# Patient Record
Sex: FEMALE | Race: WHITE | NOT HISPANIC OR LATINO
[De-identification: names, ages, dates, MRNs, and addresses within clinical notes are randomized per-mention and may not be internally consistent; named-entity substitution may affect disease eponyms.]

---

## 2017-01-05 ENCOUNTER — APPOINTMENT (OUTPATIENT)
Dept: FAMILY MEDICINE | Facility: CLINIC | Age: 82
End: 2017-01-05

## 2017-01-05 VITALS
WEIGHT: 160 LBS | DIASTOLIC BLOOD PRESSURE: 70 MMHG | HEIGHT: 65 IN | SYSTOLIC BLOOD PRESSURE: 122 MMHG | BODY MASS INDEX: 26.66 KG/M2

## 2017-01-05 DIAGNOSIS — Z01.818 ENCOUNTER FOR OTHER PREPROCEDURAL EXAMINATION: ICD-10-CM

## 2017-02-21 ENCOUNTER — MEDICATION RENEWAL (OUTPATIENT)
Age: 82
End: 2017-02-21

## 2017-02-22 ENCOUNTER — RX RENEWAL (OUTPATIENT)
Age: 82
End: 2017-02-22

## 2017-02-25 ENCOUNTER — RX RENEWAL (OUTPATIENT)
Age: 82
End: 2017-02-25

## 2017-03-13 ENCOUNTER — MEDICATION RENEWAL (OUTPATIENT)
Age: 82
End: 2017-03-13

## 2017-03-16 ENCOUNTER — RX RENEWAL (OUTPATIENT)
Age: 82
End: 2017-03-16

## 2017-03-17 ENCOUNTER — APPOINTMENT (OUTPATIENT)
Dept: FAMILY MEDICINE | Facility: CLINIC | Age: 82
End: 2017-03-17

## 2017-03-21 ENCOUNTER — APPOINTMENT (OUTPATIENT)
Dept: FAMILY MEDICINE | Facility: CLINIC | Age: 82
End: 2017-03-21

## 2017-03-21 VITALS — SYSTOLIC BLOOD PRESSURE: 138 MMHG | DIASTOLIC BLOOD PRESSURE: 70 MMHG

## 2017-03-21 RX ORDER — NEOMYCIN AND POLYMYXIN B SULFATES AND DEXAMETHASONE 3.5; 10000; 1 MG/G; [IU]/G; MG/G
3.5-10000-0.1 OINTMENT OPHTHALMIC
Qty: 3 | Refills: 0 | Status: DISCONTINUED | COMMUNITY
Start: 2017-01-11

## 2017-03-21 RX ORDER — ACETAMINOPHEN AND CODEINE 300; 30 MG/1; MG/1
300-30 TABLET ORAL
Qty: 8 | Refills: 0 | Status: DISCONTINUED | COMMUNITY
Start: 2017-01-12

## 2017-03-22 LAB
ALBUMIN SERPL ELPH-MCNC: 4.1 G/DL
ALP BLD-CCNC: 118 U/L
ALT SERPL-CCNC: 14 U/L
ANION GAP SERPL CALC-SCNC: 18 MMOL/L
AST SERPL-CCNC: 21 U/L
BASOPHILS # BLD AUTO: 0.02 K/UL
BASOPHILS NFR BLD AUTO: 0.3 %
BILIRUB SERPL-MCNC: 0.4 MG/DL
BUN SERPL-MCNC: 31 MG/DL
CALCIUM SERPL-MCNC: 9.8 MG/DL
CHLORIDE SERPL-SCNC: 98 MMOL/L
CHOLEST SERPL-MCNC: 211 MG/DL
CHOLEST/HDLC SERPL: 4.1 RATIO
CO2 SERPL-SCNC: 25 MMOL/L
CREAT SERPL-MCNC: 1.29 MG/DL
EOSINOPHIL # BLD AUTO: 0.15 K/UL
EOSINOPHIL NFR BLD AUTO: 1.9 %
GLUCOSE SERPL-MCNC: 194 MG/DL
HBA1C MFR BLD HPLC: 8.2 %
HCT VFR BLD CALC: 43.7 %
HDLC SERPL-MCNC: 52 MG/DL
HGB BLD-MCNC: 14.2 G/DL
IMM GRANULOCYTES NFR BLD AUTO: 0.1 %
LDLC SERPL CALC-MCNC: 134 MG/DL
LYMPHOCYTES # BLD AUTO: 2.32 K/UL
LYMPHOCYTES NFR BLD AUTO: 29.9 %
MAN DIFF?: NORMAL
MCHC RBC-ENTMCNC: 31.7 PG
MCHC RBC-ENTMCNC: 32.5 GM/DL
MCV RBC AUTO: 97.5 FL
MONOCYTES # BLD AUTO: 0.62 K/UL
MONOCYTES NFR BLD AUTO: 8 %
NEUTROPHILS # BLD AUTO: 4.63 K/UL
NEUTROPHILS NFR BLD AUTO: 59.8 %
PLATELET # BLD AUTO: 233 K/UL
POTASSIUM SERPL-SCNC: 4.6 MMOL/L
PROT SERPL-MCNC: 7.6 G/DL
RBC # BLD: 4.48 M/UL
RBC # FLD: 12.7 %
SODIUM SERPL-SCNC: 141 MMOL/L
TRIGL SERPL-MCNC: 125 MG/DL
TSH SERPL-ACNC: 1.34 UIU/ML
WBC # FLD AUTO: 7.75 K/UL

## 2017-03-28 ENCOUNTER — RX RENEWAL (OUTPATIENT)
Age: 82
End: 2017-03-28

## 2017-04-24 ENCOUNTER — RX RENEWAL (OUTPATIENT)
Age: 82
End: 2017-04-24

## 2017-05-23 ENCOUNTER — RX RENEWAL (OUTPATIENT)
Age: 82
End: 2017-05-23

## 2017-06-10 ENCOUNTER — RX RENEWAL (OUTPATIENT)
Age: 82
End: 2017-06-10

## 2017-06-21 ENCOUNTER — RX RENEWAL (OUTPATIENT)
Age: 82
End: 2017-06-21

## 2017-07-01 ENCOUNTER — OUTPATIENT (OUTPATIENT)
Dept: OUTPATIENT SERVICES | Facility: HOSPITAL | Age: 82
LOS: 1 days | End: 2017-07-01
Payer: MEDICAID

## 2017-07-01 DIAGNOSIS — Z90.49 ACQUIRED ABSENCE OF OTHER SPECIFIED PARTS OF DIGESTIVE TRACT: Chronic | ICD-10-CM

## 2017-07-01 DIAGNOSIS — S82.90XA UNSPECIFIED FRACTURE OF UNSPECIFIED LOWER LEG, INITIAL ENCOUNTER FOR CLOSED FRACTURE: Chronic | ICD-10-CM

## 2017-07-01 DIAGNOSIS — Z86.69 PERSONAL HISTORY OF OTHER DISEASES OF THE NERVOUS SYSTEM AND SENSE ORGANS: Chronic | ICD-10-CM

## 2017-07-18 DIAGNOSIS — R69 ILLNESS, UNSPECIFIED: ICD-10-CM

## 2017-07-27 ENCOUNTER — MEDICATION RENEWAL (OUTPATIENT)
Age: 82
End: 2017-07-27

## 2017-09-05 ENCOUNTER — RX RENEWAL (OUTPATIENT)
Age: 82
End: 2017-09-05

## 2017-09-19 ENCOUNTER — RX RENEWAL (OUTPATIENT)
Age: 82
End: 2017-09-19

## 2017-09-28 ENCOUNTER — RX RENEWAL (OUTPATIENT)
Age: 82
End: 2017-09-28

## 2017-10-19 ENCOUNTER — RX RENEWAL (OUTPATIENT)
Age: 82
End: 2017-10-19

## 2017-10-30 ENCOUNTER — RX RENEWAL (OUTPATIENT)
Age: 82
End: 2017-10-30

## 2017-11-02 ENCOUNTER — APPOINTMENT (OUTPATIENT)
Dept: FAMILY MEDICINE | Facility: CLINIC | Age: 82
End: 2017-11-02
Payer: MEDICARE

## 2017-11-02 VITALS — DIASTOLIC BLOOD PRESSURE: 76 MMHG | SYSTOLIC BLOOD PRESSURE: 146 MMHG | HEIGHT: 65 IN

## 2017-11-02 PROCEDURE — G0008: CPT

## 2017-11-02 PROCEDURE — 90688 IIV4 VACCINE SPLT 0.5 ML IM: CPT

## 2017-11-02 PROCEDURE — 99214 OFFICE O/P EST MOD 30 MIN: CPT | Mod: 25

## 2017-11-02 PROCEDURE — 36415 COLL VENOUS BLD VENIPUNCTURE: CPT

## 2017-11-03 LAB
ALBUMIN SERPL ELPH-MCNC: 4.1 G/DL
ALP BLD-CCNC: 104 U/L
ALT SERPL-CCNC: 9 U/L
ANION GAP SERPL CALC-SCNC: 15 MMOL/L
AST SERPL-CCNC: 17 U/L
BASOPHILS # BLD AUTO: 0.01 K/UL
BASOPHILS NFR BLD AUTO: 0.1 %
BILIRUB SERPL-MCNC: 0.4 MG/DL
BUN SERPL-MCNC: 26 MG/DL
CALCIUM SERPL-MCNC: 9.4 MG/DL
CHLORIDE SERPL-SCNC: 100 MMOL/L
CHOLEST SERPL-MCNC: 187 MG/DL
CHOLEST/HDLC SERPL: 3.8 RATIO
CO2 SERPL-SCNC: 28 MMOL/L
CREAT SERPL-MCNC: 0.99 MG/DL
EOSINOPHIL # BLD AUTO: 0.07 K/UL
EOSINOPHIL NFR BLD AUTO: 0.9 %
GLUCOSE SERPL-MCNC: 122 MG/DL
HBA1C MFR BLD HPLC: 7.9 %
HCT VFR BLD CALC: 43.2 %
HDLC SERPL-MCNC: 49 MG/DL
HGB BLD-MCNC: 13.8 G/DL
IMM GRANULOCYTES NFR BLD AUTO: 0.1 %
LDLC SERPL CALC-MCNC: 111 MG/DL
LYMPHOCYTES # BLD AUTO: 2.54 K/UL
LYMPHOCYTES NFR BLD AUTO: 31.4 %
MAN DIFF?: NORMAL
MCHC RBC-ENTMCNC: 31.7 PG
MCHC RBC-ENTMCNC: 31.9 GM/DL
MCV RBC AUTO: 99.3 FL
MONOCYTES # BLD AUTO: 0.73 K/UL
MONOCYTES NFR BLD AUTO: 9 %
NEUTROPHILS # BLD AUTO: 4.72 K/UL
NEUTROPHILS NFR BLD AUTO: 58.5 %
PLATELET # BLD AUTO: 242 K/UL
POTASSIUM SERPL-SCNC: 4.8 MMOL/L
PROT SERPL-MCNC: 7.8 G/DL
RBC # BLD: 4.35 M/UL
RBC # FLD: 13.2 %
SODIUM SERPL-SCNC: 143 MMOL/L
TRIGL SERPL-MCNC: 136 MG/DL
TSH SERPL-ACNC: 5 UIU/ML
WBC # FLD AUTO: 8.08 K/UL

## 2017-11-04 ENCOUNTER — RX RENEWAL (OUTPATIENT)
Age: 82
End: 2017-11-04

## 2017-12-01 ENCOUNTER — RX RENEWAL (OUTPATIENT)
Age: 82
End: 2017-12-01

## 2017-12-16 ENCOUNTER — INPATIENT (INPATIENT)
Facility: HOSPITAL | Age: 82
LOS: 2 days | Discharge: HOME | End: 2017-12-19
Attending: HOSPITALIST | Admitting: HOSPITALIST
Payer: MEDICARE

## 2017-12-16 VITALS
SYSTOLIC BLOOD PRESSURE: 143 MMHG | TEMPERATURE: 98 F | DIASTOLIC BLOOD PRESSURE: 65 MMHG | HEART RATE: 82 BPM | RESPIRATION RATE: 18 BRPM | OXYGEN SATURATION: 92 %

## 2017-12-16 DIAGNOSIS — Z90.49 ACQUIRED ABSENCE OF OTHER SPECIFIED PARTS OF DIGESTIVE TRACT: Chronic | ICD-10-CM

## 2017-12-16 DIAGNOSIS — Z86.69 PERSONAL HISTORY OF OTHER DISEASES OF THE NERVOUS SYSTEM AND SENSE ORGANS: Chronic | ICD-10-CM

## 2017-12-16 DIAGNOSIS — J44.1 CHRONIC OBSTRUCTIVE PULMONARY DISEASE WITH (ACUTE) EXACERBATION: ICD-10-CM

## 2017-12-16 DIAGNOSIS — S82.90XA UNSPECIFIED FRACTURE OF UNSPECIFIED LOWER LEG, INITIAL ENCOUNTER FOR CLOSED FRACTURE: Chronic | ICD-10-CM

## 2017-12-16 LAB
ALBUMIN SERPL ELPH-MCNC: 3.8 G/DL — SIGNIFICANT CHANGE UP (ref 3.3–5)
ALP SERPL-CCNC: 102 U/L — SIGNIFICANT CHANGE UP (ref 40–120)
ALT FLD-CCNC: 8 U/L — SIGNIFICANT CHANGE UP (ref 4–33)
AST SERPL-CCNC: 26 U/L — SIGNIFICANT CHANGE UP (ref 4–32)
BASE EXCESS BLDV CALC-SCNC: 8.2 MMOL/L — SIGNIFICANT CHANGE UP
BASOPHILS # BLD AUTO: 0.02 K/UL — SIGNIFICANT CHANGE UP (ref 0–0.2)
BASOPHILS NFR BLD AUTO: 0.2 % — SIGNIFICANT CHANGE UP (ref 0–2)
BILIRUB SERPL-MCNC: 0.3 MG/DL — SIGNIFICANT CHANGE UP (ref 0.2–1.2)
BLOOD GAS VENOUS - CREATININE: 1.23 MG/DL — SIGNIFICANT CHANGE UP (ref 0.5–1.3)
BUN SERPL-MCNC: 29 MG/DL — HIGH (ref 7–23)
CALCIUM SERPL-MCNC: 8.6 MG/DL — SIGNIFICANT CHANGE UP (ref 8.4–10.5)
CHLORIDE BLDV-SCNC: 98 MMOL/L — SIGNIFICANT CHANGE UP (ref 96–108)
CHLORIDE SERPL-SCNC: 94 MMOL/L — LOW (ref 98–107)
CK MB BLD-MCNC: 2.49 NG/ML — SIGNIFICANT CHANGE UP (ref 1–4.7)
CK SERPL-CCNC: 81 U/L — SIGNIFICANT CHANGE UP (ref 25–170)
CO2 SERPL-SCNC: 31 MMOL/L — SIGNIFICANT CHANGE UP (ref 22–31)
CREAT SERPL-MCNC: 1.38 MG/DL — HIGH (ref 0.5–1.3)
EOSINOPHIL # BLD AUTO: 0.3 K/UL — SIGNIFICANT CHANGE UP (ref 0–0.5)
EOSINOPHIL NFR BLD AUTO: 3.3 % — SIGNIFICANT CHANGE UP (ref 0–6)
GAS PNL BLDV: 135 MMOL/L — LOW (ref 136–146)
GLUCOSE BLDV-MCNC: 211 — HIGH (ref 70–99)
GLUCOSE SERPL-MCNC: 199 MG/DL — HIGH (ref 70–99)
HCO3 BLDV-SCNC: 28 MMOL/L — HIGH (ref 20–27)
HCT VFR BLD CALC: 43.5 % — SIGNIFICANT CHANGE UP (ref 34.5–45)
HCT VFR BLDV CALC: 45.7 % — HIGH (ref 34.5–45)
HGB BLD-MCNC: 14.3 G/DL — SIGNIFICANT CHANGE UP (ref 11.5–15.5)
HGB BLDV-MCNC: 14.9 G/DL — SIGNIFICANT CHANGE UP (ref 11.5–15.5)
IMM GRANULOCYTES # BLD AUTO: 0.01 # — SIGNIFICANT CHANGE UP
IMM GRANULOCYTES NFR BLD AUTO: 0.1 % — SIGNIFICANT CHANGE UP (ref 0–1.5)
LACTATE BLDV-MCNC: 1.7 MMOL/L — SIGNIFICANT CHANGE UP (ref 0.5–2)
LYMPHOCYTES # BLD AUTO: 2.03 K/UL — SIGNIFICANT CHANGE UP (ref 1–3.3)
LYMPHOCYTES # BLD AUTO: 22.2 % — SIGNIFICANT CHANGE UP (ref 13–44)
MCHC RBC-ENTMCNC: 31.3 PG — SIGNIFICANT CHANGE UP (ref 27–34)
MCHC RBC-ENTMCNC: 32.9 % — SIGNIFICANT CHANGE UP (ref 32–36)
MCV RBC AUTO: 95.2 FL — SIGNIFICANT CHANGE UP (ref 80–100)
MONOCYTES # BLD AUTO: 1.05 K/UL — HIGH (ref 0–0.9)
MONOCYTES NFR BLD AUTO: 11.5 % — SIGNIFICANT CHANGE UP (ref 2–14)
NEUTROPHILS # BLD AUTO: 5.72 K/UL — SIGNIFICANT CHANGE UP (ref 1.8–7.4)
NEUTROPHILS NFR BLD AUTO: 62.7 % — SIGNIFICANT CHANGE UP (ref 43–77)
NRBC # FLD: 0 — SIGNIFICANT CHANGE UP
PCO2 BLDV: 69 MMHG — HIGH (ref 41–51)
PH BLDV: 7.32 PH — SIGNIFICANT CHANGE UP (ref 7.32–7.43)
PLATELET # BLD AUTO: 270 K/UL — SIGNIFICANT CHANGE UP (ref 150–400)
PMV BLD: 8.8 FL — SIGNIFICANT CHANGE UP (ref 7–13)
PO2 BLDV: 31 MMHG — LOW (ref 35–40)
POTASSIUM BLDV-SCNC: 4 MMOL/L — SIGNIFICANT CHANGE UP (ref 3.4–4.5)
POTASSIUM SERPL-MCNC: 5.3 MMOL/L — SIGNIFICANT CHANGE UP (ref 3.5–5.3)
POTASSIUM SERPL-SCNC: 5.3 MMOL/L — SIGNIFICANT CHANGE UP (ref 3.5–5.3)
PROT SERPL-MCNC: 7.9 G/DL — SIGNIFICANT CHANGE UP (ref 6–8.3)
RBC # BLD: 4.57 M/UL — SIGNIFICANT CHANGE UP (ref 3.8–5.2)
RBC # FLD: 12.2 % — SIGNIFICANT CHANGE UP (ref 10.3–14.5)
SAO2 % BLDV: 48.2 % — LOW (ref 60–85)
SODIUM SERPL-SCNC: 136 MMOL/L — SIGNIFICANT CHANGE UP (ref 135–145)
TROPONIN T SERPL-MCNC: < 0.06 NG/ML — SIGNIFICANT CHANGE UP (ref 0–0.06)
WBC # BLD: 9.13 K/UL — SIGNIFICANT CHANGE UP (ref 3.8–10.5)
WBC # FLD AUTO: 9.13 K/UL — SIGNIFICANT CHANGE UP (ref 3.8–10.5)

## 2017-12-16 PROCEDURE — 71020: CPT | Mod: 26

## 2017-12-16 RX ORDER — IPRATROPIUM/ALBUTEROL SULFATE 18-103MCG
3 AEROSOL WITH ADAPTER (GRAM) INHALATION ONCE
Qty: 0 | Refills: 0 | Status: COMPLETED | OUTPATIENT
Start: 2017-12-16 | End: 2017-12-16

## 2017-12-16 RX ADMIN — Medication 3 MILLILITER(S): at 20:15

## 2017-12-16 RX ADMIN — Medication 125 MILLIGRAM(S): at 19:47

## 2017-12-16 RX ADMIN — Medication 3 MILLILITER(S): at 19:47

## 2017-12-16 NOTE — PATIENT PROFILE ADULT. - NS TRANSFER PATIENT BELONGINGS
Clothing/purse, ankle bracelet, watch, 2 rings, 1 pair of hoop earrings, 1 shopping cart/Cell Phone/PDA (specify)

## 2017-12-16 NOTE — ED PROVIDER NOTE - MEDICAL DECISION MAKING DETAILS
Maxine Morrow M.D: 83F xh COPD p/w 4 days SOB, visibly tachypnic and hypoxic on presentation. concern for COPD exacerbation. no infectious symptoms such as fever or cough to suggest a pna or viral illness as etiology. no cp to suggest cardiac etiology. will check cxr, ekg, labs, cardiacs, give nebs, steroids, supplemental oxygen and reassess. pt likely to require admission.

## 2017-12-16 NOTE — ED PROVIDER NOTE - ATTENDING CONTRIBUTION TO CARE
Attending Statement: I have personally seen and examined this patient. I have fully participated in the care of this patient. I have reviewed all pertinent clinical information, including history physical exam, plan and the Resident's note and agree except as noted   82yo F hx of COPD not on home oxy, ex smoker, HTN, DM, pw a week of SOB worsening last two days. +SOB  +LOGAN no chest pain. no fever  or chills.  no cough. no travel. no leg swelling or calf tenderness. Used her inhaler wo relief.   Vital signs noted. pt arrived on oxy that she self removed. sat 83% room air, talking on her cell phone wo any difficulty. pt refused to get undress, would not remove her pants/stocking or jacket.   limited physical exam. explained to pt would like to see if she has any pedal edema pt refused. removed her jacket, dec air entry, +bl wheezing.   plan cxr, ekg, labs, steroids and neb, admit

## 2017-12-16 NOTE — ED ADULT NURSE NOTE - OBJECTIVE STATEMENT
83 year old alert and oriented female presents to the ER with c/o SOB over one week. When RN attempted to assess pt, she was talking on her cell phone, refusing to get undressed to be examined. Pt speaking full sentences, appears comfortable. It was explained to the pt that she needs to have a through examination and in order to do that she needed to get undressed. Pt refusing to take her pantyhose and pants off MD made aware

## 2017-12-16 NOTE — ED ADULT NURSE NOTE - PMH
Chronic bronchitis  last 1/20/15  COPD (chronic obstructive pulmonary disease)    DM (diabetes mellitus)  blood glucose with PCP every 3 months , last done 3 months ago  HTN (hypertension)  mild  Hypothyroid  last TFT"s 3 months ago - normal

## 2017-12-16 NOTE — ED ADULT NURSE NOTE - EXTENSIONS OF SELF_ADULT
Medication called in for 1/13/16 pharmacy is willing to hold until medication until this time.  
Eyeglasses

## 2017-12-16 NOTE — ED PROVIDER NOTE - PHYSICAL EXAMINATION
Maxine Morrow M.D.:   patient awake alert seen sitting on stretcher, speaking in 3 word sentences, tachypnic, visibly SOB.   LUNGS poor air movement b/l; b/l expiratory wheezing L>R.   CARD RRR no m/r/g.    Abdomen soft NT ND no rebound no guarding no CVA tenderness.   EXT WWP no edema no calf tenderness CV 2+DP/PT bilaterally.   neuro A&Ox3 gait normal.    skin warm and dry no rash  HEENT: moist mucous membranes, PERRL, EOMI

## 2017-12-16 NOTE — ED PROVIDER NOTE - OBJECTIVE STATEMENT
Maxine Morrow M.D: 83yoF hx COPD, DM, HTN, p/w 4 days of progressively worsening SOB. not relieved by inhalers/nebulizers. not associated with any cp, cough, sore throat, f/c, n/v/c/d, abd pain. pt satting 83% on RA in triage Maxine Morrow M.D: 83yoF hx COPD, DM, HTN, p/w 4 days of progressively worsening SOB. not relieved by inhalers/nebulizers. not associated with any cp, cough, sore throat, f/c, n/v/c/d, abd pain. pt satting 83% on RA in room

## 2017-12-17 DIAGNOSIS — E03.9 HYPOTHYROIDISM, UNSPECIFIED: ICD-10-CM

## 2017-12-17 DIAGNOSIS — N17.9 ACUTE KIDNEY FAILURE, UNSPECIFIED: ICD-10-CM

## 2017-12-17 DIAGNOSIS — J44.1 CHRONIC OBSTRUCTIVE PULMONARY DISEASE WITH (ACUTE) EXACERBATION: ICD-10-CM

## 2017-12-17 DIAGNOSIS — I50.33 ACUTE ON CHRONIC DIASTOLIC (CONGESTIVE) HEART FAILURE: ICD-10-CM

## 2017-12-17 DIAGNOSIS — Z29.9 ENCOUNTER FOR PROPHYLACTIC MEASURES, UNSPECIFIED: ICD-10-CM

## 2017-12-17 DIAGNOSIS — E11.22 TYPE 2 DIABETES MELLITUS WITH DIABETIC CHRONIC KIDNEY DISEASE: ICD-10-CM

## 2017-12-17 LAB
APPEARANCE UR: CLEAR — SIGNIFICANT CHANGE UP
BACTERIA # UR AUTO: SIGNIFICANT CHANGE UP
BASOPHILS # BLD AUTO: 0.01 K/UL — SIGNIFICANT CHANGE UP (ref 0–0.2)
BASOPHILS NFR BLD AUTO: 0.2 % — SIGNIFICANT CHANGE UP (ref 0–2)
BILIRUB UR-MCNC: NEGATIVE — SIGNIFICANT CHANGE UP
BLOOD UR QL VISUAL: NEGATIVE — SIGNIFICANT CHANGE UP
BUN SERPL-MCNC: 38 MG/DL — HIGH (ref 7–23)
CALCIUM SERPL-MCNC: 8.4 MG/DL — SIGNIFICANT CHANGE UP (ref 8.4–10.5)
CHLORIDE SERPL-SCNC: 94 MMOL/L — LOW (ref 98–107)
CK MB BLD-MCNC: 2.27 NG/ML — SIGNIFICANT CHANGE UP (ref 1–4.7)
CK MB BLD-MCNC: SIGNIFICANT CHANGE UP (ref 0–2.5)
CK SERPL-CCNC: 47 U/L — SIGNIFICANT CHANGE UP (ref 25–170)
CO2 SERPL-SCNC: 27 MMOL/L — SIGNIFICANT CHANGE UP (ref 22–31)
COLOR SPEC: YELLOW — SIGNIFICANT CHANGE UP
CREAT ?TM UR-MCNC: 117.62 MG/DL — SIGNIFICANT CHANGE UP
CREAT SERPL-MCNC: 1.52 MG/DL — HIGH (ref 0.5–1.3)
EOSINOPHIL # BLD AUTO: 0 K/UL — SIGNIFICANT CHANGE UP (ref 0–0.5)
EOSINOPHIL NFR BLD AUTO: 0 % — SIGNIFICANT CHANGE UP (ref 0–6)
GLUCOSE SERPL-MCNC: 383 MG/DL — HIGH (ref 70–99)
GLUCOSE UR-MCNC: >1000 — HIGH
HCT VFR BLD CALC: 38.8 % — SIGNIFICANT CHANGE UP (ref 34.5–45)
HGB BLD-MCNC: 12.9 G/DL — SIGNIFICANT CHANGE UP (ref 11.5–15.5)
HYALINE CASTS # UR AUTO: SIGNIFICANT CHANGE UP (ref 0–?)
IMM GRANULOCYTES # BLD AUTO: 0.04 # — SIGNIFICANT CHANGE UP
IMM GRANULOCYTES NFR BLD AUTO: 0.7 % — SIGNIFICANT CHANGE UP (ref 0–1.5)
KETONES UR-MCNC: SIGNIFICANT CHANGE UP
LEUKOCYTE ESTERASE UR-ACNC: NEGATIVE — SIGNIFICANT CHANGE UP
LYMPHOCYTES # BLD AUTO: 0.53 K/UL — LOW (ref 1–3.3)
LYMPHOCYTES # BLD AUTO: 9.1 % — LOW (ref 13–44)
MAGNESIUM SERPL-MCNC: 2.1 MG/DL — SIGNIFICANT CHANGE UP (ref 1.6–2.6)
MCHC RBC-ENTMCNC: 32.2 PG — SIGNIFICANT CHANGE UP (ref 27–34)
MCHC RBC-ENTMCNC: 33.2 % — SIGNIFICANT CHANGE UP (ref 32–36)
MCV RBC AUTO: 96.8 FL — SIGNIFICANT CHANGE UP (ref 80–100)
MONOCYTES # BLD AUTO: 0.07 K/UL — SIGNIFICANT CHANGE UP (ref 0–0.9)
MONOCYTES NFR BLD AUTO: 1.2 % — LOW (ref 2–14)
MUCOUS THREADS # UR AUTO: SIGNIFICANT CHANGE UP
NEUTROPHILS # BLD AUTO: 5.16 K/UL — SIGNIFICANT CHANGE UP (ref 1.8–7.4)
NEUTROPHILS NFR BLD AUTO: 88.8 % — HIGH (ref 43–77)
NITRITE UR-MCNC: NEGATIVE — SIGNIFICANT CHANGE UP
NON-SQ EPI CELLS # UR AUTO: <1 — SIGNIFICANT CHANGE UP
NRBC # FLD: 0 — SIGNIFICANT CHANGE UP
NT-PROBNP SERPL-SCNC: 546.1 PG/ML — SIGNIFICANT CHANGE UP
OSMOLALITY UR: 656 MOSMO/KG — SIGNIFICANT CHANGE UP (ref 50–1200)
PH UR: 5.5 — SIGNIFICANT CHANGE UP (ref 4.6–8)
PHOSPHATE SERPL-MCNC: 3.5 MG/DL — SIGNIFICANT CHANGE UP (ref 2.5–4.5)
PLATELET # BLD AUTO: 239 K/UL — SIGNIFICANT CHANGE UP (ref 150–400)
PMV BLD: 9 FL — SIGNIFICANT CHANGE UP (ref 7–13)
POTASSIUM SERPL-MCNC: 5 MMOL/L — SIGNIFICANT CHANGE UP (ref 3.5–5.3)
POTASSIUM SERPL-SCNC: 5 MMOL/L — SIGNIFICANT CHANGE UP (ref 3.5–5.3)
PROT UR-MCNC: 20 MG/DL — SIGNIFICANT CHANGE UP
RBC # BLD: 4.01 M/UL — SIGNIFICANT CHANGE UP (ref 3.8–5.2)
RBC # FLD: 12.1 % — SIGNIFICANT CHANGE UP (ref 10.3–14.5)
RBC CASTS # UR COMP ASSIST: SIGNIFICANT CHANGE UP (ref 0–?)
SODIUM SERPL-SCNC: 135 MMOL/L — SIGNIFICANT CHANGE UP (ref 135–145)
SODIUM UR-SCNC: 34 MEQ/L — SIGNIFICANT CHANGE UP
SP GR SPEC: 1.02 — SIGNIFICANT CHANGE UP (ref 1–1.04)
SQUAMOUS # UR AUTO: SIGNIFICANT CHANGE UP
T4 FREE SERPL-MCNC: 1.41 NG/DL — SIGNIFICANT CHANGE UP (ref 0.9–1.8)
TROPONIN T SERPL-MCNC: < 0.06 NG/ML — SIGNIFICANT CHANGE UP (ref 0–0.06)
TSH SERPL-MCNC: 1.54 UIU/ML — SIGNIFICANT CHANGE UP (ref 0.27–4.2)
UROBILINOGEN FLD QL: NORMAL MG/DL — SIGNIFICANT CHANGE UP
UUN UR-MCNC: 889.8 MG/DL — SIGNIFICANT CHANGE UP
WBC # BLD: 5.81 K/UL — SIGNIFICANT CHANGE UP (ref 3.8–10.5)
WBC # FLD AUTO: 5.81 K/UL — SIGNIFICANT CHANGE UP (ref 3.8–10.5)
WBC UR QL: SIGNIFICANT CHANGE UP (ref 0–?)

## 2017-12-17 PROCEDURE — 12345: CPT | Mod: GC,NC

## 2017-12-17 PROCEDURE — 99223 1ST HOSP IP/OBS HIGH 75: CPT | Mod: GC

## 2017-12-17 RX ORDER — GLUCAGON INJECTION, SOLUTION 0.5 MG/.1ML
1 INJECTION, SOLUTION SUBCUTANEOUS ONCE
Qty: 0 | Refills: 0 | Status: DISCONTINUED | OUTPATIENT
Start: 2017-12-17 | End: 2017-12-19

## 2017-12-17 RX ORDER — LEVOTHYROXINE SODIUM 125 MCG
100 TABLET ORAL DAILY
Qty: 0 | Refills: 0 | Status: DISCONTINUED | OUTPATIENT
Start: 2017-12-17 | End: 2017-12-17

## 2017-12-17 RX ORDER — DEXTROSE 50 % IN WATER 50 %
1 SYRINGE (ML) INTRAVENOUS ONCE
Qty: 0 | Refills: 0 | Status: DISCONTINUED | OUTPATIENT
Start: 2017-12-17 | End: 2017-12-19

## 2017-12-17 RX ORDER — INSULIN LISPRO 100/ML
VIAL (ML) SUBCUTANEOUS AT BEDTIME
Qty: 0 | Refills: 0 | Status: DISCONTINUED | OUTPATIENT
Start: 2017-12-17 | End: 2017-12-18

## 2017-12-17 RX ORDER — SODIUM CHLORIDE 9 MG/ML
1000 INJECTION INTRAMUSCULAR; INTRAVENOUS; SUBCUTANEOUS
Qty: 0 | Refills: 0 | Status: DISCONTINUED | OUTPATIENT
Start: 2017-12-17 | End: 2017-12-18

## 2017-12-17 RX ORDER — DEXTROSE 50 % IN WATER 50 %
25 SYRINGE (ML) INTRAVENOUS ONCE
Qty: 0 | Refills: 0 | Status: DISCONTINUED | OUTPATIENT
Start: 2017-12-17 | End: 2017-12-19

## 2017-12-17 RX ORDER — BUDESONIDE AND FORMOTEROL FUMARATE DIHYDRATE 160; 4.5 UG/1; UG/1
2 AEROSOL RESPIRATORY (INHALATION)
Qty: 0 | Refills: 0 | Status: DISCONTINUED | OUTPATIENT
Start: 2017-12-17 | End: 2017-12-19

## 2017-12-17 RX ORDER — FUROSEMIDE 40 MG
20 TABLET ORAL DAILY
Qty: 0 | Refills: 0 | Status: DISCONTINUED | OUTPATIENT
Start: 2017-12-17 | End: 2017-12-17

## 2017-12-17 RX ORDER — SODIUM CHLORIDE 9 MG/ML
1000 INJECTION, SOLUTION INTRAVENOUS
Qty: 0 | Refills: 0 | Status: DISCONTINUED | OUTPATIENT
Start: 2017-12-17 | End: 2017-12-17

## 2017-12-17 RX ORDER — IPRATROPIUM/ALBUTEROL SULFATE 18-103MCG
3 AEROSOL WITH ADAPTER (GRAM) INHALATION EVERY 6 HOURS
Qty: 0 | Refills: 0 | Status: DISCONTINUED | OUTPATIENT
Start: 2017-12-17 | End: 2017-12-17

## 2017-12-17 RX ORDER — SODIUM CHLORIDE 9 MG/ML
1000 INJECTION, SOLUTION INTRAVENOUS
Qty: 0 | Refills: 0 | Status: DISCONTINUED | OUTPATIENT
Start: 2017-12-17 | End: 2017-12-19

## 2017-12-17 RX ORDER — PANTOPRAZOLE SODIUM 20 MG/1
40 TABLET, DELAYED RELEASE ORAL
Qty: 0 | Refills: 0 | Status: DISCONTINUED | OUTPATIENT
Start: 2017-12-17 | End: 2017-12-19

## 2017-12-17 RX ORDER — HEPARIN SODIUM 5000 [USP'U]/ML
5000 INJECTION INTRAVENOUS; SUBCUTANEOUS EVERY 8 HOURS
Qty: 0 | Refills: 0 | Status: DISCONTINUED | OUTPATIENT
Start: 2017-12-17 | End: 2017-12-19

## 2017-12-17 RX ORDER — LEVOTHYROXINE SODIUM 125 MCG
88 TABLET ORAL DAILY
Qty: 0 | Refills: 0 | Status: DISCONTINUED | OUTPATIENT
Start: 2017-12-17 | End: 2017-12-19

## 2017-12-17 RX ORDER — DEXTROSE 50 % IN WATER 50 %
12.5 SYRINGE (ML) INTRAVENOUS ONCE
Qty: 0 | Refills: 0 | Status: DISCONTINUED | OUTPATIENT
Start: 2017-12-17 | End: 2017-12-19

## 2017-12-17 RX ORDER — IPRATROPIUM/ALBUTEROL SULFATE 18-103MCG
3 AEROSOL WITH ADAPTER (GRAM) INHALATION EVERY 6 HOURS
Qty: 0 | Refills: 0 | Status: DISCONTINUED | OUTPATIENT
Start: 2017-12-17 | End: 2017-12-18

## 2017-12-17 RX ORDER — INSULIN LISPRO 100/ML
VIAL (ML) SUBCUTANEOUS
Qty: 0 | Refills: 0 | Status: DISCONTINUED | OUTPATIENT
Start: 2017-12-17 | End: 2017-12-18

## 2017-12-17 RX ADMIN — SODIUM CHLORIDE 70 MILLILITER(S): 9 INJECTION INTRAMUSCULAR; INTRAVENOUS; SUBCUTANEOUS at 13:02

## 2017-12-17 RX ADMIN — Medication 5: at 12:56

## 2017-12-17 RX ADMIN — HEPARIN SODIUM 5000 UNIT(S): 5000 INJECTION INTRAVENOUS; SUBCUTANEOUS at 05:52

## 2017-12-17 RX ADMIN — PANTOPRAZOLE SODIUM 40 MILLIGRAM(S): 20 TABLET, DELAYED RELEASE ORAL at 05:52

## 2017-12-17 RX ADMIN — Medication 3 MILLILITER(S): at 16:31

## 2017-12-17 RX ADMIN — SODIUM CHLORIDE 75 MILLILITER(S): 9 INJECTION, SOLUTION INTRAVENOUS at 10:01

## 2017-12-17 RX ADMIN — Medication 3 MILLILITER(S): at 21:37

## 2017-12-17 RX ADMIN — Medication 3 MILLILITER(S): at 11:22

## 2017-12-17 RX ADMIN — HEPARIN SODIUM 5000 UNIT(S): 5000 INJECTION INTRAVENOUS; SUBCUTANEOUS at 22:31

## 2017-12-17 RX ADMIN — Medication 3: at 18:04

## 2017-12-17 RX ADMIN — Medication 100 MICROGRAM(S): at 05:51

## 2017-12-17 RX ADMIN — Medication 3: at 09:24

## 2017-12-17 RX ADMIN — Medication 40 MILLIGRAM(S): at 05:51

## 2017-12-17 RX ADMIN — HEPARIN SODIUM 5000 UNIT(S): 5000 INJECTION INTRAVENOUS; SUBCUTANEOUS at 14:03

## 2017-12-17 RX ADMIN — BUDESONIDE AND FORMOTEROL FUMARATE DIHYDRATE 2 PUFF(S): 160; 4.5 AEROSOL RESPIRATORY (INHALATION) at 22:31

## 2017-12-17 NOTE — PHYSICAL THERAPY INITIAL EVALUATION ADULT - GENERAL OBSERVATIONS, REHAB EVAL
Pt encountered in semisupine position, no distress, AxOx4, with +IV, and 3.5L of O2 through Nasal cannula

## 2017-12-17 NOTE — PHYSICAL THERAPY INITIAL EVALUATION ADULT - PATIENT PROFILE REVIEW, REHAB EVAL
No formal activity order in the computer; spoke with JAYDE Peterson prior to PT evaluation--> Pt OK for OOB activity/yes

## 2017-12-17 NOTE — H&P ADULT - NSHPREVIEWOFSYSTEMS_GEN_ALL_CORE
REVIEW OF SYSTEMS:  CONSTITUTIONAL: No weakness, fevers or chills  EYES/ENT: No visual changes;  No vertigo or throat pain   NECK: No pain or stiffness  RESPIRATORY: +cough and SOB. No wheezing, hemoptysis  CARDIOVASCULAR: No chest pain or palpitations  GASTROINTESTINAL: No abdominal or epigastric pain. No nausea, vomiting, or hematemesis; No diarrhea or constipation. No melena or hematochezia.  GENITOURINARY: No dysuria, frequency or hematuria  NEUROLOGICAL: No numbness or weakness  SKIN: No itching, burning, rashes, or lesions   All other review of systems is negative unless indicated above.

## 2017-12-17 NOTE — H&P ADULT - NSHPLABSRESULTS_GEN_ALL_CORE
14.3   9.13  )-----------( 270      ( 16 Dec 2017 19:56 )             43.5       12-16    136  |  94<L>  |  29<H>  ----------------------------<  199<H>  5.3   |  31  |  1.38<H>    Ca    8.6      16 Dec 2017 19:56    TPro  7.9  /  Alb  3.8  /  TBili  0.3  /  DBili  x   /  AST  26  /  ALT  8   /  AlkPhos  102  12-16                      Lactate Trend      CARDIAC MARKERS ( 16 Dec 2017 19:56 )  x     / < 0.06 ng/mL / 81 u/L / 2.49 ng/mL / x            CAPILLARY BLOOD GLUCOSE      POCT Blood Glucose.: 338 mg/dL (17 Dec 2017 00:26)

## 2017-12-17 NOTE — H&P ADULT - ATTENDING COMMENTS
82 y/o female HX of Diastolic CHF, preserved EF in the past, COPD, not on Home o2, DM, HTN, Obesity, hypothyroid, + SOB x 4 days, + LOGAN, + Dry cough, no fever, no CP, EXPOSED to second hand smoking , no recent travel, no sick contact, Hypoxic in the ER?, + JANELLE as per lab,     R/O COPD/Asthma exacerbation? Duoneb Q 6 hr, Prednisone 40 mg QD, Protonix, FS, sliding scale, IVF 1/2 NS @ 75 cc/hr x 8 hours, Pulmonary consult House, Fall/aspiration precaution, ECHO, Hold Lasix for now, DVT Prophylaxis: SQ Heparin, synthroid, F/U CBC, CMP, TSH, Free  T4, HgbA1c, O2 NC, PT consult,    Case d/w pt, HS     Pt was seen by me, Dr. WINSOME Guzmán on 12/17/17.

## 2017-12-17 NOTE — H&P ADULT - PROBLEM SELECTOR PLAN 2
-Patient has known stage II diastolic dysfunction, on home lasix,  concerning for CHF exacerbation although   -Repeat TTE  -C/w PO lasix 20mg PO daily  -Daily weight -Urine sodium, urea, creatinine  -Strict I's+O's  -Trend S-cr, avoid nephrotoxins; holding lasix

## 2017-12-17 NOTE — H&P ADULT - HISTORY OF PRESENT ILLNESS
Patient is an 83 year-old female with a past medical history of HFpEF (diastolic stage 2)  COPD (not on home oxygen), T2DM, HTN, hypothyroidism presents with progressively worsening SOB x 4 days. She is unable to walk several steps without having dyspnea on exertion, in association with mild cough. She reports increasing use of her ProAir which has not provided relief of her symptoms. Patient states she has been in the company of her friend who smokes a lot of cigarettes and the smell of smoke irritates her lungs. She denies any orthopnea, PND, fever, chills, night sweats, productive cough, chest pain, sore throat, nausea, vomiting, diarrhea,  constipation, abdominal pain, dysuria, urgency or frequency.     ED course: Found to be hypoxic to 83% on RA. Given albuterol x 3 treatments, Solumedrol 125mg. Patient is an 83 year-old female with a past medical history of HFpEF (diastolic stage 2), COPD (not on home oxygen), T2DM, HTN, hypothyroidism presents with progressively worsening SOB x 4 days. She is unable to walk several steps without having dyspnea on exertion, in association with mild cough. She reports increasing use of her ProAir which has not provided relief of her symptoms. Patient states she has been in the company of her friend who smokes a lot of cigarettes and the smell of smoke irritates her lungs. She denies any orthopnea, PND, fever, chills, night sweats, productive cough, chest pain, sore throat, nausea, vomiting, diarrhea,  constipation, abdominal pain, dysuria, urgency or frequency, sick contact, recent travel.    ED course: Found to be hypoxic to 83% on RA. Given albuterol x 3 treatments, Solumedrol 125mg.

## 2017-12-17 NOTE — CONSULT NOTE ADULT - ATTENDING COMMENTS
Face to face visit was on 12/18/17. Patient with severe obstructive lung disease, known to Dr. Brooke here for dyspnea. Patient has been non-compliant with inhalers. She was also recommended for home oxygen therapy but patient states that she is "vain" and really hesitant to start home oxygen. Overall, clinically improved since admission after proper inhaler technique education. She was also advised that Symbicort should be used twice daily regardless of symptoms. Agree with addition of Spiriva. Explained risks and benefits of oxygen use. Check ambulatory room air saturation and nocturnal oximetry. Will likely need oxygen set up at home.

## 2017-12-17 NOTE — CONSULT NOTE ADULT - SUBJECTIVE AND OBJECTIVE BOX
CHIEF COMPLAINT: dyspnea    HPI: Ms. Welsh is a 83 year old woman with severe sx COPD [FEV 43] - not on home O2, HTN, HFpEF, presented here with dyspnea for 4 d.     At baseline, she has significant sx -- becomes dyspnea while doing few chores. For the last 4 d, this even worse, as she was barely able to ambulate around the house. She does not have a signficant cough. No fevers. No chest pain. No leg swelling. She thinks it may have been triggered by her friend smoking. no sick contacts.    She used to see Dr. Brooke in clinic -- but has not seen him since . She was advised to use O2, but she did not want or did not fully understand. She admits not taking her symbicort daily. She only takes her symbicort and proair as needed. She does not understand the technique of the inhalers -- and would like to just take a pill.     Significant former smoking hx.     In the hospital, she got prednisone, nebulizers, and O2 -- feels improved.     PAST MEDICAL & SURGICAL HISTORY:  Chronic bronchitis: last 1/20/15  COPD (chronic obstructive pulmonary disease)  HTN (hypertension): mild  Hypothyroid: last TFT&quot;s 3 months ago - normal  DM (diabetes mellitus): blood glucose with PCP every 3 months , last done 3 months ago  Leg fracture: right ORIF with hardware   H/O cataract: bilateral   S/P cholecystectomy      FAMILY HISTORY:  No pertinent family history      SOCIAL HISTORY:  Smoking: many years.  lives alone    Allergies  No Known Allergies    HOME MEDICATIONS:  reviewed; not compliant with her inhalers    REVIEW OF SYSTEMS:  see HPI    OBJECTIVE:  ICU Vital Signs Last 24 Hrs  T(C): 36.5 (17 Dec 2017 21:23), Max: 36.6 (16 Dec 2017 23:54)  T(F): 97.7 (17 Dec 2017 21:23), Max: 97.8 (16 Dec 2017 23:54)  HR: 68 (17 Dec 2017 21:23) (65 - 81)  BP: 96/70 (17 Dec 2017 21:23) (96/70 - 134/66)  BP(mean): --  ABP: --  ABP(mean): --  RR: 20 (17 Dec 2017 21:23) (20 - 20)  SpO2: 97% (17 Dec 2017 21:23) (91% - 98%)         @ 07:01  -   @ 07:00  --------------------------------------------------------  IN: 200 mL / OUT: 0 mL / NET: 200 mL      CAPILLARY BLOOD GLUCOSE      POCT Blood Glucose.: 271 mg/dL (17 Dec 2017 16:55)      PHYSICAL EXAM:  General: on NC. mild dyspnea but able to speak in full sentences.  HEENT:  clear OP.  Lymph Nodes: no LAD  Respiratory: mild distant breath sounds; otherwise clear.  Cardiovascular: nl rate and reg rhythm. nl s1, s2.  Abdomen: soft. nt. nd  Extremities: trace edema.  Skin: no clubbing    BEDSIDE SONO:   normal aeration pattern   nl LV, nl RV    HOSPITAL MEDICATIONS:  MEDICATIONS  (STANDING):  ALBUTerol/ipratropium for Nebulization 3 milliLiter(s) Nebulizer every 6 hours  buDESOnide 160 MICROgram(s)/formoterol 4.5 MICROgram(s) Inhaler 2 Puff(s) Inhalation two times a day  dextrose 5%. 1000 milliLiter(s) (50 mL/Hr) IV Continuous <Continuous>  dextrose 50% Injectable 12.5 Gram(s) IV Push once  dextrose 50% Injectable 25 Gram(s) IV Push once  dextrose 50% Injectable 25 Gram(s) IV Push once  heparin  Injectable 5000 Unit(s) SubCutaneous every 8 hours  insulin lispro (HumaLOG) corrective regimen sliding scale   SubCutaneous three times a day before meals  insulin lispro (HumaLOG) corrective regimen sliding scale   SubCutaneous at bedtime  levothyroxine 88 MICROGram(s) Oral daily  pantoprazole    Tablet 40 milliGRAM(s) Oral before breakfast  predniSONE   Tablet 40 milliGRAM(s) Oral daily  sodium chloride 0.9%. 1000 milliLiter(s) (70 mL/Hr) IV Continuous <Continuous>    MEDICATIONS  (PRN):  dextrose Gel 1 Dose(s) Oral once PRN Blood Glucose LESS THAN 70 milliGRAM(s)/deciliter  glucagon  Injectable 1 milliGRAM(s) IntraMuscular once PRN Glucose LESS THAN 70 milligrams/deciliter      LABS:                        12.9   5.81  )-----------( 239      ( 17 Dec 2017 05:37 )             38.8         135  |  94<L>  |  38<H>  ----------------------------<  383<H>  5.0   |  27  |  1.52<H>    Ca    8.4      17 Dec 2017 05:37  Phos  3.5       Mg     2.1         TPro  7.9  /  Alb  3.8  /  TBili  0.3  /  DBili  x   /  AST  26  /  ALT  8   /  AlkPhos  102        Urinalysis Basic - ( 17 Dec 2017 15:28 )    Color: YELLOW / Appearance: CLEAR / S.023 / pH: 5.5  Gluc: >1000 / Ketone: TRACE  / Bili: NEGATIVE / Urobili: NORMAL mg/dL   Blood: NEGATIVE / Protein: 20 mg/dL / Nitrite: NEGATIVE   Leuk Esterase: NEGATIVE / RBC: 0-2 / WBC 2-5   Sq Epi: OCC / Non Sq Epi: x / Bacteria: FEW        Venous Blood Gas:   @ 19:50  7.32/69/31/28/48.2  VBG Lactate: 1.7    RADIOLOGY:  reviewed

## 2017-12-17 NOTE — H&P ADULT - PROBLEM SELECTOR PLAN 5
-AlinoNebs PRN q6h  -Supplemental oxygen via cannula -C/w synthroid 100 mcg daily  -Serum TSH, free T4

## 2017-12-17 NOTE — H&P ADULT - PROBLEM SELECTOR PROBLEM 3
Type 2 diabetes mellitus with diabetic chronic kidney disease, unspecified CKD stage, unspecified long term insulin use status Diastolic CHF, acute on chronic

## 2017-12-17 NOTE — PHYSICAL THERAPY INITIAL EVALUATION ADULT - ADDITIONAL COMMENTS
Pt reports that she lives alone in an apartment with no stairs to negotiate; elevator inside. Prior to hospital admission pt was completely independent and used no assistive device with household ambulation; uses shopping cart with community ambulation. Pt denies any recent falls.    O2 sats monitored throughout ambulation (room air) 82-87%.    Pt left comfortable in bed, NAD, all lines intact, all precautions maintained, with call bell in reach, O2 sats @ 90%, and RN aware of PT evaluation.

## 2017-12-17 NOTE — CONSULT NOTE ADULT - ASSESSMENT
Ms. Welsh is a 83 year old woman with severe sx COPD [FEV 43] - not on home O2, HTN, HFpEF, presented here with dyspnea for 4 d. Most likely this is acute worsening of her chronic condition. Unclear the exact trigger. She is quite symptomatic from her obstructive disease -- and likely needs baseline O2 at home. Doubt any significant HF component. The issue is that she not been using her maintenance medications appropriately and does not wish to wear O2 at home 2/2 stigmata.     Tried explaining at length the importance of taking her inhalers and how her obstructive disease is not reversible. Also explained how O2 help her lifestyle and quality of life. She is willing to re-try the spiriva and take the symbicort BID at home.   - add spiriva.   - c/w symbicort BID   - can change duonebs to just albuterol nebs as starting spiriva  - explained proper technique of the inhalers. please have the RN teach her how to use it when taking it in the hospital.   - check O2 on RA +/- ambulating to document that sats <88% to qualify her for home O2.   - taper steroids slowly -- suggest going down by 10 mg every 3 d to off.   - she needs to see Dr. Brooke in outpt pulm clinic. [phone].   - would benefit from outpt pulm rehab as well.     will discuss case with attg in the AM.     Ben Rodriguez MD   Pulmonary Fellow

## 2017-12-17 NOTE — H&P ADULT - PROBLEM SELECTOR PLAN 3
-A1c, diabetic diet, diabetes examination  -ISS and sliding scale pre-meal and bedtime -Patient has known stage II diastolic dysfunction, on home lasix,  concerning for CHF exacerbation although   -Repeat TTE  -BNP, second cardiac enzymes  -Holding home lasix 20mg PO daily  -Daily weight

## 2017-12-17 NOTE — H&P ADULT - PROBLEM SELECTOR PLAN 4
-C/w synthroid 100 mcg daily  -Serum TSH -A1c, diabetic diet, diabetes examination  -ISS and sliding scale pre-meal and bedtime

## 2017-12-17 NOTE — H&P ADULT - PROBLEM SELECTOR PLAN 1
-CXR without evidence of consolidation. RVP pending.   -C/w Prednisone 40mg x 5 days   -DuoNebs q6h PRN  -Nasal cannula for respiratory support  -Not starting azithromycin at this time -CXR without evidence of consolidation. RVP pending.   -C/w Prednisone 40mg x 5 days   - Protonix 40mg  -DuoNebs q6h  -Nasal cannula for respiratory support, wean as tolerated  -Not starting azithromycin at this time  -Pulmonology consult in AM

## 2017-12-17 NOTE — PROGRESS NOTE ADULT - PROBLEM SELECTOR PLAN 2
likely on CKD (baseline Cr 0.99, GFR 50, stage 2-3)  -Urine sodium, urea, creatinine, osm  -Strict I's+O's  -Trend S-cr, avoid nephrotoxins; holding lasix likely on CKD (baseline Cr 0.99, GFR 50, stage 2-3)  -Urine sodium, urea, creatinine, osm  -Strict I's+O's  -Trend S-cr, avoid nephrotoxins; holding lasix  -gentle hydration

## 2017-12-17 NOTE — H&P ADULT - NSHPPHYSICALEXAM_GEN_ALL_CORE
Vital Signs Last 24 Hrs  T(C): 36.6 (16 Dec 2017 23:54), Max: 36.6 (16 Dec 2017 17:39)  T(F): 97.8 (16 Dec 2017 23:54), Max: 97.8 (16 Dec 2017 17:39)  HR: 81 (16 Dec 2017 23:54) (81 - 82)  BP: 134/66 (16 Dec 2017 23:54) (134/66 - 143/65)  BP(mean): --  RR: 20 (16 Dec 2017 23:54) (18 - 20)  SpO2: 94% (16 Dec 2017 23:54) (92% - 94%)    General: WN/WD NAD  Neurology: A&Ox3, nonfocal, MOCTEZUMA x 4  Head:  Normocephalic, atraumatic  ENT:  Mucosa moist, no ulcerations  Neck:  Supple, no sinuses or palpable masses  Lymphatic:  No palpable cervical, supraclavicular, axillary or inguinal adenopathy  Respiratory: CTA B/L  CV: RRR, S1S2, no murmur  Abdominal: Soft, NT, ND no palpable mass  MSK: No edema, + peripheral pulses, FROM all 4 extremity  Incisions: intact, no erythema or drainage

## 2017-12-17 NOTE — PROGRESS NOTE ADULT - PROBLEM SELECTOR PLAN 1
-CXR without evidence of consolidation or edema  -Patient refusing RVP  -Prednisone 40mg day 1/5  -Protonix 40mg  -DuoNebs q6h  -Nasal cannula for respiratory support, wean as tolerated  -Not starting azithromycin at this time  -Pulmonology consult in AM  -c/w symbicort and proair -CXR without evidence of consolidation or edema  -Patient refusing RVP  -Prednisone 40mg day 1/5  -Protonix 40mg  -DuoNebs q6h  -Nasal cannula for respiratory support, wean as tolerated  -Not starting azithromycin at this time  -Pulmonology consult in AM  [facility]

## 2017-12-17 NOTE — PHYSICAL THERAPY INITIAL EVALUATION ADULT - PERTINENT HX OF CURRENT PROBLEM, REHAB EVAL
Patient is an 83 year-old female with a past medical history of HFpEF (diastolic stage 2)  COPD (not on home oxygen), T2DM, HTN, hypothyroidism presents with progressively worsening SOB x 4 days

## 2017-12-17 NOTE — H&P ADULT - ASSESSMENT
Patient is an 83 year-old female with a past medical history of HFpEF (diastolic stage 2)  COPD (not on home oxygen), T2DM, HTN, hypothyroidism presents with progressively worsening SOB x 4 days.

## 2017-12-17 NOTE — H&P ADULT - PROBLEM SELECTOR PROBLEM 4
Hypothyroidism, unspecified type Type 2 diabetes mellitus with diabetic chronic kidney disease, unspecified CKD stage, unspecified long term insulin use status

## 2017-12-17 NOTE — PROGRESS NOTE ADULT - PROBLEM SELECTOR PLAN 3
-Patient has known stage II diastolic dysfunction, on home lasix  -Repeat TTE  -  -Holding home lasix 20mg PO daily  -Daily weight

## 2017-12-18 ENCOUNTER — TRANSCRIPTION ENCOUNTER (OUTPATIENT)
Age: 82
End: 2017-12-18

## 2017-12-18 DIAGNOSIS — J96.00 ACUTE RESPIRATORY FAILURE, UNSPECIFIED WHETHER WITH HYPOXIA OR HYPERCAPNIA: ICD-10-CM

## 2017-12-18 DIAGNOSIS — I50.32 CHRONIC DIASTOLIC (CONGESTIVE) HEART FAILURE: ICD-10-CM

## 2017-12-18 LAB
BUN SERPL-MCNC: 55 MG/DL — HIGH (ref 7–23)
CALCIUM SERPL-MCNC: 8 MG/DL — LOW (ref 8.4–10.5)
CHLORIDE SERPL-SCNC: 99 MMOL/L — SIGNIFICANT CHANGE UP (ref 98–107)
CK MB BLD-MCNC: 2.79 NG/ML — SIGNIFICANT CHANGE UP (ref 1–4.7)
CK SERPL-CCNC: 38 U/L — SIGNIFICANT CHANGE UP (ref 25–170)
CO2 SERPL-SCNC: 25 MMOL/L — SIGNIFICANT CHANGE UP (ref 22–31)
CREAT SERPL-MCNC: 1.76 MG/DL — HIGH (ref 0.5–1.3)
GLUCOSE SERPL-MCNC: 212 MG/DL — HIGH (ref 70–99)
HCT VFR BLD CALC: 34.9 % — SIGNIFICANT CHANGE UP (ref 34.5–45)
HGB BLD-MCNC: 11.6 G/DL — SIGNIFICANT CHANGE UP (ref 11.5–15.5)
MAGNESIUM SERPL-MCNC: 2.1 MG/DL — SIGNIFICANT CHANGE UP (ref 1.6–2.6)
MCHC RBC-ENTMCNC: 32.4 PG — SIGNIFICANT CHANGE UP (ref 27–34)
MCHC RBC-ENTMCNC: 33.2 % — SIGNIFICANT CHANGE UP (ref 32–36)
MCV RBC AUTO: 97.5 FL — SIGNIFICANT CHANGE UP (ref 80–100)
NRBC # FLD: 0 — SIGNIFICANT CHANGE UP
OSMOLALITY UR: 354 MOSMO/KG — SIGNIFICANT CHANGE UP (ref 50–1200)
PHOSPHATE SERPL-MCNC: 3.1 MG/DL — SIGNIFICANT CHANGE UP (ref 2.5–4.5)
PLATELET # BLD AUTO: 234 K/UL — SIGNIFICANT CHANGE UP (ref 150–400)
PMV BLD: 8.9 FL — SIGNIFICANT CHANGE UP (ref 7–13)
POTASSIUM SERPL-MCNC: 4.5 MMOL/L — SIGNIFICANT CHANGE UP (ref 3.5–5.3)
POTASSIUM SERPL-SCNC: 4.5 MMOL/L — SIGNIFICANT CHANGE UP (ref 3.5–5.3)
RBC # BLD: 3.58 M/UL — LOW (ref 3.8–5.2)
RBC # FLD: 12.1 % — SIGNIFICANT CHANGE UP (ref 10.3–14.5)
SODIUM SERPL-SCNC: 138 MMOL/L — SIGNIFICANT CHANGE UP (ref 135–145)
SODIUM UR-SCNC: 24 MEQ/L — SIGNIFICANT CHANGE UP
TROPONIN T SERPL-MCNC: < 0.06 NG/ML — SIGNIFICANT CHANGE UP (ref 0–0.06)
UUN UR-MCNC: 635 MG/DL — SIGNIFICANT CHANGE UP
WBC # BLD: 13 K/UL — HIGH (ref 3.8–10.5)
WBC # FLD AUTO: 13 K/UL — HIGH (ref 3.8–10.5)

## 2017-12-18 PROCEDURE — 99233 SBSQ HOSP IP/OBS HIGH 50: CPT | Mod: GC

## 2017-12-18 PROCEDURE — 99223 1ST HOSP IP/OBS HIGH 75: CPT | Mod: GC

## 2017-12-18 RX ORDER — INSULIN LISPRO 100/ML
VIAL (ML) SUBCUTANEOUS
Qty: 0 | Refills: 0 | Status: DISCONTINUED | OUTPATIENT
Start: 2017-12-18 | End: 2017-12-19

## 2017-12-18 RX ORDER — ALBUTEROL 90 UG/1
2.5 AEROSOL, METERED ORAL EVERY 6 HOURS
Qty: 0 | Refills: 0 | Status: DISCONTINUED | OUTPATIENT
Start: 2017-12-18 | End: 2017-12-19

## 2017-12-18 RX ORDER — TIOTROPIUM BROMIDE 18 UG/1
1 CAPSULE ORAL; RESPIRATORY (INHALATION) DAILY
Qty: 0 | Refills: 0 | Status: DISCONTINUED | OUTPATIENT
Start: 2017-12-18 | End: 2017-12-19

## 2017-12-18 RX ORDER — SODIUM CHLORIDE 9 MG/ML
1000 INJECTION INTRAMUSCULAR; INTRAVENOUS; SUBCUTANEOUS
Qty: 0 | Refills: 0 | Status: DISCONTINUED | OUTPATIENT
Start: 2017-12-18 | End: 2017-12-19

## 2017-12-18 RX ADMIN — SODIUM CHLORIDE 75 MILLILITER(S): 9 INJECTION INTRAMUSCULAR; INTRAVENOUS; SUBCUTANEOUS at 12:16

## 2017-12-18 RX ADMIN — ALBUTEROL 2.5 MILLIGRAM(S): 90 AEROSOL, METERED ORAL at 11:25

## 2017-12-18 RX ADMIN — Medication 88 MICROGRAM(S): at 06:15

## 2017-12-18 RX ADMIN — Medication 40 MILLIGRAM(S): at 06:15

## 2017-12-18 RX ADMIN — BUDESONIDE AND FORMOTEROL FUMARATE DIHYDRATE 2 PUFF(S): 160; 4.5 AEROSOL RESPIRATORY (INHALATION) at 21:09

## 2017-12-18 RX ADMIN — ALBUTEROL 2.5 MILLIGRAM(S): 90 AEROSOL, METERED ORAL at 21:45

## 2017-12-18 RX ADMIN — Medication 2: at 09:17

## 2017-12-18 RX ADMIN — BUDESONIDE AND FORMOTEROL FUMARATE DIHYDRATE 2 PUFF(S): 160; 4.5 AEROSOL RESPIRATORY (INHALATION) at 09:18

## 2017-12-18 RX ADMIN — Medication 8: at 18:03

## 2017-12-18 RX ADMIN — HEPARIN SODIUM 5000 UNIT(S): 5000 INJECTION INTRAVENOUS; SUBCUTANEOUS at 14:10

## 2017-12-18 RX ADMIN — ALBUTEROL 2.5 MILLIGRAM(S): 90 AEROSOL, METERED ORAL at 17:03

## 2017-12-18 RX ADMIN — HEPARIN SODIUM 5000 UNIT(S): 5000 INJECTION INTRAVENOUS; SUBCUTANEOUS at 21:10

## 2017-12-18 RX ADMIN — PANTOPRAZOLE SODIUM 40 MILLIGRAM(S): 20 TABLET, DELAYED RELEASE ORAL at 06:15

## 2017-12-18 RX ADMIN — Medication 3 MILLILITER(S): at 04:58

## 2017-12-18 RX ADMIN — HEPARIN SODIUM 5000 UNIT(S): 5000 INJECTION INTRAVENOUS; SUBCUTANEOUS at 06:15

## 2017-12-18 RX ADMIN — TIOTROPIUM BROMIDE 1 CAPSULE(S): 18 CAPSULE ORAL; RESPIRATORY (INHALATION) at 10:00

## 2017-12-18 NOTE — PROGRESS NOTE ADULT - PROBLEM SELECTOR PLAN 1
-CXR without evidence of consolidation or edema  -Patient refusing RVP  -Prednisone taper decr 10mg q3d; now at 40mg day 2/3  -Protonix 40mg  -Albuterol nebs q6h  -Spiriva, symbicort daily  -Will instruct patient on how to properly take inhalers  -Will obtain ox sat on ambulation to determine home need for O2  -Pulmonology following, outpatient f/u with Dr. Brooke -CXR without evidence of consolidation or edema  -Patient refusing RVP  -Prednisone taper decr 10mg q3d; now at 40mg day 2/3  -Protonix 40mg  -Albuterol nebs PRN  -Spiriva, symbicort daily  -Will instruct patient on how to properly take inhalers  -Will obtain ox sat on ambulation to determine home need for O2  -Pulmonology following, outpatient f/u with Dr. Brooke

## 2017-12-18 NOTE — DISCHARGE NOTE ADULT - HOSPITAL COURSE
Patient is an 83 year-old female with a past medical history of HFpEF (diastolic stage 2)  COPD (not on home oxygen), T2DM, HTN, hypothyroidism presents with progressively worsening SOB x 4 days. Admitted for COPD exacerbation. CXR showed no evidence of consolidation or edema. Patient started on duonebs and prednisone. Pulmonology consulted and recommended adding spiriva onto home symbicort and proair. Patient was educated on how to use inhalers. Patient was weaned from supplemental oxygen. Oxygen saturation with ambulation was documented as 88%. Hospital course complicated by JANELLE, likely pre-renal secondary to dehydration. Creatinine improved with gentle fluids. Patient is an 83 year-old female with a past medical history of HFpEF (diastolic stage 2)  COPD (not on home oxygen), T2DM, HTN, hypothyroidism presents with progressively worsening SOB x 4 days. Admitted for COPD exacerbation. CXR showed no evidence of consolidation or edema. Patient started on duonebs and prednisone. Pulmonology consulted and recommended adding spiriva onto home symbicort and proair. Patient was educated on how to use inhalers. Patient was weaned from supplemental oxygen. Hospital course complicated by JANELLE, likely pre-renal secondary to dehydration. Creatinine improved with gentle fluids. Patient maintaining oxygen saturation >88% both at rest and ambulation, therefore, does not currently qualify for home oxygen. Patient stable for discharge home.    ***PLEASE follow up with Dr. Brooke [phone]  within 1-2 weeks of discharge***  ***PLEASE follow up with Dr. Cheek within 1-2 weeks of discharge for repeat blood work and to discuss restarting lasix if necessary***  ***Prescriptions sent to [facility] on [address] in [place], [place]***

## 2017-12-18 NOTE — DISCHARGE NOTE ADULT - CONDITIONS AT DISCHARGE
This patient is stable for discharge ,she is alert and oriented x 4; walks about with standby assistance; Fall Precautions are taken; her Skin is intact, but dry and moisture Lotion is applied.  Vital Signs are stable and Discharge Instructions are discussed and given.  her [id number] of Breath is resolved , no c/o chest Pains.

## 2017-12-18 NOTE — PROGRESS NOTE ADULT - PROBLEM SELECTOR PLAN 4
-A1c 7.9  -diabetic diet, diabetes examination  -ISS and sliding scale pre-meal and bedtime  -resume metformin on discharge -Patient has known stage II diastolic dysfunction, on home lasix  -Clinically euvolemic  -  -Hold home lasix  -Daily weight, I/Os  -Recommend repeat outpatient TTE

## 2017-12-18 NOTE — DISCHARGE NOTE ADULT - CARE PLAN
Principal Discharge DX:	COPD exacerbation  Goal:	Improvement in breathing  Instructions for follow-up, activity and diet:	Please take all medications as prescribed.  Please take spiriva once every day.  Please take symbicort twice every day.  Please take proair only as needed for wheezing, cough or shortness of breath.  Please follow up with Dr. Brooke [phone] within 1-2 weeks of discharge.  Secondary Diagnosis:	JANELLE (acute kidney injury)  Goal:	Improvement in kidney function  Instructions for follow-up, activity and diet:	Please drink plenty of water everyday.  Please follow up with Dr. Cheek within 1-2 weeks of discharge for repeat blood work. Principal Discharge DX:	COPD exacerbation  Goal:	Improvement in breathing  Instructions for follow-up, activity and diet:	Please take predisone 30mg for 3 days followed by 20mg for 3 days followed by 10mg for 3 days.  Please take protonix while you are taking prednisone to prevent stomach lining injury.  Please take spiriva once every day.  Please take symbicort twice every day.  Please take proair only as needed for wheezing, cough or shortness of breath.  Please follow up with Dr. Brooke [phone] within 1-2 weeks of discharge.  Secondary Diagnosis:	JANELLE (acute kidney injury)  Goal:	Improvement in kidney function  Instructions for follow-up, activity and diet:	Please drink plenty of water everyday.  Please follow up with Dr. Cheek within 1-2 weeks of discharge for repeat blood work.

## 2017-12-18 NOTE — DISCHARGE NOTE ADULT - CARE PROVIDER_API CALL
Jan Brooke), Medicine  Pulmonary Medicine  [address]  [address]  [place] [address]  Phone: [phone]  Fax: [phone]    Mane Cheek), Family Medicine  [address]  [address]  [place] [address]  Phone: [phone]  Fax: [phone]

## 2017-12-18 NOTE — PROGRESS NOTE ADULT - PROBLEM SELECTOR PROBLEM 3
Diastolic CHF, acute on chronic Type 2 diabetes mellitus with diabetic chronic kidney disease, unspecified CKD stage, unspecified long term insulin use status

## 2017-12-18 NOTE — PROGRESS NOTE ADULT - PROBLEM SELECTOR PLAN 6
HSQ on presentation saturation 83%; now improved to >92% on RA  -likely secondary to COPD exacerbation  -improved with Duonebs + supplemental oxygen

## 2017-12-18 NOTE — DISCHARGE NOTE ADULT - ADDITIONAL INSTRUCTIONS
Please follow up with Dr. Brooke [phone] within 1-2 weeks of discharge.  Please follow up with Dr. Cheek within 1-2 weeks of discharge for repeat blood work.

## 2017-12-18 NOTE — DISCHARGE NOTE ADULT - PATIENT PORTAL LINK FT
“You can access the FollowHealth Patient Portal, offered by [facility], by registering with the following website: [email or web address]”

## 2017-12-18 NOTE — PROGRESS NOTE ADULT - PROBLEM SELECTOR PLAN 2
likely on CKD (baseline Cr 0.99, GFR 50, stage 2-3)  -FeUrea 30%, likely pre-renal due to dehydration  -Trend S-cr, avoid nephrotoxins; holding lasix  -gentle hydration likely on CKD (baseline Cr 0.99, GFR 50, stage 2-3)  -FeUrea 30%, likely pre-renal due to dehydration  -Trend S-cr, avoid nephrotoxins; holding lasix  -gentle hydration  -trend BMP

## 2017-12-18 NOTE — PROGRESS NOTE ADULT - PROBLEM SELECTOR PROBLEM 4
Type 2 diabetes mellitus with diabetic chronic kidney disease, unspecified CKD stage, unspecified long term insulin use status Chronic diastolic congestive heart failure

## 2017-12-18 NOTE — DISCHARGE NOTE ADULT - MEDICATION SUMMARY - MEDICATIONS TO TAKE
I will START or STAY ON the medications listed below when I get home from the hospital:    predniSONE 10 mg oral tablet  -- 3 tabs daily for 3 days followed by 2 tabs daily for 3 days followed by 1 tab daily for 3 days  -- It is very important that you take or use this exactly as directed.  Do not skip doses or discontinue unless directed by your doctor.  Obtain medical advice before taking any non-prescription drugs as some may affect the action of this medication.  Take with food or milk.    -- Indication: For COPD exacerbation    metformin 500 mg oral tablet  -- 2  by mouth once a day  -- Indication: For Diabetes    Symbicort 160 mcg-4.5 mcg/inh inhalation aerosol  -- 2 puff(s) inhaled 2 times a day  -- Indication: For COPD    ProAir HFA 90 mcg/inh inhalation aerosol  -- 2 puff(s) inhaled 4 times a day  -- Indication: For COPD    tiotropium 18 mcg inhalation capsule  -- 1 cap(s) inhaled once a day  -- Indication: For COPD    pantoprazole 40 mg oral delayed release tablet  -- 1 tab(s) by mouth once a day (before a meal)  -- Indication: For preventative measure while on steroids    Synthroid 88 mcg (0.088 mg) oral tablet  -- 1 tab(s) by mouth once a day  -- Indication: For Hypothyroidism, unspecified type

## 2017-12-18 NOTE — DISCHARGE NOTE ADULT - PLAN OF CARE
Improvement in breathing Please take all medications as prescribed.  Please take spiriva once every day.  Please take symbicort twice every day.  Please take proair only as needed for wheezing, cough or shortness of breath.  Please follow up with Dr. Brooke [phone] within 1-2 weeks of discharge. Improvement in kidney function Please drink plenty of water everyday.  Please follow up with Dr. Cheek within 1-2 weeks of discharge for repeat blood work. Please take predisone 30mg for 3 days followed by 20mg for 3 days followed by 10mg for 3 days.  Please take protonix while you are taking prednisone to prevent stomach lining injury.  Please take spiriva once every day.  Please take symbicort twice every day.  Please take proair only as needed for wheezing, cough or shortness of breath.  Please follow up with Dr. Brooke [phone] within 1-2 weeks of discharge.

## 2017-12-18 NOTE — DISCHARGE NOTE ADULT - CARE PROVIDERS DIRECT ADDRESSES
[email or web address]

## 2017-12-18 NOTE — DISCHARGE NOTE ADULT - MEDICATION SUMMARY - MEDICATIONS TO STOP TAKING
I will STOP taking the medications listed below when I get home from the hospital:    Lasix 20 mg oral tablet  -- 1 tab(s) by mouth once a day    Combivent 18 mcg-103 mcg-/inh inhalation aerosol  -- 1 puff(s) inhaled every 6 hours, As Needed- for bronchospasm    Combivent Respimat CFC free 20 mcg-100 mcg/inh inhalation aerosol  -- 1 puff(s) inhaled 4 times a day, As needed, SOB

## 2017-12-19 VITALS
RESPIRATION RATE: 19 BRPM | TEMPERATURE: 98 F | WEIGHT: 173.72 LBS | DIASTOLIC BLOOD PRESSURE: 59 MMHG | HEART RATE: 66 BPM | SYSTOLIC BLOOD PRESSURE: 128 MMHG | OXYGEN SATURATION: 92 %

## 2017-12-19 LAB
BUN SERPL-MCNC: 55 MG/DL — HIGH (ref 7–23)
CALCIUM SERPL-MCNC: 8.4 MG/DL — SIGNIFICANT CHANGE UP (ref 8.4–10.5)
CHLORIDE SERPL-SCNC: 97 MMOL/L — LOW (ref 98–107)
CO2 SERPL-SCNC: 26 MMOL/L — SIGNIFICANT CHANGE UP (ref 22–31)
CREAT SERPL-MCNC: 1.53 MG/DL — HIGH (ref 0.5–1.3)
GLUCOSE SERPL-MCNC: 161 MG/DL — HIGH (ref 70–99)
HCT VFR BLD CALC: 37.3 % — SIGNIFICANT CHANGE UP (ref 34.5–45)
HGB BLD-MCNC: 11.9 G/DL — SIGNIFICANT CHANGE UP (ref 11.5–15.5)
MAGNESIUM SERPL-MCNC: 2.2 MG/DL — SIGNIFICANT CHANGE UP (ref 1.6–2.6)
MCHC RBC-ENTMCNC: 30.9 PG — SIGNIFICANT CHANGE UP (ref 27–34)
MCHC RBC-ENTMCNC: 31.9 % — LOW (ref 32–36)
MCV RBC AUTO: 96.9 FL — SIGNIFICANT CHANGE UP (ref 80–100)
NRBC # FLD: 0 — SIGNIFICANT CHANGE UP
PHOSPHATE SERPL-MCNC: 2.9 MG/DL — SIGNIFICANT CHANGE UP (ref 2.5–4.5)
PLATELET # BLD AUTO: 260 K/UL — SIGNIFICANT CHANGE UP (ref 150–400)
PMV BLD: 9.2 FL — SIGNIFICANT CHANGE UP (ref 7–13)
POTASSIUM SERPL-MCNC: 4.4 MMOL/L — SIGNIFICANT CHANGE UP (ref 3.5–5.3)
POTASSIUM SERPL-SCNC: 4.4 MMOL/L — SIGNIFICANT CHANGE UP (ref 3.5–5.3)
RBC # BLD: 3.85 M/UL — SIGNIFICANT CHANGE UP (ref 3.8–5.2)
RBC # FLD: 12.7 % — SIGNIFICANT CHANGE UP (ref 10.3–14.5)
SODIUM SERPL-SCNC: 136 MMOL/L — SIGNIFICANT CHANGE UP (ref 135–145)
WBC # BLD: 11.46 K/UL — HIGH (ref 3.8–10.5)
WBC # FLD AUTO: 11.46 K/UL — HIGH (ref 3.8–10.5)

## 2017-12-19 PROCEDURE — 99239 HOSP IP/OBS DSCHRG MGMT >30: CPT

## 2017-12-19 RX ORDER — TIOTROPIUM BROMIDE 18 UG/1
1 CAPSULE ORAL; RESPIRATORY (INHALATION)
Qty: 1 | Refills: 3 | OUTPATIENT
Start: 2017-12-19 | End: 2018-04-17

## 2017-12-19 RX ORDER — BUDESONIDE AND FORMOTEROL FUMARATE DIHYDRATE 160; 4.5 UG/1; UG/1
2 AEROSOL RESPIRATORY (INHALATION)
Qty: 1 | Refills: 0
Start: 2017-12-19 | End: 2018-01-17

## 2017-12-19 RX ORDER — BUDESONIDE AND FORMOTEROL FUMARATE DIHYDRATE 160; 4.5 UG/1; UG/1
2 AEROSOL RESPIRATORY (INHALATION)
Qty: 0 | Refills: 0 | COMMUNITY

## 2017-12-19 RX ORDER — PANTOPRAZOLE SODIUM 20 MG/1
1 TABLET, DELAYED RELEASE ORAL
Qty: 9 | Refills: 0
Start: 2017-12-19 | End: 2017-12-27

## 2017-12-19 RX ORDER — SODIUM CHLORIDE 9 MG/ML
1000 INJECTION INTRAMUSCULAR; INTRAVENOUS; SUBCUTANEOUS
Qty: 0 | Refills: 0 | Status: DISCONTINUED | OUTPATIENT
Start: 2017-12-19 | End: 2017-12-19

## 2017-12-19 RX ADMIN — BUDESONIDE AND FORMOTEROL FUMARATE DIHYDRATE 2 PUFF(S): 160; 4.5 AEROSOL RESPIRATORY (INHALATION) at 09:00

## 2017-12-19 RX ADMIN — Medication 88 MICROGRAM(S): at 05:11

## 2017-12-19 RX ADMIN — ALBUTEROL 2.5 MILLIGRAM(S): 90 AEROSOL, METERED ORAL at 10:00

## 2017-12-19 RX ADMIN — HEPARIN SODIUM 5000 UNIT(S): 5000 INJECTION INTRAVENOUS; SUBCUTANEOUS at 13:18

## 2017-12-19 RX ADMIN — TIOTROPIUM BROMIDE 1 CAPSULE(S): 18 CAPSULE ORAL; RESPIRATORY (INHALATION) at 09:27

## 2017-12-19 RX ADMIN — Medication 40 MILLIGRAM(S): at 05:11

## 2017-12-19 RX ADMIN — Medication 10: at 13:18

## 2017-12-19 RX ADMIN — Medication 6: at 09:00

## 2017-12-19 RX ADMIN — HEPARIN SODIUM 5000 UNIT(S): 5000 INJECTION INTRAVENOUS; SUBCUTANEOUS at 05:11

## 2017-12-19 RX ADMIN — SODIUM CHLORIDE 75 MILLILITER(S): 9 INJECTION INTRAMUSCULAR; INTRAVENOUS; SUBCUTANEOUS at 09:01

## 2017-12-19 RX ADMIN — PANTOPRAZOLE SODIUM 40 MILLIGRAM(S): 20 TABLET, DELAYED RELEASE ORAL at 05:11

## 2017-12-19 NOTE — PROGRESS NOTE ADULT - PROBLEM SELECTOR PLAN 1
-CXR without evidence of consolidation or edema  -Patient refusing RVP  -Prednisone taper decr 10mg q3d; now at 40mg day 3/3  -Protonix 40mg  -Albuterol nebs PRN  -Spiriva, symbicort daily  -Will instruct patient on how to properly take inhalers  -Will obtain repeat ox sat on ambulation this AM prior to discharge  -Pulmonology following, outpatient f/u with Dr. Brooke

## 2017-12-19 NOTE — PROGRESS NOTE ADULT - PROBLEM SELECTOR PLAN 5
-C/w synthroid 88 mcg daily  -Serum TSH, free T4 WNL

## 2017-12-19 NOTE — PROGRESS NOTE ADULT - PROBLEM SELECTOR PLAN 6
on presentation saturation 83%, improved w/ nasal cannula   -now improved not requiring O2  -likely secondary to COPD exacerbation

## 2017-12-19 NOTE — PROGRESS NOTE ADULT - SUBJECTIVE AND OBJECTIVE BOX
Suly Bower MD (PGY-1)  Pager: [phone]   7AM-7PM      Patient is a 83y old  Female who presents with a chief complaint of SOB (17 Dec 2017 03:20)        SUBJECTIVE / OVERNIGHT EVENTS: Patient weaned to 2L NC. Reports breathing has gotten much better. +1 loose BM yesterday.       MEDICATIONS  (STANDING):  ALBUTerol    0.083% 2.5 milliGRAM(s) Nebulizer every 6 hours  buDESOnide 160 MICROgram(s)/formoterol 4.5 MICROgram(s) Inhaler 2 Puff(s) Inhalation two times a day  dextrose 5%. 1000 milliLiter(s) (50 mL/Hr) IV Continuous <Continuous>  dextrose 50% Injectable 12.5 Gram(s) IV Push once  dextrose 50% Injectable 25 Gram(s) IV Push once  dextrose 50% Injectable 25 Gram(s) IV Push once  heparin  Injectable 5000 Unit(s) SubCutaneous every 8 hours  insulin lispro (HumaLOG) corrective regimen sliding scale   SubCutaneous three times a day before meals  insulin lispro (HumaLOG) corrective regimen sliding scale   SubCutaneous at bedtime  levothyroxine 88 MICROGram(s) Oral daily  pantoprazole    Tablet 40 milliGRAM(s) Oral before breakfast  predniSONE   Tablet 40 milliGRAM(s) Oral daily  sodium chloride 0.9%. 1000 milliLiter(s) (70 mL/Hr) IV Continuous <Continuous>  sodium chloride 0.9%. 1000 milliLiter(s) (75 mL/Hr) IV Continuous <Continuous>  tiotropium 18 MICROgram(s) Capsule 1 Capsule(s) Inhalation daily    MEDICATIONS  (PRN):  dextrose Gel 1 Dose(s) Oral once PRN Blood Glucose LESS THAN 70 milliGRAM(s)/deciliter  glucagon  Injectable 1 milliGRAM(s) IntraMuscular once PRN Glucose LESS THAN 70 milligrams/deciliter      Vital Signs:  Vital Signs Last 24 Hrs  T(C): 36.5 (18 Dec 2017 06:10), Max: 36.5 (17 Dec 2017 21:23)  T(F): 97.7 (18 Dec 2017 06:10), Max: 97.7 (17 Dec 2017 21:23)  HR: 65 (18 Dec 2017 06:10) (64 - 71)  BP: 109/53 (18 Dec 2017 06:10) (96/70 - 119/48)  BP(mean): --  RR: 20 (18 Dec 2017 06:10) (20 - 20)  SpO2: 93% (18 Dec 2017 06:10) (91% - 98%)  Daily     Daily   CAPILLARY BLOOD GLUCOSE      POCT Blood Glucose.: 216 mg/dL (18 Dec 2017 09:06)  POCT Blood Glucose.: 197 mg/dL (17 Dec 2017 22:00)  POCT Blood Glucose.: 271 mg/dL (17 Dec 2017 16:55)  POCT Blood Glucose.: 369 mg/dL (17 Dec 2017 12:34)    I&O's Summary    17 Dec 2017 07:01  -  18 Dec 2017 07:00  --------------------------------------------------------  IN: 500 mL / OUT: 0 mL / NET: 500 mL        PHYSICAL EXAM  GENERAL: NAD, sitting on side of bed, responding to questions appropriately  HEAD:  Atraumatic, Normocephalic  EYES: EOMI, PERRLA, conjunctiva and sclera clear  CHEST/LUNG: Normal work of breathing; Clear to auscultation bilaterally; No wheeze, rhonchi or rales  HEART: Regular rate and rhythm; No murmurs, rubs, or gallops  ABDOMEN: Soft, Nontender, Nondistended; Bowel sounds present  EXTREMITIES:  Warm, dry, venous statis, b/l pedal edema, 2+ Peripheral Pulses  NEURO: AAOx3, moving all extremities  SKIN: No rashes or lesions    LABS:                        11.6   13.00 )-----------( 234      ( 18 Dec 2017 05:30 )             34.9     12-18    138  |  99  |  55<H>  ----------------------------<  212<H>  4.5   |  25  |  1.76<H>    Ca    8.0<L>      18 Dec 2017 05:30  Phos  3.1     12-18  Mg     2.1     -18    TPro  7.9  /  Alb  3.8  /  TBili  0.3  /  DBili  x   /  AST  26  /  ALT  8   /  AlkPhos  102  12-16      CARDIAC MARKERS ( 18 Dec 2017 05:30 )  x     / < 0.06 ng/mL / 38 u/L / 2.79 ng/mL / x      CARDIAC MARKERS ( 17 Dec 2017 05:37 )  x     / < 0.06 ng/mL / 47 u/L / 2.27 ng/mL / x      CARDIAC MARKERS ( 16 Dec 2017 19:56 )  x     / < 0.06 ng/mL / 81 u/L / 2.49 ng/mL / x          Urinalysis Basic - ( 17 Dec 2017 15:28 )    Color: YELLOW / Appearance: CLEAR / S.023 / pH: 5.5  Gluc: >1000 / Ketone: TRACE  / Bili: NEGATIVE / Urobili: NORMAL mg/dL   Blood: NEGATIVE / Protein: 20 mg/dL / Nitrite: NEGATIVE   Leuk Esterase: NEGATIVE / RBC: 0-2 / WBC 2-5   Sq Epi: OCC / Non Sq Epi: x / Bacteria: FEW        RADIOLOGY & ADDITIONAL TESTS:
Suly Bower MD (PGY-1)  Pager: [phone]   7AM-7PM      Patient is a 83y old  Female who presents with a chief complaint of SOB (17 Dec 2017 03:20)    HPI: 84yo F PMH HFpEF (diastolic stage 2 - on daily furosemide), COPD (not on home O2), T2DM, HTN, hypothyroidism presents with 4 days of shortness of breath, dyspnea on exertion, dry cough. She can usually walk a few blocks without shortness of breath but now is experiencing SOB walking a few steps. She reports using Proair without improvement in symptoms. She also reports being in the company of a friend who smokes cigarettes. Per patient, her last COPD exacerbation was a few years ago. She has never been intubated. She denies fever, chills, rigors, URI symptoms, productive cough, chest pain, palpitations, orthopnea, PND, nausea, vomiting, diarrhea, sick contacts, recent travel. She denies missing any doses of home furosemide.    In ED, AF, HR 80s, BP 140s/60s, RR 18, 83-92% on RA. Placed on 6L NC w/ improvement of saturation. CXR performed showing clear lungs. EKG without acute changes. Cardiac enzymes negative.     OVERNIGHT EVENTS: Patient remains on 6L NC. Patient reports feeling better since admission, work of breathing at baseline.       MEDICATIONS  (STANDING):  ALBUTerol/ipratropium for Nebulization 3 milliLiter(s) Nebulizer every 6 hours  dextrose 5%. 1000 milliLiter(s) (50 mL/Hr) IV Continuous <Continuous>  dextrose 50% Injectable 12.5 Gram(s) IV Push once  dextrose 50% Injectable 25 Gram(s) IV Push once  dextrose 50% Injectable 25 Gram(s) IV Push once  heparin  Injectable 5000 Unit(s) SubCutaneous every 8 hours  insulin lispro (HumaLOG) corrective regimen sliding scale   SubCutaneous three times a day before meals  insulin lispro (HumaLOG) corrective regimen sliding scale   SubCutaneous at bedtime  levothyroxine 100 MICROGram(s) Oral daily  pantoprazole    Tablet 40 milliGRAM(s) Oral before breakfast  predniSONE   Tablet 40 milliGRAM(s) Oral daily  sodium chloride 0.45%. 1000 milliLiter(s) (75 mL/Hr) IV Continuous <Continuous>    MEDICATIONS  (PRN):  dextrose Gel 1 Dose(s) Oral once PRN Blood Glucose LESS THAN 70 milliGRAM(s)/deciliter  glucagon  Injectable 1 milliGRAM(s) IntraMuscular once PRN Glucose LESS THAN 70 milligrams/deciliter      Vital Signs:  Vital Signs Last 24 Hrs  T(C): 36.6 (17 Dec 2017 05:46), Max: 36.6 (16 Dec 2017 17:39)  T(F): 97.8 (17 Dec 2017 05:46), Max: 97.8 (16 Dec 2017 17:39)  HR: 68 (17 Dec 2017 05:46) (68 - 82)  BP: 103/55 (17 Dec 2017 05:46) (103/55 - 143/65)  BP(mean): --  RR: 20 (17 Dec 2017 05:46) (18 - 20)  SpO2: 92% (17 Dec 2017 05:46) (92% - 94%)  Daily Height in cm: 165.1 (16 Dec 2017 23:54)    Daily Weight in k.7 (17 Dec 2017 05:46)  CAPILLARY BLOOD GLUCOSE      POCT Blood Glucose.: 300 mg/dL (17 Dec 2017 09:09)  POCT Blood Glucose.: 338 mg/dL (17 Dec 2017 00:26)    I&O's Summary    16 Dec 2017 07:01  -  17 Dec 2017 07:00  --------------------------------------------------------  IN: 200 mL / OUT: 0 mL / NET: 200 mL        PHYSICAL EXAM  GENERAL: NAD, sitting comfortably on side of bed, responding to questions appropriately  EYES: EOMI, PERRLA, conjunctiva and sclera clear  CHEST/LUNG: Clear to auscultation bilaterally; No wheeze, rhonchi or rales  HEART: Regular rate and rhythm; No murmurs, rubs, or gallops  ABDOMEN: Soft, Nontender, Nondistended; Bowel sounds present  EXTREMITIES:  Warm, dry, 2+ pedal edema to mid calf bilaterally; 2+ Peripheral Pulses  NEURO: AAOx3, moving all extremities  SKIN: No rashes or lesions    LABS:                        12.9   5.81  )-----------( 239      ( 17 Dec 2017 05:37 )             38.8     12-17    135  |  94<L>  |  38<H>  ----------------------------<  383<H>  5.0   |  27  |  1.52<H>    Ca    8.4      17 Dec 2017 05:37  Phos  3.5       Mg     2.1         TPro  7.9  /  Alb  3.8  /  TBili  0.3  /  DBili  x   /  AST  26  /  ALT  8   /  AlkPhos  102        CARDIAC MARKERS ( 17 Dec 2017 05:37 )  x     / < 0.06 ng/mL / 47 u/L / 2.27 ng/mL / x      CARDIAC MARKERS ( 16 Dec 2017 19:56 )  x     / < 0.06 ng/mL / 81 u/L / 2.49 ng/mL / x              RADIOLOGY & ADDITIONAL TESTS:
Suly Bower MD (PGY-1)  Pager: [phone]   7AM-7PM      Patient is a 83y old  Female who presents with a chief complaint of SOB (18 Dec 2017 17:35)        SUBJECTIVE / OVERNIGHT EVENTS: No events overnight. Patient slept throughout the night without supplemental oxygen. This AM she walked to bathroom and felt short of breath so put NC back on for comfort. +BM      MEDICATIONS  (STANDING):  ALBUTerol    0.083% 2.5 milliGRAM(s) Nebulizer every 6 hours  buDESOnide 160 MICROgram(s)/formoterol 4.5 MICROgram(s) Inhaler 2 Puff(s) Inhalation two times a day  dextrose 5%. 1000 milliLiter(s) (50 mL/Hr) IV Continuous <Continuous>  dextrose 50% Injectable 12.5 Gram(s) IV Push once  dextrose 50% Injectable 25 Gram(s) IV Push once  dextrose 50% Injectable 25 Gram(s) IV Push once  heparin  Injectable 5000 Unit(s) SubCutaneous every 8 hours  insulin lispro (HumaLOG) corrective regimen sliding scale   SubCutaneous three times a day before meals  levothyroxine 88 MICROGram(s) Oral daily  pantoprazole    Tablet 40 milliGRAM(s) Oral before breakfast  predniSONE   Tablet 40 milliGRAM(s) Oral daily  sodium chloride 0.9%. 1000 milliLiter(s) (75 mL/Hr) IV Continuous <Continuous>  sodium chloride 0.9%. 1000 milliLiter(s) (75 mL/Hr) IV Continuous <Continuous>  tiotropium 18 MICROgram(s) Capsule 1 Capsule(s) Inhalation daily    MEDICATIONS  (PRN):  dextrose Gel 1 Dose(s) Oral once PRN Blood Glucose LESS THAN 70 milliGRAM(s)/deciliter  glucagon  Injectable 1 milliGRAM(s) IntraMuscular once PRN Glucose LESS THAN 70 milligrams/deciliter      Vital Signs:  Vital Signs Last 24 Hrs  T(C): 36.8 (19 Dec 2017 05:08), Max: 37.2 (18 Dec 2017 21:07)  T(F): 98.2 (19 Dec 2017 05:08), Max: 98.9 (18 Dec 2017 21:07)  HR: 66 (19 Dec 2017 05:08) (60 - 80)  BP: 128/59 (19 Dec 2017 05:08) (113/55 - 128/59)  BP(mean): --  RR: 19 (19 Dec 2017 05:08) (19 - 20)  SpO2: 92% (19 Dec 2017 05:08) (92% - 97%)  Daily     Daily Weight in k.8 (19 Dec 2017 05:08)  CAPILLARY BLOOD GLUCOSE      POCT Blood Glucose.: 275 mg/dL (19 Dec 2017 08:34)  POCT Blood Glucose.: 167 mg/dL (18 Dec 2017 22:21)  POCT Blood Glucose.: 368 mg/dL (18 Dec 2017 17:19)  POCT Blood Glucose.: 395 mg/dL (18 Dec 2017 13:10)    I&O's Summary      PHYSICAL EXAM  GENERAL: NAD, resting comfortably in bed, responding to questions appropriately  HEAD:  Atraumatic, Normocephalic  EYES: EOMI, PERRLA, conjunctiva and sclera clear  NECK: Supple, No JVD  CHEST/LUNG: Clear to auscultation bilaterally; No wheeze, rhonchi or rales  HEART: Regular rate and rhythm; No murmurs, rubs, or gallops  ABDOMEN: Soft, Nontender, Nondistended; Bowel sounds present  EXTREMITIES:  Warm, dry, venous stasis b/l, b/l pedal edema unchanged since admission, 2+ Peripheral Pulses  SKIN: No rashes or lesions    LABS:                        11.9   11.46 )-----------( 260      ( 19 Dec 2017 05:20 )             37.3     12-19    136  |  97<L>  |  55<H>  ----------------------------<  161<H>  4.4   |  26  |  1.53<H>    Ca    8.4      19 Dec 2017 05:20  Phos  2.9     12-19  Mg     2.2     12-19        CARDIAC MARKERS ( 18 Dec 2017 05:30 )  x     / < 0.06 ng/mL / 38 u/L / 2.79 ng/mL / x          Urinalysis Basic - ( 17 Dec 2017 15:28 )    Color: YELLOW / Appearance: CLEAR / S.023 / pH: 5.5  Gluc: >1000 / Ketone: TRACE  / Bili: NEGATIVE / Urobili: NORMAL mg/dL   Blood: NEGATIVE / Protein: 20 mg/dL / Nitrite: NEGATIVE   Leuk Esterase: NEGATIVE / RBC: 0-2 / WBC 2-5   Sq Epi: OCC / Non Sq Epi: x / Bacteria: FEW        RADIOLOGY & ADDITIONAL TESTS:

## 2017-12-19 NOTE — PROGRESS NOTE ADULT - PROBLEM SELECTOR PLAN 3
-A1c 7.9  -diabetic diet, diabetes examination  -ISS and sliding scale pre-meal and bedtime  -resume metformin on discharge

## 2017-12-19 NOTE — PROGRESS NOTE ADULT - ATTENDING COMMENTS
Patient seen and examined. Agree with above note by resident.    DC home today with outpt follow up with pulm. At this time patient does not qualify for home 02. Time spent>30 mins.
Pt seen and exmained. Improved breathing. c/w steroids and nebs  JANELLE: Gentle hydration and trend cr
Patient seen and examined. Agree with above note by resident.    1. Acute hypoxic respiratory failure - patient desaturated to 80s on RA on admission. Likely due to COPD exacerbation. C/w prednisone. Now improving. Will check oxygen saturation on exertion   2. JANELLE - had diarrhea yesterday so perhaps pre-renal. Give gentle IVF. Monitor renal function. Avoid nephrotoxic agents  3. HfpEF - euvolemic at this time.     Plan discussed with patient.

## 2017-12-19 NOTE — PROGRESS NOTE ADULT - PROBLEM SELECTOR PLAN 2
likely on CKD (baseline Cr 0.99, GFR 50, stage 2-3)  -Improving  -FeUrea 30%, likely pre-renal due to dehydration  -Trend S-cr, avoid nephrotoxins; holding lasix  -gentle hydration  -trend BMP

## 2017-12-19 NOTE — PROGRESS NOTE ADULT - ASSESSMENT
Patient is an 83 year-old female with a past medical history of HFpEF (diastolic stage 2)  COPD (not on home oxygen), T2DM, HTN, hypothyroidism presents with progressively worsening SOB x 4 days. Now admitted for likely COPD exacerbation.
Patient is an 83 year-old female with a past medical history of HFpEF (diastolic stage 2)  COPD (not on home oxygen), T2DM, HTN, hypothyroidism presents with progressively worsening SOB x 4 days. Now admitted for likely COPD exacerbation. Hospital course c/b JANELLE.
Patient is an 83 year-old female with a past medical history of HFpEF (diastolic stage 2)  COPD (not on home oxygen), T2DM, HTN, hypothyroidism presents with progressively worsening SOB x 4 days. Now admitted for likely COPD exacerbation. Hospital course c/b JANELLE.

## 2017-12-19 NOTE — PROGRESS NOTE ADULT - PROBLEM SELECTOR PLAN 4
-Patient has known stage II diastolic dysfunction, on home lasix  -Clinically euvolemic  -  -Hold home lasix  -Daily weight, I/Os  -Recommend repeat outpatient TTE

## 2017-12-27 ENCOUNTER — MEDICATION RENEWAL (OUTPATIENT)
Age: 82
End: 2017-12-27

## 2017-12-27 ENCOUNTER — RX RENEWAL (OUTPATIENT)
Age: 82
End: 2017-12-27

## 2017-12-30 ENCOUNTER — RX RENEWAL (OUTPATIENT)
Age: 82
End: 2017-12-30

## 2018-01-04 ENCOUNTER — APPOINTMENT (OUTPATIENT)
Dept: FAMILY MEDICINE | Facility: CLINIC | Age: 83
End: 2018-01-04

## 2018-01-08 ENCOUNTER — APPOINTMENT (OUTPATIENT)
Dept: FAMILY MEDICINE | Facility: CLINIC | Age: 83
End: 2018-01-08

## 2018-01-09 ENCOUNTER — APPOINTMENT (OUTPATIENT)
Dept: PULMONOLOGY | Facility: CLINIC | Age: 83
End: 2018-01-09

## 2018-01-11 ENCOUNTER — APPOINTMENT (OUTPATIENT)
Dept: FAMILY MEDICINE | Facility: CLINIC | Age: 83
End: 2018-01-11
Payer: MEDICARE

## 2018-01-11 VITALS
WEIGHT: 160 LBS | DIASTOLIC BLOOD PRESSURE: 70 MMHG | SYSTOLIC BLOOD PRESSURE: 132 MMHG | BODY MASS INDEX: 26.66 KG/M2 | HEIGHT: 65 IN

## 2018-01-11 PROCEDURE — 99213 OFFICE O/P EST LOW 20 MIN: CPT

## 2018-01-11 RX ORDER — CIPROFLOXACIN HYDROCHLORIDE 250 MG/1
250 TABLET, FILM COATED ORAL
Qty: 10 | Refills: 0 | Status: DISCONTINUED | COMMUNITY
Start: 2017-12-29 | End: 2018-01-11

## 2018-01-18 ENCOUNTER — APPOINTMENT (OUTPATIENT)
Dept: PULMONOLOGY | Facility: CLINIC | Age: 83
End: 2018-01-18
Payer: MEDICARE

## 2018-01-18 VITALS
SYSTOLIC BLOOD PRESSURE: 130 MMHG | HEART RATE: 69 BPM | OXYGEN SATURATION: 88 % | DIASTOLIC BLOOD PRESSURE: 70 MMHG | TEMPERATURE: 97.4 F | BODY MASS INDEX: 26.66 KG/M2 | RESPIRATION RATE: 15 BRPM | HEIGHT: 65 IN | WEIGHT: 160 LBS

## 2018-01-18 PROCEDURE — 99215 OFFICE O/P EST HI 40 MIN: CPT

## 2018-02-10 ENCOUNTER — RX RENEWAL (OUTPATIENT)
Age: 83
End: 2018-02-10

## 2018-02-22 ENCOUNTER — APPOINTMENT (OUTPATIENT)
Dept: PULMONOLOGY | Facility: CLINIC | Age: 83
End: 2018-02-22

## 2018-03-19 ENCOUNTER — APPOINTMENT (OUTPATIENT)
Dept: PULMONOLOGY | Facility: CLINIC | Age: 83
End: 2018-03-19
Payer: MEDICARE

## 2018-03-19 VITALS
HEIGHT: 65 IN | HEART RATE: 85 BPM | TEMPERATURE: 97 F | BODY MASS INDEX: 28.49 KG/M2 | OXYGEN SATURATION: 96 % | RESPIRATION RATE: 16 BRPM | SYSTOLIC BLOOD PRESSURE: 130 MMHG | DIASTOLIC BLOOD PRESSURE: 80 MMHG | WEIGHT: 171 LBS

## 2018-03-19 PROCEDURE — 99215 OFFICE O/P EST HI 40 MIN: CPT

## 2018-03-20 LAB
ALBUMIN SERPL ELPH-MCNC: 4.1 G/DL
ALP BLD-CCNC: 90 U/L
ALT SERPL-CCNC: 10 U/L
ANION GAP SERPL CALC-SCNC: 15 MMOL/L
AST SERPL-CCNC: 19 U/L
BILIRUB SERPL-MCNC: 0.4 MG/DL
BUN SERPL-MCNC: 30 MG/DL
CALCIUM SERPL-MCNC: 9.3 MG/DL
CHLORIDE SERPL-SCNC: 99 MMOL/L
CO2 SERPL-SCNC: 27 MMOL/L
CREAT SERPL-MCNC: 1.23 MG/DL
POTASSIUM SERPL-SCNC: 4.5 MMOL/L
PROT SERPL-MCNC: 7.9 G/DL
SODIUM SERPL-SCNC: 141 MMOL/L

## 2018-03-21 ENCOUNTER — RX RENEWAL (OUTPATIENT)
Age: 83
End: 2018-03-21

## 2018-03-27 ENCOUNTER — APPOINTMENT (OUTPATIENT)
Dept: PULMONOLOGY | Facility: CLINIC | Age: 83
End: 2018-03-27
Payer: MEDICARE

## 2018-03-27 VITALS
HEART RATE: 76 BPM | DIASTOLIC BLOOD PRESSURE: 70 MMHG | RESPIRATION RATE: 15 BRPM | TEMPERATURE: 96.7 F | HEIGHT: 65 IN | OXYGEN SATURATION: 91 % | SYSTOLIC BLOOD PRESSURE: 130 MMHG | BODY MASS INDEX: 28.32 KG/M2 | WEIGHT: 170 LBS

## 2018-03-27 DIAGNOSIS — I50.40 UNSPECIFIED COMBINED SYSTOLIC (CONGESTIVE) AND DIASTOLIC (CONGESTIVE) HEART FAILURE: ICD-10-CM

## 2018-03-27 PROCEDURE — 99214 OFFICE O/P EST MOD 30 MIN: CPT

## 2018-03-29 ENCOUNTER — MEDICATION RENEWAL (OUTPATIENT)
Age: 83
End: 2018-03-29

## 2018-04-16 ENCOUNTER — RX RENEWAL (OUTPATIENT)
Age: 83
End: 2018-04-16

## 2018-04-20 ENCOUNTER — APPOINTMENT (OUTPATIENT)
Dept: PULMONOLOGY | Facility: CLINIC | Age: 83
End: 2018-04-20
Payer: MEDICARE

## 2018-04-20 VITALS
OXYGEN SATURATION: 90 % | BODY MASS INDEX: 27.99 KG/M2 | DIASTOLIC BLOOD PRESSURE: 70 MMHG | SYSTOLIC BLOOD PRESSURE: 124 MMHG | TEMPERATURE: 98.2 F | WEIGHT: 168 LBS | HEIGHT: 65 IN | RESPIRATION RATE: 15 BRPM | HEART RATE: 82 BPM

## 2018-04-20 DIAGNOSIS — G47.10 HYPERSOMNIA, UNSPECIFIED: ICD-10-CM

## 2018-04-20 PROCEDURE — 99213 OFFICE O/P EST LOW 20 MIN: CPT

## 2018-04-25 ENCOUNTER — RX RENEWAL (OUTPATIENT)
Age: 83
End: 2018-04-25

## 2018-05-03 ENCOUNTER — APPOINTMENT (OUTPATIENT)
Dept: FAMILY MEDICINE | Facility: CLINIC | Age: 83
End: 2018-05-03
Payer: MEDICARE

## 2018-05-03 VITALS — SYSTOLIC BLOOD PRESSURE: 130 MMHG | DIASTOLIC BLOOD PRESSURE: 80 MMHG

## 2018-05-03 VITALS
SYSTOLIC BLOOD PRESSURE: 140 MMHG | HEIGHT: 65 IN | HEART RATE: 88 BPM | OXYGEN SATURATION: 88 % | DIASTOLIC BLOOD PRESSURE: 90 MMHG | WEIGHT: 165 LBS | BODY MASS INDEX: 27.49 KG/M2 | TEMPERATURE: 98.1 F

## 2018-05-03 PROCEDURE — 99214 OFFICE O/P EST MOD 30 MIN: CPT | Mod: 25

## 2018-05-03 PROCEDURE — 36415 COLL VENOUS BLD VENIPUNCTURE: CPT

## 2018-05-04 LAB
ALBUMIN SERPL ELPH-MCNC: 4 G/DL
ALP BLD-CCNC: 110 U/L
ALT SERPL-CCNC: 16 U/L
ANION GAP SERPL CALC-SCNC: 12 MMOL/L
AST SERPL-CCNC: 20 U/L
BASOPHILS # BLD AUTO: 0.01 K/UL
BASOPHILS NFR BLD AUTO: 0.1 %
BILIRUB SERPL-MCNC: 0.3 MG/DL
BUN SERPL-MCNC: 36 MG/DL
CALCIUM SERPL-MCNC: 9 MG/DL
CHLORIDE SERPL-SCNC: 102 MMOL/L
CHOLEST SERPL-MCNC: 191 MG/DL
CHOLEST/HDLC SERPL: 3.8 RATIO
CO2 SERPL-SCNC: 26 MMOL/L
CREAT SERPL-MCNC: 1.18 MG/DL
EOSINOPHIL # BLD AUTO: 0.13 K/UL
EOSINOPHIL NFR BLD AUTO: 1.8 %
GLUCOSE SERPL-MCNC: 160 MG/DL
HBA1C MFR BLD HPLC: 7.2 %
HCT VFR BLD CALC: 42.9 %
HDLC SERPL-MCNC: 50 MG/DL
HGB BLD-MCNC: 13.7 G/DL
IMM GRANULOCYTES NFR BLD AUTO: 0.3 %
LDLC SERPL CALC-MCNC: 119 MG/DL
LYMPHOCYTES # BLD AUTO: 1.66 K/UL
LYMPHOCYTES NFR BLD AUTO: 23 %
MAN DIFF?: NORMAL
MCHC RBC-ENTMCNC: 31.4 PG
MCHC RBC-ENTMCNC: 31.9 GM/DL
MCV RBC AUTO: 98.4 FL
MONOCYTES # BLD AUTO: 0.67 K/UL
MONOCYTES NFR BLD AUTO: 9.3 %
NEUTROPHILS # BLD AUTO: 4.74 K/UL
NEUTROPHILS NFR BLD AUTO: 65.5 %
PLATELET # BLD AUTO: 245 K/UL
POTASSIUM SERPL-SCNC: 4.5 MMOL/L
PROT SERPL-MCNC: 7.4 G/DL
RBC # BLD: 4.36 M/UL
RBC # FLD: 13.2 %
SODIUM SERPL-SCNC: 140 MMOL/L
TRIGL SERPL-MCNC: 109 MG/DL
TSH SERPL-ACNC: 1.58 UIU/ML
WBC # FLD AUTO: 7.23 K/UL

## 2018-05-22 ENCOUNTER — APPOINTMENT (OUTPATIENT)
Dept: PULMONOLOGY | Facility: CLINIC | Age: 83
End: 2018-05-22

## 2018-05-28 ENCOUNTER — RX RENEWAL (OUTPATIENT)
Age: 83
End: 2018-05-28

## 2018-06-01 PROCEDURE — G9005: CPT

## 2018-06-01 PROCEDURE — G9001: CPT

## 2018-06-13 ENCOUNTER — RX RENEWAL (OUTPATIENT)
Age: 83
End: 2018-06-13

## 2018-06-14 ENCOUNTER — MEDICATION RENEWAL (OUTPATIENT)
Age: 83
End: 2018-06-14

## 2018-07-01 ENCOUNTER — OUTPATIENT (OUTPATIENT)
Dept: OUTPATIENT SERVICES | Facility: HOSPITAL | Age: 83
LOS: 1 days | End: 2018-07-01
Payer: MEDICARE

## 2018-07-01 DIAGNOSIS — S82.90XA UNSPECIFIED FRACTURE OF UNSPECIFIED LOWER LEG, INITIAL ENCOUNTER FOR CLOSED FRACTURE: Chronic | ICD-10-CM

## 2018-07-01 DIAGNOSIS — Z90.49 ACQUIRED ABSENCE OF OTHER SPECIFIED PARTS OF DIGESTIVE TRACT: Chronic | ICD-10-CM

## 2018-07-01 DIAGNOSIS — Z86.69 PERSONAL HISTORY OF OTHER DISEASES OF THE NERVOUS SYSTEM AND SENSE ORGANS: Chronic | ICD-10-CM

## 2018-07-03 ENCOUNTER — EMERGENCY (EMERGENCY)
Facility: HOSPITAL | Age: 83
LOS: 1 days | Discharge: HOME | End: 2018-07-03
Attending: EMERGENCY MEDICINE | Admitting: EMERGENCY MEDICINE
Payer: MEDICARE

## 2018-07-03 VITALS
SYSTOLIC BLOOD PRESSURE: 128 MMHG | HEART RATE: 78 BPM | DIASTOLIC BLOOD PRESSURE: 74 MMHG | RESPIRATION RATE: 18 BRPM | OXYGEN SATURATION: 96 % | TEMPERATURE: 98 F

## 2018-07-03 VITALS
OXYGEN SATURATION: 91 % | SYSTOLIC BLOOD PRESSURE: 153 MMHG | DIASTOLIC BLOOD PRESSURE: 61 MMHG | RESPIRATION RATE: 18 BRPM | TEMPERATURE: 98 F | HEART RATE: 81 BPM

## 2018-07-03 DIAGNOSIS — S82.90XA UNSPECIFIED FRACTURE OF UNSPECIFIED LOWER LEG, INITIAL ENCOUNTER FOR CLOSED FRACTURE: Chronic | ICD-10-CM

## 2018-07-03 DIAGNOSIS — Z90.49 ACQUIRED ABSENCE OF OTHER SPECIFIED PARTS OF DIGESTIVE TRACT: Chronic | ICD-10-CM

## 2018-07-03 DIAGNOSIS — Z86.69 PERSONAL HISTORY OF OTHER DISEASES OF THE NERVOUS SYSTEM AND SENSE ORGANS: Chronic | ICD-10-CM

## 2018-07-03 LAB
ALBUMIN SERPL ELPH-MCNC: 3.8 G/DL — SIGNIFICANT CHANGE UP (ref 3.3–5)
ALP SERPL-CCNC: 103 U/L — SIGNIFICANT CHANGE UP (ref 40–120)
ALT FLD-CCNC: 8 U/L — SIGNIFICANT CHANGE UP (ref 4–33)
APTT BLD: 29.7 SEC — SIGNIFICANT CHANGE UP (ref 27.5–37.4)
AST SERPL-CCNC: 14 U/L — SIGNIFICANT CHANGE UP (ref 4–32)
BASOPHILS # BLD AUTO: 0.02 K/UL — SIGNIFICANT CHANGE UP (ref 0–0.2)
BASOPHILS NFR BLD AUTO: 0.3 % — SIGNIFICANT CHANGE UP (ref 0–2)
BILIRUB SERPL-MCNC: 0.2 MG/DL — SIGNIFICANT CHANGE UP (ref 0.2–1.2)
BUN SERPL-MCNC: 28 MG/DL — HIGH (ref 7–23)
CALCIUM SERPL-MCNC: 8.8 MG/DL — SIGNIFICANT CHANGE UP (ref 8.4–10.5)
CHLORIDE SERPL-SCNC: 100 MMOL/L — SIGNIFICANT CHANGE UP (ref 98–107)
CO2 SERPL-SCNC: 29 MMOL/L — SIGNIFICANT CHANGE UP (ref 22–31)
CREAT SERPL-MCNC: 1.17 MG/DL — SIGNIFICANT CHANGE UP (ref 0.5–1.3)
EOSINOPHIL # BLD AUTO: 0.27 K/UL — SIGNIFICANT CHANGE UP (ref 0–0.5)
EOSINOPHIL NFR BLD AUTO: 3.4 % — SIGNIFICANT CHANGE UP (ref 0–6)
GLUCOSE SERPL-MCNC: 217 MG/DL — HIGH (ref 70–99)
HCT VFR BLD CALC: 39.6 % — SIGNIFICANT CHANGE UP (ref 34.5–45)
HGB BLD-MCNC: 13.1 G/DL — SIGNIFICANT CHANGE UP (ref 11.5–15.5)
IMM GRANULOCYTES # BLD AUTO: 0.02 # — SIGNIFICANT CHANGE UP
IMM GRANULOCYTES NFR BLD AUTO: 0.3 % — SIGNIFICANT CHANGE UP (ref 0–1.5)
INR BLD: 0.91 — SIGNIFICANT CHANGE UP (ref 0.88–1.17)
LYMPHOCYTES # BLD AUTO: 1.9 K/UL — SIGNIFICANT CHANGE UP (ref 1–3.3)
LYMPHOCYTES # BLD AUTO: 23.8 % — SIGNIFICANT CHANGE UP (ref 13–44)
MCHC RBC-ENTMCNC: 31.1 PG — SIGNIFICANT CHANGE UP (ref 27–34)
MCHC RBC-ENTMCNC: 33.1 % — SIGNIFICANT CHANGE UP (ref 32–36)
MCV RBC AUTO: 94.1 FL — SIGNIFICANT CHANGE UP (ref 80–100)
MONOCYTES # BLD AUTO: 0.76 K/UL — SIGNIFICANT CHANGE UP (ref 0–0.9)
MONOCYTES NFR BLD AUTO: 9.5 % — SIGNIFICANT CHANGE UP (ref 2–14)
NEUTROPHILS # BLD AUTO: 5.02 K/UL — SIGNIFICANT CHANGE UP (ref 1.8–7.4)
NEUTROPHILS NFR BLD AUTO: 62.7 % — SIGNIFICANT CHANGE UP (ref 43–77)
NRBC # FLD: 0 — SIGNIFICANT CHANGE UP
NT-PROBNP SERPL-SCNC: 626.1 PG/ML — SIGNIFICANT CHANGE UP
PLATELET # BLD AUTO: 292 K/UL — SIGNIFICANT CHANGE UP (ref 150–400)
PMV BLD: 8.8 FL — SIGNIFICANT CHANGE UP (ref 7–13)
POTASSIUM SERPL-MCNC: 4.5 MMOL/L — SIGNIFICANT CHANGE UP (ref 3.5–5.3)
POTASSIUM SERPL-SCNC: 4.5 MMOL/L — SIGNIFICANT CHANGE UP (ref 3.5–5.3)
PROT SERPL-MCNC: 7.3 G/DL — SIGNIFICANT CHANGE UP (ref 6–8.3)
PROTHROM AB SERPL-ACNC: 10.5 SEC — SIGNIFICANT CHANGE UP (ref 9.8–13.1)
RBC # BLD: 4.21 M/UL — SIGNIFICANT CHANGE UP (ref 3.8–5.2)
RBC # FLD: 12.8 % — SIGNIFICANT CHANGE UP (ref 10.3–14.5)
SODIUM SERPL-SCNC: 140 MMOL/L — SIGNIFICANT CHANGE UP (ref 135–145)
WBC # BLD: 7.99 K/UL — SIGNIFICANT CHANGE UP (ref 3.8–10.5)
WBC # FLD AUTO: 7.99 K/UL — SIGNIFICANT CHANGE UP (ref 3.8–10.5)

## 2018-07-03 PROCEDURE — 99284 EMERGENCY DEPT VISIT MOD MDM: CPT | Mod: GC

## 2018-07-03 NOTE — ED PROVIDER NOTE - SHIFT CHANGE DETAILS
I have signed over this patient to the above attending physician. Pertinent history, physical exam findings and workup thus far in the ED have been discussed. The pending tests and plan, including LE duplex were signed over.  All questions from the above attending physician have been answered.

## 2018-07-03 NOTE — ED PROVIDER NOTE - ATTENDING CONTRIBUTION TO CARE
Xander: 84 yo female with a h/o COPD, DM, CHF and hypothyroidism c/o approximately 4 weeks of progressively worsening LLE edema and erythema. NO associated chest pain, SOB, fevers or chills. NO h/o trauma. NO abdominal pain, nausea or vomiting. Exam: GENERAL: well appearing, NAD, HEENT: MMM, CARDIO: +S1/S2, no murmurs, rubs or gallops, LUNGS: CTA B/L, no wheezing, rales or rhonchi, ABD: soft, nontender, BSx4 quadrants, no guarding or rigidity. EXT: +LLE edema, warmth to touch and erythema, 2+ distal pulses x 4 extremities. NEURO: AxOx3, SKIN: LLE erythema as noted above. A/P- 84 yo female with LLE edema r/o DVT vs cellulitis. will obtain labs, LLE duplex and reassess.

## 2018-07-03 NOTE — ED PROVIDER NOTE - OBJECTIVE STATEMENT
Pt is an 82yo F with a history of COPD, DM, CHF and hypothyroidism presenting with LLE swelling. Has been present for the past few weeks, minimal pain on ambulation, no decreased sensation. No symptoms in her right leg. Has subjective fevers. Denies chest pain or palpitations, has baseline SOB which she uses inhalers for. Also has home oxygen for COPD that she uses on and off. Is taking Lasix daily.

## 2018-07-03 NOTE — ED ADULT TRIAGE NOTE - CHIEF COMPLAINT QUOTE
Pt c/o LLE edema, redness, warm to touch x "couple of weeks." States that leg feels tight. Pt poor historian. Denies fever/chills, SOB. No recent travel.

## 2018-07-03 NOTE — ED PROVIDER NOTE - MEDICAL DECISION MAKING DETAILS
82 yo female with LLE edema- DVT vs cellulitis will obtain labs, LE duplex and reassess. 84 yo female with LLE edema- DVT vs cellulitis will obtain labs, LE duplex and reassess. Duplex negative for DVT- will d/c home with Keflex, recommended pt follow up with her PCP within a week.

## 2018-07-03 NOTE — ED ADULT NURSE NOTE - OBJECTIVE STATEMENT
83Y F AaOx3 c/o bilateral lower leg edema/swelling/pain. pt is a poor historian upon initial assessment. pt presents with bilateral leg swelling noted redness and +2 pitting edema in foot/ankle region. pt states she is in no pain at rest but upon palpitation she has "very little pain". pt denies any SOB/LOGAN/CP/ palpitations at this time. respirations are even and unlabored .pt unsure of why she is on her medications. [place] labs collected and sent. 22g left forearm.

## 2018-07-04 PROCEDURE — 93971 EXTREMITY STUDY: CPT | Mod: 26,LT

## 2018-07-04 RX ORDER — CEPHALEXIN 500 MG
1 CAPSULE ORAL
Qty: 20 | Refills: 0 | OUTPATIENT
Start: 2018-07-04 | End: 2018-07-08

## 2018-07-17 DIAGNOSIS — Z71.89 OTHER SPECIFIED COUNSELING: ICD-10-CM

## 2018-08-01 ENCOUNTER — INPATIENT (INPATIENT)
Facility: HOSPITAL | Age: 83
LOS: 1 days | Discharge: HOME HEALTH | End: 2018-08-03
Attending: HOSPITALIST | Admitting: HOSPITALIST
Payer: MEDICARE

## 2018-08-01 VITALS
TEMPERATURE: 98 F | SYSTOLIC BLOOD PRESSURE: 178 MMHG | RESPIRATION RATE: 22 BRPM | HEART RATE: 88 BPM | OXYGEN SATURATION: 89 % | DIASTOLIC BLOOD PRESSURE: 70 MMHG

## 2018-08-01 DIAGNOSIS — Z90.49 ACQUIRED ABSENCE OF OTHER SPECIFIED PARTS OF DIGESTIVE TRACT: Chronic | ICD-10-CM

## 2018-08-01 DIAGNOSIS — Z86.69 PERSONAL HISTORY OF OTHER DISEASES OF THE NERVOUS SYSTEM AND SENSE ORGANS: Chronic | ICD-10-CM

## 2018-08-01 DIAGNOSIS — S82.90XA UNSPECIFIED FRACTURE OF UNSPECIFIED LOWER LEG, INITIAL ENCOUNTER FOR CLOSED FRACTURE: Chronic | ICD-10-CM

## 2018-08-01 LAB
ALBUMIN SERPL ELPH-MCNC: 4 G/DL — SIGNIFICANT CHANGE UP (ref 3.3–5)
ALP SERPL-CCNC: 121 U/L — HIGH (ref 40–120)
ALT FLD-CCNC: 11 U/L — SIGNIFICANT CHANGE UP (ref 4–33)
AST SERPL-CCNC: 20 U/L — SIGNIFICANT CHANGE UP (ref 4–32)
BASE EXCESS BLDV CALC-SCNC: 6.7 MMOL/L — SIGNIFICANT CHANGE UP
BASOPHILS # BLD AUTO: 0.03 K/UL — SIGNIFICANT CHANGE UP (ref 0–0.2)
BASOPHILS NFR BLD AUTO: 0.3 % — SIGNIFICANT CHANGE UP (ref 0–2)
BILIRUB SERPL-MCNC: 0.2 MG/DL — SIGNIFICANT CHANGE UP (ref 0.2–1.2)
BLOOD GAS VENOUS - CREATININE: 0.98 MG/DL — SIGNIFICANT CHANGE UP (ref 0.5–1.3)
BUN SERPL-MCNC: 25 MG/DL — HIGH (ref 7–23)
CALCIUM SERPL-MCNC: 8.8 MG/DL — SIGNIFICANT CHANGE UP (ref 8.4–10.5)
CHLORIDE BLDV-SCNC: 105 MMOL/L — SIGNIFICANT CHANGE UP (ref 96–108)
CHLORIDE SERPL-SCNC: 102 MMOL/L — SIGNIFICANT CHANGE UP (ref 98–107)
CO2 SERPL-SCNC: 30 MMOL/L — SIGNIFICANT CHANGE UP (ref 22–31)
CREAT SERPL-MCNC: 1.16 MG/DL — SIGNIFICANT CHANGE UP (ref 0.5–1.3)
EOSINOPHIL # BLD AUTO: 0.38 K/UL — SIGNIFICANT CHANGE UP (ref 0–0.5)
EOSINOPHIL NFR BLD AUTO: 4.3 % — SIGNIFICANT CHANGE UP (ref 0–6)
GAS PNL BLDV: 139 MMOL/L — SIGNIFICANT CHANGE UP (ref 136–146)
GLUCOSE BLDV-MCNC: 145 — HIGH (ref 70–99)
GLUCOSE SERPL-MCNC: 152 MG/DL — HIGH (ref 70–99)
HCO3 BLDV-SCNC: 28 MMOL/L — HIGH (ref 20–27)
HCT VFR BLD CALC: 38.9 % — SIGNIFICANT CHANGE UP (ref 34.5–45)
HCT VFR BLDV CALC: 40.2 % — SIGNIFICANT CHANGE UP (ref 34.5–45)
HGB BLD-MCNC: 12.5 G/DL — SIGNIFICANT CHANGE UP (ref 11.5–15.5)
HGB BLDV-MCNC: 13.1 G/DL — SIGNIFICANT CHANGE UP (ref 11.5–15.5)
IMM GRANULOCYTES # BLD AUTO: 0.02 # — SIGNIFICANT CHANGE UP
IMM GRANULOCYTES NFR BLD AUTO: 0.2 % — SIGNIFICANT CHANGE UP (ref 0–1.5)
LACTATE BLDV-MCNC: 1 MMOL/L — SIGNIFICANT CHANGE UP (ref 0.5–2)
LYMPHOCYTES # BLD AUTO: 2.11 K/UL — SIGNIFICANT CHANGE UP (ref 1–3.3)
LYMPHOCYTES # BLD AUTO: 24.1 % — SIGNIFICANT CHANGE UP (ref 13–44)
MCHC RBC-ENTMCNC: 31.3 PG — SIGNIFICANT CHANGE UP (ref 27–34)
MCHC RBC-ENTMCNC: 32.1 % — SIGNIFICANT CHANGE UP (ref 32–36)
MCV RBC AUTO: 97.5 FL — SIGNIFICANT CHANGE UP (ref 80–100)
MONOCYTES # BLD AUTO: 0.9 K/UL — SIGNIFICANT CHANGE UP (ref 0–0.9)
MONOCYTES NFR BLD AUTO: 10.3 % — SIGNIFICANT CHANGE UP (ref 2–14)
NEUTROPHILS # BLD AUTO: 5.3 K/UL — SIGNIFICANT CHANGE UP (ref 1.8–7.4)
NEUTROPHILS NFR BLD AUTO: 60.8 % — SIGNIFICANT CHANGE UP (ref 43–77)
NRBC # FLD: 0 — SIGNIFICANT CHANGE UP
PCO2 BLDV: 70 MMHG — HIGH (ref 41–51)
PH BLDV: 7.29 PH — LOW (ref 7.32–7.43)
PLATELET # BLD AUTO: 264 K/UL — SIGNIFICANT CHANGE UP (ref 150–400)
PMV BLD: 8.5 FL — SIGNIFICANT CHANGE UP (ref 7–13)
PO2 BLDV: 30 MMHG — LOW (ref 35–40)
POTASSIUM BLDV-SCNC: 6.5 MMOL/L — CRITICAL HIGH (ref 3.4–4.5)
POTASSIUM SERPL-MCNC: 4.9 MMOL/L — SIGNIFICANT CHANGE UP (ref 3.5–5.3)
POTASSIUM SERPL-SCNC: 4.9 MMOL/L — SIGNIFICANT CHANGE UP (ref 3.5–5.3)
PROT SERPL-MCNC: 7.4 G/DL — SIGNIFICANT CHANGE UP (ref 6–8.3)
RBC # BLD: 3.99 M/UL — SIGNIFICANT CHANGE UP (ref 3.8–5.2)
RBC # FLD: 12.9 % — SIGNIFICANT CHANGE UP (ref 10.3–14.5)
SAO2 % BLDV: 48 % — LOW (ref 60–85)
SODIUM SERPL-SCNC: 141 MMOL/L — SIGNIFICANT CHANGE UP (ref 135–145)
TROPONIN T, HIGH SENSITIVITY: 18 NG/L — SIGNIFICANT CHANGE UP (ref ?–14)
WBC # BLD: 8.74 K/UL — SIGNIFICANT CHANGE UP (ref 3.8–10.5)
WBC # FLD AUTO: 8.74 K/UL — SIGNIFICANT CHANGE UP (ref 3.8–10.5)

## 2018-08-01 PROCEDURE — 71045 X-RAY EXAM CHEST 1 VIEW: CPT | Mod: 26

## 2018-08-01 RX ORDER — PIPERACILLIN AND TAZOBACTAM 4; .5 G/20ML; G/20ML
3.38 INJECTION, POWDER, LYOPHILIZED, FOR SOLUTION INTRAVENOUS ONCE
Qty: 0 | Refills: 0 | Status: COMPLETED | OUTPATIENT
Start: 2018-08-01 | End: 2018-08-01

## 2018-08-01 RX ORDER — IPRATROPIUM/ALBUTEROL SULFATE 18-103MCG
3 AEROSOL WITH ADAPTER (GRAM) INHALATION ONCE
Qty: 0 | Refills: 0 | Status: COMPLETED | OUTPATIENT
Start: 2018-08-01 | End: 2018-08-01

## 2018-08-01 RX ADMIN — Medication 3 MILLILITER(S): at 22:58

## 2018-08-01 RX ADMIN — Medication 3 MILLILITER(S): at 23:49

## 2018-08-01 RX ADMIN — PIPERACILLIN AND TAZOBACTAM 200 GRAM(S): 4; .5 INJECTION, POWDER, LYOPHILIZED, FOR SOLUTION INTRAVENOUS at 23:49

## 2018-08-01 NOTE — ED ADULT NURSE NOTE - OBJECTIVE STATEMENT
Ambulatory complaining of SOB and chest discomfort x2 days. Patient denying chest discomfort and SOB at present, comfortable in the stretcher, reports more LOGAN. CP was L midsternal when she had it. PMH COPD, DM, hypothyroid. Was previously treated for cellulitis, finished course of ABx, however BL LE still have obvious redness and swelling. Patient states that they are much better than what they were. EKG completed, MSR with t wave abnormality. VSS at present. MD Resident at bedside. Continuous cardiac monitoring in place. Safety maintained, needs attended, will continue to monitor. Ambulatory complaining of SOB and chest discomfort x2 days. Patient denying chest discomfort and SOB at present, comfortable in the stretcher, reports more LOGAN. CP was L midsternal when she had it. PMH COPD, DM, hypothyroid. Was previously treated for cellulitis, finished course of ABx, however BL LE still have obvious redness and swelling. Patient states that they are much better than what they were. EKG completed, MSR with t wave abnormality. VSS at present. MD Resident at bedside. Continuous cardiac monitoring in place. Safety maintained, needs attended, will continue to monitor.  Received report from PM RN. Patient is alert and oriented times three. Pt ambulates without assistance. Pt is awake and comfortable with 2 liters O2. Pt ate breakfast and is waiting for bed assignment.  JAYDE Titus

## 2018-08-01 NOTE — ED PROVIDER NOTE - PHYSICAL EXAMINATION
ATTENDING PHYSICAL EXAM DR. Tabares ***GEN - NAD; A+O x3, (post neb) RR 14 which is much improved.  Speaking full sentences ***HEAD - NC/AT ***EYES/NOSE - PERRL, EOMI, mucous membranes moist, no discharge ***THROAT: Oral cavity and pharynx normal. No inflammation, swelling, exudate, or lesions.  ***NECK: Neck supple, non-tender without lymphadenopathy, no masses, no JVD.   ***PULMONARY - Decreased BS's b/l.  No w/r/r appreciated ***CARDIAC -s1s2, RRR, no M,R,G  ***ABDOMEN - +BS, ND, NT, soft, no guarding, no rebound, no masses   ***BACK - no CVA tenderness, Normal  spine ***EXTREMITIES - Noted erythema and warmth c/w cellulitis on LL extremity along with skin scaling.

## 2018-08-01 NOTE — ED PROVIDER NOTE - MEDICAL DECISION MAKING DETAILS
84yo F pmhx COPD, hypothyroid, DM p/w CC SOB/chest discomfort. States her normal O2 sat is around 90. Appears tachypneic on presentation. Will send labs trop cxr, cannot r/o PE, will CTPA. Treat cellulitis w/ IV abx and admit. 84yo F pmhx COPD, hypothyroid, DM p/w CC SOB/chest discomfort. States her normal O2 sat is around 90. Appears tachypneic on presentation. Will send labs trop cxr. Treat cellulitis w/ IV abx and admit.

## 2018-08-01 NOTE — ED PROVIDER NOTE - OBJECTIVE STATEMENT
84yo F pmhx COPD on home O2, DM, hypothyroidism p/w CC SOB and chest discomfort x2 days. States symptoms are exertional, pain radiates to back, non-pleuritic. Denies similar symptoms in past. Pt. states she is currently asymptomatic. Denies fever cough palpitations n/v/d recent travel. Pt. notes LLE cellulitis which has improved, but not completely resolved after recent antibiotic course. 82yo F pmhx COPD on home O2, DM, hypothyroidism p/w CC SOB and chest discomfort x2 days. States symptoms are exertional, pain radiates to back, non-pleuritic. Denies similar symptoms in past. Pt. states she is currently asymptomatic. Denies fever cough palpitations n/v/d recent travel. Pt. notes LLE cellulitis which has improved, but not completely resolved after recent antibiotic course.  Attending - Agree with above.  I evaluated patient myself.  84 y/o F with hx of COPD, DM, home O2 PRN.  c/o increased shortness of breathe x 2 days, similar to COPD exacerbation.  However, also notes episodes of left sided sharp chest pains x 2 days, which she has never had before.  Pain radiates to back.  Occurs at rest.  Lasts approx few seconds to 1 minute.  Multiple episodes yesterday, and 1 episode today.  No pain currently.  Also notes recurrence of LLE cellulitis.  Completed course of Keflex from previous ED visit.  Previous [place] records reviewed.

## 2018-08-01 NOTE — ED ADULT NURSE NOTE - INTERVENTIONS DEFINITIONS
Instruct patient to call for assistance/Stretcher in lowest position, wheels locked, appropriate side rails in place/Monitor for mental status changes and reorient to person, place, and time/Non-slip footwear when patient is off stretcher/Physically safe environment: no spills, clutter or unnecessary equipment/Review medications for side effects contributing to fall risk

## 2018-08-01 NOTE — ED ADULT TRIAGE NOTE - CHIEF COMPLAINT QUOTE
pt comes to ED for SOB x 2 days. pt has hx of COPD pt states she doesn't use 02 all the time. pt states her normal 02 level is 90% pt appears SOB in triage. EKG to be obtained in triage. 02 applied in triage.

## 2018-08-01 NOTE — ED PROVIDER NOTE - CONSTITUTIONAL MENTATION
+UTI  Patient was discharged with Keflex, which bacteria is susceptible to 
awake/oriented to person, place, time/situation/alert

## 2018-08-01 NOTE — ED ADULT NURSE NOTE - NS ED PATIENT SAFETY CONCERN
[facility], [place]    [place] History and Physical    Date of Admission:  2018  9:35 AM    Primary Care Physician   Primary care provider: Michelle, [facility]    Assessment & Plan   Baby1 Dina Alcazar is a Term  appropriate for gestational age female  , doing well.   -Normal  care  -Anticipatory guidance given  -Encourage exclusive breastfeeding  -Anticipate follow-up with PCP 2-3 days after discharge, AAP follow-up recommendations discussed  -Hearing screen and first hepatitis B vaccine prior to discharge per orders    Cassy Hobbs    Pregnancy History   The details of the mother's pregnancy are as follows:  OBSTETRIC HISTORY:  Information for the patient's mother:  Dina Alcazar [[id number]]   29 year old    EDC:   Information for the patient's mother:  Dina Alcazar [[id number]]   Estimated Date of Delivery: 18    Information for the patient's mother:  Dina Alcazar [[id number]]     Obstetric History       T1      L1     SAB0   TAB0   Ectopic0   Multiple0   Live Births1       # Outcome Date GA Lbr Edison/2nd Weight Sex Delivery Anes PTL Lv   1 Term 06/15/18 37w2d  6 lb 10.2 oz (3.01 kg) F CS-LTranv  N CLIFF      Name: RENEA ALCAZAR      Complications: Failure to Progress in Second Stage      Apgar1:  9                Apgar5: 9          Prenatal Labs: Information for the patient's mother:  Dina Alcazar [[id number]]     Lab Results   Component Value Date    ABO AB 2018    RH Pos 2018    AS Neg 2018    HEPBANG Nonreactive 2017    CHPCRT Negative 2017    GCPCRT Negative 2017    TREPAB Negative 2018    HGB 9.2 (L) 2018    PATH  2018     Patient Name: DINA ALCAZAR  MR#: [id number]  Specimen #: [id number]  Collected: 2018  Received: 2018  Reported: 2018 14:42  Ordering Phy(s): PETER MAYER    For improved result formatting, select 'View Enhanced Report Format' under    "Linked Documents section.    SPECIMEN(S):  Skin, right mid back    FINAL DIAGNOSIS:  Skin, right mid back:  - Compound dysplastic nevus with mild atypia - (see description)    I have personally reviewed all specimens  and/or slides, including the   listed special stains, and used them  with my medical judgement to determine or confirm the final diagnosis.    Electronically signed out by:  John Schmidt M.D., [facility]    CLINICAL HISTORY:  The patient is a 29 year old female.    GROSS:  The specimen is received in formalin with proper patient identification,   labeled \"RT mid back\" and the  specimen consists of a single, unoriented skin punch biopsy measuring 0.4   cm in diameter and is excised to a  depth of 0.5 cm.  The skin surface displays a dark brown skin lesion   comprising the majority of the skin  surface.  The resection margin is inked black.  It is bisected and   entirely submitted in cassette A1.  (Dictated by: Jana Tucker [facility] 2018 02:37 PM)    MICROSCOPIC:  The specimen exhibits a compound melanocytic proliferation which is both   nested and single celled at the  junction, with mild and focally moderate cytologic atypia but cells   largely confined to the sides and bases of  rete ridges, above papillary dermal fibrosis, nests of ovoid cells which   mature with descent, and a mild  superficial perivascular lymphocytic infiltrate with many melanophages.    The lesion extends to the lateral  margin.    CPT Codes:  A: 20980-VN2.P, 09269-TZ4.T    TESTING LAB LOCATION:  [facility], [address]   [address] [phone]    COLLECTION SITE:  Client: [facility]  [place] [facility] [place]           Prenatal Ultrasound:  Information for the patient's mother:  Dina Mckee [[id number]]     Results for orders placed or performed during the hospital encounter of 06/04/18   [facility] FABBY " Single    Narrative            BPP  ---------------------------------------------------------------------------------------------------------  Pat. Name: CONCHA ALCAZAR       Study Date:  2018 3:00pm  Pat. NO:  [id number]        Referring  MD: DEREK LE  Site:  [facility]       Sonographer: Yenifer Schmidt RDMS  :  [id number]        Age:   29  ---------------------------------------------------------------------------------------------------------    INDICATION  ---------------------------------------------------------------------------------------------------------  Possible cholestasis (elevated liver enzymes, bile acids pending) .Low QUIQUE - A.      METHOD  ---------------------------------------------------------------------------------------------------------  Transabdominal ultrasound examination.      PREGNANCY  ---------------------------------------------------------------------------------------------------------  [place] pregnancy. Number of fetuses: 1.      DATING  ---------------------------------------------------------------------------------------------------------                                           Date                                Details                                                                                      Gest. age                      ARLENE  Conception                         10/11/2017                      Conception: AID                                                                          35 w + 5 d                     2018  External assessment          2017                       GA: 11 w + 1 d                                                                           35 w + 5 d                     2018  Assigned dating                  Dating performed on 2018, based on the conception date                                              35 w + 5 d                     2018      GENERAL  EVALUATION  ---------------------------------------------------------------------------------------------------------  Cardiac activity: present.  bpm.  Fetal movements: visualized.  Presentation: cephalic.  Placenta:  Placental site: anterior.  Umbilical cord: previously studied.      AMNIOTIC FLUID ASSESSMENT  ---------------------------------------------------------------------------------------------------------  Amount of AF: normal  MVP 4.4 cm. EMERITA 14.6 cm. Q1 4.4 cm, Q2 3.6 cm, Q3 3.5 cm, Q4 3.2 cm      BIOPHYSICAL PROFILE  ---------------------------------------------------------------------------------------------------------  2: Fetal breathing movements  2: Gross body movements  2: Fetal tone  2: Amniotic fluid volume  : Biophysical profile score      MATERNAL STRUCTURES  ---------------------------------------------------------------------------------------------------------  Cervix                                  Not examined.      RECOMMENDATION  ---------------------------------------------------------------------------------------------------------  We discussed the findings on today's ultrasound with the patient.    Continue  surveillance with twice weekly BPP.    She reports itching improved after starting the Ursodiol on 18.    Given the elevated LFTs and the clinical picture of nightly palms and soles itching, I would recommend delivery @37 weeks even if BA were normal as her clinical picture is  highly concerning for ICP.    Return to primary provider for continued prenatal care.    Thank-you for the opportunity to participate in the care of this patient. If you have questions regarding today's evaluation or if we can be of further service, please contact the  [facility].    **Fetal anomalies may be present but not detected**    .        Impression     "IMPRESSION  ---------------------------------------------------------------------------------------------------------  1) Intrauterine pregnancy at 35w5d gestational age.  2) The BPP is reassuring ()  3) The amniotic fluid volume appeared normal.           GBS Status:   Information for the patient's mother:  Dina Mckee [[id number]]     Lab Results   Component Value Date    GBS Neg 2018     negative    Maternal History    Information for the patient's mother:  Dina Mckee [[id number]]     Past Medical History:   Diagnosis Date     Cholestasis during pregnancy 2018     Hypertension affecting pregnancy 2018     NO ACTIVE PROBLEMS        Medications given to Mother since admit:  reviewed     Family History -    Information for the patient's mother:  Dina Mckee [[id number]]     Family History   Problem Relation Age of Onset     Cardiovascular Maternal Grandmother      Thyroid Cancer Maternal Grandmother      Thyroid Disease Maternal Grandmother      Thyroid cancer     Cardiovascular Maternal Grandfather      Hypertension Maternal Grandfather      Prostate Cancer Maternal Grandfather      Hyperlipidemia Maternal Grandfather      Lung Cancer Paternal Grandmother      Anxiety Disorder Brother      Other - See Comments Sister      anorexia     Melanoma No family hx of      Skin Cancer No family hx of        Social History -    Information for the patient's mother:  Dina Mckee [[id number]]     Social History   Substance Use Topics     Smoking status: Never Smoker     Smokeless tobacco: Never Used     Alcohol use No      Comment:         Birth History   Infant Resuscitation Needed: no     Birth Information  Birth History     Birth     Length: 1' 8\" (0.508 m)     Weight: 6 lb 10.2 oz (3.01 kg)     HC 12.75\" (32.4 cm)     Apgar     One: 9     Five: 9     Delivery Method: , Low Transverse     Gestation Age: 37 2/7 wks       The NICU staff was not present during " "birth.    Immunization History   Immunization History   Administered Date(s) Administered     Hep B, Peds or Adolescent 2018        Physical Exam   Vital Signs:  Patient Vitals for the past 24 hrs:   Temp Temp src Heart Rate Resp Height Weight   18 0812 98.4  F (36.9  C) Axillary 124 50 - 6 lb 6.8 oz (2.914 kg)   18 0013 98  F (36.7  C) Axillary 142 56 - -   06/15/18 1615 98.8  F (37.1  C) Axillary 138 60 - -   06/15/18 1406 98.3  F (36.8  C) Axillary 140 44 - -   06/15/18 1110 98.2  F (36.8  C) Axillary 132 50 - -   06/15/18 1040 97.8  F (36.6  C) Axillary 140 50 - -   06/15/18 1005 98.1  F (36.7  C) Axillary 140 40 - -   06/15/18 1000 - - 130 70 - -   06/15/18 0935 - - - - 1' 8\" (0.508 m) 6 lb 10.2 oz (3.01 kg)     [place] Measurements:  Weight: 6 lb 10.2 oz (3010 g)    Length: 20\"    Head circumference: 32.4 cm      General:  alert and normally responsive  Skin:  no abnormal markings; normal color without significant rash.  No jaundice  Head/Neck  normal anterior and posterior fontanelle, intact scalp; Neck without masses.  Eyes  normal red reflex  Ears/Nose/Mouth:  intact canals, patent nares, mouth normal  Thorax:  normal contour, clavicles intact  Lungs:  clear, no retractions, no increased work of breathing  Heart:  normal rate, rhythm.  No murmurs.  Normal femoral pulses.  Abdomen  soft without mass, tenderness, organomegaly, hernia.  Umbilicus normal.  Genitalia:  normal female external genitalia  Anus:  patent  Trunk/Spine  straight, intact  Musculoskeletal:  Normal Serrano and Ortolani maneuvers.  intact without deformity.  Normal digits.  Neurologic:  normal, symmetric tone and strength.  normal reflexes.    Data    All laboratory data reviewed  " No

## 2018-08-02 DIAGNOSIS — L03.115 CELLULITIS OF RIGHT LOWER LIMB: ICD-10-CM

## 2018-08-02 DIAGNOSIS — E11.9 TYPE 2 DIABETES MELLITUS WITHOUT COMPLICATIONS: ICD-10-CM

## 2018-08-02 DIAGNOSIS — J44.1 CHRONIC OBSTRUCTIVE PULMONARY DISEASE WITH (ACUTE) EXACERBATION: ICD-10-CM

## 2018-08-02 DIAGNOSIS — I50.33 ACUTE ON CHRONIC DIASTOLIC (CONGESTIVE) HEART FAILURE: ICD-10-CM

## 2018-08-02 DIAGNOSIS — Z29.9 ENCOUNTER FOR PROPHYLACTIC MEASURES, UNSPECIFIED: ICD-10-CM

## 2018-08-02 DIAGNOSIS — I50.32 CHRONIC DIASTOLIC (CONGESTIVE) HEART FAILURE: ICD-10-CM

## 2018-08-02 DIAGNOSIS — I10 ESSENTIAL (PRIMARY) HYPERTENSION: ICD-10-CM

## 2018-08-02 DIAGNOSIS — R07.9 CHEST PAIN, UNSPECIFIED: ICD-10-CM

## 2018-08-02 DIAGNOSIS — E03.9 HYPOTHYROIDISM, UNSPECIFIED: ICD-10-CM

## 2018-08-02 DIAGNOSIS — L03.116 CELLULITIS OF LEFT LOWER LIMB: ICD-10-CM

## 2018-08-02 LAB
BASE EXCESS BLDV CALC-SCNC: 6.9 MMOL/L — SIGNIFICANT CHANGE UP
BASOPHILS # BLD AUTO: 0.04 K/UL — SIGNIFICANT CHANGE UP (ref 0–0.2)
BASOPHILS NFR BLD AUTO: 0.6 % — SIGNIFICANT CHANGE UP (ref 0–2)
BLOOD GAS VENOUS - CREATININE: 1.11 MG/DL — SIGNIFICANT CHANGE UP (ref 0.5–1.3)
BUN SERPL-MCNC: 22 MG/DL — SIGNIFICANT CHANGE UP (ref 7–23)
CALCIUM SERPL-MCNC: 8.3 MG/DL — LOW (ref 8.4–10.5)
CHLORIDE BLDV-SCNC: 106 MMOL/L — SIGNIFICANT CHANGE UP (ref 96–108)
CHLORIDE SERPL-SCNC: 102 MMOL/L — SIGNIFICANT CHANGE UP (ref 98–107)
CO2 SERPL-SCNC: 28 MMOL/L — SIGNIFICANT CHANGE UP (ref 22–31)
CREAT SERPL-MCNC: 1.19 MG/DL — SIGNIFICANT CHANGE UP (ref 0.5–1.3)
EOSINOPHIL # BLD AUTO: 0.35 K/UL — SIGNIFICANT CHANGE UP (ref 0–0.5)
EOSINOPHIL NFR BLD AUTO: 5.1 % — SIGNIFICANT CHANGE UP (ref 0–6)
GAS PNL BLDV: 140 MMOL/L — SIGNIFICANT CHANGE UP (ref 136–146)
GLUCOSE BLDC GLUCOMTR-MCNC: 114 MG/DL — HIGH (ref 70–99)
GLUCOSE BLDC GLUCOMTR-MCNC: 174 MG/DL — HIGH (ref 70–99)
GLUCOSE BLDV-MCNC: 154 — HIGH (ref 70–99)
GLUCOSE SERPL-MCNC: 151 MG/DL — HIGH (ref 70–99)
HCO3 BLDV-SCNC: 28 MMOL/L — HIGH (ref 20–27)
HCT VFR BLD CALC: 37.8 % — SIGNIFICANT CHANGE UP (ref 34.5–45)
HCT VFR BLDV CALC: 38.6 % — SIGNIFICANT CHANGE UP (ref 34.5–45)
HGB BLD-MCNC: 12.2 G/DL — SIGNIFICANT CHANGE UP (ref 11.5–15.5)
HGB BLDV-MCNC: 12.6 G/DL — SIGNIFICANT CHANGE UP (ref 11.5–15.5)
IMM GRANULOCYTES # BLD AUTO: 0.02 # — SIGNIFICANT CHANGE UP
IMM GRANULOCYTES NFR BLD AUTO: 0.3 % — SIGNIFICANT CHANGE UP (ref 0–1.5)
LACTATE BLDV-MCNC: 0.8 MMOL/L — SIGNIFICANT CHANGE UP (ref 0.5–2)
LYMPHOCYTES # BLD AUTO: 2 K/UL — SIGNIFICANT CHANGE UP (ref 1–3.3)
LYMPHOCYTES # BLD AUTO: 28.9 % — SIGNIFICANT CHANGE UP (ref 13–44)
MAGNESIUM SERPL-MCNC: 1.8 MG/DL — SIGNIFICANT CHANGE UP (ref 1.6–2.6)
MCHC RBC-ENTMCNC: 31.1 PG — SIGNIFICANT CHANGE UP (ref 27–34)
MCHC RBC-ENTMCNC: 32.3 % — SIGNIFICANT CHANGE UP (ref 32–36)
MCV RBC AUTO: 96.4 FL — SIGNIFICANT CHANGE UP (ref 80–100)
MONOCYTES # BLD AUTO: 0.76 K/UL — SIGNIFICANT CHANGE UP (ref 0–0.9)
MONOCYTES NFR BLD AUTO: 11 % — SIGNIFICANT CHANGE UP (ref 2–14)
NEUTROPHILS # BLD AUTO: 3.76 K/UL — SIGNIFICANT CHANGE UP (ref 1.8–7.4)
NEUTROPHILS NFR BLD AUTO: 54.1 % — SIGNIFICANT CHANGE UP (ref 43–77)
NRBC # FLD: 0 — SIGNIFICANT CHANGE UP
NT-PROBNP SERPL-SCNC: 775.6 PG/ML — SIGNIFICANT CHANGE UP
PCO2 BLDV: 64 MMHG — HIGH (ref 41–51)
PH BLDV: 7.33 PH — SIGNIFICANT CHANGE UP (ref 7.32–7.43)
PHOSPHATE SERPL-MCNC: 3 MG/DL — SIGNIFICANT CHANGE UP (ref 2.5–4.5)
PLATELET # BLD AUTO: 240 K/UL — SIGNIFICANT CHANGE UP (ref 150–400)
PMV BLD: 8.5 FL — SIGNIFICANT CHANGE UP (ref 7–13)
PO2 BLDV: 36 MMHG — SIGNIFICANT CHANGE UP (ref 35–40)
POTASSIUM BLDV-SCNC: 4.1 MMOL/L — SIGNIFICANT CHANGE UP (ref 3.4–4.5)
POTASSIUM SERPL-MCNC: 4.3 MMOL/L — SIGNIFICANT CHANGE UP (ref 3.5–5.3)
POTASSIUM SERPL-SCNC: 4.3 MMOL/L — SIGNIFICANT CHANGE UP (ref 3.5–5.3)
RBC # BLD: 3.92 M/UL — SIGNIFICANT CHANGE UP (ref 3.8–5.2)
RBC # FLD: 12.9 % — SIGNIFICANT CHANGE UP (ref 10.3–14.5)
SAO2 % BLDV: 64 % — SIGNIFICANT CHANGE UP (ref 60–85)
SODIUM SERPL-SCNC: 143 MMOL/L — SIGNIFICANT CHANGE UP (ref 135–145)
TSH SERPL-MCNC: 6.32 UIU/ML — HIGH (ref 0.27–4.2)
WBC # BLD: 6.93 K/UL — SIGNIFICANT CHANGE UP (ref 3.8–10.5)
WBC # FLD AUTO: 6.93 K/UL — SIGNIFICANT CHANGE UP (ref 3.8–10.5)

## 2018-08-02 PROCEDURE — 99223 1ST HOSP IP/OBS HIGH 75: CPT | Mod: GC

## 2018-08-02 RX ORDER — INSULIN LISPRO 100/ML
VIAL (ML) SUBCUTANEOUS AT BEDTIME
Qty: 0 | Refills: 0 | Status: DISCONTINUED | OUTPATIENT
Start: 2018-08-02 | End: 2018-08-03

## 2018-08-02 RX ORDER — INSULIN LISPRO 100/ML
VIAL (ML) SUBCUTANEOUS
Qty: 0 | Refills: 0 | Status: DISCONTINUED | OUTPATIENT
Start: 2018-08-02 | End: 2018-08-03

## 2018-08-02 RX ORDER — DEXTROSE 50 % IN WATER 50 %
25 SYRINGE (ML) INTRAVENOUS ONCE
Qty: 0 | Refills: 0 | Status: DISCONTINUED | OUTPATIENT
Start: 2018-08-02 | End: 2018-08-03

## 2018-08-02 RX ORDER — TIOTROPIUM BROMIDE 18 UG/1
1 CAPSULE ORAL; RESPIRATORY (INHALATION) DAILY
Qty: 0 | Refills: 0 | Status: DISCONTINUED | OUTPATIENT
Start: 2018-08-02 | End: 2018-08-03

## 2018-08-02 RX ORDER — IPRATROPIUM/ALBUTEROL SULFATE 18-103MCG
3 AEROSOL WITH ADAPTER (GRAM) INHALATION EVERY 6 HOURS
Qty: 0 | Refills: 0 | Status: DISCONTINUED | OUTPATIENT
Start: 2018-08-02 | End: 2018-08-03

## 2018-08-02 RX ORDER — LEVOTHYROXINE SODIUM 125 MCG
88 TABLET ORAL DAILY
Qty: 0 | Refills: 0 | Status: DISCONTINUED | OUTPATIENT
Start: 2018-08-02 | End: 2018-08-03

## 2018-08-02 RX ORDER — LISINOPRIL 2.5 MG/1
5 TABLET ORAL DAILY
Qty: 0 | Refills: 0 | Status: DISCONTINUED | OUTPATIENT
Start: 2018-08-02 | End: 2018-08-03

## 2018-08-02 RX ORDER — FUROSEMIDE 40 MG
40 TABLET ORAL DAILY
Qty: 0 | Refills: 0 | Status: DISCONTINUED | OUTPATIENT
Start: 2018-08-03 | End: 2018-08-03

## 2018-08-02 RX ORDER — SODIUM CHLORIDE 9 MG/ML
1000 INJECTION, SOLUTION INTRAVENOUS
Qty: 0 | Refills: 0 | Status: DISCONTINUED | OUTPATIENT
Start: 2018-08-02 | End: 2018-08-03

## 2018-08-02 RX ORDER — FUROSEMIDE 40 MG
40 TABLET ORAL DAILY
Qty: 0 | Refills: 0 | Status: DISCONTINUED | OUTPATIENT
Start: 2018-08-02 | End: 2018-08-02

## 2018-08-02 RX ORDER — FUROSEMIDE 40 MG
40 TABLET ORAL ONCE
Qty: 0 | Refills: 0 | Status: COMPLETED | OUTPATIENT
Start: 2018-08-02 | End: 2018-08-02

## 2018-08-02 RX ORDER — GLUCAGON INJECTION, SOLUTION 0.5 MG/.1ML
1 INJECTION, SOLUTION SUBCUTANEOUS ONCE
Qty: 0 | Refills: 0 | Status: DISCONTINUED | OUTPATIENT
Start: 2018-08-02 | End: 2018-08-03

## 2018-08-02 RX ORDER — DEXTROSE 50 % IN WATER 50 %
12.5 SYRINGE (ML) INTRAVENOUS ONCE
Qty: 0 | Refills: 0 | Status: DISCONTINUED | OUTPATIENT
Start: 2018-08-02 | End: 2018-08-03

## 2018-08-02 RX ORDER — ENOXAPARIN SODIUM 100 MG/ML
40 INJECTION SUBCUTANEOUS DAILY
Qty: 0 | Refills: 0 | Status: DISCONTINUED | OUTPATIENT
Start: 2018-08-02 | End: 2018-08-03

## 2018-08-02 RX ORDER — DEXTROSE 50 % IN WATER 50 %
15 SYRINGE (ML) INTRAVENOUS ONCE
Qty: 0 | Refills: 0 | Status: DISCONTINUED | OUTPATIENT
Start: 2018-08-02 | End: 2018-08-03

## 2018-08-02 RX ADMIN — Medication 40 MILLIGRAM(S): at 06:00

## 2018-08-02 RX ADMIN — TIOTROPIUM BROMIDE 1 CAPSULE(S): 18 CAPSULE ORAL; RESPIRATORY (INHALATION) at 15:50

## 2018-08-02 RX ADMIN — Medication 10: at 12:58

## 2018-08-02 RX ADMIN — Medication 3 MILLILITER(S): at 06:00

## 2018-08-02 RX ADMIN — Medication 88 MICROGRAM(S): at 06:00

## 2018-08-02 RX ADMIN — Medication 3 MILLILITER(S): at 11:56

## 2018-08-02 RX ADMIN — ENOXAPARIN SODIUM 40 MILLIGRAM(S): 100 INJECTION SUBCUTANEOUS at 12:59

## 2018-08-02 RX ADMIN — Medication 3 MILLILITER(S): at 22:42

## 2018-08-02 RX ADMIN — Medication 2: at 17:36

## 2018-08-02 RX ADMIN — PIPERACILLIN AND TAZOBACTAM 3.38 GRAM(S): 4; .5 INJECTION, POWDER, LYOPHILIZED, FOR SOLUTION INTRAVENOUS at 02:19

## 2018-08-02 RX ADMIN — LISINOPRIL 5 MILLIGRAM(S): 2.5 TABLET ORAL at 06:00

## 2018-08-02 NOTE — PHYSICAL THERAPY INITIAL EVALUATION ADULT - PERTINENT HX OF CURRENT PROBLEM, REHAB EVAL
Pt. is an 82 y/o female seen in the ED of [facility] on 08/02/18 with a dx of chest pain/tightness and SOB.  PT consult request secondary to COPD exacerbation.  H/O HTN.  (-) CXR.

## 2018-08-02 NOTE — H&P ADULT - PROBLEM SELECTOR PLAN 6
- T2DM on Metformin; unknown last A1c  - Monitor FSG's  - ISS - T2DM on Metformin; unknown last A1c  - Monitor FSG's  - ISS  - Consistent carb diet  - HgbA1c level in the AM

## 2018-08-02 NOTE — PHYSICAL THERAPY INITIAL EVALUATION ADULT - PATIENT PROFILE REVIEW, REHAB EVAL
yes/ACTIVITY: ambulate with assistance; consult with Heather Hidalgo RN --> pt. OK to ambulate with PT

## 2018-08-02 NOTE — PATIENT PROFILE ADULT. - TEACHING/LEARNING LEARNING PREFERENCES
Baby feels very low. Having increase pelvic pressure and discomfort.   
The patient continues to complain of a lot of discomfort. It sounds like the usual discomforts of late pregnancy. She continues to wean off the narcotics. She is taking Norco 5 mg 4 times a day. It is due to be reduced again next week. Exam is normal. She will follow-up in 2 weeks. She will call me if she cannot continue to work full-time and at that point we may have to look at giving her a note to be off work for the rest of the pregnancy.  
written material/verbal instruction/skill demonstration

## 2018-08-02 NOTE — H&P ADULT - NSHPLABSRESULTS_GEN_ALL_CORE
12.5   8.74  )-----------( 264      ( 01 Aug 2018 22:50 )             38.9       08-01    141  |  102  |  25<H>  ----------------------------<  152<H>  4.9   |  30  |  1.16    Ca    8.8      01 Aug 2018 22:50    TPro  7.4  /  Alb  4.0  /  TBili  0.2  /  DBili  x   /  AST  20  /  ALT  11  /  AlkPhos  121<H>  08-01                      Lactate Trend            CAPILLARY BLOOD GLUCOSE            < from: Xray Chest 1 View- PORTABLE-Urgent (08.01.18 @ 22:54) >    INTERPRETATION:  No emergent findings      < end of copied text > 12.5   8.74  )-----------( 264      ( 01 Aug 2018 22:50 )             38.9       08-01    141  |  102  |  25<H>  ----------------------------<  152<H>  4.9   |  30  |  1.16    Ca    8.8      01 Aug 2018 22:50    TPro  7.4  /  Alb  4.0  /  TBili  0.2  /  DBili  x   /  AST  20  /  ALT  11  /  AlkPhos  121<H>  08-01            Lactate Trend  = 1.0 -> 0.8      CAPILLARY BLOOD GLUCOSE      < from: Xray Chest 1 View- PORTABLE-Urgent (08.01.18 @ 22:54) >    INTERPRETATION:  No emergent findings      < end of copied text >

## 2018-08-02 NOTE — H&P ADULT - PROBLEM SELECTOR PROBLEM 3
Hypothyroidism, unspecified type Cellulitis of right lower extremity Cellulitis of left lower extremity

## 2018-08-02 NOTE — H&P ADULT - NSHPSOCIALHISTORY_GEN_ALL_CORE
Lives alone in [place]  Smoked 1ppd for 30 years; quit 20 years ago  Denies ETOH abuse or recreational drug abuse  Fully functional w/ ADL's; not on home O2

## 2018-08-02 NOTE — H&P ADULT - PROBLEM SELECTOR PLAN 5
- Will begin Lisinopril 5mg PO QD  - Hold for SBP <110 - SBP elevated to 178  - Will begin Lisinopril 5mg PO QD  - Hold for SBP <110

## 2018-08-02 NOTE — H&P ADULT - ASSESSMENT
84 y/o F w/ a pmh significant for HFpEF (Diastolic stage 2), COPD (multiple exacerbations in past, intermittent home O2), Type 2 DM, essential HTN, and hypothyroidism admitted for COPD exacerbation 84 y/o F w/ a pmh significant for HFpEF (Diastolic stage 2), COPD (multiple exacerbations in past, intermittent home O2), Type 2 DM, essential HTN, and hypothyroidism admitted for LOGAN concerning for COPD exacerbation vs. acute on chronic DHF exacerbation in setting of RLE cellulitis

## 2018-08-02 NOTE — PATIENT PROFILE ADULT. - NS TRANSFER PATIENT BELONGINGS
Cell Phone/PDA (specify)/Clothing/purse, ankle bracelet, watch, 2 rings, 1 pair of hoop earrings, 1 shopping cart

## 2018-08-02 NOTE — H&P ADULT - PROBLEM SELECTOR PROBLEM 5
Type 2 diabetes mellitus without complication, unspecified whether long term insulin use Essential hypertension

## 2018-08-02 NOTE — PHYSICAL THERAPY INITIAL EVALUATION ADULT - RANGE OF MOTION EXAMINATION, REHAB EVAL
bilateral upper extremity ROM was WFL (within functional limits)/(not formally assessed)/bilateral lower extremity ROM was WFL (within functional limits)

## 2018-08-02 NOTE — CONSULT NOTE ADULT - SUBJECTIVE AND OBJECTIVE BOX
CHIEF COMPLAINT:Patient is a 83y old  Female who presents with a chief complaint of SOB and chest tightness (02 Aug 2018 02:17)      HISTORY OF PRESENT ILLNESS:    this is a pleasant 82 y/o F w/ a pmh significant for HFpEF (Diastolic stage 2), COPD (multiple exacerbations in past, intermittent home O2), Type 2 DM, essential HTN, and hypothyroidism admitted for LOGAN slowly starting   no chest pain   no dizziness     PAST MEDICAL & SURGICAL HISTORY:  Chronic bronchitis: last 1/20/15  COPD (chronic obstructive pulmonary disease)  HTN (hypertension): mild  Hypothyroid: last TFT&quot;s 3 months ago - normal  DM (diabetes mellitus): blood glucose with PCP every 3 months , last done 3 months ago  Leg fracture: right ORIF with hardware 2010  H/O cataract: bilateral 2013  S/P cholecystectomy    MEDICATIONS:  enoxaparin Injectable 40 milliGRAM(s) SubCutaneous daily  lisinopril 5 milliGRAM(s) Oral daily      ALBUTerol/ipratropium for Nebulization 3 milliLiter(s) Nebulizer every 6 hours  tiotropium 18 MICROgram(s) Capsule 1 Capsule(s) Inhalation daily      dextrose 40% Gel 15 Gram(s) Oral once PRN  dextrose 50% Injectable 12.5 Gram(s) IV Push once  dextrose 50% Injectable 25 Gram(s) IV Push once  dextrose 50% Injectable 25 Gram(s) IV Push once  glucagon  Injectable 1 milliGRAM(s) IntraMuscular once PRN  insulin lispro (HumaLOG) corrective regimen sliding scale   SubCutaneous three times a day before meals  insulin lispro (HumaLOG) corrective regimen sliding scale   SubCutaneous at bedtime  levothyroxine 88 MICROGram(s) Oral daily  predniSONE   Tablet 40 milliGRAM(s) Oral daily    dextrose 5%. 1000 milliLiter(s) IV Continuous <Continuous>      FAMILY HISTORY:  No pertinent family history      Non-contributory    SOCIAL HISTORY:    No tobacco, drugs or etoh    Allergies    No Known Allergies    Intolerances    	    REVIEW OF SYSTEMS:  as above  The rest of the 14 points ROS reviewed and except above they are unremarkable.        PHYSICAL EXAM:  T(C): 36.8 (08-02-18 @ 13:47), Max: 36.8 (08-02-18 @ 13:47)  HR: 78 (08-02-18 @ 13:47) (60 - 88)  BP: 134/88 (08-02-18 @ 13:47) (130/92 - 178/70)  RR: 18 (08-02-18 @ 13:47) (18 - 22)  SpO2: 96% (08-02-18 @ 13:47) (89% - 96%)  Wt(kg): --  I&O's Summary      JVP: Normal  Neck: supple  Lung: clear   CV: S1 S2 , Murmur:  Abd: soft  Ext: pos edema and erythema left more than right   neuro: Awake / alert  Psych: flat affect  Skin: normal     LABS/DATA:    TELEMETRY: 	    ECG:  	sinus , non specific st wave abn    	  CARDIAC MARKERS:                                      12.2   6.93  )-----------( 240      ( 02 Aug 2018 06:10 )             37.8     08-02    143  |  102  |  22  ----------------------------<  151<H>  4.3   |  28  |  1.19    Ca    8.3<L>      02 Aug 2018 06:10  Phos  3.0     08-02  Mg     1.8     08-02    TPro  7.4  /  Alb  4.0  /  TBili  0.2  /  DBili  x   /  AST  20  /  ALT  11  /  AlkPhos  121<H>  08-01    proBNP: Serum Pro-Brain Natriuretic Peptide: 775.6 pg/mL (08-01 @ 22:50)    Lipid Profile:   HgA1c:   TSH: Thyroid Stimulating Hormone, Serum: 6.32 uIU/mL (08-02 @ 06:10)

## 2018-08-02 NOTE — PHYSICAL THERAPY INITIAL EVALUATION ADULT - AMBULATION SKILLS, REHAB EVAL
needs device/No Assistive Device for household ambulation; shopping cart for community ambulation/independent

## 2018-08-02 NOTE — H&P ADULT - PROBLEM SELECTOR PLAN 2
- HFpEF (Diastolic stage 2) currently euvolemic on exam- less likely her presenting symptoms are Cardiac > Pulmonary in nature.  - hsTROP 18, Pro BNP pending  - c/w home Lasix 40mg PO QD  - Strict i's/o's. Daily standing weights  - TTE in AM - HFpEF (Diastolic stage 2) w/ b/l LE edema and mildly increased Pro  (626 last month)   - The LE edema could be worsening chronic venous insufficiency however given her poor adherence to her outpt med regimen it could be in setting of worsening DHF  - hsTROP 18  - Will begin Lasix 40mg PO QD  - Strict i's/o's. Daily standing weights  - TTE in AM - HFpEF (Diastolic stage 2) w/ b/l LE edema and mildly increased Pro  (626 last month)   - The LE edema could be worsening chronic venous insufficiency or 2/2 HF; pt initially prescribed Lasix 40mg QD but has been reportedly non adherent  - hsTROP 18. EKG wnl   - Will begin Lasix 40mg PO QD  - Strict I's/O's. Daily standing weights  - TTE in AM - HFpEF (Diastolic stage 2) w/ b/l LE edema and mildly increased Pro  (626 last month)   - The LE edema could be worsening chronic venous insufficiency or 2/2 HF; pt initially prescribed Lasix 40mg QD but has been reportedly non adherent  - hsTROP 18. EKG wnl   - Will give Lasix 40mg IVP x 1 now and then begin Lasix 40mg PO QD tmw.   - Strict I's/O's. Daily standing weights  - TTE in AM - HFpEF (Diastolic stage 2) w/ b/l LE edema and mildly increased Pro  (626 last month)   - The LE edema could be worsening chronic venous insufficiency or 2/2 HF; pt initially prescribed Lasix 40mg QD but has been reportedly non adherent  - hsTROP 18. EKG wnl   - Will give Lasix 40mg IVP x 1 now and then begin Lasix 40mg PO QD tmw.   - Strict I's/O's. Daily standing weights  - TTE in AM  - F/U TSH  - HOB elevation, fluid restriction

## 2018-08-02 NOTE — PHYSICAL THERAPY INITIAL EVALUATION ADULT - CRITERIA FOR SKILLED THERAPEUTIC INTERVENTIONS
impairments found/Home --> no skilled PT needs for discharge/predicted duration of therapy intervention/therapy frequency/anticipated discharge recommendation

## 2018-08-02 NOTE — H&P ADULT - PROBLEM SELECTOR PROBLEM 6
Need for prophylactic measure Type 2 diabetes mellitus without complication, unspecified whether long term insulin use

## 2018-08-02 NOTE — PHYSICAL THERAPY INITIAL EVALUATION ADULT - ADDITIONAL COMMENTS
Pt. left in sitting at edge of stretcher post-PT in NAD with all lines/tubes intact & table/phone/call bell within reach.  RN Heather STARR. aware.

## 2018-08-02 NOTE — H&P ADULT - PROBLEM SELECTOR PLAN 1
- COPD exacerbation in setting of increased LOGAN, cough, and sputum production  - Multiple hospitalizations in past for COPD exacerbations- never intubated  - Physical exam notable for wheezing b/l lung fields; CXR w/ hyperinflated lungs low suspicion for PNA, PTX, or edema  - VBG w/ evidence of Respiratory acidosis (Likely a chronic-retainer)  - s/p Duonebs x2. Will c/w duonebs q6h   - c/w O2 supplementation (maintain SpO2>88)  - Will begin Prednisone 40mg PO QD for 5 day course - COPD exacerbation in setting of increased LOGAN and poor bronchodilator adherence  - Multiple hospitalizations in past for COPD exacerbations- never intubated  - Physical exam notable for wheezing b/l lung fields; CXR w/ hyperinflated lungs   - VBG w/ evidence of Respiratory acidosis (Likely a chronic-retainer). Repeat VBG pending.   - s/p Duonebs x2. Will c/w duonebs q6h   - c/w O2 supplementation (maintain SpO2>88). No signs of respiratory depression/impending respiratory failure at this time.  - Will begin Prednisone 40mg PO QD for 5 day course - COPD exacerbation in setting of increased LOGAN and poor bronchodilator adherence  - Multiple hospitalizations in past for COPD exacerbations- never intubated  - Physical exam notable for wheezing b/l lung fields; CXR w/ hyperinflated lungs   - VBG w/ evidence of Respiratory acidosis (likely a chronic-retainer). Repeat VBG pending. If CO2 not downtrending can try BiPAP  - s/p Duonebs x2 w/ moderate improvement in respiratory status. Will c/w duonebs q6h   - c/w O2 supplementation (maintain SpO2>88). No signs of respiratory depression/impending respiratory failure at this time.  - Will begin Prednisone 40mg PO QD for 5 day course - COPD exacerbation in setting of increased LOGAN and poor bronchodilator adherence  - Multiple hospitalizations in past for COPD exacerbations- never intubated  - Physical exam notable for wheezing b/l lung fields; CXR w/ hyperinflated lungs   - VBG w/ evidence of Respiratory acidosis (likely a chronic-retainer). Repeat VBG pending. If CO2 not downtrending can try BiPAP  - s/p Duonebs x2 w/ moderate improvement in respiratory status. Will c/w duonebs q6h   - c/w O2 supplementation (maintain SpO2>88). No signs of respiratory depression/impending respiratory failure at this time.  - Will begin Prednisone 40mg PO QD for 5 day course  - Pt reports improvement since coming to the ED

## 2018-08-02 NOTE — PHYSICAL THERAPY INITIAL EVALUATION ADULT - GAIT DISTANCE, PT EVAL
5 feet from bathroom to stretcher, 5 minute seated rest break with use of supplemental O2 via N/C, then ambulated an additional 60 feet (65 feet total)

## 2018-08-02 NOTE — H&P ADULT - PROBLEM SELECTOR PLAN 7
- DVT PPx: Lovenox 40mg QD  - Diet: DASH/TLC - Ambulatory with some limitations  - DVT PPx: Lovenox 40mg QD  - Diet: DASH/TLC

## 2018-08-02 NOTE — H&P ADULT - NSHPPHYSICALEXAM_GEN_ALL_CORE
PHYSICAL EXAM:    General: No acute distress.  HEENT: Normocephalic, atraumatic.  PERRL.  EOMI.  No scleral icterus.  Moist MM.  No oropharyngeal exudates.    Neck: Supple.  Full range of motion.  No JVD.  No carotid bruits.  No thyromegaly.  Trachea midline.  No lymphadenopathy.   Heart: RRR.  Normal S1 and S2.  No murmurs, rubs, or gallops.   Lungs: CTAB. No wheezes, crackles, or rhonchi.    Abdomen: BS+, soft, NT/ND.  No organomegaly.  Skin: Warm and dry.  No rashes.  Extremities: No edema, clubbing, or cyanosis.  2+ peripheral pulses b/l.  Musculoskeletal: No deformities.  No spinal or paraspinal tenderness.  Neuro: A&Ox3.  CN II-XII intact.  5/5 strength in UE and LE b/l.  Tactile sensation intact in UE and LE b/l.  Cerebellar function intact as assessed by finger-to-nose test. PHYSICAL EXAM:    General: Elderly Female, NAD, AAOx3, nodding off during exam  HEENT: Normocephalic, atraumatic.  PERRL.  EOMI.   Neck: Supple.   No lymphadenopathy.   Heart: RRR.  Normal S1 and S2.  No murmurs, rubs, or gallops.   Lungs: Expiratory wheezing b/l lung fields  Abdomen: BS+, soft, NT/ND.  No organomegaly.  Skin: b/l LE erythema w/ chronic venous stasis changes R > L  Extremities: 1+ pitting edema b/l LE til mid tibia  Musculoskeletal: No deformities.  No spinal or paraspinal tenderness.  Neuro: Unable to be assessed PHYSICAL EXAM:    General: Elderly Female, NAD, AAOx3, nodding off during exam  HEENT: Normocephalic, atraumatic.  PERRL.  EOMI.   Neck: Supple.   No lymphadenopathy.   Heart: RRR.  Normal S1 and S2.  No murmurs, rubs, or gallops.   Lungs: Expiratory wheezing b/l lung fields  Abdomen: BS+, soft, NT/ND.  No organomegaly.  Skin: b/l LE erythema w/ chronic venous stasis changes R > L  Extremities: 1+ pitting edema b/l LE til mid tibia (L > R)  Musculoskeletal: No deformities.  No spinal or paraspinal tenderness.  Neuro: Unable to be assessed

## 2018-08-02 NOTE — H&P ADULT - HISTORY OF PRESENT ILLNESS
84 y/o F w/ a pmh significant for HFpEF (Diastolic stage 2), COPD (multiple exacerbations in past, intermittent home O2), Type 2 DM, essential HTN, and hypothyroidism presenting to the ED w/ a chief complaint of SOB      In the ED, pt afebrile, -178/70-72, HR , RR 20 (95% 3L NC). Labs notable for hsTrop 18, VBG 7.29/70/30/28. CXR shows hyperinflated lungs w/ no overt focal consolidation. Pt given Zosyn x1, Duonebs x2, 82 y/o F w/ a pmh significant for HFpEF (Diastolic stage 2), COPD (multiple exacerbations in past, intermittent home O2), Type 2 DM, essential HTN, and hypothyroidism presenting to the ED w/ a chief complaint of worsening LOGAN of 2 days duration. Pt was in her usual state of health up until 2 days ago when she says she woke up and found it difficult to catch her breath. During this time, she endorses hx of sharp, constant, L sided chest pain that occasionally radiates to her back. Nothing seemed to alleviate/exacerbate her chest pain. She says she is prescribed inhalers for her COPD but admits to poor adherence. In addition, she noticed worsening b/l LE swelling w/ significant redness of her RLE. Of note, pt was successfully treated for LLE cellulitis w/ Keflex 1 mo ago. She has baseline LOGAN but reports noticing an acute change in her respiratory status during this time which she says progressively worsened prompting her visit to the ED.      In the ED, pt afebrile, -178/70-72, HR , RR 20 (95% 3L NC). Labs notable for hsTrop 18, VBG 7.29/70/30/28. CXR shows hyperinflated lungs w/ no overt focal consolidation. Pt given Zosyn x1, Duonebs x2,

## 2018-08-02 NOTE — H&P ADULT - NSHPREVIEWOFSYSTEMS_GEN_ALL_CORE
REVIEW OF SYSTEMS:    CONSTITUTIONAL: Denies any fatigue, weakness, fevers or chills  EYES/ENT: No visual changes;  No vertigo or throat pain   NECK: No pain or stiffness  RESPIRATORY: + SOB and LOGAN  CARDIOVASCULAR: No chest pain or palpitations  GASTROINTESTINAL: No abdominal or epigastric pain. No nausea, vomiting, or hematemesis; No diarrhea or constipation. No melena or hematochezia.  GENITOURINARY: No dysuria, frequency or hematuria  NEUROLOGICAL: No numbness or weakness  SKIN: No itching, burning, rashes, or lesions   All other review of systems is negative unless indicated above. REVIEW OF SYSTEMS:    CONSTITUTIONAL: Denies any fatigue, weakness, fevers or chills  EYES/ENT: No visual changes;  No vertigo or throat pain   NECK: No pain or stiffness  RESPIRATORY: + SOB and LOGAN  CARDIOVASCULAR: No chest pain or palpitations  GASTROINTESTINAL: No abdominal or epigastric pain. No nausea, vomiting, or hematemesis; No diarrhea or constipation. No melena or hematochezia.  GENITOURINARY: No dysuria, frequency or hematuria  EXTREMITIES: Chronic edema of the lower extremities - non-painful  NEUROLOGICAL: No numbness or weakness  SKIN: No itching, burning, rashes, or lesions   All other review of systems is negative unless indicated above.

## 2018-08-02 NOTE — PROGRESS NOTE ADULT - SUBJECTIVE AND OBJECTIVE BOX
Patient is a 83y old  Female who presents with a chief complaint of Chest pain with sob (02 Aug 2018 17:38)    SUBJECTIVE / OVERNIGHT EVENTS:    MEDICATIONS  (STANDING):  ALBUTerol/ipratropium for Nebulization 3 milliLiter(s) Nebulizer every 6 hours  dextrose 5%. 1000 milliLiter(s) (50 mL/Hr) IV Continuous <Continuous>  dextrose 50% Injectable 12.5 Gram(s) IV Push once  dextrose 50% Injectable 25 Gram(s) IV Push once  dextrose 50% Injectable 25 Gram(s) IV Push once  enoxaparin Injectable 40 milliGRAM(s) SubCutaneous daily  insulin lispro (HumaLOG) corrective regimen sliding scale   SubCutaneous three times a day before meals  insulin lispro (HumaLOG) corrective regimen sliding scale   SubCutaneous at bedtime  levothyroxine 88 MICROGram(s) Oral daily  lisinopril 5 milliGRAM(s) Oral daily  predniSONE   Tablet 40 milliGRAM(s) Oral daily  tiotropium 18 MICROgram(s) Capsule 1 Capsule(s) Inhalation daily    MEDICATIONS  (PRN):  dextrose 40% Gel 15 Gram(s) Oral once PRN Blood Glucose LESS THAN 70 milliGRAM(s)/deciliter  glucagon  Injectable 1 milliGRAM(s) IntraMuscular once PRN Glucose LESS THAN 70 milligrams/deciliter    CAPILLARY BLOOD GLUCOSE  POCT Blood Glucose.: 174 mg/dL (02 Aug 2018 17:32)  POCT Blood Glucose.: 383 mg/dL (02 Aug 2018 12:22)  POCT Blood Glucose.: 150 mg/dL (02 Aug 2018 07:08)    Vital Signs Last 24 Hrs  T(C): 36.6 (02 Aug 2018 17:26), Max: 36.8 (02 Aug 2018 13:47)  T(F): 97.9 (02 Aug 2018 17:26), Max: 98.2 (02 Aug 2018 13:47)  HR: 69 (02 Aug 2018 17:26) (60 - 88)  BP: 151/65 (02 Aug 2018 17:26) (130/92 - 178/70)  RR: 18 (02 Aug 2018 17:26) (18 - 22)  SpO2: 94% (02 Aug 2018 17:26) (89% - 96%)    PHYSICAL EXAM:  GENERAL: elderly WF, sitting in side of stretcher, NC O2 on, NAD  HEAD:  Atraumatic, Normocephalic  EYES: EOMI, PERRLA, conjunctiva and sclera clear  NECK: Supple, No JVD  CHEST/LUNG: Clear to auscultation bilaterally; No wheeze  HEART: Regular rate and rhythm; No murmurs, rubs, or gallops  ABDOMEN: Soft, Nontender, Nondistended; Bowel sounds present  EXTREMITIES:  2+ Peripheral Pulses, No clubbing, cyanosis, or edema  PSYCH: AAOx3  NEUROLOGY: non-focal  SKIN: No rashes or lesions    LABS:             12.2   6.93  )-----------( 240      ( 02 Aug 2018 06:10 )             37.8     143  |  102  |  22  ----------------------------<  151<H>  4.3   |  28  |  1.19    Ca    8.3<L>      02 Aug 2018 06:10  Phos  3.0     08-02  Mg     1.8     08-02    TPro  7.4  /  Alb  4.0  /  TBili  0.2  /  DBili  x   /  AST  20  /  ALT  11  /  AlkPhos  121<H>  08-01    RADIOLOGY & ADDITIONAL TESTS:    Imaging Personally Reviewed:    Consultant(s) Notes Reviewed:      Care Discussed with Consultants/Other Providers: RN, SW, CM, Tele PA re overall care

## 2018-08-02 NOTE — PHYSICAL THERAPY INITIAL EVALUATION ADULT - DISCHARGE DISPOSITION, PT EVAL
pt. will benefit from skilled PT while inpatient at [facility]/home/no skilled PT needs

## 2018-08-02 NOTE — CONSULT NOTE ADULT - ASSESSMENT
SOB  consistent with COPD exacerbation   has element of congestion   agree with steroids / nebs   for now cont low dose steroids   obtain echo     HTN  stable     lower ext edema/erythema  likely consistent with venous stasis dermatitis  fu with primary team

## 2018-08-03 ENCOUNTER — TRANSCRIPTION ENCOUNTER (OUTPATIENT)
Age: 83
End: 2018-08-03

## 2018-08-03 VITALS — OXYGEN SATURATION: 97 %

## 2018-08-03 LAB
BASOPHILS # BLD AUTO: 0.03 K/UL — SIGNIFICANT CHANGE UP (ref 0–0.2)
BASOPHILS NFR BLD AUTO: 0.3 % — SIGNIFICANT CHANGE UP (ref 0–2)
BUN SERPL-MCNC: 32 MG/DL — HIGH (ref 7–23)
CALCIUM SERPL-MCNC: 9 MG/DL — SIGNIFICANT CHANGE UP (ref 8.4–10.5)
CHLORIDE SERPL-SCNC: 96 MMOL/L — LOW (ref 98–107)
CO2 SERPL-SCNC: 29 MMOL/L — SIGNIFICANT CHANGE UP (ref 22–31)
CREAT SERPL-MCNC: 1.32 MG/DL — HIGH (ref 0.5–1.3)
EOSINOPHIL # BLD AUTO: 0.2 K/UL — SIGNIFICANT CHANGE UP (ref 0–0.5)
EOSINOPHIL NFR BLD AUTO: 2.1 % — SIGNIFICANT CHANGE UP (ref 0–6)
GLUCOSE BLDC GLUCOMTR-MCNC: 317 MG/DL — HIGH (ref 70–99)
GLUCOSE SERPL-MCNC: 139 MG/DL — HIGH (ref 70–99)
HBA1C BLD-MCNC: 7.1 % — HIGH (ref 4–5.6)
HCT VFR BLD CALC: 36.6 % — SIGNIFICANT CHANGE UP (ref 34.5–45)
HGB BLD-MCNC: 11.9 G/DL — SIGNIFICANT CHANGE UP (ref 11.5–15.5)
IMM GRANULOCYTES # BLD AUTO: 0.04 # — SIGNIFICANT CHANGE UP
IMM GRANULOCYTES NFR BLD AUTO: 0.4 % — SIGNIFICANT CHANGE UP (ref 0–1.5)
LYMPHOCYTES # BLD AUTO: 2.83 K/UL — SIGNIFICANT CHANGE UP (ref 1–3.3)
LYMPHOCYTES # BLD AUTO: 30.2 % — SIGNIFICANT CHANGE UP (ref 13–44)
MAGNESIUM SERPL-MCNC: 1.8 MG/DL — SIGNIFICANT CHANGE UP (ref 1.6–2.6)
MCHC RBC-ENTMCNC: 31.1 PG — SIGNIFICANT CHANGE UP (ref 27–34)
MCHC RBC-ENTMCNC: 32.5 % — SIGNIFICANT CHANGE UP (ref 32–36)
MCV RBC AUTO: 95.6 FL — SIGNIFICANT CHANGE UP (ref 80–100)
MONOCYTES # BLD AUTO: 0.9 K/UL — SIGNIFICANT CHANGE UP (ref 0–0.9)
MONOCYTES NFR BLD AUTO: 9.6 % — SIGNIFICANT CHANGE UP (ref 2–14)
NEUTROPHILS # BLD AUTO: 5.36 K/UL — SIGNIFICANT CHANGE UP (ref 1.8–7.4)
NEUTROPHILS NFR BLD AUTO: 57.4 % — SIGNIFICANT CHANGE UP (ref 43–77)
NRBC # FLD: 0 — SIGNIFICANT CHANGE UP
PLATELET # BLD AUTO: 231 K/UL — SIGNIFICANT CHANGE UP (ref 150–400)
PMV BLD: 8.5 FL — SIGNIFICANT CHANGE UP (ref 7–13)
POTASSIUM SERPL-MCNC: 4.3 MMOL/L — SIGNIFICANT CHANGE UP (ref 3.5–5.3)
POTASSIUM SERPL-SCNC: 4.3 MMOL/L — SIGNIFICANT CHANGE UP (ref 3.5–5.3)
RBC # BLD: 3.83 M/UL — SIGNIFICANT CHANGE UP (ref 3.8–5.2)
RBC # FLD: 12.5 % — SIGNIFICANT CHANGE UP (ref 10.3–14.5)
SODIUM SERPL-SCNC: 137 MMOL/L — SIGNIFICANT CHANGE UP (ref 135–145)
WBC # BLD: 9.36 K/UL — SIGNIFICANT CHANGE UP (ref 3.8–10.5)
WBC # FLD AUTO: 9.36 K/UL — SIGNIFICANT CHANGE UP (ref 3.8–10.5)

## 2018-08-03 PROCEDURE — 93306 TTE W/DOPPLER COMPLETE: CPT | Mod: 26

## 2018-08-03 RX ORDER — LISINOPRIL 2.5 MG/1
5 TABLET ORAL DAILY
Qty: 0 | Refills: 0 | Status: DISCONTINUED | OUTPATIENT
Start: 2018-08-03 | End: 2018-08-03

## 2018-08-03 RX ORDER — FUROSEMIDE 40 MG
20 TABLET ORAL
Qty: 0 | Refills: 0 | Status: DISCONTINUED | OUTPATIENT
Start: 2018-08-03 | End: 2018-08-03

## 2018-08-03 RX ORDER — FUROSEMIDE 40 MG
1 TABLET ORAL
Qty: 15 | Refills: 0
Start: 2018-08-03 | End: 2018-09-01

## 2018-08-03 RX ORDER — TIOTROPIUM BROMIDE 18 UG/1
1 CAPSULE ORAL; RESPIRATORY (INHALATION)
Qty: 1 | Refills: 3
Start: 2018-08-03 | End: 2018-11-30

## 2018-08-03 RX ORDER — LISINOPRIL 2.5 MG/1
1 TABLET ORAL
Qty: 30 | Refills: 0
Start: 2018-08-03 | End: 2018-09-01

## 2018-08-03 RX ADMIN — Medication 8: at 13:24

## 2018-08-03 RX ADMIN — Medication 40 MILLIGRAM(S): at 05:49

## 2018-08-03 RX ADMIN — LISINOPRIL 5 MILLIGRAM(S): 2.5 TABLET ORAL at 05:49

## 2018-08-03 RX ADMIN — Medication 88 MICROGRAM(S): at 05:49

## 2018-08-03 RX ADMIN — Medication 3 MILLILITER(S): at 04:52

## 2018-08-03 RX ADMIN — Medication 2: at 08:53

## 2018-08-03 RX ADMIN — TIOTROPIUM BROMIDE 1 CAPSULE(S): 18 CAPSULE ORAL; RESPIRATORY (INHALATION) at 08:53

## 2018-08-03 RX ADMIN — ENOXAPARIN SODIUM 40 MILLIGRAM(S): 100 INJECTION SUBCUTANEOUS at 08:53

## 2018-08-03 RX ADMIN — Medication 3 MILLILITER(S): at 16:14

## 2018-08-03 NOTE — PROGRESS NOTE ADULT - PROBLEM SELECTOR PLAN 4
- c/w home Synthroid 88 mcg  - f/u TSH level in the AM
- c/w home Synthroid 88 mcg  - f/u TSH level in the AM

## 2018-08-03 NOTE — DISCHARGE NOTE ADULT - ADDITIONAL INSTRUCTIONS
See Dr. Cheek on 8/7/18 at 10am, as already scheduled.  Call to schedule a follow up appointment with Dr. Brooke (pulmonary)

## 2018-08-03 NOTE — DISCHARGE NOTE ADULT - CARE PLAN
Principal Discharge DX:	COPD exacerbation  Goal:	return to baseline  Assessment and plan of treatment:	You are to finish a 5 day steroid course with Prednisone.  Take your inhalers as prescribed.  Follow up with Dr. Cheek in the office as scheduled on 8/7/18.  Secondary Diagnosis:	Acute on chronic diastolic heart failure  Assessment and plan of treatment:	You were given a few doses of lasix (water pill) in the hospital.  Echo of your heart showed an EF of 64%. Take lasix 20mg every other day.  Follow up with Dr. Cheek in the office as scheduled on 8/7/18.  Secondary Diagnosis:	Lower extremity edema  Assessment and plan of treatment:	Apply lotion to your lower legs daily, allow it to completely dry.  Then put on compression stockings daily (remove at bedtime).  Keep your legs elevated as much as possible.  Secondary Diagnosis:	Type 2 diabetes mellitus without complication, unspecified whether long term insulin use  Assessment and plan of treatment:	Hemoglobin A1c 7.1, Continue metformin.  Monitor your blood sugar daily and bring log of readings to your doctor.  Secondary Diagnosis:	Essential hypertension  Assessment and plan of treatment:	You were started on lisinopril 5mg daily.  Monitor your blood pressure daily and bring log of readings to your doctor.  Secondary Diagnosis:	Hypothyroidism, unspecified type  Assessment and plan of treatment:	Continue Synthroid Principal Discharge DX:	COPD exacerbation  Goal:	return to baseline  Assessment and plan of treatment:	You are to finish a 5 day steroid course with Prednisone.  Take your inhalers as prescribed.  Follow up with Dr. Cheek in the office as scheduled on 8/7/18.  Secondary Diagnosis:	Acute on chronic diastolic heart failure  Assessment and plan of treatment:	You were given a few doses of lasix (water pill) in the hospital.  Echo of your heart showed an EF of 64%. Take lasix 20mg every other day.  Follow up with Dr. Cheek in the office as scheduled on 8/7/18.  Secondary Diagnosis:	Lower extremity edema  Assessment and plan of treatment:	Apply lotion to your lower legs daily, allow it to completely dry.  Then put on compression stockings daily (remove at bedtime).  Keep your legs elevated as much as possible.  Secondary Diagnosis:	Type 2 diabetes mellitus without complication, unspecified whether long term insulin use  Assessment and plan of treatment:	Hemoglobin A1c 7.1, Continue metformin.  Monitor your blood sugar daily and bring log of readings to your doctor.  Secondary Diagnosis:	Essential hypertension  Assessment and plan of treatment:	You were started on lisinopril 5mg daily.  Monitor your blood pressure daily and bring log of readings to your doctor.  Secondary Diagnosis:	Hypothyroidism, unspecified type  Assessment and plan of treatment:	Continue Synthroid  Assessment and plan of treatment:	you will be contacted by  regarding home service, if you did not receive a phone call please call [phone] and ask for the

## 2018-08-03 NOTE — DISCHARGE NOTE ADULT - HOSPITAL COURSE
84 y/o F w/ a pmh significant for HFpEF (Diastolic stage 2), COPD (multiple exacerbations in past, intermittent home O2), Type 2 DM, essential HTN, and hypothyroidism presenting to the ED w/ a chief complaint of worsening LOAGN of 2 days duration. Pt was in her usual state of health up until 2 days ago when she says she woke up and found it difficult to catch her breath. During this time, she endorses hx of sharp, constant, L sided chest pain that occasionally radiates to her back. Nothing seemed to alleviate/exacerbate her chest pain. She says she is prescribed inhalers for her COPD but admits to poor adherence. In addition, she noticed worsening b/l LE swelling w/ significant redness of her RLE. Of note, pt was successfully treated for LLE cellulitis w/ Keflex 1 mo ago. She has baseline LOGAN but reports noticing an acute change in her respiratory status during this time which she says progressively worsened prompting her visit to the ED.    Hospital course:  hsT: 18  CXR: Clear lungs.  A1C 7.1    At ED pt with was given 1 time dose of abx given concern for cellulitis. Abx not continued given LE swelling likely secondary to venous statis. Pt presented with SOB unclear if HF vs COPD exacerbation. Pt started on steroid and nebs with improvement. TTE**. Cardiology following, SOB likely COPD. Pt with no needs per PT. 82 y/o F w/ a pmh significant for HFpEF (Diastolic stage 2), COPD (multiple exacerbations in past, intermittent home O2), Type 2 DM, essential HTN, and hypothyroidism presenting to the ED w/ a chief complaint of worsening LOGAN of 2 days duration. Pt was in her usual state of health up until 2 days ago when she says she woke up and found it difficult to catch her breath. During this time, she endorses hx of sharp, constant, L sided chest pain that occasionally radiates to her back. Nothing seemed to alleviate/exacerbate her chest pain. She says she is prescribed inhalers for her COPD but admits to poor adherence. In addition, she noticed worsening b/l LE swelling w/ significant redness of her RLE. Of note, pt was successfully treated for LLE cellulitis w/ Keflex 1 mo ago. She has baseline LOGAN but reports noticing an acute change in her respiratory status during this time which she says progressively worsened prompting her visit to the ED.    Hospital course:  hsT: 18  CXR: Clear lungs.  A1C 7.1    At ED pt with was given 1 time dose of abx given concern for cellulitis. Abx not continued given LE swelling likely secondary to venous statis. Pt presented with SOB unclear if HF vs COPD exacerbation. Pt started on steroid and nebs with improvement. TTE EF 64%. Cardiology Dr. Vargas following, SOB likely COPD, recommend lasix 20mg PO every other day on discharge.   Pt with no needs per PT.   F/u Dr. Cheek on 8/7

## 2018-08-03 NOTE — PROGRESS NOTE ADULT - PROBLEM SELECTOR PLAN 6
- T2DM on Metformin which is now on hold; A1c 7.1  ssI PRN
- T2DM on Metformin which is now on hold; A1c 7.9  ssI PRN

## 2018-08-03 NOTE — PROGRESS NOTE ADULT - PROBLEM SELECTOR PLAN 1
noted to be wheezing b/l lung fields on admission; CXR w/ hyperinflated lungs   - VBG w/ evidence of Respiratory acidosis (likely a chronic-retainer).  - c/w NC O2, steroids, Spiriva  - ?component of CHF - cards eval
noted to be wheezing b/l lung fields on admission, clinically improved with steroids  - c/w NC O2, steroids 5 day course, Spiriva/Symbicort

## 2018-08-03 NOTE — PROGRESS NOTE ADULT - SUBJECTIVE AND OBJECTIVE BOX
CC: SOB    SUBJECTIVE / OVERNIGHT EVENTS:  Tele NSR, no events  Pt says she's breathing better, feels up to going home.    MEDICATIONS  (STANDING):  ALBUTerol/ipratropium for Nebulization 3 milliLiter(s) Nebulizer every 6 hours  dextrose 5%. 1000 milliLiter(s) (50 mL/Hr) IV Continuous <Continuous>  dextrose 50% Injectable 12.5 Gram(s) IV Push once  dextrose 50% Injectable 25 Gram(s) IV Push once  dextrose 50% Injectable 25 Gram(s) IV Push once  enoxaparin Injectable 40 milliGRAM(s) SubCutaneous daily  furosemide    Tablet 40 milliGRAM(s) Oral daily  insulin lispro (HumaLOG) corrective regimen sliding scale   SubCutaneous three times a day before meals  insulin lispro (HumaLOG) corrective regimen sliding scale   SubCutaneous at bedtime  levothyroxine 88 MICROGram(s) Oral daily  lisinopril 5 milliGRAM(s) Oral daily  predniSONE   Tablet 40 milliGRAM(s) Oral daily  tiotropium 18 MICROgram(s) Capsule 1 Capsule(s) Inhalation daily    MEDICATIONS  (PRN):  dextrose 40% Gel 15 Gram(s) Oral once PRN Blood Glucose LESS THAN 70 milliGRAM(s)/deciliter  glucagon  Injectable 1 milliGRAM(s) IntraMuscular once PRN Glucose LESS THAN 70 milligrams/deciliter    CAPILLARY BLOOD GLUCOSE  POCT Blood Glucose.: 317 mg/dL (03 Aug 2018 13:08)  POCT Blood Glucose.: 193 mg/dL (03 Aug 2018 08:39)  POCT Blood Glucose.: 114 mg/dL (02 Aug 2018 21:14)  POCT Blood Glucose.: 174 mg/dL (02 Aug 2018 17:32)    Vital Signs Last 24 Hrs  T(C): 36.7 (03 Aug 2018 13:45), Max: 36.7 (03 Aug 2018 05:45)  T(F): 98.1 (03 Aug 2018 13:45), Max: 98.1 (03 Aug 2018 13:45)  HR: 74 (03 Aug 2018 13:45) (65 - 82)  BP: 134/69 (03 Aug 2018 13:45) (134/69 - 151/65)  RR: 18 (03 Aug 2018 13:45) (18 - 18)  SpO2: 96% (03 Aug 2018 13:45) (93% - 97%)        PHYSICAL EXAM:  GENERAL: NAD, well-developed  HEAD:  Atraumatic, Normocephalic  EYES: EOMI, PERRLA, conjunctiva and sclera clear  NECK: Supple, No JVD  CHEST/LUNG: Clear to auscultation bilaterally; No wheeze  HEART: Regular rate and rhythm; No murmurs, rubs, or gallops  ABDOMEN: Soft, Nontender, Nondistended; Bowel sounds present  EXTREMITIES:  2+ Peripheral Pulses, No clubbing, cyanosis, or edema  PSYCH: AAOx3  NEUROLOGY: non-focal  SKIN: No rashes or lesions    LABS:             11.9   9.36  )-----------( 231      ( 03 Aug 2018 05:15 )             36.6     137  |  96<L>  |  32<H>  ----------------------------<  139<H>  4.3   |  29  |  1.32<H>    Ca    9.0      03 Aug 2018 05:15  Phos  3.0     08-02  Mg     1.8     08-03    TPro  7.4  /  Alb  4.0  /  TBili  0.2  /  DBili  x   /  AST  20  /  ALT  11  /  AlkPhos  121<H>  08-01    RADIOLOGY & ADDITIONAL TESTS:    Imaging Personally Reviewed:    Consultant(s) Notes Reviewed:      Care Discussed with Consultants/Other Providers: CC: SOB    SUBJECTIVE / OVERNIGHT EVENTS:  Tele NSR, no events  Pt says she's breathing better, feels up to going home.  No chest pain, palpitations.      MEDICATIONS  (STANDING):  ALBUTerol/ipratropium for Nebulization 3 milliLiter(s) Nebulizer every 6 hours  dextrose 5%. 1000 milliLiter(s) (50 mL/Hr) IV Continuous <Continuous>  dextrose 50% Injectable 12.5 Gram(s) IV Push once  dextrose 50% Injectable 25 Gram(s) IV Push once  dextrose 50% Injectable 25 Gram(s) IV Push once  enoxaparin Injectable 40 milliGRAM(s) SubCutaneous daily  furosemide    Tablet 40 milliGRAM(s) Oral daily  insulin lispro (HumaLOG) corrective regimen sliding scale   SubCutaneous three times a day before meals  insulin lispro (HumaLOG) corrective regimen sliding scale   SubCutaneous at bedtime  levothyroxine 88 MICROGram(s) Oral daily  lisinopril 5 milliGRAM(s) Oral daily  predniSONE   Tablet 40 milliGRAM(s) Oral daily  tiotropium 18 MICROgram(s) Capsule 1 Capsule(s) Inhalation daily    MEDICATIONS  (PRN):  dextrose 40% Gel 15 Gram(s) Oral once PRN Blood Glucose LESS THAN 70 milliGRAM(s)/deciliter  glucagon  Injectable 1 milliGRAM(s) IntraMuscular once PRN Glucose LESS THAN 70 milligrams/deciliter    CAPILLARY BLOOD GLUCOSE  POCT Blood Glucose.: 317 mg/dL (03 Aug 2018 13:08)  POCT Blood Glucose.: 193 mg/dL (03 Aug 2018 08:39)  POCT Blood Glucose.: 114 mg/dL (02 Aug 2018 21:14)  POCT Blood Glucose.: 174 mg/dL (02 Aug 2018 17:32)    Vital Signs Last 24 Hrs  T(C): 36.7 (03 Aug 2018 13:45), Max: 36.7 (03 Aug 2018 05:45)  T(F): 98.1 (03 Aug 2018 13:45), Max: 98.1 (03 Aug 2018 13:45)  HR: 74 (03 Aug 2018 13:45) (65 - 82)  BP: 134/69 (03 Aug 2018 13:45) (134/69 - 151/65)  RR: 18 (03 Aug 2018 13:45) (18 - 18)  SpO2: 96% (03 Aug 2018 13:45) (93% - 97%)    PHYSICAL EXAM:  GENERAL: elderly WF, sleeping comfortably in bed, NC O2 on, NAD  HEAD:  Atraumatic, Normocephalic  EYES: EOMI, PERRLA, conjunctiva and sclera clear  NECK: Supple, No JVD  CHEST/LUNG: Clear to auscultation bilaterally; No wheeze  HEART: Regular rate and rhythm; No murmurs, rubs, or gallops  ABDOMEN: Soft, Nontender, Nondistended; Bowel sounds present  EXTREMITIES:  BLE edema with dry, thickened skin  PSYCH: AAOx3  NEUROLOGY: non-focal    LABS:             11.9   9.36  )-----------( 231      ( 03 Aug 2018 05:15 )             36.6     137  |  96<L>  |  32<H>  ----------------------------<  139<H>  4.3   |  29  |  1.32<H>    Ca    9.0      03 Aug 2018 05:15  Phos  3.0     08-02  Mg     1.8     08-03    TPro  7.4  /  Alb  4.0  /  TBili  0.2  /  DBili  x   /  AST  20  /  ALT  11  /  AlkPhos  121<H>  08-01    RADIOLOGY & ADDITIONAL TESTS:    < from: Transthoracic Echocardiogram (08.03.18 @ 11:05) >  DIMENSIONS:  Dimensions:     Normal Values:  LA:     3.3 cm    2.0 - 4.0 cm  Ao:     2.8 cm    2.0 - 3.8 cm  SEPTUM: 1.0 cm    0.6 - 1.2 cm  PWT: 1.0 cm    0.6 - 1.1 cm  LVIDd:  4.9 cm    3.0 - 5.6 cm  LVIDs:  3.2 cm    1.8 - 4.0 cm  Derived Variables:  LVMI: 97 g/m2  RWT: 0.40  Fractional short: 35 %  Ejection Fraction (Teicholtz): 64 %  ------------------------------------------------------------------------  OBSERVATIONS:  Mitral Valve: Normal mitral valve.  Aortic Root: Normal aortic root.  Aortic Valve: Aortic valve not well visualized; probably  normal.  Left Atrium: Normal left atrium.  LA volume index = 21  cc/m2.  Left Ventricle:Normal left ventricular systolic function.  No segmental wall motion abnormalities. Normal left  ventricular internal dimensions and wall thicknesses.  Right Heart: Normal right atrium. Normal right ventricular  size and function. Normal tricuspid valve. Minimal  tricuspid regurgitation. Normal pulmonic valve.  Pericardium/PleuraNormal pericardium with no pericardial  effusion.  ------------------------------------------------------------------------  CONCLUSIONS:  1. Aortic valve not well visualized; probably normal.  2. Normal left ventricular internal dimensions and wall  thicknesses.  3. Normal left ventricular systolic function. No segmental  wall motion abnormalities.  4. Normal right ventricular size and function.    Imaging Personally Reviewed:    Consultant(s) Notes Reviewed:      Care Discussed with Consultants/Other Providers: RN, SW, CM, ADS, cardiology re overall care

## 2018-08-03 NOTE — DISCHARGE NOTE ADULT - SECONDARY DIAGNOSIS.
Acute on chronic diastolic heart failure Lower extremity edema Type 2 diabetes mellitus without complication, unspecified whether long term insulin use Essential hypertension Hypothyroidism, unspecified type

## 2018-08-03 NOTE — PROGRESS NOTE ADULT - PROBLEM SELECTOR PLAN 5
c/w lisinopril, f/u as outpt as scheduled on 8/7
- SBP elevated to 178  - Will begin Lisinopril 5mg PO QD  - Hold for SBP <110

## 2018-08-03 NOTE — DISCHARGE NOTE ADULT - MEDICATION SUMMARY - MEDICATIONS TO TAKE
I will START or STAY ON the medications listed below when I get home from the hospital:    predniSONE 20 mg oral tablet  -- 2 tab(s) by mouth once a day  -- Indication: For COPD exacerbation    lisinopril 5 mg oral tablet  -- 1 tab(s) by mouth once a day  -- Indication: For Htn    metformin 500 mg oral tablet  -- 2  by mouth once a day  -- Indication: For dm    ProAir HFA 90 mcg/inh inhalation aerosol  -- 2 puff(s) inhaled 4 times a day  -- Indication: For COPD exacerbation    tiotropium 18 mcg inhalation capsule  -- 1 cap(s) inhaled once a day  -- Indication: For COPD exacerbation    Symbicort 160 mcg-4.5 mcg/inh inhalation aerosol  -- 2 puff(s) inhaled 2 times a day  -- Indication: For COPD exacerbation    furosemide 20 mg oral tablet  -- 1 tab(s) by mouth every other day  -- Indication: For LE swelling    pantoprazole 40 mg oral delayed release tablet  -- 1 tab(s) by mouth once a day (before a meal)  -- Indication: For gerd    Synthroid 88 mcg (0.088 mg) oral tablet  -- 1 tab(s) by mouth once a day  -- Indication: For Hypothyroidism, unspecified type

## 2018-08-03 NOTE — PROGRESS NOTE ADULT - PROBLEM SELECTOR PROBLEM 6
Type 2 diabetes mellitus without complication, unspecified whether long term insulin use
Type 2 diabetes mellitus without complication, unspecified whether long term insulin use

## 2018-08-03 NOTE — DISCHARGE NOTE ADULT - PROVIDER TOKENS
FREE:[LAST:[Dr Cheek],PHONE:[(   )    -],FAX:[(   )    -],ADDRESS:[pulm]] TOKEN:'[id number]',TOKEN:'[id number]'

## 2018-08-03 NOTE — PROGRESS NOTE ADULT - ASSESSMENT
SOB  consistent with COPD exacerbation   has element of congestion   agree with steroids / nebs   for now cont low dose steroids   obtain echo   clinically improving     HTN  stable     lower ext edema/erythema  likely consistent with venous stasis dermatitis  fu with primary team
82 y/o F w/ a pmh significant for HFpEF (Diastolic stage 2), COPD (multiple exacerbations in past, intermittent home O2), Type 2 DM, essential HTN, and hypothyroidism admitted for LOGAN concerning for COPD exacerbation vs. acute on chronic DHF exacerbation
82 y/o F w/ a pmh significant for HFpEF (Diastolic stage 2), COPD (multiple exacerbations in past, intermittent home O2), Type 2 DM, essential HTN, and hypothyroidism admitted for LOGAN consistent with COPD exacerbation

## 2018-08-03 NOTE — PROGRESS NOTE ADULT - PROBLEM SELECTOR PLAN 3
recently Rx'd with abx, looks chronic, TEDS/elevation
recently Rx'd with abx, looks chronic, TEDS/elevation

## 2018-08-03 NOTE — PROGRESS NOTE ADULT - PROBLEM SELECTOR PLAN 2
- HFpEF (Diastolic stage 2) w/ b/l LE edema and mildly increased Pro  (626 last month), LE edema could be worsening chronic venous insufficiency vs. CHF, pt initially prescribed Lasix 40mg QD but has been reportedly non adherent  - s/p IV lasix, slight bump in Cr - d/w Dr. Vargas - recommend lasix 20mg PO every other day, f/u with PMD in office
- HFpEF (Diastolic stage 2) w/ b/l LE edema and mildly increased Pro  (626 last month), LE edema could be worsening chronic venous insufficiency vs. CHF, pt initially prescribed Lasix 40mg QD but has been reportedly non adherent  - s/p IV lasix --> cards eval

## 2018-08-03 NOTE — DISCHARGE NOTE ADULT - PATIENT PORTAL LINK FT
You can access the [facility] Patient Portal, offered by [facility], by registering with the following website: [email or web address]

## 2018-08-03 NOTE — DISCHARGE NOTE ADULT - OTHER SIGNIFICANT FINDINGS
< from: Transthoracic Echocardiogram (08.03.18 @ 11:05) >  Fractional short: 35 %  Ejection Fraction (Teicholtz): 64 %  ------------------------------------------------------------------------  OBSERVATIONS:  Mitral Valve: Normal mitral valve.  Aortic Root: Normal aortic root.  Aortic Valve: Aortic valve not well visualized; probably  normal.  Left Atrium: Normal left atrium.  LA volume index = 21  cc/m2.  Left Ventricle:Normal left ventricular systolic function.  No segmental wall motion abnormalities. Normal left  ventricular internal dimensions and wall thicknesses.  Right Heart: Normal right atrium. Normal right ventricular  size and function. Normal tricuspid valve. Minimal  tricuspid regurgitation. Normal pulmonic valve.  Pericardium/PleuraNormal pericardium with no pericardial  effusion.  ------------------------------------------------------------------------  CONCLUSIONS:  1. Aortic valve not well visualized; probably normal.  2. Normal left ventricular internal dimensions and wall  thicknesses.  3. Normal left ventricular systolic function. No segmental  wall motion abnormalities.  4. Normal right ventricular size and function.

## 2018-08-03 NOTE — DISCHARGE NOTE ADULT - PLAN OF CARE
return to baseline You are to finish a 5 day steroid course with Prednisone.  Take your inhalers as prescribed.  Follow up with Dr. Cheek in the office as scheduled on 8/7/18. You were given a few doses of lasix (water pill) in the hospital.  Echo of your heart showed an EF of 64%. Take lasix 20mg every other day.  Follow up with Dr. Cheek in the office as scheduled on 8/7/18. Apply lotion to your lower legs daily, allow it to completely dry.  Then put on compression stockings daily (remove at bedtime).  Keep your legs elevated as much as possible. Hemoglobin A1c 7.1, Continue metformin.  Monitor your blood sugar daily and bring log of readings to your doctor. You were started on lisinopril 5mg daily.  Monitor your blood pressure daily and bring log of readings to your doctor. Continue Synthroid you will be contacted by  regarding home service, if you did not receive a phone call please call [phone] and ask for the

## 2018-08-03 NOTE — CHART NOTE - NSCHARTNOTEFT_GEN_A_CORE
TTE discussed with attending pt cleared for discharge. recommended lasix 20mg every other day and continue lisinopril. Case discussed with  pt can be discharge and someone will followup with her tomorrow to refer service. face to face done

## 2018-08-03 NOTE — DISCHARGE NOTE ADULT - CARE PROVIDERS DIRECT ADDRESSES
,DirectAddress_Unknown ,[email or web address]

## 2018-08-03 NOTE — PROGRESS NOTE ADULT - PROBLEM SELECTOR PLAN 7
- Ambulatory with some limitations  - DVT PPx: Lovenox 40mg QD  - Diet: DASH/TLC
- Ambulatory with some limitations  - DVT PPx: Lovenox 40mg QD  - Diet: DASH/TLC

## 2018-08-03 NOTE — PROGRESS NOTE ADULT - SUBJECTIVE AND OBJECTIVE BOX
Subjective: Patient seen and examined. No new events except as noted.     SUBJECTIVE/ROS:  feels better       MEDICATIONS:  MEDICATIONS  (STANDING):  ALBUTerol/ipratropium for Nebulization 3 milliLiter(s) Nebulizer every 6 hours  dextrose 5%. 1000 milliLiter(s) (50 mL/Hr) IV Continuous <Continuous>  dextrose 50% Injectable 12.5 Gram(s) IV Push once  dextrose 50% Injectable 25 Gram(s) IV Push once  dextrose 50% Injectable 25 Gram(s) IV Push once  enoxaparin Injectable 40 milliGRAM(s) SubCutaneous daily  furosemide    Tablet 40 milliGRAM(s) Oral daily  insulin lispro (HumaLOG) corrective regimen sliding scale   SubCutaneous three times a day before meals  insulin lispro (HumaLOG) corrective regimen sliding scale   SubCutaneous at bedtime  levothyroxine 88 MICROGram(s) Oral daily  lisinopril 5 milliGRAM(s) Oral daily  predniSONE   Tablet 40 milliGRAM(s) Oral daily  tiotropium 18 MICROgram(s) Capsule 1 Capsule(s) Inhalation daily      PHYSICAL EXAM:  T(C): 36.7 (08-03-18 @ 05:45), Max: 36.8 (08-02-18 @ 13:47)  HR: 82 (08-03-18 @ 05:45) (65 - 82)  BP: 149/61 (08-03-18 @ 05:45) (130/92 - 151/65)  RR: 18 (08-03-18 @ 05:45) (18 - 20)  SpO2: 97% (08-03-18 @ 05:45) (93% - 97%)  Wt(kg): --  I&O's Summary    02 Aug 2018 07:01  -  03 Aug 2018 07:00  --------------------------------------------------------  IN: 450 mL / OUT: 700 mL / NET: -250 mL      Height (cm): 165.1 (08-02 @ 17:26)  Weight (kg): 76 (08-02 @ 17:26)  BMI (kg/m2): 27.9 (08-02 @ 17:26)  BSA (m2): 1.83 (08-02 @ 17:26)  JVP: Normal  Neck: supple  Lung: clear   CV: S1 S2 , Murmur:  Abd: soft  Ext: pos edema  neuro: Awake / alert  Psych: flat affect  Skin: normal       LABS/DATA:    CARDIAC MARKERS:                                11.9   9.36  )-----------( 231      ( 03 Aug 2018 05:15 )             36.6     08-03    137  |  96<L>  |  32<H>  ----------------------------<  139<H>  4.3   |  29  |  1.32<H>    Ca    9.0      03 Aug 2018 05:15  Phos  3.0     08-02  Mg     1.8     08-03    TPro  7.4  /  Alb  4.0  /  TBili  0.2  /  DBili  x   /  AST  20  /  ALT  11  /  AlkPhos  121<H>  08-01    proBNP:   Lipid Profile:   HgA1c: Hemoglobin A1C, Whole Blood: 7.1 % (08-03 @ 05:15)    TSH:     TELE:  EKG:

## 2018-08-03 NOTE — DISCHARGE NOTE ADULT - CARE PROVIDER_API CALL
Dr Cheek   [facility]  Phone: (   )    -  Fax: (   )    - Mane Cheek), Family Medicine  [address]  [address] [place] [address]  Phone: [phone]  Fax: [phone]    Jan Brooke), Medicine  Pulmonary Medicine  [address] [address]  Phone: [phone]  Fax: [phone]

## 2018-08-07 ENCOUNTER — APPOINTMENT (OUTPATIENT)
Dept: FAMILY MEDICINE | Facility: CLINIC | Age: 83
End: 2018-08-07
Payer: MEDICARE

## 2018-08-07 VITALS
HEART RATE: 75 BPM | DIASTOLIC BLOOD PRESSURE: 80 MMHG | BODY MASS INDEX: 28.51 KG/M2 | WEIGHT: 167 LBS | HEIGHT: 64 IN | OXYGEN SATURATION: 88 % | TEMPERATURE: 97.8 F | SYSTOLIC BLOOD PRESSURE: 130 MMHG

## 2018-08-07 PROCEDURE — 99214 OFFICE O/P EST MOD 30 MIN: CPT

## 2018-08-07 RX ORDER — PREDNISONE 10 MG/1
10 TABLET ORAL
Qty: 18 | Refills: 0 | Status: DISCONTINUED | COMMUNITY
Start: 2017-12-19 | End: 2018-08-07

## 2018-08-15 ENCOUNTER — APPOINTMENT (OUTPATIENT)
Dept: SLEEP CENTER | Facility: CLINIC | Age: 83
End: 2018-08-15
Payer: MEDICARE

## 2018-08-15 ENCOUNTER — OUTPATIENT (OUTPATIENT)
Dept: OUTPATIENT SERVICES | Facility: HOSPITAL | Age: 83
LOS: 1 days | End: 2018-08-15
Payer: MEDICARE

## 2018-08-15 DIAGNOSIS — S82.90XA UNSPECIFIED FRACTURE OF UNSPECIFIED LOWER LEG, INITIAL ENCOUNTER FOR CLOSED FRACTURE: Chronic | ICD-10-CM

## 2018-08-15 DIAGNOSIS — Z90.49 ACQUIRED ABSENCE OF OTHER SPECIFIED PARTS OF DIGESTIVE TRACT: Chronic | ICD-10-CM

## 2018-08-15 DIAGNOSIS — Z86.69 PERSONAL HISTORY OF OTHER DISEASES OF THE NERVOUS SYSTEM AND SENSE ORGANS: Chronic | ICD-10-CM

## 2018-08-15 PROCEDURE — 95810 POLYSOM 6/> YRS 4/> PARAM: CPT | Mod: 26

## 2018-08-15 PROCEDURE — 95810 POLYSOM 6/> YRS 4/> PARAM: CPT

## 2018-08-16 DIAGNOSIS — G47.33 OBSTRUCTIVE SLEEP APNEA (ADULT) (PEDIATRIC): ICD-10-CM

## 2018-08-30 ENCOUNTER — RESULT REVIEW (OUTPATIENT)
Age: 83
End: 2018-08-30

## 2018-09-03 ENCOUNTER — RX RENEWAL (OUTPATIENT)
Age: 83
End: 2018-09-03

## 2018-09-05 ENCOUNTER — RX RENEWAL (OUTPATIENT)
Age: 83
End: 2018-09-05

## 2018-09-11 ENCOUNTER — APPOINTMENT (OUTPATIENT)
Dept: PULMONOLOGY | Facility: CLINIC | Age: 83
End: 2018-09-11
Payer: MEDICARE

## 2018-09-11 VITALS
HEIGHT: 64 IN | OXYGEN SATURATION: 77 % | HEART RATE: 83 BPM | DIASTOLIC BLOOD PRESSURE: 80 MMHG | RESPIRATION RATE: 15 BRPM | TEMPERATURE: 97.7 F | SYSTOLIC BLOOD PRESSURE: 162 MMHG | WEIGHT: 171 LBS | BODY MASS INDEX: 29.19 KG/M2

## 2018-09-11 PROCEDURE — 99214 OFFICE O/P EST MOD 30 MIN: CPT

## 2018-09-19 ENCOUNTER — RX RENEWAL (OUTPATIENT)
Age: 83
End: 2018-09-19

## 2018-09-21 ENCOUNTER — CHART COPY (OUTPATIENT)
Age: 83
End: 2018-09-21

## 2018-09-21 RX ORDER — SOFT LENS DISINFECTANT
SOLUTION, NON-ORAL MISCELLANEOUS
Qty: 1 | Refills: 0 | Status: ACTIVE | COMMUNITY
Start: 2018-08-07

## 2018-09-30 ENCOUNTER — RX RENEWAL (OUTPATIENT)
Age: 83
End: 2018-09-30

## 2018-10-15 ENCOUNTER — APPOINTMENT (OUTPATIENT)
Dept: FAMILY MEDICINE | Facility: CLINIC | Age: 83
End: 2018-10-15
Payer: MEDICARE

## 2018-10-15 ENCOUNTER — APPOINTMENT (OUTPATIENT)
Dept: CARDIOLOGY | Facility: CLINIC | Age: 83
End: 2018-10-15
Payer: MEDICARE

## 2018-10-15 ENCOUNTER — NON-APPOINTMENT (OUTPATIENT)
Age: 83
End: 2018-10-15

## 2018-10-15 VITALS
SYSTOLIC BLOOD PRESSURE: 140 MMHG | HEIGHT: 64 IN | WEIGHT: 174 LBS | DIASTOLIC BLOOD PRESSURE: 80 MMHG | OXYGEN SATURATION: 78 % | BODY MASS INDEX: 29.71 KG/M2 | HEART RATE: 81 BPM

## 2018-10-15 VITALS — DIASTOLIC BLOOD PRESSURE: 68 MMHG | SYSTOLIC BLOOD PRESSURE: 166 MMHG

## 2018-10-15 PROCEDURE — 93000 ELECTROCARDIOGRAM COMPLETE: CPT

## 2018-10-15 PROCEDURE — G0438: CPT

## 2018-10-15 PROCEDURE — 36415 COLL VENOUS BLD VENIPUNCTURE: CPT

## 2018-10-15 PROCEDURE — 99204 OFFICE O/P NEW MOD 45 MIN: CPT

## 2018-10-15 RX ORDER — FUROSEMIDE 40 MG/1
40 TABLET ORAL DAILY
Qty: 30 | Refills: 1 | Status: DISCONTINUED | COMMUNITY
Start: 2018-04-20 | End: 2018-10-15

## 2018-10-16 LAB
ALBUMIN SERPL ELPH-MCNC: 4.2 G/DL
ALP BLD-CCNC: 121 U/L
ALT SERPL-CCNC: 9 U/L
ANION GAP SERPL CALC-SCNC: 17 MMOL/L
AST SERPL-CCNC: 14 U/L
BASOPHILS # BLD AUTO: 0.02 K/UL
BASOPHILS NFR BLD AUTO: 0.3 %
BILIRUB SERPL-MCNC: 0.3 MG/DL
BUN SERPL-MCNC: 17 MG/DL
CALCIUM SERPL-MCNC: 8.9 MG/DL
CHLORIDE SERPL-SCNC: 103 MMOL/L
CHOLEST SERPL-MCNC: 168 MG/DL
CHOLEST/HDLC SERPL: 3.4 RATIO
CO2 SERPL-SCNC: 28 MMOL/L
CREAT SERPL-MCNC: 1.1 MG/DL
EOSINOPHIL # BLD AUTO: 0.38 K/UL
EOSINOPHIL NFR BLD AUTO: 4.9 %
GLUCOSE SERPL-MCNC: 134 MG/DL
HBA1C MFR BLD HPLC: 7.5 %
HCT VFR BLD CALC: 41.7 %
HDLC SERPL-MCNC: 50 MG/DL
HGB BLD-MCNC: 13.1 G/DL
IMM GRANULOCYTES NFR BLD AUTO: 0.1 %
LDLC SERPL CALC-MCNC: 97 MG/DL
LYMPHOCYTES # BLD AUTO: 1.9 K/UL
LYMPHOCYTES NFR BLD AUTO: 24.5 %
MAN DIFF?: NORMAL
MCHC RBC-ENTMCNC: 31 PG
MCHC RBC-ENTMCNC: 31.4 GM/DL
MCV RBC AUTO: 98.8 FL
MONOCYTES # BLD AUTO: 0.89 K/UL
MONOCYTES NFR BLD AUTO: 11.5 %
NEUTROPHILS # BLD AUTO: 4.56 K/UL
NEUTROPHILS NFR BLD AUTO: 58.7 %
PLATELET # BLD AUTO: 285 K/UL
POTASSIUM SERPL-SCNC: 4 MMOL/L
PROT SERPL-MCNC: 7.3 G/DL
RBC # BLD: 4.22 M/UL
RBC # FLD: 13.6 %
SODIUM SERPL-SCNC: 148 MMOL/L
TRIGL SERPL-MCNC: 103 MG/DL
TSH SERPL-ACNC: 6.2 UIU/ML
WBC # FLD AUTO: 7.76 K/UL

## 2018-10-22 ENCOUNTER — APPOINTMENT (OUTPATIENT)
Dept: CARDIOLOGY | Facility: CLINIC | Age: 83
End: 2018-10-22

## 2018-10-24 DIAGNOSIS — Z60.2 PROBLEMS RELATED TO LIVING ALONE: ICD-10-CM

## 2018-10-24 SDOH — SOCIAL STABILITY - SOCIAL INSECURITY: PROBLEMS RELATED TO LIVING ALONE: Z60.2

## 2018-10-29 ENCOUNTER — APPOINTMENT (OUTPATIENT)
Dept: FAMILY MEDICINE | Facility: CLINIC | Age: 83
End: 2018-10-29
Payer: MEDICARE

## 2018-10-29 ENCOUNTER — APPOINTMENT (OUTPATIENT)
Dept: CARDIOLOGY | Facility: CLINIC | Age: 83
End: 2018-10-29
Payer: MEDICARE

## 2018-10-29 VITALS — SYSTOLIC BLOOD PRESSURE: 164 MMHG | DIASTOLIC BLOOD PRESSURE: 60 MMHG

## 2018-10-29 VITALS — OXYGEN SATURATION: 87 %

## 2018-10-29 VITALS — WEIGHT: 169.5 LBS | BODY MASS INDEX: 29.09 KG/M2

## 2018-10-29 PROCEDURE — 99214 OFFICE O/P EST MOD 30 MIN: CPT

## 2018-10-29 PROCEDURE — G0008: CPT

## 2018-10-29 PROCEDURE — 90686 IIV4 VACC NO PRSV 0.5 ML IM: CPT

## 2018-11-07 ENCOUNTER — MEDICATION RENEWAL (OUTPATIENT)
Age: 83
End: 2018-11-07

## 2018-11-15 ENCOUNTER — RX RENEWAL (OUTPATIENT)
Age: 83
End: 2018-11-15

## 2018-11-19 ENCOUNTER — APPOINTMENT (OUTPATIENT)
Dept: CARDIOLOGY | Facility: CLINIC | Age: 83
End: 2018-11-19
Payer: MEDICARE

## 2018-11-19 VITALS — SYSTOLIC BLOOD PRESSURE: 148 MMHG | DIASTOLIC BLOOD PRESSURE: 60 MMHG | BODY MASS INDEX: 27.88 KG/M2 | WEIGHT: 162.4 LBS

## 2018-11-19 PROCEDURE — 99214 OFFICE O/P EST MOD 30 MIN: CPT

## 2018-11-19 PROCEDURE — 36415 COLL VENOUS BLD VENIPUNCTURE: CPT

## 2018-11-19 NOTE — PHYSICAL EXAM
[General Appearance - Well Developed] : well developed [General Appearance - Well Nourished] : well nourished [Normal Conjunctiva] : the conjunctiva exhibited no abnormalities [Eyelids - No Xanthelasma] : the eyelids demonstrated no xanthelasmas [Normal Oral Mucosa] : normal oral mucosa [No Oral Pallor] : no oral pallor [No Oral Cyanosis] : no oral cyanosis [Heart Rate And Rhythm] : heart rate and rhythm were normal [Heart Sounds] : normal S1 and S2 [FreeTextEntry1] : LE edema 2+, improved from prior [Abdomen Soft] : soft [Abdomen Tenderness] : non-tender [Abdomen Mass (___ Cm)] : no abdominal mass palpated [Abnormal Walk] : normal gait [Gait - Sufficient For Exercise Testing] : the gait was sufficient for exercise testing [Nail Clubbing] : no clubbing of the fingernails [Cyanosis, Localized] : no localized cyanosis [Petechial Hemorrhages (___cm)] : no petechial hemorrhages [Skin Color & Pigmentation] : normal skin color and pigmentation [] : no rash [No Venous Stasis] : no venous stasis [Skin Lesions] : no skin lesions [No Skin Ulcers] : no skin ulcer [No Xanthoma] : no  xanthoma was observed [Oriented To Time, Place, And Person] : oriented to person, place, and time [Affect] : the affect was normal [Mood] : the mood was normal [No Anxiety] : not feeling anxious

## 2018-11-19 NOTE — HISTORY OF PRESENT ILLNESS
[FreeTextEntry1] : 83F with severe COPD supposed to be on home O2 but noncompliant, HFpEF who presents for follow up after being seen for worsening shortness of breath.  Pt has been compliant with daily lasix and notes that dyspnea, leg swelling are improving.  Not fully resolved but getting better.  Taking lasix and lisinopril. \par \par Pt was hospitalized at [facility] August 2018 for COPD exacerbation.  Longstanding COPD, not always compliant with home O2 or with medications.  She was seen by a cardiologist in 2014 and was noted that she had diastolic dysfunction.  She had an echo during her hospitalization 2 months ago which did not record any diastolic dysfunction. She lives alone. \par \par Former smoker. \par \par ECG: SR, TWI laterally\par TTE 8/2018: Normal\par NST (2014):  No ischemia, EF >70%.

## 2018-11-19 NOTE — DISCUSSION/SUMMARY
[FreeTextEntry1] : 83F with severe COPD on home O2, poorly compliant, HTN, who presents for continued evaluation of dyspnea, LE swelling.  \par \par Improving LE edema, symptoms.  Weight down from 169 - 162.  Still not feeling back to normal but getting there.  Pt needs continued BP control and diuresis to help optimize symptoms. Will continue lasix 40mg daily and lisinopril 10mg daily.  \par \par -Check CMP today\par -Cont to encourage home O2 use\par -Encourage home O2.  Hypoxic to 86% on RA today in office.  Continue pulmonary follow up\par -Likely will need to repeat echo to get a better diastolic assessment and measurement of pulmonary pressures, filling pressures if possible  \par \par As documented in other notes, her social situation is complex as she lives alone, and would benefit from some assistance at home with medication administration and oxygen management.  \par \par RV in 2 weeks

## 2018-11-19 NOTE — REVIEW OF SYSTEMS
[Fever] : no fever [Chills] : no chills [Shortness Of Breath] : shortness of breath [Dyspnea on exertion] : dyspnea during exertion [Lower Ext Edema] : lower extremity edema [Cough] : no cough [Wheezing] : no wheezing [Confusion] : no confusion was observed [Anxiety] : no anxiety

## 2018-11-21 LAB
ALBUMIN SERPL ELPH-MCNC: 4.4 G/DL
ALP BLD-CCNC: 115 U/L
ALT SERPL-CCNC: 9 U/L
ANION GAP SERPL CALC-SCNC: 14 MMOL/L
AST SERPL-CCNC: 15 U/L
BILIRUB SERPL-MCNC: 0.2 MG/DL
BUN SERPL-MCNC: 34 MG/DL
CALCIUM SERPL-MCNC: 9.2 MG/DL
CHLORIDE SERPL-SCNC: 101 MMOL/L
CO2 SERPL-SCNC: 32 MMOL/L
CREAT SERPL-MCNC: 1.32 MG/DL
GLUCOSE SERPL-MCNC: 161 MG/DL
MAGNESIUM SERPL-MCNC: 1.5 MG/DL
POTASSIUM SERPL-SCNC: 5.1 MMOL/L
PROT SERPL-MCNC: 7.5 G/DL
SODIUM SERPL-SCNC: 147 MMOL/L

## 2018-11-30 ENCOUNTER — RX RENEWAL (OUTPATIENT)
Age: 83
End: 2018-11-30

## 2018-12-01 PROCEDURE — G9005: CPT

## 2018-12-06 ENCOUNTER — APPOINTMENT (OUTPATIENT)
Dept: CARDIOLOGY | Facility: CLINIC | Age: 83
End: 2018-12-06

## 2018-12-06 ENCOUNTER — MEDICATION RENEWAL (OUTPATIENT)
Age: 83
End: 2018-12-06

## 2018-12-21 ENCOUNTER — RX RENEWAL (OUTPATIENT)
Age: 83
End: 2018-12-21

## 2019-01-08 ENCOUNTER — MEDICATION RENEWAL (OUTPATIENT)
Age: 84
End: 2019-01-08

## 2019-01-15 ENCOUNTER — APPOINTMENT (OUTPATIENT)
Dept: FAMILY MEDICINE | Facility: CLINIC | Age: 84
End: 2019-01-15

## 2019-02-04 ENCOUNTER — APPOINTMENT (OUTPATIENT)
Dept: FAMILY MEDICINE | Facility: CLINIC | Age: 84
End: 2019-02-04
Payer: MEDICARE

## 2019-02-04 VITALS
BODY MASS INDEX: 26.12 KG/M2 | OXYGEN SATURATION: 90 % | HEART RATE: 89 BPM | HEIGHT: 64 IN | RESPIRATION RATE: 16 BRPM | WEIGHT: 153 LBS

## 2019-02-04 VITALS — DIASTOLIC BLOOD PRESSURE: 80 MMHG | SYSTOLIC BLOOD PRESSURE: 140 MMHG

## 2019-02-04 DIAGNOSIS — Z00.00 ENCOUNTER FOR GENERAL ADULT MEDICAL EXAMINATION W/OUT ABNORMAL FINDINGS: ICD-10-CM

## 2019-02-04 DIAGNOSIS — Z23 ENCOUNTER FOR IMMUNIZATION: ICD-10-CM

## 2019-02-04 PROCEDURE — 36415 COLL VENOUS BLD VENIPUNCTURE: CPT

## 2019-02-04 PROCEDURE — G0009: CPT

## 2019-02-04 PROCEDURE — 90670 PCV13 VACCINE IM: CPT

## 2019-02-04 PROCEDURE — 99214 OFFICE O/P EST MOD 30 MIN: CPT | Mod: 25

## 2019-02-05 LAB — TSH SERPL-ACNC: 1.83 UIU/ML

## 2019-02-23 ENCOUNTER — RX RENEWAL (OUTPATIENT)
Age: 84
End: 2019-02-23

## 2019-03-08 ENCOUNTER — MEDICATION RENEWAL (OUTPATIENT)
Age: 84
End: 2019-03-08

## 2019-03-21 ENCOUNTER — MEDICATION RENEWAL (OUTPATIENT)
Age: 84
End: 2019-03-21

## 2019-04-01 ENCOUNTER — APPOINTMENT (OUTPATIENT)
Dept: CARDIOLOGY | Facility: CLINIC | Age: 84
End: 2019-04-01

## 2019-04-29 ENCOUNTER — APPOINTMENT (OUTPATIENT)
Dept: CARDIOLOGY | Facility: CLINIC | Age: 84
End: 2019-04-29
Payer: MEDICARE

## 2019-04-29 VITALS
RESPIRATION RATE: 98 BRPM | HEIGHT: 64 IN | WEIGHT: 150 LBS | HEART RATE: 82 BPM | BODY MASS INDEX: 25.61 KG/M2 | TEMPERATURE: 97.6 F | SYSTOLIC BLOOD PRESSURE: 145 MMHG | DIASTOLIC BLOOD PRESSURE: 70 MMHG

## 2019-04-29 PROCEDURE — 99214 OFFICE O/P EST MOD 30 MIN: CPT

## 2019-04-29 PROCEDURE — 36415 COLL VENOUS BLD VENIPUNCTURE: CPT

## 2019-04-29 NOTE — PHYSICAL EXAM
[General Appearance - Well Developed] : well developed [General Appearance - Well Nourished] : well nourished [Normal Conjunctiva] : the conjunctiva exhibited no abnormalities [Eyelids - No Xanthelasma] : the eyelids demonstrated no xanthelasmas [Normal Oral Mucosa] : normal oral mucosa [No Oral Pallor] : no oral pallor [No Oral Cyanosis] : no oral cyanosis [Respiration, Rhythm And Depth] : normal respiratory rhythm and effort [Auscultation Breath Sounds / Voice Sounds] : lungs were clear to auscultation bilaterally [Heart Rate And Rhythm] : heart rate and rhythm were normal [Heart Sounds] : normal S1 and S2 [Murmurs] : no murmurs present [FreeTextEntry1] : RLE 2+, LLE 1+ edema [Abdomen Soft] : soft [Abdomen Tenderness] : non-tender [Abdomen Mass (___ Cm)] : no abdominal mass palpated [Abnormal Walk] : normal gait [Nail Clubbing] : no clubbing of the fingernails [Cyanosis, Localized] : no localized cyanosis [Petechial Hemorrhages (___cm)] : no petechial hemorrhages [Skin Color & Pigmentation] : normal skin color and pigmentation [] : no rash [No Venous Stasis] : no venous stasis [Skin Lesions] : no skin lesions [No Skin Ulcers] : no skin ulcer [No Xanthoma] : no  xanthoma was observed [Oriented To Time, Place, And Person] : oriented to person, place, and time [Affect] : the affect was normal [Mood] : the mood was normal [No Anxiety] : not feeling anxious

## 2019-04-29 NOTE — HISTORY OF PRESENT ILLNESS
[FreeTextEntry1] : 84F with severe COPD supposed to be on home O2 but noncompliant, HFpEF who presents for follow up.  Pt was last seen 6 months prior after being initially seen for fluid overload, LE edema, worsening HFpEF.  Was started on lasix with marked improvement in symptoms. Continues to feel well.  Still not wearing home o2.  10-15 lb weight loss since last visit. \par \par Pt was hospitalized at [facility] August 2018 for COPD exacerbation.  Longstanding COPD, not always compliant with home O2 or with medications.  She was seen by a cardiologist in 2014 and was noted that she had diastolic dysfunction.  She had an echo during her hospitalization August 2018 which did not record any diastolic dysfunction. She lives alone. \par \par Former smoker. \par \par ECG: SR, TWI laterally\par TTE 8/2018: Normal\par NST (2014):  No ischemia, EF >70%. \par \par Cardiac meds: Lasix 20mg, Lisinopril 5mg

## 2019-04-29 NOTE — DISCUSSION/SUMMARY
[FreeTextEntry1] : 84F with severe COPD on home O2, poorly compliant, HTN, HFpEF \par \par Improved symptoms on lasix, stable, 15 lb weight loss since last year, likely fluid related.  \par \par -Check CMP today\par -Cont lasix, lisinopril\par -Cont to encourage home O2 use\par \par As documented in other notes, her social situation is complex as she lives alone, and would benefit from some assistance at home with medication administration and oxygen management.  \par \par RV in 3 months

## 2019-04-30 LAB
ALBUMIN SERPL ELPH-MCNC: 4.2 G/DL
ALP BLD-CCNC: 99 U/L
ALT SERPL-CCNC: 9 U/L
ANION GAP SERPL CALC-SCNC: 13 MMOL/L
AST SERPL-CCNC: 17 U/L
BILIRUB SERPL-MCNC: 0.3 MG/DL
BUN SERPL-MCNC: 27 MG/DL
CALCIUM SERPL-MCNC: 9.5 MG/DL
CHLORIDE SERPL-SCNC: 101 MMOL/L
CO2 SERPL-SCNC: 28 MMOL/L
CREAT SERPL-MCNC: 1.34 MG/DL
GLUCOSE SERPL-MCNC: 113 MG/DL
POTASSIUM SERPL-SCNC: 5.3 MMOL/L
PROT SERPL-MCNC: 7.5 G/DL
SODIUM SERPL-SCNC: 142 MMOL/L

## 2019-05-15 NOTE — ED ADULT NURSE NOTE - TEMPLATE LIST FOR HEAD TO TOE ASSESSMENT
Problem: Activity/Exercise Intolerance  Goal: Patient will tolerate activity/exercise    Intervention: Progress activity per Cardiac Rehab protocol  Intervention Status  Done  Intervention: Progressive graded ambulation  Intervention Status  Done  Intervention: Collaborate with nursing to ensure ambulation 4-6 times per day  Intervention Status  Done  Intervention: Monitor and document progressive activity response  Intervention Status  Done  Intervention: Encourage hourly incentive spirometry, cough and deep breathe  Intervention Status  Done  Intervention: Stairs prior to discharge  Intervention Status  Done         Respiratory

## 2019-05-28 ENCOUNTER — RX RENEWAL (OUTPATIENT)
Age: 84
End: 2019-05-28

## 2019-05-30 NOTE — ED ADULT TRIAGE NOTE - ARRIVAL FROM
appointment visit, Krishan [facility] spent at least 50% of the face-to-face time in counseling, explanation of diagnosis, planning of further management, and answering all questions. Orders Placed This Encounter   Procedures    Comprehensive Metabolic Panel     Standing Status:   Future     Standing Expiration Date:   5/29/2020    CBC     Standing Status:   Future     Standing Expiration Date:   5/29/2020    Lipid Panel     Standing Status:   Future     Standing Expiration Date:   5/29/2020     Order Specific Question:   Is Patient Fasting?/# of Hours     Answer:   15    TSH with Reflex     Standing Status:   Future     Standing Expiration Date:   5/29/2020   Citlali Bailey MD, Gynecology, [place]     Referral Priority:   Routine     Referral Type:   Eval and Treat     Referral Reason:   Specialty Services Required     Referred to Provider:   Paty Valdez DO     Requested Specialty:   Obstetrics & Gynecology     Number of Visits Requested:   1        Orders Placed This Encounter   Medications    buPROPion (WELLBUTRIN SR) 200 MG extended release tablet     Sig: Take 1 tablet by mouth 2 times daily     Dispense:  60 tablet     Refill:  0    hydrocortisone 1 % cream     Sig: Apply topically 2 -3 times daily for 5 - 7 days. Dispense:  1 Tube     Refill:  0       Patient given educational materials - see patient instructions. Discussed use, benefit, and side effects of prescribed medications. All patientquestions answered. Pt voiced understanding. Reviewed health maintenance. Instructedto continue current medications, diet and exercise. Patient agreed with treatmentplan. Follow up as directed.      Electronicallysigned by DK Odonnell CNP on 5/30/2019 at 12:17 PM Home

## 2019-05-31 ENCOUNTER — RX CHANGE (OUTPATIENT)
Age: 84
End: 2019-05-31

## 2019-06-01 ENCOUNTER — RX CHANGE (OUTPATIENT)
Age: 84
End: 2019-06-01

## 2019-06-03 ENCOUNTER — RX CHANGE (OUTPATIENT)
Age: 84
End: 2019-06-03

## 2019-06-06 ENCOUNTER — RX CHANGE (OUTPATIENT)
Age: 84
End: 2019-06-06

## 2019-07-16 ENCOUNTER — APPOINTMENT (OUTPATIENT)
Dept: FAMILY MEDICINE | Facility: CLINIC | Age: 84
End: 2019-07-16
Payer: MEDICARE

## 2019-07-16 VITALS
WEIGHT: 149 LBS | HEIGHT: 64 IN | BODY MASS INDEX: 25.44 KG/M2 | SYSTOLIC BLOOD PRESSURE: 120 MMHG | DIASTOLIC BLOOD PRESSURE: 70 MMHG

## 2019-07-16 PROCEDURE — 99214 OFFICE O/P EST MOD 30 MIN: CPT

## 2019-07-25 ENCOUNTER — RX CHANGE (OUTPATIENT)
Age: 84
End: 2019-07-25

## 2019-07-29 ENCOUNTER — APPOINTMENT (OUTPATIENT)
Dept: FAMILY MEDICINE | Facility: CLINIC | Age: 84
End: 2019-07-29

## 2019-08-01 ENCOUNTER — OUTPATIENT (OUTPATIENT)
Dept: OUTPATIENT SERVICES | Facility: HOSPITAL | Age: 84
LOS: 1 days | End: 2019-08-01

## 2019-08-01 DIAGNOSIS — Z86.69 PERSONAL HISTORY OF OTHER DISEASES OF THE NERVOUS SYSTEM AND SENSE ORGANS: Chronic | ICD-10-CM

## 2019-08-01 DIAGNOSIS — Z90.49 ACQUIRED ABSENCE OF OTHER SPECIFIED PARTS OF DIGESTIVE TRACT: Chronic | ICD-10-CM

## 2019-08-01 DIAGNOSIS — S82.90XA UNSPECIFIED FRACTURE OF UNSPECIFIED LOWER LEG, INITIAL ENCOUNTER FOR CLOSED FRACTURE: Chronic | ICD-10-CM

## 2019-08-06 ENCOUNTER — APPOINTMENT (OUTPATIENT)
Dept: FAMILY MEDICINE | Facility: CLINIC | Age: 84
End: 2019-08-06
Payer: MEDICARE

## 2019-08-06 VITALS
OXYGEN SATURATION: 84 % | SYSTOLIC BLOOD PRESSURE: 110 MMHG | DIASTOLIC BLOOD PRESSURE: 60 MMHG | HEIGHT: 64 IN | WEIGHT: 147 LBS | BODY MASS INDEX: 25.1 KG/M2 | HEART RATE: 74 BPM | RESPIRATION RATE: 16 BRPM

## 2019-08-06 PROCEDURE — 36415 COLL VENOUS BLD VENIPUNCTURE: CPT

## 2019-08-06 PROCEDURE — 99214 OFFICE O/P EST MOD 30 MIN: CPT | Mod: 25

## 2019-08-06 RX ORDER — POTASSIUM CHLORIDE 1500 MG/1
20 TABLET, FILM COATED, EXTENDED RELEASE ORAL
Qty: 1 | Refills: 0 | Status: DISCONTINUED | COMMUNITY
Start: 2018-03-29 | End: 2019-08-06

## 2019-08-06 RX ORDER — LISINOPRIL 5 MG/1
5 TABLET ORAL
Qty: 90 | Refills: 0 | Status: DISCONTINUED | COMMUNITY
Start: 2018-11-30 | End: 2019-08-06

## 2019-08-07 LAB
ALBUMIN SERPL ELPH-MCNC: 4.3 G/DL
ALP BLD-CCNC: 127 U/L
ALT SERPL-CCNC: 7 U/L
ANION GAP SERPL CALC-SCNC: 11 MMOL/L
AST SERPL-CCNC: 14 U/L
BASOPHILS # BLD AUTO: 0.03 K/UL
BASOPHILS NFR BLD AUTO: 0.3 %
BILIRUB SERPL-MCNC: 0.2 MG/DL
BUN SERPL-MCNC: 48 MG/DL
CALCIUM SERPL-MCNC: 9.3 MG/DL
CHLORIDE SERPL-SCNC: 102 MMOL/L
CHOLEST SERPL-MCNC: 191 MG/DL
CHOLEST/HDLC SERPL: 4.3 RATIO
CO2 SERPL-SCNC: 26 MMOL/L
CREAT SERPL-MCNC: 1.56 MG/DL
EOSINOPHIL # BLD AUTO: 0.27 K/UL
EOSINOPHIL NFR BLD AUTO: 2.9 %
ESTIMATED AVERAGE GLUCOSE: 151 MG/DL
GLUCOSE SERPL-MCNC: 112 MG/DL
HBA1C MFR BLD HPLC: 6.9 %
HCT VFR BLD CALC: 42.4 %
HDLC SERPL-MCNC: 44 MG/DL
HGB BLD-MCNC: 13.6 G/DL
IMM GRANULOCYTES NFR BLD AUTO: 0.4 %
LDLC SERPL CALC-MCNC: 121 MG/DL
LYMPHOCYTES # BLD AUTO: 2 K/UL
LYMPHOCYTES NFR BLD AUTO: 21.3 %
MAN DIFF?: NORMAL
MCHC RBC-ENTMCNC: 32.1 GM/DL
MCHC RBC-ENTMCNC: 32.1 PG
MCV RBC AUTO: 100 FL
MONOCYTES # BLD AUTO: 0.92 K/UL
MONOCYTES NFR BLD AUTO: 9.8 %
NEUTROPHILS # BLD AUTO: 6.12 K/UL
NEUTROPHILS NFR BLD AUTO: 65.3 %
PLATELET # BLD AUTO: 342 K/UL
POTASSIUM SERPL-SCNC: 7.4 MMOL/L
PROT SERPL-MCNC: 7.4 G/DL
RBC # BLD: 4.24 M/UL
RBC # FLD: 12.1 %
SODIUM SERPL-SCNC: 139 MMOL/L
TRIGL SERPL-MCNC: 131 MG/DL
TSH SERPL-ACNC: 7.63 UIU/ML
WBC # FLD AUTO: 9.38 K/UL

## 2019-08-10 ENCOUNTER — MOBILE ON CALL (OUTPATIENT)
Age: 84
End: 2019-08-10

## 2019-08-10 ENCOUNTER — INPATIENT (INPATIENT)
Facility: HOSPITAL | Age: 84
LOS: 2 days | Discharge: HOME | End: 2019-08-13
Attending: INTERNAL MEDICINE | Admitting: INTERNAL MEDICINE
Payer: MEDICARE

## 2019-08-10 VITALS
HEART RATE: 72 BPM | DIASTOLIC BLOOD PRESSURE: 57 MMHG | OXYGEN SATURATION: 94 % | TEMPERATURE: 98 F | RESPIRATION RATE: 18 BRPM | SYSTOLIC BLOOD PRESSURE: 151 MMHG

## 2019-08-10 DIAGNOSIS — E87.5 HYPERKALEMIA: ICD-10-CM

## 2019-08-10 DIAGNOSIS — I10 ESSENTIAL (PRIMARY) HYPERTENSION: ICD-10-CM

## 2019-08-10 DIAGNOSIS — E03.9 HYPOTHYROIDISM, UNSPECIFIED: ICD-10-CM

## 2019-08-10 DIAGNOSIS — J44.9 CHRONIC OBSTRUCTIVE PULMONARY DISEASE, UNSPECIFIED: ICD-10-CM

## 2019-08-10 DIAGNOSIS — Z86.69 PERSONAL HISTORY OF OTHER DISEASES OF THE NERVOUS SYSTEM AND SENSE ORGANS: Chronic | ICD-10-CM

## 2019-08-10 DIAGNOSIS — N18.9 CHRONIC KIDNEY DISEASE, UNSPECIFIED: ICD-10-CM

## 2019-08-10 DIAGNOSIS — R94.31 ABNORMAL ELECTROCARDIOGRAM [ECG] [EKG]: ICD-10-CM

## 2019-08-10 DIAGNOSIS — E11.9 TYPE 2 DIABETES MELLITUS WITHOUT COMPLICATIONS: ICD-10-CM

## 2019-08-10 DIAGNOSIS — Z90.49 ACQUIRED ABSENCE OF OTHER SPECIFIED PARTS OF DIGESTIVE TRACT: Chronic | ICD-10-CM

## 2019-08-10 DIAGNOSIS — M54.5 LOW BACK PAIN: ICD-10-CM

## 2019-08-10 DIAGNOSIS — S82.90XA UNSPECIFIED FRACTURE OF UNSPECIFIED LOWER LEG, INITIAL ENCOUNTER FOR CLOSED FRACTURE: Chronic | ICD-10-CM

## 2019-08-10 DIAGNOSIS — Z29.9 ENCOUNTER FOR PROPHYLACTIC MEASURES, UNSPECIFIED: ICD-10-CM

## 2019-08-10 LAB
ALBUMIN SERPL ELPH-MCNC: 4.1 G/DL — SIGNIFICANT CHANGE UP (ref 3.3–5)
ALP SERPL-CCNC: 129 U/L — HIGH (ref 40–120)
ALT FLD-CCNC: 8 U/L — SIGNIFICANT CHANGE UP (ref 4–33)
ANION GAP SERPL CALC-SCNC: 10 MMO/L — SIGNIFICANT CHANGE UP (ref 7–14)
ANION GAP SERPL CALC-SCNC: 12 MMO/L — SIGNIFICANT CHANGE UP (ref 7–14)
AST SERPL-CCNC: 13 U/L — SIGNIFICANT CHANGE UP (ref 4–32)
BASE EXCESS BLDV CALC-SCNC: 3.1 MMOL/L — SIGNIFICANT CHANGE UP
BASOPHILS # BLD AUTO: 0.03 K/UL — SIGNIFICANT CHANGE UP (ref 0–0.2)
BASOPHILS NFR BLD AUTO: 0.3 % — SIGNIFICANT CHANGE UP (ref 0–2)
BILIRUB SERPL-MCNC: < 0.2 MG/DL — LOW (ref 0.2–1.2)
BLOOD GAS VENOUS - CREATININE: 1.66 MG/DL — HIGH (ref 0.5–1.3)
BUN SERPL-MCNC: 64 MG/DL — HIGH (ref 7–23)
BUN SERPL-MCNC: 66 MG/DL — HIGH (ref 7–23)
CALCIUM SERPL-MCNC: 8.4 MG/DL — SIGNIFICANT CHANGE UP (ref 8.4–10.5)
CALCIUM SERPL-MCNC: 9.2 MG/DL — SIGNIFICANT CHANGE UP (ref 8.4–10.5)
CHLORIDE BLDV-SCNC: 107 MMOL/L — SIGNIFICANT CHANGE UP (ref 96–108)
CHLORIDE SERPL-SCNC: 102 MMOL/L — SIGNIFICANT CHANGE UP (ref 98–107)
CHLORIDE SERPL-SCNC: 104 MMOL/L — SIGNIFICANT CHANGE UP (ref 98–107)
CO2 SERPL-SCNC: 24 MMOL/L — SIGNIFICANT CHANGE UP (ref 22–31)
CO2 SERPL-SCNC: 25 MMOL/L — SIGNIFICANT CHANGE UP (ref 22–31)
CREAT SERPL-MCNC: 1.75 MG/DL — HIGH (ref 0.5–1.3)
CREAT SERPL-MCNC: 1.75 MG/DL — HIGH (ref 0.5–1.3)
EOSINOPHIL # BLD AUTO: 0.15 K/UL — SIGNIFICANT CHANGE UP (ref 0–0.5)
EOSINOPHIL NFR BLD AUTO: 1.7 % — SIGNIFICANT CHANGE UP (ref 0–6)
GAS PNL BLDV: 135 MMOL/L — LOW (ref 136–146)
GLUCOSE BLDV-MCNC: 115 MG/DL — HIGH (ref 70–99)
GLUCOSE SERPL-MCNC: 121 MG/DL — HIGH (ref 70–99)
GLUCOSE SERPL-MCNC: 126 MG/DL — HIGH (ref 70–99)
HCO3 BLDV-SCNC: 24 MMOL/L — SIGNIFICANT CHANGE UP (ref 20–27)
HCT VFR BLD CALC: 40 % — SIGNIFICANT CHANGE UP (ref 34.5–45)
HCT VFR BLDV CALC: 37.7 % — SIGNIFICANT CHANGE UP (ref 34.5–45)
HGB BLD-MCNC: 12.8 G/DL — SIGNIFICANT CHANGE UP (ref 11.5–15.5)
HGB BLDV-MCNC: 12.2 G/DL — SIGNIFICANT CHANGE UP (ref 11.5–15.5)
IMM GRANULOCYTES NFR BLD AUTO: 0.2 % — SIGNIFICANT CHANGE UP (ref 0–1.5)
LACTATE BLDV-MCNC: 1.8 MMOL/L — SIGNIFICANT CHANGE UP (ref 0.5–2)
LYMPHOCYTES # BLD AUTO: 2.26 K/UL — SIGNIFICANT CHANGE UP (ref 1–3.3)
LYMPHOCYTES # BLD AUTO: 25.3 % — SIGNIFICANT CHANGE UP (ref 13–44)
MAGNESIUM SERPL-MCNC: 1.7 MG/DL — SIGNIFICANT CHANGE UP (ref 1.6–2.6)
MCHC RBC-ENTMCNC: 31.7 PG — SIGNIFICANT CHANGE UP (ref 27–34)
MCHC RBC-ENTMCNC: 32 % — SIGNIFICANT CHANGE UP (ref 32–36)
MCV RBC AUTO: 99 FL — SIGNIFICANT CHANGE UP (ref 80–100)
MONOCYTES # BLD AUTO: 0.94 K/UL — HIGH (ref 0–0.9)
MONOCYTES NFR BLD AUTO: 10.5 % — SIGNIFICANT CHANGE UP (ref 2–14)
NEUTROPHILS # BLD AUTO: 5.55 K/UL — SIGNIFICANT CHANGE UP (ref 1.8–7.4)
NEUTROPHILS NFR BLD AUTO: 62 % — SIGNIFICANT CHANGE UP (ref 43–77)
NRBC # FLD: 0.03 K/UL — SIGNIFICANT CHANGE UP (ref 0–0)
PCO2 BLDV: 71 MMHG — HIGH (ref 41–51)
PH BLDV: 7.25 PH — LOW (ref 7.32–7.43)
PHOSPHATE SERPL-MCNC: 3.1 MG/DL — SIGNIFICANT CHANGE UP (ref 2.5–4.5)
PLATELET # BLD AUTO: 292 K/UL — SIGNIFICANT CHANGE UP (ref 150–400)
PMV BLD: 8.7 FL — SIGNIFICANT CHANGE UP (ref 7–13)
PO2 BLDV: 29 MMHG — LOW (ref 35–40)
POTASSIUM BLDV-SCNC: 6 MMOL/L — HIGH (ref 3.4–4.5)
POTASSIUM SERPL-MCNC: 5.5 MMOL/L — HIGH (ref 3.5–5.3)
POTASSIUM SERPL-MCNC: 6.1 MMOL/L — HIGH (ref 3.5–5.3)
POTASSIUM SERPL-MCNC: 7.6 MMOL/L — CRITICAL HIGH (ref 3.5–5.3)
POTASSIUM SERPL-SCNC: 5.5 MMOL/L — HIGH (ref 3.5–5.3)
POTASSIUM SERPL-SCNC: 6.1 MMOL/L — HIGH (ref 3.5–5.3)
POTASSIUM SERPL-SCNC: 7.6 MMOL/L — CRITICAL HIGH (ref 3.5–5.3)
PROT SERPL-MCNC: 7.5 G/DL — SIGNIFICANT CHANGE UP (ref 6–8.3)
RBC # BLD: 4.04 M/UL — SIGNIFICANT CHANGE UP (ref 3.8–5.2)
RBC # FLD: 12.1 % — SIGNIFICANT CHANGE UP (ref 10.3–14.5)
SAO2 % BLDV: 43.6 % — LOW (ref 60–85)
SODIUM SERPL-SCNC: 138 MMOL/L — SIGNIFICANT CHANGE UP (ref 135–145)
SODIUM SERPL-SCNC: 139 MMOL/L — SIGNIFICANT CHANGE UP (ref 135–145)
TROPONIN T, HIGH SENSITIVITY: 18 NG/L — SIGNIFICANT CHANGE UP (ref ?–14)
TROPONIN T, HIGH SENSITIVITY: 21 NG/L — SIGNIFICANT CHANGE UP (ref ?–14)
WBC # BLD: 8.95 K/UL — SIGNIFICANT CHANGE UP (ref 3.8–10.5)
WBC # FLD AUTO: 8.95 K/UL — SIGNIFICANT CHANGE UP (ref 3.8–10.5)

## 2019-08-10 PROCEDURE — 99223 1ST HOSP IP/OBS HIGH 75: CPT

## 2019-08-10 PROCEDURE — 99232 SBSQ HOSP IP/OBS MODERATE 35: CPT

## 2019-08-10 PROCEDURE — 72170 X-RAY EXAM OF PELVIS: CPT | Mod: 26

## 2019-08-10 PROCEDURE — 72100 X-RAY EXAM L-S SPINE 2/3 VWS: CPT | Mod: 26

## 2019-08-10 RX ORDER — DEXTROSE 50 % IN WATER 50 %
12.5 SYRINGE (ML) INTRAVENOUS ONCE
Refills: 0 | Status: DISCONTINUED | OUTPATIENT
Start: 2019-08-10 | End: 2019-08-13

## 2019-08-10 RX ORDER — ALBUTEROL 90 UG/1
10 AEROSOL, METERED ORAL ONCE
Refills: 0 | Status: COMPLETED | OUTPATIENT
Start: 2019-08-10 | End: 2019-08-10

## 2019-08-10 RX ORDER — DEXTROSE 50 % IN WATER 50 %
25 SYRINGE (ML) INTRAVENOUS ONCE
Refills: 0 | Status: DISCONTINUED | OUTPATIENT
Start: 2019-08-10 | End: 2019-08-13

## 2019-08-10 RX ORDER — INSULIN HUMAN 100 [IU]/ML
10 INJECTION, SOLUTION SUBCUTANEOUS ONCE
Refills: 0 | Status: COMPLETED | OUTPATIENT
Start: 2019-08-10 | End: 2019-08-10

## 2019-08-10 RX ORDER — HEPARIN SODIUM 5000 [USP'U]/ML
5000 INJECTION INTRAVENOUS; SUBCUTANEOUS EVERY 8 HOURS
Refills: 0 | Status: DISCONTINUED | OUTPATIENT
Start: 2019-08-10 | End: 2019-08-13

## 2019-08-10 RX ORDER — FUROSEMIDE 40 MG
20 TABLET ORAL DAILY
Refills: 0 | Status: DISCONTINUED | OUTPATIENT
Start: 2019-08-10 | End: 2019-08-11

## 2019-08-10 RX ORDER — CALCIUM GLUCONATE 100 MG/ML
2 VIAL (ML) INTRAVENOUS ONCE
Refills: 0 | Status: COMPLETED | OUTPATIENT
Start: 2019-08-10 | End: 2019-08-10

## 2019-08-10 RX ORDER — INSULIN LISPRO 100/ML
VIAL (ML) SUBCUTANEOUS
Refills: 0 | Status: DISCONTINUED | OUTPATIENT
Start: 2019-08-10 | End: 2019-08-13

## 2019-08-10 RX ORDER — SODIUM CHLORIDE 9 MG/ML
1000 INJECTION, SOLUTION INTRAVENOUS
Refills: 0 | Status: DISCONTINUED | OUTPATIENT
Start: 2019-08-10 | End: 2019-08-13

## 2019-08-10 RX ORDER — DEXTROSE 50 % IN WATER 50 %
50 SYRINGE (ML) INTRAVENOUS ONCE
Refills: 0 | Status: COMPLETED | OUTPATIENT
Start: 2019-08-10 | End: 2019-08-10

## 2019-08-10 RX ORDER — CYCLOBENZAPRINE HYDROCHLORIDE 10 MG/1
10 TABLET, FILM COATED ORAL ONCE
Refills: 0 | Status: COMPLETED | OUTPATIENT
Start: 2019-08-10 | End: 2019-08-10

## 2019-08-10 RX ORDER — ACETAMINOPHEN 500 MG
650 TABLET ORAL EVERY 6 HOURS
Refills: 0 | Status: DISCONTINUED | OUTPATIENT
Start: 2019-08-10 | End: 2019-08-13

## 2019-08-10 RX ORDER — LEVOTHYROXINE SODIUM 125 MCG
88 TABLET ORAL DAILY
Refills: 0 | Status: DISCONTINUED | OUTPATIENT
Start: 2019-08-10 | End: 2019-08-11

## 2019-08-10 RX ORDER — SODIUM BICARBONATE 1 MEQ/ML
50 SYRINGE (ML) INTRAVENOUS ONCE
Refills: 0 | Status: COMPLETED | OUTPATIENT
Start: 2019-08-10 | End: 2019-08-10

## 2019-08-10 RX ORDER — GLUCAGON INJECTION, SOLUTION 0.5 MG/.1ML
1 INJECTION, SOLUTION SUBCUTANEOUS ONCE
Refills: 0 | Status: DISCONTINUED | OUTPATIENT
Start: 2019-08-10 | End: 2019-08-13

## 2019-08-10 RX ORDER — SODIUM CHLORIDE 9 MG/ML
1000 INJECTION, SOLUTION INTRAVENOUS ONCE
Refills: 0 | Status: COMPLETED | OUTPATIENT
Start: 2019-08-10 | End: 2019-08-10

## 2019-08-10 RX ORDER — SODIUM POLYSTYRENE SULFONATE 4.1 MEQ/G
30 POWDER, FOR SUSPENSION ORAL ONCE
Refills: 0 | Status: COMPLETED | OUTPATIENT
Start: 2019-08-10 | End: 2019-08-10

## 2019-08-10 RX ORDER — INSULIN LISPRO 100/ML
VIAL (ML) SUBCUTANEOUS AT BEDTIME
Refills: 0 | Status: DISCONTINUED | OUTPATIENT
Start: 2019-08-10 | End: 2019-08-13

## 2019-08-10 RX ORDER — ALBUTEROL 90 UG/1
2 AEROSOL, METERED ORAL
Qty: 0 | Refills: 0 | DISCHARGE

## 2019-08-10 RX ORDER — INSULIN HUMAN 100 [IU]/ML
10 INJECTION, SOLUTION SUBCUTANEOUS ONCE
Refills: 0 | Status: DISCONTINUED | OUTPATIENT
Start: 2019-08-10 | End: 2019-08-10

## 2019-08-10 RX ORDER — DEXTROSE 50 % IN WATER 50 %
15 SYRINGE (ML) INTRAVENOUS ONCE
Refills: 0 | Status: DISCONTINUED | OUTPATIENT
Start: 2019-08-10 | End: 2019-08-13

## 2019-08-10 RX ORDER — LIDOCAINE 4 G/100G
1 CREAM TOPICAL ONCE
Refills: 0 | Status: COMPLETED | OUTPATIENT
Start: 2019-08-10 | End: 2019-08-10

## 2019-08-10 RX ORDER — ACETAMINOPHEN 500 MG
650 TABLET ORAL ONCE
Refills: 0 | Status: COMPLETED | OUTPATIENT
Start: 2019-08-10 | End: 2019-08-10

## 2019-08-10 RX ORDER — IPRATROPIUM/ALBUTEROL SULFATE 18-103MCG
3 AEROSOL WITH ADAPTER (GRAM) INHALATION
Refills: 0 | Status: DISCONTINUED | OUTPATIENT
Start: 2019-08-10 | End: 2019-08-10

## 2019-08-10 RX ADMIN — Medication 50 MILLILITER(S): at 13:25

## 2019-08-10 RX ADMIN — Medication 3 MILLILITER(S): at 13:30

## 2019-08-10 RX ADMIN — Medication 200 GRAM(S): at 18:43

## 2019-08-10 RX ADMIN — INSULIN HUMAN 10 UNIT(S): 100 INJECTION, SOLUTION SUBCUTANEOUS at 18:43

## 2019-08-10 RX ADMIN — LIDOCAINE 1 PATCH: 4 CREAM TOPICAL at 12:07

## 2019-08-10 RX ADMIN — Medication 50 MILLILITER(S): at 18:42

## 2019-08-10 RX ADMIN — ALBUTEROL 10 MILLIGRAM(S): 90 AEROSOL, METERED ORAL at 13:38

## 2019-08-10 RX ADMIN — SODIUM POLYSTYRENE SULFONATE 30 GRAM(S): 4.1 POWDER, FOR SUSPENSION ORAL at 14:24

## 2019-08-10 RX ADMIN — INSULIN HUMAN 10 UNIT(S): 100 INJECTION, SOLUTION SUBCUTANEOUS at 13:27

## 2019-08-10 RX ADMIN — HEPARIN SODIUM 5000 UNIT(S): 5000 INJECTION INTRAVENOUS; SUBCUTANEOUS at 22:24

## 2019-08-10 RX ADMIN — Medication 50 MILLIEQUIVALENT(S): at 13:52

## 2019-08-10 RX ADMIN — SODIUM CHLORIDE 2000 MILLILITER(S): 9 INJECTION, SOLUTION INTRAVENOUS at 13:33

## 2019-08-10 RX ADMIN — Medication 3 MILLILITER(S): at 13:34

## 2019-08-10 RX ADMIN — Medication 200 GRAM(S): at 13:34

## 2019-08-10 RX ADMIN — LIDOCAINE 1 PATCH: 4 CREAM TOPICAL at 19:30

## 2019-08-10 NOTE — ED ADULT NURSE NOTE - OBJECTIVE STATEMENT
Patient reports hyperkalemia, as reported by PMD. Patient had labs drawn on Tuesday. Patient denies any CP/SOB/dizziness. Patient is well-appearing, A&ox4, conversing appropriately. Patient pending further assessment by MD.

## 2019-08-10 NOTE — H&P ADULT - PROBLEM SELECTOR PLAN 2
ekg/telemetry, house cardio c/s, repeat echo ekg/telemetry, house cardio recs appreciated, repeat echo  no chest pain  CE 21 -> 18

## 2019-08-10 NOTE — H&P ADULT - PROBLEM SELECTOR PLAN 3
monitor bun/cr, avoid nephrotoxic agents, consider renal consult, hold aceI for now pelvic xray and Lumbar spine xray neg  pain control as needed pelvic xray and Lumbar spine xray as above  Noted age indeterminate focal concave contour depression of left lateral L2   superior endplate margin  pain control as needed

## 2019-08-10 NOTE — ED PROVIDER NOTE - OBJECTIVE STATEMENT
84 year old male with PMH of CHF (Diastolic stage 2), COPD (on intermittent home O2), Type 2 DM, essential HTN, and hypothyroidism presents to the ED referred by PMD for elevated potassium. Pt states she saw her PMD last Tuesday for routine blood work and was told that her potassium was elevated. Pt denies any acute complaint at that time except for low back pain for the past 2 weeks s/p fall. Pt states she fell on her back, saw her PMD, was prescribed Advil which she takes intermittently but low back pain has been persistent with severity 8/10 today, radiates to the right lower extremity; denies urinary/bowel incontinence, weakness, numbness or tingling sensation. Pt denies any other complaints.

## 2019-08-10 NOTE — H&P ADULT - GASTROINTESTINAL DETAILS
soft/no guarding/normal/nontender/no distention/bowel sounds normal soft/nontender/no guarding/normal/no distention/no rigidity/bowel sounds normal

## 2019-08-10 NOTE — H&P ADULT - NEGATIVE CARDIOVASCULAR SYMPTOMS
no paroxysmal nocturnal dyspnea/no dyspnea on exertion/no chest pain/no palpitations/no peripheral edema

## 2019-08-10 NOTE — H&P ADULT - BACK COMMENTS
pain to lower back on palpation, mild pain to lower back/buttocks region on palpation, mild, ecchymosis noted

## 2019-08-10 NOTE — H&P ADULT - NSHPPHYSICALEXAM_GEN_ALL_CORE
Vital Signs Last 24 Hrs  T(C): 36.7 (10 Aug 2019 11:08), Max: 36.7 (10 Aug 2019 11:08)  T(F): 98 (10 Aug 2019 11:08), Max: 98 (10 Aug 2019 11:08)  HR: 74 (10 Aug 2019 16:14) (71 - 74)  BP: 156/70 (10 Aug 2019 16:14) (151/57 - 166/84)  BP(mean): --  RR: 16 (10 Aug 2019 16:14) (16 - 18)  SpO2: 100% (10 Aug 2019 16:14) (94% - 100%)    EKG: NSR @ 67 T inv I, AVL, V3-6, Q wave AVF (new) Vital Signs Last 24 Hrs  T(C): 36.7 (10 Aug 2019 11:08), Max: 36.7 (10 Aug 2019 11:08)  T(F): 98 (10 Aug 2019 11:08), Max: 98 (10 Aug 2019 11:08)  HR: 74 (10 Aug 2019 16:14) (71 - 74)  BP: 156/70 (10 Aug 2019 16:14) (151/57 - 166/84)  BP(mean): --  RR: 16 (10 Aug 2019 16:14) (16 - 18)  SpO2: 100% (10 Aug 2019 16:14) (94% - 100%)

## 2019-08-10 NOTE — CONSULT NOTE ADULT - SUBJECTIVE AND OBJECTIVE BOX
Date of Admission: 8/10/2019    CHIEF COMPLAINT: none, sent by PMD for hyperkalemia    HISTORY OF PRESENT ILLNESS: 84F with HTN, diastolic HF, DM, COPD on home O2, and hypothyroidism presents for hyperkalemia noted by PMD on routine office blood work. Also with fall at home several weeks ago but is currently without symptoms. In ER K noted to be 7.6, treated with D50, insulin, calcium, and albuterol with repeat K 6.1. No evidence of arrhythmia however EKGs done after K correction show TWI in 1,L, V2 - V5 which is new based on EKG of 2018. Patient is asymptomatic, denies chest pain, SOB, palpitations, N/V, or dizziness. hsT is negative. ECHO of 2018 shows EF 64% with normal LV function. Consulted for abnormal EKG.      Allergies    No Known Allergies    Intolerances    	    MEDICATIONS:        PAST MEDICAL & SURGICAL HISTORY:  Chronic bronchitis: last 1/20/15  COPD (chronic obstructive pulmonary disease)  HTN (hypertension): mild  Hypothyroid: last TFT&quot;s 3 months ago - normal  DM (diabetes mellitus): blood glucose with PCP every 3 months , last done 3 months ago  Leg fracture: right ORIF with hardware   H/O cataract: bilateral   S/P cholecystectomy      FAMILY HISTORY:  No pertinent family history      SOCIAL HISTORY:    Smoker  denies  Alcohol denies  Drugs denies      REVIEW OF SYSTEMS:  See HPI. Otherwise, 10 point ROS done and otherwise negative.    PHYSICAL EXAM:  T(C): 36.7 (08-10-19 @ 11:08), Max: 36.7 (08-10-19 @ 11:08)  HR: 71 (08-10-19 @ 13:57) (71 - 72)  BP: 166/84 (08-10-19 @ 13:57) (151/57 - 166/84)  RR: 17 (08-10-19 @ 13:57) (17 - 18)  SpO2: 100% (08-10-19 @ 13:57) (94% - 100%)  Wt(kg): --  I&O's Summary      Appearance: NAD, skin W & D  HEENT:   Normal oral mucosa, PERRL, EOMI	  Cardiovascular: nl S1S2, no M/R/C  Respiratory: Lungs clear to auscultation	  Psychiatry: A & O x 3, Mood & affect appropriate  Gastrointestinal:  Soft, Non-tender, + BS	  Skin: No rashes, No ecchymoses, No cyanosis	  Neurologic: Non-focal  Extremities: no C/C/E          LABS:	 	                        12.8   8.95  )-----------( 292      ( 10 Aug 2019 12:00 )             40.0       08-10    x   |  x   |  x   ----------------------------<  x   6.1<H>   |  x   |  x     08-10    138  |  104  |  66<H>  ----------------------------<  121<H>  7.6<HH>   |  24  |  1.75<H>    Ca    8.4      10 Aug 2019 12:00    TPro  7.5  /  Alb  4.1  /  TBili  < 0.2<L>  /  DBili  x   /  AST  13  /  ALT  8   /  AlkPhos  129<H>  0810      CARDIAC MARKERS:  Troponin T, High Sensitivity: 18 ng/L (08-10-19 @ 14:45)  Troponin T, High Sensitivity: 21 ng/L (08-10-19 @ 12:00)    TELEMETRY: 	NSR      EC. 2018 NSR NSSTTW abnormality  . 2019 NSR with TWI noted in 1,L, V2- V5 after K correction      PREVIOUS DIAGNOSTIC TESTING:    [ ] Echocardiogram:  Patient name: Chayo Welsh  YOB: 1934   Age: 83 (F)   MR#: [id number]  Study Date: 8/3/2018  Location: [id number]: [facility]  Study quality: Technically good  Referring Physician: Mike Araya MD  Blood Pressure: 140/62 mmHg  Height: 165 cm  Weight: 75 kg  BSA: 1.8 m2  ------------------------------------------------------------------------  PROCEDURE: Transthoracic echocardiogram with 2-D, M-Mode  and complete spectral and color flow Doppler.  INDICATION: Shortness of breath (R06.02)  ------------------------------------------------------------------------  DIMENSIONS:  Dimensions:     Normal Values:  LA:     3.3 cm    2.0 - 4.0 cm  Ao:     2.8 cm    2.0 - 3.8 cm  SEPTUM: 1.0 cm    0.6 - 1.2 cm  PWT: 1.0 cm    0.6 - 1.1 cm  LVIDd:  4.9 cm    3.0 - 5.6 cm  LVIDs:  3.2 cm    1.8 - 4.0 cm  Derived Variables:  LVMI: 97 g/m2  RWT: 0.40  Fractional short: 35 %  Ejection Fraction (Teicholtz): 64 %  ------------------------------------------------------------------------  OBSERVATIONS:  Mitral Valve: Normal mitral valve.  Aortic Root: Normal aortic root.  Aortic Valve: Aortic valve not well visualized; probably  normal.  Left Atrium: Normal left atrium.  LA volume index = 21  cc/m2.  Left Ventricle:Normal left ventricular systolic function.  No segmental wall motion abnormalities. Normal left  ventricular internal dimensions and wall thicknesses.  Right Heart: Normal right atrium. Normal right ventricular  size and function. Normal tricuspid valve. Minimal  tricuspid regurgitation. Normal pulmonic valve.  Pericardium/PleuraNormal pericardium with no pericardial  effusion.  ------------------------------------------------------------------------  CONCLUSIONS:  1. Aortic valve not well visualized; probably normal.  2. Normal left ventricular internal dimensions and wall  thicknesses.  3. Normal left ventricular systolic function. No segmental  wall motion abnormalities.  4. Normal right ventricular size and function.  ------------------------------------------------------------------------  Confirmed on  8/3/2018 - 14:28:48 by PACHECO Rose  ------------------------------------------------------------------------

## 2019-08-10 NOTE — H&P ADULT - PROBLEM SELECTOR PLAN 1
ekg/telemetry, s/p bicarb, d50, insulin, albuterol, calcium gluconate, Kayexalate, monitor k, hold ACEI for now ekg/telemetry, s/p bicarb, d50, insulin, albuterol, calcium gluconate, Kayexalate  hold ACEI for now  K downtrending, continue to monitor

## 2019-08-10 NOTE — H&P ADULT - ASSESSMENT
85 y/o female, with a PmHx of dCHF, COPD on home O2, Cholecystectomy, CKD, DM2, HTN, Hypothyroidism, presented to the [facility] ED with hyperkalemia on outpatient labs and pain to lower back region s/p fall 2 weeks ago. Admitted to telemetry for EKG changes and for Hyperkalemia. 83 y/o female, with dCHF, COPD on home O2, Cholecystectomy, CKD, DM2, HTN, Hypothyroidism, presented to the [facility] ED with hyperkalemia on outpatient labs and pain to lower back region s/p fall 2 weeks ago. Admitted to telemetry for EKG changes and for Hyperkalemia.

## 2019-08-10 NOTE — H&P ADULT - NSICDXPASTMEDICALHX_GEN_ALL_CORE_FT
PAST MEDICAL HISTORY:  Chronic kidney disease (CKD)     COPD (chronic obstructive pulmonary disease)     DM (diabetes mellitus)     HTN (hypertension)     Hypothyroid

## 2019-08-10 NOTE — H&P ADULT - PROBLEM SELECTOR PLAN 8
Heparin 5,000u sc tid cont home meds patient repots not on any inhalers at home  OMR reviewed noted Rx: Symbicort and Proair, will restart Symbicort, Braulio PRN

## 2019-08-10 NOTE — H&P ADULT - NEGATIVE NEUROLOGICAL SYMPTOMS
no headache/no syncope/no weakness/no vertigo no loss of consciousness/no weakness/no vertigo/no loss of sensation/no headache/no paresthesias/no syncope

## 2019-08-10 NOTE — ED ADULT TRIAGE NOTE - CHIEF COMPLAINT QUOTE
Sent from PCP for hyperkalemia. K 7.2 as of yesterday. c/o increased weakness x few days. States she fell 2 wks ago, c/o lower back pain with no head trauma. Denies any cp, sob, dizziness, palpitations or n/v.

## 2019-08-10 NOTE — H&P ADULT - RS GEN PE MLT RESP DETAILS PC
normal/clear to auscultation bilaterally/breath sounds equal/respirations non-labored/no chest wall tenderness/good air movement/airway patent no chest wall tenderness/no intercostal retractions/no wheezes/airway patent/breath sounds equal/clear to auscultation bilaterally/no rales/no rhonchi/respirations non-labored

## 2019-08-10 NOTE — ED PROVIDER NOTE - CLINICAL SUMMARY MEDICAL DECISION MAKING FREE TEXT BOX
84 year old male with PMH of CHF (Diastolic stage 2), COPD (on intermittent home O2), Type 2 DM, essential HTN, and hypothyroidism presents to the ED referred by PMD for elevated potassium. VS stable. EKG NSR, no acute STT or hyperkalemic changes.   Low back pain, s/p fall 2 weeks ago, no clinical evidence suggestive of compression cord syndrome. will do lumbar spine x-ray, analgesics and Reassess.

## 2019-08-10 NOTE — H&P ADULT - HISTORY OF PRESENT ILLNESS
83 y/o female, with a PmHx of dCHF, COPD on home O2, Cholecystectomy, CKD, DM2, HTN, Hypothyroidism, presented to the [facility] ED with hyperkalemia on outpatient labs and pain to lower back region s/p fall 2 weeks ago. 85 y/o female, with a PmHx of COPD on home O2, Cholecystectomy, CKD, DM2, HTN, Hypothyroidism, presented to the [facility] ED with hyperkalemia on outpatient labs and pain to lower back region s/p fall 2 weeks ago. 85 y/o female, with a PmHx of COPD on home O2, Cholecystectomy, CKD, DM2, HTN, Hypothyroidism, presented to the [facility] ED with hyperkalemia on outpatient labs and pain to lower back region s/p fall 2 weeks ago. Pt states 2 weeks ago she was getting dressed and when she stood up to put on her clothes she states she lost her balance and then fell. She states she landed on her right buttocks region. At that time she didn't follow up with her PCP and was taking Alleve for pain relief. This past Tuesday, she states she had gone to her PCP's office only for routine lab work and received a phone call this morning from her PCP that she needed to go to the hospital because her potassium was high. She states she was recently placed on Lisinopril for BP control. She denies any fever, chills, chest pain, HA, dizziness, blurred vision, abd pain, n/v, sick contacts, recent travel. She only had complaints of feeling tired and mild cramping in her left hand. In the [facility] ED, she was found to be hyperkalemic with a Potassium of 7.6 and was given Kayexalate, D50, Insulin, Bicarb, Calcium Gluconate and Albuterol. Her potassium improved to 6.1. She also was found to have new ekg t wave inversions in anterolateral leads. House Cards was consulted. She appears comfortable at this time and is being admitted to telemetry for Hyperkalmia, EKG changes and pain control for her lower back/buttocks region. 83 y/o female, with a PmHx of COPD on intermittent home O2 as needed (currently not on O2), Cholecystectomy, CKD, DM2, HTN, Hypothyroidism, presented to the [facility] ED with hyperkalemia on outpatient labs and pain to lower back region s/p fall 2 weeks ago. Pt states 2 weeks ago she was getting dressed and when she stood up to put on her clothes she states she lost her balance and then fell. She states she landed on her right buttocks region. At that time she didn't follow up with her PCP and was taking Alleve for pain relief. This past Tuesday, she states she had gone to her PCP's office only for routine lab work and received a phone call this morning from her PCP that she needed to go to the hospital because her potassium was high. She states she was recently placed on Lisinopril for BP control. She denies any fever, chills, chest pain, HA, dizziness, blurred vision, abd pain, n/v, sick contacts, recent travel. She only had complaints of feeling tired and mild cramping in her left hand. In the [facility] ED, she was found to be hyperkalemic with a Potassium of 7.6 and was given Kayexalate, D50, Insulin, Bicarb, Calcium Gluconate and Albuterol. Her potassium improved to 6.1. She also was found to have new ekg t wave inversions in anterolateral leads. House Cards was consulted. She appears comfortable at this time and is being admitted to telemetry for Hyperkalmia, EKG changes and pain control for her lower back/buttocks region. 85 y/o female, with COPD on intermittent 3L home O2 as needed (currently not on O2), Cholecystectomy, CKD, DM2, HTN, Hypothyroidism, presented to the [facility] ED with hyperkalemia on outpatient labs and pain to lower back region s/p fall 2 weeks ago. Pt states 2 weeks ago she was getting dressed and when she stood up to put on her clothes she states she lost her balance and then fell. She states she landed on her right buttocks region. At that time she didn't follow up with her PCP and was taking Aleve for pain relief. This past Tuesday, she states she had gone to her PCP's office only for routine lab work and received a phone call this morning from her PCP that she needed to go to the hospital because her potassium was high. She states she was recently placed on Lisinopril for BP control. She denies any fever, chills, chest pain, HA, dizziness, blurred vision, abd pain, n/v, sick contacts, recent travel. She only had complaints of feeling tired and mild cramping in her left hand. In the [facility] ED, she was found to be hyperkalemic with a Potassium of 7.6 and was given Kayexalate, D50, Insulin, Bicarb, Calcium Gluconate and Albuterol. Her potassium improved to 6.1. She also was found to have new ekg t wave inversions in anterolateral leads. House Cards was consulted. She appears comfortable at this time and is being admitted to telemetry for Hyperkalmia, EKG changes and pain control for her lower back/buttocks region.

## 2019-08-10 NOTE — H&P ADULT - NSICDXPASTSURGICALHX_GEN_ALL_CORE_FT
PAST SURGICAL HISTORY:  H/O cataract bilateral 2013    Leg fracture right ORIF with hardware 2010    S/P cholecystectomy

## 2019-08-10 NOTE — ED PROVIDER NOTE - PROGRESS NOTE DETAILS
[facility]O [facility]: Labs reviewed, K+ 7.6, no acute EKG changes, pt remains asymptomatic. Will start hyperK+ cocktail and admit to Telemetry. Patient remains asymptomatic. Repeat K on VBG is 6.0. Of note repeat EKG now showing neww deep TWI in anterolateral leads. ? related to electrolyte shifts. No CP. Plan: Continue to monitor closely, add troponin, consult cardiology. [facility]: cardiology consulted, no acute intervention as pt remains asymptomatic, card recommends TTE. Pt to be admitted to Telemetry. Accepting MD Dr. Albright. MAR text paged at this time.

## 2019-08-10 NOTE — H&P ADULT - PROBLEM SELECTOR PLAN 4
monitor fs, insulin ssc, hgba1-c, hold po meds monitor bun/cr, avoid nephrotoxic agents, consider renal consult, hold aceI for now confirm baseline Cr with PMD in AM  monitor bun/cr, avoid nephrotoxic agents  Hold ACE-i for now

## 2019-08-10 NOTE — H&P ADULT - NEGATIVE OPHTHALMOLOGIC SYMPTOMS
no loss of vision L/no blurred vision L/no blurred vision R/no loss of vision R/no diplopia/no photophobia

## 2019-08-10 NOTE — ED ADULT NURSE NOTE - CHPI ED NUR SYMPTOMS NEG
no dizziness/no fever/no decreased eating/drinking/no chills/no nausea/no pain/no weakness/no tingling/no vomiting

## 2019-08-10 NOTE — H&P ADULT - NSHPLABSRESULTS_GEN_ALL_CORE
08-10                              139  |  102  |  64<H>                       ----------------------------<  126<H>  7.6 -> 6.1 -.> 5.5<H>   |  25  |  1.75<H>    Ca    9.2      10 Aug 2019 20:20  Phos  3.1     08-10  Mg     1.7     08-10    TPro  7.5  /  Alb  4.1  /  TBili  < 0.2<L>  /  DBili  x   /  AST  13  /  ALT  8   /  AlkPhos  129<H>  08-10                        12.8   8.95  )-----------( 292      ( 10 Aug 2019 12:00 )             40.0     Alb: 4.1 g/dL / Pro: 7.5 g/dL / ALK PHOS: 129 u/L / ALT: 8 u/L / AST: 13 u/L / GGT: x           14:45 - VBG - pH: 7.25  | pCO2: 71    | pO2: 29    | Lactate: 1.8    Troponin T, High Sensitivity: 18 ng/L (08.10.19 @ 14:45)  Troponin T, High Sensitivity: 21 ng/L (08.10.19 @ 12:00)    < from: Xray Lumbar Spine AP + Lateral (08.10.19 @ 15:51) >  IMPRESSION: Evaluation on lateral view limited by suboptimal positioning. Age indeterminate focal concave contour depression of left lateral L2   superior endplate margin. Remaining vertebral body heights maintained. No spondylolistheses or spondylolysis defects. Multilevel degenerative disease manifest by varying degrees of disc space narrowing with disc margin osteophyte formation and posterior facet arthrosis. Unremarkable SI joints and partially visualized hips. Generalized osteopenia otherwise no discrete lytic or blastic lesions. Atherosclerotic abdominal aortic calcifications.  < end of copied text >    < from: Xray Pelvis AP only (08.10.19 @ 15:51) >  IMPRESSION: No hip fractures or dislocations. Intact pelvic and obturator rings and symmetrically aligned and spaced SI joints and pubic symphysis.   Moderately advanced right hip osteoarthritis. Preserved left hip joint space. Generalized osteopenia otherwise no discrete lytic or blastic lesions.   < end of copied text >    EKG personally reviewed   11:00 - 66bpm NSR TWI I, aVL , biphasic T in V6; QTc 421ms  15:07 - 67bpm NSR, new TWI V3-V6

## 2019-08-10 NOTE — H&P ADULT - PROBLEM SELECTOR PLAN 6
monitor bp, cont meds, adjust as needed monitor tft's, cont levothyroxine cont levothyroxine  check TSH cont levothyroxine, clarify dose in AM (recently increased Rx from 88mcg to 100mcg on 8/10/19)  check TSH

## 2019-08-10 NOTE — H&P ADULT - PROBLEM SELECTOR PLAN 5
monitor tft's, cont levothyroxine monitor fs, insulin ssc, hgba1-c, hold po meds NIDDM2   monitor fs, insulin ssc, hgba1-c, hold metformin while inpatient

## 2019-08-10 NOTE — H&P ADULT - PROBLEM SELECTOR PLAN 7
cont home meds monitor bp, cont meds, adjust as needed Essential hypertension  monitor bp, cont furosemide  holding ACE-i given hyperkalemia

## 2019-08-10 NOTE — H&P ADULT - ATTENDING COMMENTS
Agree with PA H&P and plan as edited above. Denies back pain on my exam. If persistent pain, consider TLSO brace given new L2 finding otherwise outpatient f/u with PMD. Agree with PA H&P and plan as edited above. Denies back pain on my exam. If persistent pain, consider TLSO brace given new L2 finding otherwise outpatient f/u with PMD.  On review of OMR patient has had Rx for Lisinopril 10mg since 10/2018, holding for now in light of hyperkalemia, would clarify recent medication changes with patient's PMD. Patient seen and examined on 8/10/2019.    Agree with PA H&P and plan as edited above. Denies back pain on my exam. If persistent pain, consider TLSO brace given new L2 finding otherwise outpatient f/u with PMD.  On review of OMR patient has had Rx for Lisinopril 10mg since 10/2018, holding for now in light of hyperkalemia, would clarify recent medication changes with patient's PMD.

## 2019-08-10 NOTE — H&P ADULT - NSHPSOCIALHISTORY_GEN_ALL_CORE
Marital Status: , lives alone    Occupation: Retired; used to work for an insurance company and [employer]    Tobacco Use: d/c smoking 20 years ago; used to smoke about 1 pack per day x 15 years    ETOH Use: socially    Flu Vaccine:      9/18                            Pneumonia Vaccine: neg Marital Status: , lives alone  Occupation: Retired; used to work for an insurance company and [employer]  Tobacco Use: d/c smoking 20 years ago; used to smoke about 1 pack per day x 15 years  ETOH Use: socially  Flu Vaccine:      9/18                            Pneumonia Vaccine: neg

## 2019-08-10 NOTE — H&P ADULT - NSICDXFAMILYHX_GEN_ALL_CORE_FT
FAMILY HISTORY:  Family history of hypertension, Father  FH: type 2 diabetes mellitus, Father  FHx: rheumatoid arthritis, Mother and Brother

## 2019-08-11 DIAGNOSIS — R60.0 LOCALIZED EDEMA: ICD-10-CM

## 2019-08-11 LAB
ANION GAP SERPL CALC-SCNC: 11 MMO/L — SIGNIFICANT CHANGE UP (ref 7–14)
ANION GAP SERPL CALC-SCNC: 11 MMO/L — SIGNIFICANT CHANGE UP (ref 7–14)
BASOPHILS # BLD AUTO: 0.02 K/UL — SIGNIFICANT CHANGE UP (ref 0–0.2)
BASOPHILS NFR BLD AUTO: 0.3 % — SIGNIFICANT CHANGE UP (ref 0–2)
BUN SERPL-MCNC: 56 MG/DL — HIGH (ref 7–23)
BUN SERPL-MCNC: 58 MG/DL — HIGH (ref 7–23)
CALCIUM SERPL-MCNC: 8.8 MG/DL — SIGNIFICANT CHANGE UP (ref 8.4–10.5)
CALCIUM SERPL-MCNC: 9.3 MG/DL — SIGNIFICANT CHANGE UP (ref 8.4–10.5)
CHLORIDE SERPL-SCNC: 104 MMOL/L — SIGNIFICANT CHANGE UP (ref 98–107)
CHLORIDE SERPL-SCNC: 105 MMOL/L — SIGNIFICANT CHANGE UP (ref 98–107)
CHLORIDE UR-SCNC: 112 MMOL/L — SIGNIFICANT CHANGE UP
CHOLEST SERPL-MCNC: 170 MG/DL — SIGNIFICANT CHANGE UP (ref 120–199)
CO2 SERPL-SCNC: 22 MMOL/L — SIGNIFICANT CHANGE UP (ref 22–31)
CO2 SERPL-SCNC: 25 MMOL/L — SIGNIFICANT CHANGE UP (ref 22–31)
CREAT SERPL-MCNC: 1.46 MG/DL — HIGH (ref 0.5–1.3)
CREAT SERPL-MCNC: 1.51 MG/DL — HIGH (ref 0.5–1.3)
EOSINOPHIL # BLD AUTO: 0.24 K/UL — SIGNIFICANT CHANGE UP (ref 0–0.5)
EOSINOPHIL NFR BLD AUTO: 3.1 % — SIGNIFICANT CHANGE UP (ref 0–6)
GLUCOSE SERPL-MCNC: 141 MG/DL — HIGH (ref 70–99)
GLUCOSE SERPL-MCNC: 186 MG/DL — HIGH (ref 70–99)
HBA1C BLD-MCNC: 6.9 % — HIGH (ref 4–5.6)
HCT VFR BLD CALC: 38.7 % — SIGNIFICANT CHANGE UP (ref 34.5–45)
HDLC SERPL-MCNC: 39 MG/DL — LOW (ref 45–65)
HGB BLD-MCNC: 12.2 G/DL — SIGNIFICANT CHANGE UP (ref 11.5–15.5)
IMM GRANULOCYTES NFR BLD AUTO: 0.3 % — SIGNIFICANT CHANGE UP (ref 0–1.5)
LIPID PNL WITH DIRECT LDL SERPL: 115 MG/DL — SIGNIFICANT CHANGE UP
LYMPHOCYTES # BLD AUTO: 1.94 K/UL — SIGNIFICANT CHANGE UP (ref 1–3.3)
LYMPHOCYTES # BLD AUTO: 25.3 % — SIGNIFICANT CHANGE UP (ref 13–44)
MAGNESIUM SERPL-MCNC: 1.8 MG/DL — SIGNIFICANT CHANGE UP (ref 1.6–2.6)
MCHC RBC-ENTMCNC: 31.4 PG — SIGNIFICANT CHANGE UP (ref 27–34)
MCHC RBC-ENTMCNC: 31.5 % — LOW (ref 32–36)
MCV RBC AUTO: 99.5 FL — SIGNIFICANT CHANGE UP (ref 80–100)
MONOCYTES # BLD AUTO: 0.91 K/UL — HIGH (ref 0–0.9)
MONOCYTES NFR BLD AUTO: 11.9 % — SIGNIFICANT CHANGE UP (ref 2–14)
NEUTROPHILS # BLD AUTO: 4.53 K/UL — SIGNIFICANT CHANGE UP (ref 1.8–7.4)
NEUTROPHILS NFR BLD AUTO: 59.1 % — SIGNIFICANT CHANGE UP (ref 43–77)
NRBC # FLD: 0 K/UL — SIGNIFICANT CHANGE UP (ref 0–0)
OSMOLALITY SERPL: 316 MOSMO/KG — HIGH (ref 275–295)
OSMOLALITY UR: 434 MOSMO/KG — SIGNIFICANT CHANGE UP (ref 50–1200)
PHOSPHATE SERPL-MCNC: 3.8 MG/DL — SIGNIFICANT CHANGE UP (ref 2.5–4.5)
PLATELET # BLD AUTO: 263 K/UL — SIGNIFICANT CHANGE UP (ref 150–400)
PMV BLD: 8.7 FL — SIGNIFICANT CHANGE UP (ref 7–13)
POTASSIUM SERPL-MCNC: 5.4 MMOL/L — HIGH (ref 3.5–5.3)
POTASSIUM SERPL-MCNC: 5.6 MMOL/L — HIGH (ref 3.5–5.3)
POTASSIUM SERPL-MCNC: 6.5 MMOL/L — CRITICAL HIGH (ref 3.5–5.3)
POTASSIUM SERPL-SCNC: 5.4 MMOL/L — HIGH (ref 3.5–5.3)
POTASSIUM SERPL-SCNC: 5.6 MMOL/L — HIGH (ref 3.5–5.3)
POTASSIUM SERPL-SCNC: 6.5 MMOL/L — CRITICAL HIGH (ref 3.5–5.3)
POTASSIUM SERPL-SCNC: 7.3 MMOL/L
POTASSIUM UR-SCNC: 26.2 MMOL/L — SIGNIFICANT CHANGE UP
RBC # BLD: 3.89 M/UL — SIGNIFICANT CHANGE UP (ref 3.8–5.2)
RBC # FLD: 12 % — SIGNIFICANT CHANGE UP (ref 10.3–14.5)
SODIUM SERPL-SCNC: 138 MMOL/L — SIGNIFICANT CHANGE UP (ref 135–145)
SODIUM SERPL-SCNC: 140 MMOL/L — SIGNIFICANT CHANGE UP (ref 135–145)
SODIUM UR-SCNC: 115 MMOL/L — SIGNIFICANT CHANGE UP
T3 SERPL-MCNC: 58.5 NG/DL — LOW (ref 80–200)
T4 FREE SERPL-MCNC: 1.19 NG/DL — SIGNIFICANT CHANGE UP (ref 0.9–1.8)
TRIGL SERPL-MCNC: 127 MG/DL — SIGNIFICANT CHANGE UP (ref 10–149)
TSH SERPL-MCNC: 7.78 UIU/ML — HIGH (ref 0.27–4.2)
WBC # BLD: 7.66 K/UL — SIGNIFICANT CHANGE UP (ref 3.8–10.5)
WBC # FLD AUTO: 7.66 K/UL — SIGNIFICANT CHANGE UP (ref 3.8–10.5)

## 2019-08-11 PROCEDURE — 76770 US EXAM ABDO BACK WALL COMP: CPT | Mod: 26

## 2019-08-11 PROCEDURE — 99233 SBSQ HOSP IP/OBS HIGH 50: CPT

## 2019-08-11 PROCEDURE — 93970 EXTREMITY STUDY: CPT | Mod: 26

## 2019-08-11 PROCEDURE — 93010 ELECTROCARDIOGRAM REPORT: CPT

## 2019-08-11 PROCEDURE — 99223 1ST HOSP IP/OBS HIGH 75: CPT | Mod: GC

## 2019-08-11 RX ORDER — POLYETHYLENE GLYCOL 3350 17 G/17G
17 POWDER, FOR SOLUTION ORAL DAILY
Refills: 0 | Status: DISCONTINUED | OUTPATIENT
Start: 2019-08-11 | End: 2019-08-13

## 2019-08-11 RX ORDER — FUROSEMIDE 40 MG
20 TABLET ORAL ONCE
Refills: 0 | Status: COMPLETED | OUTPATIENT
Start: 2019-08-11 | End: 2019-08-11

## 2019-08-11 RX ORDER — SENNA PLUS 8.6 MG/1
2 TABLET ORAL AT BEDTIME
Refills: 0 | Status: DISCONTINUED | OUTPATIENT
Start: 2019-08-11 | End: 2019-08-13

## 2019-08-11 RX ORDER — DEXTROSE 50 % IN WATER 50 %
50 SYRINGE (ML) INTRAVENOUS ONCE
Refills: 0 | Status: COMPLETED | OUTPATIENT
Start: 2019-08-11 | End: 2019-08-11

## 2019-08-11 RX ORDER — CALCIUM GLUCONATE 100 MG/ML
1 VIAL (ML) INTRAVENOUS ONCE
Refills: 0 | Status: COMPLETED | OUTPATIENT
Start: 2019-08-11 | End: 2019-08-11

## 2019-08-11 RX ORDER — SODIUM CHLORIDE 9 MG/ML
1000 INJECTION INTRAMUSCULAR; INTRAVENOUS; SUBCUTANEOUS
Refills: 0 | Status: DISCONTINUED | OUTPATIENT
Start: 2019-08-11 | End: 2019-08-12

## 2019-08-11 RX ORDER — INSULIN HUMAN 100 [IU]/ML
10 INJECTION, SOLUTION SUBCUTANEOUS ONCE
Refills: 0 | Status: COMPLETED | OUTPATIENT
Start: 2019-08-11 | End: 2019-08-11

## 2019-08-11 RX ORDER — SODIUM POLYSTYRENE SULFONATE 4.1 MEQ/G
30 POWDER, FOR SUSPENSION ORAL ONCE
Refills: 0 | Status: COMPLETED | OUTPATIENT
Start: 2019-08-11 | End: 2019-08-11

## 2019-08-11 RX ORDER — IPRATROPIUM/ALBUTEROL SULFATE 18-103MCG
3 AEROSOL WITH ADAPTER (GRAM) INHALATION EVERY 6 HOURS
Refills: 0 | Status: DISCONTINUED | OUTPATIENT
Start: 2019-08-11 | End: 2019-08-12

## 2019-08-11 RX ORDER — LEVOTHYROXINE SODIUM 125 MCG
100 TABLET ORAL DAILY
Refills: 0 | Status: DISCONTINUED | OUTPATIENT
Start: 2019-08-11 | End: 2019-08-13

## 2019-08-11 RX ORDER — BUDESONIDE AND FORMOTEROL FUMARATE DIHYDRATE 160; 4.5 UG/1; UG/1
2 AEROSOL RESPIRATORY (INHALATION)
Refills: 0 | Status: DISCONTINUED | OUTPATIENT
Start: 2019-08-11 | End: 2019-08-13

## 2019-08-11 RX ORDER — DOCUSATE SODIUM 100 MG
100 CAPSULE ORAL THREE TIMES A DAY
Refills: 0 | Status: DISCONTINUED | OUTPATIENT
Start: 2019-08-11 | End: 2019-08-13

## 2019-08-11 RX ORDER — ALBUTEROL 90 UG/1
10 AEROSOL, METERED ORAL ONCE
Refills: 0 | Status: COMPLETED | OUTPATIENT
Start: 2019-08-11 | End: 2019-08-11

## 2019-08-11 RX ADMIN — Medication 88 MICROGRAM(S): at 05:10

## 2019-08-11 RX ADMIN — BUDESONIDE AND FORMOTEROL FUMARATE DIHYDRATE 2 PUFF(S): 160; 4.5 AEROSOL RESPIRATORY (INHALATION) at 09:14

## 2019-08-11 RX ADMIN — Medication 100 MILLIGRAM(S): at 12:53

## 2019-08-11 RX ADMIN — Medication 100 MILLIGRAM(S): at 22:17

## 2019-08-11 RX ADMIN — ALBUTEROL 10 MILLIGRAM(S): 90 AEROSOL, METERED ORAL at 09:48

## 2019-08-11 RX ADMIN — INSULIN HUMAN 10 UNIT(S): 100 INJECTION, SOLUTION SUBCUTANEOUS at 09:13

## 2019-08-11 RX ADMIN — Medication 20 MILLIGRAM(S): at 05:10

## 2019-08-11 RX ADMIN — Medication 200 GRAM(S): at 09:14

## 2019-08-11 RX ADMIN — Medication 50 MILLILITER(S): at 09:13

## 2019-08-11 RX ADMIN — POLYETHYLENE GLYCOL 3350 17 GRAM(S): 17 POWDER, FOR SOLUTION ORAL at 12:53

## 2019-08-11 RX ADMIN — HEPARIN SODIUM 5000 UNIT(S): 5000 INJECTION INTRAVENOUS; SUBCUTANEOUS at 12:53

## 2019-08-11 RX ADMIN — SENNA PLUS 2 TABLET(S): 8.6 TABLET ORAL at 22:21

## 2019-08-11 RX ADMIN — BUDESONIDE AND FORMOTEROL FUMARATE DIHYDRATE 2 PUFF(S): 160; 4.5 AEROSOL RESPIRATORY (INHALATION) at 22:17

## 2019-08-11 RX ADMIN — SODIUM CHLORIDE 100 MILLILITER(S): 9 INJECTION INTRAMUSCULAR; INTRAVENOUS; SUBCUTANEOUS at 10:24

## 2019-08-11 RX ADMIN — HEPARIN SODIUM 5000 UNIT(S): 5000 INJECTION INTRAVENOUS; SUBCUTANEOUS at 22:17

## 2019-08-11 RX ADMIN — Medication 20 MILLIGRAM(S): at 12:53

## 2019-08-11 RX ADMIN — SODIUM POLYSTYRENE SULFONATE 30 GRAM(S): 4.1 POWDER, FOR SUSPENSION ORAL at 22:16

## 2019-08-11 RX ADMIN — LIDOCAINE 1 PATCH: 4 CREAM TOPICAL at 00:23

## 2019-08-11 RX ADMIN — HEPARIN SODIUM 5000 UNIT(S): 5000 INJECTION INTRAVENOUS; SUBCUTANEOUS at 05:10

## 2019-08-11 RX ADMIN — Medication 1: at 12:51

## 2019-08-11 NOTE — PROGRESS NOTE ADULT - SUBJECTIVE AND OBJECTIVE BOX
Joya Stafford MD  Pager [phone]    CHIEF COMPLAINT: Patient is a 84y old  female who presents with a chief complaint of Hyperkalemia/Lower back pain (11 Aug 2019 10:09)      SUBJECTIVE / OVERNIGHT EVENTS:  pt sent by PCP for hyperkalemia k 6.5 today, was on lisinopril at home, had fall 2 wks ago with low back pain, was taking aleve 2-4 tabs/day for a week    MEDICATIONS  (STANDING):  buDESOnide 160 MICROgram(s)/formoterol 4.5 MICROgram(s) Inhaler 2 Puff(s) Inhalation two times a day  dextrose 5%. 1000 milliLiter(s) (50 mL/Hr) IV Continuous <Continuous>  dextrose 50% Injectable 12.5 Gram(s) IV Push once  dextrose 50% Injectable 25 Gram(s) IV Push once  dextrose 50% Injectable 25 Gram(s) IV Push once  furosemide   Injectable 20 milliGRAM(s) IV Push once  heparin  Injectable 5000 Unit(s) SubCutaneous every 8 hours  insulin lispro (HumaLOG) corrective regimen sliding scale   SubCutaneous three times a day before meals  insulin lispro (HumaLOG) corrective regimen sliding scale   SubCutaneous at bedtime  levothyroxine 88 MICROGram(s) Oral daily  sodium chloride 0.9%. 1000 milliLiter(s) (100 mL/Hr) IV Continuous <Continuous>    MEDICATIONS  (PRN):  acetaminophen   Tablet .. 650 milliGRAM(s) Oral every 6 hours PRN Temp greater or equal to 38C (100.4F), Mild Pain (1 - 3), Moderate Pain (4 - 6)  ALBUTerol/ipratropium for Nebulization 3 milliLiter(s) Nebulizer every 6 hours PRN Shortness of Breath and/or Wheezing  dextrose 40% Gel 15 Gram(s) Oral once PRN Blood Glucose LESS THAN 70 milliGRAM(s)/deciliter  glucagon  Injectable 1 milliGRAM(s) IntraMuscular once PRN Glucose LESS THAN 70 milligrams/deciliter      VITALS:  T(F): 98 (19 @ 09:09), Max: 98 (19 @ 09:09)  HR: 62 (19 @ 09:09) (62 - 74)  BP: 118/60 (19 @ 09:09) (105/46 - 166/84)  RR: 17 (19 @ 09:09) (16 - 18)  SpO2: 99% (19 @ 09:09)  Height (cm): 162.56 (17:38)  Weight (kg): 67.6 (17:38)  BMI (kg/m2): 25.6 (17:38)    CAPILLARY BLOOD GLUCOSE    Output   Height (cm): 162.56 (17:38)  Weight (kg): 67.6 (17:38)  BMI (kg/m2): 25.6 (17:38)  I&O's Summary  T(F): 98 (19 @ 09:09), Max: 98 (19 @ 09:09)  HR: 62 (19 09:09) (62 - 74)  BP: 118/60 (19 @ 09:09) (105/46 - 166/84)  RR: 17 (19 @ 09:09) (16 - 18)  SpO2: 99% (19 @ 09:09)    PHYSICAL EXAM:  GENERAL: NAD, well-developed  HEAD:  Atraumatic, Normocephalic  EYES: EOMI, PERRLA, conjunctiva and sclera clear  NECK: Supple, No JVD  CHEST/LUNG: Clear to auscultation bilaterally; No wheeze  HEART: Regular rate and rhythm; No murmurs, rubs, or gallops  ABDOMEN: Soft, Nontender, Nondistended; Bowel sounds present  EXTREMITIES:  2+ Peripheral Pulses, No clubbing, cyanosis, or edema  PSYCH: AAOx3  NEUROLOGY: non-focal  SKIN: No rashes or lesions    LABS:              12.2                 138  | 22   | 58           7.66  >-----------< 263     ------------------------< 141                   38.7                 6.5  | 105  | 1.51                                         Ca 8.8   Mg 1.8   Ph 3.8         TPro  7.5  /  Alb  4.1      TBili  < 0.2  /  DBili  x         AST  13  /  ALT  8             AlkPhos  129        VB-10 @ 14:45  7.25/71/29/24/43.6/3.1    MICROBIOLOGY:    RADIOLOGY & ADDITIONAL TESTS:    Imaging Personally Reviewed:  < from: Xray Pelvis AP only (08.10.19 @ 15:51) >  IMPRESSION:  No hip fractures or dislocations.    Intact pelvic and obturator rings and symmetrically aligned and spaced SI   joints and pubic symphysis.     Moderately advanced right hip osteoarthritis. Preserved left hip joint   space.     Generalized osteopenia otherwise no discrete lytic or blastic lesions.     < from: Xray Lumbar Spine AP + Lateral (08.10.19 @ 15:51) >  IMPRESSION:  Evaluation on lateral view limited by suboptimal positioning.    Age indeterminate focal concave contour depression of left lateral L2   superior endplate margin. Remaining vertebral body heights maintained.    No spondylolistheses or spondylolysis defects.    Multilevel degenerative disease manifest by varying degrees of disc space   narrowing with disc margin osteophyte formation and posterior facet   arthrosis.    Unremarkable SI joints and partially visualized hips.    Generalized osteopenia otherwise no discrete lytic or blastic lesions.     Atherosclerotic abdominal aortic calcifications.    [ ] Consultant(s) Notes Reviewed:  [x ] Care Discussed with Consultants/Other Providers: nephrology fellow Dr. Collado, consult for hyperkalemia/chong.

## 2019-08-11 NOTE — PROGRESS NOTE ADULT - PROBLEM SELECTOR PLAN 5
NIDDM2   monitor fs, insulin ssc, hgba1-c, hold metformin while inpatient NIDDM2   monitor fs, insulin ssc, hgba1-c 6.9, hold metformin while inpatient

## 2019-08-11 NOTE — PROGRESS NOTE ADULT - PROBLEM SELECTOR PLAN 6
cont levothyroxine, clarify dose in AM (recently increased Rx from 88mcg to 100mcg on 8/10/19)  check TSH cont levothyroxine, clarify dose in AM (recently increased Rx from 88mcg to 100mcg on 8/10/19)  TSH 7.78, check free T4 in am

## 2019-08-11 NOTE — PROGRESS NOTE ADULT - PROBLEM SELECTOR PLAN 8
patient repots not on any inhalers at home  [facility] reviewed noted Rx: Symbicort and Proair, will restart Symbicort, Braulio PRN

## 2019-08-11 NOTE — CONSULT NOTE ADULT - PROBLEM SELECTOR RECOMMENDATION 9
Patient with hyperkalemia in the setting of CKD, NSAIDs, and ACE-i.  Shilpa CHAU reviewed, patient had previous episode of hyperkalemia of 7.4 on 8/6/2019. [employer] reviewed, on 8/9/2019 showed serum potassium of 7.3. Patient arrived to  ER on 8/10/2019, serum potassium was 7.6 with TWI inversions on EKG. Patient received Kayexalate, Calcium gluconate, Insulin and D50 in the ER. Repeat Potassium was 6.1 which down trended to 5.5. Today serum potassium is 6.5. Patient denies any symptoms. Extensive discussion had with patient regarding possible HD if serum potassium does not improve, hesitant at this time. Recommend continuing medical management at this time. Switch PO Lasix to IV Lasix. Add bowel regimen in addition to PRN Kayexalate. Repeat serum potassium in afternoon. Low potassium diet advised. Monitor serum potassium Patient with hyperkalemia in the setting of CKD, NSAIDs, and ACE-i.  Shilpa CHAU reviewed, patient had previous episode of hyperkalemia of 7.4 on 8/6/2019. [employer] reviewed, on 8/9/2019 showed serum potassium of 7.3. Patient arrived to  ER on 8/10/2019, serum potassium was 7.6 with TWI inversions on EKG. Patient received Kayexalate, Calcium gluconate, Insulin and D50 in the ER. Repeat Potassium was 6.1 which down trended to 5.5. Today serum potassium is 6.5. Check CPK with next labs. Patient denies any symptoms. Extensive discussion had with patient regarding possible HD if serum potassium does not improve, hesitant at this time. Recommend continuing medical management at this time. Switch PO Lasix to IV Lasix. Add bowel regimen in addition to PRN Kayexalate. Repeat serum potassium in afternoon. Low potassium diet advised. Monitor serum potassium. Do not start any ACE-I or ARB and please place on low potassium diet.

## 2019-08-11 NOTE — CONSULT NOTE ADULT - SUBJECTIVE AND OBJECTIVE BOX
[facility] DIVISION OF KIDNEY DISEASES AND HYPERTENSION -- [phone]  -- INITIAL CONSULT NOTE  --------------------------------------------------------------------------------  HPI: 83 y/o female, with COPD on intermittent 3L home O2 as needed (currently not on O2), Cholecystectomy, CKD, DM2, HTN, Hypothyroidism, presented to the [facility] ED with hyperkalemia on outpatient labs. Nephrology consulted for hyperkalemia. [facility] to [facility] patient had previous episode of hyperkalemia of 7.4 on 8/6/2019. [employer] reviewed, on 8/9/2019 showed serum potassium of 7.3. Patient was advised to go to the ER for further evaluation. On arrival in the ER, serum potassium was 7.6 with TWI inversions on EKG. Patient received Kayexalate, Calcium gluconate, Insulin and D50 in the ER. Repeat Potassium was 6.1 which down trended to 5.5. Today serum potassium is 6.5.        PAST HISTORY  --------------------------------------------------------------------------------  PAST MEDICAL & SURGICAL HISTORY:  Chronic kidney disease (CKD)  COPD (chronic obstructive pulmonary disease)  HTN (hypertension)  Hypothyroid  DM (diabetes mellitus)  Leg fracture: right ORIF with hardware 2010  H/O cataract: bilateral 2013  S/P cholecystectomy    FAMILY HISTORY:  Family history of hypertension: Father  FH: type 2 diabetes mellitus: Father  FHx: rheumatoid arthritis: Mother and Brother    PAST SOCIAL HISTORY:    ALLERGIES & MEDICATIONS  --------------------------------------------------------------------------------  Allergies    No Known Allergies    Intolerances    Standing Inpatient Medications  buDESOnide 160 MICROgram(s)/formoterol 4.5 MICROgram(s) Inhaler 2 Puff(s) Inhalation two times a day  dextrose 5%. 1000 milliLiter(s) IV Continuous <Continuous>  dextrose 50% Injectable 12.5 Gram(s) IV Push once  dextrose 50% Injectable 25 Gram(s) IV Push once  dextrose 50% Injectable 25 Gram(s) IV Push once  furosemide    Tablet 20 milliGRAM(s) Oral daily  heparin  Injectable 5000 Unit(s) SubCutaneous every 8 hours  insulin lispro (HumaLOG) corrective regimen sliding scale   SubCutaneous three times a day before meals  insulin lispro (HumaLOG) corrective regimen sliding scale   SubCutaneous at bedtime  levothyroxine 88 MICROGram(s) Oral daily  sodium chloride 0.9%. 1000 milliLiter(s) IV Continuous <Continuous>    REVIEW OF SYSTEMS  --------------------------------------------------------------------------------  Gen: No fevers/chills  Skin: No rashes  Head/Eyes/Ears: Normal hearing,   Respiratory: No dyspnea, cough  CV: No chest pain  GI: No abdominal pain, diarrhea  : No dysuria, hematuria  MSK: No  edema  Heme: No easy bruising or bleeding  Psych: No significant depression    All other systems were reviewed and are negative, except as noted.    VITALS/PHYSICAL EXAM  --------------------------------------------------------------------------------  T(C): 36.7 (08-11-19 @ 09:09), Max: 36.7 (08-10-19 @ 11:08)  HR: 62 (08-11-19 @ 09:09) (62 - 74)  BP: 118/60 (08-11-19 @ 09:09) (105/46 - 166/84)  RR: 17 (08-11-19 @ 09:09) (16 - 18)  SpO2: 99% (08-11-19 @ 09:09) (93% - 100%)  Wt(kg): --  Height (cm): 162.56 (08-10-19 @ 17:38)  Weight (kg): 67.6 (08-10-19 @ 17:38)  BMI (kg/m2): 25.6 (08-10-19 @ 17:38)  BSA (m2): 1.73 (08-10-19 @ 17:38)      Physical Exam:  	Gen: NAD  	HEENT: MMM  	Pulm: CTA B/L  	CV: S1S2  	Abd: Soft, +BS   	Ext: No LE edema B/L  	Neuro: Awake  	Skin: Warm and dry  	Vascular access:    LABS/STUDIES  --------------------------------------------------------------------------------              12.2   7.66  >-----------<  263      [08-11-19 @ 05:40]              38.7     138  |  105  |  58  ----------------------------<  141      [08-11-19 @ 05:40]  6.5   |  22  |  1.51        Ca     8.8     [08-11-19 @ 05:40]      Mg     1.8     [08-11-19 @ 05:40]      Phos  3.8     [08-11-19 @ 05:40]    TPro  7.5  /  Alb  4.1  /  TBili  < 0.2  /  DBili  x   /  AST  13  /  ALT  8   /  AlkPhos  129  [08-10-19 @ 12:00]    Creatinine Trend:  SCr 1.51 [08-11 @ 05:40]  SCr 1.75 [08-10 @ 20:20]  SCr 1.75 [08-10 @ 12:00]    Urinalysis - [12-17-17 @ 15:28]      Color YELLOW / Appearance CLEAR / SG 1.023 / pH 5.5      Gluc >1000 / Ketone TRACE  / Bili NEGATIVE / Urobili NORMAL       Blood NEGATIVE / Protein 20 / Leuk Est NEGATIVE / Nitrite NEGATIVE      RBC 0-2 / WBC 2-5 / Hyaline 5-10 / Gran  / Sq Epi OCC / Non Sq Epi  / Bacteria FEW      HbA1c 6.9      [08-11-19 @ 05:40]  TSH 7.78      [08-11-19 @ 05:40]  Lipid: chol 170, , HDL 39,       [08-11-19 @ 05:40] [facility] DIVISION OF KIDNEY DISEASES AND HYPERTENSION -- [phone]  -- INITIAL CONSULT NOTE  --------------------------------------------------------------------------------  HPI: 85 y/o female, with COPD on intermittent 3L home O2 as needed (currently not on O2), Cholecystectomy, CKD, DM2, HTN, Hypothyroidism, presented to the [facility] ED with hyperkalemia on outpatient labs. Nephrology consulted for hyperkalemia. [facility] prior to [facility] patient had previous episode of hyperkalemia of 7.4 on 8/6/2019. [employer] reviewed, on 8/9/2019 showed serum potassium of 7.3. Patient was advised to go to the ER for further evaluation. On arrival in the ER, serum potassium was 7.6 with TWI inversions on EKG. Patient received Kayexalate, Calcium gluconate, Insulin and D50 in the ER. Repeat Potassium was 6.1 which down trended to 5.5. Today serum potassium is 6.5. Patient reports that she had a fall about two weeks ago and since then has been taking Aleve 2 tablets TID. Patient also reports recently being started on Lisinopril by PMD for renoprotection from DM. Patient having previous history of hyperkalemia. Denies any complaints of muslce weakness, tremors, palpitations, chest pain. Only complains of back pain.      PAST HISTORY  --------------------------------------------------------------------------------  PAST MEDICAL & SURGICAL HISTORY:  Chronic kidney disease (CKD)  COPD (chronic obstructive pulmonary disease)  HTN (hypertension)  Hypothyroid  DM (diabetes mellitus)  Leg fracture: right ORIF with hardware 2010  H/O cataract: bilateral 2013  S/P cholecystectomy    FAMILY HISTORY:  Family history of hypertension: Father  FH: type 2 diabetes mellitus: Father  FHx: rheumatoid arthritis: Mother and Brother    PAST SOCIAL HISTORY: Lives at home alone, former smoker, retired from insurance consulting    ALLERGIES & MEDICATIONS  --------------------------------------------------------------------------------  Allergies    No Known Allergies    Intolerances    Standing Inpatient Medications  buDESOnide 160 MICROgram(s)/formoterol 4.5 MICROgram(s) Inhaler 2 Puff(s) Inhalation two times a day  dextrose 5%. 1000 milliLiter(s) IV Continuous <Continuous>  dextrose 50% Injectable 12.5 Gram(s) IV Push once  dextrose 50% Injectable 25 Gram(s) IV Push once  dextrose 50% Injectable 25 Gram(s) IV Push once  furosemide    Tablet 20 milliGRAM(s) Oral daily  heparin  Injectable 5000 Unit(s) SubCutaneous every 8 hours  insulin lispro (HumaLOG) corrective regimen sliding scale   SubCutaneous three times a day before meals  insulin lispro (HumaLOG) corrective regimen sliding scale   SubCutaneous at bedtime  levothyroxine 88 MICROGram(s) Oral daily  sodium chloride 0.9%. 1000 milliLiter(s) IV Continuous <Continuous>    REVIEW OF SYSTEMS  --------------------------------------------------------------------------------  Gen: No fevers/chills  Skin: No rashes  Respiratory: No dyspnea, cough  CV: No chest pain  GI: No abdominal pain, diarrhea  : No dysuria, hematuria  MSK: No  edema  Heme: No easy bruising or bleeding  Psych: No significant depression    All other systems were reviewed and are negative, except as noted.    VITALS/PHYSICAL EXAM  --------------------------------------------------------------------------------  T(C): 36.7 (08-11-19 @ 09:09), Max: 36.7 (08-10-19 @ 11:08)  HR: 62 (08-11-19 @ 09:09) (62 - 74)  BP: 118/60 (08-11-19 @ 09:09) (105/46 - 166/84)  RR: 17 (08-11-19 @ 09:09) (16 - 18)  SpO2: 99% (08-11-19 @ 09:09) (93% - 100%)  Wt(kg): --  Height (cm): 162.56 (08-10-19 @ 17:38)  Weight (kg): 67.6 (08-10-19 @ 17:38)  BMI (kg/m2): 25.6 (08-10-19 @ 17:38)  BSA (m2): 1.73 (08-10-19 @ 17:38)      Physical Exam:  	Gen: NAD  	HEENT: MMM  	Pulm: CTA B/L  	CV: S1S2  	Abd: Soft, +BS   	Ext: No LE edema B/L  	Neuro: Awake  	Skin: Warm and dry  	  LABS/STUDIES  --------------------------------------------------------------------------------              12.2   7.66  >-----------<  263      [08-11-19 @ 05:40]              38.7     138  |  105  |  58  ----------------------------<  141      [08-11-19 @ 05:40]  6.5   |  22  |  1.51        Ca     8.8     [08-11-19 @ 05:40]      Mg     1.8     [08-11-19 @ 05:40]      Phos  3.8     [08-11-19 @ 05:40]    TPro  7.5  /  Alb  4.1  /  TBili  < 0.2  /  DBili  x   /  AST  13  /  ALT  8   /  AlkPhos  129  [08-10-19 @ 12:00]    Creatinine Trend:  SCr 1.51 [08-11 @ 05:40]  SCr 1.75 [08-10 @ 20:20]  SCr 1.75 [08-10 @ 12:00]    HbA1c 6.9      [08-11-19 @ 05:40]  TSH 7.78      [08-11-19 @ 05:40]

## 2019-08-11 NOTE — PROGRESS NOTE ADULT - PROBLEM SELECTOR PLAN 4
Pt with chong in setting of nsaid use (was taking aleve at home for a week for back pain)  IVF NS, check renal US, call PCP on Monday to check baseline creat  monitor bun/cr, avoid nephrotoxic agents  Hold ACE-i (lisinopril) for now

## 2019-08-11 NOTE — PROGRESS NOTE ADULT - ASSESSMENT
85 y/o female, with dCHF, COPD on home O2, Cholecystectomy, CKD, DM2, HTN, Hypothyroidism, presented to the [facility] ED with hyperkalemia on outpatient labs and pain to lower back region s/p fall 2 weeks ago. Admitted to telemetry for EKG changes and for Hyperkalemia.

## 2019-08-11 NOTE — PHYSICAL THERAPY INITIAL EVALUATION ADULT - RANGE OF MOTION EXAMINATION, REHAB EVAL
bilateral lower extremity ROM was WFL (within functional limits)/Right UE ROM was WFL (within functional limits)/bilateral upper extremity ROM was WFL (within functional limits)

## 2019-08-11 NOTE — PROGRESS NOTE ADULT - PROBLEM SELECTOR PLAN 2
ekg/telemetry, house cardio recs appreciated, repeat echo  no chest pain  CE 21 -> 18 ruled out for MI

## 2019-08-11 NOTE — PROGRESS NOTE ADULT - PROBLEM SELECTOR PLAN 1
tele monitor, ekg  in setting of chong and ACEI (was taking aleve at home for back pain for about a week)  hyperkalemia today 6.5, given albuterol/d50/insulin, repeat bmp   ivf  ml/h x1 L , and change lasix to iv lasix 20 mg x1  hold lisinopril  nephrology consult appreciated, if persistent severe hyperkalemia may have to dialyze  check renal US, urine serologies

## 2019-08-11 NOTE — CONSULT NOTE ADULT - ASSESSMENT
84F with HTN, DM, COPD on home O2, diastolic HF, and hypothyroidism presents for evaluation of hyperkalemia. New EKG changes noted post correction although patient is asymptomatic with negative hsT.       D/W Dr. Gregory, cardio fellow: patient is asymptomatic, no clear indication for ACS. May be related to aggressive K repletion. Would continue to monitor and check ECHO.
Patient with hyperkalemia in the setting of CKD, NSAIDs, and ACE-i use

## 2019-08-11 NOTE — PROGRESS NOTE ADULT - PROBLEM SELECTOR PLAN 3
pelvic xray and Lumbar spine xray as above  Noted age indeterminate focal concave contour depression of left lateral L2   superior endplate margin  pain control as needed

## 2019-08-12 DIAGNOSIS — N17.9 ACUTE KIDNEY FAILURE, UNSPECIFIED: ICD-10-CM

## 2019-08-12 LAB
ANION GAP SERPL CALC-SCNC: 11 MMO/L — SIGNIFICANT CHANGE UP (ref 7–14)
BASOPHILS # BLD AUTO: 0.03 K/UL — SIGNIFICANT CHANGE UP (ref 0–0.2)
BASOPHILS NFR BLD AUTO: 0.4 % — SIGNIFICANT CHANGE UP (ref 0–2)
BUN SERPL-MCNC: 57 MG/DL — HIGH (ref 7–23)
BUN SERPL-MCNC: 59 MG/DL — HIGH (ref 7–23)
CALCIUM SERPL-MCNC: 8.7 MG/DL — SIGNIFICANT CHANGE UP (ref 8.4–10.5)
CHLORIDE SERPL-SCNC: 102 MMOL/L — SIGNIFICANT CHANGE UP (ref 98–107)
CO2 SERPL-SCNC: 27 MMOL/L — SIGNIFICANT CHANGE UP (ref 22–31)
CREAT SERPL-MCNC: 1.53 MG/DL — HIGH (ref 0.5–1.3)
CREAT SERPL-MCNC: 1.69 MG/DL — HIGH (ref 0.5–1.3)
EOSINOPHIL # BLD AUTO: 0.19 K/UL — SIGNIFICANT CHANGE UP (ref 0–0.5)
EOSINOPHIL NFR BLD AUTO: 2.8 % — SIGNIFICANT CHANGE UP (ref 0–6)
GLUCOSE BLDC GLUCOMTR-MCNC: 108 MG/DL — HIGH (ref 70–99)
GLUCOSE BLDC GLUCOMTR-MCNC: 138 MG/DL — HIGH (ref 70–99)
GLUCOSE BLDC GLUCOMTR-MCNC: 149 MG/DL — HIGH (ref 70–99)
GLUCOSE SERPL-MCNC: 125 MG/DL — HIGH (ref 70–99)
HCT VFR BLD CALC: 38.8 % — SIGNIFICANT CHANGE UP (ref 34.5–45)
HGB BLD-MCNC: 12 G/DL — SIGNIFICANT CHANGE UP (ref 11.5–15.5)
IMM GRANULOCYTES NFR BLD AUTO: 0.3 % — SIGNIFICANT CHANGE UP (ref 0–1.5)
LYMPHOCYTES # BLD AUTO: 2.4 K/UL — SIGNIFICANT CHANGE UP (ref 1–3.3)
LYMPHOCYTES # BLD AUTO: 35.6 % — SIGNIFICANT CHANGE UP (ref 13–44)
MAGNESIUM SERPL-MCNC: 1.9 MG/DL — SIGNIFICANT CHANGE UP (ref 1.6–2.6)
MCHC RBC-ENTMCNC: 30.9 % — LOW (ref 32–36)
MCHC RBC-ENTMCNC: 31.3 PG — SIGNIFICANT CHANGE UP (ref 27–34)
MCV RBC AUTO: 101 FL — HIGH (ref 80–100)
MONOCYTES # BLD AUTO: 0.73 K/UL — SIGNIFICANT CHANGE UP (ref 0–0.9)
MONOCYTES NFR BLD AUTO: 10.8 % — SIGNIFICANT CHANGE UP (ref 2–14)
NEUTROPHILS # BLD AUTO: 3.38 K/UL — SIGNIFICANT CHANGE UP (ref 1.8–7.4)
NEUTROPHILS NFR BLD AUTO: 50.1 % — SIGNIFICANT CHANGE UP (ref 43–77)
NRBC # FLD: 0 K/UL — SIGNIFICANT CHANGE UP (ref 0–0)
PHOSPHATE SERPL-MCNC: 3.8 MG/DL — SIGNIFICANT CHANGE UP (ref 2.5–4.5)
PLATELET # BLD AUTO: 250 K/UL — SIGNIFICANT CHANGE UP (ref 150–400)
PMV BLD: 9.3 FL — SIGNIFICANT CHANGE UP (ref 7–13)
POTASSIUM SERPL-MCNC: 5.1 MMOL/L — SIGNIFICANT CHANGE UP (ref 3.5–5.3)
POTASSIUM SERPL-MCNC: 5.4 MMOL/L — HIGH (ref 3.5–5.3)
POTASSIUM SERPL-SCNC: 5.1 MMOL/L — SIGNIFICANT CHANGE UP (ref 3.5–5.3)
POTASSIUM SERPL-SCNC: 5.4 MMOL/L — HIGH (ref 3.5–5.3)
RBC # BLD: 3.84 M/UL — SIGNIFICANT CHANGE UP (ref 3.8–5.2)
RBC # FLD: 12.1 % — SIGNIFICANT CHANGE UP (ref 10.3–14.5)
SODIUM SERPL-SCNC: 140 MMOL/L — SIGNIFICANT CHANGE UP (ref 135–145)
T4 FREE SERPL-MCNC: 1.12 NG/DL — SIGNIFICANT CHANGE UP (ref 0.9–1.8)
WBC # BLD: 6.75 K/UL — SIGNIFICANT CHANGE UP (ref 3.8–10.5)
WBC # FLD AUTO: 6.75 K/UL — SIGNIFICANT CHANGE UP (ref 3.8–10.5)

## 2019-08-12 PROCEDURE — 99233 SBSQ HOSP IP/OBS HIGH 50: CPT | Mod: GC

## 2019-08-12 PROCEDURE — 93306 TTE W/DOPPLER COMPLETE: CPT | Mod: 26

## 2019-08-12 RX ORDER — ALBUTEROL 90 UG/1
2 AEROSOL, METERED ORAL EVERY 4 HOURS
Refills: 0 | Status: DISCONTINUED | OUTPATIENT
Start: 2019-08-12 | End: 2019-08-13

## 2019-08-12 RX ORDER — TIOTROPIUM BROMIDE 18 UG/1
1 CAPSULE ORAL; RESPIRATORY (INHALATION) DAILY
Refills: 0 | Status: DISCONTINUED | OUTPATIENT
Start: 2019-08-12 | End: 2019-08-13

## 2019-08-12 RX ADMIN — POLYETHYLENE GLYCOL 3350 17 GRAM(S): 17 POWDER, FOR SOLUTION ORAL at 13:41

## 2019-08-12 RX ADMIN — BUDESONIDE AND FORMOTEROL FUMARATE DIHYDRATE 2 PUFF(S): 160; 4.5 AEROSOL RESPIRATORY (INHALATION) at 13:00

## 2019-08-12 RX ADMIN — Medication 100 MILLIGRAM(S): at 22:13

## 2019-08-12 RX ADMIN — HEPARIN SODIUM 5000 UNIT(S): 5000 INJECTION INTRAVENOUS; SUBCUTANEOUS at 22:13

## 2019-08-12 RX ADMIN — Medication 100 MILLIGRAM(S): at 13:41

## 2019-08-12 RX ADMIN — BUDESONIDE AND FORMOTEROL FUMARATE DIHYDRATE 2 PUFF(S): 160; 4.5 AEROSOL RESPIRATORY (INHALATION) at 22:13

## 2019-08-12 RX ADMIN — Medication 100 MICROGRAM(S): at 05:40

## 2019-08-12 RX ADMIN — HEPARIN SODIUM 5000 UNIT(S): 5000 INJECTION INTRAVENOUS; SUBCUTANEOUS at 05:40

## 2019-08-12 RX ADMIN — Medication 100 MILLIGRAM(S): at 05:40

## 2019-08-12 RX ADMIN — HEPARIN SODIUM 5000 UNIT(S): 5000 INJECTION INTRAVENOUS; SUBCUTANEOUS at 13:41

## 2019-08-12 RX ADMIN — SENNA PLUS 2 TABLET(S): 8.6 TABLET ORAL at 22:13

## 2019-08-12 NOTE — PROGRESS NOTE ADULT - PROBLEM SELECTOR PLAN 1
- Continue Tele monitoring for now until K normalizes  - K today 5.4 and general trend is improved  - Continue to hold lisinopril   - Baseline Cr 1.4-1.5 today is 1.6  - Will repeat K, Cr, and BUN at 2pm if rising will treat with standing IV Lasix  - Nephrology consult appreciated, if persistent severe hyperkalemia may have to dialyze and discussed with patient but will continue with medical mgt at this time.   - Renal US w/out obstructive component

## 2019-08-12 NOTE — PROGRESS NOTE ADULT - PROBLEM SELECTOR PLAN 7
- Will continue to monitor and will consider standing lasix given K+  - Holding ACE-i given hyperkalemia

## 2019-08-12 NOTE — PROGRESS NOTE ADULT - ASSESSMENT
85 y/o female, with dCHF, COPD on home O2, Cholecystectomy, CKD, DM2, HTN, Hypothyroidism, presented to the [facility] ED with hyperkalemia on outpatient labs and pain to lower back region s/p fall 2 weeks ago. Admitted to telemetry for EKG changes and for Hyperkalemia likely in the setting of NSAID use and ACE-i currently with some improvement.

## 2019-08-12 NOTE — PROGRESS NOTE ADULT - SUBJECTIVE AND OBJECTIVE BOX
Patient is a 84y old  Female who presents with a chief complaint of Hyperkalemia/Lower back pain (11 Aug 2019 11:12)      INTERVAL HPI/OVERNIGHT EVENTS:  Patient with no acute events over night. Yesterday patient received 20mg IV Lasix and 30g of Kayexalate once. Denies BMs. Pt states that she is urinating but it is not a lot. Pt denies fevers, abdominal pain, nausea, vomiting, Tele normal. Patient went down this AM for TTE.     T(C): 36.9 (08-12-19 @ 05:18), Max: 36.9 (08-12-19 @ 05:18)  HR: 67 (08-12-19 @ 05:18) (64 - 71)  BP: 112/61 (08-12-19 @ 05:18) (107/60 - 128/80)  RR: 17 (08-12-19 @ 05:18) (17 - 17)  SpO2: 96% (08-12-19 @ 05:18) (96% - 99%)    PHYSICAL EXAM:  GENERAL: NAD, well-developed  HEAD:  Atraumatic, Normocephalic  EYES: EOMI, PERRLA, conjunctiva and sclera clear  NECK: Supple, No JVD  CHEST/LUNG: Clear to auscultation bilaterally; No wheeze  HEART: Regular rate and rhythm; No murmurs, rubs, or gallops  ABDOMEN: Soft, Nontender, Nondistended; Bowel sounds present  EXTREMITIES:  2+ Peripheral Pulses, No clubbing, cyanosis, or edema  PSYCH: AAOx3  NEUROLOGY: non-focal  SKIN: No rashes or lesions    PAST MEDICAL & SURGICAL HISTORY:  Chronic kidney disease (CKD)  COPD (chronic obstructive pulmonary disease)  HTN (hypertension)  Hypothyroid  DM (diabetes mellitus)  Leg fracture: right ORIF with hardware 2010  H/O cataract: bilateral 2013  S/P cholecystectomy    MEDICATIONS  (STANDING):  buDESOnide 160 MICROgram(s)/formoterol 4.5 MICROgram(s) Inhaler 2 Puff(s) Inhalation two times a day  dextrose 5%. 1000 milliLiter(s) (50 mL/Hr) IV Continuous <Continuous>  dextrose 50% Injectable 12.5 Gram(s) IV Push once  dextrose 50% Injectable 25 Gram(s) IV Push once  dextrose 50% Injectable 25 Gram(s) IV Push once  docusate sodium 100 milliGRAM(s) Oral three times a day  heparin  Injectable 5000 Unit(s) SubCutaneous every 8 hours  insulin lispro (HumaLOG) corrective regimen sliding scale   SubCutaneous three times a day before meals  insulin lispro (HumaLOG) corrective regimen sliding scale   SubCutaneous at bedtime  levothyroxine 100 MICROGram(s) Oral daily  polyethylene glycol 3350 17 Gram(s) Oral daily  senna 2 Tablet(s) Oral at bedtime    MEDICATIONS  (PRN):  acetaminophen   Tablet .. 650 milliGRAM(s) Oral every 6 hours PRN Temp greater or equal to 38C (100.4F), Mild Pain (1 - 3), Moderate Pain (4 - 6)  ALBUTerol/ipratropium for Nebulization 3 milliLiter(s) Nebulizer every 6 hours PRN Shortness of Breath and/or Wheezing  dextrose 40% Gel 15 Gram(s) Oral once PRN Blood Glucose LESS THAN 70 milliGRAM(s)/deciliter  glucagon  Injectable 1 milliGRAM(s) IntraMuscular once PRN Glucose LESS THAN 70 milligrams/deciliter    REVIEW OF SYSTEMS:  ROS: Denies fevers, chills, CP, Abdominal pain, rhinorrhea, new rashes, new LE edema, new visual changes, confusion, headaches, blood in stools, N/V, dysuria, and hematuria.     LABS:                        12.0   6.75  )-----------( 250      ( 12 Aug 2019 05:30 )             38.8     08-12    140  |  102  |  59<H>  ----------------------------<  125<H>  5.4<H>   |  27  |  1.69<H>    Ca    8.7      12 Aug 2019 05:30  Phos  3.8     08-12  Mg     1.9     08-12    TPro  7.5  /  Alb  4.1  /  TBili  < 0.2<L>  /  DBili  x   /  AST  13  /  ALT  8   /  AlkPhos  129<H>  08-10    LIVER FUNCTIONS - ( 10 Aug 2019 12:00 )  Alb: 4.1 g/dL / Pro: 7.5 g/dL / ALK PHOS: 129 u/L / ALT: 8 u/L / AST: 13 u/L / GGT: x     / T. Bili < 0.2 mg/dL / D. Bili x         CAPILLARY BLOOD GLUCOSE  POCT Blood Glucose.: 123 mg/dL (12 Aug 2019 07:47)  POCT Blood Glucose.: 114 mg/dL (11 Aug 2019 22:12)  POCT Blood Glucose.: 102 mg/dL (11 Aug 2019 17:16)  POCT Blood Glucose.: 164 mg/dL (11 Aug 2019 12:00)    RADIOLOGY & ADDITIONAL TESTS:  Ultrasound Renal - No hydro  Duplex US - No DVT    Imaging Personally Reviewed:  [x] YES  [ ] NO    Consultant(s) Notes Reviewed:  [x] YES  [ ] NO - Nephrology    Care Discussed with Consultants/Other Providers [x] YES  [ ] NO - Nephrology Patient is a 84y old  Female who presents with a chief complaint of Hyperkalemia/Lower back pain (11 Aug 2019 11:12)    INTERVAL HPI/OVERNIGHT EVENTS:  Patient with no acute events over night. Yesterday patient received 20mg IV Lasix and 30g of Kayexalate once. Denies BMs. Pt states that she is urinating but it is not a lot. Pt denies fevers, abdominal pain, nausea, vomiting, Tele normal. Patient went down this AM for TTE.     T(C): 36.9 (08-12-19 @ 05:18), Max: 36.9 (08-12-19 @ 05:18)  HR: 67 (08-12-19 @ 05:18) (64 - 71)  BP: 112/61 (08-12-19 @ 05:18) (107/60 - 128/80)  RR: 17 (08-12-19 @ 05:18) (17 - 17)  SpO2: 96% (08-12-19 @ 05:18) (96% - 99%)    PHYSICAL EXAM:  GENERAL: NAD, well-developed  HEAD:  Atraumatic, Normocephalic  EYES: EOMI, PERRLA, conjunctiva and sclera clear  NECK: Supple, No JVD  CHEST/LUNG: Clear to auscultation bilaterally; No wheeze  HEART: Regular rate and rhythm; No murmurs, rubs, or gallops  ABDOMEN: Soft, Nontender, Nondistended; Bowel sounds present  EXTREMITIES:  2+ Peripheral Pulses, No clubbing, cyanosis, or edema  PSYCH: AAOx3  NEUROLOGY: non-focal  SKIN: No rashes or lesions    PAST MEDICAL & SURGICAL HISTORY:  Chronic kidney disease (CKD)  COPD (chronic obstructive pulmonary disease)  HTN (hypertension)  Hypothyroid  DM (diabetes mellitus)  Leg fracture: right ORIF with hardware 2010  H/O cataract: bilateral 2013  S/P cholecystectomy    MEDICATIONS  (STANDING):  buDESOnide 160 MICROgram(s)/formoterol 4.5 MICROgram(s) Inhaler 2 Puff(s) Inhalation two times a day  dextrose 5%. 1000 milliLiter(s) (50 mL/Hr) IV Continuous <Continuous>  dextrose 50% Injectable 12.5 Gram(s) IV Push once  dextrose 50% Injectable 25 Gram(s) IV Push once  dextrose 50% Injectable 25 Gram(s) IV Push once  docusate sodium 100 milliGRAM(s) Oral three times a day  heparin  Injectable 5000 Unit(s) SubCutaneous every 8 hours  insulin lispro (HumaLOG) corrective regimen sliding scale   SubCutaneous three times a day before meals  insulin lispro (HumaLOG) corrective regimen sliding scale   SubCutaneous at bedtime  levothyroxine 100 MICROGram(s) Oral daily  polyethylene glycol 3350 17 Gram(s) Oral daily  senna 2 Tablet(s) Oral at bedtime    MEDICATIONS  (PRN):  acetaminophen   Tablet .. 650 milliGRAM(s) Oral every 6 hours PRN Temp greater or equal to 38C (100.4F), Mild Pain (1 - 3), Moderate Pain (4 - 6)  ALBUTerol/ipratropium for Nebulization 3 milliLiter(s) Nebulizer every 6 hours PRN Shortness of Breath and/or Wheezing  dextrose 40% Gel 15 Gram(s) Oral once PRN Blood Glucose LESS THAN 70 milliGRAM(s)/deciliter  glucagon  Injectable 1 milliGRAM(s) IntraMuscular once PRN Glucose LESS THAN 70 milligrams/deciliter    REVIEW OF SYSTEMS:  ROS: Denies fevers, chills, CP, Abdominal pain, rhinorrhea, new rashes, new LE edema, new visual changes, confusion, headaches, blood in stools, N/V, dysuria, and hematuria.     LABS:                        12.0   6.75  )-----------( 250      ( 12 Aug 2019 05:30 )             38.8     08-12    140  |  102  |  59<H>  ----------------------------<  125<H>  5.4<H>   |  27  |  1.69<H>    Ca    8.7      12 Aug 2019 05:30  Phos  3.8     08-12  Mg     1.9     08-12    TPro  7.5  /  Alb  4.1  /  TBili  < 0.2<L>  /  DBili  x   /  AST  13  /  ALT  8   /  AlkPhos  129<H>  08-10    LIVER FUNCTIONS - ( 10 Aug 2019 12:00 )  Alb: 4.1 g/dL / Pro: 7.5 g/dL / ALK PHOS: 129 u/L / ALT: 8 u/L / AST: 13 u/L / GGT: x     / T. Bili < 0.2 mg/dL / D. Bili x         CAPILLARY BLOOD GLUCOSE  POCT Blood Glucose.: 123 mg/dL (12 Aug 2019 07:47)  POCT Blood Glucose.: 114 mg/dL (11 Aug 2019 22:12)  POCT Blood Glucose.: 102 mg/dL (11 Aug 2019 17:16)  POCT Blood Glucose.: 164 mg/dL (11 Aug 2019 12:00)    RADIOLOGY & ADDITIONAL TESTS:  Ultrasound Renal - No hydro  Duplex US - No DVT    Imaging Personally Reviewed:  [x] YES  [ ] NO    Consultant(s) Notes Reviewed:  [x] YES  [ ] NO - Nephrology    Care Discussed with Consultants/Other Providers [x] YES  [ ] NO - Nephrology

## 2019-08-12 NOTE — PROGRESS NOTE ADULT - PROBLEM SELECTOR PLAN 10
- c/w HSQ tid for DVT ppx  - Fall precautions  - PT = No needs and will plan dispo once K+ normalizes - c/w HSQ tid for DVT ppx  - Fall precautions  - PT = No needs and will plan dispo once K+ normalizes    -Iker Monte PGY5 EMIM Pager#[phone]

## 2019-08-12 NOTE — PROGRESS NOTE ADULT - PROBLEM SELECTOR PLAN 1
Patient with hyperkalemia in the setting of CKD, NSAIDs, and ACE-i.  Shilpa CHAU reviewed, patient had previous episode of hyperkalemia of 7.4 on 8/6/2019. [employer] reviewed, on 8/9/2019 showed serum potassium of 7.3. Patient arrived to  ER on 8/10/2019, serum potassium was 7.6 with TWI inversions on EKG. Patient received Kayexalate, Calcium gluconate, Insulin and D50 in the ER. Current serum K: 5.4. Continue medical management. Recommend to repeat Serum K this afternoon. Low potassium diet.

## 2019-08-12 NOTE — PROGRESS NOTE ADULT - PROBLEM SELECTOR PLAN 2
Patient with Scr: 1.69 today with her lowest SCr: 1.16 on August 2018.  SCr: 1.75 on admission on 8/10/19. Patient with good urine output. She likely has JANELLE on CKD in the setting of drug use NSAIDs and ACE-I. Continue to trend renal function. Avoid giving further nephrotoxic medications.

## 2019-08-12 NOTE — PROGRESS NOTE ADULT - PROBLEM SELECTOR PLAN 4
- Pt with chong (baseline Cr 1.4-1.5) in setting of nsaid use (was taking aleve at home for a week for back pain)  - Will recheck Cr, BUN, and K+ at 2pm  - Continue monitor bun/cr, avoid nephrotoxic agents  - Continue to Hold ACE-i (lisinopril) for now

## 2019-08-12 NOTE — PROGRESS NOTE ADULT - PROBLEM SELECTOR PLAN 2
- Marva Tele until K normalized - kristal can d/c tele tomorrow  - Will f/u Echo today  - low suspicion for ACS given trop 21->18

## 2019-08-12 NOTE — PROGRESS NOTE ADULT - SUBJECTIVE AND OBJECTIVE BOX
[facility] Division of Kidney Diseases & Hypertension  FOLLOW UP NOTE  [phone]--------------------------------------------------------------------------------  Chief Complaint:Hyperkalemia      HPI: 85 y/o female, with COPD on intermittent 3L home O2 as needed (currently not on O2), Cholecystectomy, CKD, DM2, HTN, Hypothyroidism, presented to the [facility] ED with hyperkalemia on outpatient labs. Nephrology consulted for hyperkalemia. [facility], prior to admission patient had previous episode of hyperkalemia of 7.4 on 8/6/2019. [employer] reviewed, on 8/9/2019 showed serum potassium of 7.3. Patient was advised to go to the ER for further evaluation. On arrival in the ER, serum potassium was 7.6 with TWI inversions on EKG. Patient received Kayexalate, Calcium gluconate, Insulin and D50 in the ER. Repeat Potassium was 6.1 which down trended to 5.5 however with repeat serum potassium is 6.5. Patient reports that she had a fall about two weeks ago and since then has been taking Aleve 2 tablets TID. Patient also reports recently being started on Lisinopril by PMD for renoprotection from DM. Patient having previous history of hyperkalemia.     Patient seen this morning. Labs and charts reviewed. Patient with Scr: 1.69 today with her lowest SCr: 1.16 on August 2018.  SCr: 1.75 on admission on 8/10/19. Her current Srum K; 5.4 today. Patient reports that she does not feel sick. No subjective complaints. Still with good urine output. Reports no bowel movement since yesterday.         PAST HISTORY  --------------------------------------------------------------------------------  No significant changes to PMH, PSH, FHx, SHx, unless otherwise noted    ALLERGIES & MEDICATIONS  --------------------------------------------------------------------------------  Allergies    No Known Allergies    Intolerances      Standing Inpatient Medications  buDESOnide 160 MICROgram(s)/formoterol 4.5 MICROgram(s) Inhaler 2 Puff(s) Inhalation two times a day  dextrose 5%. 1000 milliLiter(s) IV Continuous <Continuous>  dextrose 50% Injectable 12.5 Gram(s) IV Push once  dextrose 50% Injectable 25 Gram(s) IV Push once  dextrose 50% Injectable 25 Gram(s) IV Push once  docusate sodium 100 milliGRAM(s) Oral three times a day  heparin  Injectable 5000 Unit(s) SubCutaneous every 8 hours  insulin lispro (HumaLOG) corrective regimen sliding scale   SubCutaneous three times a day before meals  insulin lispro (HumaLOG) corrective regimen sliding scale   SubCutaneous at bedtime  levothyroxine 100 MICROGram(s) Oral daily  polyethylene glycol 3350 17 Gram(s) Oral daily  senna 2 Tablet(s) Oral at bedtime  tiotropium 18 MICROgram(s) Capsule 1 Capsule(s) Inhalation daily    PRN Inpatient Medications  acetaminophen   Tablet .. 650 milliGRAM(s) Oral every 6 hours PRN  ALBUTerol    90 MICROgram(s) HFA Inhaler 2 Puff(s) Inhalation every 4 hours PRN  dextrose 40% Gel 15 Gram(s) Oral once PRN  glucagon  Injectable 1 milliGRAM(s) IntraMuscular once PRN      REVIEW OF SYSTEMS  --------------------------------------------------------------------------------  Gen: No fevers/chills  Skin: No rashes  Respiratory: No dyspnea, cough  CV: No chest pain  GI: No abdominal pain, diarrhea  : No dysuria, hematuria  MSK: No  edema  Heme: No easy bruising or bleeding  Psych: No significant depression    All other systems were reviewed and are negative, except as noted.    VITALS/PHYSICAL EXAM  --------------------------------------------------------------------------------  T(C): 36.9 (08-12-19 @ 05:18), Max: 36.9 (08-12-19 @ 05:18)  HR: 67 (08-12-19 @ 05:18) (64 - 71)  BP: 112/61 (08-12-19 @ 05:18) (107/60 - 128/80)  RR: 17 (08-12-19 @ 05:18) (17 - 17)  SpO2: 96% (08-12-19 @ 05:18) (96% - 99%)  Wt(kg): --  Height (cm): 162.56 (08-10-19 @ 17:38)  Weight (kg): 67.6 (08-10-19 @ 17:38)  BMI (kg/m2): 25.6 (08-10-19 @ 17:38)  BSA (m2): 1.73 (08-10-19 @ 17:38)      Physical Exam:  	Gen: NAD  	HEENT: MMM  	Pulm: CTA B/L  	CV: S1S2  	Abd: Soft, +BS   	Ext: No LE edema B/L  	Neuro: Awake  	Skin: Warm and dry  	    LABS/STUDIES  --------------------------------------------------------------------------------              12.0   6.75  >-----------<  250      [08-12-19 @ 05:30]              38.8     140  |  102  |  59  ----------------------------<  125      [08-12-19 @ 05:30]  5.4   |  27  |  1.69        Ca     8.7     [08-12-19 @ 05:30]      Mg     1.9     [08-12-19 @ 05:30]      Phos  3.8     [08-12-19 @ 05:30]          Serum Osmolality 316      [08-11-19 @ 11:07]    Creatinine Trend:  SCr 1.69 [08-12 @ 05:30]  SCr 1.46 [08-11 @ 11:07]  SCr 1.51 [08-11 @ 05:40]  SCr 1.75 [08-10 @ 20:20]  SCr 1.75 [08-10 @ 12:00]      Urine Sodium 115      [08-11-19 @ 14:10]  Urine Potassium 26.2      [08-11-19 @ 14:10]  Urine Chloride 112      [08-11-19 @ 14:10]  Urine Osmolality 434      [08-11-19 @ 14:10]    HbA1c 6.9      [08-11-19 @ 05:40]  TSH 7.78      [08-11-19 @ 05:40]  Lipid: chol 170, , HDL 39,       [08-11-19 @ 05:40]

## 2019-08-13 ENCOUNTER — TRANSCRIPTION ENCOUNTER (OUTPATIENT)
Age: 84
End: 2019-08-13

## 2019-08-13 VITALS
RESPIRATION RATE: 18 BRPM | TEMPERATURE: 98 F | DIASTOLIC BLOOD PRESSURE: 48 MMHG | HEART RATE: 63 BPM | OXYGEN SATURATION: 94 % | SYSTOLIC BLOOD PRESSURE: 104 MMHG

## 2019-08-13 DIAGNOSIS — Z71.89 OTHER SPECIFIED COUNSELING: ICD-10-CM

## 2019-08-13 LAB
ANION GAP SERPL CALC-SCNC: 11 MMO/L — SIGNIFICANT CHANGE UP (ref 7–14)
BUN SERPL-MCNC: 53 MG/DL — HIGH (ref 7–23)
CALCIUM SERPL-MCNC: 8.8 MG/DL — SIGNIFICANT CHANGE UP (ref 8.4–10.5)
CHLORIDE SERPL-SCNC: 103 MMOL/L — SIGNIFICANT CHANGE UP (ref 98–107)
CO2 SERPL-SCNC: 27 MMOL/L — SIGNIFICANT CHANGE UP (ref 22–31)
CREAT SERPL-MCNC: 1.37 MG/DL — HIGH (ref 0.5–1.3)
GLUCOSE BLDC GLUCOMTR-MCNC: 123 MG/DL — HIGH (ref 70–99)
GLUCOSE BLDC GLUCOMTR-MCNC: 136 MG/DL — HIGH (ref 70–99)
GLUCOSE SERPL-MCNC: 126 MG/DL — HIGH (ref 70–99)
HCT VFR BLD CALC: 35 % — SIGNIFICANT CHANGE UP (ref 34.5–45)
HGB BLD-MCNC: 11.3 G/DL — LOW (ref 11.5–15.5)
MAGNESIUM SERPL-MCNC: 2 MG/DL — SIGNIFICANT CHANGE UP (ref 1.6–2.6)
MCHC RBC-ENTMCNC: 31.7 PG — SIGNIFICANT CHANGE UP (ref 27–34)
MCHC RBC-ENTMCNC: 32.3 % — SIGNIFICANT CHANGE UP (ref 32–36)
MCV RBC AUTO: 98 FL — SIGNIFICANT CHANGE UP (ref 80–100)
NRBC # FLD: 0 K/UL — SIGNIFICANT CHANGE UP (ref 0–0)
PHOSPHATE SERPL-MCNC: 3.4 MG/DL — SIGNIFICANT CHANGE UP (ref 2.5–4.5)
PLATELET # BLD AUTO: 234 K/UL — SIGNIFICANT CHANGE UP (ref 150–400)
PMV BLD: 9 FL — SIGNIFICANT CHANGE UP (ref 7–13)
POTASSIUM SERPL-MCNC: 4.7 MMOL/L — SIGNIFICANT CHANGE UP (ref 3.5–5.3)
POTASSIUM SERPL-SCNC: 4.7 MMOL/L — SIGNIFICANT CHANGE UP (ref 3.5–5.3)
RBC # BLD: 3.57 M/UL — LOW (ref 3.8–5.2)
RBC # FLD: 12 % — SIGNIFICANT CHANGE UP (ref 10.3–14.5)
SODIUM SERPL-SCNC: 141 MMOL/L — SIGNIFICANT CHANGE UP (ref 135–145)
WBC # BLD: 6.22 K/UL — SIGNIFICANT CHANGE UP (ref 3.8–10.5)
WBC # FLD AUTO: 6.22 K/UL — SIGNIFICANT CHANGE UP (ref 3.8–10.5)

## 2019-08-13 PROCEDURE — 99239 HOSP IP/OBS DSCHRG MGMT >30: CPT

## 2019-08-13 PROCEDURE — 99233 SBSQ HOSP IP/OBS HIGH 50: CPT | Mod: GC

## 2019-08-13 PROCEDURE — 99232 SBSQ HOSP IP/OBS MODERATE 35: CPT

## 2019-08-13 RX ORDER — LISINOPRIL 2.5 MG/1
1 TABLET ORAL
Qty: 0 | Refills: 0 | DISCHARGE

## 2019-08-13 RX ORDER — LACTULOSE 10 G/15ML
30 SOLUTION ORAL ONCE
Refills: 0 | Status: DISCONTINUED | OUTPATIENT
Start: 2019-08-13 | End: 2019-08-13

## 2019-08-13 RX ADMIN — BUDESONIDE AND FORMOTEROL FUMARATE DIHYDRATE 2 PUFF(S): 160; 4.5 AEROSOL RESPIRATORY (INHALATION) at 10:31

## 2019-08-13 RX ADMIN — HEPARIN SODIUM 5000 UNIT(S): 5000 INJECTION INTRAVENOUS; SUBCUTANEOUS at 13:02

## 2019-08-13 RX ADMIN — HEPARIN SODIUM 5000 UNIT(S): 5000 INJECTION INTRAVENOUS; SUBCUTANEOUS at 06:03

## 2019-08-13 RX ADMIN — Medication 100 MILLIGRAM(S): at 06:03

## 2019-08-13 RX ADMIN — Medication 100 MICROGRAM(S): at 06:03

## 2019-08-13 NOTE — DISCHARGE NOTE PROVIDER - HOSPITAL COURSE
85 y/o female, with COPD on intermittent 3L home O2 as needed (currently not on O2), Cholecystectomy, CKD, DM2, HTN, Hypothyroidism, presented to the [facility] ED with hyperkalemia on outpatient labs and pain to lower back region s/p fall 2 weeks ago. Patient was taking Aleve for pain relief. This past Tuesday went to PCP's office only for routine lab work and received a phone call from PCP that she needed to go to the hospital because her potassium was high. In the [facility] ED, she was found to be hyperkalemic with a Potassium of 7.6 received multiple cocktails to address hyperkalemia          found to have new ekg t wave inversions in anterolateral leads.         Hyperkalemia     - K today 4.7 and Cr improved    - Continue to hold lisinopril     - Baseline Cr 1.4-1.5 today is 1.3    - Renal US w/out obstructive component    - Patient educated on avoidance of Aleeve and other NSAIDs and to use tylenol for back pain             Abnormal EKG    - Echo with Eccentric hypertrophy - will likely need follow-up with cardiology as an outpatient    - low suspicion for ACS given trop 21->18.            Lower back pain.       No active issues currently and pain controlled.             Chronic kidney disease (CKD).      Pt with JANELLE (baseline Cr 1.4-1.5) in setting of NSIAD use (was taking aleve at home for a week for back pain)    - Continue monitor bun/cr, avoid nephrotoxic agents    - Continue to Hold ACE-i (lisinopril) for now and will need to follow-up as an outpatient on restarting possibly at a lower dose.        DM      diabetic diet and metformin as outpatient             Hypothyroid     continue levothyroxine 100mcg daily    Will need repeat thyroid function tests in 4-6 weeks.            HTN     Holding ACE-i given hyperkalemia and follow-up on restarting as an outpatient.        COPD     Continue home medications and home O2 as needed         Lower extremity edema    LE dopplers negative for DVT

## 2019-08-13 NOTE — PROGRESS NOTE ADULT - PROBLEM SELECTOR PLAN 7
- Will continue to monitor and will consider standing lasix given K+  - Holding ACE-i given hyperkalemia - Will continue to monitor and will consider standing lasix given K+  - Holding ACE-i given hyperkalemia and follow-up on restarting as an outpatient

## 2019-08-13 NOTE — DIETITIAN INITIAL EVALUATION ADULT. - ADD RECOMMEND
1. Encourage & assist Pt with meals; Monitor PO diet tolerance; Honor food preferences;           2. Recommend: Nephro-radha 1 cap daily;           3. Monitor labs, hydration status;

## 2019-08-13 NOTE — DIETITIAN INITIAL EVALUATION ADULT. - OTHER INFO
Pt 83 yo female with CHF, COPD on home O2, Cholecystectomy, CKD, DM2, HTN, Hypothyroidism - per chart review. At time of visit Pt appears alert, oriented. Per Pt her appetite good now; No chew/swallow problem voiced; no nausea/vomiting/diarrhea reported @ present. Pt also stated her height: ~64" & her weight: ~149# -> has been stable. At home Pt tries to avoid salt, but likes to eat Deli meats; at times Pt eats food items/drinks beverages prepared with regular sugar reported. Of note Pt's HbA1c level 6.9% (8/11). Pt's diet rx includes Renal Replacement (no prot restr, no conc K, no conc phos, low sodium). Better food options discussed; RDN answered questions/concerns related to diet. Written materials on diet also provided. Pt verbalized understanding. RDN remains available, Pt made aware.

## 2019-08-13 NOTE — DIETITIAN INITIAL EVALUATION ADULT. - PERTINENT LABORATORY DATA
(8/13) Hbg 11.3 L, BUN 53 H, Creat 1.37 H, Glu 126 H;        (8/11) HDL 39 L, HbA1c 6.9% H;            (8/10) Albumin 4.1, NSW833 H

## 2019-08-13 NOTE — PROGRESS NOTE ADULT - SUBJECTIVE AND OBJECTIVE BOX
Patient is a 84y old  Female who presents with a chief complaint of Hyperkalemia/Lower back pain (12 Aug 2019 12:06)    INTERVAL HPI/OVERNIGHT EVENTS:  No acute events over night. Patient's K 5.1-->4.7 without treatment with improved Cr. Patient eating and drinking normally, urinating normally. ECHO only with eccentric hypertrophy otherwise normal. Denies fevers, chills, shortness of breath, new LE edema. Patient states in on home o2 3L NC as needed at home but sometimes does not use it and feels fine. Patient was 88% on RA at rest.     T(C): 36.5 (08-13-19 @ 05:58), Max: 36.9 (08-12-19 @ 12:30)  HR: 61 (08-13-19 @ 05:58) (61 - 65)  BP: 101/51 (08-13-19 @ 05:58) (101/51 - 131/61)  RR: 18 (08-13-19 @ 05:58) (17 - 19)  SpO2: 96% (08-13-19 @ 05:58) (88% - 96%)    PHYSICAL EXAM:  GENERAL: Lying in bed comfortably in NAD  HEAD:  Atraumatic, Normocephalic  EYES: PERRL, EOMs intact b/l,   ENMT: Moist Mucus membranes  NERVOUS SYSTEM:  A&Ox4, Normal Motor strenght in b/l Upper and lower extremities, gross sensation to light tough intact in b/l upper and lower extremities, CNs II-XII intact b/l w/out focal defcitis, EOMs intact, and PERRL  CHEST/LUNG: CTAB no w/r/r  HEART: RRR no m/g/r  ABDOMEN: +BS, soft, NT, ND  EXTREMITIES:  +2 radial pulses b/l, no LE edema  LYMPH: No anterior/Posterior or supraclavicular LAD  SKIN: No new rashes or DTIs, warm, dry, and intact    PAST MEDICAL & SURGICAL HISTORY:  Chronic kidney disease (CKD)  COPD (chronic obstructive pulmonary disease)  HTN (hypertension)  Hypothyroid  DM (diabetes mellitus)  Leg fracture: right ORIF with hardware 2010  H/O cataract: bilateral 2013  S/P cholecystectomy    MEDICATIONS  (STANDING):  buDESOnide 160 MICROgram(s)/formoterol 4.5 MICROgram(s) Inhaler 2 Puff(s) Inhalation two times a day  dextrose 5%. 1000 milliLiter(s) (50 mL/Hr) IV Continuous <Continuous>  dextrose 50% Injectable 12.5 Gram(s) IV Push once  dextrose 50% Injectable 25 Gram(s) IV Push once  dextrose 50% Injectable 25 Gram(s) IV Push once  docusate sodium 100 milliGRAM(s) Oral three times a day  heparin  Injectable 5000 Unit(s) SubCutaneous every 8 hours  insulin lispro (HumaLOG) corrective regimen sliding scale   SubCutaneous three times a day before meals  insulin lispro (HumaLOG) corrective regimen sliding scale   SubCutaneous at bedtime  levothyroxine 100 MICROGram(s) Oral daily  polyethylene glycol 3350 17 Gram(s) Oral daily  senna 2 Tablet(s) Oral at bedtime  tiotropium 18 MICROgram(s) Capsule 1 Capsule(s) Inhalation daily    MEDICATIONS  (PRN):  acetaminophen   Tablet .. 650 milliGRAM(s) Oral every 6 hours PRN Temp greater or equal to 38C (100.4F), Mild Pain (1 - 3), Moderate Pain (4 - 6)  ALBUTerol    90 MICROgram(s) HFA Inhaler 2 Puff(s) Inhalation every 4 hours PRN Shortness of Breath  dextrose 40% Gel 15 Gram(s) Oral once PRN Blood Glucose LESS THAN 70 milliGRAM(s)/deciliter  glucagon  Injectable 1 milliGRAM(s) IntraMuscular once PRN Glucose LESS THAN 70 milligrams/deciliter    REVIEW OF SYSTEMS:  ROS: Denies fevers, chills, CP, Abdominal pain, rhinorrhea, new rashes, new LE edema, new visual changes, confusion, headaches, blood in stools, N/V, dysuria, and hematuria.     LABS:                        11.3   6.22  )-----------( 234      ( 13 Aug 2019 06:28 )             35.0     08-13    141  |  103  |  53<H>  ----------------------------<  126<H>  4.7   |  27  |  1.37<H>    Ca    8.8      13 Aug 2019 06:28  Phos  3.4     08-13  Mg     2.0     08-13    LIVER FUNCTIONS - ( 10 Aug 2019 12:00 )  Alb: 4.1 g/dL / Pro: 7.5 g/dL / ALK PHOS: 129 u/L / ALT: 8 u/L / AST: 13 u/L / GGT: x     / T. Bili < 0.2 mg/dL / D. Bili x         CAPILLARY BLOOD GLUCOSE  POCT Blood Glucose.: 136 mg/dL (13 Aug 2019 08:05)  POCT Blood Glucose.: 149 mg/dL (12 Aug 2019 21:21)  POCT Blood Glucose.: 108 mg/dL (12 Aug 2019 17:00)  POCT Blood Glucose.: 138 mg/dL (12 Aug 2019 11:54)    RADIOLOGY & ADDITIONAL TESTS:  ECHO - Eccentric Hypertrophy    Imaging Personally Reviewed:  [x] YES  [ ] NO    Consultant(s) Notes Reviewed:  [x] YES  [ ] NO - Nephrology Patient is a 84y old  Female who presents with a chief complaint of Hyperkalemia/Lower back pain (12 Aug 2019 12:06)    INTERVAL HPI/OVERNIGHT EVENTS:  No acute events over night. Patient's K 5.1-->4.7 without treatment with improved Cr. Patient eating and drinking normally, urinating normally. ECHO only with eccentric hypertrophy otherwise normal. Denies fevers, chills, shortness of breath, new LE edema. Patient states in on home o2 3L NC as needed at home but sometimes does not use it and feels fine. Patient was 88% on RA at rest. Patient just had a BM. Patient feels well enough to go home today.     T(C): 36.5 (08-13-19 @ 05:58), Max: 36.9 (08-12-19 @ 12:30)  HR: 61 (08-13-19 @ 05:58) (61 - 65)  BP: 101/51 (08-13-19 @ 05:58) (101/51 - 131/61)  RR: 18 (08-13-19 @ 05:58) (17 - 19)  SpO2: 96% (08-13-19 @ 05:58) (88% - 96%)    PHYSICAL EXAM:  GENERAL: Lying in bed comfortably in NAD  HEAD:  Atraumatic, Normocephalic  EYES: PERRL, EOMs intact b/l,   ENMT: Moist Mucus membranes  NERVOUS SYSTEM:  A&Ox4, Normal Motor strenght in b/l Upper and lower extremities, gross sensation to light tough intact in b/l upper and lower extremities, CNs II-XII intact b/l w/out focal defcitis, EOMs intact, and PERRL  CHEST/LUNG: CTAB no w/r/r  HEART: RRR no m/g/r  ABDOMEN: +BS, soft, NT, ND  EXTREMITIES:  +2 radial pulses b/l, no LE edema  LYMPH: No anterior/Posterior or supraclavicular LAD  SKIN: No new rashes or DTIs, warm, dry, and intact    PAST MEDICAL & SURGICAL HISTORY:  Chronic kidney disease (CKD)  COPD (chronic obstructive pulmonary disease)  HTN (hypertension)  Hypothyroid  DM (diabetes mellitus)  Leg fracture: right ORIF with hardware 2010  H/O cataract: bilateral 2013  S/P cholecystectomy    MEDICATIONS  (STANDING):  buDESOnide 160 MICROgram(s)/formoterol 4.5 MICROgram(s) Inhaler 2 Puff(s) Inhalation two times a day  dextrose 5%. 1000 milliLiter(s) (50 mL/Hr) IV Continuous <Continuous>  dextrose 50% Injectable 12.5 Gram(s) IV Push once  dextrose 50% Injectable 25 Gram(s) IV Push once  dextrose 50% Injectable 25 Gram(s) IV Push once  docusate sodium 100 milliGRAM(s) Oral three times a day  heparin  Injectable 5000 Unit(s) SubCutaneous every 8 hours  insulin lispro (HumaLOG) corrective regimen sliding scale   SubCutaneous three times a day before meals  insulin lispro (HumaLOG) corrective regimen sliding scale   SubCutaneous at bedtime  levothyroxine 100 MICROGram(s) Oral daily  polyethylene glycol 3350 17 Gram(s) Oral daily  senna 2 Tablet(s) Oral at bedtime  tiotropium 18 MICROgram(s) Capsule 1 Capsule(s) Inhalation daily    MEDICATIONS  (PRN):  acetaminophen   Tablet .. 650 milliGRAM(s) Oral every 6 hours PRN Temp greater or equal to 38C (100.4F), Mild Pain (1 - 3), Moderate Pain (4 - 6)  ALBUTerol    90 MICROgram(s) HFA Inhaler 2 Puff(s) Inhalation every 4 hours PRN Shortness of Breath  dextrose 40% Gel 15 Gram(s) Oral once PRN Blood Glucose LESS THAN 70 milliGRAM(s)/deciliter  glucagon  Injectable 1 milliGRAM(s) IntraMuscular once PRN Glucose LESS THAN 70 milligrams/deciliter    REVIEW OF SYSTEMS:  ROS: Denies fevers, chills, CP, Abdominal pain, rhinorrhea, new rashes, new LE edema, new visual changes, confusion, headaches, blood in stools, N/V, dysuria, and hematuria.     LABS:                        11.3   6.22  )-----------( 234      ( 13 Aug 2019 06:28 )             35.0     08-13    141  |  103  |  53<H>  ----------------------------<  126<H>  4.7   |  27  |  1.37<H>    Ca    8.8      13 Aug 2019 06:28  Phos  3.4     08-13  Mg     2.0     08-13    LIVER FUNCTIONS - ( 10 Aug 2019 12:00 )  Alb: 4.1 g/dL / Pro: 7.5 g/dL / ALK PHOS: 129 u/L / ALT: 8 u/L / AST: 13 u/L / GGT: x     / T. Bili < 0.2 mg/dL / D. Bili x         CAPILLARY BLOOD GLUCOSE  POCT Blood Glucose.: 136 mg/dL (13 Aug 2019 08:05)  POCT Blood Glucose.: 149 mg/dL (12 Aug 2019 21:21)  POCT Blood Glucose.: 108 mg/dL (12 Aug 2019 17:00)  POCT Blood Glucose.: 138 mg/dL (12 Aug 2019 11:54)    RADIOLOGY & ADDITIONAL TESTS:  ECHO - Eccentric Hypertrophy    Imaging Personally Reviewed:  [x] YES  [ ] NO    Consultant(s) Notes Reviewed:  [x] YES  [ ] NO - Nephrology

## 2019-08-13 NOTE — DISCHARGE NOTE PROVIDER - NSDCCPCAREPLAN_GEN_ALL_CORE_FT
PRINCIPAL DISCHARGE DIAGNOSIS  Diagnosis: Hyperkalemia  Assessment and Plan of Treatment: Continue to hold lisinopril.  Patient educated on avoidance of Aleeve and other NSAIDs and to use tylenol for back pain.  follow up with your pmd re restarting your lisinopril.  follow up in 1 week call for appointment         SECONDARY DISCHARGE DIAGNOSES  Diagnosis: Chronic kidney disease (CKD)  Assessment and Plan of Treatment: Continue to Hold ACE-i (lisinopril) for now and will need to follow-up as an outpatient on restarting possibly at a lower dose. follow up with your medical doctor and nephrologist    Diagnosis: Hypothyroid  Assessment and Plan of Treatment: continue levothyroxine 100mcg daily  Will need repeat thyroid function tests in 4-6 weeks. follow up with your primary care doctor       Diagnosis: DM (diabetes mellitus)  Assessment and Plan of Treatment: diabetic diet and metformin as outpatient follow up with your primary care doctor call for an appointment       Diagnosis: Hypertension  Assessment and Plan of Treatment: Holding ACE-i given hyperkalemia and follow-up on restarting as an outpatient.  follow up with your primary care doctor in 1 week call for appointment       Diagnosis: Lower back pain  Assessment and Plan of Treatment: No active issues currently and pain controlled. may take tylenol as needed for pain       Diagnosis: COPD (chronic obstructive pulmonary disease)  Assessment and Plan of Treatment: Continue home medications and home O2 as needed.  follow up with your primary care doctor and pulmonologist    Diagnosis: Lower extremity edema  Assessment and Plan of Treatment: LE dopplers negative for DVT follow up with your primary care doctor regarding restarting your lasix

## 2019-08-13 NOTE — PROGRESS NOTE ADULT - PROBLEM SELECTOR PLAN 2
- Marva Tele until K normalized - kristal can d/c tele tomorrow  - Will f/u Echo today  - low suspicion for ACS given trop 21->18 - DC Tele  - Echo with Eccentric hypertrophy - will liekly need follow-up with cardiology as an outpatient  - low suspicion for ACS given trop 21->18

## 2019-08-13 NOTE — PROGRESS NOTE ADULT - ASSESSMENT
85 y/o female, with dCHF, COPD on home O2, Cholecystectomy, CKD, DM2, HTN, Hypothyroidism, presented to the [facility] ED with hyperkalemia on outpatient labs and pain to lower back region s/p fall 2 weeks ago. Admitted to telemetry for EKG changes and for Hyperkalemia likely in the setting of NSAID use and ACE-i currently with some improvement. 85 y/o female, with dCHF, COPD on home O2, Cholecystectomy, CKD, DM2, HTN, Hypothyroidism, presented to the [facility] ED with hyperkalemia on outpatient labs and pain to lower back region s/p fall 2 weeks ago. Admitted to telemetry for EKG changes and for Hyperkalemia likely in the setting of NSAID use and ACE-i currently resolved and doing well.

## 2019-08-13 NOTE — PROGRESS NOTE ADULT - SUBJECTIVE AND OBJECTIVE BOX
[facility] Division of Kidney Diseases & Hypertension  FOLLOW UP NOTE  [phone]--------------------------------------------------------------------------------  Chief Complaint:Hyperkalemia        Patient seen this morning. Labs and charts reviewed. Patient with Scr: 1.37 today with her lowest SCr: 1.16 on August 2018.  SCr: 1.75 on admission on 8/10/19. Her current Srum K; 4.7 today. No subjective complaints. Still with good urine output.       PAST HISTORY  --------------------------------------------------------------------------------  No significant changes to PMH, PSH, FHx, SHx, unless otherwise noted    ALLERGIES & MEDICATIONS  --------------------------------------------------------------------------------  Allergies    No Known Allergies    Intolerances      Standing Inpatient Medications  buDESOnide 160 MICROgram(s)/formoterol 4.5 MICROgram(s) Inhaler 2 Puff(s) Inhalation two times a day  dextrose 5%. 1000 milliLiter(s) IV Continuous <Continuous>  dextrose 50% Injectable 12.5 Gram(s) IV Push once  dextrose 50% Injectable 25 Gram(s) IV Push once  dextrose 50% Injectable 25 Gram(s) IV Push once  docusate sodium 100 milliGRAM(s) Oral three times a day  heparin  Injectable 5000 Unit(s) SubCutaneous every 8 hours  insulin lispro (HumaLOG) corrective regimen sliding scale   SubCutaneous three times a day before meals  insulin lispro (HumaLOG) corrective regimen sliding scale   SubCutaneous at bedtime  levothyroxine 100 MICROGram(s) Oral daily  polyethylene glycol 3350 17 Gram(s) Oral daily  senna 2 Tablet(s) Oral at bedtime  tiotropium 18 MICROgram(s) Capsule 1 Capsule(s) Inhalation daily    PRN Inpatient Medications  acetaminophen   Tablet .. 650 milliGRAM(s) Oral every 6 hours PRN  ALBUTerol    90 MICROgram(s) HFA Inhaler 2 Puff(s) Inhalation every 4 hours PRN  dextrose 40% Gel 15 Gram(s) Oral once PRN  glucagon  Injectable 1 milliGRAM(s) IntraMuscular once PRN      REVIEW OF SYSTEMS  --------------------------------------------------------------------------------  Gen: No fevers/chills  Skin: No rashes  Respiratory: No dyspnea, cough  CV: No chest pain  GI: No abdominal pain, diarrhea  : No dysuria, hematuria  MSK: No  edema  Heme: No easy bruising or bleeding  Psych: No significant depression    All other systems were reviewed and are negative, except as noted.    VITALS/PHYSICAL EXAM  --------------------------------------------------------------------------------  T(C): 36.5 (08-13-19 @ 05:58), Max: 36.9 (08-12-19 @ 12:30)  HR: 61 (08-13-19 @ 05:58) (61 - 65)  BP: 101/51 (08-13-19 @ 05:58) (101/51 - 131/61)  RR: 18 (08-13-19 @ 05:58) (17 - 19)  SpO2: 96% (08-13-19 @ 05:58) (88% - 96%)  Wt(kg): --        Physical Exam:  	Gen: NAD  	HEENT: MMM  	Pulm: CTA B/L  	CV: S1S2  	Abd: Soft, +BS   	Ext: No LE edema B/L  	Neuro: Awake  	Skin: Warm and dry    LABS/STUDIES  --------------------------------------------------------------------------------              11.3   6.22  >-----------<  234      [08-13-19 @ 06:28]              35.0     141  |  103  |  53  ----------------------------<  126      [08-13-19 @ 06:28]  4.7   |  27  |  1.37        Ca     8.8     [08-13-19 @ 06:28]      Mg     2.0     [08-13-19 @ 06:28]      Phos  3.4     [08-13-19 @ 06:28]            Creatinine Trend:  SCr 1.37 [08-13 @ 06:28]  SCr 1.53 [08-12 @ 15:00]  SCr 1.69 [08-12 @ 05:30]  SCr 1.46 [08-11 @ 11:07]  SCr 1.51 [08-11 @ 05:40]      Urine Sodium 115      [08-11-19 @ 14:10]  Urine Potassium 26.2      [08-11-19 @ 14:10]  Urine Chloride 112      [08-11-19 @ 14:10]  Urine Osmolality 434      [08-11-19 @ 14:10]    HbA1c 6.9      [08-11-19 @ 05:40]  TSH 7.78      [08-11-19 @ 05:40]  Lipid: chol 170, , HDL 39,       [08-11-19 @ 05:40]

## 2019-08-13 NOTE — DISCHARGE NOTE PROVIDER - CARE PROVIDER_API CALL
Jaguar Cheek (MD)  Medicine  [address] [place] [address]  Phone: [phone]  Fax: [phone]  Follow Up Time:

## 2019-08-13 NOTE — PROGRESS NOTE ADULT - REASON FOR ADMISSION
Hyperkalemia/Lower back pain

## 2019-08-13 NOTE — DISCHARGE NOTE NURSING/CASE MANAGEMENT/SOCIAL WORK - NSDCDPATPORTLINK_GEN_ALL_CORE
You can access the [facility] Portal, offered by [facility], by registering with the following website: [email or web address]

## 2019-08-13 NOTE — DIETITIAN INITIAL EVALUATION ADULT. - DIET TYPE
PO diet pureed/consistent carbohydrate (no snacks)/renal replacement pts:no protein restr,no conc K & phos, low sodium

## 2019-08-13 NOTE — PROGRESS NOTE ADULT - SUBJECTIVE AND OBJECTIVE BOX
Cardiology Progress Note    Interval Events:  [x]No significant events    MEDICATIONS:  heparin  Injectable 5000 Unit(s) SubCutaneous every 8 hours  ALBUTerol    90 MICROgram(s) HFA Inhaler 2 Puff(s) Inhalation every 4 hours PRN  buDESOnide 160 MICROgram(s)/formoterol 4.5 MICROgram(s) Inhaler 2 Puff(s) Inhalation two times a day  tiotropium 18 MICROgram(s) Capsule 1 Capsule(s) Inhalation daily  acetaminophen   Tablet .. 650 milliGRAM(s) Oral every 6 hours PRN  docusate sodium 100 milliGRAM(s) Oral three times a day  polyethylene glycol 3350 17 Gram(s) Oral daily  senna 2 Tablet(s) Oral at bedtime  glucagon  Injectable 1 milliGRAM(s) IntraMuscular once PRN  insulin lispro (HumaLOG) corrective regimen sliding scale   SubCutaneous three times a day before meals  insulin lispro (HumaLOG) corrective regimen sliding scale   SubCutaneous at bedtime  levothyroxine 100 MICROGram(s) Oral daily    Review of Systems:  Constitutional: [- ] Fever [- ] Chills [ ] Fatigue [ ] Weight change   HEENT: [- ] Blurred vision [ ] Eye pain [ ] Headache [ ] Runny nose [ ] Sore throat   Respiratory: [- ] Cough [ ] Wheezing [ ] Shortness of breath  Cardiovascular: [- ] Chest Pain [ -] Palpitations [- ] LOGAN [- ] PND [ -] Orthopnea  Gastrointestinal: [- ] Abdominal Pain [- ] Diarrhea [ ] Constipation [ ] Hemorrhoids [ ] Nausea [ ] Vomiting  Genitourinary: [- ] Nocturia [ ] Dysuria [ ] Incontinence  Extremities: [- ] Swelling [ ] Joint Pain  Neurologic: [ -] Focal deficit [ ] Paresthesias [ ] Syncope  Skin: [ -] Rash [ ] Ecchymoses [ ] Wounds [ ] Lesions  Psychiatry: [- ] Depression [ ] Suicidal/Homicidal ideation [ ] Anxiety [ ] Sleep disturbances  [x ] 10 point review of systems is otherwise negative except as mentioned above            [ ]Unable to obtain    PHYSICAL EXAM:  T(C): 36.6 (08-13-19 @ 12:24), Max: 36.6 (08-13-19 @ 12:24)  HR: 63 (08-13-19 @ 12:24) (61 - 63)  BP: 104/48 (08-13-19 @ 12:24) (101/51 - 119/53)  RR: 18 (08-13-19 @ 12:24) (18 - 19)  SpO2: 94% (08-13-19 @ 12:24) (88% - 96%)  Wt(kg): --  I&O's Summary    Appearance: No acute distress  HEENT:   Normal oral mucosa, PERRL  Cardiovascular: Normal S1 S2, no elevated JVP, no murmurs, no edema  Respiratory: Lungs clear to auscultation	, good air movement  Psychiatry: A & O x 3, Mood & affect appropriate  Gastrointestinal:  soft nt nd normoactive bs	  Skin: No rashes, no ecchymoses, no cyanosis	  Neurologic: grossly non-focal  Extremities: Normal range of motion, no clubbing, cyanosis or edema  Vascular: Peripheral pulses palpable bilaterally    LABS:	 	  CBC Full  -  ( 13 Aug 2019 06:28 )  WBC Count : 6.22 K/uL  Hemoglobin : 11.3 g/dL  Hematocrit : 35.0 %  Platelet Count - Automated : 234 K/uL    08-13  141  |  103  |  53<H>  ----------------------------<  126<H>  4.7   |  27  |  1.37<H>    08-12  x   |  x   |  57<H>  ----------------------------<  x   5.1   |  x   |  1.53<H>    Ca    8.8      13 Aug 2019 06:28  Ca    8.7      12 Aug 2019 05:30  Phos  3.4     08-13  Phos  3.8     08-12  Mg     2.0     08-13  Mg     1.9     08-12    ECG:  	  RADIOLOGY:  OTHER: 	    PREVIOUS DIAGNOSTIC TESTING:    [x] Echocardiogram:  < from: Transthoracic Echocardiogram (08.12.19 @ 10:08) >  CONCLUSIONS:  1. Aortic valve not well visualized  2. Eccentric left ventricular hypertrophy (dilated left  ventricle with normal relative wall thickness).  3. Normal left ventricular systolic function. No segmental  wall motion abnormalities.  4. Normal right ventricular size and function.    < end of copied text >    [ ] Catheterization:   [ ] Stress Test:

## 2019-08-13 NOTE — PROGRESS NOTE ADULT - ASSESSMENT
84F with HTN, DM, COPD on home O2, diastolic HF, and hypothyroidism presents with abnl ECG.    TTE wnl  no further inpatient cardiac evaluation at this time

## 2019-08-13 NOTE — PROGRESS NOTE ADULT - PROBLEM SELECTOR PLAN 10
- c/w HSQ tid for DVT ppx  - Fall precautions  - PT = No needs and will plan dispo once K+ normalizes    -Iker Monte PGY5 EMIM Pager#[phone] - c/w HSQ tid for DVT ppx  - Fall precautions  - PT = No needs and will plan discharge today after nutrition sees patient for CKD and DM education.     -Iker Monte PGY5 EMIM Pager#[phone]

## 2019-08-13 NOTE — PROGRESS NOTE ADULT - PROBLEM SELECTOR PLAN 1
- Continue Tele monitoring for now until K normalizes  - K today 5.4 and general trend is improved  - Continue to hold lisinopril   - Baseline Cr 1.4-1.5 today is 1.6  - Will repeat K, Cr, and BUN at 2pm if rising will treat with standing IV Lasix  - Nephrology consult appreciated, if persistent severe hyperkalemia may have to dialyze and discussed with patient but will continue with medical mgt at this time.   - Renal US w/out obstructive component - D/C tele  - K today 4.7 and Cr improved  - Continue to hold lisinopril   - Baseline Cr 1.4-1.5 today is 1.3  - Renal US w/out obstructive component  - Will DC home today with follow-up with primary care doctor to discuss need to restart ACEi (patient states was given for kidney protection for her DM)  - Patient educated on avoidance of Aleeve and other NSAIDs and to use tylenol for back pain instead but states she currently does not need it as her pain is much improved

## 2019-08-13 NOTE — PROGRESS NOTE ADULT - ATTENDING COMMENTS
Patient seen and examined this morning potassium elevated in setting of NSAID and lisinopril.  Continue to use medical management.  repeat potassium again  afternoon.
She was warned about the complications of the NSAIDs ( JANELLE, Hyperkalemia)  and to avoid them in the future.  She can be reevaluated for ACE inhibitor as an outpatient only f she has proteinuria.
Will obtain Nutrition consult re: low potassium diet.
Medically stable for discharge. Spoke to pt at length about f/u issues, she understood and agreed with the plan.

## 2019-08-13 NOTE — PROGRESS NOTE ADULT - PROBLEM SELECTOR PLAN 4
- Pt with chong (baseline Cr 1.4-1.5) in setting of nsaid use (was taking aleve at home for a week for back pain)  - Will recheck Cr, BUN, and K+ at 2pm  - Continue monitor bun/cr, avoid nephrotoxic agents  - Continue to Hold ACE-i (lisinopril) for now - Pt with JANELLE (baseline Cr 1.4-1.5) in setting of NSIAD use (was taking aleve at home for a week for back pain)  - Continue monitor bun/cr, avoid nephrotoxic agents  - Continue to Hold ACE-i (lisinopril) for now and will need to follow-up as an outpatient on restarting possibly at a lower dose

## 2019-08-14 PROBLEM — N18.9 CHRONIC KIDNEY DISEASE, UNSPECIFIED: Chronic | Status: ACTIVE | Noted: 2019-08-10

## 2019-08-15 ENCOUNTER — APPOINTMENT (OUTPATIENT)
Dept: CARDIOLOGY | Facility: CLINIC | Age: 84
End: 2019-08-15
Payer: MEDICARE

## 2019-08-15 VITALS
HEIGHT: 64 IN | HEART RATE: 87 BPM | WEIGHT: 149 LBS | DIASTOLIC BLOOD PRESSURE: 74 MMHG | OXYGEN SATURATION: 88 % | SYSTOLIC BLOOD PRESSURE: 142 MMHG | BODY MASS INDEX: 25.44 KG/M2

## 2019-08-15 DIAGNOSIS — R94.31 ABNORMAL ELECTROCARDIOGRAM [ECG] [EKG]: ICD-10-CM

## 2019-08-15 PROCEDURE — 99496 TRANSJ CARE MGMT HIGH F2F 7D: CPT

## 2019-08-15 RX ORDER — LISINOPRIL 10 MG/1
10 TABLET ORAL
Qty: 90 | Refills: 0 | Status: DISCONTINUED | COMMUNITY
Start: 2018-09-03 | End: 2019-08-15

## 2019-08-15 NOTE — PHYSICAL EXAM
[General Appearance - Well Developed] : well developed [General Appearance - Well Nourished] : well nourished [Normal Conjunctiva] : the conjunctiva exhibited no abnormalities [Eyelids - No Xanthelasma] : the eyelids demonstrated no xanthelasmas [Normal Oral Mucosa] : normal oral mucosa [No Oral Pallor] : no oral pallor [No Oral Cyanosis] : no oral cyanosis [Respiration, Rhythm And Depth] : normal respiratory rhythm and effort [Auscultation Breath Sounds / Voice Sounds] : lungs were clear to auscultation bilaterally [Heart Rate And Rhythm] : heart rate and rhythm were normal [Heart Sounds] : normal S1 and S2 [Murmurs] : no murmurs present [Abdomen Soft] : soft [Abdomen Tenderness] : non-tender [Abdomen Mass (___ Cm)] : no abdominal mass palpated [Abnormal Walk] : normal gait [Nail Clubbing] : no clubbing of the fingernails [Cyanosis, Localized] : no localized cyanosis [Petechial Hemorrhages (___cm)] : no petechial hemorrhages [Skin Color & Pigmentation] : normal skin color and pigmentation [] : no rash [No Venous Stasis] : no venous stasis [Skin Lesions] : no skin lesions [No Skin Ulcers] : no skin ulcer [No Xanthoma] : no  xanthoma was observed [Oriented To Time, Place, And Person] : oriented to person, place, and time [Mood] : the mood was normal [Affect] : the affect was normal [No Anxiety] : not feeling anxious [FreeTextEntry1] : Trace edema bilateral LE

## 2019-08-15 NOTE — REVIEW OF SYSTEMS
[Dyspnea on exertion] : dyspnea during exertion [Lower Ext Edema] : lower extremity edema [Fever] : no fever [Chills] : no chills [Shortness Of Breath] : no shortness of breath [Cough] : no cough [Wheezing] : no wheezing [Confusion] : no confusion was observed [Anxiety] : no anxiety

## 2019-08-15 NOTE — DISCUSSION/SUMMARY
[FreeTextEntry1] : 84F with severe COPD on home O2, poorly compliant, HTN, HFpEF, recent hospitalization for JANELLE with hyperkalemia\par \par -Hold lisinopril\par -Hold lasix\par -Trend Crt, K\par -Home O2, breathing stable \par -DM care per PCP, cont metformin, cautious use with crt\par \par RV in 1 month to reassess symptoms, need for diuretic\par \par As documented in other notes, her social situation is complex as she lives alone, and would benefit from some assistance at home with medication administration and oxygen management.  \par

## 2019-08-15 NOTE — HISTORY OF PRESENT ILLNESS
[FreeTextEntry1] : 84F with severe COPD supposed to be on home O2 but noncompliant, HFpEF who presents for follow up after hospitalization for hyperkalemia.  Noted on routine labs with PCP with JANELLE, hyperkalemia to 7.6.  Had been taking NSAIDs for the previous week due to pain. Given IVF, given cocktail, ACEi and Lasix d/c'ed, discharged off both medications with improved K and Crt. Had a fall 3 weeks ago, was rushing while getting dressed.\par \par Pt was first seen October 2018 for fluid overload, LE edema, worsening HFpEF.  Was started on lasix with marked improvement in symptoms. Continues to feel well.  Uses home o2 prn.  25 lb weight loss since initial visit. \par \par Pt was hospitalized at [facility] August 2018 for COPD exacerbation.  Longstanding COPD, not always compliant with home O2 or with medications.  She was seen by a cardiologist in 2014 and was noted that she had diastolic dysfunction.  She had an echo during her hospitalization August 2018 which did not record any diastolic dysfunction. She lives alone. \par \par Former smoker. \par \par ECG: SR, TWI laterally\par TTE 8/2019: Normal LV, eccentric hypertrophy\par NST (2014):  No ischemia, EF >70%. \par \par Metformin 1g BID

## 2019-08-26 ENCOUNTER — APPOINTMENT (OUTPATIENT)
Dept: FAMILY MEDICINE | Facility: CLINIC | Age: 84
End: 2019-08-26
Payer: MEDICARE

## 2019-08-26 VITALS — DIASTOLIC BLOOD PRESSURE: 70 MMHG | HEART RATE: 80 BPM | SYSTOLIC BLOOD PRESSURE: 140 MMHG | RESPIRATION RATE: 16 BRPM

## 2019-08-26 DIAGNOSIS — Z09 ENCOUNTER FOR FOLLOW-UP EXAMINATION AFTER COMPLETED TREATMENT FOR CONDITIONS OTHER THAN MALIGNANT NEOPLASM: ICD-10-CM

## 2019-08-26 PROCEDURE — 99496 TRANSJ CARE MGMT HIGH F2F 7D: CPT | Mod: 25

## 2019-08-26 PROCEDURE — 36415 COLL VENOUS BLD VENIPUNCTURE: CPT

## 2019-08-26 PROCEDURE — 99213 OFFICE O/P EST LOW 20 MIN: CPT | Mod: 25

## 2019-08-26 RX ORDER — POTASSIUM CHLORIDE 1500 MG/1
20 TABLET, EXTENDED RELEASE ORAL
Qty: 30 | Refills: 1 | Status: DISCONTINUED | COMMUNITY
Start: 2018-04-16 | End: 2019-08-26

## 2019-08-26 RX ORDER — FUROSEMIDE 20 MG/1
20 TABLET ORAL
Qty: 90 | Refills: 0 | Status: DISCONTINUED | COMMUNITY
Start: 2017-11-04 | End: 2019-08-26

## 2019-08-26 NOTE — REVIEW OF SYSTEMS
[Muscle Pain] : muscle pain [Shortness Of Breath] : shortness of breath [FreeTextEntry9] : sciatica [Negative] : Psychiatric

## 2019-08-26 NOTE — PHYSICAL EXAM
[No Acute Distress] : no acute distress [Well Nourished] : well nourished [Normal Sclera/Conjunctiva] : normal sclera/conjunctiva [EOMI] : extraocular movements intact [Normal Outer Ear/Nose] : the outer ears and nose were normal in appearance [No JVD] : no jugular venous distention [No Respiratory Distress] : no respiratory distress  [No Accessory Muscle Use] : no accessory muscle use [Clear to Auscultation] : lungs were clear to auscultation bilaterally [Normal Rate] : normal rate  [Regular Rhythm] : with a regular rhythm [Normal S1, S2] : normal S1 and S2 [Normal Affect] : the affect was normal [Coordination Grossly Intact] : coordination grossly intact [Alert and Oriented x3] : oriented to person, place, and time [Normal Insight/Judgement] : insight and judgment were intact [de-identified] : elderly [18359 - High Complexity requires an extensive number of possible diagnoses and/or the management options, extensive complexity of the medical data (tests, etc.) to be reviewed, and a high risk of significant complications, morbidity, and/or mortality as w] : High Complexity

## 2019-08-26 NOTE — HISTORY OF PRESENT ILLNESS
[Post-hospitalization from ___ Hospital] : Post-hospitalization from [unfilled] Hospital [Admitted on: ___] : The patient was admitted on [unfilled] [Discharged on ___] : discharged on [unfilled] [Discharge Summary] : discharge summary [Radiology Findings] : radiology findings

## 2019-08-28 LAB
ALBUMIN SERPL ELPH-MCNC: 3.9 G/DL
ALP BLD-CCNC: 141 U/L
ALT SERPL-CCNC: 5 U/L
ANION GAP SERPL CALC-SCNC: 12 MMOL/L
AST SERPL-CCNC: 14 U/L
BASOPHILS # BLD AUTO: 0.03 K/UL
BASOPHILS NFR BLD AUTO: 0.5 %
BILIRUB SERPL-MCNC: 0.3 MG/DL
BUN SERPL-MCNC: 24 MG/DL
CALCIUM SERPL-MCNC: 9.5 MG/DL
CHLORIDE SERPL-SCNC: 103 MMOL/L
CO2 SERPL-SCNC: 26 MMOL/L
CREAT SERPL-MCNC: 1.19 MG/DL
EOSINOPHIL # BLD AUTO: 0.18 K/UL
EOSINOPHIL NFR BLD AUTO: 2.7 %
FRUCTOSAMINE SERPL-MCNC: 269 UMOL/L
GLUCOSE SERPL-MCNC: 121 MG/DL
HCT VFR BLD CALC: 37.2 %
HGB BLD-MCNC: 11.4 G/DL
IMM GRANULOCYTES NFR BLD AUTO: 0.2 %
LYMPHOCYTES # BLD AUTO: 1.92 K/UL
LYMPHOCYTES NFR BLD AUTO: 29.2 %
MAN DIFF?: NORMAL
MCHC RBC-ENTMCNC: 30.6 GM/DL
MCHC RBC-ENTMCNC: 31.1 PG
MCV RBC AUTO: 101.6 FL
MONOCYTES # BLD AUTO: 0.61 K/UL
MONOCYTES NFR BLD AUTO: 9.3 %
NEUTROPHILS # BLD AUTO: 3.83 K/UL
NEUTROPHILS NFR BLD AUTO: 58.1 %
PLATELET # BLD AUTO: 270 K/UL
POTASSIUM SERPL-SCNC: 5.7 MMOL/L
PROT SERPL-MCNC: 7 G/DL
RBC # BLD: 3.66 M/UL
RBC # FLD: 12.5 %
SODIUM SERPL-SCNC: 141 MMOL/L
T3FREE SERPL-MCNC: 1.94 PG/ML
T4 FREE SERPL-MCNC: 1.5 NG/DL
TSH SERPL-ACNC: 2.51 UIU/ML
WBC # FLD AUTO: 6.58 K/UL

## 2019-08-29 ENCOUNTER — RESULT REVIEW (OUTPATIENT)
Age: 84
End: 2019-08-29

## 2019-08-30 ENCOUNTER — RESULT REVIEW (OUTPATIENT)
Age: 84
End: 2019-08-30

## 2019-08-30 ENCOUNTER — MEDICATION RENEWAL (OUTPATIENT)
Age: 84
End: 2019-08-30

## 2019-08-30 LAB
ALBUMIN SERPL ELPH-MCNC: 4.1 G/DL
ANION GAP SERPL CALC-SCNC: 12 MMOL/L
BUN SERPL-MCNC: 26 MG/DL
CALCIUM SERPL-MCNC: 9.3 MG/DL
CHLORIDE SERPL-SCNC: 101 MMOL/L
CO2 SERPL-SCNC: 27 MMOL/L
CREAT SERPL-MCNC: 1.2 MG/DL
GLUCOSE SERPL-MCNC: 95 MG/DL
PHOSPHATE SERPL-MCNC: 3.3 MG/DL
POTASSIUM SERPL-SCNC: 5.9 MMOL/L
SODIUM SERPL-SCNC: 140 MMOL/L

## 2019-09-04 ENCOUNTER — APPOINTMENT (OUTPATIENT)
Dept: CARDIOLOGY | Facility: CLINIC | Age: 84
End: 2019-09-04

## 2019-09-04 LAB
ALBUMIN SERPL ELPH-MCNC: 4.2 G/DL
ANION GAP SERPL CALC-SCNC: 14 MMOL/L
BUN SERPL-MCNC: 31 MG/DL
CALCIUM SERPL-MCNC: 8.9 MG/DL
CHLORIDE SERPL-SCNC: 100 MMOL/L
CO2 SERPL-SCNC: 29 MMOL/L
CREAT SERPL-MCNC: 1.29 MG/DL
GLUCOSE SERPL-MCNC: 121 MG/DL
PHOSPHATE SERPL-MCNC: 2.4 MG/DL
POTASSIUM SERPL-SCNC: 3.6 MMOL/L
SODIUM SERPL-SCNC: 143 MMOL/L

## 2019-09-06 ENCOUNTER — APPOINTMENT (OUTPATIENT)
Dept: NEPHROLOGY | Facility: CLINIC | Age: 84
End: 2019-09-06
Payer: MEDICARE

## 2019-09-06 VITALS
DIASTOLIC BLOOD PRESSURE: 52 MMHG | SYSTOLIC BLOOD PRESSURE: 155 MMHG | OXYGEN SATURATION: 84 % | HEART RATE: 87 BPM | BODY MASS INDEX: 25.22 KG/M2 | HEIGHT: 64 IN | WEIGHT: 147.71 LBS

## 2019-09-06 VITALS — DIASTOLIC BLOOD PRESSURE: 68 MMHG | SYSTOLIC BLOOD PRESSURE: 142 MMHG

## 2019-09-06 PROCEDURE — 99215 OFFICE O/P EST HI 40 MIN: CPT

## 2019-09-06 RX ORDER — IBUPROFEN 600 MG/1
600 TABLET, FILM COATED ORAL
Qty: 90 | Refills: 0 | Status: DISCONTINUED | COMMUNITY
Start: 2018-09-03 | End: 2019-09-06

## 2019-09-06 RX ORDER — PANTOPRAZOLE 40 MG/1
40 TABLET, DELAYED RELEASE ORAL
Qty: 9 | Refills: 0 | Status: DISCONTINUED | COMMUNITY
Start: 2017-12-19 | End: 2019-09-06

## 2019-09-06 RX ORDER — PREDNISONE 10 MG/1
10 TABLET ORAL
Qty: 12 | Refills: 0 | Status: DISCONTINUED | COMMUNITY
Start: 2019-08-26 | End: 2019-09-06

## 2019-09-06 NOTE — PHYSICAL EXAM
[General Appearance - Alert] : alert [General Appearance - In No Acute Distress] : in no acute distress [General Appearance - Well Developed] : well developed [General Appearance - Well Nourished] : well nourished [Sclera] : the sclera and conjunctiva were normal [Hearing Threshold Finger Rub Not [place]] : hearing was normal [Examination Of The Oral Cavity] : the lips and gums were normal [Oropharynx] : the oropharynx was normal [Neck Appearance] : the appearance of the neck was normal [Neck Cervical Mass (___cm)] : no neck mass was observed [Jugular Venous Distention Increased] : there was no jugular-venous distention [Respiration, Rhythm And Depth] : normal respiratory rhythm and effort [Auscultation Breath Sounds / Voice Sounds] : lungs were clear to auscultation bilaterally [Exaggerated Use Of Accessory Muscles For Inspiration] : no accessory muscle use [Heart Rate And Rhythm] : heart rate was normal and rhythm regular [Heart Sounds] : normal S1 and S2 [Heart Sounds Gallop] : no gallops [Murmurs] : no murmurs [___ +] : bilateral [unfilled]+ pretibial pitting edema [Abdomen Soft] : soft [Abdomen Tenderness] : non-tender [Abdomen Mass (___ Cm)] : no abdominal mass palpated [] : no hepato-splenomegaly [Cervical Lymph Nodes Enlarged Posterior Bilaterally] : posterior cervical [Cervical Lymph Nodes Enlarged Anterior Bilaterally] : anterior cervical [No CVA Tenderness] : no ~M costovertebral angle tenderness [Supraclavicular Lymph Nodes Enlarged Bilaterally] : supraclavicular [No Spinal Tenderness] : no spinal tenderness [Oriented To Time, Place, And Person] : oriented to person, place, and time [Abnormal Walk] : normal gait [Impaired Insight] : insight and judgment were intact [Affect] : the affect was normal [Mood] : the mood was normal

## 2019-09-06 NOTE — HISTORY OF PRESENT ILLNESS
[FreeTextEntry1] : Today I had the pleasure of meeting Chayo Welsh who is an 84 years old woman who formerly worked for [employer] and later in life for [employer].  She is accompanied today by her friend.  Her main health issue for the last few years has been COPD for which she follows with pulmonary.  She uses inhalers and oxygen at night and around the house.  About one year ago she began to develop hypertension and edema.  Around that time she was placed on lisinopril and her creatinine which was 0.89 in April 2013 was noted to be 1.32 in November 2018.  Since that time her creatinine has been about 1.3.  Then a few weeks ago she began taking alleve for her sciatica and her potassium was 7.4 with a creatinine of 1.56 and she was sent to [facility] which is where I first met her.  Since discharge she reports that she may have been confused by discharge instructions and was still taking lisinopril.  She has not taken NSAIDs since discharge.  Her repeat potassium remained elevated and so last week we had to start her on Lokelma.\par \par On my evaluation today she denies chest pain, SOB is at baseline, she has dry skin, ankle edema, energy level is ok.  She has loose stools at times.  Dietary history is significant for weekly Chinese food, often goes to the bagel store and will eat two bagels with lox.   While she reports that her appetite is no longer good a detailed dietary history reveals a caloric diet high in salt.

## 2019-09-06 NOTE — ASSESSMENT
[FreeTextEntry1] : 84 years old woman with a history COPD here for evaluation of hyperkalemia and CKD\par \par Hyperkalemia -- The patient's hyperkalemia is likely multifactorial in the setting of CKD, high dietary intake, with ACE I and NSAID use.  Recommended stop lisinopril, stop all NSAIDS, restrict dietary potassium.  Continue last dose of lokelma tomorrow and repeat next week.  Will send blood and urine studies today.\par \par Chronic Kidney Disease -- The patient has had progressive rise in creatinine in the setting of hypertension and worsening edema.  Former smoker.  Could this be renovascular disease.  dupled of renal arteries were ordered. \par \par Hypertension / Edema -- Spent a great deal of time today talking about restriction of sodium.  continue HCTZ and start norvasc (since we are stopping lisinopril).\par \par f./u in three months or sooner if needed.

## 2019-09-06 NOTE — REVIEW OF SYSTEMS
[Fever] : no fever [Feeling Poorly] : not feeling poorly [Chills] : no chills [Eyesight Problems] : no eyesight problems [Nosebleeds] : no nosebleeds [Chest Pain] : no chest pain [SOB on Exertion] : shortness of breath during exertion [Lower Ext Edema] : lower extremity edema [As Noted in HPI] : as noted in HPI [Fainting] : no fainting [Dizziness] : no dizziness [Easy Bruising] : no tendency for easy bruising [Easy Bleeding] : no tendency for easy bleeding

## 2019-09-09 LAB
ALBUMIN SERPL ELPH-MCNC: 4.5 G/DL
ALDOSTERONE SERUM: <3 NG/DL
ANION GAP SERPL CALC-SCNC: 15 MMOL/L
APPEARANCE: CLEAR
BACTERIA: NEGATIVE
BASOPHILS # BLD AUTO: 0.02 K/UL
BASOPHILS NFR BLD AUTO: 0.3 %
BILIRUBIN URINE: NEGATIVE
BLOOD URINE: NEGATIVE
BUN SERPL-MCNC: 32 MG/DL
CALCIUM SERPL-MCNC: 9.4 MG/DL
CHLORIDE SERPL-SCNC: 102 MMOL/L
CHOLEST SERPL-MCNC: 184 MG/DL
CHOLEST/HDLC SERPL: 3.4 RATIO
CO2 SERPL-SCNC: 28 MMOL/L
COLOR: NORMAL
CREAT SERPL-MCNC: 1.16 MG/DL
CREAT SPEC-SCNC: 42 MG/DL
CREAT/PROT UR: 0.2 RATIO
EOSINOPHIL # BLD AUTO: 0.19 K/UL
EOSINOPHIL NFR BLD AUTO: 2.8 %
ESTIMATED AVERAGE GLUCOSE: 140 MG/DL
FERRITIN SERPL-MCNC: 122 NG/ML
GLUCOSE QUALITATIVE U: NEGATIVE
GLUCOSE SERPL-MCNC: 162 MG/DL
HBA1C MFR BLD HPLC: 6.5 %
HCT VFR BLD CALC: 36.5 %
HDLC SERPL-MCNC: 54 MG/DL
HGB BLD-MCNC: 11.8 G/DL
HYALINE CASTS: 2 /LPF
IMM GRANULOCYTES NFR BLD AUTO: 0.1 %
IRON SATN MFR SERPL: 23 %
IRON SERPL-MCNC: 79 UG/DL
KETONES URINE: NEGATIVE
LDLC SERPL CALC-MCNC: 109 MG/DL
LEUKOCYTE ESTERASE URINE: NEGATIVE
LYMPHOCYTES # BLD AUTO: 1.77 K/UL
LYMPHOCYTES NFR BLD AUTO: 26.3 %
MAN DIFF?: NORMAL
MCHC RBC-ENTMCNC: 32.3 GM/DL
MCHC RBC-ENTMCNC: 32.5 PG
MCV RBC AUTO: 100.6 FL
MICROSCOPIC-UA: NORMAL
MONOCYTES # BLD AUTO: 0.59 K/UL
MONOCYTES NFR BLD AUTO: 8.8 %
NEUTROPHILS # BLD AUTO: 4.14 K/UL
NEUTROPHILS NFR BLD AUTO: 61.7 %
NITRITE URINE: NEGATIVE
OSMOLALITY SERPL: 310 MOSMOL/KG
OSMOLALITY UR: 410 MOSM/KG
PH URINE: 6
PHOSPHATE SERPL-MCNC: 2.4 MG/DL
PLATELET # BLD AUTO: 268 K/UL
POTASSIUM SERPL-SCNC: 3.2 MMOL/L
POTASSIUM UR-SCNC: 9.5 MMOL/L
PROT UR-MCNC: 9 MG/DL
PROTEIN URINE: NEGATIVE
RBC # BLD: 3.63 M/UL
RBC # FLD: 12.5 %
RED BLOOD CELLS URINE: 2 /HPF
SODIUM ?TM SUB UR QN: 102 MMOL/L
SODIUM SERPL-SCNC: 145 MMOL/L
SPECIFIC GRAVITY URINE: 1.01
SQUAMOUS EPITHELIAL CELLS: 1 /HPF
TIBC SERPL-MCNC: 342 UG/DL
TRIGL SERPL-MCNC: 104 MG/DL
UIBC SERPL-MCNC: 263 UG/DL
UROBILINOGEN URINE: NORMAL
WBC # FLD AUTO: 6.72 K/UL
WHITE BLOOD CELLS URINE: 0 /HPF

## 2019-09-11 ENCOUNTER — APPOINTMENT (OUTPATIENT)
Dept: ULTRASOUND IMAGING | Facility: CLINIC | Age: 84
End: 2019-09-11
Payer: MEDICARE

## 2019-09-11 ENCOUNTER — OUTPATIENT (OUTPATIENT)
Dept: OUTPATIENT SERVICES | Facility: HOSPITAL | Age: 84
LOS: 1 days | End: 2019-09-11
Payer: MEDICARE

## 2019-09-11 DIAGNOSIS — Z90.49 ACQUIRED ABSENCE OF OTHER SPECIFIED PARTS OF DIGESTIVE TRACT: Chronic | ICD-10-CM

## 2019-09-11 DIAGNOSIS — S82.90XA UNSPECIFIED FRACTURE OF UNSPECIFIED LOWER LEG, INITIAL ENCOUNTER FOR CLOSED FRACTURE: Chronic | ICD-10-CM

## 2019-09-11 DIAGNOSIS — Z86.69 PERSONAL HISTORY OF OTHER DISEASES OF THE NERVOUS SYSTEM AND SENSE ORGANS: Chronic | ICD-10-CM

## 2019-09-11 DIAGNOSIS — N18.3 CHRONIC KIDNEY DISEASE, STAGE 3 (MODERATE): ICD-10-CM

## 2019-09-11 PROCEDURE — 93975 VASCULAR STUDY: CPT | Mod: 26

## 2019-09-11 PROCEDURE — 93975 VASCULAR STUDY: CPT

## 2019-09-12 ENCOUNTER — TRANSCRIPTION ENCOUNTER (OUTPATIENT)
Age: 84
End: 2019-09-12

## 2019-09-13 LAB
ALBUMIN SERPL ELPH-MCNC: 4.2 G/DL
ANION GAP SERPL CALC-SCNC: 17 MMOL/L
BUN SERPL-MCNC: 36 MG/DL
CALCIUM SERPL-MCNC: 9 MG/DL
CHLORIDE SERPL-SCNC: 100 MMOL/L
CO2 SERPL-SCNC: 29 MMOL/L
CREAT SERPL-MCNC: 1.47 MG/DL
GLUCOSE SERPL-MCNC: 157 MG/DL
PHOSPHATE SERPL-MCNC: 3.5 MG/DL
POTASSIUM SERPL-SCNC: 4.2 MMOL/L
SODIUM SERPL-SCNC: 146 MMOL/L

## 2019-09-16 LAB — RENIN ACTIVITY, PLASMA: 0.86 NG/ML/HR

## 2019-09-19 ENCOUNTER — TRANSCRIPTION ENCOUNTER (OUTPATIENT)
Age: 84
End: 2019-09-19

## 2019-09-20 ENCOUNTER — INPATIENT (INPATIENT)
Facility: HOSPITAL | Age: 84
LOS: 17 days | Discharge: NURSING, REHAB OR LONG-TERM CARE | End: 2019-10-08
Attending: ORTHOPAEDIC SURGERY | Admitting: ORTHOPAEDIC SURGERY
Payer: MEDICARE

## 2019-09-20 ENCOUNTER — RESULT REVIEW (OUTPATIENT)
Age: 84
End: 2019-09-20

## 2019-09-20 VITALS
HEART RATE: 82 BPM | DIASTOLIC BLOOD PRESSURE: 60 MMHG | RESPIRATION RATE: 22 BRPM | SYSTOLIC BLOOD PRESSURE: 150 MMHG | OXYGEN SATURATION: 95 % | TEMPERATURE: 98 F

## 2019-09-20 DIAGNOSIS — Z90.49 ACQUIRED ABSENCE OF OTHER SPECIFIED PARTS OF DIGESTIVE TRACT: Chronic | ICD-10-CM

## 2019-09-20 DIAGNOSIS — S72.002A FRACTURE OF UNSPECIFIED PART OF NECK OF LEFT FEMUR, INITIAL ENCOUNTER FOR CLOSED FRACTURE: ICD-10-CM

## 2019-09-20 DIAGNOSIS — E11.49 TYPE 2 DIABETES MELLITUS WITH OTHER DIABETIC NEUROLOGICAL COMPLICATION: ICD-10-CM

## 2019-09-20 DIAGNOSIS — E03.9 HYPOTHYROIDISM, UNSPECIFIED: ICD-10-CM

## 2019-09-20 DIAGNOSIS — J44.9 CHRONIC OBSTRUCTIVE PULMONARY DISEASE, UNSPECIFIED: ICD-10-CM

## 2019-09-20 DIAGNOSIS — J96.11 CHRONIC RESPIRATORY FAILURE WITH HYPOXIA: ICD-10-CM

## 2019-09-20 DIAGNOSIS — S82.90XA UNSPECIFIED FRACTURE OF UNSPECIFIED LOWER LEG, INITIAL ENCOUNTER FOR CLOSED FRACTURE: Chronic | ICD-10-CM

## 2019-09-20 DIAGNOSIS — Z86.69 PERSONAL HISTORY OF OTHER DISEASES OF THE NERVOUS SYSTEM AND SENSE ORGANS: Chronic | ICD-10-CM

## 2019-09-20 DIAGNOSIS — N18.3 CHRONIC KIDNEY DISEASE, STAGE 3 (MODERATE): ICD-10-CM

## 2019-09-20 DIAGNOSIS — I10 ESSENTIAL (PRIMARY) HYPERTENSION: ICD-10-CM

## 2019-09-20 LAB
ALBUMIN SERPL ELPH-MCNC: 4.2 G/DL — SIGNIFICANT CHANGE UP (ref 3.3–5)
ALP SERPL-CCNC: 96 U/L — SIGNIFICANT CHANGE UP (ref 40–120)
ALT FLD-CCNC: 13 U/L — SIGNIFICANT CHANGE UP (ref 4–33)
ANION GAP SERPL CALC-SCNC: 12 MMO/L — SIGNIFICANT CHANGE UP (ref 7–14)
ANION GAP SERPL CALC-SCNC: 14 MMO/L — SIGNIFICANT CHANGE UP (ref 7–14)
APPEARANCE UR: CLEAR — SIGNIFICANT CHANGE UP
APTT BLD: 27.4 SEC — LOW (ref 27.5–36.3)
AST SERPL-CCNC: 19 U/L — SIGNIFICANT CHANGE UP (ref 4–32)
BACTERIA # UR AUTO: NEGATIVE — SIGNIFICANT CHANGE UP
BASOPHILS # BLD AUTO: 0.02 K/UL — SIGNIFICANT CHANGE UP (ref 0–0.2)
BASOPHILS NFR BLD AUTO: 0.1 % — SIGNIFICANT CHANGE UP (ref 0–2)
BILIRUB SERPL-MCNC: 0.4 MG/DL — SIGNIFICANT CHANGE UP (ref 0.2–1.2)
BILIRUB UR-MCNC: NEGATIVE — SIGNIFICANT CHANGE UP
BLD GP AB SCN SERPL QL: NEGATIVE — SIGNIFICANT CHANGE UP
BLOOD UR QL VISUAL: NEGATIVE — SIGNIFICANT CHANGE UP
BUN SERPL-MCNC: 28 MG/DL — HIGH (ref 7–23)
BUN SERPL-MCNC: 36 MG/DL — HIGH (ref 7–23)
CALCIUM SERPL-MCNC: 8 MG/DL — LOW (ref 8.4–10.5)
CALCIUM SERPL-MCNC: 8.9 MG/DL — SIGNIFICANT CHANGE UP (ref 8.4–10.5)
CHLORIDE SERPL-SCNC: 103 MMOL/L — SIGNIFICANT CHANGE UP (ref 98–107)
CHLORIDE SERPL-SCNC: 99 MMOL/L — SIGNIFICANT CHANGE UP (ref 98–107)
CO2 SERPL-SCNC: 26 MMOL/L — SIGNIFICANT CHANGE UP (ref 22–31)
CO2 SERPL-SCNC: 26 MMOL/L — SIGNIFICANT CHANGE UP (ref 22–31)
COLOR SPEC: SIGNIFICANT CHANGE UP
CREAT SERPL-MCNC: 1.13 MG/DL — SIGNIFICANT CHANGE UP (ref 0.5–1.3)
CREAT SERPL-MCNC: 1.19 MG/DL — SIGNIFICANT CHANGE UP (ref 0.5–1.3)
EOSINOPHIL # BLD AUTO: 0.02 K/UL — SIGNIFICANT CHANGE UP (ref 0–0.5)
EOSINOPHIL NFR BLD AUTO: 0.1 % — SIGNIFICANT CHANGE UP (ref 0–6)
GLUCOSE SERPL-MCNC: 128 MG/DL — HIGH (ref 70–99)
GLUCOSE SERPL-MCNC: 156 MG/DL — HIGH (ref 70–99)
GLUCOSE UR-MCNC: NEGATIVE — SIGNIFICANT CHANGE UP
HCT VFR BLD CALC: 32.4 % — LOW (ref 34.5–45)
HCT VFR BLD CALC: 35.7 % — SIGNIFICANT CHANGE UP (ref 34.5–45)
HGB BLD-MCNC: 10.4 G/DL — LOW (ref 11.5–15.5)
HGB BLD-MCNC: 11.4 G/DL — LOW (ref 11.5–15.5)
HYALINE CASTS # UR AUTO: NEGATIVE — SIGNIFICANT CHANGE UP
IMM GRANULOCYTES NFR BLD AUTO: 0.6 % — SIGNIFICANT CHANGE UP (ref 0–1.5)
INR BLD: 0.97 — SIGNIFICANT CHANGE UP (ref 0.88–1.17)
KETONES UR-MCNC: NEGATIVE — SIGNIFICANT CHANGE UP
LEUKOCYTE ESTERASE UR-ACNC: NEGATIVE — SIGNIFICANT CHANGE UP
LYMPHOCYTES # BLD AUTO: 1.18 K/UL — SIGNIFICANT CHANGE UP (ref 1–3.3)
LYMPHOCYTES # BLD AUTO: 7.9 % — LOW (ref 13–44)
MCHC RBC-ENTMCNC: 31.1 PG — SIGNIFICANT CHANGE UP (ref 27–34)
MCHC RBC-ENTMCNC: 31.9 % — LOW (ref 32–36)
MCHC RBC-ENTMCNC: 32.1 % — SIGNIFICANT CHANGE UP (ref 32–36)
MCHC RBC-ENTMCNC: 32.2 PG — SIGNIFICANT CHANGE UP (ref 27–34)
MCV RBC AUTO: 100.3 FL — HIGH (ref 80–100)
MCV RBC AUTO: 97.3 FL — SIGNIFICANT CHANGE UP (ref 80–100)
MONOCYTES # BLD AUTO: 0.83 K/UL — SIGNIFICANT CHANGE UP (ref 0–0.9)
MONOCYTES NFR BLD AUTO: 5.5 % — SIGNIFICANT CHANGE UP (ref 2–14)
NEUTROPHILS # BLD AUTO: 12.85 K/UL — HIGH (ref 1.8–7.4)
NEUTROPHILS NFR BLD AUTO: 85.8 % — HIGH (ref 43–77)
NITRITE UR-MCNC: NEGATIVE — SIGNIFICANT CHANGE UP
NRBC # FLD: 0 K/UL — SIGNIFICANT CHANGE UP (ref 0–0)
NRBC # FLD: 0 K/UL — SIGNIFICANT CHANGE UP (ref 0–0)
PH UR: 7 — SIGNIFICANT CHANGE UP (ref 5–8)
PLATELET # BLD AUTO: 185 K/UL — SIGNIFICANT CHANGE UP (ref 150–400)
PLATELET # BLD AUTO: 244 K/UL — SIGNIFICANT CHANGE UP (ref 150–400)
PMV BLD: 8.4 FL — SIGNIFICANT CHANGE UP (ref 7–13)
PMV BLD: 8.6 FL — SIGNIFICANT CHANGE UP (ref 7–13)
POTASSIUM SERPL-MCNC: 4.5 MMOL/L — SIGNIFICANT CHANGE UP (ref 3.5–5.3)
POTASSIUM SERPL-MCNC: 4.7 MMOL/L — SIGNIFICANT CHANGE UP (ref 3.5–5.3)
POTASSIUM SERPL-SCNC: 4.5 MMOL/L — SIGNIFICANT CHANGE UP (ref 3.5–5.3)
POTASSIUM SERPL-SCNC: 4.7 MMOL/L — SIGNIFICANT CHANGE UP (ref 3.5–5.3)
PROT SERPL-MCNC: 7.8 G/DL — SIGNIFICANT CHANGE UP (ref 6–8.3)
PROT UR-MCNC: 50 — SIGNIFICANT CHANGE UP
PROTHROM AB SERPL-ACNC: 10.8 SEC — SIGNIFICANT CHANGE UP (ref 9.8–13.1)
RBC # BLD: 3.23 M/UL — LOW (ref 3.8–5.2)
RBC # BLD: 3.67 M/UL — LOW (ref 3.8–5.2)
RBC # FLD: 12.4 % — SIGNIFICANT CHANGE UP (ref 10.3–14.5)
RBC # FLD: 12.5 % — SIGNIFICANT CHANGE UP (ref 10.3–14.5)
RBC CASTS # UR COMP ASSIST: SIGNIFICANT CHANGE UP (ref 0–?)
RH IG SCN BLD-IMP: POSITIVE — SIGNIFICANT CHANGE UP
RH IG SCN BLD-IMP: POSITIVE — SIGNIFICANT CHANGE UP
SODIUM SERPL-SCNC: 139 MMOL/L — SIGNIFICANT CHANGE UP (ref 135–145)
SODIUM SERPL-SCNC: 141 MMOL/L — SIGNIFICANT CHANGE UP (ref 135–145)
SP GR SPEC: 1.02 — SIGNIFICANT CHANGE UP (ref 1–1.04)
SQUAMOUS # UR AUTO: SIGNIFICANT CHANGE UP
UROBILINOGEN FLD QL: NORMAL — SIGNIFICANT CHANGE UP
WBC # BLD: 11.72 K/UL — HIGH (ref 3.8–10.5)
WBC # BLD: 14.99 K/UL — HIGH (ref 3.8–10.5)
WBC # FLD AUTO: 11.72 K/UL — HIGH (ref 3.8–10.5)
WBC # FLD AUTO: 14.99 K/UL — HIGH (ref 3.8–10.5)
WBC UR QL: SIGNIFICANT CHANGE UP (ref 0–?)

## 2019-09-20 PROCEDURE — 73552 X-RAY EXAM OF FEMUR 2/>: CPT | Mod: 26,LT

## 2019-09-20 PROCEDURE — 88305 TISSUE EXAM BY PATHOLOGIST: CPT | Mod: 26

## 2019-09-20 PROCEDURE — 73502 X-RAY EXAM HIP UNI 2-3 VIEWS: CPT | Mod: 26,77,LT

## 2019-09-20 PROCEDURE — 88311 DECALCIFY TISSUE: CPT | Mod: 26

## 2019-09-20 PROCEDURE — 27236 TREAT THIGH FRACTURE: CPT | Mod: LT

## 2019-09-20 PROCEDURE — 71045 X-RAY EXAM CHEST 1 VIEW: CPT | Mod: 26

## 2019-09-20 PROCEDURE — 99223 1ST HOSP IP/OBS HIGH 75: CPT

## 2019-09-20 PROCEDURE — 73502 X-RAY EXAM HIP UNI 2-3 VIEWS: CPT | Mod: 26,LT

## 2019-09-20 RX ORDER — ONDANSETRON 8 MG/1
4 TABLET, FILM COATED ORAL EVERY 6 HOURS
Refills: 0 | Status: DISCONTINUED | OUTPATIENT
Start: 2019-09-20 | End: 2019-09-26

## 2019-09-20 RX ORDER — DEXTROSE 50 % IN WATER 50 %
25 SYRINGE (ML) INTRAVENOUS ONCE
Refills: 0 | Status: DISCONTINUED | OUTPATIENT
Start: 2019-09-20 | End: 2019-10-08

## 2019-09-20 RX ORDER — SODIUM CHLORIDE 9 MG/ML
1000 INJECTION INTRAMUSCULAR; INTRAVENOUS; SUBCUTANEOUS ONCE
Refills: 0 | Status: COMPLETED | OUTPATIENT
Start: 2019-09-20 | End: 2019-09-21

## 2019-09-20 RX ORDER — DEXTROSE 50 % IN WATER 50 %
12.5 SYRINGE (ML) INTRAVENOUS ONCE
Refills: 0 | Status: DISCONTINUED | OUTPATIENT
Start: 2019-09-20 | End: 2019-09-26

## 2019-09-20 RX ORDER — DEXTROSE 50 % IN WATER 50 %
25 SYRINGE (ML) INTRAVENOUS ONCE
Refills: 0 | Status: DISCONTINUED | OUTPATIENT
Start: 2019-09-20 | End: 2019-09-26

## 2019-09-20 RX ORDER — SODIUM CHLORIDE 9 MG/ML
1000 INJECTION INTRAMUSCULAR; INTRAVENOUS; SUBCUTANEOUS ONCE
Refills: 0 | Status: COMPLETED | OUTPATIENT
Start: 2019-09-21 | End: 2019-09-21

## 2019-09-20 RX ORDER — FENTANYL CITRATE 50 UG/ML
25 INJECTION INTRAVENOUS
Refills: 0 | Status: DISCONTINUED | OUTPATIENT
Start: 2019-09-20 | End: 2019-09-21

## 2019-09-20 RX ORDER — DOCUSATE SODIUM 100 MG
100 CAPSULE ORAL THREE TIMES A DAY
Refills: 0 | Status: DISCONTINUED | OUTPATIENT
Start: 2019-09-20 | End: 2019-09-26

## 2019-09-20 RX ORDER — SENNA PLUS 8.6 MG/1
2 TABLET ORAL DAILY
Refills: 0 | Status: DISCONTINUED | OUTPATIENT
Start: 2019-09-20 | End: 2019-09-25

## 2019-09-20 RX ORDER — LANOLIN ALCOHOL/MO/W.PET/CERES
3 CREAM (GRAM) TOPICAL AT BEDTIME
Refills: 0 | Status: DISCONTINUED | OUTPATIENT
Start: 2019-09-20 | End: 2019-09-26

## 2019-09-20 RX ORDER — SODIUM CHLORIDE 9 MG/ML
1000 INJECTION, SOLUTION INTRAVENOUS
Refills: 0 | Status: DISCONTINUED | OUTPATIENT
Start: 2019-09-20 | End: 2019-09-26

## 2019-09-20 RX ORDER — MORPHINE SULFATE 50 MG/1
2 CAPSULE, EXTENDED RELEASE ORAL ONCE
Refills: 0 | Status: DISCONTINUED | OUTPATIENT
Start: 2019-09-20 | End: 2019-09-20

## 2019-09-20 RX ORDER — DEXTROSE 50 % IN WATER 50 %
15 SYRINGE (ML) INTRAVENOUS ONCE
Refills: 0 | Status: DISCONTINUED | OUTPATIENT
Start: 2019-09-20 | End: 2019-09-26

## 2019-09-20 RX ORDER — LEVOTHYROXINE SODIUM 125 MCG
88 TABLET ORAL DAILY
Refills: 0 | Status: DISCONTINUED | OUTPATIENT
Start: 2019-09-20 | End: 2019-09-26

## 2019-09-20 RX ORDER — OXYCODONE HYDROCHLORIDE 5 MG/1
5 TABLET ORAL EVERY 4 HOURS
Refills: 0 | Status: DISCONTINUED | OUTPATIENT
Start: 2019-09-20 | End: 2019-09-25

## 2019-09-20 RX ORDER — ACETAMINOPHEN 500 MG
975 TABLET ORAL EVERY 8 HOURS
Refills: 0 | Status: DISCONTINUED | OUTPATIENT
Start: 2019-09-20 | End: 2019-09-26

## 2019-09-20 RX ORDER — INSULIN LISPRO 100/ML
VIAL (ML) SUBCUTANEOUS
Refills: 0 | Status: DISCONTINUED | OUTPATIENT
Start: 2019-09-20 | End: 2019-09-26

## 2019-09-20 RX ORDER — ASPIRIN/CALCIUM CARB/MAGNESIUM 324 MG
325 TABLET ORAL
Refills: 0 | Status: DISCONTINUED | OUTPATIENT
Start: 2019-09-20 | End: 2019-09-26

## 2019-09-20 RX ORDER — GLUCAGON INJECTION, SOLUTION 0.5 MG/.1ML
1 INJECTION, SOLUTION SUBCUTANEOUS ONCE
Refills: 0 | Status: DISCONTINUED | OUTPATIENT
Start: 2019-09-20 | End: 2019-10-08

## 2019-09-20 RX ORDER — KETOROLAC TROMETHAMINE 30 MG/ML
15 SYRINGE (ML) INJECTION ONCE
Refills: 0 | Status: DISCONTINUED | OUTPATIENT
Start: 2019-09-20 | End: 2019-09-20

## 2019-09-20 RX ORDER — TIOTROPIUM BROMIDE 18 UG/1
1 CAPSULE ORAL; RESPIRATORY (INHALATION) DAILY
Refills: 0 | Status: DISCONTINUED | OUTPATIENT
Start: 2019-09-20 | End: 2019-09-22

## 2019-09-20 RX ORDER — CEFAZOLIN SODIUM 1 G
2000 VIAL (EA) INJECTION EVERY 8 HOURS
Refills: 0 | Status: COMPLETED | OUTPATIENT
Start: 2019-09-20 | End: 2019-09-21

## 2019-09-20 RX ORDER — OXYCODONE HYDROCHLORIDE 5 MG/1
2.5 TABLET ORAL EVERY 4 HOURS
Refills: 0 | Status: DISCONTINUED | OUTPATIENT
Start: 2019-09-20 | End: 2019-09-25

## 2019-09-20 RX ORDER — SODIUM CHLORIDE 9 MG/ML
1000 INJECTION INTRAMUSCULAR; INTRAVENOUS; SUBCUTANEOUS
Refills: 0 | Status: DISCONTINUED | OUTPATIENT
Start: 2019-09-20 | End: 2019-09-23

## 2019-09-20 RX ORDER — ACETAMINOPHEN 500 MG
975 TABLET ORAL ONCE
Refills: 0 | Status: COMPLETED | OUTPATIENT
Start: 2019-09-20 | End: 2019-09-20

## 2019-09-20 RX ORDER — DIAZEPAM 5 MG
2 TABLET ORAL ONCE
Refills: 0 | Status: DISCONTINUED | OUTPATIENT
Start: 2019-09-20 | End: 2019-09-20

## 2019-09-20 RX ORDER — BUDESONIDE AND FORMOTEROL FUMARATE DIHYDRATE 160; 4.5 UG/1; UG/1
2 AEROSOL RESPIRATORY (INHALATION)
Refills: 0 | Status: DISCONTINUED | OUTPATIENT
Start: 2019-09-20 | End: 2019-10-08

## 2019-09-20 RX ADMIN — BUDESONIDE AND FORMOTEROL FUMARATE DIHYDRATE 2 PUFF(S): 160; 4.5 AEROSOL RESPIRATORY (INHALATION) at 21:27

## 2019-09-20 RX ADMIN — OXYCODONE HYDROCHLORIDE 5 MILLIGRAM(S): 5 TABLET ORAL at 10:44

## 2019-09-20 RX ADMIN — Medication 100 MILLIGRAM(S): at 23:15

## 2019-09-20 RX ADMIN — SODIUM CHLORIDE 125 MILLILITER(S): 9 INJECTION INTRAMUSCULAR; INTRAVENOUS; SUBCUTANEOUS at 09:37

## 2019-09-20 RX ADMIN — Medication 975 MILLIGRAM(S): at 05:15

## 2019-09-20 RX ADMIN — Medication 15 MILLIGRAM(S): at 19:18

## 2019-09-20 RX ADMIN — Medication 15 MILLIGRAM(S): at 19:30

## 2019-09-20 RX ADMIN — Medication 2 MILLIGRAM(S): at 05:15

## 2019-09-20 RX ADMIN — OXYCODONE HYDROCHLORIDE 5 MILLIGRAM(S): 5 TABLET ORAL at 11:50

## 2019-09-20 RX ADMIN — MORPHINE SULFATE 2 MILLIGRAM(S): 50 CAPSULE, EXTENDED RELEASE ORAL at 05:15

## 2019-09-20 RX ADMIN — FENTANYL CITRATE 25 MICROGRAM(S): 50 INJECTION INTRAVENOUS at 19:30

## 2019-09-20 RX ADMIN — Medication 0: at 19:00

## 2019-09-20 RX ADMIN — FENTANYL CITRATE 25 MICROGRAM(S): 50 INJECTION INTRAVENOUS at 19:20

## 2019-09-20 RX ADMIN — Medication 100 MILLIGRAM(S): at 21:28

## 2019-09-20 RX ADMIN — SODIUM CHLORIDE 125 MILLILITER(S): 9 INJECTION INTRAMUSCULAR; INTRAVENOUS; SUBCUTANEOUS at 18:20

## 2019-09-20 NOTE — ED ADULT NURSE NOTE - OBJECTIVE STATEMENT
A&Ox3 S/P fall out of bed no LOC or head trauma. Patient reports L hip pain, shortening and external rotation visualized. +pulses bilaterally. IV placed 22G R forearm. Medicated for pain, report given to primary RN Melissa

## 2019-09-20 NOTE — ED PROVIDER NOTE - OBJECTIVE STATEMENT
85 y/o F with COPD on intermittent 3L home O2 as needed, previous smoker, s/p Cholecystectomy, CKD, DM2, HTN, Hypothyroidism that was BIBEMS from home where she lives alone for [facility] slip and fall that occurred 1 hour prior to arrival. Pt was getting out of bed to go use the bathroom and fell on left side injuring her left hip and leg. Pt reports no head trauma, no LOC, was unable to get up after the injury/fall. No vision/hearing changes, no numbness or tingling in her leg. still has sensation. No chest pain, palpitations, SOB, or weakness prior to fall. Otherwise has been well and able to complete ADLs prior to fall and eating and drinking normally. No pain meds taken prior to arrival.

## 2019-09-20 NOTE — CONSULT NOTE ADULT - PROBLEM SELECTOR RECOMMENDATION 9
-scheduled for left hip hemiarthroplasty today  -RCRI 0, low cardiac risk for intermediate risk surgery. D/t chronic respiratory failure requiring O2, high risk for pulm decompensation, close monitoring of respiratory status per-op recommended. Local anesthesia preferred if possible. No additional testing required

## 2019-09-20 NOTE — PROGRESS NOTE ADULT - SUBJECTIVE AND OBJECTIVE BOX
Patient was seen and examined. She sustained a displaced left femoral neck fracture after a mechanical fall overnight. She has been medically cleared and optimized for surgery. I discussed operative intervention with hip hemiarthroplasty vs. total hip arthroplasty in detail with the patient. Risks of the surgery including but not limited to infection, bleeding, nerve damage, leg length discrepancy, DVT, and dislocation were reviewed with the patient. All questions were answered. The patient has agreed to proceed with left hip hemiarthroplasty. Patient was seen and examined. She sustained a displaced left femoral neck fracture after a mechanical fall overnight. She has been medically cleared and optimized for surgery. I discussed operative intervention with hip hemiarthroplasty vs. total hip arthroplasty in detail with the patient. Risks of the surgery including but not limited to infection, bleeding, nerve damage, leg length discrepancy, DVT, and dislocation were reviewed with the patient. All questions were answered. The patient has agreed to proceed with left hip hemiarthroplasty.     Attempt was made to call emergency contacts however, unsuccessfully.

## 2019-09-20 NOTE — ED PROVIDER NOTE - PROGRESS NOTE DETAILS
Pain improved with meds, Ortho consulted for Left hip fx. Pt to be admitted to ortho for hip replacement.

## 2019-09-20 NOTE — CONSULT NOTE ADULT - SUBJECTIVE AND OBJECTIVE BOX
HPI:  83 yo female with PMH of HTN, Hypothyroid, DMII, CKD stage 3, COPD c/w chronic hypoxic respiratory failure on 3L home O2 present after mechanical fall c/o left hip pain found to have L femoral neck fracture, scheduled for left hip hemiarthroplasty. Medicine consulted for pre-op evaluation.    Pt was getting out of bed to go use the bathroom and fell on left side injuring her left hip and leg. Pt reports no head trauma, no LOC, was unable to get up after the injury/fall. No vision/hearing changes, no numbness or tingling in her leg. No chest pain, palpitations, SOB, or weakness prior to fall. Otherwise has been well and able to complete ADLs prior to fall, walking around the house w/o LOGAN,  and eating and drinking normally.    This am pt reports feeling anxious awaiting surgery, associated with mild SOB. Pain controlled.         PAST MEDICAL & SURGICAL HISTORY:  Chronic kidney disease (CKD) stage 3   COPD (chronic obstructive pulmonary disease)  HTN (hypertension)  Hypothyroid  DM (diabetes mellitus)  Leg fracture: right ORIF with hardware 2010  H/O cataract: bilateral 2013  S/P cholecystectomy      Review of Systems:   CONSTITUTIONAL: No fever, weight loss, or fatigue  EYES: No eye pain, visual disturbances, or discharge  ENMT:  No difficulty hearing, tinnitus, vertigo; No sinus or throat pain  NECK: No pain or stiffness  BREASTS: No pain, masses, or nipple discharge  RESPIRATORY: No cough, wheezing, chills or hemoptysis; + shortness of breath  CARDIOVASCULAR: No chest pain, palpitations, dizziness, + mild leg swelling  GASTROINTESTINAL: No abdominal or epigastric pain. No nausea, vomiting, or hematemesis; No diarrhea or constipation. No melena or hematochezia.  GENITOURINARY: No dysuria, frequency, hematuria, or incontinence  NEUROLOGICAL: No headaches, memory loss, loss of strength, numbness, or tremors  SKIN: No itching, burning, rashes, or lesions   LYMPH NODES: No enlarged glands  ENDOCRINE: No heat or cold intolerance; No hair loss  MUSCULOSKELETAL: No joint pain or swelling; No muscle, back, or extremity pain  PSYCHIATRIC: No depression, anxiety, mood swings, or difficulty sleeping  HEME/LYMPH: No easy bruising, or bleeding gums  ALLERGY AND IMMUNOLOGIC: No hives or eczema    Allergies    No Known Allergies    Intolerances        Social History:   Prior smoker, denies ETOH or illicit drug use     FAMILY HISTORY:  Family history of hypertension: Father  FH: type 2 diabetes mellitus: Father  FHx: rheumatoid arthritis: Mother and Brother      MEDICATIONS  (STANDING):  dextrose 5%. 1000 milliLiter(s) (50 mL/Hr) IV Continuous <Continuous>  dextrose 50% Injectable 12.5 Gram(s) IV Push once  dextrose 50% Injectable 25 Gram(s) IV Push once  dextrose 50% Injectable 25 Gram(s) IV Push once  insulin lispro (HumaLOG) corrective regimen sliding scale   SubCutaneous three times a day before meals  levothyroxine 88 MICROGram(s) Oral daily  sodium chloride 0.9%. 1000 milliLiter(s) (125 mL/Hr) IV Continuous <Continuous>    MEDICATIONS  (PRN):  dextrose 40% Gel 15 Gram(s) Oral once PRN Blood Glucose LESS THAN 70 milliGRAM(s)/deciliter  glucagon  Injectable 1 milliGRAM(s) IntraMuscular once PRN Glucose LESS THAN 70 milligrams/deciliter  melatonin 3 milliGRAM(s) Oral at bedtime PRN Insomnia  oxyCODONE    IR 2.5 milliGRAM(s) Oral every 4 hours PRN Moderate Pain (4 - 6)  oxyCODONE    IR 5 milliGRAM(s) Oral every 4 hours PRN Severe Pain (7 - 10)      T(C): 36.8 (09-20-19 @ 10:02), Max: 36.8 (09-20-19 @ 04:22)  HR: 81 (09-20-19 @ 10:02) (81 - 82)  BP: 140/70 (09-20-19 @ 10:02) (140/70 - 150/60)  RR: 16 (09-20-19 @ 10:02) (16 - 22)  SpO2: 93% (09-20-19 @ 10:02) (93% - 95%)    CAPILLARY BLOOD GLUCOSE      POCT Blood Glucose.: 135 mg/dL (20 Sep 2019 04:29)    I&O's Summary      PHYSICAL EXAM:  GENERAL: elderly woman appears anxious.   HEAD:  Atraumatic, Normocephalic  EYES: EOMI, PERRLA, conjunctiva and sclera clear  NECK: Supple, No elevated JVD  CHEST/LUNG: Tachypneic. Clear to auscultation bilaterally; No wheeze  HEART: Regular rate and rhythm; No murmurs, rubs, or gallops  ABDOMEN: Soft, Nontender, Nondistended; Bowel sounds present  EXTREMITIES:  2+ Peripheral Pulses, No clubbing, cyanosis. Trace LE edema  PSYCH: AAOx3  NEUROLOGY: CN II-XII grossly intact, moving all extremities  SKIN: No rashes or lesions    LABS:                        11.4   14.99 )-----------( 244      ( 20 Sep 2019 05:30 )             35.7     09-20    139  |  99  |  36<H>  ----------------------------<  156<H>  4.7   |  26  |  1.13    Ca    8.9      20 Sep 2019 05:30    TPro  7.8  /  Alb  4.2  /  TBili  0.4  /  DBili  x   /  AST  19  /  ALT  13  /  AlkPhos  96  09-20    PT/INR - ( 20 Sep 2019 05:30 )   PT: 10.8 SEC;   INR: 0.97          PTT - ( 20 Sep 2019 05:30 )  PTT:27.4 SEC      RADIOLOGY & ADDITIONAL TESTS:  CXR- hyper-inflated lungs. No edema      ECG Personally Reviewed - NSR, TWI inferior-lateral leads, unchanged from prior     Echo 8/2019-Eccentric left ventricular hypertrophy (dilated left ventricle with normal relative wall thickness). Normal left ventricular systolic function. No segmental wall motion abnormalities. Normal right ventricular size and function.

## 2019-09-20 NOTE — ED PROVIDER NOTE - CARE PLAN
Principal Discharge DX:	Fall, initial encounter Principal Discharge DX:	Closed fracture of left hip, initial encounter  Secondary Diagnosis:	Fall, initial encounter

## 2019-09-20 NOTE — ED PROVIDER NOTE - PMH
Chronic kidney disease (CKD)    COPD (chronic obstructive pulmonary disease)    DM (diabetes mellitus)    HTN (hypertension)    Hypothyroid

## 2019-09-20 NOTE — H&P ADULT - ASSESSMENT
A/P left Femoral neck fracture         Admit to Ortho 9T Dr Cohn         NPO for left hip hemiarthroplasty         Hospitalist consulted for medical clearance

## 2019-09-20 NOTE — PROGRESS NOTE ADULT - SUBJECTIVE AND OBJECTIVE BOX
Ortho Post-op    Patient was seen and examined at bedside. Denies CP/SOB/Dizziness/N/V/D/HA. Pain is controlled.     Vital Signs Last 24 Hrs  T(C): 36.5 (20 Sep 2019 18:20), Max: 36.9 (20 Sep 2019 12:46)  T(F): 97.7 (20 Sep 2019 18:20), Max: 98.5 (20 Sep 2019 12:46)  HR: 74 (20 Sep 2019 20:15) (69 - 95)  BP: 111/51 (20 Sep 2019 20:15) (100/47 - 215/157)  BP(mean): 67 (20 Sep 2019 20:15) (58 - 185)  RR: 17 (20 Sep 2019 20:15) (16 - 31)  SpO2: 99% (20 Sep 2019 20:15) (92% - 100%)    GEN: NAD  LLE: Dressing C/D/I. abduction pillow in place   Bilat LE: Motor intact + EHL/FHL/TA/GS. Sensation is grossly intact distal . Extremity warm. Compartments are soft. DP 1+    Labs:                          10.4   11.72 )-----------( 185      ( 20 Sep 2019 19:40 )             32.4       09-20    141  |  103  |  28<H>  ----------------------------<  128<H>  4.5   |  26  |  1.19    Ca    8.0<L>      20 Sep 2019 19:40    TPro  7.8  /  Alb  4.2  /  TBili  0.4  /  DBili  x   /  AST  19  /  ALT  13  /  AlkPhos  96  09-20      A/P: Patient is a 84y y/o Female s/p left hip hemiarthroplasty, POD #0    -Pain control/analgesia  -Inc spirometry  -Venodynes  -F/U AM Labs  -PT/OT/WBAT  -Antibiotic periop-ancef  -Anticoagulation-ASA BID  -Hip precautions-posterior  -Continue to monitor. Notify ortho with any questions.

## 2019-09-20 NOTE — ED PROVIDER NOTE - CLINICAL SUMMARY MEDICAL DECISION MAKING FREE TEXT BOX
85 y/o female, with COPD on intermittent 3L home O2 as needed (currently not on O2), Cholecystectomy, CKD, DM2, HTN, Hypothyroidism, presented to the [facility] ED for mech slip and fall 1 hour prior to arrival and has been unable to ambulate since. found to have most likely left hip fx on exam but neurovasc intact. Pulses strong and able to move with pain LLE. Will need labs, imaging and pain control and most likely consult Ortho when XR back.

## 2019-09-20 NOTE — ED ADULT NURSE REASSESSMENT NOTE - NS ED NURSE REASSESS COMMENT FT1
received pt from night RN. Pt aox3. pt reports pain to left hip. pt appears SOB and states she is nervous. supplemental o2 removed and pt satting at 83. NC 4l returned and pt satting at 92%. Admit medicice MD at bedside assessing pt. handoff report given to ESSU2 RN. pt denies chest pain, n/v/d. 22g iv to right forearm intact, NS infusing well.

## 2019-09-20 NOTE — ED ADULT TRIAGE NOTE - CHIEF COMPLAINT QUOTE
Pt says as she was trying to get out of bed she fell and now is c/o pain to left hip. Left leg looks shortened and externally rotated. denies LOC or head injury. Pt has PMH of COPD and is on oxygen 3L at home during sleeping hours. Also PMH of HTN, DM, Hypothyroidism.

## 2019-09-21 LAB
ANION GAP SERPL CALC-SCNC: 11 MMO/L — SIGNIFICANT CHANGE UP (ref 7–14)
BUN SERPL-MCNC: 27 MG/DL — HIGH (ref 7–23)
CALCIUM SERPL-MCNC: 7.8 MG/DL — LOW (ref 8.4–10.5)
CHLORIDE SERPL-SCNC: 108 MMOL/L — HIGH (ref 98–107)
CO2 SERPL-SCNC: 23 MMOL/L — SIGNIFICANT CHANGE UP (ref 22–31)
CREAT SERPL-MCNC: 1.07 MG/DL — SIGNIFICANT CHANGE UP (ref 0.5–1.3)
GLUCOSE BLDC GLUCOMTR-MCNC: 132 MG/DL — HIGH (ref 70–99)
GLUCOSE BLDC GLUCOMTR-MCNC: 177 MG/DL — HIGH (ref 70–99)
GLUCOSE BLDC GLUCOMTR-MCNC: 189 MG/DL — HIGH (ref 70–99)
GLUCOSE SERPL-MCNC: 137 MG/DL — HIGH (ref 70–99)
HCT VFR BLD CALC: 29.8 % — LOW (ref 34.5–45)
HGB BLD-MCNC: 9.2 G/DL — LOW (ref 11.5–15.5)
MCHC RBC-ENTMCNC: 30.9 % — LOW (ref 32–36)
MCHC RBC-ENTMCNC: 31.3 PG — SIGNIFICANT CHANGE UP (ref 27–34)
MCV RBC AUTO: 101.4 FL — HIGH (ref 80–100)
NRBC # FLD: 0.06 K/UL — SIGNIFICANT CHANGE UP (ref 0–0)
PLATELET # BLD AUTO: 169 K/UL — SIGNIFICANT CHANGE UP (ref 150–400)
PMV BLD: 9 FL — SIGNIFICANT CHANGE UP (ref 7–13)
POTASSIUM SERPL-MCNC: 4.1 MMOL/L — SIGNIFICANT CHANGE UP (ref 3.5–5.3)
POTASSIUM SERPL-SCNC: 4.1 MMOL/L — SIGNIFICANT CHANGE UP (ref 3.5–5.3)
RBC # BLD: 2.94 M/UL — LOW (ref 3.8–5.2)
RBC # FLD: 12.8 % — SIGNIFICANT CHANGE UP (ref 10.3–14.5)
SODIUM SERPL-SCNC: 142 MMOL/L — SIGNIFICANT CHANGE UP (ref 135–145)
WBC # BLD: 8.12 K/UL — SIGNIFICANT CHANGE UP (ref 3.8–10.5)
WBC # FLD AUTO: 8.12 K/UL — SIGNIFICANT CHANGE UP (ref 3.8–10.5)

## 2019-09-21 PROCEDURE — 73502 X-RAY EXAM HIP UNI 2-3 VIEWS: CPT | Mod: 26,LT

## 2019-09-21 PROCEDURE — 99233 SBSQ HOSP IP/OBS HIGH 50: CPT

## 2019-09-21 RX ORDER — INFLUENZA VIRUS VACCINE 15; 15; 15; 15 UG/.5ML; UG/.5ML; UG/.5ML; UG/.5ML
0.5 SUSPENSION INTRAMUSCULAR ONCE
Refills: 0 | Status: DISCONTINUED | OUTPATIENT
Start: 2019-09-21 | End: 2019-09-24

## 2019-09-21 RX ORDER — IPRATROPIUM/ALBUTEROL SULFATE 18-103MCG
3 AEROSOL WITH ADAPTER (GRAM) INHALATION EVERY 6 HOURS
Refills: 0 | Status: DISCONTINUED | OUTPATIENT
Start: 2019-09-21 | End: 2019-09-22

## 2019-09-21 RX ADMIN — Medication 100 MILLIGRAM(S): at 06:06

## 2019-09-21 RX ADMIN — Medication 100 MILLIGRAM(S): at 07:25

## 2019-09-21 RX ADMIN — BUDESONIDE AND FORMOTEROL FUMARATE DIHYDRATE 2 PUFF(S): 160; 4.5 AEROSOL RESPIRATORY (INHALATION) at 08:47

## 2019-09-21 RX ADMIN — Medication 975 MILLIGRAM(S): at 12:18

## 2019-09-21 RX ADMIN — Medication 325 MILLIGRAM(S): at 17:17

## 2019-09-21 RX ADMIN — Medication 1: at 16:55

## 2019-09-21 RX ADMIN — Medication 3 MILLILITER(S): at 01:09

## 2019-09-21 RX ADMIN — Medication 975 MILLIGRAM(S): at 01:30

## 2019-09-21 RX ADMIN — Medication 100 MILLIGRAM(S): at 21:02

## 2019-09-21 RX ADMIN — BUDESONIDE AND FORMOTEROL FUMARATE DIHYDRATE 2 PUFF(S): 160; 4.5 AEROSOL RESPIRATORY (INHALATION) at 21:02

## 2019-09-21 RX ADMIN — Medication 3 MILLILITER(S): at 17:08

## 2019-09-21 RX ADMIN — SENNA PLUS 2 TABLET(S): 8.6 TABLET ORAL at 12:18

## 2019-09-21 RX ADMIN — SODIUM CHLORIDE 1000 MILLILITER(S): 9 INJECTION INTRAMUSCULAR; INTRAVENOUS; SUBCUTANEOUS at 01:00

## 2019-09-21 RX ADMIN — Medication 3 MILLILITER(S): at 04:56

## 2019-09-21 RX ADMIN — Medication 3 MILLILITER(S): at 22:40

## 2019-09-21 RX ADMIN — SODIUM CHLORIDE 1000 MILLILITER(S): 9 INJECTION INTRAMUSCULAR; INTRAVENOUS; SUBCUTANEOUS at 06:07

## 2019-09-21 RX ADMIN — Medication 3 MILLIGRAM(S): at 21:09

## 2019-09-21 RX ADMIN — Medication 325 MILLIGRAM(S): at 06:06

## 2019-09-21 RX ADMIN — Medication 975 MILLIGRAM(S): at 19:13

## 2019-09-21 RX ADMIN — Medication 1: at 12:17

## 2019-09-21 RX ADMIN — OXYCODONE HYDROCHLORIDE 5 MILLIGRAM(S): 5 TABLET ORAL at 21:11

## 2019-09-21 RX ADMIN — OXYCODONE HYDROCHLORIDE 5 MILLIGRAM(S): 5 TABLET ORAL at 22:10

## 2019-09-21 RX ADMIN — Medication 3 MILLILITER(S): at 09:30

## 2019-09-21 RX ADMIN — Medication 88 MICROGRAM(S): at 07:26

## 2019-09-21 NOTE — PROVIDER CONTACT NOTE (OTHER) - ACTION/TREATMENT ORDERED:
Duoneb treatments ordered Jaguar Alejandre at bedside at this time. Duoneb nebulizer treatments ordered q6h.

## 2019-09-21 NOTE — PROGRESS NOTE ADULT - SUBJECTIVE AND OBJECTIVE BOX
Ortho Post-op    Patient was seen and examined at bedside. Pain is controlled.   Tachypneic and desatting to low 80s on 3L NC overnight. Improved with Duonebs.     Vitals 24hrs  Vital Signs Last 24 Hrs  T(C): 36.5 (21 Sep 2019 01:00), Max: 36.9 (20 Sep 2019 12:46)  T(F): 97.7 (21 Sep 2019 01:00), Max: 98.5 (20 Sep 2019 12:46)  HR: 91 (21 Sep 2019 01:00) (68 - 95)  BP: 128/69 (21 Sep 2019 01:00) (97/68 - 215/157)  BP(mean): 69 (20 Sep 2019 23:00) (58 - 185)  RR: 22 (21 Sep 2019 01:15) (16 - 33)  SpO2: 92% (21 Sep 2019 01:15) (85% - 100%)      09-20-19 @ 07:01  -  09-21-19 @ 05:03  --------------------------------------------------------  IN: 895 mL / OUT: 525 mL / NET: 370 mL        Lab Results 24hrs:                        10.4   11.72 )-----------( 185      ( 20 Sep 2019 19:40 )             32.4     09-20    141  |  103  |  28<H>  ----------------------------<  128<H>  4.5   |  26  |  1.19    Ca    8.0<L>      20 Sep 2019 19:40    TPro  7.8  /  Alb  4.2  /  TBili  0.4  /  DBili  x   /  AST  19  /  ALT  13  /  AlkPhos  96  09-20      GEN: NAD  LLE: Dressing C/D/I. abduction pillow in place  Bilat LE: Motor intact + EHL/FHL/TA/GS. Sensation is grossly intact distal . Extremity warm. Compartments are soft. DP 1+      A/P: Patient is a 84y y/o Female s/p left hip hemiarthroplasty, POD #1    -Pain control/analgesia  -Inc spirometry  -Venodynes  -F/U AM Labs  -PT/OT/WBAT  -DVT ppx-ASA BID  -Hip precautions-posterior  -3L NC, monitor O2 sat

## 2019-09-21 NOTE — PROGRESS NOTE ADULT - PROBLEM SELECTOR PLAN 2
d/t COPD. currently SpO2 84% RA, 95% on 3L O2 close to baseline  -cont O2 via NC to maintain Spo2 88-92.

## 2019-09-21 NOTE — PROGRESS NOTE ADULT - ASSESSMENT
85 yo female with PMH of HTN, Hypothyroid, DMII, CKD stage 3, COPD c/w chronic hypoxic respiratory failure on 3L home O2 present after mechanical fall c/o left hip pain found to have L femoral neck fracture, s/p left hip hemiarthroplasty, POD 1

## 2019-09-21 NOTE — OCCUPATIONAL THERAPY INITIAL EVALUATION ADULT - PERTINENT HX OF CURRENT PROBLEM, REHAB EVAL
85 yo female s/p mechanical fall c/o left hip pain was brought to [facility] via EMS for eval.  Pt denies syncope, LOC, hitting head.  XR in [facility] ED reveal left FNfx . Pt Hx COPD on 3L O2, CKD, HTN, DM2 , hypothyroid. 84 y.o. female s/p mechanical fall c/o left hip pain was brought to [facility] via EMS for eval.  Pt denies syncope, LOC, hitting head.  XR in [facility] ED reveal left FNfx . Pt Hx COPD on 3L O2, CKD, HTN, DM2 , hypothyroid.

## 2019-09-21 NOTE — PROVIDER CONTACT NOTE (OTHER) - ASSESSMENT
Pt A&Ox4, afebrile. pt respirations 33, with use of accessory muscles. Pt A&Ox4, afebrile. VS- /69, 97.7, HR 91, 85% O2 sat, respirations 33, with use of accessory muscles. pt is on 3L of O2 at this time.

## 2019-09-21 NOTE — PROVIDER CONTACT NOTE (OTHER) - BACKGROUND
pt received from PACU. pt s/p L Hip arthroplasty. Pt has history of COPD and uses 3L of oxygen at baseline.

## 2019-09-21 NOTE — PROVIDER CONTACT NOTE (OTHER) - SITUATION
pt respiration is 33 breaths per minute upon arrival from PACU. pt respiration is 33 breaths per minute and o@ sat of 85% upon arrival from PACU.

## 2019-09-21 NOTE — PROGRESS NOTE ADULT - SUBJECTIVE AND OBJECTIVE BOX
Patient is a 84y old  Female who presents with a chief complaint of left hip fracture s/p fall 1D (21 Sep 2019 05:03)      SUBJECTIVE / OVERNIGHT EVENTS:    Pt had episode of SOB o/n, relieved with duonebs. this am pt reports her breathing is close to her baseline. Denies fever, chills, chest pain, palpitations. Pain controlled    Review of Systems:    RESPIRATORY: No cough, wheezing, chills or hemoptysis; No shortness of breath  CARDIOVASCULAR: No chest pain, palpitations, dizziness, or leg swelling  GASTROINTESTINAL: No abdominal or epigastric pain. No nausea, vomiting, or hematemesis; No diarrhea or constipation. No melena or hematochezia.    MEDICATIONS  (STANDING):  acetaminophen   Tablet .. 975 milliGRAM(s) Oral every 8 hours  ALBUTerol/ipratropium for Nebulization 3 milliLiter(s) Nebulizer every 6 hours  aspirin enteric coated 325 milliGRAM(s) Oral two times a day  buDESOnide  80 MICROgram(s)/formoterol 4.5 MICROgram(s) Inhaler 2 Puff(s) Inhalation two times a day  dextrose 5%. 1000 milliLiter(s) (50 mL/Hr) IV Continuous <Continuous>  dextrose 50% Injectable 12.5 Gram(s) IV Push once  dextrose 50% Injectable 25 Gram(s) IV Push once  dextrose 50% Injectable 25 Gram(s) IV Push once  docusate sodium 100 milliGRAM(s) Oral three times a day  influenza   Vaccine 0.5 milliLiter(s) IntraMuscular once  insulin lispro (HumaLOG) corrective regimen sliding scale   SubCutaneous three times a day before meals  levothyroxine 88 MICROGram(s) Oral daily  senna 2 Tablet(s) Oral daily  sodium chloride 0.9%. 1000 milliLiter(s) (125 mL/Hr) IV Continuous <Continuous>  tiotropium 18 MICROgram(s) Capsule 1 Capsule(s) Inhalation daily    MEDICATIONS  (PRN):  dextrose 40% Gel 15 Gram(s) Oral once PRN Blood Glucose LESS THAN 70 milliGRAM(s)/deciliter  glucagon  Injectable 1 milliGRAM(s) IntraMuscular once PRN Glucose LESS THAN 70 milligrams/deciliter  melatonin 3 milliGRAM(s) Oral at bedtime PRN Insomnia  ondansetron Injectable 4 milliGRAM(s) IV Push every 6 hours PRN Nausea and/or Vomiting  oxyCODONE    IR 2.5 milliGRAM(s) Oral every 4 hours PRN Moderate Pain (4 - 6)  oxyCODONE    IR 5 milliGRAM(s) Oral every 4 hours PRN Severe Pain (7 - 10)      PHYSICAL EXAM:  T(C): 36.8 (19 @ 14:00), Max: 37.7 (19 @ 05:55)  HR: 94 (19 @ 14:00) (68 - 97)  BP: 116/40 (19 @ 14:00) (97/68 - 215/157)  RR: 22 (19 @ 14:00) (16 - 33)  SpO2: 85% (19 @ 14:00) (85% - 100%)  I&O's Summary    20 Sep 2019 07:  -  21 Sep 2019 07:00  --------------------------------------------------------  IN: 895 mL / OUT: 725 mL / NET: 170 mL    21 Sep 2019 07:  -  21 Sep 2019 16:01  --------------------------------------------------------  IN: 0 mL / OUT: 150 mL / NET: -150 mL      GENERAL: elderly woman sitting in bed NAD   HEAD:  Atraumatic, Normocephalic  EYES: EOMI, PERRLA, conjunctiva and sclera clear  NECK: Supple, No elevated JVD  CHEST/LUNG: Tachypneic. Clear to auscultation bilaterally; No wheeze  HEART: Regular rate and rhythm; No murmurs, rubs, or gallops  ABDOMEN: Soft, Nontender, Nondistended; Bowel sounds present. correa in place draining clear yellow urine   EXTREMITIES:  2+ Peripheral Pulses, No clubbing, cyanosis. Trace LE edema  PSYCH: AAOx3  NEUROLOGY: CN II-XII grossly intact, moving all extremities  SKIN: L hip dressing c/d/i     LABS:  CAPILLARY BLOOD GLUCOSE      POCT Blood Glucose.: 189 mg/dL (21 Sep 2019 11:56)  POCT Blood Glucose.: 124 mg/dL (21 Sep 2019 07:44)  POCT Blood Glucose.: 128 mg/dL (20 Sep 2019 19:00)                          9.2    8.12  )-----------( 169      ( 21 Sep 2019 06:15 )             29.8     -    142  |  108<H>  |  27<H>  ----------------------------<  137<H>  4.1   |  23  |  1.07    Ca    7.8<L>      21 Sep 2019 06:15    TPro  7.8  /  Alb  4.2  /  TBili  0.4  /  DBili  x   /  AST  19  /  ALT  13  /  AlkPhos  96  09-    PT/INR - ( 20 Sep 2019 05:30 )   PT: 10.8 SEC;   INR: 0.97          PTT - ( 20 Sep 2019 05:30 )  PTT:27.4 SEC      Urinalysis Basic - ( 20 Sep 2019 16:10 )    Color: LIGHT YELLOW / Appearance: CLEAR / S.018 / pH: 7.0  Gluc: NEGATIVE / Ketone: NEGATIVE  / Bili: NEGATIVE / Urobili: NORMAL   Blood: NEGATIVE / Protein: 50 / Nitrite: NEGATIVE   Leuk Esterase: NEGATIVE / RBC: 0-2 / WBC 0-2   Sq Epi: OCC / Non Sq Epi: x / Bacteria: NEGATIVE        RADIOLOGY & ADDITIONAL TESTS:    Imaging Personally Reviewed:

## 2019-09-21 NOTE — PHYSICAL THERAPY INITIAL EVALUATION ADULT - RANGE OF MOTION EXAMINATION, REHAB EVAL
bilateral upper extremity ROM was WFL (within functional limits)/bilateral lower extremity ROM was WFL (within functional limits) left hip ROM 0-80/bilateral upper extremity ROM was WFL (within functional limits)/Right LE ROM was WFL (within functional limits)

## 2019-09-22 LAB
ANION GAP SERPL CALC-SCNC: 11 MMO/L — SIGNIFICANT CHANGE UP (ref 7–14)
BACTERIA UR CULT: SIGNIFICANT CHANGE UP
BUN SERPL-MCNC: 25 MG/DL — HIGH (ref 7–23)
CALCIUM SERPL-MCNC: 8 MG/DL — LOW (ref 8.4–10.5)
CHLORIDE SERPL-SCNC: 105 MMOL/L — SIGNIFICANT CHANGE UP (ref 98–107)
CO2 SERPL-SCNC: 22 MMOL/L — SIGNIFICANT CHANGE UP (ref 22–31)
CREAT SERPL-MCNC: 1.14 MG/DL — SIGNIFICANT CHANGE UP (ref 0.5–1.3)
GLUCOSE BLDC GLUCOMTR-MCNC: 135 MG/DL — HIGH (ref 70–99)
GLUCOSE BLDC GLUCOMTR-MCNC: 136 MG/DL — HIGH (ref 70–99)
GLUCOSE BLDC GLUCOMTR-MCNC: 157 MG/DL — HIGH (ref 70–99)
GLUCOSE BLDC GLUCOMTR-MCNC: 180 MG/DL — HIGH (ref 70–99)
GLUCOSE SERPL-MCNC: 136 MG/DL — HIGH (ref 70–99)
HCT VFR BLD CALC: 26.6 % — LOW (ref 34.5–45)
HGB BLD-MCNC: 8.3 G/DL — LOW (ref 11.5–15.5)
MCHC RBC-ENTMCNC: 31.2 % — LOW (ref 32–36)
MCHC RBC-ENTMCNC: 31.7 PG — SIGNIFICANT CHANGE UP (ref 27–34)
MCV RBC AUTO: 101.5 FL — HIGH (ref 80–100)
NRBC # FLD: 0.02 K/UL — SIGNIFICANT CHANGE UP (ref 0–0)
PLATELET # BLD AUTO: 161 K/UL — SIGNIFICANT CHANGE UP (ref 150–400)
PMV BLD: 9.2 FL — SIGNIFICANT CHANGE UP (ref 7–13)
POTASSIUM SERPL-MCNC: 4.2 MMOL/L — SIGNIFICANT CHANGE UP (ref 3.5–5.3)
POTASSIUM SERPL-SCNC: 4.2 MMOL/L — SIGNIFICANT CHANGE UP (ref 3.5–5.3)
RBC # BLD: 2.62 M/UL — LOW (ref 3.8–5.2)
RBC # FLD: 12.7 % — SIGNIFICANT CHANGE UP (ref 10.3–14.5)
SODIUM SERPL-SCNC: 138 MMOL/L — SIGNIFICANT CHANGE UP (ref 135–145)
SPECIMEN SOURCE: SIGNIFICANT CHANGE UP
WBC # BLD: 10.23 K/UL — SIGNIFICANT CHANGE UP (ref 3.8–10.5)
WBC # FLD AUTO: 10.23 K/UL — SIGNIFICANT CHANGE UP (ref 3.8–10.5)

## 2019-09-22 PROCEDURE — 99233 SBSQ HOSP IP/OBS HIGH 50: CPT

## 2019-09-22 PROCEDURE — 73501 X-RAY EXAM HIP UNI 1 VIEW: CPT | Mod: 26,LT

## 2019-09-22 RX ORDER — IPRATROPIUM/ALBUTEROL SULFATE 18-103MCG
3 AEROSOL WITH ADAPTER (GRAM) INHALATION EVERY 6 HOURS
Refills: 0 | Status: DISCONTINUED | OUTPATIENT
Start: 2019-09-22 | End: 2019-09-26

## 2019-09-22 RX ORDER — ALBUTEROL 90 UG/1
2 AEROSOL, METERED ORAL EVERY 6 HOURS
Refills: 0 | Status: DISCONTINUED | OUTPATIENT
Start: 2019-09-22 | End: 2019-10-08

## 2019-09-22 RX ORDER — SODIUM CHLORIDE 9 MG/ML
500 INJECTION INTRAMUSCULAR; INTRAVENOUS; SUBCUTANEOUS ONCE
Refills: 0 | Status: COMPLETED | OUTPATIENT
Start: 2019-09-22 | End: 2019-09-22

## 2019-09-22 RX ADMIN — Medication 325 MILLIGRAM(S): at 05:29

## 2019-09-22 RX ADMIN — OXYCODONE HYDROCHLORIDE 5 MILLIGRAM(S): 5 TABLET ORAL at 09:05

## 2019-09-22 RX ADMIN — SENNA PLUS 2 TABLET(S): 8.6 TABLET ORAL at 12:09

## 2019-09-22 RX ADMIN — OXYCODONE HYDROCHLORIDE 5 MILLIGRAM(S): 5 TABLET ORAL at 09:50

## 2019-09-22 RX ADMIN — Medication 975 MILLIGRAM(S): at 12:10

## 2019-09-22 RX ADMIN — Medication 3 MILLILITER(S): at 04:55

## 2019-09-22 RX ADMIN — Medication 100 MILLIGRAM(S): at 14:45

## 2019-09-22 RX ADMIN — Medication 325 MILLIGRAM(S): at 21:05

## 2019-09-22 RX ADMIN — Medication 975 MILLIGRAM(S): at 03:51

## 2019-09-22 RX ADMIN — BUDESONIDE AND FORMOTEROL FUMARATE DIHYDRATE 2 PUFF(S): 160; 4.5 AEROSOL RESPIRATORY (INHALATION) at 09:07

## 2019-09-22 RX ADMIN — Medication 100 MILLIGRAM(S): at 05:29

## 2019-09-22 RX ADMIN — Medication 88 MICROGRAM(S): at 05:29

## 2019-09-22 RX ADMIN — Medication 1: at 12:12

## 2019-09-22 RX ADMIN — Medication 3 MILLIGRAM(S): at 22:18

## 2019-09-22 RX ADMIN — ALBUTEROL 2 PUFF(S): 90 AEROSOL, METERED ORAL at 17:55

## 2019-09-22 RX ADMIN — Medication 3 MILLILITER(S): at 12:37

## 2019-09-22 RX ADMIN — SODIUM CHLORIDE 1000 MILLILITER(S): 9 INJECTION INTRAMUSCULAR; INTRAVENOUS; SUBCUTANEOUS at 03:04

## 2019-09-22 RX ADMIN — BUDESONIDE AND FORMOTEROL FUMARATE DIHYDRATE 2 PUFF(S): 160; 4.5 AEROSOL RESPIRATORY (INHALATION) at 21:02

## 2019-09-22 RX ADMIN — Medication 100 MILLIGRAM(S): at 21:03

## 2019-09-22 RX ADMIN — Medication 3 MILLILITER(S): at 19:33

## 2019-09-22 NOTE — PROGRESS NOTE ADULT - SUBJECTIVE AND OBJECTIVE BOX
Orthopaedic Surgery Progress Note    Subjective:   Patient seen and examined, no acute events overnight, not complaining of any pain, denies fevers/chills, chest pain, SOB, numbness/tingling.  Postop xrays attempted yesterday but pt refused d/t being too tired, agreed to obtain this AM.     Objective:  T(C): 37.1 (19 @ 01:28), Max: 37.7 (19 @ 05:55)  HR: 78 (19 @ 04:55) (70 - 97)  BP: 122/43 (19 @ 01:28) (110/47 - 127/42)  RR: 24 (19 @ 01:28) (19 - 24)  SpO2: 97% (19 @ 01:28) (85% - 98%)  Wt(kg): --     @ 07:01  -   @ 07:00  --------------------------------------------------------  IN: 895 mL / OUT: 725 mL / NET: 170 mL     @ 07:01  -   @ 05:27  --------------------------------------------------------  IN: 0 mL / OUT: 875 mL / NET: -875 mL        PE    NAD  LLE:   dressing C/D/I  motor intact GS/TA/EHL  SILT S/S/SP/DP  WWP                          9.2    8.12  )-----------( 169      ( 21 Sep 2019 06:15 )             29.8         142  |  108<H>  |  27<H>  ----------------------------<  137<H>  4.1   |  23  |  1.07    Ca    7.8<L>      21 Sep 2019 06:15    TPro  7.8  /  Alb  4.2  /  TBili  0.4  /  DBili  x   /  AST  19  /  ALT  13  /  AlkPhos  96  09-20    PT/INR - ( 20 Sep 2019 05:30 )   PT: 10.8 SEC;   INR: 0.97          PTT - ( 20 Sep 2019 05:30 )  PTT:27.4 SEC  Urinalysis Basic - ( 20 Sep 2019 16:10 )    Color: LIGHT YELLOW / Appearance: CLEAR / S.018 / pH: 7.0  Gluc: NEGATIVE / Ketone: NEGATIVE  / Bili: NEGATIVE / Urobili: NORMAL   Blood: NEGATIVE / Protein: 50 / Nitrite: NEGATIVE   Leuk Esterase: NEGATIVE / RBC: 0-2 / WBC 0-2   Sq Epi: OCC / Non Sq Epi: x / Bacteria: NEGATIVE

## 2019-09-22 NOTE — PROVIDER CONTACT NOTE (OTHER) - ASSESSMENT
Pt. alert and oriented, mostly sleeping, easily arousable to voice. On cont. O2 at 4LPM via NC, HR in 110's, temp 100F, RR 20-24/min. Pt. received nebu treatment about 2 hrs. Also noted pt. using accessory muscles to breathe.

## 2019-09-22 NOTE — PROVIDER CONTACT NOTE (OTHER) - SITUATION
Noted with pt.'s SPO2 82-86% on supplemental O2 at 4LPM, Pt. tachypneic, in low 110's. Temp 100F, Resp 20-24/min Noted with pt.'s SPO2 82-86% on supplemental O2 at 4LPM, Pt. tachypneic, RR 20-24/min, HR in low 110's. Temp 100F.

## 2019-09-22 NOTE — PROGRESS NOTE ADULT - ASSESSMENT
85 yo female with PMH of HTN, Hypothyroid, DMII, CKD stage 3, COPD c/w chronic hypoxic respiratory failure on 3L home O2 present after mechanical fall c/o left hip pain found to have L femoral neck fracture, s/p left hip hemiarthroplasty, POD 2

## 2019-09-22 NOTE — PROVIDER CONTACT NOTE (OTHER) - ACTION/TREATMENT ORDERED:
Ordered O2 via mask at 50% if SPO2 <88%. Same done. Will notify provider if it does not get better. Will continue to monitor.

## 2019-09-22 NOTE — PROVIDER CONTACT NOTE (OTHER) - ASSESSMENT
pt appears to be using accessory muscles when breathing, looks at though her effort to breath is very high, pt states she feels okay

## 2019-09-22 NOTE — PROVIDER CONTACT NOTE (OTHER) - ACTION/TREATMENT ORDERED:
Daily Progress Note    Time In: 0800      Time Out: 900      ICD-10-CM ICD-9-CM   1. S/P rotator cuff repair Z98.890 V45.89       Subjective   Pt reports he is doing ok this date, no new complaints  Pre treatment pain    1/10  Post treatment pain  0/10    Visits 16/19   Recert Date 9/1/17   MD appointment    Pt rated % of improvement 60       Objective    NAD    AROM:       PROM L shoulder flexion 140°, abd 123°  AROM L shoulder flexion 92°, abd 75°                           PROCEDURES AND MODALITIES:            Ice  Ice Applied: Yes  Location: L shoulder  Rx Minutes: 15 mins  Ice S/P Rx: Yes    Electrical Stimulation  Stimulation Type: IFC  Location/Electrode Placement/Other: L shoulder  Rx Minutes: 15 mins                   EXERCISE  Exercise 1  Exercise Name 1: PRO2 fwd/bck  Equipment/Resistance 1: 4.0  Time: 10  Exercise 2  Exercise Name 2: Pulleys  Sets/Reps 2: 4 Exercise 3  Exercise Name 3: PROM flexion abd  Time 3: 10 Exercise 4  Exercise Name 4: scap squeeze  Sets/Reps 4: 20 Exercise 5  Exercise Name 5: no money  Equipment/Resistance 5: yellow tb  Sets/Reps 5: 20 Exercise 6  Exercise Name 6: Timothy shoulder ext  Equipment/Resistance 6: red tb  Sets/Reps 6: 20   Exercise 7  Exercise Name 7: wall slides flex abd  Sets/Reps 7: 20 Exercise 8  Exercise Name 8: md note                                       MANUAL PT:                    Manual PT [id number] 10 minutes and Therapy Exercise [id number] 30 minutes    Total Treatment Time: 60 Minutes    Assessment/Plan   Pt had increase in ROM this visit, Pt progressing slowly to met unmet goals  Progress per Plan of Care             Liborio Mckeon PTA  Physical Therapist       Keep monitoring pt., maintain SPO2 > 85%.

## 2019-09-22 NOTE — PROVIDER CONTACT NOTE (OTHER) - SITUATION
Pt. using accessory muscle while breathing, is on O2 at 4LPM via NC. pt. non-compliant with Venti mask at 50% O2.

## 2019-09-22 NOTE — PROGRESS NOTE ADULT - SUBJECTIVE AND OBJECTIVE BOX
Patient is a 84y old  Female who presents with a chief complaint of left hip fracture s/p fall 1D (22 Sep 2019 05:27)      SUBJECTIVE / OVERNIGHT EVENTS:    No acute event o/n. Pt is feeling well. Denies fever, chills, sob, chest pain.     Review of Systems:    RESPIRATORY: No cough, wheezing, chills or hemoptysis; No shortness of breath  CARDIOVASCULAR: No chest pain, palpitations, dizziness, or leg swelling  GASTROINTESTINAL: No abdominal or epigastric pain. No nausea, vomiting, or hematemesis; No diarrhea or constipation. No melena or hematochezia.    MEDICATIONS  (STANDING):  acetaminophen   Tablet .. 975 milliGRAM(s) Oral every 8 hours  ALBUTerol/ipratropium for Nebulization 3 milliLiter(s) Nebulizer every 6 hours  aspirin enteric coated 325 milliGRAM(s) Oral two times a day  buDESOnide  80 MICROgram(s)/formoterol 4.5 MICROgram(s) Inhaler 2 Puff(s) Inhalation two times a day  dextrose 5%. 1000 milliLiter(s) (50 mL/Hr) IV Continuous <Continuous>  dextrose 50% Injectable 12.5 Gram(s) IV Push once  dextrose 50% Injectable 25 Gram(s) IV Push once  dextrose 50% Injectable 25 Gram(s) IV Push once  docusate sodium 100 milliGRAM(s) Oral three times a day  influenza   Vaccine 0.5 milliLiter(s) IntraMuscular once  insulin lispro (HumaLOG) corrective regimen sliding scale   SubCutaneous three times a day before meals  levothyroxine 88 MICROGram(s) Oral daily  senna 2 Tablet(s) Oral daily  sodium chloride 0.9%. 1000 milliLiter(s) (125 mL/Hr) IV Continuous <Continuous>  tiotropium 18 MICROgram(s) Capsule 1 Capsule(s) Inhalation daily    MEDICATIONS  (PRN):  dextrose 40% Gel 15 Gram(s) Oral once PRN Blood Glucose LESS THAN 70 milliGRAM(s)/deciliter  glucagon  Injectable 1 milliGRAM(s) IntraMuscular once PRN Glucose LESS THAN 70 milligrams/deciliter  melatonin 3 milliGRAM(s) Oral at bedtime PRN Insomnia  ondansetron Injectable 4 milliGRAM(s) IV Push every 6 hours PRN Nausea and/or Vomiting  oxyCODONE    IR 2.5 milliGRAM(s) Oral every 4 hours PRN Moderate Pain (4 - 6)  oxyCODONE    IR 5 milliGRAM(s) Oral every 4 hours PRN Severe Pain (7 - 10)      PHYSICAL EXAM:  T(C): 36.7 (19 @ 05:27), Max: 37.1 (19 @ 01:28)  HR: 80 (19 @ 05:27) (70 - 97)  BP: 113/49 (19 @ 05:27) (110/47 - 127/42)  RR: 20 (19 @ 05:27) (19 - 24)  SpO2: 94% (19 @ 05:27) (85% - 98%)  I&O's Summary    21 Sep 2019 07:01  -  22 Sep 2019 07:00  --------------------------------------------------------  IN: 0 mL / OUT: 875 mL / NET: -875 mL      GENERAL: elderly woman sitting in bed NAD   HEAD:  Atraumatic, Normocephalic  EYES: EOMI, PERRLA, conjunctiva and sclera clear  NECK: Supple, No elevated JVD  CHEST/LUNG: Tachypneic. Clear to auscultation bilaterally; No wheeze  HEART: Regular rate and rhythm; No murmurs, rubs, or gallops  ABDOMEN: Soft, Nontender, Nondistended; Bowel sounds present. correa in place draining clear yellow urine   EXTREMITIES:  2+ Peripheral Pulses, No clubbing, cyanosis. Trace LE edema  PSYCH: AAOx3  NEUROLOGY: CN II-XII grossly intact, moving all extremities  SKIN: L hip dressing c/d/i       LABS:  CAPILLARY BLOOD GLUCOSE      POCT Blood Glucose.: 136 mg/dL (22 Sep 2019 07:35)  POCT Blood Glucose.: 132 mg/dL (21 Sep 2019 22:04)  POCT Blood Glucose.: 177 mg/dL (21 Sep 2019 16:43)  POCT Blood Glucose.: 189 mg/dL (21 Sep 2019 11:56)                          8.3    10.23 )-----------( 161      ( 22 Sep 2019 07:06 )             26.6     09-21    142  |  108<H>  |  27<H>  ----------------------------<  137<H>  4.1   |  23  |  1.07    Ca    7.8<L>      21 Sep 2019 06:15            Urinalysis Basic - ( 20 Sep 2019 16:10 )    Color: LIGHT YELLOW / Appearance: CLEAR / S.018 / pH: 7.0  Gluc: NEGATIVE / Ketone: NEGATIVE  / Bili: NEGATIVE / Urobili: NORMAL   Blood: NEGATIVE / Protein: 50 / Nitrite: NEGATIVE   Leuk Esterase: NEGATIVE / RBC: 0-2 / WBC 0-2   Sq Epi: OCC / Non Sq Epi: x / Bacteria: NEGATIVE        RADIOLOGY & ADDITIONAL TESTS:    Imaging Personally Reviewed:

## 2019-09-22 NOTE — PROVIDER CONTACT NOTE (OTHER) - ASSESSMENT
Repositioned in bed, O2 administered via NC, as pt. keeps removing venti mask, stated feels hot and suffocating. Compliant with NC. Currently SPO2 maintained at 88-92% at 4LPM via NC.

## 2019-09-22 NOTE — PROVIDER CONTACT NOTE (OTHER) - BACKGROUND
Pt. using accessory muscle while breathing, is on O2 at 4LPM via NC. pt. non-compliant with Venti mask at 50% O2 as per order. Pt. still tachypneic 20-24/min, HR high 100's to low 110's.

## 2019-09-22 NOTE — PROGRESS NOTE ADULT - ASSESSMENT
84y Female s/p L basilio for FN fx    - Pain control  - WBAT  - Posterior dislocation precautions  - PT/OT/OOB  - DVT ppx: ASA BID  - xray xross table lateral this AM  - Dispo planning - rehab placement

## 2019-09-23 ENCOUNTER — TRANSCRIPTION ENCOUNTER (OUTPATIENT)
Age: 84
End: 2019-09-23

## 2019-09-23 LAB
GLUCOSE BLDC GLUCOMTR-MCNC: 127 MG/DL — HIGH (ref 70–99)
GLUCOSE BLDC GLUCOMTR-MCNC: 153 MG/DL — HIGH (ref 70–99)
GLUCOSE BLDC GLUCOMTR-MCNC: 159 MG/DL — HIGH (ref 70–99)
GLUCOSE BLDC GLUCOMTR-MCNC: 185 MG/DL — HIGH (ref 70–99)

## 2019-09-23 PROCEDURE — 99233 SBSQ HOSP IP/OBS HIGH 50: CPT

## 2019-09-23 RX ORDER — LANOLIN ALCOHOL/MO/W.PET/CERES
1 CREAM (GRAM) TOPICAL
Qty: 0 | Refills: 0 | DISCHARGE
Start: 2019-09-23

## 2019-09-23 RX ORDER — LEVOTHYROXINE SODIUM 125 MCG
1 TABLET ORAL
Qty: 0 | Refills: 0 | DISCHARGE

## 2019-09-23 RX ORDER — OXYCODONE HYDROCHLORIDE 5 MG/1
1 TABLET ORAL
Qty: 0 | Refills: 0 | DISCHARGE
Start: 2019-09-23

## 2019-09-23 RX ORDER — ALBUTEROL 90 UG/1
2 AEROSOL, METERED ORAL
Qty: 0 | Refills: 0 | DISCHARGE
Start: 2019-09-23

## 2019-09-23 RX ORDER — ACETAMINOPHEN 500 MG
3 TABLET ORAL
Qty: 0 | Refills: 0 | DISCHARGE
Start: 2019-09-23

## 2019-09-23 RX ORDER — OXYCODONE HYDROCHLORIDE 5 MG/1
2.5 TABLET ORAL
Qty: 0 | Refills: 0 | DISCHARGE
Start: 2019-09-23

## 2019-09-23 RX ORDER — ASCORBIC ACID 60 MG
500 TABLET,CHEWABLE ORAL DAILY
Refills: 0 | Status: DISCONTINUED | OUTPATIENT
Start: 2019-09-23 | End: 2019-09-26

## 2019-09-23 RX ORDER — DOCUSATE SODIUM 100 MG
1 CAPSULE ORAL
Qty: 0 | Refills: 0 | DISCHARGE
Start: 2019-09-23

## 2019-09-23 RX ORDER — FAMOTIDINE 10 MG/ML
1 INJECTION INTRAVENOUS
Qty: 0 | Refills: 0 | DISCHARGE
Start: 2019-09-23

## 2019-09-23 RX ORDER — ACETAMINOPHEN 500 MG
2 TABLET ORAL
Qty: 0 | Refills: 0 | DISCHARGE
Start: 2019-09-23

## 2019-09-23 RX ORDER — ASPIRIN/CALCIUM CARB/MAGNESIUM 324 MG
1 TABLET ORAL
Qty: 0 | Refills: 0 | DISCHARGE
Start: 2019-09-23

## 2019-09-23 RX ORDER — SENNA PLUS 8.6 MG/1
2 TABLET ORAL
Qty: 0 | Refills: 0 | DISCHARGE
Start: 2019-09-23

## 2019-09-23 RX ORDER — ASCORBIC ACID 60 MG
1 TABLET,CHEWABLE ORAL
Qty: 0 | Refills: 0 | DISCHARGE
Start: 2019-09-23

## 2019-09-23 RX ORDER — FAMOTIDINE 10 MG/ML
20 INJECTION INTRAVENOUS DAILY
Refills: 0 | Status: DISCONTINUED | OUTPATIENT
Start: 2019-09-23 | End: 2019-09-26

## 2019-09-23 RX ADMIN — Medication 1: at 11:58

## 2019-09-23 RX ADMIN — SENNA PLUS 2 TABLET(S): 8.6 TABLET ORAL at 11:59

## 2019-09-23 RX ADMIN — Medication 88 MICROGRAM(S): at 05:43

## 2019-09-23 RX ADMIN — Medication 3 MILLILITER(S): at 23:26

## 2019-09-23 RX ADMIN — FAMOTIDINE 20 MILLIGRAM(S): 10 INJECTION INTRAVENOUS at 11:59

## 2019-09-23 RX ADMIN — Medication 325 MILLIGRAM(S): at 17:12

## 2019-09-23 RX ADMIN — Medication 975 MILLIGRAM(S): at 19:16

## 2019-09-23 RX ADMIN — Medication 1: at 17:12

## 2019-09-23 RX ADMIN — Medication 3 MILLILITER(S): at 17:03

## 2019-09-23 RX ADMIN — Medication 325 MILLIGRAM(S): at 05:45

## 2019-09-23 RX ADMIN — Medication 3 MILLILITER(S): at 04:49

## 2019-09-23 RX ADMIN — Medication 100 MILLIGRAM(S): at 22:08

## 2019-09-23 RX ADMIN — BUDESONIDE AND FORMOTEROL FUMARATE DIHYDRATE 2 PUFF(S): 160; 4.5 AEROSOL RESPIRATORY (INHALATION) at 11:18

## 2019-09-23 RX ADMIN — Medication 3 MILLILITER(S): at 11:18

## 2019-09-23 RX ADMIN — Medication 500 MILLIGRAM(S): at 11:59

## 2019-09-23 RX ADMIN — BUDESONIDE AND FORMOTEROL FUMARATE DIHYDRATE 2 PUFF(S): 160; 4.5 AEROSOL RESPIRATORY (INHALATION) at 22:07

## 2019-09-23 RX ADMIN — Medication 1 TABLET(S): at 11:59

## 2019-09-23 RX ADMIN — Medication 100 MILLIGRAM(S): at 05:43

## 2019-09-23 RX ADMIN — Medication 975 MILLIGRAM(S): at 11:59

## 2019-09-23 RX ADMIN — Medication 975 MILLIGRAM(S): at 03:39

## 2019-09-23 NOTE — PROGRESS NOTE ADULT - PROBLEM SELECTOR PLAN 2
Due to COPD. Pt likely has atelectasis.   -instructed pt on incentive spirometer use at bedside  -cont O2 via NC to maintain Spo2 88-92.

## 2019-09-23 NOTE — PROGRESS NOTE ADULT - SUBJECTIVE AND OBJECTIVE BOX
Pt seen/examined. Doing well. Pain controlled. No acute overnight complaints or events.    T(C): 37 (09-23-19 @ 05:38), Max: 37.8 (09-22-19 @ 20:25)  HR: 91 (09-23-19 @ 05:38) (78 - 115)  BP: 118/46 (09-23-19 @ 05:38) (116/42 - 139/51)  RR: 20 (09-23-19 @ 05:38) (20 - 24)  SpO2: 95% (09-23-19 @ 05:38) (90% - 100%)  Wt(kg): --    - Gen: NAD  - LLE: Dressing C/D/I; SILT TN/SPN/DPN/SN; TA/EHL/GS intact    84yFemale s/p L HHA    - Pain control  - DVT ppx  - OOB/PT  - DC planning

## 2019-09-23 NOTE — DISCHARGE NOTE PROVIDER - REASON FOR ADMISSION
2018  Previously preserved EF  Her symptoms are new since post colonoscopy, she had GI bleed, Hgb 12 last.  Check Lexiscan and TTE. Continue DAPT. BP and HR well controlled. Discussed symptoms requiring emergency care.      Disposition:  6 months           Electronically signed by Purnima Salazar MD   8/28/2019 at 2:58 PM mechanical fall sustained Left femoral neck fracture  surgery Left hip hemiarthroplasty 9/20/2019 mechanical fall sustained Left femoral neck fracture and underwent surgery Left hip hemiarthroplasty 9/20/2019 left hip fracture s/p fall 1D

## 2019-09-23 NOTE — DISCHARGE NOTE NURSING/CASE MANAGEMENT/SOCIAL WORK - PATIENT PORTAL LINK FT
You can access the FollowMyHealth Patient Portal offered by [facility] by registering at the following website: [email or web address]. By joining [facility]’s FollowMyHealth portal, you will also be able to view your health information using other applications (apps) compatible with our system.

## 2019-09-23 NOTE — DISCHARGE NOTE PROVIDER - NSDCACTIVITY_GEN_ALL_CORE
Walking - Outdoors allowed/Do not make important decisions/No heavy lifting/straining/Do not drive or operate machinery/Walking - Indoors allowed

## 2019-09-23 NOTE — DISCHARGE NOTE PROVIDER - CARE PROVIDERS DIRECT ADDRESSES
,DirectAddress_Unknown ,DirectAddress_Unknown,[email or web address]

## 2019-09-23 NOTE — DISCHARGE NOTE NURSING/CASE MANAGEMENT/SOCIAL WORK - NSDCPECAREGIVERED_GEN_ALL_CORE
Medline and carenotes for surgical procedure Posterior Hip as well as DC Medications and side effects, COPD literature for patient reference post-operatively. Medline and carenotes for Hip Fracture, surgical procedure Posterior Hip as well as DC Medications and side effects, COPD literature for patient reference post-operatively.

## 2019-09-23 NOTE — DISCHARGE NOTE PROVIDER - HOSPITAL COURSE
84year old is admitted for mechanical fall sustained Left femoral neck fracture and underwent surgery Left hip hemiarthroplasty 9/20/2019 without any intraoperative complications.  Patient is doing well and stable for discharge.  Patient is tolerating physical therapy: weight bearing to Left leg, out of bed for gait training, STRICT POSTERIOR HIP PRECAUTIONS and exercises as shown by Physical Therapy.  Keep wound dressing on as is until day of office visit.  Staples/sutures if applicable, to be removed in the office 14 days from date of surgery.  Patient is on Aspirin (MUST TAKE WITH FOOD) for anticoagulation.  Orthopaedic Surgeon Dr. Saul Jr. would like patient to call private MD/Internist for appointment postop to maintain continuity of care.  Follow up with Dr. Saul Jr.'s office in 1-2 weeks. 84year old is admitted for mechanical fall sustained Left femoral neck fracture and underwent surgery Left hip hemiarthroplasty 9/20/2019 without any intraoperative complications.  Patient is doing well and stable for discharge.  Patient is tolerating physical therapy: weight bearing to Left leg, out of bed for gait training, STRICT POSTERIOR HIP PRECAUTIONS and exercises as shown by Physical Therapy.  Keep wound dressing on as is until day of office visit.  Staples/sutures if applicable, to be removed in the office 14 days from date of surgery.  Patient is on Aspirin (MUST TAKE WITH FOOD) for anticoagulation, antibiotic Ceftin 3day course for urinary tract infection, and prednisone taper for gradual ween off of solucortef while patient was in ICU.  Patient was in ICU for COPD exacerabation for which patient was intubated, now doing well.  Orthopaedic Surgeon Dr. Saul Jr. would like patient to call private MD/Internist for appointment postop to maintain continuity of care.  Follow up with Dr. Saul Jr.'s office in 1-2 weeks. 84year old is admitted for mechanical fall sustained Left femoral neck fracture and underwent surgery Left hip hemiarthroplasty 9/20/2019 without any intraoperative complications.  Patient is doing well and stable for discharge.  Patient is tolerating physical therapy: weight bearing to Left leg, out of bed for gait training, STRICT POSTERIOR HIP PRECAUTIONS and exercises as shown by Physical Therapy.  Keep wound dressing on as is until day of office visit.  Staples removed already on day 14 from date of surgery .  Patient is on Aspirin (MUST TAKE WITH FOOD) for anticoagulation, antibiotic Ceftin 3day course (last day 10/5/2019) for urinary tract infection, and prednisone taper for gradual ween off of solucortef while patient was in ICU.  Patient was in ICU for COPD exacerabation for which patient was intubated, now doing well.  Orthopaedic Surgeon Dr. Saul Jr. would like patient to call private MD/Internist for appointment postop to maintain continuity of care.  Follow up with Dr. Saul Jr.'s office in 1-2 weeks. 84year old is admitted for mechanical fall sustained Left femoral neck fracture and underwent surgery Left hip hemiarthroplasty 9/20/2019 without any intraoperative complications.  Patient is doing well and stable for discharge.  Patient is tolerating physical therapy: weight bearing to Left leg, out of bed for gait training, STRICT POSTERIOR HIP PRECAUTIONS and exercises as shown by Physical Therapy.  Keep wound dressing on as is until day of office visit.  Staples removed already on day 14 from date of surgery .  Patient is on Aspirin (MUST TAKE WITH FOOD) for anticoagulation, antibiotic Ceftin 3day course (last day 10/5/2019) for urinary tract infection, and prednisone taper for gradual ween off of solucortef while patient was in ICU.  Patient was in ICU for COPD exacerabation for which patient was intubated, now doing well.  Patient was seen by Pulmonary consult: Hypercarbic and hypoxemic respiratory failure - resolved, hypoxemia was likely 2/2 to combination between established V/Q mismatch from COPD and pulmonary edema with associated basal atelectasis, was treated with lasix and steroid taper, continue with symbicort and spiriva, titrate supplemental O2 to Spo2 > 88% and less than 94%, needs to use IS (incentive spirometry) 10x/hour daily to prevent post-operative and now post-ICU PNA.  Orthopaedic Surgeon Dr. Saul Jr. would like patient to call private MD/Internist for appointment postop to maintain continuity of care.  Follow up with Dr. Saul Jr.'s office in 1-2 weeks. 84year old is admitted for mechanical fall sustained Left femoral neck fracture and underwent surgery Left hip hemiarthroplasty 9/20/2019 without any intraoperative complications.  Patient is doing well and stable for discharge.  Patient is tolerating physical therapy: weight bearing to Left leg, out of bed for gait training, STRICT POSTERIOR HIP PRECAUTIONS and exercises as shown by Physical Therapy.  Keep wound dressing on as is until day of office visit.  Staples removed already on day 14 from date of surgery.  Patient is on Aspirin (MUST TAKE WITH FOOD) for anticoagulation, was on antibiotic Ceftin 3day course (last day 10/5/2019) for urinary tract infection, and prednisone taper (last day 10/7/2019) for gradual ween off of solucortef while patient was in ICU.  Patient was in ICU for COPD exacerabation for which patient was intubated, now doing well.  Patient was seen by Pulmonary consult (Dr. Brooke): Hypercarbic and hypoxemic respiratory failure - resolved, hypoxemia was likely 2/2 to combination between established V/Q mismatch from COPD and pulmonary edema with associated basal atelectasis, was treated with lasix and steroid taper, continue with symbicort and spiriva, titrate supplemental O2 to Spo2 greater than 88% and less than 94% with NO need for Bilevel NIV at this time or overnight, needs to use IS (incentive spirometry) 10x/hour daily to prevent post-operative and now post-ICU PNA.  Orthopaedic Surgeon Dr. Saul Jr. would like patient to call private MD/Internist and Pulmonologist Dr. Jan Brooke [phone] for appointment postop to maintain continuity of care.  Follow up with Dr. Saul Jr.'s office in 1-2 weeks.

## 2019-09-23 NOTE — DISCHARGE NOTE PROVIDER - NSDCCAREPROVSEEN_GEN_ALL_CORE_FT
[facility], Team  Kevin, Noemy Ramon [facility], Team  Renu Brown Rosemund Marjorie [facility] Pulmonary, Team

## 2019-09-23 NOTE — PROGRESS NOTE ADULT - ASSESSMENT
83 yo female with PMH of HTN, Hypothyroid, DMII, CKD stage 3, COPD c/w chronic hypoxic respiratory failure on 3L home O2 present after mechanical fall c/o left hip pain found to have L femoral neck fracture, s/p left hip hemiarthroplasty, POD 3.

## 2019-09-23 NOTE — DISCHARGE NOTE PROVIDER - NSDCCPTREATMENT_GEN_ALL_CORE_FT
PRINCIPAL PROCEDURE  Procedure: Hemiarthroplasty of left hip  Findings and Treatment: dictated operative note  pain control, pain med may cause drowsiness, refrain from activities require decision making, physical therapy to help resume activities of daily living  weight bearing as tolerated to Left leg  TOTAL HIP PRECAUTIONS POSTERIOR  call Surgeon's office to make appointment in 1 week Dr. Saul Jr. [phone]

## 2019-09-23 NOTE — DISCHARGE NOTE PROVIDER - PROVIDER TOKENS
PROVIDER:[TOKEN:[[id number]],FOLLOWUP:[2 weeks]] PROVIDER:[TOKEN:[[id number]],FOLLOWUP:[2 weeks]],PROVIDER:[TOKEN:[[id number]],FOLLOWUP:[1 week]]

## 2019-09-23 NOTE — DISCHARGE NOTE NURSING/CASE MANAGEMENT/SOCIAL WORK - NSDCPNINST_GEN_ALL_CORE
Maintain Posterior hip incision and dressing clean dry and intact. Call MD with any signs of infection ie fever, redness, drainage, or with signs of bleeding, unrelieved increased pain, or persistent nausea. Continue to drink plenty of fluids. Avoid strenuous activity and constipation which may be a side effect from taking certain medications and narcotics.  Posterior Total hip precautions reviewed with patient, safety and fall prevention measures reinforced. Maintain Posterior hip incision and dressing clean dry and intact. Call MD with any signs of infection ie fever, redness, drainage, or with signs of bleeding, unrelieved increased pain, or persistent nausea. Continue to drink plenty of fluids. Avoid strenuous activity and constipation which may be a side effect from taking certain medications and narcotics.  Posterior Total hip precautions reviewed with patient, safety and fall prevention measures reinforced.  Continue safety and fall prevention measures as instructed to avoid injury.

## 2019-09-23 NOTE — PROGRESS NOTE ADULT - SUBJECTIVE AND OBJECTIVE BOX
Patient is a 84y old  Female who presents with a chief complaint of mechanical fall sustained Left femoral neck fracture  surgery Left hip hemiarthroplasty 9/20/2019 (23 Sep 2019 11:11)        SUBJECTIVE / OVERNIGHT EVENTS: Pt denies any pain in the left hip. Notes that her biggest concern is her breathing. Typically, she does not require daytime oxygen, but since surgery she has felt SOB. No cough, no chest pain. Pt is not using incentive spirometer.      MEDICATIONS  (STANDING):  acetaminophen   Tablet .. 975 milliGRAM(s) Oral every 8 hours  ALBUTerol/ipratropium for Nebulization 3 milliLiter(s) Nebulizer every 6 hours  ascorbic acid 500 milliGRAM(s) Oral daily  aspirin enteric coated 325 milliGRAM(s) Oral two times a day  buDESOnide  80 MICROgram(s)/formoterol 4.5 MICROgram(s) Inhaler 2 Puff(s) Inhalation two times a day  calcium carbonate 1250 mG  + Vitamin D (OsCal 500 + D) 1 Tablet(s) Oral daily  dextrose 5%. 1000 milliLiter(s) (50 mL/Hr) IV Continuous <Continuous>  dextrose 50% Injectable 12.5 Gram(s) IV Push once  dextrose 50% Injectable 25 Gram(s) IV Push once  dextrose 50% Injectable 25 Gram(s) IV Push once  docusate sodium 100 milliGRAM(s) Oral three times a day  famotidine    Tablet 20 milliGRAM(s) Oral daily  influenza   Vaccine 0.5 milliLiter(s) IntraMuscular once  insulin lispro (HumaLOG) corrective regimen sliding scale   SubCutaneous three times a day before meals  levothyroxine 88 MICROGram(s) Oral daily  senna 2 Tablet(s) Oral daily    MEDICATIONS  (PRN):  ALBUTerol    90 MICROgram(s) HFA Inhaler 2 Puff(s) Inhalation every 6 hours PRN Bronchospasm  dextrose 40% Gel 15 Gram(s) Oral once PRN Blood Glucose LESS THAN 70 milliGRAM(s)/deciliter  glucagon  Injectable 1 milliGRAM(s) IntraMuscular once PRN Glucose LESS THAN 70 milligrams/deciliter  melatonin 3 milliGRAM(s) Oral at bedtime PRN Insomnia  ondansetron Injectable 4 milliGRAM(s) IV Push every 6 hours PRN Nausea and/or Vomiting  oxyCODONE    IR 2.5 milliGRAM(s) Oral every 4 hours PRN Moderate Pain (4 - 6)  oxyCODONE    IR 5 milliGRAM(s) Oral every 4 hours PRN Severe Pain (7 - 10)      Vital Signs Last 24 Hrs  T(C): 36.3 (23 Sep 2019 09:36), Max: 37.8 (22 Sep 2019 20:25)  T(F): 97.3 (23 Sep 2019 09:36), Max: 100 (22 Sep 2019 20:25)  HR: 88 (23 Sep 2019 11:20) (78 - 115)  BP: 143/49 (23 Sep 2019 09:36) (116/42 - 143/49)  BP(mean): --  RR: 20 (23 Sep 2019 09:36) (20 - 22)  SpO2: 91% (23 Sep 2019 09:36) (90% - 100%)  CAPILLARY BLOOD GLUCOSE      POCT Blood Glucose.: 127 mg/dL (23 Sep 2019 07:31)  POCT Blood Glucose.: 157 mg/dL (22 Sep 2019 22:30)  POCT Blood Glucose.: 135 mg/dL (22 Sep 2019 16:41)  POCT Blood Glucose.: 180 mg/dL (22 Sep 2019 12:11)    I&O's Summary    22 Sep 2019 07:01  -  23 Sep 2019 07:00  --------------------------------------------------------  IN: 0 mL / OUT: 550 mL / NET: -550 mL    23 Sep 2019 07:01  -  23 Sep 2019 11:40  --------------------------------------------------------  IN: 0 mL / OUT: 300 mL / NET: -300 mL          PHYSICAL EXAM  GENERAL: NAD, well-developed  HEAD:  Atraumatic, Normocephalic  EYES: EOMI, PERRLA, conjunctiva and sclera clear  CHEST/LUNG: Good inspiratory effort, fair air movement, clear with the exception of bibasilar crackles  HEART: Regular rate and rhythm; No murmurs, rubs, or gallops  ABDOMEN: Soft, Nontender, Nondistended; Bowel sounds present  EXTREMITIES:  2+ Peripheral Pulses, No clubbing, cyanosis, or edema    LABS:                        8.3    10.23 )-----------( 161      ( 22 Sep 2019 07:06 )             26.6     09-22    138  |  105  |  25<H>  ----------------------------<  136<H>  4.2   |  22  |  1.14    Ca    8.0<L>      22 Sep 2019 07:06                Culture - Urine (collected 20 Sep 2019 17:00)  Source: URINE  Final Report (22 Sep 2019 09:09):    NO GROWTH AT 24 HOURS        RADIOLOGY & ADDITIONAL TESTS:    Imaging Personally Reviewed:  Consultant(s) Notes Reviewed:  orthopaedics  Care Discussed with Consultants/Other Providers: orthopaedics

## 2019-09-23 NOTE — DISCHARGE NOTE NURSING/CASE MANAGEMENT/SOCIAL WORK - NSDPDISTO_GEN_ALL_CORE
Acute rehab/Pt Hip incision with dressing intact, positive NV status. VS stable Afebrile. Pt. with Hx COPD,   Pulse Ox 91% with NC O2 at 3l/min. Seen by MD and cleared for DC to Rehab as per safe DC plan. Sub-Acute rehab/Pt Hip incision with dressing intact, positive NV status. VS stable Afebrile. Pt. with Hx COPD,   Pulse Ox 91% with NC O2 at 3l/min. Seen by MD and cleared for DC to Rehab as per safe DC plan. Pt Hip incision with dressing intact, positive NV status. VS stable Afebrile. Pt. with Hx COPD,   Pulse Ox 94% to 95% with NC O2 at 2l/min. Seen by MD and cleared for DC to Rehab as per safe DC plan.  incontinent of urine/Acute rehab

## 2019-09-23 NOTE — DISCHARGE NOTE NURSING/CASE MANAGEMENT/SOCIAL WORK - NSDCFUADDAPPT_GEN_ALL_CORE_FT
Follow-up with Dr. Hughes in the office as instructed for post-op check as well as with PMD/Pulmonologist for continuity of care.

## 2019-09-23 NOTE — DISCHARGE NOTE PROVIDER - NSDCCPCAREPLAN_GEN_ALL_CORE_FT
PRINCIPAL DISCHARGE DIAGNOSIS  Diagnosis: Closed displaced fracture of left femoral neck  Assessment and Plan of Treatment: 84year old is admitted for mechanical fall sustained Left femoral neck fracture and underwent surgery Left hip hemiarthroplasty 9/20/2019 without any intraoperative complications.  Patient is doing well and stable for discharge.  Patient is tolerating physical therapy: weight bearing to Left leg, out of bed for gait training, STRICT POSTERIOR HIP PRECAUTIONS and exercises as shown by Physical Therapy.  Keep wound dressing on as is until day of office visit.  Staples/sutures if applicable, to be removed in the office 14 days from date of surgery.  Patient is on Aspirin (MUST TAKE WITH FOOD) for anticoagulation.  Orthopaedic Surgeon Dr. Saul Jr. would like patient to call private MD/Internist for appointment postop to maintain continuity of care.  Follow up with Dr. Saul Jr.'s office in 1-2 weeks.      SECONDARY DISCHARGE DIAGNOSES  Diagnosis: Fall, initial encounter  Assessment and Plan of Treatment: PRINCIPAL DISCHARGE DIAGNOSIS  Diagnosis: Closed displaced fracture of left femoral neck  Assessment and Plan of Treatment: 84year old is admitted for mechanical fall sustained Left femoral neck fracture and underwent surgery Left hip hemiarthroplasty 9/20/2019 without any intraoperative complications.  Patient is doing well and stable for discharge.  Patient is tolerating physical therapy: weight bearing to Left leg, out of bed for gait training, STRICT POSTERIOR HIP PRECAUTIONS and exercises as shown by Physical Therapy.  Keep wound dressing on as is until day of office visit.  Staples/sutures if applicable, to be removed in the office 14 days from date of surgery.  Patient is on Aspirin (MUST TAKE WITH FOOD) for anticoagulation.  Orthopaedic Surgeon Dr. Saul Jr. would like patient to call private MD/Internist for appointment postop to maintain continuity of care.  Follow up with Dr. Saul Jr.'s office in 1-2 weeks. PRINCIPAL DISCHARGE DIAGNOSIS  Diagnosis: Closed displaced fracture of left femoral neck  Assessment and Plan of Treatment: 84year old is admitted for mechanical fall sustained Left femoral neck fracture and underwent surgery Left hip hemiarthroplasty 9/20/2019 without any intraoperative complications.  Patient is doing well and stable for discharge.  Patient is tolerating physical therapy: weight bearing to Left leg, out of bed for gait training, STRICT POSTERIOR HIP PRECAUTIONS and exercises as shown by Physical Therapy.  Keep wound dressing on as is until day of office visit.  Staples removed already on day 14 from date of surgery .  Patient is on Aspirin (MUST TAKE WITH FOOD) for anticoagulation, antibiotic Ceftin 3day course (last day 10/5/2019) for urinary tract infection, and prednisone taper for gradual ween off of solucortef while patient was in ICU.  Patient was in ICU for COPD exacerabation for which patient was intubated, now doing well.  Orthopaedic Surgeon Dr. Saul Jr. would like patient to call private MD/Internist for appointment postop to maintain continuity of care.  Follow up with Dr. Saul Jr.'s office in 1-2 weeks. PRINCIPAL DISCHARGE DIAGNOSIS  Diagnosis: Closed displaced fracture of left femoral neck  Assessment and Plan of Treatment: 84year old is admitted for mechanical fall sustained Left femoral neck fracture and underwent surgery Left hip hemiarthroplasty 9/20/2019 without any intraoperative complications.  Patient is doing well and stable for discharge.  Patient is tolerating physical therapy: weight bearing to Left leg, out of bed for gait training, STRICT POSTERIOR HIP PRECAUTIONS and exercises as shown by Physical Therapy.  Keep wound dressing on as is until day of office visit.  Staples removed already on day 14 from date of surgery .  Patient is on Aspirin (MUST TAKE WITH FOOD) for anticoagulation, antibiotic Ceftin 3day course (last day 10/5/2019) for urinary tract infection, and prednisone taper for gradual ween off of solucortef while patient was in ICU.  Patient was in ICU for COPD exacerabation for which patient was intubated, now doing well.  Orthopaedic Surgeon Dr. Saul Jr. would like patient to call private MD/Internist for appointment postop to maintain continuity of care.  Follow up with Dr. Saul Jr.'s office in 1-2 weeks.      SECONDARY DISCHARGE DIAGNOSES  Diagnosis: Chronic obstructive pulmonary disease, unspecified COPD type  Assessment and Plan of Treatment: Patient was seen by Pulmonary consult: Hypercarbic and hypoxemic respiratory failure - resolved, hypoxemia was likely 2/2 to combination between established V/Q mismatch from COPD and pulmonary edema with associated basal atelectasis, was treated with lasix and steroid taper, continue with symbicort and spiriva, titrate supplemental O2 to Spo2 > 88% and less than 94%, needs to use IS (incentive spirometry) 10x/hour daily to prevent post-operative and now post-ICU PNA. PRINCIPAL DISCHARGE DIAGNOSIS  Diagnosis: Closed displaced fracture of left femoral neck  Assessment and Plan of Treatment: 84year old is admitted for mechanical fall sustained Left femoral neck fracture and underwent surgery Left hip hemiarthroplasty 9/20/2019 without any intraoperative complications.  Patient is doing well and stable for discharge.  Patient is tolerating physical therapy: weight bearing to Left leg, out of bed for gait training, STRICT POSTERIOR HIP PRECAUTIONS and exercises as shown by Physical Therapy.  Keep wound dressing on as is until day of office visit.  Staples removed already on day 14 from date of surgery .  Patient is on Aspirin (MUST TAKE WITH FOOD) for anticoagulation, was on antibiotic Ceftin 3day course (last day 10/5/2019) for urinary tract infection, and prednisone taper (last day 10/7/2019) for gradual ween off of solucortef while patient was in ICU.  Patient was in ICU for COPD exacerabation for which patient was intubated, now doing well.  Orthopaedic Surgeon Dr. Saul Jr. would like patient to call private MD/Internist and private Pulmonologist [phone] for appointment postop to maintain continuity of care.  Follow up with Dr. Saul Jr.'s office in 1-2 weeks.      SECONDARY DISCHARGE DIAGNOSES  Diagnosis: Chronic obstructive pulmonary disease, unspecified COPD type  Assessment and Plan of Treatment: Patient was seen by Pulmonary consult (Dr. Brooke's group): Hypercarbic and hypoxemic respiratory failure - resolved, hypoxemia was likely 2/2 to combination between established V/Q mismatch from COPD and pulmonary edema with associated basal atelectasis, was treated with lasix and steroid taper, continue with symbicort and spiriva, titrate supplemental O2 to Spo2 greater than 88% and less than 94% with NO need for Bilevel NIV at this time or overnight, needs to use IS (incentive spirometry) 10x/hour daily to prevent post-operative and now post-ICU PNA.  Follow up with private Pulmonologist [phone].

## 2019-09-23 NOTE — DISCHARGE NOTE PROVIDER - CARE PROVIDER_API CALL
Angie Hughes)  Orthopedics  [address] [place] [address], [address]  [place] [address]  Phone: [phone]  Follow Up Time: 2 weeks Angie Hughes)  Orthopedics  [address] [facility], [address]  [place] [address]  Phone: [phone]  Follow Up Time: 2 weeks    Jan Brooke)  Medicine  Pulmonary Medicine  [address] [place] [address], [address] [place] [address]  Phone: [phone]  Fax: [phone]  Follow Up Time: 1 week

## 2019-09-24 LAB
ANION GAP SERPL CALC-SCNC: 13 MMO/L — SIGNIFICANT CHANGE UP (ref 7–14)
BASE EXCESS BLDA CALC-SCNC: 0.5 MMOL/L — SIGNIFICANT CHANGE UP
BLD GP AB SCN SERPL QL: NEGATIVE — SIGNIFICANT CHANGE UP
BUN SERPL-MCNC: 31 MG/DL — HIGH (ref 7–23)
CALCIUM SERPL-MCNC: 9.3 MG/DL — SIGNIFICANT CHANGE UP (ref 8.4–10.5)
CHLORIDE SERPL-SCNC: 106 MMOL/L — SIGNIFICANT CHANGE UP (ref 98–107)
CO2 SERPL-SCNC: 22 MMOL/L — SIGNIFICANT CHANGE UP (ref 22–31)
CREAT SERPL-MCNC: 1.02 MG/DL — SIGNIFICANT CHANGE UP (ref 0.5–1.3)
GLUCOSE BLDC GLUCOMTR-MCNC: 127 MG/DL — HIGH (ref 70–99)
GLUCOSE BLDC GLUCOMTR-MCNC: 129 MG/DL — HIGH (ref 70–99)
GLUCOSE BLDC GLUCOMTR-MCNC: 142 MG/DL — HIGH (ref 70–99)
GLUCOSE BLDC GLUCOMTR-MCNC: 223 MG/DL — HIGH (ref 70–99)
GLUCOSE SERPL-MCNC: 147 MG/DL — HIGH (ref 70–99)
HCO3 BLDA-SCNC: 25 MMOL/L — SIGNIFICANT CHANGE UP (ref 22–26)
HCT VFR BLD CALC: 26.6 % — LOW (ref 34.5–45)
HGB BLD-MCNC: 8.3 G/DL — LOW (ref 11.5–15.5)
MCHC RBC-ENTMCNC: 31.2 % — LOW (ref 32–36)
MCHC RBC-ENTMCNC: 31.4 PG — SIGNIFICANT CHANGE UP (ref 27–34)
MCV RBC AUTO: 100.8 FL — HIGH (ref 80–100)
NRBC # FLD: 0 K/UL — SIGNIFICANT CHANGE UP (ref 0–0)
PCO2 BLDA: 55 MMHG — HIGH (ref 32–48)
PH BLDA: 7.3 PH — LOW (ref 7.35–7.45)
PLATELET # BLD AUTO: 219 K/UL — SIGNIFICANT CHANGE UP (ref 150–400)
PMV BLD: 9.4 FL — SIGNIFICANT CHANGE UP (ref 7–13)
PO2 BLDA: 150 MMHG — HIGH (ref 83–108)
POTASSIUM SERPL-MCNC: 4.8 MMOL/L — SIGNIFICANT CHANGE UP (ref 3.5–5.3)
POTASSIUM SERPL-SCNC: 4.8 MMOL/L — SIGNIFICANT CHANGE UP (ref 3.5–5.3)
RBC # BLD: 2.64 M/UL — LOW (ref 3.8–5.2)
RBC # FLD: 12.7 % — SIGNIFICANT CHANGE UP (ref 10.3–14.5)
RH IG SCN BLD-IMP: POSITIVE — SIGNIFICANT CHANGE UP
SAO2 % BLDA: 98.4 % — SIGNIFICANT CHANGE UP (ref 95–99)
SODIUM SERPL-SCNC: 141 MMOL/L — SIGNIFICANT CHANGE UP (ref 135–145)
WBC # BLD: 9 K/UL — SIGNIFICANT CHANGE UP (ref 3.8–10.5)
WBC # FLD AUTO: 9 K/UL — SIGNIFICANT CHANGE UP (ref 3.8–10.5)

## 2019-09-24 PROCEDURE — 99231 SBSQ HOSP IP/OBS SF/LOW 25: CPT

## 2019-09-24 PROCEDURE — 36600 WITHDRAWAL OF ARTERIAL BLOOD: CPT

## 2019-09-24 PROCEDURE — 99233 SBSQ HOSP IP/OBS HIGH 50: CPT

## 2019-09-24 PROCEDURE — 71045 X-RAY EXAM CHEST 1 VIEW: CPT | Mod: 26

## 2019-09-24 RX ORDER — INFLUENZA VIRUS VACCINE 15; 15; 15; 15 UG/.5ML; UG/.5ML; UG/.5ML; UG/.5ML
0.5 SUSPENSION INTRAMUSCULAR ONCE
Refills: 0 | Status: COMPLETED | OUTPATIENT
Start: 2019-09-24 | End: 2019-09-24

## 2019-09-24 RX ORDER — FUROSEMIDE 40 MG
20 TABLET ORAL ONCE
Refills: 0 | Status: COMPLETED | OUTPATIENT
Start: 2019-09-24 | End: 2019-09-24

## 2019-09-24 RX ADMIN — Medication 325 MILLIGRAM(S): at 18:09

## 2019-09-24 RX ADMIN — Medication 975 MILLIGRAM(S): at 03:06

## 2019-09-24 RX ADMIN — Medication 3 MILLILITER(S): at 11:41

## 2019-09-24 RX ADMIN — Medication 1 TABLET(S): at 13:05

## 2019-09-24 RX ADMIN — INFLUENZA VIRUS VACCINE 0.5 MILLILITER(S): 15; 15; 15; 15 SUSPENSION INTRAMUSCULAR at 12:44

## 2019-09-24 RX ADMIN — Medication 975 MILLIGRAM(S): at 13:06

## 2019-09-24 RX ADMIN — BUDESONIDE AND FORMOTEROL FUMARATE DIHYDRATE 2 PUFF(S): 160; 4.5 AEROSOL RESPIRATORY (INHALATION) at 21:36

## 2019-09-24 RX ADMIN — Medication 3 MILLILITER(S): at 17:49

## 2019-09-24 RX ADMIN — Medication 500 MILLIGRAM(S): at 13:06

## 2019-09-24 RX ADMIN — SENNA PLUS 2 TABLET(S): 8.6 TABLET ORAL at 13:05

## 2019-09-24 RX ADMIN — Medication 325 MILLIGRAM(S): at 06:01

## 2019-09-24 RX ADMIN — BUDESONIDE AND FORMOTEROL FUMARATE DIHYDRATE 2 PUFF(S): 160; 4.5 AEROSOL RESPIRATORY (INHALATION) at 10:26

## 2019-09-24 RX ADMIN — Medication 100 MILLIGRAM(S): at 06:01

## 2019-09-24 RX ADMIN — Medication 3 MILLILITER(S): at 21:00

## 2019-09-24 RX ADMIN — Medication 2: at 12:14

## 2019-09-24 RX ADMIN — Medication 100 MILLIGRAM(S): at 21:36

## 2019-09-24 RX ADMIN — FAMOTIDINE 20 MILLIGRAM(S): 10 INJECTION INTRAVENOUS at 13:06

## 2019-09-24 RX ADMIN — Medication 20 MILLIGRAM(S): at 18:08

## 2019-09-24 RX ADMIN — Medication 975 MILLIGRAM(S): at 21:36

## 2019-09-24 RX ADMIN — Medication 88 MICROGRAM(S): at 06:01

## 2019-09-24 RX ADMIN — Medication 3 MILLILITER(S): at 03:56

## 2019-09-24 RX ADMIN — Medication 100 MILLIGRAM(S): at 13:05

## 2019-09-24 NOTE — PROGRESS NOTE ADULT - SUBJECTIVE AND OBJECTIVE BOX
Patient is a 84y old  Female who presents with a chief complaint of left hip fracture s/p fall 1D (23 Sep 2019 11:40)        SUBJECTIVE / OVERNIGHT EVENTS: Pt states she had a tough night and didn't sleep well. Breathing is similar to yesterday. She reports not getting the chance to use her incentive spirometer.      MEDICATIONS  (STANDING):  acetaminophen   Tablet .. 975 milliGRAM(s) Oral every 8 hours  ALBUTerol/ipratropium for Nebulization 3 milliLiter(s) Nebulizer every 6 hours  ascorbic acid 500 milliGRAM(s) Oral daily  aspirin enteric coated 325 milliGRAM(s) Oral two times a day  buDESOnide  80 MICROgram(s)/formoterol 4.5 MICROgram(s) Inhaler 2 Puff(s) Inhalation two times a day  calcium carbonate 1250 mG  + Vitamin D (OsCal 500 + D) 1 Tablet(s) Oral daily  dextrose 5%. 1000 milliLiter(s) (50 mL/Hr) IV Continuous <Continuous>  dextrose 50% Injectable 12.5 Gram(s) IV Push once  dextrose 50% Injectable 25 Gram(s) IV Push once  dextrose 50% Injectable 25 Gram(s) IV Push once  docusate sodium 100 milliGRAM(s) Oral three times a day  famotidine    Tablet 20 milliGRAM(s) Oral daily  influenza  Vaccine (HIGH DOSE) 0.5 milliLiter(s) IntraMuscular once  insulin lispro (HumaLOG) corrective regimen sliding scale   SubCutaneous three times a day before meals  levothyroxine 88 MICROGram(s) Oral daily  senna 2 Tablet(s) Oral daily    MEDICATIONS  (PRN):  ALBUTerol    90 MICROgram(s) HFA Inhaler 2 Puff(s) Inhalation every 6 hours PRN Bronchospasm  dextrose 40% Gel 15 Gram(s) Oral once PRN Blood Glucose LESS THAN 70 milliGRAM(s)/deciliter  glucagon  Injectable 1 milliGRAM(s) IntraMuscular once PRN Glucose LESS THAN 70 milligrams/deciliter  melatonin 3 milliGRAM(s) Oral at bedtime PRN Insomnia  ondansetron Injectable 4 milliGRAM(s) IV Push every 6 hours PRN Nausea and/or Vomiting  oxyCODONE    IR 2.5 milliGRAM(s) Oral every 4 hours PRN Moderate Pain (4 - 6)  oxyCODONE    IR 5 milliGRAM(s) Oral every 4 hours PRN Severe Pain (7 - 10)      Vital Signs Last 24 Hrs  T(C): 36.7 (24 Sep 2019 10:16), Max: 36.8 (23 Sep 2019 17:13)  T(F): 98 (24 Sep 2019 10:16), Max: 98.2 (23 Sep 2019 17:13)  HR: 103 (24 Sep 2019 10:16) (86 - 103)  BP: 143/53 (24 Sep 2019 10:16) (117/59 - 144/49)  BP(mean): --  RR: 21 (24 Sep 2019 10:39) (20 - 24)  SpO2: 92% (24 Sep 2019 10:39) (91% - 94%)  CAPILLARY BLOOD GLUCOSE      POCT Blood Glucose.: 223 mg/dL (24 Sep 2019 11:35)  POCT Blood Glucose.: 142 mg/dL (24 Sep 2019 07:39)  POCT Blood Glucose.: 153 mg/dL (23 Sep 2019 22:11)  POCT Blood Glucose.: 185 mg/dL (23 Sep 2019 16:44)  POCT Blood Glucose.: 159 mg/dL (23 Sep 2019 11:43)    I&O's Summary    23 Sep 2019 07:01  -  24 Sep 2019 07:00  --------------------------------------------------------  IN: 0 mL / OUT: 1100 mL / NET: -1100 mL          PHYSICAL EXAM  GENERAL: older woman, sitting up in chair, mildly SOB  HEAD:  Atraumatic, Normocephalic  EYES: EOMI, PERRLA, conjunctiva and sclera clear  CHEST/LUNG: poor inspiratory effort but grossly clear  HEART: Regular rate and rhythm; No murmurs, rubs, or gallops  ABDOMEN: Soft, Nontender, Nondistended; Bowel sounds present  EXTREMITIES:  2+ Peripheral Pulses, No clubbing, cyanosis, or edema    Consultant(s) Notes Reviewed:  orthopaedics  Care Discussed with Consultants/Other Providers: orthopaedics

## 2019-09-24 NOTE — PROGRESS NOTE ADULT - ASSESSMENT
83 yo female with PMH of HTN, Hypothyroid, DMII, CKD stage 3, COPD c/w chronic hypoxic respiratory failure on 3L home O2 present after mechanical fall c/o left hip pain found to have L femoral neck fracture, s/p left hip hemiarthroplasty, POD 4.

## 2019-09-24 NOTE — PROGRESS NOTE ADULT - PROBLEM SELECTOR PLAN 2
Due to COPD. Pt likely has atelectasis and is not using IS  -instructed pt on incentive spirometer use at bedside  -cont O2 via NC to maintain Spo2 88-92.  -may need additional nebs

## 2019-09-24 NOTE — CHART NOTE - NSCHARTNOTEFT_GEN_A_CORE
called by RN to assess patient. patient had some visual hallucination earlier of her friend being present in room. patient told nurse she "did not feel well" with no specific complaint. patient states she feels unwell and tired. she fell asleep frequently on examination but was easily arousable. she denies cough, chest pain, dizziness, palpitations, abd pain.     gen: NAD, AO x 2  abd: soft, NT, ND  chest: CTA BL  LLE: dressing CDI, thigh soft compressible          sensation grossly intact distal         motor intact +TA/GS/EHL/FHL         1+DP    T(C): 36.8 (09-24-19 @ 14:22), Max: 36.8 (09-23-19 @ 17:13)  HR: 87 (09-24-19 @ 14:22) (86 - 103)  BP: 143/62 (09-24-19 @ 14:22) (125/52 - 144/49)  RR: 23 (09-24-19 @ 14:22) (20 - 24)  SpO2: 98% (09-24-19 @ 14:22) (92% - 98%)        AP: 84F s/p L hip basilio pod#4   -hemodynamically stable at this time  -FU ABG, rule out hypercapnea  -FU CBC, BMP  -FU CXR  -Discussed with Dr. Jo high d/w Dr. Hughes      Addendum:  mental status improved upon return to room for blood draw, patient recognized she had a hallucination  MS improved to AO x3

## 2019-09-25 LAB
ANION GAP SERPL CALC-SCNC: 13 MMO/L — SIGNIFICANT CHANGE UP (ref 7–14)
APPEARANCE UR: CLEAR — SIGNIFICANT CHANGE UP
BACTERIA # UR AUTO: NEGATIVE — SIGNIFICANT CHANGE UP
BILIRUB UR-MCNC: NEGATIVE — SIGNIFICANT CHANGE UP
BLOOD UR QL VISUAL: NEGATIVE — SIGNIFICANT CHANGE UP
BUN SERPL-MCNC: 33 MG/DL — HIGH (ref 7–23)
CALCIUM SERPL-MCNC: 9 MG/DL — SIGNIFICANT CHANGE UP (ref 8.4–10.5)
CHLORIDE SERPL-SCNC: 104 MMOL/L — SIGNIFICANT CHANGE UP (ref 98–107)
CO2 SERPL-SCNC: 24 MMOL/L — SIGNIFICANT CHANGE UP (ref 22–31)
COLOR SPEC: YELLOW — SIGNIFICANT CHANGE UP
CREAT SERPL-MCNC: 1.01 MG/DL — SIGNIFICANT CHANGE UP (ref 0.5–1.3)
GLUCOSE BLDC GLUCOMTR-MCNC: 140 MG/DL — HIGH (ref 70–99)
GLUCOSE BLDC GLUCOMTR-MCNC: 152 MG/DL — HIGH (ref 70–99)
GLUCOSE BLDC GLUCOMTR-MCNC: 166 MG/DL — HIGH (ref 70–99)
GLUCOSE BLDC GLUCOMTR-MCNC: 180 MG/DL — HIGH (ref 70–99)
GLUCOSE SERPL-MCNC: 147 MG/DL — HIGH (ref 70–99)
GLUCOSE UR-MCNC: NEGATIVE — SIGNIFICANT CHANGE UP
HCT VFR BLD CALC: 27.1 % — LOW (ref 34.5–45)
HGB BLD-MCNC: 8.3 G/DL — LOW (ref 11.5–15.5)
HYALINE CASTS # UR AUTO: NEGATIVE — SIGNIFICANT CHANGE UP
KETONES UR-MCNC: NEGATIVE — SIGNIFICANT CHANGE UP
LEUKOCYTE ESTERASE UR-ACNC: NEGATIVE — SIGNIFICANT CHANGE UP
MCHC RBC-ENTMCNC: 30.6 % — LOW (ref 32–36)
MCHC RBC-ENTMCNC: 30.9 PG — SIGNIFICANT CHANGE UP (ref 27–34)
MCV RBC AUTO: 100.7 FL — HIGH (ref 80–100)
NITRITE UR-MCNC: NEGATIVE — SIGNIFICANT CHANGE UP
NRBC # FLD: 0 K/UL — SIGNIFICANT CHANGE UP (ref 0–0)
PH UR: 6 — SIGNIFICANT CHANGE UP (ref 5–8)
PLATELET # BLD AUTO: 242 K/UL — SIGNIFICANT CHANGE UP (ref 150–400)
PMV BLD: 9.3 FL — SIGNIFICANT CHANGE UP (ref 7–13)
POTASSIUM SERPL-MCNC: 4.7 MMOL/L — SIGNIFICANT CHANGE UP (ref 3.5–5.3)
POTASSIUM SERPL-SCNC: 4.7 MMOL/L — SIGNIFICANT CHANGE UP (ref 3.5–5.3)
PROT UR-MCNC: 70 — SIGNIFICANT CHANGE UP
RBC # BLD: 2.69 M/UL — LOW (ref 3.8–5.2)
RBC # FLD: 12.6 % — SIGNIFICANT CHANGE UP (ref 10.3–14.5)
RBC CASTS # UR COMP ASSIST: SIGNIFICANT CHANGE UP (ref 0–?)
SODIUM SERPL-SCNC: 141 MMOL/L — SIGNIFICANT CHANGE UP (ref 135–145)
SP GR SPEC: 1.02 — SIGNIFICANT CHANGE UP (ref 1–1.04)
SQUAMOUS # UR AUTO: SIGNIFICANT CHANGE UP
UROBILINOGEN FLD QL: NORMAL — SIGNIFICANT CHANGE UP
WBC # BLD: 7.67 K/UL — SIGNIFICANT CHANGE UP (ref 3.8–10.5)
WBC # FLD AUTO: 7.67 K/UL — SIGNIFICANT CHANGE UP (ref 3.8–10.5)
WBC UR QL: SIGNIFICANT CHANGE UP (ref 0–?)

## 2019-09-25 PROCEDURE — 99233 SBSQ HOSP IP/OBS HIGH 50: CPT

## 2019-09-25 RX ORDER — TIOTROPIUM BROMIDE 18 UG/1
1 CAPSULE ORAL; RESPIRATORY (INHALATION) DAILY
Refills: 0 | Status: DISCONTINUED | OUTPATIENT
Start: 2019-09-25 | End: 2019-10-08

## 2019-09-25 RX ORDER — INSULIN LISPRO 100/ML
VIAL (ML) SUBCUTANEOUS AT BEDTIME
Refills: 0 | Status: DISCONTINUED | OUTPATIENT
Start: 2019-09-25 | End: 2019-09-26

## 2019-09-25 RX ADMIN — Medication 40 MILLIGRAM(S): at 16:56

## 2019-09-25 RX ADMIN — Medication 100 MILLIGRAM(S): at 07:05

## 2019-09-25 RX ADMIN — BUDESONIDE AND FORMOTEROL FUMARATE DIHYDRATE 2 PUFF(S): 160; 4.5 AEROSOL RESPIRATORY (INHALATION) at 08:29

## 2019-09-25 RX ADMIN — FAMOTIDINE 20 MILLIGRAM(S): 10 INJECTION INTRAVENOUS at 13:23

## 2019-09-25 RX ADMIN — Medication 1: at 11:56

## 2019-09-25 RX ADMIN — Medication 1 TABLET(S): at 13:23

## 2019-09-25 RX ADMIN — TIOTROPIUM BROMIDE 1 CAPSULE(S): 18 CAPSULE ORAL; RESPIRATORY (INHALATION) at 13:22

## 2019-09-25 RX ADMIN — Medication 88 MICROGRAM(S): at 07:05

## 2019-09-25 RX ADMIN — Medication 500 MILLIGRAM(S): at 13:23

## 2019-09-25 RX ADMIN — Medication 3 MILLILITER(S): at 04:23

## 2019-09-25 RX ADMIN — Medication 3 MILLILITER(S): at 10:08

## 2019-09-25 RX ADMIN — Medication 1: at 22:24

## 2019-09-25 RX ADMIN — Medication 3 MILLILITER(S): at 21:50

## 2019-09-25 RX ADMIN — Medication 325 MILLIGRAM(S): at 16:56

## 2019-09-25 RX ADMIN — Medication 325 MILLIGRAM(S): at 07:05

## 2019-09-25 RX ADMIN — Medication 1: at 08:30

## 2019-09-25 RX ADMIN — ALBUTEROL 2 PUFF(S): 90 AEROSOL, METERED ORAL at 09:37

## 2019-09-25 RX ADMIN — Medication 975 MILLIGRAM(S): at 07:05

## 2019-09-25 RX ADMIN — Medication 975 MILLIGRAM(S): at 13:23

## 2019-09-25 RX ADMIN — Medication 100 MILLIGRAM(S): at 13:23

## 2019-09-25 NOTE — PROGRESS NOTE ADULT - PROBLEM SELECTOR PLAN 2
Now suspect COPD exacerbation given poor air movement. On reviewing of outpatient records, pt was on spiriva, so will resume immediately. Pt also likely has atelectasis and is not using IS; will encourage.  -resume spiriva as above  -treat with short steroid course: solumedrol 125mg x 1, followed by prednisone 40mg daily x 3-4 days  -instructed pt on incentive spirometer use at bedside  -cont O2 via NC to maintain Spo2 88-92.  -continue nebs

## 2019-09-25 NOTE — PROVIDER CONTACT NOTE (OTHER) - ASSESSMENT
Pt has orders for  and for 2l O2 via NC. Pt is currently desatting between 89-87 on 2L. Pt also presents as being confused. Pt thinks she is at rehab taking a class, at [facility], or not at the [facility] in [place]. Pt is reoriented and remembers she came for her hip, but easily goes back to the periods of confusion. Pt is AOx2. Pt knows self and , but does not know the current year.

## 2019-09-25 NOTE — PROVIDER CONTACT NOTE (OTHER) - BACKGROUND
Pt admitted 9/20/19 for a Lt Hip basilio s/p fall at home. Pt lives alone. PMH of CKD, DM, hypothroid, and COPD. Pt currently is on 3L O2 at home.

## 2019-09-25 NOTE — PROVIDER CONTACT NOTE (OTHER) - ACTION/TREATMENT ORDERED:
notifed and is aware that patient has been becoming confused. Pt's O2 is back to 92%. Doctor suggests to keep monitoring patient to reorient her. Will con't to monitor.

## 2019-09-25 NOTE — PROGRESS NOTE ADULT - SUBJECTIVE AND OBJECTIVE BOX
Patient is a 84y old  Female who presents with a chief complaint of left hip fracture s/p fall 1D (25 Sep 2019 06:31)        SUBJECTIVE / OVERNIGHT EVENTS: Pt feeling "alright" today. Still having some difficulty breathing. No cough, no chest pain.       MEDICATIONS  (STANDING):  acetaminophen   Tablet .. 975 milliGRAM(s) Oral every 8 hours  ALBUTerol/ipratropium for Nebulization 3 milliLiter(s) Nebulizer every 6 hours  ascorbic acid 500 milliGRAM(s) Oral daily  aspirin enteric coated 325 milliGRAM(s) Oral two times a day  buDESOnide  80 MICROgram(s)/formoterol 4.5 MICROgram(s) Inhaler 2 Puff(s) Inhalation two times a day  calcium carbonate 1250 mG  + Vitamin D (OsCal 500 + D) 1 Tablet(s) Oral daily  dextrose 5%. 1000 milliLiter(s) (50 mL/Hr) IV Continuous <Continuous>  dextrose 50% Injectable 12.5 Gram(s) IV Push once  dextrose 50% Injectable 25 Gram(s) IV Push once  dextrose 50% Injectable 25 Gram(s) IV Push once  docusate sodium 100 milliGRAM(s) Oral three times a day  famotidine    Tablet 20 milliGRAM(s) Oral daily  insulin lispro (HumaLOG) corrective regimen sliding scale   SubCutaneous three times a day before meals  levothyroxine 88 MICROGram(s) Oral daily    MEDICATIONS  (PRN):  ALBUTerol    90 MICROgram(s) HFA Inhaler 2 Puff(s) Inhalation every 6 hours PRN Bronchospasm  dextrose 40% Gel 15 Gram(s) Oral once PRN Blood Glucose LESS THAN 70 milliGRAM(s)/deciliter  glucagon  Injectable 1 milliGRAM(s) IntraMuscular once PRN Glucose LESS THAN 70 milligrams/deciliter  melatonin 3 milliGRAM(s) Oral at bedtime PRN Insomnia  ondansetron Injectable 4 milliGRAM(s) IV Push every 6 hours PRN Nausea and/or Vomiting      Vital Signs Last 24 Hrs  T(C): 36.3 (25 Sep 2019 10:32), Max: 37 (25 Sep 2019 07:01)  T(F): 97.4 (25 Sep 2019 10:32), Max: 98.6 (25 Sep 2019 07:01)  HR: 90 (25 Sep 2019 10:32) (78 - 95)  BP: 149/55 (25 Sep 2019 10:32) (134/66 - 149/55)  BP(mean): --  RR: 24 (25 Sep 2019 10:32) (21 - 26)  SpO2: 94% (25 Sep 2019 10:32) (90% - 98%)  CAPILLARY BLOOD GLUCOSE      POCT Blood Glucose.: 152 mg/dL (25 Sep 2019 07:41)  POCT Blood Glucose.: 129 mg/dL (24 Sep 2019 22:31)  POCT Blood Glucose.: 127 mg/dL (24 Sep 2019 16:50)  POCT Blood Glucose.: 223 mg/dL (24 Sep 2019 11:35)    I&O's Summary    24 Sep 2019 07:01  -  25 Sep 2019 07:00  --------------------------------------------------------  IN: 0 mL / OUT: 500 mL / NET: -500 mL    25 Sep 2019 07:01  -  25 Sep 2019 11:08  --------------------------------------------------------  IN: 0 mL / OUT: 0 mL / NET: 0 mL          PHYSICAL EXAM  GENERAL: somnolent, NAD  HEAD:  Atraumatic, Normocephalic  EYES: EOMI, PERRLA, conjunctiva and sclera clear  CHEST/LUNG: poor air movement throughout; No wheeze  HEART: Regular rate and rhythm; No murmurs, rubs, or gallops  ABDOMEN: Soft, Nontender, Nondistended; Bowel sounds present  EXTREMITIES:  2+ Peripheral Pulses, No clubbing, cyanosis, or edema      LABS:                        8.3    7.67  )-----------( 242      ( 25 Sep 2019 06:29 )             27.1     09-25    141  |  104  |  33<H>  ----------------------------<  147<H>  4.7   |  24  |  1.01    Ca    9.0      25 Sep 2019 06:29                  RADIOLOGY & ADDITIONAL TESTS:    Imaging Personally Reviewed:  Consultant(s) Notes Reviewed:   orthopaedics  Care Discussed with Consultants/Other Providers: orthopaedics

## 2019-09-25 NOTE — PROGRESS NOTE ADULT - ASSESSMENT
83 yo female with PMH of HTN, Hypothyroid, DMII, CKD stage 3, COPD c/w chronic hypoxic respiratory failure on 3L home O2 present after mechanical fall c/o left hip pain found to have L femoral neck fracture, s/p left hip hemiarthroplasty, POD 5.

## 2019-09-25 NOTE — PROGRESS NOTE ADULT - SUBJECTIVE AND OBJECTIVE BOX
Pt seen/examined. Doing well. Pain controlled. Worsening confusion overnight.    T(C): 36.7 (09-24-19 @ 21:30), Max: 36.8 (09-24-19 @ 14:22)  HR: 90 (09-25-19 @ 04:23) (78 - 103)  BP: 146/58 (09-24-19 @ 21:30) (143/53 - 146/58)  RR: 24 (09-24-19 @ 21:30) (21 - 24)  SpO2: 94% (09-25-19 @ 04:23) (92% - 98%)  Wt(kg): --    - Gen: NAD  - LLE: Dressing C/D/I; moving foot spontaneously though not to command; WWP/BCR    84yFemale s/p L HHA    - Pain control  - DVT ppx  - OOB/PT  - OHOS reccs re: confusion appreciated

## 2019-09-26 ENCOUNTER — APPOINTMENT (OUTPATIENT)
Dept: FAMILY MEDICINE | Facility: CLINIC | Age: 84
End: 2019-09-26

## 2019-09-26 LAB
ALBUMIN SERPL ELPH-MCNC: 3 G/DL — LOW (ref 3.3–5)
ALP SERPL-CCNC: 108 U/L — SIGNIFICANT CHANGE UP (ref 40–120)
ALT FLD-CCNC: 6 U/L — SIGNIFICANT CHANGE UP (ref 4–33)
ANION GAP SERPL CALC-SCNC: 12 MMO/L — SIGNIFICANT CHANGE UP (ref 7–14)
ANION GAP SERPL CALC-SCNC: 14 MMO/L — SIGNIFICANT CHANGE UP (ref 7–14)
ANION GAP SERPL CALC-SCNC: 32 MMO/L — HIGH (ref 7–14)
ANION GAP SERPL CALC-SCNC: 7 MMO/L — SIGNIFICANT CHANGE UP (ref 7–14)
APTT BLD: 24 SEC — LOW (ref 27.5–36.3)
AST SERPL-CCNC: 11 U/L — SIGNIFICANT CHANGE UP (ref 4–32)
BASE EXCESS BLDA CALC-SCNC: 2.6 MMOL/L — SIGNIFICANT CHANGE UP
BASE EXCESS BLDA CALC-SCNC: 3 MMOL/L — SIGNIFICANT CHANGE UP
BILIRUB SERPL-MCNC: 0.3 MG/DL — SIGNIFICANT CHANGE UP (ref 0.2–1.2)
BLOOD GAS ARTERIAL - FIO2: 40 — SIGNIFICANT CHANGE UP
BLOOD GAS ARTERIAL - FIO2: 40 — SIGNIFICANT CHANGE UP
BUN SERPL-MCNC: 34 MG/DL — HIGH (ref 7–23)
BUN SERPL-MCNC: 35 MG/DL — HIGH (ref 7–23)
BUN SERPL-MCNC: 35 MG/DL — HIGH (ref 7–23)
BUN SERPL-MCNC: 38 MG/DL — HIGH (ref 7–23)
CA-I BLDA-SCNC: 1.26 MMOL/L — SIGNIFICANT CHANGE UP (ref 1.15–1.29)
CALCIUM SERPL-MCNC: 9.1 MG/DL — SIGNIFICANT CHANGE UP (ref 8.4–10.5)
CALCIUM SERPL-MCNC: 9.1 MG/DL — SIGNIFICANT CHANGE UP (ref 8.4–10.5)
CALCIUM SERPL-MCNC: 9.2 MG/DL — SIGNIFICANT CHANGE UP (ref 8.4–10.5)
CALCIUM SERPL-MCNC: 9.7 MG/DL — SIGNIFICANT CHANGE UP (ref 8.4–10.5)
CHLORIDE SERPL-SCNC: 101 MMOL/L — SIGNIFICANT CHANGE UP (ref 98–107)
CHLORIDE SERPL-SCNC: 106 MMOL/L — SIGNIFICANT CHANGE UP (ref 98–107)
CHLORIDE SERPL-SCNC: 107 MMOL/L — SIGNIFICANT CHANGE UP (ref 98–107)
CHLORIDE SERPL-SCNC: 95 MMOL/L — LOW (ref 98–107)
CO2 SERPL-SCNC: 22 MMOL/L — SIGNIFICANT CHANGE UP (ref 22–31)
CO2 SERPL-SCNC: 26 MMOL/L — SIGNIFICANT CHANGE UP (ref 22–31)
CO2 SERPL-SCNC: 26 MMOL/L — SIGNIFICANT CHANGE UP (ref 22–31)
CO2 SERPL-SCNC: 30 MMOL/L — SIGNIFICANT CHANGE UP (ref 22–31)
CREAT SERPL-MCNC: 0.97 MG/DL — SIGNIFICANT CHANGE UP (ref 0.5–1.3)
CREAT SERPL-MCNC: 0.98 MG/DL — SIGNIFICANT CHANGE UP (ref 0.5–1.3)
CREAT SERPL-MCNC: 0.99 MG/DL — SIGNIFICANT CHANGE UP (ref 0.5–1.3)
CREAT SERPL-MCNC: 1.09 MG/DL — SIGNIFICANT CHANGE UP (ref 0.5–1.3)
GLUCOSE BLDA-MCNC: 183 MG/DL — HIGH (ref 70–99)
GLUCOSE BLDC GLUCOMTR-MCNC: 238 MG/DL — HIGH (ref 70–99)
GLUCOSE BLDC GLUCOMTR-MCNC: 245 MG/DL — HIGH (ref 70–99)
GLUCOSE BLDC GLUCOMTR-MCNC: 261 MG/DL — HIGH (ref 70–99)
GLUCOSE SERPL-MCNC: 190 MG/DL — HIGH (ref 70–99)
GLUCOSE SERPL-MCNC: 195 MG/DL — HIGH (ref 70–99)
GLUCOSE SERPL-MCNC: 242 MG/DL — HIGH (ref 70–99)
GLUCOSE SERPL-MCNC: 259 MG/DL — HIGH (ref 70–99)
HCO3 BLDA-SCNC: 25 MMOL/L — SIGNIFICANT CHANGE UP (ref 22–26)
HCO3 BLDA-SCNC: 27 MMOL/L — HIGH (ref 22–26)
HCT VFR BLD CALC: 25.9 % — LOW (ref 34.5–45)
HCT VFR BLD CALC: 29.5 % — LOW (ref 34.5–45)
HCT VFR BLDA CALC: 25 % — LOW (ref 34.5–46.5)
HGB BLD-MCNC: 8 G/DL — LOW (ref 11.5–15.5)
HGB BLD-MCNC: 8.9 G/DL — LOW (ref 11.5–15.5)
HGB BLDA-MCNC: 8 G/DL — LOW (ref 11.5–15.5)
INR BLD: 1.06 — SIGNIFICANT CHANGE UP (ref 0.88–1.17)
LACTATE BLDA-SCNC: 0.6 MMOL/L — SIGNIFICANT CHANGE UP (ref 0.5–2)
LACTATE BLDA-SCNC: 1 MMOL/L — SIGNIFICANT CHANGE UP (ref 0.5–2)
MAGNESIUM SERPL-MCNC: 1.9 MG/DL — SIGNIFICANT CHANGE UP (ref 1.6–2.6)
MAGNESIUM SERPL-MCNC: 2 MG/DL — SIGNIFICANT CHANGE UP (ref 1.6–2.6)
MAGNESIUM SERPL-MCNC: 2 MG/DL — SIGNIFICANT CHANGE UP (ref 1.6–2.6)
MCHC RBC-ENTMCNC: 30.2 % — LOW (ref 32–36)
MCHC RBC-ENTMCNC: 30.9 % — LOW (ref 32–36)
MCHC RBC-ENTMCNC: 31.1 PG — SIGNIFICANT CHANGE UP (ref 27–34)
MCHC RBC-ENTMCNC: 31.6 PG — SIGNIFICANT CHANGE UP (ref 27–34)
MCV RBC AUTO: 102.4 FL — HIGH (ref 80–100)
MCV RBC AUTO: 103.1 FL — HIGH (ref 80–100)
NRBC # FLD: 0 K/UL — SIGNIFICANT CHANGE UP (ref 0–0)
NRBC # FLD: 0.02 K/UL — SIGNIFICANT CHANGE UP (ref 0–0)
PCO2 BLDA: 47 MMHG — SIGNIFICANT CHANGE UP (ref 32–48)
PCO2 BLDA: 79 MMHG — CRITICAL HIGH (ref 32–48)
PH BLDA: 7.21 PH — LOW (ref 7.35–7.45)
PH BLDA: 7.38 PH — SIGNIFICANT CHANGE UP (ref 7.35–7.45)
PHOSPHATE SERPL-MCNC: 1.9 MG/DL — LOW (ref 2.5–4.5)
PHOSPHATE SERPL-MCNC: 2.4 MG/DL — LOW (ref 2.5–4.5)
PHOSPHATE SERPL-MCNC: 3.2 MG/DL — SIGNIFICANT CHANGE UP (ref 2.5–4.5)
PLATELET # BLD AUTO: 243 K/UL — SIGNIFICANT CHANGE UP (ref 150–400)
PLATELET # BLD AUTO: 279 K/UL — SIGNIFICANT CHANGE UP (ref 150–400)
PMV BLD: 9.3 FL — SIGNIFICANT CHANGE UP (ref 7–13)
PMV BLD: 9.4 FL — SIGNIFICANT CHANGE UP (ref 7–13)
PO2 BLDA: 127 MMHG — HIGH (ref 83–108)
PO2 BLDA: 78 MMHG — LOW (ref 83–108)
POTASSIUM BLDA-SCNC: 5.1 MMOL/L — HIGH (ref 3.4–4.5)
POTASSIUM SERPL-MCNC: 4.9 MMOL/L — SIGNIFICANT CHANGE UP (ref 3.5–5.3)
POTASSIUM SERPL-MCNC: 5.4 MMOL/L — HIGH (ref 3.5–5.3)
POTASSIUM SERPL-MCNC: 5.7 MMOL/L — HIGH (ref 3.5–5.3)
POTASSIUM SERPL-MCNC: 5.8 MMOL/L — HIGH (ref 3.5–5.3)
POTASSIUM SERPL-SCNC: 4.9 MMOL/L — SIGNIFICANT CHANGE UP (ref 3.5–5.3)
POTASSIUM SERPL-SCNC: 5.4 MMOL/L — HIGH (ref 3.5–5.3)
POTASSIUM SERPL-SCNC: 5.7 MMOL/L — HIGH (ref 3.5–5.3)
POTASSIUM SERPL-SCNC: 5.8 MMOL/L — HIGH (ref 3.5–5.3)
PROCALCITONIN SERPL-MCNC: 0.17 NG/ML — HIGH (ref 0.02–0.1)
PROT SERPL-MCNC: 6 G/DL — SIGNIFICANT CHANGE UP (ref 6–8.3)
PROTHROM AB SERPL-ACNC: 11.8 SEC — SIGNIFICANT CHANGE UP (ref 9.8–13.1)
RBC # BLD: 2.53 M/UL — LOW (ref 3.8–5.2)
RBC # BLD: 2.86 M/UL — LOW (ref 3.8–5.2)
RBC # FLD: 12.2 % — SIGNIFICANT CHANGE UP (ref 10.3–14.5)
RBC # FLD: 12.4 % — SIGNIFICANT CHANGE UP (ref 10.3–14.5)
SAO2 % BLDA: 94.4 % — LOW (ref 95–99)
SAO2 % BLDA: 99.1 % — HIGH (ref 95–99)
SODIUM BLDA-SCNC: 137 MMOL/L — SIGNIFICANT CHANGE UP (ref 136–146)
SODIUM SERPL-SCNC: 140 MMOL/L — SIGNIFICANT CHANGE UP (ref 135–145)
SODIUM SERPL-SCNC: 142 MMOL/L — SIGNIFICANT CHANGE UP (ref 135–145)
SODIUM SERPL-SCNC: 143 MMOL/L — SIGNIFICANT CHANGE UP (ref 135–145)
SODIUM SERPL-SCNC: 153 MMOL/L — HIGH (ref 135–145)
WBC # BLD: 4.91 K/UL — SIGNIFICANT CHANGE UP (ref 3.8–10.5)
WBC # BLD: 6.53 K/UL — SIGNIFICANT CHANGE UP (ref 3.8–10.5)
WBC # FLD AUTO: 4.91 K/UL — SIGNIFICANT CHANGE UP (ref 3.8–10.5)
WBC # FLD AUTO: 6.53 K/UL — SIGNIFICANT CHANGE UP (ref 3.8–10.5)

## 2019-09-26 PROCEDURE — 93010 ELECTROCARDIOGRAM REPORT: CPT

## 2019-09-26 PROCEDURE — 76604 US EXAM CHEST: CPT | Mod: 26

## 2019-09-26 PROCEDURE — 71045 X-RAY EXAM CHEST 1 VIEW: CPT | Mod: 26

## 2019-09-26 PROCEDURE — 99291 CRITICAL CARE FIRST HOUR: CPT

## 2019-09-26 RX ORDER — HYDROCORTISONE 20 MG
50 TABLET ORAL EVERY 6 HOURS
Refills: 0 | Status: DISCONTINUED | OUTPATIENT
Start: 2019-09-26 | End: 2019-10-02

## 2019-09-26 RX ORDER — PROPOFOL 10 MG/ML
10 INJECTION, EMULSION INTRAVENOUS
Qty: 1000 | Refills: 0 | Status: DISCONTINUED | OUTPATIENT
Start: 2019-09-26 | End: 2019-09-26

## 2019-09-26 RX ORDER — ALBUTEROL 90 UG/1
2.5 AEROSOL, METERED ORAL ONCE
Refills: 0 | Status: COMPLETED | OUTPATIENT
Start: 2019-09-26 | End: 2019-09-26

## 2019-09-26 RX ORDER — HYDROCORTISONE 20 MG
150 TABLET ORAL ONCE
Refills: 0 | Status: COMPLETED | OUTPATIENT
Start: 2019-09-26 | End: 2019-09-26

## 2019-09-26 RX ORDER — DEXMEDETOMIDINE HYDROCHLORIDE IN 0.9% SODIUM CHLORIDE 4 UG/ML
0.2 INJECTION INTRAVENOUS
Qty: 200 | Refills: 0 | Status: DISCONTINUED | OUTPATIENT
Start: 2019-09-26 | End: 2019-09-27

## 2019-09-26 RX ORDER — ENOXAPARIN SODIUM 100 MG/ML
40 INJECTION SUBCUTANEOUS DAILY
Refills: 0 | Status: DISCONTINUED | OUTPATIENT
Start: 2019-09-26 | End: 2019-10-02

## 2019-09-26 RX ORDER — DEXTROSE 50 % IN WATER 50 %
50 SYRINGE (ML) INTRAVENOUS ONCE
Refills: 0 | Status: COMPLETED | OUTPATIENT
Start: 2019-09-26 | End: 2019-09-26

## 2019-09-26 RX ORDER — SODIUM CHLORIDE 9 MG/ML
500 INJECTION INTRAMUSCULAR; INTRAVENOUS; SUBCUTANEOUS ONCE
Refills: 0 | Status: COMPLETED | OUTPATIENT
Start: 2019-09-26 | End: 2019-09-26

## 2019-09-26 RX ORDER — INSULIN HUMAN 100 [IU]/ML
10 INJECTION, SOLUTION SUBCUTANEOUS ONCE
Refills: 0 | Status: COMPLETED | OUTPATIENT
Start: 2019-09-26 | End: 2019-09-26

## 2019-09-26 RX ORDER — FAMOTIDINE 10 MG/ML
20 INJECTION INTRAVENOUS DAILY
Refills: 0 | Status: DISCONTINUED | OUTPATIENT
Start: 2019-09-26 | End: 2019-09-27

## 2019-09-26 RX ORDER — SODIUM CHLORIDE 9 MG/ML
500 INJECTION INTRAMUSCULAR; INTRAVENOUS; SUBCUTANEOUS ONCE
Refills: 0 | Status: DISCONTINUED | OUTPATIENT
Start: 2019-09-26 | End: 2019-09-26

## 2019-09-26 RX ORDER — INSULIN HUMAN 100 [IU]/ML
10 INJECTION, SOLUTION SUBCUTANEOUS ONCE
Refills: 0 | Status: DISCONTINUED | OUTPATIENT
Start: 2019-09-26 | End: 2019-09-26

## 2019-09-26 RX ORDER — INSULIN LISPRO 100/ML
VIAL (ML) SUBCUTANEOUS EVERY 6 HOURS
Refills: 0 | Status: DISCONTINUED | OUTPATIENT
Start: 2019-09-26 | End: 2019-10-01

## 2019-09-26 RX ORDER — ASPIRIN/CALCIUM CARB/MAGNESIUM 324 MG
300 TABLET ORAL
Refills: 0 | Status: DISCONTINUED | OUTPATIENT
Start: 2019-09-26 | End: 2019-09-26

## 2019-09-26 RX ORDER — SODIUM CHLORIDE 9 MG/ML
1000 INJECTION, SOLUTION INTRAVENOUS
Refills: 0 | Status: DISCONTINUED | OUTPATIENT
Start: 2019-09-26 | End: 2019-09-27

## 2019-09-26 RX ORDER — CHLORHEXIDINE GLUCONATE 213 G/1000ML
1 SOLUTION TOPICAL DAILY
Refills: 0 | Status: DISCONTINUED | OUTPATIENT
Start: 2019-09-26 | End: 2019-10-02

## 2019-09-26 RX ORDER — SODIUM CHLORIDE 9 MG/ML
500 INJECTION, SOLUTION INTRAVENOUS ONCE
Refills: 0 | Status: COMPLETED | OUTPATIENT
Start: 2019-09-26 | End: 2019-09-26

## 2019-09-26 RX ORDER — INSULIN LISPRO 100/ML
VIAL (ML) SUBCUTANEOUS EVERY 6 HOURS
Refills: 0 | Status: DISCONTINUED | OUTPATIENT
Start: 2019-09-26 | End: 2019-09-26

## 2019-09-26 RX ORDER — CHLORHEXIDINE GLUCONATE 213 G/1000ML
15 SOLUTION TOPICAL EVERY 12 HOURS
Refills: 0 | Status: DISCONTINUED | OUTPATIENT
Start: 2019-09-26 | End: 2019-09-28

## 2019-09-26 RX ORDER — LEVOTHYROXINE SODIUM 125 MCG
44 TABLET ORAL AT BEDTIME
Refills: 0 | Status: DISCONTINUED | OUTPATIENT
Start: 2019-09-26 | End: 2019-10-02

## 2019-09-26 RX ADMIN — SODIUM CHLORIDE 1000 MILLILITER(S): 9 INJECTION INTRAMUSCULAR; INTRAVENOUS; SUBCUTANEOUS at 03:14

## 2019-09-26 RX ADMIN — ALBUTEROL 2 PUFF(S): 90 AEROSOL, METERED ORAL at 20:17

## 2019-09-26 RX ADMIN — Medication 4: at 17:21

## 2019-09-26 RX ADMIN — Medication 2: at 11:18

## 2019-09-26 RX ADMIN — Medication 50 MILLIGRAM(S): at 17:21

## 2019-09-26 RX ADMIN — BUDESONIDE AND FORMOTEROL FUMARATE DIHYDRATE 2 PUFF(S): 160; 4.5 AEROSOL RESPIRATORY (INHALATION) at 10:20

## 2019-09-26 RX ADMIN — Medication 150 MILLIGRAM(S): at 11:18

## 2019-09-26 RX ADMIN — BUDESONIDE AND FORMOTEROL FUMARATE DIHYDRATE 2 PUFF(S): 160; 4.5 AEROSOL RESPIRATORY (INHALATION) at 20:17

## 2019-09-26 RX ADMIN — SODIUM CHLORIDE 1000 MILLILITER(S): 9 INJECTION, SOLUTION INTRAVENOUS at 17:21

## 2019-09-26 RX ADMIN — ENOXAPARIN SODIUM 40 MILLIGRAM(S): 100 INJECTION SUBCUTANEOUS at 11:17

## 2019-09-26 RX ADMIN — Medication 44 MICROGRAM(S): at 22:23

## 2019-09-26 RX ADMIN — Medication 300 MILLIGRAM(S): at 08:26

## 2019-09-26 RX ADMIN — CHLORHEXIDINE GLUCONATE 15 MILLILITER(S): 213 SOLUTION TOPICAL at 08:26

## 2019-09-26 RX ADMIN — Medication 50 MILLILITER(S): at 05:55

## 2019-09-26 RX ADMIN — FAMOTIDINE 104 MILLIGRAM(S): 10 INJECTION INTRAVENOUS at 11:17

## 2019-09-26 RX ADMIN — DEXMEDETOMIDINE HYDROCHLORIDE IN 0.9% SODIUM CHLORIDE 3.75 MICROGRAM(S)/KG/HR: 4 INJECTION INTRAVENOUS at 10:29

## 2019-09-26 RX ADMIN — SODIUM CHLORIDE 100 MILLILITER(S): 9 INJECTION, SOLUTION INTRAVENOUS at 08:15

## 2019-09-26 RX ADMIN — PROPOFOL 4.5 MICROGRAM(S)/KG/MIN: 10 INJECTION, EMULSION INTRAVENOUS at 05:43

## 2019-09-26 RX ADMIN — Medication 2: at 06:33

## 2019-09-26 RX ADMIN — SODIUM CHLORIDE 2000 MILLILITER(S): 9 INJECTION, SOLUTION INTRAVENOUS at 04:45

## 2019-09-26 RX ADMIN — Medication 125 MILLIGRAM(S): at 05:43

## 2019-09-26 RX ADMIN — SODIUM CHLORIDE 100 MILLILITER(S): 9 INJECTION, SOLUTION INTRAVENOUS at 05:42

## 2019-09-26 RX ADMIN — PROPOFOL 4.5 MICROGRAM(S)/KG/MIN: 10 INJECTION, EMULSION INTRAVENOUS at 08:14

## 2019-09-26 RX ADMIN — PROPOFOL 4.5 MICROGRAM(S)/KG/MIN: 10 INJECTION, EMULSION INTRAVENOUS at 03:04

## 2019-09-26 RX ADMIN — INSULIN HUMAN 10 UNIT(S): 100 INJECTION, SOLUTION SUBCUTANEOUS at 05:56

## 2019-09-26 NOTE — CONSULT NOTE ADULT - SUBJECTIVE AND OBJECTIVE BOX
SICU Consultation Note  =====================================================  HPI: 84y female  ***    Surgery Information  ***  Case Duration: 	EBL: 	IV Fluids: 	Blood Products: 	Urine Output:   Complications:     HISTORY  84y Female  HPI:  83 yo female s/p mechanical fall c/o left hip pain was brought to [facility] via EMS for eval.  Pt denies syncope, LOC, hitting head.  XR in [facility] ED reveal left FNfx . Pt Hx COPD on 3L O2, CKD, HTN, DM2 , hypothyroid. (20 Sep 2019 08:04)    Allergies:   PAST MEDICAL & SURGICAL HISTORY:  Chronic kidney disease (CKD)  COPD (chronic obstructive pulmonary disease)  HTN (hypertension)  Hypothyroid  DM (diabetes mellitus)  Leg fracture: right ORIF with hardware 2010  H/O cataract: bilateral 2013  S/P cholecystectomy    FAMILY HISTORY:  Family history of hypertension: Father  FH: type 2 diabetes mellitus: Father  FHx: rheumatoid arthritis: Mother and Brother    ADVANCE DIRECTIVES: Full Code  ***    REVIEW OF SYSTEMS:  Unable to obtain secondary to AMS  HOME MEDICATIONS:    --------------------------------------------------------------------------------------  Home Medications:  acetaminophen 325 mg oral tablet: 3 tab(s) orally every 8 hours (23 Sep 2019 11:26)  albuterol 90 mcg/inh inhalation aerosol: 2 puff(s) inhaled every 6 hours, As needed, Bronchospasm (23 Sep 2019 11:26)  ascorbic acid 500 mg oral tablet: 1 tab(s) orally once a day (23 Sep 2019 11:26)  aspirin 325 mg oral delayed release tablet: 1 tab(s) orally 2 times a day (with meals) (23 Sep 2019 11:26)  calcium-vitamin D 500 mg-200 intl units oral tablet: 1 tab(s) orally once a day (23 Sep 2019 11:26)  docusate sodium 100 mg oral capsule: 1 cap(s) orally 3 times a day  discharge home with over the counter for constipation caused by pain meds   (23 Sep 2019 11:26)  famotidine 20 mg oral tablet: 1 tab(s) orally once a day (23 Sep 2019 11:26)  melatonin 3 mg oral tablet: 1 tab(s) orally once a day (at bedtime), As needed, Insomnia (23 Sep 2019 11:26)  metformin 500 mg oral tablet: 2  orally once a day in the morning (10 Aug 2019 17:32)  oxyCODONE: 2.5 milligram(s) orally every 6 hours as needed for moderate pain  begin tapering off as pain decreases  (23 Sep 2019 11:26)  oxyCODONE 5 mg oral tablet: 1 tab(s) orally every 6 hours as needed for severe pain  begin tapering off as pain decreases  (23 Sep 2019 11:26)  senna oral tablet: 2 tab(s) orally once a day  discharge home with over the counter for constipation caused by pain meds   (23 Sep 2019 11:26)    --------------------------------------------------------------------------------------    CURRENT MEDICATIONS:   --------------------------------------------------------------------------------------  Neurologic Medications  acetaminophen   Tablet .. 975 milliGRAM(s) Oral every 8 hours  LORazepam   Injectable 2 milliGRAM(s) IntraMuscular once  melatonin 3 milliGRAM(s) Oral at bedtime PRN Insomnia  ondansetron Injectable 4 milliGRAM(s) IV Push every 6 hours PRN Nausea and/or Vomiting  propofol Infusion 10 MICROgram(s)/kG/Min IV Continuous <Continuous>    Respiratory Medications  ALBUTerol    90 MICROgram(s) HFA Inhaler 2 Puff(s) Inhalation every 6 hours PRN Bronchospasm  ALBUTerol/ipratropium for Nebulization 3 milliLiter(s) Nebulizer every 6 hours  buDESOnide  80 MICROgram(s)/formoterol 4.5 MICROgram(s) Inhaler 2 Puff(s) Inhalation two times a day  tiotropium 18 MICROgram(s) Capsule 1 Capsule(s) Inhalation daily    Cardiovascular Medications    Gastrointestinal Medications  ascorbic acid 500 milliGRAM(s) Oral daily  calcium carbonate 1250 mG  + Vitamin D (OsCal 500 + D) 1 Tablet(s) Oral daily  dextrose 5%. 1000 milliLiter(s) IV Continuous <Continuous>  docusate sodium 100 milliGRAM(s) Oral three times a day  famotidine    Tablet 20 milliGRAM(s) Oral daily    Genitourinary Medications    Hematologic/Oncologic Medications  aspirin enteric coated 325 milliGRAM(s) Oral two times a day    Antimicrobial/Immunologic Medications    Endocrine/Metabolic Medications  dextrose 40% Gel 15 Gram(s) Oral once PRN Blood Glucose LESS THAN 70 milliGRAM(s)/deciliter  dextrose 50% Injectable 12.5 Gram(s) IV Push once  dextrose 50% Injectable 25 Gram(s) IV Push once  dextrose 50% Injectable 25 Gram(s) IV Push once  glucagon  Injectable 1 milliGRAM(s) IntraMuscular once PRN Glucose LESS THAN 70 milligrams/deciliter  insulin lispro (HumaLOG) corrective regimen sliding scale   SubCutaneous at bedtime  insulin lispro (HumaLOG) corrective regimen sliding scale   SubCutaneous three times a day before meals  levothyroxine 88 MICROGram(s) Oral daily  predniSONE   Tablet 40 milliGRAM(s) Oral daily    Topical/Other Medications    --------------------------------------------------------------------------------------    VITAL SIGNS, INS/OUTS (last 24 hours):  --------------------------------------------------------------------------------------  ((Insert SICU Vitals / Is+Os here)) ***  --------------------------------------------------------------------------------------    EXAM  NEUROLOGY  RASS:   	GCS:    Exam: Normal, NAD, alert, oriented x 3, no focal deficits. ***    HEENT  Exam: Normocephalic, atraumatic.  EOMI ***    RESPIRATORY  Exam: Lungs clear to auscultation, Normal expansion/effort.  ***  Mechanical Ventilation:     CARDIOVASCULAR  Exam: S1, S2.  Regular rate and rhythm.  Peripheral edema  ***    GI/NUTRITION  Exam: Abdomen soft, Non-tender, Non-distended.  ***  Wound:   ***  Current Diet:  NPO ***    VASCULAR  Exam: Extremities warm, pink, well-perfused.  ***    MUSCULOSKELETAL  Exam: All extremities moving spontaneously without limitations.  ***    SKIN:  Exam: Good skin turgor, no skin breakdown.  ***    METABOLIC/FLUIDS/ELECTROLYTES  ascorbic acid 500 milliGRAM(s) Oral daily  calcium carbonate 1250 mG  + Vitamin D (OsCal 500 + D) 1 Tablet(s) Oral daily  dextrose 5%. 1000 milliLiter(s) IV Continuous <Continuous>      HEMATOLOGIC  [x] DVT Prophylaxis: aspirin enteric coated 325 milliGRAM(s) Oral two times a day    Transfusions:	[] PRBC	[] Platelets		[] FFP	[] Cryoprecipitate    INFECTIOUS DISEASE  Antimicrobials/Immunologic Medications:    Day #  	of    ***    Tubes/Lines/Drains  ***  [x] Peripheral IV  [] Central Venous Line     	[] R	[] L	[] IJ	[] Fem	[] SC	Date Placed:   [] Arterial Line		[] R	[] L	[] Fem	[] Rad	[] Ax	Date Placed:   [] PICC:         	[] Midline		[] Mediport  [] Urinary Catheter		Date Placed:     LABS  --------------------------------------------------------------------------------------  ((Insert SICU Labs here))***  --------------------------------------------------------------------------------------    OTHER LABS    IMAGING RESULTS  Echo:   CT:   Xray:     ASSESSMENT:  84y Female ***    PLAN:  ***  Neurologic:   Respiratory:   Cardiovascular:   Gastrointestinal/Nutrition:   Renal/Genitourinary:   Hematologic:   Infectious Disease:   Tubes/Lines/Drains:   Endocrine:   Disposition:     --------------------------------------------------------------------------------------    Critical Care Diagnoses: SICU Consultation Note  =====================================================  HPI: 84y female  ***    Surgery Information  ***  Case Duration: 	EBL: 	IV Fluids: 	Blood Products: 	Urine Output:   Complications:     HISTORY  84y Female  HPI:  85 yo female s/p mechanical fall c/o left hip pain was brought to [facility] via EMS for eval.  Pt denies syncope, LOC, hitting head.  XR in [facility] ED reveal left FNfx . Pt Hx COPD on 3L O2, CKD, HTN, DM2 , hypothyroid. (20 Sep 2019 08:04)    Allergies:   PAST MEDICAL & SURGICAL HISTORY:  Chronic kidney disease (CKD)  COPD (chronic obstructive pulmonary disease)  HTN (hypertension)  Hypothyroid  DM (diabetes mellitus)  Leg fracture: right ORIF with hardware 2010  H/O cataract: bilateral 2013  S/P cholecystectomy    FAMILY HISTORY:  Family history of hypertension: Father  FH: type 2 diabetes mellitus: Father  FHx: rheumatoid arthritis: Mother and Brother    ADVANCE DIRECTIVES: Full Code  ***    REVIEW OF SYSTEMS:  Unable to obtain secondary to AMS  HOME MEDICATIONS:    --------------------------------------------------------------------------------------  Home Medications:  acetaminophen 325 mg oral tablet: 3 tab(s) orally every 8 hours (23 Sep 2019 11:26)  albuterol 90 mcg/inh inhalation aerosol: 2 puff(s) inhaled every 6 hours, As needed, Bronchospasm (23 Sep 2019 11:26)  ascorbic acid 500 mg oral tablet: 1 tab(s) orally once a day (23 Sep 2019 11:26)  aspirin 325 mg oral delayed release tablet: 1 tab(s) orally 2 times a day (with meals) (23 Sep 2019 11:26)  calcium-vitamin D 500 mg-200 intl units oral tablet: 1 tab(s) orally once a day (23 Sep 2019 11:26)  docusate sodium 100 mg oral capsule: 1 cap(s) orally 3 times a day  discharge home with over the counter for constipation caused by pain meds   (23 Sep 2019 11:26)  famotidine 20 mg oral tablet: 1 tab(s) orally once a day (23 Sep 2019 11:26)  melatonin 3 mg oral tablet: 1 tab(s) orally once a day (at bedtime), As needed, Insomnia (23 Sep 2019 11:26)  metformin 500 mg oral tablet: 2  orally once a day in the morning (10 Aug 2019 17:32)  oxyCODONE: 2.5 milligram(s) orally every 6 hours as needed for moderate pain  begin tapering off as pain decreases  (23 Sep 2019 11:26)  oxyCODONE 5 mg oral tablet: 1 tab(s) orally every 6 hours as needed for severe pain  begin tapering off as pain decreases  (23 Sep 2019 11:26)  senna oral tablet: 2 tab(s) orally once a day  discharge home with over the counter for constipation caused by pain meds   (23 Sep 2019 11:26)    --------------------------------------------------------------------------------------    CURRENT MEDICATIONS:   --------------------------------------------------------------------------------------  Neurologic Medications  acetaminophen   Tablet .. 975 milliGRAM(s) Oral every 8 hours  LORazepam   Injectable 2 milliGRAM(s) IntraMuscular once  melatonin 3 milliGRAM(s) Oral at bedtime PRN Insomnia  ondansetron Injectable 4 milliGRAM(s) IV Push every 6 hours PRN Nausea and/or Vomiting  propofol Infusion 10 MICROgram(s)/kG/Min IV Continuous <Continuous>    Respiratory Medications  ALBUTerol    90 MICROgram(s) HFA Inhaler 2 Puff(s) Inhalation every 6 hours PRN Bronchospasm  ALBUTerol/ipratropium for Nebulization 3 milliLiter(s) Nebulizer every 6 hours  buDESOnide  80 MICROgram(s)/formoterol 4.5 MICROgram(s) Inhaler 2 Puff(s) Inhalation two times a day  tiotropium 18 MICROgram(s) Capsule 1 Capsule(s) Inhalation daily    Cardiovascular Medications    Gastrointestinal Medications  ascorbic acid 500 milliGRAM(s) Oral daily  calcium carbonate 1250 mG  + Vitamin D (OsCal 500 + D) 1 Tablet(s) Oral daily  dextrose 5%. 1000 milliLiter(s) IV Continuous <Continuous>  docusate sodium 100 milliGRAM(s) Oral three times a day  famotidine    Tablet 20 milliGRAM(s) Oral daily    Genitourinary Medications    Hematologic/Oncologic Medications  aspirin enteric coated 325 milliGRAM(s) Oral two times a day    Antimicrobial/Immunologic Medications    Endocrine/Metabolic Medications  dextrose 40% Gel 15 Gram(s) Oral once PRN Blood Glucose LESS THAN 70 milliGRAM(s)/deciliter  dextrose 50% Injectable 12.5 Gram(s) IV Push once  dextrose 50% Injectable 25 Gram(s) IV Push once  dextrose 50% Injectable 25 Gram(s) IV Push once  glucagon  Injectable 1 milliGRAM(s) IntraMuscular once PRN Glucose LESS THAN 70 milligrams/deciliter  insulin lispro (HumaLOG) corrective regimen sliding scale   SubCutaneous at bedtime  insulin lispro (HumaLOG) corrective regimen sliding scale   SubCutaneous three times a day before meals  levothyroxine 88 MICROGram(s) Oral daily  predniSONE   Tablet 40 milliGRAM(s) Oral daily    Topical/Other Medications    --------------------------------------------------------------------------------------    VITAL SIGNS, INS/OUTS (last 24 hours):  --------------------------------------------------------------------------------------  ((Insert SICU Vitals / Is+Os here)) ***  --------------------------------------------------------------------------------------    EXAM  NEUROLOGY  Exam: moving all extremities    HEENT  Exam: Normocephalic, atraumatic.  perrla    RESPIRATORY  Exam: decreased air movement b/l, increased wob    CARDIOVASCULAR  Exam: S1, S2    GI/NUTRITION  Exam: Abdomen soft, Non-tender, Non-distended.       VASCULAR  Exam: Extremities warm, pink, well-perfused.      MUSCULOSKELETAL  Exam: All extremities moving spontaneously without limitations.      SKIN:  Exam: Good skin turgor, no skin breakdown.      METABOLIC/FLUIDS/ELECTROLYTES  ascorbic acid 500 milliGRAM(s) Oral daily  calcium carbonate 1250 mG  + Vitamin D (OsCal 500 + D) 1 Tablet(s) Oral daily  dextrose 5%. 1000 milliLiter(s) IV Continuous <Continuous>      HEMATOLOGIC  [x] DVT Prophylaxis: aspirin enteric coated 325 milliGRAM(s) Oral two times a day    Transfusions:	[] PRBC	[] Platelets		[] FFP	[] Cryoprecipitate        Tubes/Lines/Drains  ***  [x] Peripheral IV  [] Central Venous Line     	[] R	[] L	[] IJ	[] Fem	[] SC	Date Placed:   [] Arterial Line		[] R	[] L	[] Fem	[] Rad	[] Ax	Date Placed:   [] PICC:         	[] Midline		[] Mediport  [] Urinary Catheter		Date Placed:     LABS  --------------------------------------------------------------------------------------  ((Insert SICU Labs here))***  --------------------------------------------------------------------------------------    OTHER LABS    IMAGING RESULTS  Echo:   CT:   Xray:     ASSESSMENT:  85 yo female with PMH of HTN, Hypothyroid, DMII, CKD stage 3, COPD c/w chronic hypoxic respiratory failure on 3L home O2 present after mechanical fall c/o left hip pain found to have L femoral neck fracture, s/p left hip hemiarthroplasty, POD 5.    PLAN:     Neurologic:   - AMS likely secondary to hypercarbia  - Will r/o metabolic/structural causes  - CT head ordered    Respiratory:   - Chronic respiratory failure with hypoxia, on home O2 therapy  - COPD exacerbation and hypercarbia  - Increase RR of vent to decrease pCO2  - duonebs q6  - Trend ABGs    Cardiovascular:   - hemodynamically stable    Gastrointestinal/Nutrition:   - NPO    Renal/Genitourinary:   - Trend I/Os  - Monitor electrolytes    Hematologic:   - ASA for DVT prophylaxis      Infectious Disease:   - Consider ABX for COPD exacerbation    Tubes/Lines/Drains:     Endocrine:   - Synthroid    Disposition: SICU    --------------------------------------------------------------------------------------    Critical Care Diagnoses: SICU Consultation Note  =====================================================  HPI: 84y female  ***    Surgery Information  ***  Case Duration: 	EBL: 	IV Fluids: 	Blood Products: 	Urine Output:   Complications:     HISTORY  84y Female  HPI:  85 yo female s/p mechanical fall c/o left hip pain was brought to [facility] via EMS for eval.  Pt denies syncope, LOC, hitting head.  XR in [facility] ED reveal left FNfx . Pt Hx COPD on 3L O2, CKD, HTN, DM2 , hypothyroid. (20 Sep 2019 08:04)    Allergies:   PAST MEDICAL & SURGICAL HISTORY:  Chronic kidney disease (CKD)  COPD (chronic obstructive pulmonary disease)  HTN (hypertension)  Hypothyroid  DM (diabetes mellitus)  Leg fracture: right ORIF with hardware 2010  H/O cataract: bilateral 2013  S/P cholecystectomy    FAMILY HISTORY:  Family history of hypertension: Father  FH: type 2 diabetes mellitus: Father  FHx: rheumatoid arthritis: Mother and Brother    ADVANCE DIRECTIVES: Full Code  ***    REVIEW OF SYSTEMS:  Unable to obtain secondary to AMS  HOME MEDICATIONS:    --------------------------------------------------------------------------------------  Home Medications:  acetaminophen 325 mg oral tablet: 3 tab(s) orally every 8 hours (23 Sep 2019 11:26)  albuterol 90 mcg/inh inhalation aerosol: 2 puff(s) inhaled every 6 hours, As needed, Bronchospasm (23 Sep 2019 11:26)  ascorbic acid 500 mg oral tablet: 1 tab(s) orally once a day (23 Sep 2019 11:26)  aspirin 325 mg oral delayed release tablet: 1 tab(s) orally 2 times a day (with meals) (23 Sep 2019 11:26)  calcium-vitamin D 500 mg-200 intl units oral tablet: 1 tab(s) orally once a day (23 Sep 2019 11:26)  docusate sodium 100 mg oral capsule: 1 cap(s) orally 3 times a day  discharge home with over the counter for constipation caused by pain meds   (23 Sep 2019 11:26)  famotidine 20 mg oral tablet: 1 tab(s) orally once a day (23 Sep 2019 11:26)  melatonin 3 mg oral tablet: 1 tab(s) orally once a day (at bedtime), As needed, Insomnia (23 Sep 2019 11:26)  metformin 500 mg oral tablet: 2  orally once a day in the morning (10 Aug 2019 17:32)  oxyCODONE: 2.5 milligram(s) orally every 6 hours as needed for moderate pain  begin tapering off as pain decreases  (23 Sep 2019 11:26)  oxyCODONE 5 mg oral tablet: 1 tab(s) orally every 6 hours as needed for severe pain  begin tapering off as pain decreases  (23 Sep 2019 11:26)  senna oral tablet: 2 tab(s) orally once a day  discharge home with over the counter for constipation caused by pain meds   (23 Sep 2019 11:26)    --------------------------------------------------------------------------------------    CURRENT MEDICATIONS:   --------------------------------------------------------------------------------------  Neurologic Medications  acetaminophen   Tablet .. 975 milliGRAM(s) Oral every 8 hours  LORazepam   Injectable 2 milliGRAM(s) IntraMuscular once  melatonin 3 milliGRAM(s) Oral at bedtime PRN Insomnia  ondansetron Injectable 4 milliGRAM(s) IV Push every 6 hours PRN Nausea and/or Vomiting  propofol Infusion 10 MICROgram(s)/kG/Min IV Continuous <Continuous>    Respiratory Medications  ALBUTerol    90 MICROgram(s) HFA Inhaler 2 Puff(s) Inhalation every 6 hours PRN Bronchospasm  ALBUTerol/ipratropium for Nebulization 3 milliLiter(s) Nebulizer every 6 hours  buDESOnide  80 MICROgram(s)/formoterol 4.5 MICROgram(s) Inhaler 2 Puff(s) Inhalation two times a day  tiotropium 18 MICROgram(s) Capsule 1 Capsule(s) Inhalation daily    Cardiovascular Medications    Gastrointestinal Medications  ascorbic acid 500 milliGRAM(s) Oral daily  calcium carbonate 1250 mG  + Vitamin D (OsCal 500 + D) 1 Tablet(s) Oral daily  dextrose 5%. 1000 milliLiter(s) IV Continuous <Continuous>  docusate sodium 100 milliGRAM(s) Oral three times a day  famotidine    Tablet 20 milliGRAM(s) Oral daily    Genitourinary Medications    Hematologic/Oncologic Medications  aspirin enteric coated 325 milliGRAM(s) Oral two times a day    Antimicrobial/Immunologic Medications    Endocrine/Metabolic Medications  dextrose 40% Gel 15 Gram(s) Oral once PRN Blood Glucose LESS THAN 70 milliGRAM(s)/deciliter  dextrose 50% Injectable 12.5 Gram(s) IV Push once  dextrose 50% Injectable 25 Gram(s) IV Push once  dextrose 50% Injectable 25 Gram(s) IV Push once  glucagon  Injectable 1 milliGRAM(s) IntraMuscular once PRN Glucose LESS THAN 70 milligrams/deciliter  insulin lispro (HumaLOG) corrective regimen sliding scale   SubCutaneous at bedtime  insulin lispro (HumaLOG) corrective regimen sliding scale   SubCutaneous three times a day before meals  levothyroxine 88 MICROGram(s) Oral daily  predniSONE   Tablet 40 milliGRAM(s) Oral daily    Topical/Other Medications    --------------------------------------------------------------------------------------    VITAL SIGNS, INS/OUTS (last 24 hours):  --------------------------------------------------------------------------------------  ((Insert SICU Vitals / Is+Os here)) ***  --------------------------------------------------------------------------------------    EXAM  NEUROLOGY  Exam: moving all extremities    HEENT  Exam: Normocephalic, atraumatic.  perrla    RESPIRATORY  Exam: decreased air movement b/l, increased wob    CARDIOVASCULAR  Exam: S1, S2    GI/NUTRITION  Exam: Abdomen soft, Non-tender, Non-distended.       VASCULAR  Exam: Extremities warm, pink, well-perfused.      MUSCULOSKELETAL  Exam: All extremities moving spontaneously without limitations.      SKIN:  Exam: Good skin turgor, no skin breakdown.      METABOLIC/FLUIDS/ELECTROLYTES  ascorbic acid 500 milliGRAM(s) Oral daily  calcium carbonate 1250 mG  + Vitamin D (OsCal 500 + D) 1 Tablet(s) Oral daily  dextrose 5%. 1000 milliLiter(s) IV Continuous <Continuous>      HEMATOLOGIC  [x] DVT Prophylaxis: aspirin enteric coated 325 milliGRAM(s) Oral two times a day    Transfusions:	[] PRBC	[] Platelets		[] FFP	[] Cryoprecipitate        Tubes/Lines/Drains  ***  [x] Peripheral IV  [] Central Venous Line     	[] R	[] L	[] IJ	[] Fem	[] SC	Date Placed:   [] Arterial Line		[] R	[] L	[] Fem	[] Rad	[] Ax	Date Placed:   [] PICC:         	[] Midline		[] Mediport  [] Urinary Catheter		Date Placed:     LABS  --------------------------------------------------------------------------------------  ((Insert SICU Labs here))***  --------------------------------------------------------------------------------------    OTHER LABS    IMAGING RESULTS  Echo:   CT:   Xray:     ASSESSMENT:  85 yo female with PMH of HTN, Hypothyroid, DMII, CKD stage 3, COPD c/w chronic hypoxic respiratory failure on 3L home O2 present after mechanical fall c/o left hip pain found to have L femoral neck fracture, s/p left hip hemiarthroplasty, POD 5.    PLAN:     Neurologic:   - AMS likely secondary to hypercarbia    Respiratory:   - Chronic respiratory failure with hypoxia, on home O2 therapy  - COPD exacerbation and hypercarbia  - Increase RR of vent to decrease pCO2  - duonebs q6, Symbicort bid, Spiriva qd  - IV solumedrol taper for COPD exacerbation  - Trend ABGs  - Wean to extubate   - Will perform chest US to evaluate for pulmonary edema - possible lasix     Cardiovascular:   - hemodynamically stable    Gastrointestinal/Nutrition:   - NPO    Renal/Genitourinary:   - Trend I/Os  - Monitor electrolytes    Hematologic:   - lovenox for DVT prophylaxis      Infectious Disease:   - CXR demonstrating RLL consolidation  - Consider ABX for COPD exacerbation    Tubes/Lines/Drains:     Endocrine:   - Synthroid  - ISS    Disposition: SICU    --------------------------------------------------------------------------------------    Critical Care Diagnoses:

## 2019-09-26 NOTE — PROGRESS NOTE ADULT - SUBJECTIVE AND OBJECTIVE BOX
Orthopedic Surgery Progress Note  Patient went into hypercapnic respiratory failure on floor, intubated and transferred to SICU. Being sedated but continues to be agitated and ABG much improved now.    O:  Vital Signs Last 24 Hrs  T(C): 36.5 (26 Sep 2019 03:42), Max: 37 (25 Sep 2019 07:01)  T(F): 97.7 (26 Sep 2019 03:42), Max: 98.6 (25 Sep 2019 07:01)  HR: 75 (26 Sep 2019 05:00) (71 - 100)  BP: 99/51 (26 Sep 2019 05:00) (93/41 - 175/88)  BP(mean): 63 (26 Sep 2019 05:00) (57 - 74)  RR: 19 (26 Sep 2019 05:00) (19 - 26)  SpO2: 94% (26 Sep 2019 05:00) (90% - 100%)    Gen: NAD  LLE  Dressing C/D/I  abduction pillow in place  NV exam limited by patient condition  WWP distally, DP palpable    Labs:                        8.0    4.91  )-----------( 243      ( 26 Sep 2019 04:10 )             25.9                         8.9    6.53  )-----------( 279      ( 26 Sep 2019 01:10 )             29.5     09-26    153<H>  |  95<L>  |  35<H>  ----------------------------<  195<H>  5.8<H>   |  26  |  0.98    PT/INR - ( 26 Sep 2019 04:10 )   PT: 11.8 SEC;   INR: 1.06       PTT - ( 26 Sep 2019 04:10 )  PTT:24.0 SEC    A/P 84y year old female POD6 s/p L hip hemiarthroplasty     Pain Control  DVT PPX  PT/OOB  WBAT, posterior precautions  Wean vent/sedation as tolerated  Management of cause of hypercapnic respiratory failure (COPD exacerbation vs. PNA) per SICU  Dispo Planning    Zan Domingo MD

## 2019-09-26 NOTE — CONSULT NOTE ADULT - ATTENDING COMMENTS
83 yo female with PMH of HTN, Hypothyroid, DMII, CKD stage 3, COPD c/w chronic hypoxic respiratory failure on 3L home O2 present after mechanical fall c/o left hip pain found to have L femoral neck fracture, s/p left hip hemiarthroplasty, POD 5.    PLAN:     Neurologic:   - AMS likely secondary to hypercarbia    Respiratory:   - Chronic respiratory failure with hypoxia, on home O2 therapy  - COPD exacerbation and hypercarbia  - Increase RR of vent to decrease pCO2  - duonebs q6, Symbicort bid, Spiriva qd  - IV solumedrol taper for COPD exacerbation  - Trend ABGs  - Wean to extubate   - Will perform chest US to evaluate for pulmonary edema - possible lasix     Cardiovascular:   - hemodynamically stable    Gastrointestinal/Nutrition:   - NPO    Renal/Genitourinary:   - Trend I/Os  - Monitor electrolytes    Hematologic:   - lovenox for DVT prophylaxis      Infectious Disease:   - CXR demonstrating RLL consolidation  - Consider ABX for COPD exacerbation    Tubes/Lines/Drains:     Endocrine:   - Synthroid  - ISS    Disposition: SICU    --------------------------------------------------------------------------------------    Critical Care Diagnoses: respiratory failure, Exc COPD.  The patient is a critical care patient with life threatening hemodynamic and metabolic instability in SICU.  I have personally interviewed when possible and examined the patient, reviewed data and laboratory tests/x-rays and all pertinent electronic images.  I was physically present for the key portions of the evaluation and management (E/M) service provided.   The SICU team has a constant risk benefit analyzes discussion with the primary team, all consultants, House Staff and PA's on all decisions.  These diagnoses are unrelated to the surgical procedure noted above.  I meet with family if needed to get further history, discuss the case and make care decisions for this patient who might not be able to participate.  Time involved in performance of separately billable procedures was not counted toward my critical care time. There is no overlap.  I spent 55-75 minutes ( 0800Hrs-0915Hrs in AM/ 1600Hrs-1715Hrs in PM, or other time indicated) of critical care time for the diagnoses, assessment, plan and interventions.  This time excludes time spent on separate procedures and teaching as discussed above.

## 2019-09-26 NOTE — CHART NOTE - NSCHARTNOTEFT_GEN_A_CORE
: Tripp Price    INDICATION: hypercarbic respiratory failure    PROCEDURE:  [ ] LIMITED ECHO  [x] LIMITED CHEST  [ ] LIMITED RETROPERITONEAL  [ ] LIMITED ABDOMINAL  [ ] LIMITED DVT  [ ] NEEDLE GUIDANCE VASCULAR  [ ] NEEDLE GUIDANCE THORACENTESIS  [ ] NEEDLE GUIDANCE PARACENTESIS  [ ] NEEDLE GUIDANCE PERICARDIOCENTESIS  [ ] OTHER    FINDINGS: A line predominance with few scattered B lines in the anterior/posterior lung fields bilaterally      INTERPRETATION: A line predominance with few scattered B lines in the anterior/posterior lung fields bilaterally    Images stored on [employer]. : Tripp Price    INDICATION: hypercarbic respiratory failure    PROCEDURE:  [ ] LIMITED ECHO  [x] LIMITED CHEST  [ ] LIMITED RETROPERITONEAL  [ ] LIMITED ABDOMINAL  [ ] LIMITED DVT  [ ] NEEDLE GUIDANCE VASCULAR  [ ] NEEDLE GUIDANCE THORACENTESIS  [ ] NEEDLE GUIDANCE PARACENTESIS  [ ] NEEDLE GUIDANCE PERICARDIOCENTESIS  [ ] OTHER    FINDINGS: A line predominance with few scattered B lines in the anterior/posterior lung fields bilaterally      INTERPRETATION: A line predominance with few scattered B lines in the anterior/posterior lung fields bilaterally    Images stored on [employer].  Agree

## 2019-09-26 NOTE — PROCEDURE NOTE - ADDITIONAL PROCEDURE DETAILS
After confirmation of tube placement. Patient was placed on a ventilator.  settings: RR= 20, TV= 400, PEEP 15, FIO2= 50%

## 2019-09-26 NOTE — PROVIDER CONTACT NOTE (CRITICAL VALUE NOTIFICATION) - SITUATION
PCO2 of 79, previously 55. Pt continues to be lethargic, only responding to painful stimuli or vigorous touch.

## 2019-09-27 LAB
ANION GAP SERPL CALC-SCNC: 10 MMO/L — SIGNIFICANT CHANGE UP (ref 7–14)
BASE EXCESS BLDA CALC-SCNC: 1.7 MMOL/L — SIGNIFICANT CHANGE UP
BUN SERPL-MCNC: 41 MG/DL — HIGH (ref 7–23)
CA-I BLDA-SCNC: 1.18 MMOL/L — SIGNIFICANT CHANGE UP (ref 1.15–1.29)
CALCIUM SERPL-MCNC: 8.7 MG/DL — SIGNIFICANT CHANGE UP (ref 8.4–10.5)
CHLORIDE SERPL-SCNC: 107 MMOL/L — SIGNIFICANT CHANGE UP (ref 98–107)
CO2 SERPL-SCNC: 22 MMOL/L — SIGNIFICANT CHANGE UP (ref 22–31)
CREAT SERPL-MCNC: 1.22 MG/DL — SIGNIFICANT CHANGE UP (ref 0.5–1.3)
GLUCOSE BLDA-MCNC: 281 MG/DL — HIGH (ref 70–99)
GLUCOSE BLDC GLUCOMTR-MCNC: 150 MG/DL — HIGH (ref 70–99)
GLUCOSE BLDC GLUCOMTR-MCNC: 188 MG/DL — HIGH (ref 70–99)
GLUCOSE BLDC GLUCOMTR-MCNC: 260 MG/DL — HIGH (ref 70–99)
GLUCOSE BLDC GLUCOMTR-MCNC: 290 MG/DL — HIGH (ref 70–99)
GLUCOSE SERPL-MCNC: 295 MG/DL — HIGH (ref 70–99)
HCO3 BLDA-SCNC: 26 MMOL/L — SIGNIFICANT CHANGE UP (ref 22–26)
HCT VFR BLD CALC: 25 % — LOW (ref 34.5–45)
HCT VFR BLDA CALC: 25.6 % — LOW (ref 34.5–46.5)
HGB BLD-MCNC: 8 G/DL — LOW (ref 11.5–15.5)
HGB BLDA-MCNC: 8.2 G/DL — LOW (ref 11.5–15.5)
LACTATE BLDA-SCNC: 1.2 MMOL/L — SIGNIFICANT CHANGE UP (ref 0.5–2)
MAGNESIUM SERPL-MCNC: 2 MG/DL — SIGNIFICANT CHANGE UP (ref 1.6–2.6)
MCHC RBC-ENTMCNC: 31.1 PG — SIGNIFICANT CHANGE UP (ref 27–34)
MCHC RBC-ENTMCNC: 32 % — SIGNIFICANT CHANGE UP (ref 32–36)
MCV RBC AUTO: 97.3 FL — SIGNIFICANT CHANGE UP (ref 80–100)
NRBC # FLD: 0 K/UL — SIGNIFICANT CHANGE UP (ref 0–0)
PCO2 BLDA: 40 MMHG — SIGNIFICANT CHANGE UP (ref 32–48)
PH BLDA: 7.43 PH — SIGNIFICANT CHANGE UP (ref 7.35–7.45)
PHOSPHATE SERPL-MCNC: 2.8 MG/DL — SIGNIFICANT CHANGE UP (ref 2.5–4.5)
PLATELET # BLD AUTO: 290 K/UL — SIGNIFICANT CHANGE UP (ref 150–400)
PMV BLD: 9.2 FL — SIGNIFICANT CHANGE UP (ref 7–13)
PO2 BLDA: 79 MMHG — LOW (ref 83–108)
POTASSIUM BLDA-SCNC: 4.7 MMOL/L — HIGH (ref 3.4–4.5)
POTASSIUM SERPL-MCNC: 5.1 MMOL/L — SIGNIFICANT CHANGE UP (ref 3.5–5.3)
POTASSIUM SERPL-SCNC: 5.1 MMOL/L — SIGNIFICANT CHANGE UP (ref 3.5–5.3)
RBC # BLD: 2.57 M/UL — LOW (ref 3.8–5.2)
RBC # FLD: 12.1 % — SIGNIFICANT CHANGE UP (ref 10.3–14.5)
SAO2 % BLDA: 96.9 % — SIGNIFICANT CHANGE UP (ref 95–99)
SODIUM BLDA-SCNC: 136 MMOL/L — SIGNIFICANT CHANGE UP (ref 136–146)
SODIUM SERPL-SCNC: 139 MMOL/L — SIGNIFICANT CHANGE UP (ref 135–145)
WBC # BLD: 8.35 K/UL — SIGNIFICANT CHANGE UP (ref 3.8–10.5)
WBC # FLD AUTO: 8.35 K/UL — SIGNIFICANT CHANGE UP (ref 3.8–10.5)

## 2019-09-27 PROCEDURE — 71045 X-RAY EXAM CHEST 1 VIEW: CPT | Mod: 26

## 2019-09-27 PROCEDURE — 99291 CRITICAL CARE FIRST HOUR: CPT | Mod: 25

## 2019-09-27 PROCEDURE — 71045 X-RAY EXAM CHEST 1 VIEW: CPT | Mod: 26,77,76

## 2019-09-27 RX ORDER — ACETAMINOPHEN 500 MG
1000 TABLET ORAL ONCE
Refills: 0 | Status: COMPLETED | OUTPATIENT
Start: 2019-09-27 | End: 2019-09-27

## 2019-09-27 RX ORDER — FAMOTIDINE 10 MG/ML
20 INJECTION INTRAVENOUS DAILY
Refills: 0 | Status: DISCONTINUED | OUTPATIENT
Start: 2019-09-27 | End: 2019-10-02

## 2019-09-27 RX ORDER — HUMAN INSULIN 100 [IU]/ML
3 INJECTION, SUSPENSION SUBCUTANEOUS EVERY 8 HOURS
Refills: 0 | Status: DISCONTINUED | OUTPATIENT
Start: 2019-09-27 | End: 2019-10-02

## 2019-09-27 RX ORDER — ACETAMINOPHEN 500 MG
1000 TABLET ORAL ONCE
Refills: 0 | Status: COMPLETED | OUTPATIENT
Start: 2019-09-28 | End: 2019-09-27

## 2019-09-27 RX ORDER — HYDROMORPHONE HYDROCHLORIDE 2 MG/ML
1 INJECTION INTRAMUSCULAR; INTRAVENOUS; SUBCUTANEOUS ONCE
Refills: 0 | Status: DISCONTINUED | OUTPATIENT
Start: 2019-09-27 | End: 2019-09-27

## 2019-09-27 RX ORDER — SODIUM CHLORIDE 9 MG/ML
1000 INJECTION, SOLUTION INTRAVENOUS
Refills: 0 | Status: DISCONTINUED | OUTPATIENT
Start: 2019-09-27 | End: 2019-09-28

## 2019-09-27 RX ORDER — POTASSIUM PHOSPHATE, MONOBASIC POTASSIUM PHOSPHATE, DIBASIC 236; 224 MG/ML; MG/ML
15 INJECTION, SOLUTION INTRAVENOUS ONCE
Refills: 0 | Status: COMPLETED | OUTPATIENT
Start: 2019-09-27 | End: 2019-09-27

## 2019-09-27 RX ORDER — ACETAMINOPHEN 500 MG
1000 TABLET ORAL ONCE
Refills: 0 | Status: COMPLETED | OUTPATIENT
Start: 2019-09-28 | End: 2019-09-28

## 2019-09-27 RX ORDER — SODIUM CHLORIDE 9 MG/ML
500 INJECTION, SOLUTION INTRAVENOUS ONCE
Refills: 0 | Status: COMPLETED | OUTPATIENT
Start: 2019-09-27 | End: 2019-09-27

## 2019-09-27 RX ORDER — INSULIN GLARGINE 100 [IU]/ML
8 INJECTION, SOLUTION SUBCUTANEOUS AT BEDTIME
Refills: 0 | Status: DISCONTINUED | OUTPATIENT
Start: 2019-09-27 | End: 2019-09-27

## 2019-09-27 RX ORDER — FUROSEMIDE 40 MG
20 TABLET ORAL ONCE
Refills: 0 | Status: COMPLETED | OUTPATIENT
Start: 2019-09-27 | End: 2019-09-27

## 2019-09-27 RX ORDER — HYDROMORPHONE HYDROCHLORIDE 2 MG/ML
0.5 INJECTION INTRAMUSCULAR; INTRAVENOUS; SUBCUTANEOUS
Refills: 0 | Status: DISCONTINUED | OUTPATIENT
Start: 2019-09-27 | End: 2019-09-29

## 2019-09-27 RX ADMIN — BUDESONIDE AND FORMOTEROL FUMARATE DIHYDRATE 2 PUFF(S): 160; 4.5 AEROSOL RESPIRATORY (INHALATION) at 11:00

## 2019-09-27 RX ADMIN — Medication 50 MILLIGRAM(S): at 05:41

## 2019-09-27 RX ADMIN — POTASSIUM PHOSPHATE, MONOBASIC POTASSIUM PHOSPHATE, DIBASIC 62.5 MILLIMOLE(S): 236; 224 INJECTION, SOLUTION INTRAVENOUS at 04:14

## 2019-09-27 RX ADMIN — Medication 50 MILLIGRAM(S): at 17:07

## 2019-09-27 RX ADMIN — HYDROMORPHONE HYDROCHLORIDE 0.5 MILLIGRAM(S): 2 INJECTION INTRAMUSCULAR; INTRAVENOUS; SUBCUTANEOUS at 16:57

## 2019-09-27 RX ADMIN — Medication 400 MILLIGRAM(S): at 17:47

## 2019-09-27 RX ADMIN — SODIUM CHLORIDE 75 MILLILITER(S): 9 INJECTION, SOLUTION INTRAVENOUS at 08:07

## 2019-09-27 RX ADMIN — ENOXAPARIN SODIUM 40 MILLIGRAM(S): 100 INJECTION SUBCUTANEOUS at 11:30

## 2019-09-27 RX ADMIN — HYDROMORPHONE HYDROCHLORIDE 0.5 MILLIGRAM(S): 2 INJECTION INTRAMUSCULAR; INTRAVENOUS; SUBCUTANEOUS at 17:10

## 2019-09-27 RX ADMIN — BUDESONIDE AND FORMOTEROL FUMARATE DIHYDRATE 2 PUFF(S): 160; 4.5 AEROSOL RESPIRATORY (INHALATION) at 20:13

## 2019-09-27 RX ADMIN — FAMOTIDINE 20 MILLIGRAM(S): 10 INJECTION INTRAVENOUS at 13:05

## 2019-09-27 RX ADMIN — Medication 44 MICROGRAM(S): at 23:42

## 2019-09-27 RX ADMIN — HYDROMORPHONE HYDROCHLORIDE 1 MILLIGRAM(S): 2 INJECTION INTRAMUSCULAR; INTRAVENOUS; SUBCUTANEOUS at 08:38

## 2019-09-27 RX ADMIN — HUMAN INSULIN 3 UNIT(S): 100 INJECTION, SUSPENSION SUBCUTANEOUS at 23:42

## 2019-09-27 RX ADMIN — SODIUM CHLORIDE 9999 MILLILITER(S): 9 INJECTION, SOLUTION INTRAVENOUS at 07:00

## 2019-09-27 RX ADMIN — Medication 400 MILLIGRAM(S): at 13:05

## 2019-09-27 RX ADMIN — HYDROMORPHONE HYDROCHLORIDE 0.5 MILLIGRAM(S): 2 INJECTION INTRAMUSCULAR; INTRAVENOUS; SUBCUTANEOUS at 14:46

## 2019-09-27 RX ADMIN — Medication 4: at 05:41

## 2019-09-27 RX ADMIN — Medication 4: at 00:49

## 2019-09-27 RX ADMIN — CHLORHEXIDINE GLUCONATE 15 MILLILITER(S): 213 SOLUTION TOPICAL at 00:49

## 2019-09-27 RX ADMIN — Medication 50 MILLIGRAM(S): at 23:43

## 2019-09-27 RX ADMIN — Medication 2: at 11:31

## 2019-09-27 RX ADMIN — DEXMEDETOMIDINE HYDROCHLORIDE IN 0.9% SODIUM CHLORIDE 3.75 MICROGRAM(S)/KG/HR: 4 INJECTION INTRAVENOUS at 08:07

## 2019-09-27 RX ADMIN — Medication 20 MILLIGRAM(S): at 14:54

## 2019-09-27 RX ADMIN — Medication 50 MILLIGRAM(S): at 00:49

## 2019-09-27 RX ADMIN — CHLORHEXIDINE GLUCONATE 15 MILLILITER(S): 213 SOLUTION TOPICAL at 17:07

## 2019-09-27 RX ADMIN — CHLORHEXIDINE GLUCONATE 15 MILLILITER(S): 213 SOLUTION TOPICAL at 08:08

## 2019-09-27 RX ADMIN — Medication 1000 MILLIGRAM(S): at 17:52

## 2019-09-27 RX ADMIN — Medication 50 MILLIGRAM(S): at 11:30

## 2019-09-27 RX ADMIN — Medication 1000 MILLIGRAM(S): at 14:46

## 2019-09-27 RX ADMIN — HYDROMORPHONE HYDROCHLORIDE 1 MILLIGRAM(S): 2 INJECTION INTRAMUSCULAR; INTRAVENOUS; SUBCUTANEOUS at 08:30

## 2019-09-27 RX ADMIN — SODIUM CHLORIDE 75 MILLILITER(S): 9 INJECTION, SOLUTION INTRAVENOUS at 23:44

## 2019-09-27 RX ADMIN — HYDROMORPHONE HYDROCHLORIDE 0.5 MILLIGRAM(S): 2 INJECTION INTRAMUSCULAR; INTRAVENOUS; SUBCUTANEOUS at 12:59

## 2019-09-27 RX ADMIN — Medication 400 MILLIGRAM(S): at 23:41

## 2019-09-27 RX ADMIN — CHLORHEXIDINE GLUCONATE 1 APPLICATION(S): 213 SOLUTION TOPICAL at 21:51

## 2019-09-27 NOTE — PROGRESS NOTE ADULT - SUBJECTIVE AND OBJECTIVE BOX
SICU Daily Progress Note  =====================================================  Overnight events: Has been intermittently yasir to the 40s likely 2/2 to precedex. Precedex held and pt returned to baseline with HR in 70s    HPI: 83 yo female s/p mechanical fall c/o left hip pain was brought to [facility] via EMS for eval.  Pt denies syncope, LOC, hitting head. XR in [facility] ED reveal left femoral neck fx . Pt Hx COPD on 3L O2, CKD, HTN, DM2 , hypothyroid. (20 Sep 2019 08:04)    Allergies:   PAST MEDICAL & SURGICAL HISTORY:  Chronic kidney disease (CKD)  COPD (chronic obstructive pulmonary disease)  HTN (hypertension)  Hypothyroid  DM (diabetes mellitus)  Leg fracture: right ORIF with hardware   H/O cataract: bilateral   S/P cholecystectomy    FAMILY HISTORY:  Family history of hypertension: Father  FH: type 2 diabetes mellitus: Fathe  FHx: rheumatoid arthritis: Mother and Brother    ADVANCE DIRECTIVES: Full Code  *    REVIEW OF SYSTEMS:  Unable to obtain secondary to AMS  HOME MEDICATIONS:    --------------------------------------------------------------------------------------    MEDICATIONS  (STANDING):  buDESOnide  80 MICROgram(s)/formoterol 4.5 MICROgram(s) Inhaler 2 Puff(s) Inhalation two times a day  chlorhexidine 0.12% Liquid 15 milliLiter(s) Oral Mucosa every 12 hours  chlorhexidine 4% Liquid 1 Application(s) Topical daily  dexmedetomidine Infusion 0.2 MICROgram(s)/kG/Hr (3.75 mL/Hr) IV Continuous <Continuous>  dextrose 5% + sodium chloride 0.45%. 1000 milliLiter(s) (100 mL/Hr) IV Continuous <Continuous>  dextrose 50% Injectable 25 Gram(s) IV Push once  enoxaparin Injectable 40 milliGRAM(s) SubCutaneous daily  famotidine  IVPB 20 milliGRAM(s) IV Intermittent daily  hydrocortisone sodium succinate Injectable 50 milliGRAM(s) IV Push every 6 hours  insulin lispro (HumaLOG) corrective regimen sliding scale   SubCutaneous every 6 hours  levothyroxine Injectable 44 MICROGram(s) IV Push at bedtime  potassium phosphate IVPB 15 milliMole(s) IV Intermittent once  tiotropium 18 MICROgram(s) Capsule 1 Capsule(s) Inhalation daily    MEDICATIONS  (PRN):  ALBUTerol    90 MICROgram(s) HFA Inhaler 2 Puff(s) Inhalation every 6 hours PRN Bronchospasm  glucagon  Injectable 1 milliGRAM(s) IntraMuscular once PRN Glucose LESS THAN 70 milligrams/deciliter    --------------------------------------------------------------------------------------  ICU Vital Signs Last 24 Hrs  T(C): 36.2 (27 Sep 2019 00:00), Max: 37.9 (26 Sep 2019 12:00)  T(F): 97.1 (27 Sep 2019 00:00), Max: 100.3 (26 Sep 2019 12:00)  HR: 44 (27 Sep 2019 04:00) (44 - 90)  BP: 122/48 (27 Sep 2019 04:00) (96/68 - 149/70)  BP(mean): 67 (27 Sep 2019 04:00) (57 - 116)  ABP: --  ABP(mean): --  RR: 18 (27 Sep 2019 04:00) (17 - 31)  SpO2: 100% (27 Sep 2019 04:00) (94% - 100%)    --------------------------------------------------------------------------------------  I&O's Detail    25 Sep 2019 07:01  -  26 Sep 2019 07:00  --------------------------------------------------------  IN:    dextrose 5% + sodium chloride 0.45%.: 200 mL    Lactated Ringers IV Bolus: 500 mL    propofol Infusion: 72 mL  Total IN: 772 mL    OUT:    Incontinent per Collection Ba mL    Voided: 900 mL  Total OUT: 985 mL    Total NET: -213 mL      26 Sep 2019 07:01  -  27 Sep 2019 04:10  --------------------------------------------------------  IN:    dexmedetomidine Infusion: 89.5 mL    dextrose 5% + sodium chloride 0.45%.: 1900 mL  Total IN: 1989.5 mL    OUT:    Indwelling Catheter - Urethral: 220 mL  Total OUT: 220 mL    Total NET: 1769.5 mL    --------------------------------------------------------------------------------------    EXAM  NEUROLOGY  Exam: sedated on precedex    RESPIRATORY  Exam: decreased air movement b/l, increased wob    CARDIOVASCULAR  Exam: S1, S2    GI/NUTRITION  Exam: Abdomen soft, Non-tender, Non-distended.     VASCULAR  Exam: Extremities warm, pink, well-perfused.      MUSCULOSKELETAL  Exam: All extremities moving spontaneously without limitations.      SKIN:  Exam: Good skin turgor, no skin breakdown.      METABOLIC/FLUIDS/ELECTROLYTES  ascorbic acid 500 milliGRAM(s) Oral daily  calcium carbonate 1250 mG  + Vitamin D (OsCal 500 + D) 1 Tablet(s) Oral daily  dextrose 5%. 1000 milliLiter(s) IV Continuous <Continuous>      HEMATOLOGIC  [x] DVT Prophylaxis: aspirin enteric coated 325 milliGRAM(s) Oral two times a day    Transfusions:	[] PRBC	[] Platelets		[] FFP	[] Cryoprecipitate        Tubes/Lines/Drains    [x] Peripheral IV  [] Central Venous Line     	[] R	[] L	[] IJ	[] Fem	[] SC	Date Placed:   [] Arterial Line		[] R	[] L	[] Fem	[] Rad	[] Ax	Date Placed:   [] PICC:         	[] Midline		[] Mediport  [] Urinary Catheter		Date Placed:     LABS  --------------------------------------------------------------------------------------  CBC ( @ 04:10)                              8.0<L>                         4.91    )----------------(  243        --    % Neutrophils, --    % Lymphocytes, ANC: --                                  25.9<L>  CBC ( @ 01:10)                              8.9<L>                         6.53    )----------------(  279        --    % Neutrophils, --    % Lymphocytes, ANC: --                                  29.5<L>    BMP ( @ 16:58)             140     |  107     |  38<H> 		Ca++ --      Ca 9.1                ---------------------------------( 259<H>		Mg 1.9                4.9     |  26      |  1.09  			Ph 1.9<L>  BMP ( @ 06:45)             142     |  106     |  35<H> 		Ca++ --      Ca 9.2                ---------------------------------( 242<H>		Mg 2.0                5.7<H>  |  22      |  0.97  			Ph 2.4<L>    LFTs ( @ 04:10)      TPro 6.0 / Alb 3.0<L> / TBili 0.3 / DBili -- / AST 11 / ALT 6 / AlkPhos 108    Coags ( @ 04:10)  aPTT 24.0<L> / INR 1.06 / PT 11.8      ABG ( @ 04:10)     7.38 / 47 / 127<H> / 27<H> / 2.6 / 99.1<H>%     Lactate: 1.0   ABG ( @ 01:10)     7.21<L> / 79<HH> / 78<L> / 25 / 3.0 / 94.4<L>%     Lactate: 0.6       --------------------------------------------------------------------------------------      ASSESSMENT:  83 yo female with PMH of HTN, Hypothyroid, DMII, CKD stage 3, COPD c/w chronic hypoxic respiratory failure on 3L home O2 present after mechanical fall c/o left hip pain found to have L femoral neck fracture, s/p left hip hemiarthroplasty, POD 6.    PLAN:     Neurologic:   - AMS likely secondary to hypercarbia    Respiratory:   - Chronic respiratory failure with hypoxia, on home O2 therapy  - COPD exacerbation and hypercarbia  - Increase RR of vent to decrease pCO2  - duonebs q6, Symbicort bid, Spiriva qd  - IV solumedrol taper for COPD exacerbation  - Trend ABGs  - Wean to extubate     Cardiovascular:   - monitor vitals    Gastrointestinal/Nutrition:   - NPO    Renal/Genitourinary:   - correa  - Trend I/Os  - Monitor electrolytes    Hematologic:   - lovenox for DVT prophylaxis    Infectious Disease:   - CXR demonstrating RLL consolidation  - Consider ABX for COPD exacerbation    Tubes/Lines/Drains:     Endocrine:   - Synthroid  - ISS    Disposition: SICU    --------------------------------------------------------------------------------------    Critical Care Diagnoses: SICU Daily Progress Note  =====================================================  Overnight events: Has been intermittently yasir to the 40s likely 2/2 to precedex. Precedex held and pt returned to baseline with HR in 70s    HPI: 83 yo female s/p mechanical fall c/o left hip pain was brought to [facility] via EMS for eval.  Pt denies syncope, LOC, hitting head. XR in [facility] ED reveal left femoral neck fx . Pt Hx COPD on 3L O2, CKD, HTN, DM2 , hypothyroid. (20 Sep 2019 08:04)    Allergies:   PAST MEDICAL & SURGICAL HISTORY:  Chronic kidney disease (CKD)  COPD (chronic obstructive pulmonary disease)  HTN (hypertension)  Hypothyroid  DM (diabetes mellitus)  Leg fracture: right ORIF with hardware   H/O cataract: bilateral   S/P cholecystectomy    FAMILY HISTORY:  Family history of hypertension: Father  FH: type 2 diabetes mellitus: Fathe  FHx: rheumatoid arthritis: Mother and Brother    ADVANCE DIRECTIVES: Full Code  *    REVIEW OF SYSTEMS:  Unable to obtain secondary to AMS  HOME MEDICATIONS:    --------------------------------------------------------------------------------------    MEDICATIONS  (STANDING):  buDESOnide  80 MICROgram(s)/formoterol 4.5 MICROgram(s) Inhaler 2 Puff(s) Inhalation two times a day  chlorhexidine 0.12% Liquid 15 milliLiter(s) Oral Mucosa every 12 hours  chlorhexidine 4% Liquid 1 Application(s) Topical daily  dexmedetomidine Infusion 0.2 MICROgram(s)/kG/Hr (3.75 mL/Hr) IV Continuous <Continuous>  dextrose 5% + sodium chloride 0.45%. 1000 milliLiter(s) (100 mL/Hr) IV Continuous <Continuous>  dextrose 50% Injectable 25 Gram(s) IV Push once  enoxaparin Injectable 40 milliGRAM(s) SubCutaneous daily  famotidine  IVPB 20 milliGRAM(s) IV Intermittent daily  hydrocortisone sodium succinate Injectable 50 milliGRAM(s) IV Push every 6 hours  insulin lispro (HumaLOG) corrective regimen sliding scale   SubCutaneous every 6 hours  levothyroxine Injectable 44 MICROGram(s) IV Push at bedtime  potassium phosphate IVPB 15 milliMole(s) IV Intermittent once  tiotropium 18 MICROgram(s) Capsule 1 Capsule(s) Inhalation daily    MEDICATIONS  (PRN):  ALBUTerol    90 MICROgram(s) HFA Inhaler 2 Puff(s) Inhalation every 6 hours PRN Bronchospasm  glucagon  Injectable 1 milliGRAM(s) IntraMuscular once PRN Glucose LESS THAN 70 milligrams/deciliter    --------------------------------------------------------------------------------------  ICU Vital Signs Last 24 Hrs  T(C): 36.2 (27 Sep 2019 00:00), Max: 37.9 (26 Sep 2019 12:00)  T(F): 97.1 (27 Sep 2019 00:00), Max: 100.3 (26 Sep 2019 12:00)  HR: 44 (27 Sep 2019 04:00) (44 - 90)  BP: 122/48 (27 Sep 2019 04:00) (96/68 - 149/70)  BP(mean): 67 (27 Sep 2019 04:00) (57 - 116)  ABP: --  ABP(mean): --  RR: 18 (27 Sep 2019 04:00) (17 - 31)  SpO2: 100% (27 Sep 2019 04:00) (94% - 100%)    --------------------------------------------------------------------------------------  I&O's Detail    25 Sep 2019 07:01  -  26 Sep 2019 07:00  --------------------------------------------------------  IN:    dextrose 5% + sodium chloride 0.45%.: 200 mL    Lactated Ringers IV Bolus: 500 mL    propofol Infusion: 72 mL  Total IN: 772 mL    OUT:    Incontinent per Collection Ba mL    Voided: 900 mL  Total OUT: 985 mL    Total NET: -213 mL      26 Sep 2019 07:01  -  27 Sep 2019 04:10  --------------------------------------------------------  IN:    dexmedetomidine Infusion: 89.5 mL    dextrose 5% + sodium chloride 0.45%.: 1900 mL  Total IN: 1989.5 mL    OUT:    Indwelling Catheter - Urethral: 220 mL  Total OUT: 220 mL    Total NET: 1769.5 mL    --------------------------------------------------------------------------------------    EXAM  NEUROLOGY  Exam: sedated on precedex    RESPIRATORY  Exam: decreased air movement b/l, increased wob    CARDIOVASCULAR  Exam: S1, S2    GI/NUTRITION  Exam: Abdomen soft, Non-tender, Non-distended.     VASCULAR  Exam: Extremities warm, pink, well-perfused.      MUSCULOSKELETAL  Exam: All extremities moving spontaneously without limitations.      SKIN:  Exam: Good skin turgor, no skin breakdown.      METABOLIC/FLUIDS/ELECTROLYTES  ascorbic acid 500 milliGRAM(s) Oral daily  calcium carbonate 1250 mG  + Vitamin D (OsCal 500 + D) 1 Tablet(s) Oral daily  dextrose 5%. 1000 milliLiter(s) IV Continuous <Continuous>      HEMATOLOGIC  [x] DVT Prophylaxis: aspirin enteric coated 325 milliGRAM(s) Oral two times a day    Transfusions:	[] PRBC	[] Platelets		[] FFP	[] Cryoprecipitate        Tubes/Lines/Drains    [x] Peripheral IV  [] Central Venous Line     	[] R	[] L	[] IJ	[] Fem	[] SC	Date Placed:   [] Arterial Line		[] R	[] L	[] Fem	[] Rad	[] Ax	Date Placed:   [] PICC:         	[] Midline		[] Mediport  [] Urinary Catheter		Date Placed:     LABS  --------------------------------------------------------------------------------------  CBC ( @ 04:10)                              8.0<L>                         4.91    )----------------(  243        --    % Neutrophils, --    % Lymphocytes, ANC: --                                  25.9<L>  CBC ( @ 01:10)                              8.9<L>                         6.53    )----------------(  279        --    % Neutrophils, --    % Lymphocytes, ANC: --                                  29.5<L>    BMP ( @ 16:58)             140     |  107     |  38<H> 		Ca++ --      Ca 9.1                ---------------------------------( 259<H>		Mg 1.9                4.9     |  26      |  1.09  			Ph 1.9<L>  BMP ( @ 06:45)             142     |  106     |  35<H> 		Ca++ --      Ca 9.2                ---------------------------------( 242<H>		Mg 2.0                5.7<H>  |  22      |  0.97  			Ph 2.4<L>    LFTs ( @ 04:10)      TPro 6.0 / Alb 3.0<L> / TBili 0.3 / DBili -- / AST 11 / ALT 6 / AlkPhos 108    Coags ( @ 04:10)  aPTT 24.0<L> / INR 1.06 / PT 11.8      ABG ( @ 04:10)     7.38 / 47 / 127<H> / 27<H> / 2.6 / 99.1<H>%     Lactate: 1.0   ABG ( @ 01:10)     7.21<L> / 79<HH> / 78<L> / 25 / 3.0 / 94.4<L>%     Lactate: 0.6       --------------------------------------------------------------------------------------      ASSESSMENT:  83 yo female with PMH of HTN, Hypothyroid, DMII, CKD stage 3, COPD c/w chronic hypoxic respiratory failure on 3L home O2 present after mechanical fall c/o left hip pain found to have L femoral neck fracture, s/p left hip hemiarthroplasty, POD 6.    PLAN:     Neurologic:   - AMS likely secondary to hypercarbia  - Standing IV tylenol for pain control    Respiratory:   - Chronic respiratory failure with hypoxia, on home O2 therapy  - COPD exacerbation and hypercarbia  - Increase RR of vent to decrease pCO2  - duonebs q6, Symbicort bid, Spiriva qd  - IV solumedrol taper for COPD exacerbation  - Trend ABGs  - Will attempt transition back to CPAP after pain is controlled  - Chest ultrasound today. Will consider diuresis    Cardiovascular:   - monitor vitals    Gastrointestinal/Nutrition:   - NPO  - Will place NGT and start enteral feeding today     Renal/Genitourinary:   - correa  - Trend I/Os  - Monitor electrolytes    Hematologic:   - lovenox for DVT prophylaxis    Infectious Disease:   - Consider ABX for COPD exacerbation    Tubes/Lines/Drains:     Endocrine:   - Synthroid  - FS elevated to 290s (on steroids). Will start NPH today   - ISS    Disposition: SICU    --------------------------------------------------------------------------------------    Critical Care Diagnoses:

## 2019-09-27 NOTE — CHART NOTE - NSCHARTNOTEFT_GEN_A_CORE
: Tripp Price    INDICATION: hypercarbic respiratory failure     PROCEDURE:  [ ] LIMITED ECHO  [x] LIMITED CHEST  [ ] LIMITED RETROPERITONEAL  [ ] LIMITED ABDOMINAL  [ ] LIMITED DVT  [ ] NEEDLE GUIDANCE VASCULAR  [ ] NEEDLE GUIDANCE THORACENTESIS  [ ] NEEDLE GUIDANCE PARACENTESIS  [ ] NEEDLE GUIDANCE PERICARDIOCENTESIS  [ ] OTHER    FINDINGS: A line predominance in the anterior lung fields with many scattered B lines bilaterally      INTERPRETATION: A line predominance in the anterior lung fields with many scattered B lines bilaterally consistent with possible mild pulmonary edema     Images stored on [employer].

## 2019-09-27 NOTE — PROGRESS NOTE ADULT - SUBJECTIVE AND OBJECTIVE BOX
Orthopedic Surgery Progress Note  No acute events overnight.  Remains intubated/sedated.  O:  Vital Signs Last 24 Hrs  T(C): 36.2 (27 Sep 2019 00:00), Max: 37.9 (26 Sep 2019 12:00)  T(F): 97.1 (27 Sep 2019 00:00), Max: 100.3 (26 Sep 2019 12:00)  HR: 59 (27 Sep 2019 06:17) (44 - 90)  BP: 141/57 (27 Sep 2019 05:00) (99/43 - 149/70)  BP(mean): 79 (27 Sep 2019 05:00) (57 - 116)  RR: 22 (27 Sep 2019 05:00) (17 - 31)  SpO2: 97% (27 Sep 2019 06:17) (95% - 100%)    Gen: NAD  LLE  Dressing C/D/I  exam limited  WWP distally    Labs:                        8.0    8.35  )-----------( 290      ( 27 Sep 2019 05:05 )             25.0                         8.0    4.91  )-----------( 243      ( 26 Sep 2019 04:10 )             25.9     09-27    139  |  107  |  41<H>  ----------------------------<  295<H>  5.1   |  22  |  1.22    PT/INR - ( 26 Sep 2019 04:10 )   PT: 11.8 SEC;   INR: 1.06       PTT - ( 26 Sep 2019 04:10 )  PTT:24.0 SEC    A/P 84y year old female POD7 s/p L hip hemiarthroplasty     Pain Control  DVT PPX  PT/OOB  WBAT, posterior precautions  Wean vent/sedation as tolerated  Management of cause of hypercapnic respiratory failure per SICU  Dispo Planning    Zan Domingo MD

## 2019-09-27 NOTE — DIETITIAN INITIAL EVALUATION ADULT. - OTHER INFO
Pt. visited, met w/ the HCP, per HCP pt. was taking PO nutrition prior to admission, was on diabetic, low potassium, low sodium diet pert MD prescription, lost some wt. but not a significant amount , but in hospital s/p surgery was  taking < 50 % of the meals . Pt. intubated, sedated, unable to speak . Noted wt. increase since admission , reflective of 2+ generalized edema . Pt. was recently in hospital & noted the RDN assessment , wt. recorded 67.6 kg on 8/10/19 & wt. loss of 10# reported  in few months that time .

## 2019-09-28 LAB
ANION GAP SERPL CALC-SCNC: 12 MMO/L — SIGNIFICANT CHANGE UP (ref 7–14)
BASE EXCESS BLDA CALC-SCNC: 2 MMOL/L — SIGNIFICANT CHANGE UP
BASE EXCESS BLDA CALC-SCNC: 2 MMOL/L — SIGNIFICANT CHANGE UP
BASE EXCESS BLDA CALC-SCNC: 3.2 MMOL/L — SIGNIFICANT CHANGE UP
BUN SERPL-MCNC: 45 MG/DL — HIGH (ref 7–23)
CA-I BLDA-SCNC: 1.16 MMOL/L — SIGNIFICANT CHANGE UP (ref 1.15–1.29)
CA-I BLDA-SCNC: 1.17 MMOL/L — SIGNIFICANT CHANGE UP (ref 1.15–1.29)
CA-I BLDA-SCNC: 1.26 MMOL/L — SIGNIFICANT CHANGE UP (ref 1.15–1.29)
CALCIUM SERPL-MCNC: 8.3 MG/DL — LOW (ref 8.4–10.5)
CHLORIDE SERPL-SCNC: 104 MMOL/L — SIGNIFICANT CHANGE UP (ref 98–107)
CO2 SERPL-SCNC: 22 MMOL/L — SIGNIFICANT CHANGE UP (ref 22–31)
CREAT SERPL-MCNC: 1.35 MG/DL — HIGH (ref 0.5–1.3)
GLUCOSE BLDA-MCNC: 135 MG/DL — HIGH (ref 70–99)
GLUCOSE BLDA-MCNC: 165 MG/DL — HIGH (ref 70–99)
GLUCOSE BLDA-MCNC: 207 MG/DL — HIGH (ref 70–99)
GLUCOSE BLDC GLUCOMTR-MCNC: 113 MG/DL — HIGH (ref 70–99)
GLUCOSE BLDC GLUCOMTR-MCNC: 141 MG/DL — HIGH (ref 70–99)
GLUCOSE BLDC GLUCOMTR-MCNC: 149 MG/DL — HIGH (ref 70–99)
GLUCOSE BLDC GLUCOMTR-MCNC: 150 MG/DL — HIGH (ref 70–99)
GLUCOSE BLDC GLUCOMTR-MCNC: 188 MG/DL — HIGH (ref 70–99)
GLUCOSE SERPL-MCNC: 208 MG/DL — HIGH (ref 70–99)
HCO3 BLDA-SCNC: 26 MMOL/L — SIGNIFICANT CHANGE UP (ref 22–26)
HCO3 BLDA-SCNC: 26 MMOL/L — SIGNIFICANT CHANGE UP (ref 22–26)
HCO3 BLDA-SCNC: 27 MMOL/L — HIGH (ref 22–26)
HCT VFR BLD CALC: 22.6 % — LOW (ref 34.5–45)
HCT VFR BLDA CALC: 23.7 % — LOW (ref 34.5–46.5)
HCT VFR BLDA CALC: 27.1 % — LOW (ref 34.5–46.5)
HCT VFR BLDA CALC: 27.2 % — LOW (ref 34.5–46.5)
HGB BLD-MCNC: 7.3 G/DL — LOW (ref 11.5–15.5)
HGB BLDA-MCNC: 7.6 G/DL — LOW (ref 11.5–15.5)
HGB BLDA-MCNC: 8.7 G/DL — LOW (ref 11.5–15.5)
HGB BLDA-MCNC: 8.8 G/DL — LOW (ref 11.5–15.5)
LACTATE BLDA-SCNC: 0.8 MMOL/L — SIGNIFICANT CHANGE UP (ref 0.5–2)
LACTATE BLDA-SCNC: 0.9 MMOL/L — SIGNIFICANT CHANGE UP (ref 0.5–2)
LACTATE BLDA-SCNC: 1.9 MMOL/L — SIGNIFICANT CHANGE UP (ref 0.5–2)
MAGNESIUM SERPL-MCNC: 1.7 MG/DL — SIGNIFICANT CHANGE UP (ref 1.6–2.6)
MCHC RBC-ENTMCNC: 31.3 PG — SIGNIFICANT CHANGE UP (ref 27–34)
MCHC RBC-ENTMCNC: 32.3 % — SIGNIFICANT CHANGE UP (ref 32–36)
MCV RBC AUTO: 97 FL — SIGNIFICANT CHANGE UP (ref 80–100)
NRBC # FLD: 0.02 K/UL — SIGNIFICANT CHANGE UP (ref 0–0)
PCO2 BLDA: 36 MMHG — SIGNIFICANT CHANGE UP (ref 32–48)
PCO2 BLDA: 41 MMHG — SIGNIFICANT CHANGE UP (ref 32–48)
PCO2 BLDA: 44 MMHG — SIGNIFICANT CHANGE UP (ref 32–48)
PH BLDA: 7.42 PH — SIGNIFICANT CHANGE UP (ref 7.35–7.45)
PH BLDA: 7.42 PH — SIGNIFICANT CHANGE UP (ref 7.35–7.45)
PH BLDA: 7.47 PH — HIGH (ref 7.35–7.45)
PHOSPHATE SERPL-MCNC: 3 MG/DL — SIGNIFICANT CHANGE UP (ref 2.5–4.5)
PLATELET # BLD AUTO: 325 K/UL — SIGNIFICANT CHANGE UP (ref 150–400)
PMV BLD: 9.4 FL — SIGNIFICANT CHANGE UP (ref 7–13)
PO2 BLDA: 127 MMHG — HIGH (ref 83–108)
PO2 BLDA: 67 MMHG — LOW (ref 83–108)
PO2 BLDA: 69 MMHG — LOW (ref 83–108)
POTASSIUM BLDA-SCNC: 4 MMOL/L — SIGNIFICANT CHANGE UP (ref 3.4–4.5)
POTASSIUM BLDA-SCNC: 4.1 MMOL/L — SIGNIFICANT CHANGE UP (ref 3.4–4.5)
POTASSIUM BLDA-SCNC: 4.3 MMOL/L — SIGNIFICANT CHANGE UP (ref 3.4–4.5)
POTASSIUM SERPL-MCNC: 4.3 MMOL/L — SIGNIFICANT CHANGE UP (ref 3.5–5.3)
POTASSIUM SERPL-SCNC: 4.3 MMOL/L — SIGNIFICANT CHANGE UP (ref 3.5–5.3)
RBC # BLD: 2.33 M/UL — LOW (ref 3.8–5.2)
RBC # FLD: 12.9 % — SIGNIFICANT CHANGE UP (ref 10.3–14.5)
SAO2 % BLDA: 93.2 % — LOW (ref 95–99)
SAO2 % BLDA: 94.6 % — LOW (ref 95–99)
SAO2 % BLDA: 98.9 % — SIGNIFICANT CHANGE UP (ref 95–99)
SODIUM BLDA-SCNC: 136 MMOL/L — SIGNIFICANT CHANGE UP (ref 136–146)
SODIUM BLDA-SCNC: 138 MMOL/L — SIGNIFICANT CHANGE UP (ref 136–146)
SODIUM BLDA-SCNC: 141 MMOL/L — SIGNIFICANT CHANGE UP (ref 136–146)
SODIUM SERPL-SCNC: 138 MMOL/L — SIGNIFICANT CHANGE UP (ref 135–145)
WBC # BLD: 9.83 K/UL — SIGNIFICANT CHANGE UP (ref 3.8–10.5)
WBC # FLD AUTO: 9.83 K/UL — SIGNIFICANT CHANGE UP (ref 3.8–10.5)

## 2019-09-28 PROCEDURE — 71045 X-RAY EXAM CHEST 1 VIEW: CPT | Mod: 26,77

## 2019-09-28 PROCEDURE — 71045 X-RAY EXAM CHEST 1 VIEW: CPT | Mod: 26

## 2019-09-28 PROCEDURE — 99291 CRITICAL CARE FIRST HOUR: CPT | Mod: 25

## 2019-09-28 RX ORDER — MAGNESIUM SULFATE 500 MG/ML
2 VIAL (ML) INJECTION ONCE
Refills: 0 | Status: COMPLETED | OUTPATIENT
Start: 2019-09-28 | End: 2019-09-28

## 2019-09-28 RX ADMIN — HUMAN INSULIN 3 UNIT(S): 100 INJECTION, SUSPENSION SUBCUTANEOUS at 05:20

## 2019-09-28 RX ADMIN — FAMOTIDINE 20 MILLIGRAM(S): 10 INJECTION INTRAVENOUS at 13:21

## 2019-09-28 RX ADMIN — Medication 1000 MILLIGRAM(S): at 05:53

## 2019-09-28 RX ADMIN — Medication 50 MILLIGRAM(S): at 17:14

## 2019-09-28 RX ADMIN — Medication 1000 MILLIGRAM(S): at 00:11

## 2019-09-28 RX ADMIN — SODIUM CHLORIDE 75 MILLILITER(S): 9 INJECTION, SOLUTION INTRAVENOUS at 08:23

## 2019-09-28 RX ADMIN — Medication 44 MICROGRAM(S): at 23:15

## 2019-09-28 RX ADMIN — HYDROMORPHONE HYDROCHLORIDE 0.5 MILLIGRAM(S): 2 INJECTION INTRAMUSCULAR; INTRAVENOUS; SUBCUTANEOUS at 00:45

## 2019-09-28 RX ADMIN — Medication 2: at 05:21

## 2019-09-28 RX ADMIN — Medication 0.5 MILLIGRAM(S): at 01:07

## 2019-09-28 RX ADMIN — ENOXAPARIN SODIUM 40 MILLIGRAM(S): 100 INJECTION SUBCUTANEOUS at 13:21

## 2019-09-28 RX ADMIN — BUDESONIDE AND FORMOTEROL FUMARATE DIHYDRATE 2 PUFF(S): 160; 4.5 AEROSOL RESPIRATORY (INHALATION) at 07:01

## 2019-09-28 RX ADMIN — Medication 400 MILLIGRAM(S): at 05:23

## 2019-09-28 RX ADMIN — HUMAN INSULIN 3 UNIT(S): 100 INJECTION, SUSPENSION SUBCUTANEOUS at 13:32

## 2019-09-28 RX ADMIN — Medication 0.5 MILLIGRAM(S): at 05:28

## 2019-09-28 RX ADMIN — HYDROMORPHONE HYDROCHLORIDE 0.5 MILLIGRAM(S): 2 INJECTION INTRAMUSCULAR; INTRAVENOUS; SUBCUTANEOUS at 01:00

## 2019-09-28 RX ADMIN — CHLORHEXIDINE GLUCONATE 15 MILLILITER(S): 213 SOLUTION TOPICAL at 05:25

## 2019-09-28 RX ADMIN — HUMAN INSULIN 3 UNIT(S): 100 INJECTION, SUSPENSION SUBCUTANEOUS at 23:14

## 2019-09-28 RX ADMIN — Medication 0.5 MILLIGRAM(S): at 18:55

## 2019-09-28 RX ADMIN — CHLORHEXIDINE GLUCONATE 1 APPLICATION(S): 213 SOLUTION TOPICAL at 17:14

## 2019-09-28 RX ADMIN — Medication 50 GRAM(S): at 05:27

## 2019-09-28 RX ADMIN — Medication 50 MILLIGRAM(S): at 23:15

## 2019-09-28 RX ADMIN — Medication 50 MILLIGRAM(S): at 05:18

## 2019-09-28 RX ADMIN — Medication 50 MILLIGRAM(S): at 13:20

## 2019-09-28 NOTE — PROGRESS NOTE ADULT - SUBJECTIVE AND OBJECTIVE BOX
SICU Daily Progress Note  =====================================================  Overnight events: NGT placed for TFs and placement was confirmed. FS have been trending down, now 150.     HPI: 83 yo female s/p mechanical fall c/o left hip pain was brought to [facility] via EMS for eval.  Pt denies syncope, LOC, hitting head. XR in [facility] ED reveal left femoral neck fx . Pt Hx COPD on 3L O2, CKD, HTN, DM2 , hypothyroid. (20 Sep 2019 08:04)    Allergies:   PAST MEDICAL & SURGICAL HISTORY:  Chronic kidney disease (CKD)  COPD (chronic obstructive pulmonary disease)  HTN (hypertension)  Hypothyroid  DM (diabetes mellitus)  Leg fracture: right ORIF with hardware 2010  H/O cataract: bilateral 2013  S/P cholecystectomy    FAMILY HISTORY:  Family history of hypertension: Father  FH: type 2 diabetes mellitus: Fathe  FHx: rheumatoid arthritis: Mother and Brother    ADVANCE DIRECTIVES: Full Code  *    REVIEW OF SYSTEMS:  Unable to obtain secondary to AMS  HOME MEDICATIONS:    --------------------------------------------------------------------------------------  MEDICATIONS  (STANDING):  acetaminophen  IVPB .. 1000 milliGRAM(s) IV Intermittent once  buDESOnide  80 MICROgram(s)/formoterol 4.5 MICROgram(s) Inhaler 2 Puff(s) Inhalation two times a day  chlorhexidine 0.12% Liquid 15 milliLiter(s) Oral Mucosa every 12 hours  chlorhexidine 4% Liquid 1 Application(s) Topical daily  dextrose 50% Injectable 25 Gram(s) IV Push once  enoxaparin Injectable 40 milliGRAM(s) SubCutaneous daily  famotidine Injectable 20 milliGRAM(s) IV Push daily  hydrocortisone sodium succinate Injectable 50 milliGRAM(s) IV Push every 6 hours  insulin lispro (HumaLOG) corrective regimen sliding scale   SubCutaneous every 6 hours  insulin NPH human recombinant 3 Unit(s) SubCutaneous every 8 hours  lactated ringers. 1000 milliLiter(s) (75 mL/Hr) IV Continuous <Continuous>  levothyroxine Injectable 44 MICROGram(s) IV Push at bedtime  tiotropium 18 MICROgram(s) Capsule 1 Capsule(s) Inhalation daily    MEDICATIONS  (PRN):  ALBUTerol    90 MICROgram(s) HFA Inhaler 2 Puff(s) Inhalation every 6 hours PRN Bronchospasm  glucagon  Injectable 1 milliGRAM(s) IntraMuscular once PRN Glucose LESS THAN 70 milligrams/deciliter  HYDROmorphone  Injectable 0.5 milliGRAM(s) IV Push every 2 hours PRN moderate to severe pain (4 - 10)      ICU Vital Signs Last 24 Hrs  T(C): 36.6 (28 Sep 2019 00:00), Max: 36.7 (27 Sep 2019 20:00)  T(F): 97.8 (28 Sep 2019 00:00), Max: 98.1 (27 Sep 2019 20:00)  HR: 63 (28 Sep 2019 04:17) (40 - 137)  BP: 115/44 (28 Sep 2019 04:00) (90/42 - 202/88)  BP(mean): 63 (28 Sep 2019 04:00) (52 - 113)  ABP: --  ABP(mean): --  RR: 20 (28 Sep 2019 04:00) (18 - 43)  SpO2: 100% (28 Sep 2019 04:17) (95% - 100%)    --------------------------------------------------------------------------------------  I&O's Detail    26 Sep 2019 07:01  -  27 Sep 2019 07:00  --------------------------------------------------------  IN:    dexmedetomidine Infusion: 130.7 mL    dextrose 5% + sodium chloride 0.45%.: 2100 mL    IV PiggyBack: 250 mL    Lactated Ringers IV Bolus: 500 mL    lactated ringers.: 75 mL  Total IN: 3055.7 mL    OUT:    Indwelling Catheter - Urethral: 370 mL  Total OUT: 370 mL    Total NET: 2685.7 mL      27 Sep 2019 07:01  -  28 Sep 2019 05:02  --------------------------------------------------------  IN:    dexmedetomidine Infusion: 14.1 mL    lactated ringers.: 1200 mL  Total IN: 1214.1 mL    OUT:    Indwelling Catheter - Urethral: 805 mL  Total OUT: 805 mL    Total NET: 409.1 mL      --------------------------------------------------------------------------------------    EXAM  NEUROLOGY  Exam: responds to voice, follows commands    RESPIRATORY  Exam: CTA B/L    CARDIOVASCULAR  Exam: S1, S2    GI/NUTRITION  Exam: Abdomen soft, Non-tender, Non-distended.     VASCULAR  Exam: Extremities warm, pink, well-perfused.      MUSCULOSKELETAL  Exam: All extremities moving spontaneously without limitations.      SKIN:  Exam: Good skin turgor, no skin breakdown.      METABOLIC/FLUIDS/ELECTROLYTES  ascorbic acid 500 milliGRAM(s) Oral daily  calcium carbonate 1250 mG  + Vitamin D (OsCal 500 + D) 1 Tablet(s) Oral daily  dextrose 5%. 1000 milliLiter(s) IV Continuous <Continuous>      HEMATOLOGIC  [x] DVT Prophylaxis: aspirin enteric coated 325 milliGRAM(s) Oral two times a day    Transfusions:	[] PRBC	[] Platelets		[] FFP	[] Cryoprecipitate      Tubes/Lines/Drains    [x] Peripheral IV  [] Central Venous Line     	[] R	[] L	[] IJ	[] Fem	[] SC	Date Placed:   [] Arterial Line		[] R	[] L	[] Fem	[] Rad	[] Ax	Date Placed:   [] PICC:         	[] Midline		[] Mediport  [] Urinary Catheter		Date Placed:     LABS  --------------------------------------------------------------------------------------  CBC (09-28 @ 03:50)                              7.3<L>                         9.83    )----------------(  325        --    % Neutrophils, --    % Lymphocytes, ANC: --                                  22.6<L>  CBC (09-27 @ 05:05)                              8.0<L>                         8.35    )----------------(  290        --    % Neutrophils, --    % Lymphocytes, ANC: --                                  25.0<L>    BMP (09-27 @ 05:05)             139     |  107     |  41<H> 		Ca++ --      Ca 8.7                ---------------------------------( 295<H>		Mg 2.0                5.1     |  22      |  1.22  			Ph 2.8             ABG (09-28 @ 02:45)     7.47<H> / 36 / 69<L> / 26 / 2.0 / 94.6<L>%     Lactate: 1.9   ABG (09-27 @ 05:05)     7.43 / 40 / 79<L> / 26 / 1.7 / 96.9%     Lactate: 1.2       --------------------------------------------------------------------------------------      ASSESSMENT:  83 yo female with PMH of HTN, Hypothyroid, DMII, CKD stage 3, COPD c/w chronic hypoxic respiratory failure on 3L home O2 present after mechanical fall c/o left hip pain found to have L femoral neck fracture, s/p left hip hemiarthroplasty, POD 7    PLAN:     Neuro:   -     Respiratory:   - Chronic respiratory failure with hypoxia, on home O2 therapy  - COPD exacerbation and hypercarbia  - Increase RR of vent to decrease pCO2  - duonebs q6, Symbicort bid, Spiriva qd  - IV solumedrol taper for COPD exacerbation  - Trend ABGs  - Will attempt transition back to CPAP after pain is controlled  - Chest ultrasound today. Will consider diuresis    Cardiovascular:   - monitor vitals    Gastrointestinal/Nutrition:   - NPO  - Will place NGT and start enteral feeding today     Renal/Genitourinary:   - correa  - Trend I/Os  - Monitor electrolytes    Hematologic:   - lovenox for DVT prophylaxis    Infectious Disease:   - Consider ABX for COPD exacerbation    Tubes/Lines/Drains:     Endocrine:   - Synthroid  - FS elevated to 290s (on steroids). Will start NPH today   - ISS    Disposition: SICU    --------------------------------------------------------------------------------------    Critical Care Diagnoses: SICU Daily Progress Note  =====================================================  Overnight events: NGT placed for TFs and placement was confirmed. FS have been trending down, now 150. Received ativan 0.5mg x 2 for agitation overnight. Increased FiO2 from 40 to 50 for low PO2. Will repeat ABG.    HPI: 83 yo female s/p mechanical fall c/o left hip pain was brought to [facility] via EMS for eval.  Pt denies syncope, LOC, hitting head. XR in [facility] ED reveal left femoral neck fx . Pt Hx COPD on 3L O2, CKD, HTN, DM2 , hypothyroid. (20 Sep 2019 08:04)    Allergies:   PAST MEDICAL & SURGICAL HISTORY:  Chronic kidney disease (CKD)  COPD (chronic obstructive pulmonary disease)  HTN (hypertension)  Hypothyroid  DM (diabetes mellitus)  Leg fracture: right ORIF with hardware 2010  H/O cataract: bilateral 2013  S/P cholecystectomy    FAMILY HISTORY:  Family history of hypertension: Father  FH: type 2 diabetes mellitus: Fathe  FHx: rheumatoid arthritis: Mother and Brother    ADVANCE DIRECTIVES: Full Code  *    REVIEW OF SYSTEMS:  Unable to obtain secondary to AMS  HOME MEDICATIONS:    --------------------------------------------------------------------------------------  MEDICATIONS  (STANDING):  acetaminophen  IVPB .. 1000 milliGRAM(s) IV Intermittent once  buDESOnide  80 MICROgram(s)/formoterol 4.5 MICROgram(s) Inhaler 2 Puff(s) Inhalation two times a day  chlorhexidine 0.12% Liquid 15 milliLiter(s) Oral Mucosa every 12 hours  chlorhexidine 4% Liquid 1 Application(s) Topical daily  dextrose 50% Injectable 25 Gram(s) IV Push once  enoxaparin Injectable 40 milliGRAM(s) SubCutaneous daily  famotidine Injectable 20 milliGRAM(s) IV Push daily  hydrocortisone sodium succinate Injectable 50 milliGRAM(s) IV Push every 6 hours  insulin lispro (HumaLOG) corrective regimen sliding scale   SubCutaneous every 6 hours  insulin NPH human recombinant 3 Unit(s) SubCutaneous every 8 hours  lactated ringers. 1000 milliLiter(s) (75 mL/Hr) IV Continuous <Continuous>  levothyroxine Injectable 44 MICROGram(s) IV Push at bedtime  tiotropium 18 MICROgram(s) Capsule 1 Capsule(s) Inhalation daily    MEDICATIONS  (PRN):  ALBUTerol    90 MICROgram(s) HFA Inhaler 2 Puff(s) Inhalation every 6 hours PRN Bronchospasm  glucagon  Injectable 1 milliGRAM(s) IntraMuscular once PRN Glucose LESS THAN 70 milligrams/deciliter  HYDROmorphone  Injectable 0.5 milliGRAM(s) IV Push every 2 hours PRN moderate to severe pain (4 - 10)      ICU Vital Signs Last 24 Hrs  T(C): 36.6 (28 Sep 2019 00:00), Max: 36.7 (27 Sep 2019 20:00)  T(F): 97.8 (28 Sep 2019 00:00), Max: 98.1 (27 Sep 2019 20:00)  HR: 63 (28 Sep 2019 04:17) (40 - 137)  BP: 115/44 (28 Sep 2019 04:00) (90/42 - 202/88)  BP(mean): 63 (28 Sep 2019 04:00) (52 - 113)  ABP: --  ABP(mean): --  RR: 20 (28 Sep 2019 04:00) (18 - 43)  SpO2: 100% (28 Sep 2019 04:17) (95% - 100%)    --------------------------------------------------------------------------------------  I&O's Detail    26 Sep 2019 07:01  -  27 Sep 2019 07:00  --------------------------------------------------------  IN:    dexmedetomidine Infusion: 130.7 mL    dextrose 5% + sodium chloride 0.45%.: 2100 mL    IV PiggyBack: 250 mL    Lactated Ringers IV Bolus: 500 mL    lactated ringers.: 75 mL  Total IN: 3055.7 mL    OUT:    Indwelling Catheter - Urethral: 370 mL  Total OUT: 370 mL    Total NET: 2685.7 mL      27 Sep 2019 07:01  -  28 Sep 2019 05:02  --------------------------------------------------------  IN:    dexmedetomidine Infusion: 14.1 mL    lactated ringers.: 1200 mL  Total IN: 1214.1 mL    OUT:    Indwelling Catheter - Urethral: 805 mL  Total OUT: 805 mL    Total NET: 409.1 mL      --------------------------------------------------------------------------------------    EXAM  NEUROLOGY  Exam: doesn't track or follows commands, responds to painful stimulus,     RESPIRATORY  Exam: Lungs congested/junky B/L    CARDIOVASCULAR  Exam: S1, S2    GI/NUTRITION  Exam: Abdomen soft, Non-tender, Non-distended.     VASCULAR  Exam: Extremities warm, pink, well-perfused.      MUSCULOSKELETAL  Exam: All extremities moving spontaneously without limitations.      SKIN:  Exam: Good skin turgor, no skin breakdown.      METABOLIC/FLUIDS/ELECTROLYTES  ascorbic acid 500 milliGRAM(s) Oral daily  calcium carbonate 1250 mG  + Vitamin D (OsCal 500 + D) 1 Tablet(s) Oral daily  dextrose 5%. 1000 milliLiter(s) IV Continuous <Continuous>      HEMATOLOGIC  [x] DVT Prophylaxis: aspirin enteric coated 325 milliGRAM(s) Oral two times a day    Transfusions:	[] PRBC	[] Platelets		[] FFP	[] Cryoprecipitate      Tubes/Lines/Drains    [x] Peripheral IV  [] Central Venous Line     	[] R	[] L	[] IJ	[] Fem	[] SC	Date Placed:   [] Arterial Line		[] R	[] L	[] Fem	[] Rad	[] Ax	Date Placed:   [] PICC:         	[] Midline		[] Mediport  [] Urinary Catheter		Date Placed:     LABS  --------------------------------------------------------------------------------------  CBC (09-28 @ 03:50)                              7.3<L>                         9.83    )----------------(  325        --    % Neutrophils, --    % Lymphocytes, ANC: --                                  22.6<L>  CBC (09-27 @ 05:05)                              8.0<L>                         8.35    )----------------(  290        --    % Neutrophils, --    % Lymphocytes, ANC: --                                  25.0<L>    BMP (09-27 @ 05:05)             139     |  107     |  41<H> 		Ca++ --      Ca 8.7                ---------------------------------( 295<H>		Mg 2.0                5.1     |  22      |  1.22  			Ph 2.8             ABG (09-28 @ 02:45)     7.47<H> / 36 / 69<L> / 26 / 2.0 / 94.6<L>%     Lactate: 1.9   ABG (09-27 @ 05:05)     7.43 / 40 / 79<L> / 26 / 1.7 / 96.9%     Lactate: 1.2       --------------------------------------------------------------------------------------      ASSESSMENT:  83 yo female with PMH of HTN, Hypothyroid, DMII, CKD stage 3, COPD c/w chronic hypoxic respiratory failure on 3L home O2 present after mechanical fall c/o left hip pain found to have L femoral neck fracture, s/p left hip hemiarthroplasty, POD 7    PLAN:     Neuro:   - doesn't track or follows commands  - dilaudid for pain mgmt  - ativan PRN for agitation    Respiratory:   - Chronic respiratory failure with hypoxia, on home O2 therapy  - COPD exacerbation and hypercarbia  - Increase RR of vent to decrease pCO2  - duonebs q6, Symbicort bid, Spiriva qd  - IV solumedrol taper for COPD exacerbation  - repeat ABG after FiO2 changed from 40 to 50    Cardiovascular:   - monitor vitals    Gastrointestinal/Nutrition:   - NPO  - TFs via NGT     Renal/Genitourinary:   - correa  - Trend I/Os  - Monitor electrolytes    Hematologic:   - lovenox for DVT prophylaxis    Infectious Disease:   - Consider ABX for COPD exacerbation    Tubes/Lines/Drains: NGT    Endocrine:   - Synthroid  - FS improved to 150 now on NPH and CHELSI  - steroid taper    Disposition: SICU    --------------------------------------------------------------------------------------    Critical Care Diagnoses: SICU Daily Progress Note  =====================================================  Overnight events: NGT placed for TFs and placement was confirmed. FS have been trending down, now 150. Received ativan 0.5mg x 2 for agitation overnight. Increased FiO2 from 40 to 50 for low PO2. Will repeat ABG.    HPI: 85 yo female s/p mechanical fall c/o left hip pain was brought to [facility] via EMS for eval.  Pt denies syncope, LOC, hitting head. XR in [facility] ED reveal left femoral neck fx . Pt Hx COPD on 3L O2, CKD, HTN, DM2 , hypothyroid. (20 Sep 2019 08:04)    Allergies:   PAST MEDICAL & SURGICAL HISTORY:  Chronic kidney disease (CKD)  COPD (chronic obstructive pulmonary disease)  HTN (hypertension)  Hypothyroid  DM (diabetes mellitus)  Leg fracture: right ORIF with hardware 2010  H/O cataract: bilateral 2013  S/P cholecystectomy    FAMILY HISTORY:  Family history of hypertension: Father  FH: type 2 diabetes mellitus: Fathe  FHx: rheumatoid arthritis: Mother and Brother    ADVANCE DIRECTIVES: Full Code  *    REVIEW OF SYSTEMS:  Unable to obtain secondary to AMS  HOME MEDICATIONS:    --------------------------------------------------------------------------------------  MEDICATIONS  (STANDING):  acetaminophen  IVPB .. 1000 milliGRAM(s) IV Intermittent once  buDESOnide  80 MICROgram(s)/formoterol 4.5 MICROgram(s) Inhaler 2 Puff(s) Inhalation two times a day  chlorhexidine 0.12% Liquid 15 milliLiter(s) Oral Mucosa every 12 hours  chlorhexidine 4% Liquid 1 Application(s) Topical daily  dextrose 50% Injectable 25 Gram(s) IV Push once  enoxaparin Injectable 40 milliGRAM(s) SubCutaneous daily  famotidine Injectable 20 milliGRAM(s) IV Push daily  hydrocortisone sodium succinate Injectable 50 milliGRAM(s) IV Push every 6 hours  insulin lispro (HumaLOG) corrective regimen sliding scale   SubCutaneous every 6 hours  insulin NPH human recombinant 3 Unit(s) SubCutaneous every 8 hours  lactated ringers. 1000 milliLiter(s) (75 mL/Hr) IV Continuous <Continuous>  levothyroxine Injectable 44 MICROGram(s) IV Push at bedtime  tiotropium 18 MICROgram(s) Capsule 1 Capsule(s) Inhalation daily    MEDICATIONS  (PRN):  ALBUTerol    90 MICROgram(s) HFA Inhaler 2 Puff(s) Inhalation every 6 hours PRN Bronchospasm  glucagon  Injectable 1 milliGRAM(s) IntraMuscular once PRN Glucose LESS THAN 70 milligrams/deciliter  HYDROmorphone  Injectable 0.5 milliGRAM(s) IV Push every 2 hours PRN moderate to severe pain (4 - 10)      ICU Vital Signs Last 24 Hrs  T(C): 36.6 (28 Sep 2019 00:00), Max: 36.7 (27 Sep 2019 20:00)  T(F): 97.8 (28 Sep 2019 00:00), Max: 98.1 (27 Sep 2019 20:00)  HR: 63 (28 Sep 2019 04:17) (40 - 137)  BP: 115/44 (28 Sep 2019 04:00) (90/42 - 202/88)  BP(mean): 63 (28 Sep 2019 04:00) (52 - 113)  ABP: --  ABP(mean): --  RR: 20 (28 Sep 2019 04:00) (18 - 43)  SpO2: 100% (28 Sep 2019 04:17) (95% - 100%)    --------------------------------------------------------------------------------------  I&O's Detail    26 Sep 2019 07:01  -  27 Sep 2019 07:00  --------------------------------------------------------  IN:    dexmedetomidine Infusion: 130.7 mL    dextrose 5% + sodium chloride 0.45%.: 2100 mL    IV PiggyBack: 250 mL    Lactated Ringers IV Bolus: 500 mL    lactated ringers.: 75 mL  Total IN: 3055.7 mL    OUT:    Indwelling Catheter - Urethral: 370 mL  Total OUT: 370 mL    Total NET: 2685.7 mL      27 Sep 2019 07:01  -  28 Sep 2019 05:02  --------------------------------------------------------  IN:    dexmedetomidine Infusion: 14.1 mL    lactated ringers.: 1200 mL  Total IN: 1214.1 mL    OUT:    Indwelling Catheter - Urethral: 805 mL  Total OUT: 805 mL    Total NET: 409.1 mL      --------------------------------------------------------------------------------------    EXAM  NEUROLOGY  Exam: doesn't track or follows commands, responds to painful stimulus,     RESPIRATORY  Exam: Lungs congested/junky B/L    CARDIOVASCULAR  Exam: S1, S2    GI/NUTRITION  Exam: Abdomen soft, Non-tender, Non-distended.     VASCULAR  Exam: Extremities warm, pink, well-perfused.      MUSCULOSKELETAL  Exam: All extremities moving spontaneously without limitations.      SKIN:  Exam: Good skin turgor, no skin breakdown.      METABOLIC/FLUIDS/ELECTROLYTES  ascorbic acid 500 milliGRAM(s) Oral daily  calcium carbonate 1250 mG  + Vitamin D (OsCal 500 + D) 1 Tablet(s) Oral daily  dextrose 5%. 1000 milliLiter(s) IV Continuous <Continuous>      HEMATOLOGIC  [x] DVT Prophylaxis: aspirin enteric coated 325 milliGRAM(s) Oral two times a day    Transfusions:	[] PRBC	[] Platelets		[] FFP	[] Cryoprecipitate      Tubes/Lines/Drains    [x] Peripheral IV  [] Central Venous Line     	[] R	[] L	[] IJ	[] Fem	[] SC	Date Placed:   [] Arterial Line		[] R	[] L	[] Fem	[] Rad	[] Ax	Date Placed:   [] PICC:         	[] Midline		[] Mediport  [] Urinary Catheter		Date Placed:     LABS  --------------------------------------------------------------------------------------  CBC (09-28 @ 03:50)                              7.3<L>                         9.83    )----------------(  325        --    % Neutrophils, --    % Lymphocytes, ANC: --                                  22.6<L>  CBC (09-27 @ 05:05)                              8.0<L>                         8.35    )----------------(  290        --    % Neutrophils, --    % Lymphocytes, ANC: --                                  25.0<L>    BMP (09-27 @ 05:05)             139     |  107     |  41<H> 		Ca++ --      Ca 8.7                ---------------------------------( 295<H>		Mg 2.0                5.1     |  22      |  1.22  			Ph 2.8             ABG (09-28 @ 02:45)     7.47<H> / 36 / 69<L> / 26 / 2.0 / 94.6<L>%     Lactate: 1.9   ABG (09-27 @ 05:05)     7.43 / 40 / 79<L> / 26 / 1.7 / 96.9%     Lactate: 1.2       --------------------------------------------------------------------------------------      ASSESSMENT:  85 yo female with PMH of HTN, Hypothyroid, DMII, CKD stage 3, COPD c/w chronic hypoxic respiratory failure on 3L home O2 present after mechanical fall c/o left hip pain found to have L femoral neck fracture, s/p left hip hemiarthroplasty, POD 7    PLAN:     Neuro:   - doesn't track or follows commands  - dilaudid PRN and standing tylenol via NGT for pain mgmt  - ativan PRN for agitation    Respiratory:   - Chronic respiratory failure with hypoxia, on home O2 therapy  - COPD exacerbation and hypercarbia  - Increase RR of vent to decrease pCO2  - duonebs q6, Symbicort bid, Spiriva qd  - IV solumedrol taper for COPD exacerbation  - repeat ABG. Will consider SBT pending result of ABG    Cardiovascular:   - monitor vitals    Gastrointestinal/Nutrition:   - NPO  - TFs via NGT     Renal/Genitourinary:   - correa  - Trend I/Os  - Monitor electrolytes    Hematologic:   - lovenox for DVT prophylaxis    Infectious Disease:   - Consider ABX for COPD exacerbation    Tubes/Lines/Drains: NGT    Endocrine:   - Synthroid  - FS improved to 150 now on NPH and CHELSI  - steroid taper    Disposition: SICU    --------------------------------------------------------------------------------------    Critical Care Diagnoses:

## 2019-09-28 NOTE — PROGRESS NOTE ADULT - SUBJECTIVE AND OBJECTIVE BOX
Orthopedic Surgery Progress Note  Still intubated. Sedation weaned this AM; SICU plan to attempt SBT and wean to extubate this AM.    O:  Vital Signs Last 24 Hrs  T(C): 36.6 (28 Sep 2019 00:00), Max: 36.7 (27 Sep 2019 20:00)  T(F): 97.8 (28 Sep 2019 00:00), Max: 98.1 (27 Sep 2019 20:00)  HR: 69 (28 Sep 2019 06:00) (40 - 137)  BP: 117/39 (28 Sep 2019 06:00) (90/42 - 202/88)  BP(mean): 58 (28 Sep 2019 06:00) (52 - 113)  RR: 19 (28 Sep 2019 06:00) (18 - 43)  SpO2: 100% (28 Sep 2019 06:00) (95% - 100%)    Gen: NAD  LLE  Dressing C/D/I  abduction pillow in place  exam limited by patient status  WWP distally    Labs:                        7.3    9.83  )-----------( 325      ( 28 Sep 2019 03:50 )             22.6                         8.0    8.35  )-----------( 290      ( 27 Sep 2019 05:05 )             25.0       09-28    138  |  104  |  45<H>  ----------------------------<  208<H>  4.3   |  22  |  1.35<H>     A/P 84y year old female POD8 s/p L hip hemiarthroplasty     Pain Control  DVT PPX  PT/OOB  WBAT, posterior precautions  Wean to extubate  Care per SICU  Dispo Planning    Zan Domingo MD

## 2019-09-29 LAB
ANION GAP SERPL CALC-SCNC: 12 MMO/L — SIGNIFICANT CHANGE UP (ref 7–14)
BASE EXCESS BLDA CALC-SCNC: 3.7 MMOL/L — SIGNIFICANT CHANGE UP
BASE EXCESS BLDA CALC-SCNC: 5.3 MMOL/L — SIGNIFICANT CHANGE UP
BUN SERPL-MCNC: 38 MG/DL — HIGH (ref 7–23)
CA-I BLD-SCNC: 1.11 MMOL/L — SIGNIFICANT CHANGE UP (ref 1.03–1.23)
CA-I BLDA-SCNC: 1.23 MMOL/L — SIGNIFICANT CHANGE UP (ref 1.15–1.29)
CA-I BLDA-SCNC: 1.24 MMOL/L — SIGNIFICANT CHANGE UP (ref 1.15–1.29)
CALCIUM SERPL-MCNC: 8.3 MG/DL — LOW (ref 8.4–10.5)
CHLORIDE SERPL-SCNC: 106 MMOL/L — SIGNIFICANT CHANGE UP (ref 98–107)
CO2 SERPL-SCNC: 25 MMOL/L — SIGNIFICANT CHANGE UP (ref 22–31)
CREAT SERPL-MCNC: 1.13 MG/DL — SIGNIFICANT CHANGE UP (ref 0.5–1.3)
GLUCOSE BLDA-MCNC: 143 MG/DL — HIGH (ref 70–99)
GLUCOSE BLDA-MCNC: 154 MG/DL — HIGH (ref 70–99)
GLUCOSE BLDC GLUCOMTR-MCNC: 130 MG/DL — HIGH (ref 70–99)
GLUCOSE BLDC GLUCOMTR-MCNC: 131 MG/DL — HIGH (ref 70–99)
GLUCOSE BLDC GLUCOMTR-MCNC: 148 MG/DL — HIGH (ref 70–99)
GLUCOSE BLDC GLUCOMTR-MCNC: 150 MG/DL — HIGH (ref 70–99)
GLUCOSE SERPL-MCNC: 142 MG/DL — HIGH (ref 70–99)
HCO3 BLDA-SCNC: 27 MMOL/L — HIGH (ref 22–26)
HCO3 BLDA-SCNC: 29 MMOL/L — HIGH (ref 22–26)
HCT VFR BLD CALC: 26.1 % — LOW (ref 34.5–45)
HCT VFR BLDA CALC: 24.4 % — LOW (ref 34.5–46.5)
HCT VFR BLDA CALC: 27.8 % — LOW (ref 34.5–46.5)
HGB BLD-MCNC: 8.3 G/DL — LOW (ref 11.5–15.5)
HGB BLDA-MCNC: 7.8 G/DL — LOW (ref 11.5–15.5)
HGB BLDA-MCNC: 9 G/DL — LOW (ref 11.5–15.5)
LACTATE BLDA-SCNC: 0.8 MMOL/L — SIGNIFICANT CHANGE UP (ref 0.5–2)
LACTATE BLDA-SCNC: 0.9 MMOL/L — SIGNIFICANT CHANGE UP (ref 0.5–2)
MAGNESIUM SERPL-MCNC: 1.9 MG/DL — SIGNIFICANT CHANGE UP (ref 1.6–2.6)
MCHC RBC-ENTMCNC: 31.4 PG — SIGNIFICANT CHANGE UP (ref 27–34)
MCHC RBC-ENTMCNC: 31.8 % — LOW (ref 32–36)
MCV RBC AUTO: 98.9 FL — SIGNIFICANT CHANGE UP (ref 80–100)
NRBC # FLD: 0 K/UL — SIGNIFICANT CHANGE UP (ref 0–0)
PCO2 BLDA: 38 MMHG — SIGNIFICANT CHANGE UP (ref 32–48)
PCO2 BLDA: 59 MMHG — HIGH (ref 32–48)
PH BLDA: 7.32 PH — LOW (ref 7.35–7.45)
PH BLDA: 7.49 PH — HIGH (ref 7.35–7.45)
PHOSPHATE SERPL-MCNC: 3.3 MG/DL — SIGNIFICANT CHANGE UP (ref 2.5–4.5)
PLATELET # BLD AUTO: 386 K/UL — SIGNIFICANT CHANGE UP (ref 150–400)
PMV BLD: 9 FL — SIGNIFICANT CHANGE UP (ref 7–13)
PO2 BLDA: 70 MMHG — LOW (ref 83–108)
PO2 BLDA: 79 MMHG — LOW (ref 83–108)
POTASSIUM BLDA-SCNC: 3.8 MMOL/L — SIGNIFICANT CHANGE UP (ref 3.4–4.5)
POTASSIUM BLDA-SCNC: 4.1 MMOL/L — SIGNIFICANT CHANGE UP (ref 3.4–4.5)
POTASSIUM SERPL-MCNC: 4 MMOL/L — SIGNIFICANT CHANGE UP (ref 3.5–5.3)
POTASSIUM SERPL-SCNC: 4 MMOL/L — SIGNIFICANT CHANGE UP (ref 3.5–5.3)
RBC # BLD: 2.64 M/UL — LOW (ref 3.8–5.2)
RBC # FLD: 13.1 % — SIGNIFICANT CHANGE UP (ref 10.3–14.5)
SAO2 % BLDA: 92.4 % — LOW (ref 95–99)
SAO2 % BLDA: 97.4 % — SIGNIFICANT CHANGE UP (ref 95–99)
SODIUM BLDA-SCNC: 140 MMOL/L — SIGNIFICANT CHANGE UP (ref 136–146)
SODIUM BLDA-SCNC: 141 MMOL/L — SIGNIFICANT CHANGE UP (ref 136–146)
SODIUM SERPL-SCNC: 143 MMOL/L — SIGNIFICANT CHANGE UP (ref 135–145)
WBC # BLD: 13.02 K/UL — HIGH (ref 3.8–10.5)
WBC # FLD AUTO: 13.02 K/UL — HIGH (ref 3.8–10.5)

## 2019-09-29 PROCEDURE — 99291 CRITICAL CARE FIRST HOUR: CPT | Mod: 25

## 2019-09-29 PROCEDURE — 71045 X-RAY EXAM CHEST 1 VIEW: CPT | Mod: 26

## 2019-09-29 RX ORDER — FUROSEMIDE 40 MG
40 TABLET ORAL ONCE
Refills: 0 | Status: COMPLETED | OUTPATIENT
Start: 2019-09-29 | End: 2019-09-29

## 2019-09-29 RX ORDER — QUETIAPINE FUMARATE 200 MG/1
25 TABLET, FILM COATED ORAL AT BEDTIME
Refills: 0 | Status: DISCONTINUED | OUTPATIENT
Start: 2019-09-29 | End: 2019-10-08

## 2019-09-29 RX ORDER — ACETAMINOPHEN 500 MG
650 TABLET ORAL EVERY 6 HOURS
Refills: 0 | Status: DISCONTINUED | OUTPATIENT
Start: 2019-09-29 | End: 2019-10-08

## 2019-09-29 RX ORDER — HALOPERIDOL DECANOATE 100 MG/ML
2.5 INJECTION INTRAMUSCULAR EVERY 6 HOURS
Refills: 0 | Status: DISCONTINUED | OUTPATIENT
Start: 2019-09-29 | End: 2019-10-07

## 2019-09-29 RX ADMIN — HUMAN INSULIN 3 UNIT(S): 100 INJECTION, SUSPENSION SUBCUTANEOUS at 13:20

## 2019-09-29 RX ADMIN — HUMAN INSULIN 3 UNIT(S): 100 INJECTION, SUSPENSION SUBCUTANEOUS at 23:00

## 2019-09-29 RX ADMIN — HUMAN INSULIN 3 UNIT(S): 100 INJECTION, SUSPENSION SUBCUTANEOUS at 05:17

## 2019-09-29 RX ADMIN — FAMOTIDINE 20 MILLIGRAM(S): 10 INJECTION INTRAVENOUS at 11:43

## 2019-09-29 RX ADMIN — Medication 50 MILLIGRAM(S): at 05:12

## 2019-09-29 RX ADMIN — Medication 50 MILLIGRAM(S): at 17:21

## 2019-09-29 RX ADMIN — Medication 44 MICROGRAM(S): at 23:00

## 2019-09-29 RX ADMIN — Medication 50 MILLIGRAM(S): at 23:00

## 2019-09-29 RX ADMIN — Medication 0.5 MILLIGRAM(S): at 23:50

## 2019-09-29 RX ADMIN — Medication 40 MILLIGRAM(S): at 18:58

## 2019-09-29 RX ADMIN — Medication 50 MILLIGRAM(S): at 11:43

## 2019-09-29 RX ADMIN — ENOXAPARIN SODIUM 40 MILLIGRAM(S): 100 INJECTION SUBCUTANEOUS at 11:43

## 2019-09-29 NOTE — PROGRESS NOTE ADULT - SUBJECTIVE AND OBJECTIVE BOX
SICU Daily Progress Note  =====================================================  Overnight events: Post extubation, pt put on BiPap which made her agitated s/p ativan 0.5mg IM x 2. Pt chnaged to face tent, started to slowly desat to lino to mid 80s. Pt was shanged to high flow N/C cannala with ABG showing mild CO2 retention. Pt changed back to BiPap. ABG pending for later this morning.       HPI: 85 yo female s/p mechanical fall c/o left hip pain was brought to [facility] via EMS for eval.  Pt denies syncope, LOC, hitting head. XR in [facility] ED reveal left femoral neck fx . Pt Hx COPD on 3L O2, CKD, HTN, DM2 , hypothyroid. (20 Sep 2019 08:04)    Allergies:   PAST MEDICAL & SURGICAL HISTORY:  Chronic kidney disease (CKD)  COPD (chronic obstructive pulmonary disease)  HTN (hypertension)  Hypothyroid  DM (diabetes mellitus)  Leg fracture: right ORIF with hardware 2010  H/O cataract: bilateral 2013  S/P cholecystectomy    FAMILY HISTORY:  Family history of hypertension: Father  FH: type 2 diabetes mellitus: Fathe  FHx: rheumatoid arthritis: Mother and Brother    ADVANCE DIRECTIVES: Full Code  *    REVIEW OF SYSTEMS:  Unable to obtain secondary to AMS  HOME MEDICATIONS:    --------------------------------------------------------------------------------------  MEDICATIONS  (STANDING):  buDESOnide  80 MICROgram(s)/formoterol 4.5 MICROgram(s) Inhaler 2 Puff(s) Inhalation two times a day  chlorhexidine 4% Liquid 1 Application(s) Topical daily  dextrose 50% Injectable 25 Gram(s) IV Push once  enoxaparin Injectable 40 milliGRAM(s) SubCutaneous daily  famotidine Injectable 20 milliGRAM(s) IV Push daily  hydrocortisone sodium succinate Injectable 50 milliGRAM(s) IV Push every 6 hours  insulin lispro (HumaLOG) corrective regimen sliding scale   SubCutaneous every 6 hours  insulin NPH human recombinant 3 Unit(s) SubCutaneous every 8 hours  levothyroxine Injectable 44 MICROGram(s) IV Push at bedtime  LORazepam   Injectable 1 milliGRAM(s) IV Push once  tiotropium 18 MICROgram(s) Capsule 1 Capsule(s) Inhalation daily    MEDICATIONS  (PRN):  ALBUTerol    90 MICROgram(s) HFA Inhaler 2 Puff(s) Inhalation every 6 hours PRN Bronchospasm  glucagon  Injectable 1 milliGRAM(s) IntraMuscular once PRN Glucose LESS THAN 70 milligrams/deciliter  HYDROmorphone  Injectable 0.5 milliGRAM(s) IV Push every 2 hours PRN moderate to severe pain (4 - 10)      ICU Vital Signs Last 24 Hrs  T(C): 37.7 (29 Sep 2019 00:00), Max: 37.7 (28 Sep 2019 12:00)  T(F): 99.8 (29 Sep 2019 00:00), Max: 99.9 (28 Sep 2019 12:00)  HR: 76 (29 Sep 2019 02:00) (53 - 98)  BP: 145/59 (29 Sep 2019 02:00) (103/61 - 158/74)  BP(mean): 81 (29 Sep 2019 02:00) (58 - 116)  ABP: --  ABP(mean): --  RR: 25 (29 Sep 2019 02:00) (18 - 31)  SpO2: 94% (29 Sep 2019 02:00) (92% - 100%)      --------------------------------------------------------------------------------------  I&O's Detail    27 Sep 2019 07:01  -  28 Sep 2019 07:00  --------------------------------------------------------  IN:    dexmedetomidine Infusion: 14.1 mL    Glucerna 1.5: 396 mL    lactated ringers.: 1800 mL  Total IN: 2210.1 mL    OUT:    Indwelling Catheter - Urethral: 1315 mL  Total OUT: 1315 mL    Total NET: 895.1 mL      28 Sep 2019 07:01  -  29 Sep 2019 02:54  --------------------------------------------------------  IN:    Enteral Tube Flush: 60 mL    Glucerna 1.5: 112 mL    lactated ringers.: 600 mL  Total IN: 772 mL    OUT:    Indwelling Catheter - Urethral: 1510 mL  Total OUT: 1510 mL    Total NET: -738 mL        --------------------------------------------------------------------------------------    EXAM  NEUROLOGY  Exam: mental status waxes and wanes, follows simple commands     RESPIRATORY  Exam: Lungs congested/junky B/L    CARDIOVASCULAR  Exam: S1, S2    GI/NUTRITION  Exam: Abdomen soft, Non-tender, Non-distended, NGT    VASCULAR  Exam: Extremities warm, pink, well-perfused.      MUSCULOSKELETAL  Exam: All extremities moving spontaneously without limitations.      SKIN:  Exam: Good skin turgor, no skin breakdown.      METABOLIC/FLUIDS/ELECTROLYTES  ascorbic acid 500 milliGRAM(s) Oral daily  calcium carbonate 1250 mG  + Vitamin D (OsCal 500 + D) 1 Tablet(s) Oral daily  dextrose 5%. 1000 milliLiter(s) IV Continuous <Continuous>      HEMATOLOGIC  [x] DVT Prophylaxis: aspirin enteric coated 325 milliGRAM(s) Oral two times a day    Transfusions:	[] PRBC	[] Platelets		[] FFP	[] Cryoprecipitate      Tubes/Lines/Drains  NGT  [x] Peripheral IV  [] Central Venous Line     	[] R	[] L	[] IJ	[] Fem	[] SC	Date Placed:   [] Arterial Line		[] R	[] L	[] Fem	[] Rad	[] Ax	Date Placed:   [] PICC:         	[] Midline		[] Mediport  [] Urinary Catheter		Date Placed:     LABS  --------------------------------------------------------------------------------------  CBC (09-29 @ 01:25)                              8.3<L>                         13.02<H>  )----------------(  386        --    % Neutrophils, --    % Lymphocytes, ANC: --                                  26.1<L>  CBC (09-28 @ 03:50)                              7.3<L>                         9.83    )----------------(  325        --    % Neutrophils, --    % Lymphocytes, ANC: --                                  22.6<L>    BMP (09-29 @ 01:25)             143     |  106     |  38<H> 		Ca++ 1.11    Ca 8.3<L>             ---------------------------------( 142<H>		Mg 1.9                4.0     |  25      |  1.13  			Ph 3.3     BMP (09-28 @ 03:45)             138     |  104     |  45<H> 		Ca++ --      Ca 8.3<L>             ---------------------------------( 208<H>		Mg 1.7                4.3     |  22      |  1.35<H>			Ph 3.0             ABG (09-29 @ 01:35)     7.32<L> / 59<H> / 70<L> / 27<H> / 3.7 / 92.4<L>%     Lactate: 0.8   ABG (09-28 @ 20:09)     7.42 / 44 / 67<L> / 27<H> / 3.2 / 93.2<L>%     Lactate: 0.8       --------------------------------------------------------------------------------------      ASSESSMENT:  85 yo female with PMH of HTN, Hypothyroid, DMII, CKD stage 3, COPD c/w chronic hypoxic respiratory failure on 3L home O2 present after mechanical fall c/o left hip pain found to have L femoral neck fracture, s/p left hip hemiarthroplasty, POD 7    PLAN:     Neuro:   -follows simple commands (mental status waxes and wanes)  - dilaudid PRN and standing tylenol via NGT for pain mgmt  - no more ativan, consider seroquel for agitation/possibly sundowning       Respiratory:   - Chronic respiratory failure with hypoxia, on home O2 therapy  - COPD exacerbation and hypercarbia  - duonebs q6, Symbicort bid, Spiriva qd  - IV solumedrol taper for COPD exacerbation  - current on BiPap  - repeat ABG later this AM    Cardiovascular:   - monitor vitals    Gastrointestinal/Nutrition:   - NPO  -TFs on hold    Renal/Genitourinary:   - correa  - Trend I/Os  - Monitor electrolytes    Hematologic:   - lovenox for DVT prophylaxis    Infectious Disease:   - Consider ABX for COPD exacerbation    Tubes/Lines/Drains: NGT    Endocrine:   - Synthroid  - FS improved, currently on NPH and CHELSI  - c/w steroid taper    Disposition: SICU    --------------------------------------------------------------------------------------    Critical Care Diagnoses: SICU Daily Progress Note  =====================================================  Overnight events: Post extubation, pt put on BiPap which made her agitated s/p ativan 0.5mg IM x 2. Pt chnaged to face tent, started to slowly desat to lino to mid 80s. Pt was shanged to high flow N/C cannala with ABG showing mild CO2 retention. Pt changed back to BiPap. ABG pending for later this morning.       HPI: 83 yo female s/p mechanical fall c/o left hip pain was brought to [facility] via EMS for eval.  Pt denies syncope, LOC, hitting head. XR in [facility] ED reveal left femoral neck fx . Pt Hx COPD on 3L O2, CKD, HTN, DM2 , hypothyroid. (20 Sep 2019 08:04)    Allergies:   PAST MEDICAL & SURGICAL HISTORY:  Chronic kidney disease (CKD)  COPD (chronic obstructive pulmonary disease)  HTN (hypertension)  Hypothyroid  DM (diabetes mellitus)  Leg fracture: right ORIF with hardware 2010  H/O cataract: bilateral 2013  S/P cholecystectomy    FAMILY HISTORY:  Family history of hypertension: Father  FH: type 2 diabetes mellitus: Fathe  FHx: rheumatoid arthritis: Mother and Brother    ADVANCE DIRECTIVES: Full Code  *    REVIEW OF SYSTEMS:  Unable to obtain secondary to AMS  HOME MEDICATIONS:    --------------------------------------------------------------------------------------  MEDICATIONS  (STANDING):  buDESOnide  80 MICROgram(s)/formoterol 4.5 MICROgram(s) Inhaler 2 Puff(s) Inhalation two times a day  chlorhexidine 4% Liquid 1 Application(s) Topical daily  dextrose 50% Injectable 25 Gram(s) IV Push once  enoxaparin Injectable 40 milliGRAM(s) SubCutaneous daily  famotidine Injectable 20 milliGRAM(s) IV Push daily  hydrocortisone sodium succinate Injectable 50 milliGRAM(s) IV Push every 6 hours  insulin lispro (HumaLOG) corrective regimen sliding scale   SubCutaneous every 6 hours  insulin NPH human recombinant 3 Unit(s) SubCutaneous every 8 hours  levothyroxine Injectable 44 MICROGram(s) IV Push at bedtime  LORazepam   Injectable 1 milliGRAM(s) IV Push once  tiotropium 18 MICROgram(s) Capsule 1 Capsule(s) Inhalation daily    MEDICATIONS  (PRN):  ALBUTerol    90 MICROgram(s) HFA Inhaler 2 Puff(s) Inhalation every 6 hours PRN Bronchospasm  glucagon  Injectable 1 milliGRAM(s) IntraMuscular once PRN Glucose LESS THAN 70 milligrams/deciliter  HYDROmorphone  Injectable 0.5 milliGRAM(s) IV Push every 2 hours PRN moderate to severe pain (4 - 10)      ICU Vital Signs Last 24 Hrs  T(C): 37.7 (29 Sep 2019 00:00), Max: 37.7 (28 Sep 2019 12:00)  T(F): 99.8 (29 Sep 2019 00:00), Max: 99.9 (28 Sep 2019 12:00)  HR: 76 (29 Sep 2019 02:00) (53 - 98)  BP: 145/59 (29 Sep 2019 02:00) (103/61 - 158/74)  BP(mean): 81 (29 Sep 2019 02:00) (58 - 116)  ABP: --  ABP(mean): --  RR: 25 (29 Sep 2019 02:00) (18 - 31)  SpO2: 94% (29 Sep 2019 02:00) (92% - 100%)      --------------------------------------------------------------------------------------  I&O's Detail    27 Sep 2019 07:01  -  28 Sep 2019 07:00  --------------------------------------------------------  IN:    dexmedetomidine Infusion: 14.1 mL    Glucerna 1.5: 396 mL    lactated ringers.: 1800 mL  Total IN: 2210.1 mL    OUT:    Indwelling Catheter - Urethral: 1315 mL  Total OUT: 1315 mL    Total NET: 895.1 mL      28 Sep 2019 07:01  -  29 Sep 2019 02:54  --------------------------------------------------------  IN:    Enteral Tube Flush: 60 mL    Glucerna 1.5: 112 mL    lactated ringers.: 600 mL  Total IN: 772 mL    OUT:    Indwelling Catheter - Urethral: 1510 mL  Total OUT: 1510 mL    Total NET: -738 mL        --------------------------------------------------------------------------------------    EXAM  NEUROLOGY  Exam: mental status waxes and wanes, follows simple commands     RESPIRATORY  Exam: Lungs congested/junky B/L  Mode: biPAP 13/2    CARDIOVASCULAR  Exam: S1, S2    GI/NUTRITION  Exam: Abdomen soft, Non-tender, Non-distended, NGT    VASCULAR  Exam: Extremities warm, pink, well-perfused.      MUSCULOSKELETAL  Exam: All extremities moving spontaneously without limitations.      SKIN:  Exam: Good skin turgor, no skin breakdown.      METABOLIC/FLUIDS/ELECTROLYTES  ascorbic acid 500 milliGRAM(s) Oral daily  calcium carbonate 1250 mG  + Vitamin D (OsCal 500 + D) 1 Tablet(s) Oral daily  dextrose 5%. 1000 milliLiter(s) IV Continuous <Continuous>      HEMATOLOGIC  [x] DVT Prophylaxis: aspirin enteric coated 325 milliGRAM(s) Oral two times a day    Transfusions:	[] PRBC	[] Platelets		[] FFP	[] Cryoprecipitate      Tubes/Lines/Drains  NGT  [x] Peripheral IV  [] Central Venous Line     	[] R	[] L	[] IJ	[] Fem	[] SC	Date Placed:   [] Arterial Line		[] R	[] L	[] Fem	[] Rad	[] Ax	Date Placed:   [] PICC:         	[] Midline		[] Mediport  [] Urinary Catheter		Date Placed:     LABS  --------------------------------------------------------------------------------------  CBC (09-29 @ 01:25)                              8.3<L>                         13.02<H>  )----------------(  386        --    % Neutrophils, --    % Lymphocytes, ANC: --                                  26.1<L>  CBC (09-28 @ 03:50)                              7.3<L>                         9.83    )----------------(  325        --    % Neutrophils, --    % Lymphocytes, ANC: --                                  22.6<L>    BMP (09-29 @ 01:25)             143     |  106     |  38<H> 		Ca++ 1.11    Ca 8.3<L>             ---------------------------------( 142<H>		Mg 1.9                4.0     |  25      |  1.13  			Ph 3.3     BMP (09-28 @ 03:45)             138     |  104     |  45<H> 		Ca++ --      Ca 8.3<L>             ---------------------------------( 208<H>		Mg 1.7                4.3     |  22      |  1.35<H>			Ph 3.0             ABG (09-29 @ 01:35)     7.32<L> / 59<H> / 70<L> / 27<H> / 3.7 / 92.4<L>%     Lactate: 0.8   ABG (09-28 @ 20:09)     7.42 / 44 / 67<L> / 27<H> / 3.2 / 93.2<L>%     Lactate: 0.8       --------------------------------------------------------------------------------------

## 2019-09-29 NOTE — PROGRESS NOTE ADULT - ASSESSMENT
ASSESSMENT:  85 yo female with PMH of HTN, Hypothyroid, DMII, CKD stage 3, COPD c/w chronic hypoxic respiratory failure on 3L home O2 present after mechanical fall c/o left hip pain found to have L femoral neck fracture, s/p left hip hemiarthroplasty (9/20).    PLAN:     Neuro:   - Follows simple commands (mental status waxes and wanes)  - Dc dilaudid  - Oxycodone and toradol prn, standing tylenol via NGT for pain mgmt  - Dc ativan  - Seroquel and haldol for agitation/possibly sundowning     Respiratory:   - Chronic respiratory failure with hypoxia, on home O2 therapy  - COPD exacerbation and hypercarbia  - Titrate down FiO2, wean off bipap  - Duonebs q6, Symbicort bid, Spiriva qd  - IV solumedrol taper for COPD exacerbation  - Currently on BiPap  - Repeat ABG this morning on BiPAP    Cardiovascular:   - Monitor vitals    Gastrointestinal/Nutrition:   - NPO  - Restart TF at 20cc/hr    Renal/Genitourinary:   - Palacio  - Trend I/Os  - Monitor electrolytes    Hematologic:   - Lovenox for DVT prophylaxis    Infectious Disease:   - Monitor off abx    Tubes/Lines/Drains: NGT    Endocrine:   - Synthroid  - FS improved, currently on NPH and CHELSI  - Steroid taper completed    Disposition: SICU    --------------------------------------------------------------------------------------    Critical Care Diagnoses: ASSESSMENT:  85 yo female with PMH of HTN, Hypothyroid, DMII, CKD stage 3, COPD c/w chronic hypoxic respiratory failure on 3L home O2 present after mechanical fall c/o left hip pain found to have L femoral neck fracture, s/p left hip hemiarthroplasty (9/20).    PLAN:     Neuro:   - Follows simple commands (mental status waxes and wanes)  - Dc dilaudid  - Oxycodone and toradol prn, standing tylenol via NGT for pain mgmt  - Dc ativan  - Seroquel and haldol for agitation/possibly sundowning     Respiratory:   - Chronic respiratory failure with hypoxia, on home O2 therapy  - COPD exacerbation and hypercarbia  - Titrate down FiO2, wean off bipap  - Duonebs q6, Symbicort bid, Spiriva qd  - IV solumedrol taper for COPD exacerbation  - Currently on BiPap  - Repeat ABG this morning on BiPAP    Cardiovascular:   - Monitor vitals    Gastrointestinal/Nutrition:   - NPO  - Restart TF at 20cc/hr    Renal/Genitourinary:   - Palacio  - Trend I/Os  - Monitor electrolytes    Hematologic:   - Lovenox for DVT prophylaxis    Infectious Disease:   - Monitor off abx    Tubes/Lines/Drains: NGT    Endocrine:   - Synthroid  - FS improved, currently on NPH and CHELSI    Disposition: SICU    --------------------------------------------------------------------------------------    Critical Care Diagnoses:

## 2019-09-29 NOTE — PROGRESS NOTE ADULT - SUBJECTIVE AND OBJECTIVE BOX
Orthopedic Surgery Progress Note  Patient extubated yesterday. Desaturated on NC so is currently on BIPAP. Had episodes of agitation overnight.     O:  Vitals 24hrs  Vital Signs Last 24 Hrs  T(C): 37 (29 Sep 2019 04:00), Max: 37.7 (28 Sep 2019 12:00)  T(F): 98.6 (29 Sep 2019 04:00), Max: 99.9 (28 Sep 2019 12:00)  HR: 74 (29 Sep 2019 07:12) (57 - 98)  BP: 151/69 (29 Sep 2019 07:00) (103/61 - 158/74)  BP(mean): 88 (29 Sep 2019 07:00) (61 - 116)  RR: 19 (29 Sep 2019 07:00) (18 - 31)  SpO2: 98% (29 Sep 2019 07:12) (92% - 100%)      09-28-19 @ 07:01  -  09-29-19 @ 07:00  --------------------------------------------------------  IN: 772 mL / OUT: 1760 mL / NET: -988 mL        Lab Results 24hrs:                        8.3    13.02 )-----------( 386      ( 29 Sep 2019 01:25 )             26.1     09-29    143  |  106  |  38<H>  ----------------------------<  142<H>  4.0   |  25  |  1.13    Ca    8.3<L>      29 Sep 2019 01:25  Phos  3.3     09-29  Mg     1.9     09-29        Gen: NAD  LLE  Dressing C/D/I  5/5 Q/TA/EHL/FHL  +SILT T/S/S/DP/SP  WWP distally    A/P 84y year old female POD9 s/p L hip hemiarthroplasty     Pain Control  DVT PPX  PT/OOB  WBAT, posterior precautions  Monitor respiratory status  Care per SICU  Dispo Planning

## 2019-09-30 LAB
ANION GAP SERPL CALC-SCNC: 13 MMO/L — SIGNIFICANT CHANGE UP (ref 7–14)
BASE EXCESS BLDA CALC-SCNC: 8.6 MMOL/L — SIGNIFICANT CHANGE UP
BUN SERPL-MCNC: 34 MG/DL — HIGH (ref 7–23)
CA-I BLDA-SCNC: 1.16 MMOL/L — SIGNIFICANT CHANGE UP (ref 1.15–1.29)
CALCIUM SERPL-MCNC: 8.6 MG/DL — SIGNIFICANT CHANGE UP (ref 8.4–10.5)
CHLORIDE SERPL-SCNC: 103 MMOL/L — SIGNIFICANT CHANGE UP (ref 98–107)
CO2 SERPL-SCNC: 30 MMOL/L — SIGNIFICANT CHANGE UP (ref 22–31)
CREAT SERPL-MCNC: 1.06 MG/DL — SIGNIFICANT CHANGE UP (ref 0.5–1.3)
GLUCOSE BLDA-MCNC: 147 MG/DL — HIGH (ref 70–99)
GLUCOSE BLDC GLUCOMTR-MCNC: 133 MG/DL — HIGH (ref 70–99)
GLUCOSE BLDC GLUCOMTR-MCNC: 144 MG/DL — HIGH (ref 70–99)
GLUCOSE BLDC GLUCOMTR-MCNC: 208 MG/DL — HIGH (ref 70–99)
GLUCOSE BLDC GLUCOMTR-MCNC: 212 MG/DL — HIGH (ref 70–99)
GLUCOSE BLDC GLUCOMTR-MCNC: 287 MG/DL — HIGH (ref 70–99)
GLUCOSE SERPL-MCNC: 144 MG/DL — HIGH (ref 70–99)
HCO3 BLDA-SCNC: 32 MMOL/L — HIGH (ref 22–26)
HCT VFR BLD CALC: 26.8 % — LOW (ref 34.5–45)
HCT VFR BLDA CALC: 27.5 % — LOW (ref 34.5–46.5)
HGB BLD-MCNC: 8.3 G/DL — LOW (ref 11.5–15.5)
HGB BLDA-MCNC: 8.9 G/DL — LOW (ref 11.5–15.5)
INR BLD: 1.1 — SIGNIFICANT CHANGE UP (ref 0.88–1.17)
LACTATE BLDA-SCNC: 0.9 MMOL/L — SIGNIFICANT CHANGE UP (ref 0.5–2)
MAGNESIUM SERPL-MCNC: 2.1 MG/DL — SIGNIFICANT CHANGE UP (ref 1.6–2.6)
MCHC RBC-ENTMCNC: 30.7 PG — SIGNIFICANT CHANGE UP (ref 27–34)
MCHC RBC-ENTMCNC: 31 % — LOW (ref 32–36)
MCV RBC AUTO: 99.3 FL — SIGNIFICANT CHANGE UP (ref 80–100)
NRBC # FLD: 0 K/UL — SIGNIFICANT CHANGE UP (ref 0–0)
PCO2 BLDA: 52 MMHG — HIGH (ref 32–48)
PH BLDA: 7.42 PH — SIGNIFICANT CHANGE UP (ref 7.35–7.45)
PHOSPHATE SERPL-MCNC: 3.2 MG/DL — SIGNIFICANT CHANGE UP (ref 2.5–4.5)
PLATELET # BLD AUTO: 356 K/UL — SIGNIFICANT CHANGE UP (ref 150–400)
PMV BLD: 9.2 FL — SIGNIFICANT CHANGE UP (ref 7–13)
PO2 BLDA: 66 MMHG — LOW (ref 83–108)
POTASSIUM BLDA-SCNC: 3.5 MMOL/L — SIGNIFICANT CHANGE UP (ref 3.4–4.5)
POTASSIUM SERPL-MCNC: 3.7 MMOL/L — SIGNIFICANT CHANGE UP (ref 3.5–5.3)
POTASSIUM SERPL-SCNC: 3.7 MMOL/L — SIGNIFICANT CHANGE UP (ref 3.5–5.3)
PROTHROM AB SERPL-ACNC: 12.6 SEC — SIGNIFICANT CHANGE UP (ref 9.8–13.1)
RBC # BLD: 2.7 M/UL — LOW (ref 3.8–5.2)
RBC # FLD: 12.9 % — SIGNIFICANT CHANGE UP (ref 10.3–14.5)
SAO2 % BLDA: 93.5 % — LOW (ref 95–99)
SODIUM BLDA-SCNC: 143 MMOL/L — SIGNIFICANT CHANGE UP (ref 136–146)
SODIUM SERPL-SCNC: 146 MMOL/L — HIGH (ref 135–145)
WBC # BLD: 10.17 K/UL — SIGNIFICANT CHANGE UP (ref 3.8–10.5)
WBC # FLD AUTO: 10.17 K/UL — SIGNIFICANT CHANGE UP (ref 3.8–10.5)

## 2019-09-30 PROCEDURE — 71045 X-RAY EXAM CHEST 1 VIEW: CPT | Mod: 26

## 2019-09-30 PROCEDURE — 99232 SBSQ HOSP IP/OBS MODERATE 35: CPT | Mod: GC

## 2019-09-30 RX ORDER — DEXTROSE MONOHYDRATE, SODIUM CHLORIDE, AND POTASSIUM CHLORIDE 50; .745; 4.5 G/1000ML; G/1000ML; G/1000ML
1000 INJECTION, SOLUTION INTRAVENOUS
Refills: 0 | Status: DISCONTINUED | OUTPATIENT
Start: 2019-09-30 | End: 2019-09-30

## 2019-09-30 RX ADMIN — Medication 50 MILLIGRAM(S): at 11:27

## 2019-09-30 RX ADMIN — Medication 3: at 23:29

## 2019-09-30 RX ADMIN — Medication 44 MICROGRAM(S): at 23:31

## 2019-09-30 RX ADMIN — BUDESONIDE AND FORMOTEROL FUMARATE DIHYDRATE 2 PUFF(S): 160; 4.5 AEROSOL RESPIRATORY (INHALATION) at 20:11

## 2019-09-30 RX ADMIN — ENOXAPARIN SODIUM 40 MILLIGRAM(S): 100 INJECTION SUBCUTANEOUS at 11:27

## 2019-09-30 RX ADMIN — HUMAN INSULIN 3 UNIT(S): 100 INJECTION, SUSPENSION SUBCUTANEOUS at 15:12

## 2019-09-30 RX ADMIN — HUMAN INSULIN 3 UNIT(S): 100 INJECTION, SUSPENSION SUBCUTANEOUS at 05:52

## 2019-09-30 RX ADMIN — Medication 50 MILLIGRAM(S): at 17:49

## 2019-09-30 RX ADMIN — DEXTROSE MONOHYDRATE, SODIUM CHLORIDE, AND POTASSIUM CHLORIDE 75 MILLILITER(S): 50; .745; 4.5 INJECTION, SOLUTION INTRAVENOUS at 11:27

## 2019-09-30 RX ADMIN — QUETIAPINE FUMARATE 25 MILLIGRAM(S): 200 TABLET, FILM COATED ORAL at 23:30

## 2019-09-30 RX ADMIN — Medication 50 MILLIGRAM(S): at 05:43

## 2019-09-30 RX ADMIN — HUMAN INSULIN 3 UNIT(S): 100 INJECTION, SUSPENSION SUBCUTANEOUS at 23:29

## 2019-09-30 RX ADMIN — Medication 4: at 17:49

## 2019-09-30 RX ADMIN — FAMOTIDINE 20 MILLIGRAM(S): 10 INJECTION INTRAVENOUS at 11:28

## 2019-09-30 RX ADMIN — Medication 50 MILLIGRAM(S): at 23:30

## 2019-09-30 RX ADMIN — BUDESONIDE AND FORMOTEROL FUMARATE DIHYDRATE 2 PUFF(S): 160; 4.5 AEROSOL RESPIRATORY (INHALATION) at 11:09

## 2019-09-30 NOTE — PROGRESS NOTE ADULT - ASSESSMENT
A/P 84y year old female s/p L hip basilio  Wean BiPap as tolerated  Appreciate SICU care  Pain Control  DVT PPX  PT/OOB  WBAT   Dispo Planning

## 2019-09-30 NOTE — PHYSICAL THERAPY INITIAL EVALUATION ADULT - RANGE OF MOTION EXAMINATION, REHAB EVAL
bilateral upper extremity ROM was WFL (within functional limits)/Right LE ROM was WFL (within functional limits)/left hip ROM 0-80

## 2019-09-30 NOTE — PROGRESS NOTE ADULT - SUBJECTIVE AND OBJECTIVE BOX
SICU Daily Progress Note  =====================================================  Overnight events:   - Patient on BIPAP, not tolerating weaning yesterday  - NGT removed, unable to replace with daniela tube  - Patient started on haldol PRN and seroquel at bedtime for agitation  - Received lasix yesterday       HPI: 85 yo female s/p mechanical fall c/o left hip pain was brought to [facility] via EMS for eval.  Pt denies syncope, LOC, hitting head. XR in [facility] ED reveal left femoral neck fx . Pt Hx COPD on 3L O2, CKD, HTN, DM2 , hypothyroid. (20 Sep 2019 08:04)    Allergies:   PAST MEDICAL & SURGICAL HISTORY:  Chronic kidney disease (CKD)  COPD (chronic obstructive pulmonary disease)  HTN (hypertension)  Hypothyroid  DM (diabetes mellitus)  Leg fracture: right ORIF with hardware 2010  H/O cataract: bilateral 2013  S/P cholecystectomy    FAMILY HISTORY:  Family history of hypertension: Father  FH: type 2 diabetes mellitus: Fathe  FHx: rheumatoid arthritis: Mother and Brother    ADVANCE DIRECTIVES: Full Code  *    REVIEW OF SYSTEMS:  Unable to obtain secondary to AMS  HOME MEDICATIONS:    --------------------------------------------------------------------------------------  Neurologic Medications:  acetaminophen    Suspension .. 650 milliGRAM(s) Oral every 6 hours PRN  haloperidol    Injectable 2.5 milliGRAM(s) IV Push every 6 hours PRN  QUEtiapine 25 milliGRAM(s) Oral at bedtime    Respiratory Medications:  ALBUTerol    90 MICROgram(s) HFA Inhaler 2 Puff(s) Inhalation every 6 hours PRN  buDESOnide  80 MICROgram(s)/formoterol 4.5 MICROgram(s) Inhaler 2 Puff(s) Inhalation two times a day  tiotropium 18 MICROgram(s) Capsule 1 Capsule(s) Inhalation daily    Cardiovascular Medications:  metolazone 5 milliGRAM(s) Oral once    Gastrointestinal Medications:  famotidine Injectable 20 milliGRAM(s) IV Push daily    Genitourinary Medications:    Hematologic/Oncologic Medications:  enoxaparin Injectable 40 milliGRAM(s) SubCutaneous daily    Antimicrobials/Immunologic Medications:    Endocrine/Metabolic Medications:  dextrose 50% Injectable 25 Gram(s) IV Push once  glucagon  Injectable 1 milliGRAM(s) IntraMuscular once PRN  hydrocortisone sodium succinate Injectable 50 milliGRAM(s) IV Push every 6 hours  insulin lispro (HumaLOG) corrective regimen sliding scale   SubCutaneous every 6 hours  insulin NPH human recombinant 3 Unit(s) SubCutaneous every 8 hours  levothyroxine Injectable 44 MICROGram(s) IV Push at bedtime    Topical/Other Medications:  chlorhexidine 4% Liquid 1 Application(s) Topical daily    T(C): 37.2 (09-30-19 @ 04:00), Max: 37.6 (09-29-19 @ 20:00)  HR: 76 (09-30-19 @ 05:00) (55 - 86)  BP: 163/71 (09-30-19 @ 05:00) (119/47 - 163/71)  ABP: --  ABP(mean): --  RR: 20 (09-30-19 @ 05:00) (16 - 26)  SpO2: 96% (09-30-19 @ 05:00) (86% - 98%)  Wt(kg): --  CVP(mm Hg): --      09-28 @ 07:01  -  09-29 @ 07:00  --------------------------------------------------------  IN:    Enteral Tube Flush: 60 mL    Glucerna 1.5: 112 mL    lactated ringers.: 600 mL  Total IN: 772 mL    OUT:    Indwelling Catheter - Urethral: 1760 mL  Total OUT: 1760 mL    Total NET: -988 mL      09-29 @ 07:01  -  09-30 @ 05:48  --------------------------------------------------------  IN:    Glucerna 1.5: 120 mL  Total IN: 120 mL    OUT:    Indwelling Catheter - Urethral: 2540 mL  Total OUT: 2540 mL    Total NET: -2420 mL        --------------------------------------------------------------------------------------    EXAM  NEUROLOGY  Exam: mental status waxes and wanes, follows simple commands     RESPIRATORY  Exam: Lungs congested/junky B/L  Mode: biPAP 13/2    CARDIOVASCULAR  Exam: S1, S2    GI/NUTRITION  Exam: Abdomen soft, Non-tender, Non-distended, NGT    VASCULAR  Exam: Extremities warm, pink, well-perfused.      MUSCULOSKELETAL  Exam: All extremities moving spontaneously without limitations.      SKIN:  Exam: Good skin turgor, no skin breakdown.      METABOLIC/FLUIDS/ELECTROLYTES  ascorbic acid 500 milliGRAM(s) Oral daily  calcium carbonate 1250 mG  + Vitamin D (OsCal 500 + D) 1 Tablet(s) Oral daily  dextrose 5%. 1000 milliLiter(s) IV Continuous <Continuous>      HEMATOLOGIC  [x] DVT Prophylaxis: aspirin enteric coated 325 milliGRAM(s) Oral two times a day    Transfusions:	[] PRBC	[] Platelets		[] FFP	[] Cryoprecipitate      Tubes/Lines/Drains  NGT  [x] Peripheral IV  [] Central Venous Line     	[] R	[] L	[] IJ	[] Fem	[] SC	Date Placed:   [] Arterial Line		[] R	[] L	[] Fem	[] Rad	[] Ax	Date Placed:   [] PICC:         	[] Midline		[] Mediport  [] Urinary Catheter		Date Placed:     LABS  --------------------------------------------------------------------------------------  CBC (09-29 @ 01:25)                              8.3<L>                         13.02<H>  )----------------(  386        --    % Neutrophils, --    % Lymphocytes, ANC: --                                  26.1<L>                BMP (09-29 @ 01:25)             143     |  106     |  38<H> 		Ca++ 1.11    Ca 8.3<L>             ---------------------------------( 142<H>		Mg 1.9                4.0     |  25      |  1.13  			Ph 3.3           ABG (09-29 @ 19:27)     7.49<H> / 38 / 79<L> / 29<H> / 5.3 / 97.4%     Lactate: 0.9    ABG (09-29 @ 01:35)     7.32<L> / 59<H> / 70<L> / 27<H> / 3.7 / 92.4<L>%     Lactate: 0.8        -> URINE Culture (09-20 @ 17:00)     NG    NG  NG      -------------------------------------------------------------------------------------- SICU Daily Progress Note  =====================================================  Overnight events:   - Patient on BIPAP, not tolerating weaning yesterday  - NGT removed, unable to replace with daniela tube  - Patient started on haldol PRN and seroquel at bedtime for agitation  - Received lasix yesterday       HPI: 83 yo female s/p mechanical fall c/o left hip pain was brought to [facility] via EMS for eval.  Pt denies syncope, LOC, hitting head. XR in [facility] ED reveal left femoral neck fx . Pt Hx COPD on 3L O2, CKD, HTN, DM2 , hypothyroid. (20 Sep 2019 08:04)    Allergies:   PAST MEDICAL & SURGICAL HISTORY:  Chronic kidney disease (CKD)  COPD (chronic obstructive pulmonary disease)  HTN (hypertension)  Hypothyroid  DM (diabetes mellitus)  Leg fracture: right ORIF with hardware 2010  H/O cataract: bilateral 2013  S/P cholecystectomy    FAMILY HISTORY:  Family history of hypertension: Father  FH: type 2 diabetes mellitus: Fathe  FHx: rheumatoid arthritis: Mother and Brother    ADVANCE DIRECTIVES: Full Code  *    REVIEW OF SYSTEMS:  Unable to obtain secondary to AMS  HOME MEDICATIONS:    --------------------------------------------------------------------------------------  Neurologic Medications:  acetaminophen    Suspension .. 650 milliGRAM(s) Oral every 6 hours PRN  haloperidol    Injectable 2.5 milliGRAM(s) IV Push every 6 hours PRN  QUEtiapine 25 milliGRAM(s) Oral at bedtime    Respiratory Medications:  ALBUTerol    90 MICROgram(s) HFA Inhaler 2 Puff(s) Inhalation every 6 hours PRN  buDESOnide  80 MICROgram(s)/formoterol 4.5 MICROgram(s) Inhaler 2 Puff(s) Inhalation two times a day  tiotropium 18 MICROgram(s) Capsule 1 Capsule(s) Inhalation daily    Cardiovascular Medications:  metolazone 5 milliGRAM(s) Oral once    Gastrointestinal Medications:  famotidine Injectable 20 milliGRAM(s) IV Push daily    Genitourinary Medications:    Hematologic/Oncologic Medications:  enoxaparin Injectable 40 milliGRAM(s) SubCutaneous daily    Antimicrobials/Immunologic Medications:    Endocrine/Metabolic Medications:  dextrose 50% Injectable 25 Gram(s) IV Push once  glucagon  Injectable 1 milliGRAM(s) IntraMuscular once PRN  hydrocortisone sodium succinate Injectable 50 milliGRAM(s) IV Push every 6 hours  insulin lispro (HumaLOG) corrective regimen sliding scale   SubCutaneous every 6 hours  insulin NPH human recombinant 3 Unit(s) SubCutaneous every 8 hours  levothyroxine Injectable 44 MICROGram(s) IV Push at bedtime    Topical/Other Medications:  chlorhexidine 4% Liquid 1 Application(s) Topical daily    T(C): 37.2 (09-30-19 @ 04:00), Max: 37.6 (09-29-19 @ 20:00)  HR: 76 (09-30-19 @ 05:00) (55 - 86)  BP: 163/71 (09-30-19 @ 05:00) (119/47 - 163/71)  ABP: --  ABP(mean): --  RR: 20 (09-30-19 @ 05:00) (16 - 26)  SpO2: 96% (09-30-19 @ 05:00) (86% - 98%)  Wt(kg): --  CVP(mm Hg): --      09-28 @ 07:01  -  09-29 @ 07:00  --------------------------------------------------------  IN:    Enteral Tube Flush: 60 mL    Glucerna 1.5: 112 mL    lactated ringers.: 600 mL  Total IN: 772 mL    OUT:    Indwelling Catheter - Urethral: 1760 mL  Total OUT: 1760 mL    Total NET: -988 mL      09-29 @ 07:01  -  09-30 @ 05:48  --------------------------------------------------------  IN:    Glucerna 1.5: 120 mL  Total IN: 120 mL    OUT:    Indwelling Catheter - Urethral: 2540 mL  Total OUT: 2540 mL    Total NET: -2420 mL        --------------------------------------------------------------------------------------    EXAM  NEUROLOGY  Exam: mental status waxes and wanes, follows simple commands     RESPIRATORY  Exam: Lungs congested/junky B/L  Mode: biPAP 10/5    CARDIOVASCULAR  Exam: S1, S2    GI/NUTRITION  Exam: Abdomen soft, Non-tender, Non-distended    VASCULAR  Exam: Extremities warm, pink, well-perfused.      MUSCULOSKELETAL  Exam: All extremities moving spontaneously without limitations.      SKIN:  Exam: Good skin turgor, no skin breakdown.      METABOLIC/FLUIDS/ELECTROLYTES  ascorbic acid 500 milliGRAM(s) Oral daily  calcium carbonate 1250 mG  + Vitamin D (OsCal 500 + D) 1 Tablet(s) Oral daily  dextrose 5%. 1000 milliLiter(s) IV Continuous <Continuous>      HEMATOLOGIC  [x] DVT Prophylaxis: aspirin enteric coated 325 milliGRAM(s) Oral two times a day    Transfusions:	[] PRBC	[] Platelets		[] FFP	[] Cryoprecipitate      Tubes/Lines/Drains  NGT  [x] Peripheral IV  [] Central Venous Line     	[] R	[] L	[] IJ	[] Fem	[] SC	Date Placed:   [] Arterial Line		[] R	[] L	[] Fem	[] Rad	[] Ax	Date Placed:   [] PICC:         	[] Midline		[] Mediport  [] Urinary Catheter		Date Placed:     LABS  --------------------------------------------------------------------------------------  CBC (09-29 @ 01:25)                              8.3<L>                         13.02<H>  )----------------(  386        --    % Neutrophils, --    % Lymphocytes, ANC: --                                  26.1<L>                BMP (09-29 @ 01:25)             143     |  106     |  38<H> 		Ca++ 1.11    Ca 8.3<L>             ---------------------------------( 142<H>		Mg 1.9                4.0     |  25      |  1.13  			Ph 3.3           ABG (09-29 @ 19:27)     7.49<H> / 38 / 79<L> / 29<H> / 5.3 / 97.4%     Lactate: 0.9    ABG (09-29 @ 01:35)     7.32<L> / 59<H> / 70<L> / 27<H> / 3.7 / 92.4<L>%     Lactate: 0.8        -> URINE Culture (09-20 @ 17:00)     NG    NG  NG      --------------------------------------------------------------------------------------

## 2019-09-30 NOTE — PHYSICAL THERAPY INITIAL EVALUATION ADULT - GAIT DEVIATIONS NOTED, PT EVAL
decreased step length/decreased weight-shifting ability/decreased lucille
decreased stride length/decreased weight-shifting ability/decreased step length/decreased lucille

## 2019-09-30 NOTE — PHYSICAL THERAPY INITIAL EVALUATION ADULT - IMPAIRMENTS FOUND, PT EVAL
gait, locomotion, and balance/muscle strength/ROM/aerobic capacity/endurance
aerobic capacity/endurance/ROM/gait, locomotion, and balance/muscle strength

## 2019-09-30 NOTE — PHYSICAL THERAPY INITIAL EVALUATION ADULT - GENERAL OBSERVATIONS, REHAB EVAL
Patient received supine in bed; No apparent distress. (+) heplock, telemetry monitor
Patient received supine in bed; No apparent distress. (+) hip abduction pillow

## 2019-09-30 NOTE — PROGRESS NOTE ADULT - ASSESSMENT
ASSESSMENT:  83 yo female with PMH of HTN, Hypothyroid, DMII, CKD stage 3, COPD c/w chronic hypoxic respiratory failure on 3L home O2 present after mechanical fall c/o left hip pain found to have L femoral neck fracture, s/p left hip hemiarthroplasty (9/20).    PLAN:     Neuro:   - Follows simple commands (mental status waxes and wanes)  - Tylenol for pain mgmt  - Seroquel and haldol for agitation/possibly sundowning     Respiratory:   - Chronic respiratory failure with hypoxia, on home O2 therapy  - COPD exacerbation and hypercarbia  - Titrate down FiO2, wean off bipap  - Duonebs q6, Symbicort bid, Spiriva qd  - IV solumedrol taper for COPD exacerbation  - Currently on BiPap    Cardiovascular:   - Monitor vitals    Gastrointestinal/Nutrition:   - NPO  - Replace daniela tube today  - Restart TFeeds     Renal/Genitourinary:   - Palacio  - Trend I/Os  - Monitor electrolytes    Hematologic:   - Lovenox for DVT prophylaxis    Infectious Disease:   - Monitor off abx    Tubes/Lines/Drains: NGT    Endocrine:   - Synthroid  - FS improved, currently on NPH and CHELSI    Disposition: SICU    --------------------------------------------------------------------------------------    Critical Care Diagnoses: ASSESSMENT:  85 yo female with PMH of HTN, Hypothyroid, DMII, CKD stage 3, COPD c/w chronic hypoxic respiratory failure on 3L home O2 present after mechanical fall c/o left hip pain found to have L femoral neck fracture, s/p left hip hemiarthroplasty (9/20).    PLAN:     Neuro:   - Follows simple commands (mental status waxes and wanes)  - Tylenol for pain mgmt  - Seroquel and haldol for agitation/possibly sundowning     Respiratory:   - Chronic respiratory failure with hypoxia, on home O2 therapy, chronic hypercarbia  - COPD exacerbation and hypercarbia  - Duonebs q6, Symbicort bid, Spiriva qd  - IV solumedrol taper for COPD exacerbation  - Wean BiPAp, can add nocturnal bipap if necessary    Cardiovascular:   - Monitor vitals    Gastrointestinal/Nutrition:   - NPO  - Advance diet once off bipap    Renal/Genitourinary:   - Palacio  - Trend I/Os  - Monitor electrolytes  - D5 1/2NS + 20 K @75ml/hr    Hematologic:   - Lovenox for DVT prophylaxis    Infectious Disease:   - Monitor off abx    Tubes/Lines/Drains: NGT    Endocrine:   - Synthroid  - FS improved, currently on NPH and CHELSI    Disposition: SICU    --------------------------------------------------------------------------------------    Critical Care Diagnoses: respiratory abnormality

## 2019-09-30 NOTE — PHYSICAL THERAPY INITIAL EVALUATION ADULT - PERTINENT HX OF CURRENT PROBLEM, REHAB EVAL
84 year old female s/p L hip basilio; now s/p intubation on Nasal Canula
s/p left hip hemiarthroplasty

## 2019-09-30 NOTE — PHYSICAL THERAPY INITIAL EVALUATION ADULT - TRANSFER SAFETY CONCERNS NOTED: SIT/STAND, REHAB EVAL
decreased step length/losing balance/decreased weight-shifting ability
decreased step length/decreased weight-shifting ability/losing balance

## 2019-09-30 NOTE — PHYSICAL THERAPY INITIAL EVALUATION ADULT - LEVEL OF INDEPENDENCE: GAIT, REHAB EVAL
minimum assist (75% patients effort)/moderate assist (50% patients effort)
maximum assist (25% patients effort)

## 2019-10-01 LAB
ANION GAP SERPL CALC-SCNC: 11 MMO/L — SIGNIFICANT CHANGE UP (ref 7–14)
BASE EXCESS BLDA CALC-SCNC: 10.1 MMOL/L — SIGNIFICANT CHANGE UP
BUN SERPL-MCNC: 32 MG/DL — HIGH (ref 7–23)
CA-I BLD-SCNC: 1.09 MMOL/L — SIGNIFICANT CHANGE UP (ref 1.03–1.23)
CALCIUM SERPL-MCNC: 8 MG/DL — LOW (ref 8.4–10.5)
CHLORIDE SERPL-SCNC: 101 MMOL/L — SIGNIFICANT CHANGE UP (ref 98–107)
CO2 SERPL-SCNC: 32 MMOL/L — HIGH (ref 22–31)
CREAT SERPL-MCNC: 1.04 MG/DL — SIGNIFICANT CHANGE UP (ref 0.5–1.3)
GLUCOSE BLDC GLUCOMTR-MCNC: 121 MG/DL — HIGH (ref 70–99)
GLUCOSE BLDC GLUCOMTR-MCNC: 164 MG/DL — HIGH (ref 70–99)
GLUCOSE BLDC GLUCOMTR-MCNC: 234 MG/DL — HIGH (ref 70–99)
GLUCOSE BLDC GLUCOMTR-MCNC: 238 MG/DL — HIGH (ref 70–99)
GLUCOSE SERPL-MCNC: 200 MG/DL — HIGH (ref 70–99)
HCO3 BLDA-SCNC: 33 MMOL/L — HIGH (ref 22–26)
HCT VFR BLD CALC: 26.5 % — LOW (ref 34.5–45)
HGB BLD-MCNC: 8 G/DL — LOW (ref 11.5–15.5)
MAGNESIUM SERPL-MCNC: 2.1 MG/DL — SIGNIFICANT CHANGE UP (ref 1.6–2.6)
MCHC RBC-ENTMCNC: 30.2 % — LOW (ref 32–36)
MCHC RBC-ENTMCNC: 30.5 PG — SIGNIFICANT CHANGE UP (ref 27–34)
MCV RBC AUTO: 101.1 FL — HIGH (ref 80–100)
NRBC # FLD: 0 K/UL — SIGNIFICANT CHANGE UP (ref 0–0)
PCO2 BLDA: 67 MMHG — HIGH (ref 32–48)
PH BLDA: 7.35 PH — SIGNIFICANT CHANGE UP (ref 7.35–7.45)
PHOSPHATE SERPL-MCNC: 2.6 MG/DL — SIGNIFICANT CHANGE UP (ref 2.5–4.5)
PLATELET # BLD AUTO: 354 K/UL — SIGNIFICANT CHANGE UP (ref 150–400)
PMV BLD: 9 FL — SIGNIFICANT CHANGE UP (ref 7–13)
PO2 BLDA: 154 MMHG — HIGH (ref 83–108)
POTASSIUM SERPL-MCNC: 3.2 MMOL/L — LOW (ref 3.5–5.3)
POTASSIUM SERPL-SCNC: 3.2 MMOL/L — LOW (ref 3.5–5.3)
RBC # BLD: 2.62 M/UL — LOW (ref 3.8–5.2)
RBC # FLD: 13 % — SIGNIFICANT CHANGE UP (ref 10.3–14.5)
SAO2 % BLDA: 99.1 % — HIGH (ref 95–99)
SODIUM SERPL-SCNC: 144 MMOL/L — SIGNIFICANT CHANGE UP (ref 135–145)
WBC # BLD: 7.76 K/UL — SIGNIFICANT CHANGE UP (ref 3.8–10.5)
WBC # FLD AUTO: 7.76 K/UL — SIGNIFICANT CHANGE UP (ref 3.8–10.5)

## 2019-10-01 PROCEDURE — 93010 ELECTROCARDIOGRAM REPORT: CPT

## 2019-10-01 RX ORDER — POTASSIUM CHLORIDE 20 MEQ
40 PACKET (EA) ORAL ONCE
Refills: 0 | Status: COMPLETED | OUTPATIENT
Start: 2019-10-01 | End: 2019-10-01

## 2019-10-01 RX ORDER — POTASSIUM PHOSPHATE, MONOBASIC POTASSIUM PHOSPHATE, DIBASIC 236; 224 MG/ML; MG/ML
15 INJECTION, SOLUTION INTRAVENOUS ONCE
Refills: 0 | Status: COMPLETED | OUTPATIENT
Start: 2019-10-01 | End: 2019-10-01

## 2019-10-01 RX ORDER — INSULIN LISPRO 100/ML
VIAL (ML) SUBCUTANEOUS
Refills: 0 | Status: DISCONTINUED | OUTPATIENT
Start: 2019-10-01 | End: 2019-10-08

## 2019-10-01 RX ADMIN — HUMAN INSULIN 3 UNIT(S): 100 INJECTION, SUSPENSION SUBCUTANEOUS at 16:35

## 2019-10-01 RX ADMIN — Medication 44 MICROGRAM(S): at 22:39

## 2019-10-01 RX ADMIN — Medication 50 MILLIGRAM(S): at 12:09

## 2019-10-01 RX ADMIN — HUMAN INSULIN 3 UNIT(S): 100 INJECTION, SUSPENSION SUBCUTANEOUS at 05:10

## 2019-10-01 RX ADMIN — FAMOTIDINE 20 MILLIGRAM(S): 10 INJECTION INTRAVENOUS at 12:09

## 2019-10-01 RX ADMIN — Medication 50 MILLIGRAM(S): at 22:39

## 2019-10-01 RX ADMIN — CHLORHEXIDINE GLUCONATE 1 APPLICATION(S): 213 SOLUTION TOPICAL at 12:09

## 2019-10-01 RX ADMIN — Medication 50 MILLIGRAM(S): at 05:11

## 2019-10-01 RX ADMIN — BUDESONIDE AND FORMOTEROL FUMARATE DIHYDRATE 2 PUFF(S): 160; 4.5 AEROSOL RESPIRATORY (INHALATION) at 22:40

## 2019-10-01 RX ADMIN — Medication 3: at 16:36

## 2019-10-01 RX ADMIN — Medication 40 MILLIEQUIVALENT(S): at 05:11

## 2019-10-01 RX ADMIN — HUMAN INSULIN 3 UNIT(S): 100 INJECTION, SUSPENSION SUBCUTANEOUS at 22:54

## 2019-10-01 RX ADMIN — ENOXAPARIN SODIUM 40 MILLIGRAM(S): 100 INJECTION SUBCUTANEOUS at 12:09

## 2019-10-01 RX ADMIN — POTASSIUM PHOSPHATE, MONOBASIC POTASSIUM PHOSPHATE, DIBASIC 62.5 MILLIMOLE(S): 236; 224 INJECTION, SOLUTION INTRAVENOUS at 05:11

## 2019-10-01 RX ADMIN — Medication 3: at 12:15

## 2019-10-01 RX ADMIN — QUETIAPINE FUMARATE 25 MILLIGRAM(S): 200 TABLET, FILM COATED ORAL at 22:40

## 2019-10-01 RX ADMIN — BUDESONIDE AND FORMOTEROL FUMARATE DIHYDRATE 2 PUFF(S): 160; 4.5 AEROSOL RESPIRATORY (INHALATION) at 09:32

## 2019-10-01 RX ADMIN — Medication 2: at 05:10

## 2019-10-01 NOTE — PROGRESS NOTE ADULT - SUBJECTIVE AND OBJECTIVE BOX
Orthopedic Surgery Progress Note    Patient seen and examined this morning. No acute events overnight. Pain is well controlled. Denies f/c, chest pain, sob, dizziness. Weaned off BiPap yesterday. NC this morning. Worked with PT, OOB yesterday    O:  Vital Signs Last 24 Hrs  T(C): 37.2 (01 Oct 2019 04:00), Max: 37.3 (01 Oct 2019 00:00)  T(F): 99 (01 Oct 2019 04:00), Max: 99.1 (01 Oct 2019 00:00)  HR: 57 (01 Oct 2019 06:00) (52 - 83)  BP: 135/55 (01 Oct 2019 06:00) (117/47 - 156/106)  BP(mean): 76 (01 Oct 2019 06:00) (63 - 131)  RR: 19 (01 Oct 2019 06:00) (17 - 26)  SpO2: 95% (01 Oct 2019 06:00) (88% - 97%)    Gen: NAD  LLE  Dressing C/D/I  EHL/FHL/TA/GS intact  SILT DP/SP/WOO/Sa  WWP distally    Labs:                        8.0    7.76  )-----------( 354      ( 01 Oct 2019 00:34 )             26.5                         8.3    10.17 )-----------( 356      ( 30 Sep 2019 05:57 )             26.8       10-01    144  |  101  |  32<H>  ----------------------------<  200<H>  3.2<L>   |  32<H>  |  1.04        PT/INR - ( 30 Sep 2019 13:57 )   PT: 12.6 SEC;   INR: 1.10

## 2019-10-01 NOTE — PROGRESS NOTE ADULT - SUBJECTIVE AND OBJECTIVE BOX
SICU Daily Progress Note  =====================================================  Overnight events:   - extubated on supplemental O2 satting well  - reportedly confused after waking up from a deep sleep in AM    HPI: 85 yo female s/p mechanical fall c/o left hip pain was brought to [facility] via EMS for eval.  Pt denies syncope, LOC, hitting head. XR in [facility] ED reveal left femoral neck fx . Pt Hx COPD on 3L O2, CKD, HTN, DM2 , hypothyroid. (20 Sep 2019 08:04)    Allergies:   PAST MEDICAL & SURGICAL HISTORY:  Chronic kidney disease (CKD)  COPD (chronic obstructive pulmonary disease)  HTN (hypertension)  Hypothyroid  DM (diabetes mellitus)  Leg fracture: right ORIF with hardware 2010  H/O cataract: bilateral 2013  S/P cholecystectomy    FAMILY HISTORY:  Family history of hypertension: Father  FH: type 2 diabetes mellitus: Fathe  FHx: rheumatoid arthritis: Mother and Brother    ADVANCE DIRECTIVES: Full Code  *    REVIEW OF SYSTEMS:  Unable to obtain secondary to AMS  HOME MEDICATIONS:    --------------------------------------------------------------------------------------    MEDICATIONS  (STANDING):  buDESOnide  80 MICROgram(s)/formoterol 4.5 MICROgram(s) Inhaler 2 Puff(s) Inhalation two times a day  chlorhexidine 4% Liquid 1 Application(s) Topical daily  dextrose 50% Injectable 25 Gram(s) IV Push once  enoxaparin Injectable 40 milliGRAM(s) SubCutaneous daily  famotidine Injectable 20 milliGRAM(s) IV Push daily  hydrocortisone sodium succinate Injectable 50 milliGRAM(s) IV Push every 6 hours  insulin lispro (HumaLOG) corrective regimen sliding scale   SubCutaneous every 6 hours  insulin NPH human recombinant 3 Unit(s) SubCutaneous every 8 hours  levothyroxine Injectable 44 MICROGram(s) IV Push at bedtime  QUEtiapine 25 milliGRAM(s) Oral at bedtime  tiotropium 18 MICROgram(s) Capsule 1 Capsule(s) Inhalation daily    MEDICATIONS  (PRN):  acetaminophen    Suspension .. 650 milliGRAM(s) Oral every 6 hours PRN Mild Pain (1 - 3)  ALBUTerol    90 MICROgram(s) HFA Inhaler 2 Puff(s) Inhalation every 6 hours PRN Bronchospasm  glucagon  Injectable 1 milliGRAM(s) IntraMuscular once PRN Glucose LESS THAN 70 milligrams/deciliter  haloperidol    Injectable 2.5 milliGRAM(s) IV Push every 6 hours PRN agitation      ICU Vital Signs Last 24 Hrs  T(C): 37.2 (01 Oct 2019 04:00), Max: 37.3 (01 Oct 2019 00:00)  T(F): 99 (01 Oct 2019 04:00), Max: 99.1 (01 Oct 2019 00:00)  HR: 57 (01 Oct 2019 06:00) (52 - 83)  BP: 135/55 (01 Oct 2019 06:00) (117/47 - 156/106)  BP(mean): 76 (01 Oct 2019 06:00) (63 - 131)  ABP: --  ABP(mean): --  RR: 19 (01 Oct 2019 06:00) (17 - 26)  SpO2: 95% (01 Oct 2019 06:00) (88% - 97%)      I&O's Detail    29 Sep 2019 07:01  -  30 Sep 2019 07:00  --------------------------------------------------------  IN:    Glucerna 1.5: 120 mL  Total IN: 120 mL    OUT:    Indwelling Catheter - Urethral: 2930 mL  Total OUT: 2930 mL    Total NET: -2810 mL      30 Sep 2019 07:01  -  01 Oct 2019 06:23  --------------------------------------------------------  IN:    dextrose 5% + sodium chloride 0.45% with potassium chloride 20 mEq/L: 900 mL    IV PiggyBack: 250 mL    Oral Fluid: 360 mL  Total IN: 1510 mL    OUT:    Indwelling Catheter - Urethral: 1505 mL  Total OUT: 1505 mL    Total NET: 5 mL        --------------------------------------------------------------------------------------    EXAM  NEUROLOGY  Exam: awake, tracks, follows commands    RESPIRATORY  Exam: CTA B/L     CARDIOVASCULAR  Exam: S1, S2    GI/NUTRITION  Exam: Abdomen soft, Non-tender, Non-distended    VASCULAR  Exam: Extremities warm, pink, well-perfused.      MUSCULOSKELETAL  Exam: All extremities moving spontaneously without limitations.      SKIN:  Exam: Good skin turgor, no skin breakdown.      METABOLIC/FLUIDS/ELECTROLYTES  ascorbic acid 500 milliGRAM(s) Oral daily  calcium carbonate 1250 mG  + Vitamin D (OsCal 500 + D) 1 Tablet(s) Oral daily  dextrose 5%. 1000 milliLiter(s) IV Continuous <Continuous>      HEMATOLOGIC  [x] DVT Prophylaxis: aspirin enteric coated 325 milliGRAM(s) Oral two times a day    Transfusions:	[] PRBC	[] Platelets		[] FFP	[] Cryoprecipitate      Tubes/Lines/Drains  NGT  [x] Peripheral IV  [] Central Venous Line     	[] R	[] L	[] IJ	[] Fem	[] SC	Date Placed:   [] Arterial Line		[] R	[] L	[] Fem	[] Rad	[] Ax	Date Placed:   [] PICC:         	[] Midline		[] Mediport  [] Urinary Catheter		Date Placed:     LABS  --------------------------------------------------------------------------------------  CBC (10-01 @ 00:34)                              8.0<L>                         7.76    )----------------(  354        --    % Neutrophils, --    % Lymphocytes, ANC: --                                  26.5<L>  CBC (09-30 @ 05:57)                              8.3<L>                         10.17   )----------------(  356        --    % Neutrophils, --    % Lymphocytes, ANC: --                                  26.8<L>    BMP (10-01 @ 00:34)             144     |  101     |  32<H> 		Ca++ 1.09    Ca 8.0<L>             ---------------------------------( 200<H>		Mg 2.1                3.2<L>  |  32<H>   |  1.04  			Ph 2.6     BMP (09-30 @ 05:57)             146<H>  |  103     |  34<H> 		Ca++ --      Ca 8.6                ---------------------------------( 144<H>		Mg 2.1                3.7     |  30      |  1.06  			Ph 3.2         Coags (09-30 @ 13:57)  aPTT -- / INR 1.10 / PT 12.6      ABG (10-01 @ 00:34)     7.35 / 67<H> / 154<H> / 33<H> / 10.1 / 99.1<H>%     Lactate:    ABG (09-30 @ 05:57)     7.42 / 52<H> / 66<L> / 32<H> / 8.6 / 93.5<L>%     Lactate: 0.9       --------------------------------------------------------------------------------------    ASSESSMENT:  85 yo female with PMH of HTN, Hypothyroid, DMII, CKD stage 3, COPD c/w chronic hypoxic respiratory failure on 3L home O2 present after mechanical fall c/o left hip pain found to have L femoral neck fracture, s/p left hip hemiarthroplasty (9/20).    PLAN:     Neuro:   - Follows simple commands (mental status waxes and wanes)  - Tylenol for pain mgmt  - Seroquel and haldol for agitation/possibly sundowning     Respiratory:   - Chronic respiratory failure with hypoxia, on home O2 therapy, chronic hypercarbia  - COPD exacerbation and hypercarbia  - Duonebs q6, Symbicort bid, Spiriva qd  - IV solumedrol taper for COPD exacerbation  - Wean BiPAp, can add nocturnal bipap if necessary    Cardiovascular:   - Monitor vitals    Gastrointestinal/Nutrition:   - NPO  - Advance to reg diet    Renal/Genitourinary:   - Palacio  - Trend I/Os  - Monitor electrolytes    Hematologic:   - Lovenox for DVT prophylaxis    Infectious Disease:   - Monitor off abx    Tubes/Lines/Drains: NGT    Endocrine:   - Synthroid  - FS improved, currently on NPH and CHELSI    Disposition: SICU    --------------------------------------------------------------------------------------    Critical Care Diagnoses: respiratory abnormality SICU Daily Progress Note  =====================================================  Overnight events:   - extubated on supplemental O2 satting well  - reportedly confused after waking up from a deep sleep in AM    HPI: 85 yo female s/p mechanical fall c/o left hip pain was brought to [facility] via EMS for eval.  Pt denies syncope, LOC, hitting head. XR in [facility] ED reveal left femoral neck fx . Pt Hx COPD on 3L O2, CKD, HTN, DM2 , hypothyroid. (20 Sep 2019 08:04)    Allergies:   PAST MEDICAL & SURGICAL HISTORY:  Chronic kidney disease (CKD)  COPD (chronic obstructive pulmonary disease)  HTN (hypertension)  Hypothyroid  DM (diabetes mellitus)  Leg fracture: right ORIF with hardware 2010  H/O cataract: bilateral 2013  S/P cholecystectomy    FAMILY HISTORY:  Family history of hypertension: Father  FH: type 2 diabetes mellitus: Fathe  FHx: rheumatoid arthritis: Mother and Brother    ADVANCE DIRECTIVES: Full Code  *    REVIEW OF SYSTEMS:  Unable to obtain secondary to AMS  HOME MEDICATIONS:    --------------------------------------------------------------------------------------    MEDICATIONS  (STANDING):  buDESOnide  80 MICROgram(s)/formoterol 4.5 MICROgram(s) Inhaler 2 Puff(s) Inhalation two times a day  chlorhexidine 4% Liquid 1 Application(s) Topical daily  dextrose 50% Injectable 25 Gram(s) IV Push once  enoxaparin Injectable 40 milliGRAM(s) SubCutaneous daily  famotidine Injectable 20 milliGRAM(s) IV Push daily  hydrocortisone sodium succinate Injectable 50 milliGRAM(s) IV Push every 6 hours  insulin lispro (HumaLOG) corrective regimen sliding scale   SubCutaneous every 6 hours  insulin NPH human recombinant 3 Unit(s) SubCutaneous every 8 hours  levothyroxine Injectable 44 MICROGram(s) IV Push at bedtime  QUEtiapine 25 milliGRAM(s) Oral at bedtime  tiotropium 18 MICROgram(s) Capsule 1 Capsule(s) Inhalation daily    MEDICATIONS  (PRN):  acetaminophen    Suspension .. 650 milliGRAM(s) Oral every 6 hours PRN Mild Pain (1 - 3)  ALBUTerol    90 MICROgram(s) HFA Inhaler 2 Puff(s) Inhalation every 6 hours PRN Bronchospasm  glucagon  Injectable 1 milliGRAM(s) IntraMuscular once PRN Glucose LESS THAN 70 milligrams/deciliter  haloperidol    Injectable 2.5 milliGRAM(s) IV Push every 6 hours PRN agitation      ICU Vital Signs Last 24 Hrs  T(C): 37.2 (01 Oct 2019 04:00), Max: 37.3 (01 Oct 2019 00:00)  T(F): 99 (01 Oct 2019 04:00), Max: 99.1 (01 Oct 2019 00:00)  HR: 57 (01 Oct 2019 06:00) (52 - 83)  BP: 135/55 (01 Oct 2019 06:00) (117/47 - 156/106)  BP(mean): 76 (01 Oct 2019 06:00) (63 - 131)  ABP: --  ABP(mean): --  RR: 19 (01 Oct 2019 06:00) (17 - 26)  SpO2: 95% (01 Oct 2019 06:00) (88% - 97%)      I&O's Detail    29 Sep 2019 07:01  -  30 Sep 2019 07:00  --------------------------------------------------------  IN:    Glucerna 1.5: 120 mL  Total IN: 120 mL    OUT:    Indwelling Catheter - Urethral: 2930 mL  Total OUT: 2930 mL    Total NET: -2810 mL      30 Sep 2019 07:01  -  01 Oct 2019 06:23  --------------------------------------------------------  IN:    dextrose 5% + sodium chloride 0.45% with potassium chloride 20 mEq/L: 900 mL    IV PiggyBack: 250 mL    Oral Fluid: 360 mL  Total IN: 1510 mL    OUT:    Indwelling Catheter - Urethral: 1505 mL  Total OUT: 1505 mL    Total NET: 5 mL        --------------------------------------------------------------------------------------    EXAM  NEUROLOGY  Exam: awake, tracks, follows commands    RESPIRATORY  Exam: CTA B/L     CARDIOVASCULAR  Exam: S1, S2    GI/NUTRITION  Exam: Abdomen soft, Non-tender, Non-distended    VASCULAR  Exam: Extremities warm, pink, well-perfused.      MUSCULOSKELETAL  Exam: All extremities moving spontaneously without limitations.      SKIN:  Exam: Good skin turgor, no skin breakdown.      METABOLIC/FLUIDS/ELECTROLYTES  ascorbic acid 500 milliGRAM(s) Oral daily  calcium carbonate 1250 mG  + Vitamin D (OsCal 500 + D) 1 Tablet(s) Oral daily  dextrose 5%. 1000 milliLiter(s) IV Continuous <Continuous>      HEMATOLOGIC  [x] DVT Prophylaxis: aspirin enteric coated 325 milliGRAM(s) Oral two times a day    Transfusions:	[] PRBC	[] Platelets		[] FFP	[] Cryoprecipitate      Tubes/Lines/Drains  NGT  [x] Peripheral IV  [] Central Venous Line     	[] R	[] L	[] IJ	[] Fem	[] SC	Date Placed:   [] Arterial Line		[] R	[] L	[] Fem	[] Rad	[] Ax	Date Placed:   [] PICC:         	[] Midline		[] Mediport  [] Urinary Catheter		Date Placed:     LABS  --------------------------------------------------------------------------------------  CBC (10-01 @ 00:34)                              8.0<L>                         7.76    )----------------(  354        --    % Neutrophils, --    % Lymphocytes, ANC: --                                  26.5<L>  CBC (09-30 @ 05:57)                              8.3<L>                         10.17   )----------------(  356        --    % Neutrophils, --    % Lymphocytes, ANC: --                                  26.8<L>    BMP (10-01 @ 00:34)             144     |  101     |  32<H> 		Ca++ 1.09    Ca 8.0<L>             ---------------------------------( 200<H>		Mg 2.1                3.2<L>  |  32<H>   |  1.04  			Ph 2.6     BMP (09-30 @ 05:57)             146<H>  |  103     |  34<H> 		Ca++ --      Ca 8.6                ---------------------------------( 144<H>		Mg 2.1                3.7     |  30      |  1.06  			Ph 3.2         Coags (09-30 @ 13:57)  aPTT -- / INR 1.10 / PT 12.6      ABG (10-01 @ 00:34)     7.35 / 67<H> / 154<H> / 33<H> / 10.1 / 99.1<H>%     Lactate:    ABG (09-30 @ 05:57)     7.42 / 52<H> / 66<L> / 32<H> / 8.6 / 93.5<L>%     Lactate: 0.9       --------------------------------------------------------------------------------------    ASSESSMENT:  85 yo female with PMH of HTN, Hypothyroid, DMII, CKD stage 3, COPD c/w chronic hypoxic respiratory failure on 3L home O2 present after mechanical fall c/o left hip pain found to have L femoral neck fracture, s/p left hip hemiarthroplasty (9/20).    PLAN:     Neuro:   - Follows simple commands (mental status waxes and wanes)  - Tylenol for pain mgmt  - Seroquel and haldol for agitation/possibly sundowning     Respiratory:   - Chronic respiratory failure with hypoxia, on home O2 therapy, chronic hypercarbia  - COPD exacerbation and hypercarbia  - Duonebs q6, Symbicort bid, Spiriva qd  - IV solumedrol taper for COPD exacerbation (last day of steroids today)   - Now SpO2 96% on 3L NC (home O2 dose)     Cardiovascular:   - Monitor vitals  - Will obtain EKG for bradycardia to 50s     Gastrointestinal/Nutrition:   - NPO  - Advance to reg diet    Renal/Genitourinary:   - dc correa  - Trend I/Os  - Monitor electrolytes    Hematologic:   - Lovenox for DVT prophylaxis    Infectious Disease:   - Monitor off abx    Tubes/Lines/Drains: NGT    Endocrine:   - Synthroid  - FS improved, currently on NPH and CHELSI    Disposition: SICU    --------------------------------------------------------------------------------------    Critical Care Diagnoses: respiratory abnormality

## 2019-10-01 NOTE — PROGRESS NOTE ADULT - ASSESSMENT
A/P 84y year old female s/p L hip hemiarthroplasty. Extubated. NC this morning  Listed for floors as per SICU. appreciate SICU management  Pain Control  DVT PPX  PT/OOB  WBAT   Abduction pillow  Posterior dislocation precautions  Dispo Planning - Rehab

## 2019-10-02 DIAGNOSIS — N30.00 ACUTE CYSTITIS WITHOUT HEMATURIA: ICD-10-CM

## 2019-10-02 DIAGNOSIS — J96.21 ACUTE AND CHRONIC RESPIRATORY FAILURE WITH HYPOXIA: ICD-10-CM

## 2019-10-02 LAB
ANION GAP SERPL CALC-SCNC: 10 MMO/L — SIGNIFICANT CHANGE UP (ref 7–14)
APPEARANCE UR: CLEAR — SIGNIFICANT CHANGE UP
BACTERIA # UR AUTO: HIGH
BILIRUB UR-MCNC: NEGATIVE — SIGNIFICANT CHANGE UP
BLOOD UR QL VISUAL: SIGNIFICANT CHANGE UP
BUN SERPL-MCNC: 28 MG/DL — HIGH (ref 7–23)
CALCIUM SERPL-MCNC: 8.1 MG/DL — LOW (ref 8.4–10.5)
CHLORIDE SERPL-SCNC: 98 MMOL/L — SIGNIFICANT CHANGE UP (ref 98–107)
CO2 SERPL-SCNC: 32 MMOL/L — HIGH (ref 22–31)
COLOR SPEC: SIGNIFICANT CHANGE UP
CREAT SERPL-MCNC: 0.92 MG/DL — SIGNIFICANT CHANGE UP (ref 0.5–1.3)
GLUCOSE BLDC GLUCOMTR-MCNC: 187 MG/DL — HIGH (ref 70–99)
GLUCOSE BLDC GLUCOMTR-MCNC: 198 MG/DL — HIGH (ref 70–99)
GLUCOSE BLDC GLUCOMTR-MCNC: 204 MG/DL — HIGH (ref 70–99)
GLUCOSE BLDC GLUCOMTR-MCNC: 211 MG/DL — HIGH (ref 70–99)
GLUCOSE SERPL-MCNC: 169 MG/DL — HIGH (ref 70–99)
GLUCOSE UR-MCNC: NEGATIVE — SIGNIFICANT CHANGE UP
HCT VFR BLD CALC: 29.8 % — LOW (ref 34.5–45)
HGB BLD-MCNC: 9.2 G/DL — LOW (ref 11.5–15.5)
HYALINE CASTS # UR AUTO: NEGATIVE — SIGNIFICANT CHANGE UP
KETONES UR-MCNC: NEGATIVE — SIGNIFICANT CHANGE UP
LEUKOCYTE ESTERASE UR-ACNC: SIGNIFICANT CHANGE UP
MAGNESIUM SERPL-MCNC: 2.1 MG/DL — SIGNIFICANT CHANGE UP (ref 1.6–2.6)
MCHC RBC-ENTMCNC: 30.9 % — LOW (ref 32–36)
MCHC RBC-ENTMCNC: 31.4 PG — SIGNIFICANT CHANGE UP (ref 27–34)
MCV RBC AUTO: 101.7 FL — HIGH (ref 80–100)
NITRITE UR-MCNC: NEGATIVE — SIGNIFICANT CHANGE UP
NRBC # FLD: 0 K/UL — SIGNIFICANT CHANGE UP (ref 0–0)
PH UR: 5 — SIGNIFICANT CHANGE UP (ref 5–8)
PHOSPHATE SERPL-MCNC: 2.6 MG/DL — SIGNIFICANT CHANGE UP (ref 2.5–4.5)
PLATELET # BLD AUTO: 408 K/UL — HIGH (ref 150–400)
PMV BLD: 9.1 FL — SIGNIFICANT CHANGE UP (ref 7–13)
POTASSIUM SERPL-MCNC: 3.7 MMOL/L — SIGNIFICANT CHANGE UP (ref 3.5–5.3)
POTASSIUM SERPL-SCNC: 3.7 MMOL/L — SIGNIFICANT CHANGE UP (ref 3.5–5.3)
PROT UR-MCNC: 10 — SIGNIFICANT CHANGE UP
RBC # BLD: 2.93 M/UL — LOW (ref 3.8–5.2)
RBC # FLD: 13.3 % — SIGNIFICANT CHANGE UP (ref 10.3–14.5)
RBC CASTS # UR COMP ASSIST: SIGNIFICANT CHANGE UP (ref 0–?)
SODIUM SERPL-SCNC: 140 MMOL/L — SIGNIFICANT CHANGE UP (ref 135–145)
SP GR SPEC: 1.01 — SIGNIFICANT CHANGE UP (ref 1–1.04)
SQUAMOUS # UR AUTO: SIGNIFICANT CHANGE UP
UROBILINOGEN FLD QL: NORMAL — SIGNIFICANT CHANGE UP
WBC # BLD: 12.69 K/UL — HIGH (ref 3.8–10.5)
WBC # FLD AUTO: 12.69 K/UL — HIGH (ref 3.8–10.5)
WBC UR QL: HIGH (ref 0–?)

## 2019-10-02 PROCEDURE — 71045 X-RAY EXAM CHEST 1 VIEW: CPT | Mod: 26

## 2019-10-02 PROCEDURE — 99233 SBSQ HOSP IP/OBS HIGH 50: CPT

## 2019-10-02 RX ORDER — INSULIN GLARGINE 100 [IU]/ML
10 INJECTION, SOLUTION SUBCUTANEOUS AT BEDTIME
Refills: 0 | Status: DISCONTINUED | OUTPATIENT
Start: 2019-10-02 | End: 2019-10-08

## 2019-10-02 RX ORDER — INSULIN LISPRO 100/ML
3 VIAL (ML) SUBCUTANEOUS
Refills: 0 | Status: DISCONTINUED | OUTPATIENT
Start: 2019-10-02 | End: 2019-10-08

## 2019-10-02 RX ORDER — BENZOCAINE AND MENTHOL 5; 1 G/100ML; G/100ML
1 LIQUID ORAL EVERY 6 HOURS
Refills: 0 | Status: DISCONTINUED | OUTPATIENT
Start: 2019-10-02 | End: 2019-10-08

## 2019-10-02 RX ORDER — ALBUTEROL 90 UG/1
2.5 AEROSOL, METERED ORAL EVERY 6 HOURS
Refills: 0 | Status: DISCONTINUED | OUTPATIENT
Start: 2019-10-02 | End: 2019-10-07

## 2019-10-02 RX ORDER — PANTOPRAZOLE SODIUM 20 MG/1
40 TABLET, DELAYED RELEASE ORAL
Refills: 0 | Status: DISCONTINUED | OUTPATIENT
Start: 2019-10-02 | End: 2019-10-08

## 2019-10-02 RX ORDER — TIOTROPIUM BROMIDE 18 UG/1
1 CAPSULE ORAL; RESPIRATORY (INHALATION)
Qty: 0 | Refills: 0 | DISCHARGE
Start: 2019-10-02

## 2019-10-02 RX ORDER — PANTOPRAZOLE SODIUM 20 MG/1
1 TABLET, DELAYED RELEASE ORAL
Qty: 0 | Refills: 0 | DISCHARGE
Start: 2019-10-02

## 2019-10-02 RX ORDER — LEVOTHYROXINE SODIUM 125 MCG
1 TABLET ORAL
Qty: 0 | Refills: 0 | DISCHARGE
Start: 2019-10-02

## 2019-10-02 RX ORDER — LEVOTHYROXINE SODIUM 125 MCG
88 TABLET ORAL DAILY
Refills: 0 | Status: DISCONTINUED | OUTPATIENT
Start: 2019-10-02 | End: 2019-10-08

## 2019-10-02 RX ORDER — CEFUROXIME AXETIL 250 MG
250 TABLET ORAL EVERY 12 HOURS
Refills: 0 | Status: COMPLETED | OUTPATIENT
Start: 2019-10-02 | End: 2019-10-05

## 2019-10-02 RX ORDER — ASPIRIN/CALCIUM CARB/MAGNESIUM 324 MG
325 TABLET ORAL
Refills: 0 | Status: DISCONTINUED | OUTPATIENT
Start: 2019-10-02 | End: 2019-10-08

## 2019-10-02 RX ADMIN — BUDESONIDE AND FORMOTEROL FUMARATE DIHYDRATE 2 PUFF(S): 160; 4.5 AEROSOL RESPIRATORY (INHALATION) at 10:07

## 2019-10-02 RX ADMIN — INSULIN GLARGINE 10 UNIT(S): 100 INJECTION, SOLUTION SUBCUTANEOUS at 22:10

## 2019-10-02 RX ADMIN — Medication 2: at 08:00

## 2019-10-02 RX ADMIN — Medication 3 UNIT(S): at 17:12

## 2019-10-02 RX ADMIN — Medication 40 MILLIGRAM(S): at 17:13

## 2019-10-02 RX ADMIN — Medication 50 MILLIGRAM(S): at 11:11

## 2019-10-02 RX ADMIN — BUDESONIDE AND FORMOTEROL FUMARATE DIHYDRATE 2 PUFF(S): 160; 4.5 AEROSOL RESPIRATORY (INHALATION) at 20:55

## 2019-10-02 RX ADMIN — Medication 2: at 17:12

## 2019-10-02 RX ADMIN — Medication 325 MILLIGRAM(S): at 17:12

## 2019-10-02 RX ADMIN — Medication 4: at 22:10

## 2019-10-02 RX ADMIN — Medication 4: at 12:04

## 2019-10-02 RX ADMIN — ALBUTEROL 2.5 MILLIGRAM(S): 90 AEROSOL, METERED ORAL at 21:28

## 2019-10-02 RX ADMIN — QUETIAPINE FUMARATE 25 MILLIGRAM(S): 200 TABLET, FILM COATED ORAL at 22:10

## 2019-10-02 RX ADMIN — Medication 50 MILLIGRAM(S): at 04:54

## 2019-10-02 RX ADMIN — Medication 250 MILLIGRAM(S): at 17:12

## 2019-10-02 RX ADMIN — HUMAN INSULIN 3 UNIT(S): 100 INJECTION, SUSPENSION SUBCUTANEOUS at 07:59

## 2019-10-02 NOTE — PROGRESS NOTE ADULT - SUBJECTIVE AND OBJECTIVE BOX
Patient is a 84y old  Female who presents with a chief complaint of left hip fracture s/p fall 1D (02 Oct 2019 06:04)      SUBJECTIVE / OVERNIGHT EVENTS:    MEDICATIONS  (STANDING):  buDESOnide  80 MICROgram(s)/formoterol 4.5 MICROgram(s) Inhaler 2 Puff(s) Inhalation two times a day  cefuroxime   Tablet 250 milliGRAM(s) Oral every 12 hours  dextrose 50% Injectable 25 Gram(s) IV Push once  enoxaparin Injectable 40 milliGRAM(s) SubCutaneous daily  famotidine Injectable 20 milliGRAM(s) IV Push daily  hydrocortisone sodium succinate Injectable 50 milliGRAM(s) IV Push every 6 hours  insulin lispro (HumaLOG) corrective regimen sliding scale   SubCutaneous Before meals and at bedtime  insulin NPH human recombinant 3 Unit(s) SubCutaneous every 8 hours  levothyroxine Injectable 44 MICROGram(s) IV Push at bedtime  QUEtiapine 25 milliGRAM(s) Oral at bedtime  tiotropium 18 MICROgram(s) Capsule 1 Capsule(s) Inhalation daily    MEDICATIONS  (PRN):  acetaminophen    Suspension .. 650 milliGRAM(s) Oral every 6 hours PRN Mild Pain (1 - 3)  ALBUTerol    90 MICROgram(s) HFA Inhaler 2 Puff(s) Inhalation every 6 hours PRN Bronchospasm  glucagon  Injectable 1 milliGRAM(s) IntraMuscular once PRN Glucose LESS THAN 70 milligrams/deciliter  haloperidol    Injectable 2.5 milliGRAM(s) IV Push every 6 hours PRN agitation      Vital Signs Last 24 Hrs  T(C): 37.2 (02 Oct 2019 10:03), Max: 37.2 (02 Oct 2019 10:03)  T(F): 98.9 (02 Oct 2019 10:03), Max: 98.9 (02 Oct 2019 10:03)  HR: 70 (02 Oct 2019 10:03) (68 - 92)  BP: 163/66 (02 Oct 2019 10:03) (114/98 - 163/66)  BP(mean): 70 (01 Oct 2019 18:00) (54 - 102)  RR: 24 (02 Oct 2019 10:03) (19 - 27)  SpO2: 90% (02 Oct 2019 10:03) (86% - 94%)  CAPILLARY BLOOD GLUCOSE      POCT Blood Glucose.: 187 mg/dL (02 Oct 2019 07:55)  POCT Blood Glucose.: 121 mg/dL (01 Oct 2019 22:13)  POCT Blood Glucose.: 234 mg/dL (01 Oct 2019 16:33)  POCT Blood Glucose.: 238 mg/dL (01 Oct 2019 12:12)    I&O's Summary    01 Oct 2019 07:01  -  02 Oct 2019 07:00  --------------------------------------------------------  IN: 0 mL / OUT: 1000 mL / NET: -1000 mL    02 Oct 2019 07:01  -  02 Oct 2019 11:14  --------------------------------------------------------  IN: 0 mL / OUT: 460 mL / NET: -460 mL        PHYSICAL EXAM:  GENERAL: NAD, well-developed  HEAD:  Atraumatic, Normocephalic  EYES: EOMI, PERRLA, conjunctiva and sclera clear  NECK: Supple, No JVD  CHEST/LUNG: Clear to auscultation bilaterally; No wheeze  HEART: Regular rate and rhythm; No murmurs, rubs, or gallops  ABDOMEN: Soft, Nontender, Nondistended; Bowel sounds present  EXTREMITIES:  2+ Peripheral Pulses, No clubbing, cyanosis, or edema  PSYCH: AAOx3  NEUROLOGY: non-focal  SKIN: No rashes or lesions    LABS:                        9.2    12.69 )-----------( 408      ( 02 Oct 2019 05:02 )             29.8     10-02    140  |  98  |  28<H>  ----------------------------<  169<H>  3.7   |  32<H>  |  0.92    Ca    8.1<L>      02 Oct 2019 05:02  Phos  2.6     10-02  Mg     2.1     10-02      PT/INR - ( 30 Sep 2019 13:57 )   PT: 12.6 SEC;   INR: 1.10                Urinalysis Basic - ( 02 Oct 2019 07:12 )    Color: LIGHT YELLOW / Appearance: CLEAR / S.012 / pH: 5.0  Gluc: NEGATIVE / Ketone: NEGATIVE  / Bili: NEGATIVE / Urobili: NORMAL   Blood: TRACE / Protein: 10 / Nitrite: NEGATIVE   Leuk Esterase: MODERATE / RBC: 0-2 / WBC 26-50   Sq Epi: OCC / Non Sq Epi: x / Bacteria: MANY        RADIOLOGY & ADDITIONAL TESTS:    Imaging Personally Reviewed: < from: Xray Chest 1 View- PORTABLE-Routine (10.02.19 @ 08:17) >  IMPRESSION: Bilateral pleural effusions with associated atelectasis   without significant interval change from 2019.    < end of copied text >      Consultant(s) Notes Reviewed:      Care Discussed with Consultants/Other Providers: Ortho Patient is a 84y old  Female who presents with a chief complaint of left hip fracture s/p fall 1D (02 Oct 2019 06:04)      SUBJECTIVE / OVERNIGHT EVENTS:  Patient has no new complaints. She is SOB at baseline. Denies cp, abdominal pain, N/V/D     MEDICATIONS  (STANDING):  buDESOnide  80 MICROgram(s)/formoterol 4.5 MICROgram(s) Inhaler 2 Puff(s) Inhalation two times a day  cefuroxime   Tablet 250 milliGRAM(s) Oral every 12 hours  dextrose 50% Injectable 25 Gram(s) IV Push once  enoxaparin Injectable 40 milliGRAM(s) SubCutaneous daily  famotidine Injectable 20 milliGRAM(s) IV Push daily  hydrocortisone sodium succinate Injectable 50 milliGRAM(s) IV Push every 6 hours  insulin lispro (HumaLOG) corrective regimen sliding scale   SubCutaneous Before meals and at bedtime  insulin NPH human recombinant 3 Unit(s) SubCutaneous every 8 hours  levothyroxine Injectable 44 MICROGram(s) IV Push at bedtime  QUEtiapine 25 milliGRAM(s) Oral at bedtime  tiotropium 18 MICROgram(s) Capsule 1 Capsule(s) Inhalation daily    MEDICATIONS  (PRN):  acetaminophen    Suspension .. 650 milliGRAM(s) Oral every 6 hours PRN Mild Pain (1 - 3)  ALBUTerol    90 MICROgram(s) HFA Inhaler 2 Puff(s) Inhalation every 6 hours PRN Bronchospasm  glucagon  Injectable 1 milliGRAM(s) IntraMuscular once PRN Glucose LESS THAN 70 milligrams/deciliter  haloperidol    Injectable 2.5 milliGRAM(s) IV Push every 6 hours PRN agitation      Vital Signs Last 24 Hrs  T(C): 37.2 (02 Oct 2019 10:03), Max: 37.2 (02 Oct 2019 10:03)  T(F): 98.9 (02 Oct 2019 10:03), Max: 98.9 (02 Oct 2019 10:03)  HR: 70 (02 Oct 2019 10:03) (68 - 92)  BP: 163/66 (02 Oct 2019 10:03) (114/98 - 163/66)  BP(mean): 70 (01 Oct 2019 18:00) (54 - 102)  RR: 24 (02 Oct 2019 10:03) (19 - 27)  SpO2: 90% (02 Oct 2019 10:03) (86% - 94%)  CAPILLARY BLOOD GLUCOSE      POCT Blood Glucose.: 187 mg/dL (02 Oct 2019 07:55)  POCT Blood Glucose.: 121 mg/dL (01 Oct 2019 22:13)  POCT Blood Glucose.: 234 mg/dL (01 Oct 2019 16:33)  POCT Blood Glucose.: 238 mg/dL (01 Oct 2019 12:12)    I&O's Summary    01 Oct 2019 07:  -  02 Oct 2019 07:00  --------------------------------------------------------  IN: 0 mL / OUT: 1000 mL / NET: -1000 mL    02 Oct 2019 07:01  -  02 Oct 2019 11:14  --------------------------------------------------------  IN: 0 mL / OUT: 460 mL / NET: -460 mL        PHYSICAL EXAM:  GENERAL: NAD, well-developed  HEAD:  Atraumatic, Normocephalic  EYES: EOMI, PERRLA, conjunctiva and sclera clear  NECK: Supple, No JVD  CHEST/LUNG: decreased BS bilaterally; No wheeze  HEART: Regular rate and rhythm; No murmurs, rubs, or gallops  ABDOMEN: Soft, Nontender, Nondistended; Bowel sounds present  EXTREMITIES:  2+ Peripheral Pulses, No clubbing, cyanosis, or edema  PSYCH: AAOx3  NEUROLOGY: non-focal  SKIN: No rashes or lesions    LABS:                        9.2    12.69 )-----------( 408      ( 02 Oct 2019 05:02 )             29.8     10-02    140  |  98  |  28<H>  ----------------------------<  169<H>  3.7   |  32<H>  |  0.92    Ca    8.1<L>      02 Oct 2019 05:02  Phos  2.6     10-02  Mg     2.1     10-02      PT/INR - ( 30 Sep 2019 13:57 )   PT: 12.6 SEC;   INR: 1.10                Urinalysis Basic - ( 02 Oct 2019 07:12 )    Color: LIGHT YELLOW / Appearance: CLEAR / S.012 / pH: 5.0  Gluc: NEGATIVE / Ketone: NEGATIVE  / Bili: NEGATIVE / Urobili: NORMAL   Blood: TRACE / Protein: 10 / Nitrite: NEGATIVE   Leuk Esterase: MODERATE / RBC: 0-2 / WBC 26-50   Sq Epi: OCC / Non Sq Epi: x / Bacteria: MANY        RADIOLOGY & ADDITIONAL TESTS:    Imaging Personally Reviewed: < from: Xray Chest 1 View- PORTABLE-Routine (10.02.19 @ 08:17) >  IMPRESSION: Bilateral pleural effusions with associated atelectasis   without significant interval change from 2019.    < end of copied text >      Consultant(s) Notes Reviewed:      Care Discussed with Consultants/Other Providers: Ortho

## 2019-10-02 NOTE — PROGRESS NOTE ADULT - PROBLEM SELECTOR PROBLEM 3
Chronic obstructive pulmonary disease, unspecified COPD type CKD (chronic kidney disease), stage III

## 2019-10-02 NOTE — PROGRESS NOTE ADULT - ASSESSMENT
85 yo female w/ hx COPD c/b chronic respiratory failure on 3LNC at home, HTN, DM2, Hypothyroidism, CKD stage 3 p/w mechanical fall found to have L femoral neck fracture, s/p left hip hemiarthroplasty, POD 12. Hospital course c/b acute hypercarbic respiratory failure requiring intubation,  IV steroids, and SICU stay.

## 2019-10-02 NOTE — PROGRESS NOTE ADULT - PROBLEM SELECTOR PLAN 2
- now extubated on bipap at night-  - taper steroids  - incentive spirometry  -cont O2 via NC to maintain Spo2 88-92.  -continue nebs - now extubated on bipap at night  -will need to check nocturnal pulse Ox tonight off Bipap to see if patient needs Bipap at night. Patient would rather not be on bipap.   - Patient is on Solucortef 50mg IV q6hr x 6 days.; Will d/c solucortef  - encouraged incentive spirometry  -cont O2 with 3LNC to maintain Spo2 88-92. Patient does desat to low 80s with ambulation as per PT.   -c/w spiriva, budesonide - now extubated on bipap at night  -will need to check nocturnal pulse Ox tonight off Bipap to see if patient needs Bipap at night. Patient would rather not be on bipap.   - Patient is on Solucortef 50mg IV q6hr x 6 days.; Will d/c solucortef and start Prednisone taper.   - encouraged incentive spirometry  -cont O2 with 3LNC to maintain Spo2 88-92. Patient does desat to low 80s with ambulation as per PT.   -c/w spiriva, budesonide

## 2019-10-02 NOTE — PROGRESS NOTE ADULT - SUBJECTIVE AND OBJECTIVE BOX
Ortho       Patient is seen and examined at bedside.  Patient transferred out of SICU back to  yesterday evening. Pain well controlled at moment. Denies SOB, chest pain     Vital Signs Last 24 Hrs  Vital Signs Last 24 Hrs  T(C): 36.3 (02 Oct 2019 04:52), Max: 37.2 (01 Oct 2019 08:00)  T(F): 97.4 (02 Oct 2019 04:52), Max: 98.9 (01 Oct 2019 08:00)  HR: 71 (02 Oct 2019 04:52) (51 - 92)  BP: 156/58 (02 Oct 2019 04:52) (114/98 - 160/70)  BP(mean): 70 (01 Oct 2019 18:00) (54 - 104)  RR: 24 (02 Oct 2019 04:52) (16 - 27)  SpO2: 92% (02 Oct 2019 04:52) (86% - 99%)    Gen: NAD    LLE: Dressing C/D/I.   Motor grossly intact distal + EHL/FHL/ TA/ GS. Sensation is grossly intact to light touch distal. Compartments are soft. Extremity  warm.    Labs:                        8.0    7.76  )-----------( 354      ( 01 Oct 2019 00:34 )             26.5     10-01    144  |  101  |  32<H>  ----------------------------<  200<H>  3.2<L>   |  32<H>  |  1.04    Ca    8.0<L>      01 Oct 2019 00:34  Phos  2.6     10-01  Mg     2.1     10-01        A/P: Patient is a 84y y/o Female s/p , POD # 12  - cont current meds for COPD  - FU medicine recs  - Pain control / Analgesia  - Inc Spirometry   - Venodynes  - DVT Prophylaxis - lovenox  - PT / OT  - WBAT / OOB

## 2019-10-02 NOTE — PROGRESS NOTE ADULT - PROBLEM SELECTOR PLAN 5
stable, FS 100s  -hold metformin  -cont lispro ss. stable  -cont to monitor off meds UA positive  s/p correa  F/U urine Cx  start ceftin 250mg bid x 3 days

## 2019-10-02 NOTE — PROGRESS NOTE ADULT - PROBLEM SELECTOR PLAN 4
Stable  -cont to monitor. stable, FS 100s  -hold metformin  -cont lispro ss. FS elevated  -hold home metformin  -d/c NPH insulin  - start Humalog 3 Units qAC and Lantus 10 Units qHs

## 2019-10-02 NOTE — PROGRESS NOTE ADULT - PROBLEM SELECTOR PLAN 6
stable  -cont to monitor off meds stable  -cont synthroid. BPs elevated  -cont to monitor off meds BPs elevated; most likely steroid induced. Will taper off steroids.   -cont to monitor off meds

## 2019-10-02 NOTE — PROGRESS NOTE ADULT - PROBLEM SELECTOR PROBLEM 4
CKD (chronic kidney disease), stage III Type 2 diabetes mellitus with other neurologic complication, without long-term current use of insulin

## 2019-10-02 NOTE — PROGRESS NOTE ADULT - PROBLEM SELECTOR PROBLEM 2
Chronic respiratory failure with hypoxia, on home O2 therapy Acute on chronic respiratory failure with hypoxia

## 2019-10-02 NOTE — PROGRESS NOTE ADULT - PROBLEM SELECTOR PROBLEM 5
Type 2 diabetes mellitus with other neurologic complication, without long-term current use of insulin Essential hypertension Acute cystitis without hematuria

## 2019-10-03 LAB
BACTERIA UR CULT: SIGNIFICANT CHANGE UP
BASE EXCESS BLDA CALC-SCNC: 14.8 MMOL/L — SIGNIFICANT CHANGE UP
GLUCOSE BLDC GLUCOMTR-MCNC: 149 MG/DL — HIGH (ref 70–99)
GLUCOSE BLDC GLUCOMTR-MCNC: 185 MG/DL — HIGH (ref 70–99)
GLUCOSE BLDC GLUCOMTR-MCNC: 232 MG/DL — HIGH (ref 70–99)
GLUCOSE BLDC GLUCOMTR-MCNC: 245 MG/DL — HIGH (ref 70–99)
HCO3 BLDA-SCNC: 38 MMOL/L — HIGH (ref 22–26)
PCO2 BLDA: 57 MMHG — HIGH (ref 32–48)
PH BLDA: 7.46 PH — HIGH (ref 7.35–7.45)
PO2 BLDA: 53 MMHG — LOW (ref 83–108)
SAO2 % BLDA: 88.8 % — LOW (ref 95–99)
SPECIMEN SOURCE: SIGNIFICANT CHANGE UP

## 2019-10-03 PROCEDURE — 99233 SBSQ HOSP IP/OBS HIGH 50: CPT

## 2019-10-03 PROCEDURE — 99233 SBSQ HOSP IP/OBS HIGH 50: CPT | Mod: GC

## 2019-10-03 RX ORDER — TIOTROPIUM BROMIDE 18 UG/1
1 CAPSULE ORAL; RESPIRATORY (INHALATION) DAILY
Refills: 0 | Status: DISCONTINUED | OUTPATIENT
Start: 2019-10-03 | End: 2019-10-08

## 2019-10-03 RX ORDER — ALBUTEROL 90 UG/1
1 AEROSOL, METERED ORAL EVERY 4 HOURS
Refills: 0 | Status: DISCONTINUED | OUTPATIENT
Start: 2019-10-03 | End: 2019-10-08

## 2019-10-03 RX ORDER — OXYCODONE HYDROCHLORIDE 5 MG/1
1 TABLET ORAL
Qty: 30 | Refills: 0
Start: 2019-10-03 | End: 2019-10-07

## 2019-10-03 RX ORDER — FUROSEMIDE 40 MG
20 TABLET ORAL DAILY
Refills: 0 | Status: DISCONTINUED | OUTPATIENT
Start: 2019-10-03 | End: 2019-10-06

## 2019-10-03 RX ORDER — IPRATROPIUM/ALBUTEROL SULFATE 18-103MCG
3 AEROSOL WITH ADAPTER (GRAM) INHALATION EVERY 6 HOURS
Refills: 0 | Status: DISCONTINUED | OUTPATIENT
Start: 2019-10-03 | End: 2019-10-07

## 2019-10-03 RX ADMIN — BUDESONIDE AND FORMOTEROL FUMARATE DIHYDRATE 2 PUFF(S): 160; 4.5 AEROSOL RESPIRATORY (INHALATION) at 21:28

## 2019-10-03 RX ADMIN — PANTOPRAZOLE SODIUM 40 MILLIGRAM(S): 20 TABLET, DELAYED RELEASE ORAL at 06:04

## 2019-10-03 RX ADMIN — QUETIAPINE FUMARATE 25 MILLIGRAM(S): 200 TABLET, FILM COATED ORAL at 21:28

## 2019-10-03 RX ADMIN — Medication 4: at 17:22

## 2019-10-03 RX ADMIN — Medication 3 UNIT(S): at 17:22

## 2019-10-03 RX ADMIN — Medication 250 MILLIGRAM(S): at 17:21

## 2019-10-03 RX ADMIN — Medication 2: at 07:53

## 2019-10-03 RX ADMIN — Medication 3 UNIT(S): at 07:52

## 2019-10-03 RX ADMIN — ALBUTEROL 2.5 MILLIGRAM(S): 90 AEROSOL, METERED ORAL at 10:15

## 2019-10-03 RX ADMIN — Medication 250 MILLIGRAM(S): at 06:04

## 2019-10-03 RX ADMIN — Medication 20 MILLIGRAM(S): at 17:21

## 2019-10-03 RX ADMIN — Medication 325 MILLIGRAM(S): at 06:04

## 2019-10-03 RX ADMIN — Medication 3 UNIT(S): at 12:33

## 2019-10-03 RX ADMIN — Medication 88 MICROGRAM(S): at 06:04

## 2019-10-03 RX ADMIN — TIOTROPIUM BROMIDE 1 CAPSULE(S): 18 CAPSULE ORAL; RESPIRATORY (INHALATION) at 17:21

## 2019-10-03 RX ADMIN — Medication 325 MILLIGRAM(S): at 17:21

## 2019-10-03 RX ADMIN — INSULIN GLARGINE 10 UNIT(S): 100 INJECTION, SOLUTION SUBCUTANEOUS at 21:28

## 2019-10-03 RX ADMIN — Medication 40 MILLIGRAM(S): at 06:04

## 2019-10-03 RX ADMIN — BUDESONIDE AND FORMOTEROL FUMARATE DIHYDRATE 2 PUFF(S): 160; 4.5 AEROSOL RESPIRATORY (INHALATION) at 09:39

## 2019-10-03 RX ADMIN — Medication 4: at 12:33

## 2019-10-03 NOTE — PROGRESS NOTE ADULT - SUBJECTIVE AND OBJECTIVE BOX
Patient is a 84y old  Female who presents with a chief complaint of left hip fracture s/p fall 1D (03 Oct 2019 06:20)      SUBJECTIVE / OVERNIGHT EVENTS:  Patient has no new complaints. Nursing reports she was slightly confused last night with O2 sat in low 80s on 3LNC while sleeping. Denies cp, SOB, abdominal pain, N/V/D     MEDICATIONS  (STANDING):  aspirin enteric coated 325 milliGRAM(s) Oral two times a day  buDESOnide  80 MICROgram(s)/formoterol 4.5 MICROgram(s) Inhaler 2 Puff(s) Inhalation two times a day  cefuroxime   Tablet 250 milliGRAM(s) Oral every 12 hours  dextrose 50% Injectable 25 Gram(s) IV Push once  insulin glargine Injectable (LANTUS) 10 Unit(s) SubCutaneous at bedtime  insulin lispro (HumaLOG) corrective regimen sliding scale   SubCutaneous Before meals and at bedtime  insulin lispro Injectable (HumaLOG) 3 Unit(s) SubCutaneous three times a day before meals  levothyroxine 88 MICROGram(s) Oral daily  pantoprazole    Tablet 40 milliGRAM(s) Oral before breakfast  predniSONE   Tablet   Oral   QUEtiapine 25 milliGRAM(s) Oral at bedtime  tiotropium 18 MICROgram(s) Capsule 1 Capsule(s) Inhalation daily    MEDICATIONS  (PRN):  acetaminophen    Suspension .. 650 milliGRAM(s) Oral every 6 hours PRN Mild Pain (1 - 3)  ALBUTerol    0.083% 2.5 milliGRAM(s) Nebulizer every 6 hours PRN Shortness of Breath and/or Wheezing  ALBUTerol    90 MICROgram(s) HFA Inhaler 2 Puff(s) Inhalation every 6 hours PRN Bronchospasm  benzocaine 15 mG/menthol 3.6 mG (Sugar-Free) Lozenge 1 Lozenge Oral every 6 hours PRN Sore Throat  glucagon  Injectable 1 milliGRAM(s) IntraMuscular once PRN Glucose LESS THAN 70 milligrams/deciliter  haloperidol    Injectable 2.5 milliGRAM(s) IV Push every 6 hours PRN agitation      Vital Signs Last 24 Hrs  T(C): 36.4 (03 Oct 2019 09:34), Max: 37 (02 Oct 2019 22:00)  T(F): 97.5 (03 Oct 2019 09:34), Max: 98.6 (02 Oct 2019 22:00)  HR: 73 (03 Oct 2019 10:16) (65 - 85)  BP: 151/69 (03 Oct 2019 09:34) (147/54 - 163/57)  BP(mean): --  RR: 24 (03 Oct 2019 09:34) (18 - 24)  SpO2: 88% (03 Oct 2019 09:34) (86% - 95%)  CAPILLARY BLOOD GLUCOSE      POCT Blood Glucose.: 185 mg/dL (03 Oct 2019 07:47)  POCT Blood Glucose.: 204 mg/dL (02 Oct 2019 21:35)  POCT Blood Glucose.: 198 mg/dL (02 Oct 2019 16:52)  POCT Blood Glucose.: 211 mg/dL (02 Oct 2019 11:46)    I&O's Summary    02 Oct 2019 07:01  -  03 Oct 2019 07:00  --------------------------------------------------------  IN: 0 mL / OUT: 1960 mL / NET: -1960 mL        PHYSICAL EXAM:  GENERAL: NAD, well-developed  HEAD:  Atraumatic, Normocephalic  EYES: EOMI, PERRLA, conjunctiva and sclera clear  NECK: Supple, No JVD  CHEST/LUNG: decreased BS bilaterally; No wheeze  HEART: Regular rate and rhythm; No murmurs, rubs, or gallops  ABDOMEN: Soft, Nontender, Nondistended; Bowel sounds present  EXTREMITIES:  2+ Peripheral Pulses, No clubbing, cyanosis, or edema  PSYCH: AAOx3  NEUROLOGY: non-focal  SKIN: No rashes or lesions    LABS:                        9.2    12.69 )-----------( 408      ( 02 Oct 2019 05:02 )             29.8     10-02    140  |  98  |  28<H>  ----------------------------<  169<H>  3.7   |  32<H>  |  0.92    Ca    8.1<L>      02 Oct 2019 05:02  Phos  2.6     10-02  Mg     2.1     10-02            Urinalysis Basic - ( 02 Oct 2019 07:12 )    Color: LIGHT YELLOW / Appearance: CLEAR / S.012 / pH: 5.0  Gluc: NEGATIVE / Ketone: NEGATIVE  / Bili: NEGATIVE / Urobili: NORMAL   Blood: TRACE / Protein: 10 / Nitrite: NEGATIVE   Leuk Esterase: MODERATE / RBC: 0-2 / WBC 26-50   Sq Epi: OCC / Non Sq Epi: x / Bacteria: MANY        RADIOLOGY & ADDITIONAL TESTS:    Imaging Personally Reviewed:    Consultant(s) Notes Reviewed:      Care Discussed with Consultants/Other Providers: Ortho Patient is a 84y old  Female who presents with a chief complaint of left hip fracture s/p fall 1D (03 Oct 2019 06:20)      SUBJECTIVE / OVERNIGHT EVENTS:  Patient has no new complaints. Nursing reports she was slightly confused last night with O2 sat in low 80s on 3LNC while sleeping. Denies cp, SOB, abdominal pain, N/V/D     MEDICATIONS  (STANDING):  aspirin enteric coated 325 milliGRAM(s) Oral two times a day  buDESOnide  80 MICROgram(s)/formoterol 4.5 MICROgram(s) Inhaler 2 Puff(s) Inhalation two times a day  cefuroxime   Tablet 250 milliGRAM(s) Oral every 12 hours  dextrose 50% Injectable 25 Gram(s) IV Push once  insulin glargine Injectable (LANTUS) 10 Unit(s) SubCutaneous at bedtime  insulin lispro (HumaLOG) corrective regimen sliding scale   SubCutaneous Before meals and at bedtime  insulin lispro Injectable (HumaLOG) 3 Unit(s) SubCutaneous three times a day before meals  levothyroxine 88 MICROGram(s) Oral daily  pantoprazole    Tablet 40 milliGRAM(s) Oral before breakfast  predniSONE   Tablet   Oral   QUEtiapine 25 milliGRAM(s) Oral at bedtime  tiotropium 18 MICROgram(s) Capsule 1 Capsule(s) Inhalation daily    MEDICATIONS  (PRN):  acetaminophen    Suspension .. 650 milliGRAM(s) Oral every 6 hours PRN Mild Pain (1 - 3)  ALBUTerol    0.083% 2.5 milliGRAM(s) Nebulizer every 6 hours PRN Shortness of Breath and/or Wheezing  ALBUTerol    90 MICROgram(s) HFA Inhaler 2 Puff(s) Inhalation every 6 hours PRN Bronchospasm  benzocaine 15 mG/menthol 3.6 mG (Sugar-Free) Lozenge 1 Lozenge Oral every 6 hours PRN Sore Throat  glucagon  Injectable 1 milliGRAM(s) IntraMuscular once PRN Glucose LESS THAN 70 milligrams/deciliter  haloperidol    Injectable 2.5 milliGRAM(s) IV Push every 6 hours PRN agitation      Vital Signs Last 24 Hrs  T(C): 36.4 (03 Oct 2019 09:34), Max: 37 (02 Oct 2019 22:00)  T(F): 97.5 (03 Oct 2019 09:34), Max: 98.6 (02 Oct 2019 22:00)  HR: 73 (03 Oct 2019 10:16) (65 - 85)  BP: 151/69 (03 Oct 2019 09:34) (147/54 - 163/57)  BP(mean): --  RR: 24 (03 Oct 2019 09:34) (18 - 24)  SpO2: 88% (03 Oct 2019 09:34) (86% - 95%)  CAPILLARY BLOOD GLUCOSE      POCT Blood Glucose.: 185 mg/dL (03 Oct 2019 07:47)  POCT Blood Glucose.: 204 mg/dL (02 Oct 2019 21:35)  POCT Blood Glucose.: 198 mg/dL (02 Oct 2019 16:52)  POCT Blood Glucose.: 211 mg/dL (02 Oct 2019 11:46)    I&O's Summary    02 Oct 2019 07:01  -  03 Oct 2019 07:00  --------------------------------------------------------  IN: 0 mL / OUT: 1960 mL / NET: -1960 mL        PHYSICAL EXAM:  GENERAL: NAD, well-developed  HEAD:  Atraumatic, Normocephalic  EYES: EOMI, PERRLA, conjunctiva and sclera clear  NECK: Supple, No JVD  CHEST/LUNG: decreased BS bilaterally; No wheeze  HEART: Regular rate and rhythm; No murmurs, rubs, or gallops  ABDOMEN: Soft, Nontender, Nondistended; Bowel sounds present  EXTREMITIES:  2+ Peripheral Pulses, No clubbing, cyanosis, or edema  PSYCH: AAOx3  NEUROLOGY: non-focal  SKIN: No rashes or lesions    LABS:                        9.2    12.69 )-----------( 408      ( 02 Oct 2019 05:02 )             29.8     10-02    140  |  98  |  28<H>  ----------------------------<  169<H>  3.7   |  32<H>  |  0.92    Ca    8.1<L>      02 Oct 2019 05:02  Phos  2.6     10-02  Mg     2.1     10-02            Urinalysis Basic - ( 02 Oct 2019 07:12 )    Color: LIGHT YELLOW / Appearance: CLEAR / S.012 / pH: 5.0  Gluc: NEGATIVE / Ketone: NEGATIVE  / Bili: NEGATIVE / Urobili: NORMAL   Blood: TRACE / Protein: 10 / Nitrite: NEGATIVE   Leuk Esterase: MODERATE / RBC: 0-2 / WBC 26-50   Sq Epi: OCC / Non Sq Epi: x / Bacteria: MANY        RADIOLOGY & ADDITIONAL TESTS:    Imaging Personally Reviewed:    Consultant(s) Notes Reviewed:      Care Discussed with Consultants/Other Providers: Ortho    ***History Limited to due to:   [ ] Dementia   [ ] Delirium   [ ] Condition    ***Delirium screen:   Patient knows the day of the week: [x ] YES [ ] NO  Patient can state the days of the week or months of the year backwards: [ x] YES [ ] NO    ***Function:   [ ] Independent  [x ] Assistance  [ ] Total care  [ ] Non-ambulatory    ***Living Situation/home resources:  [x ] Lives alone  [ ] Lives with family  [ ] Has a home health aide    ***Adavanced Directives:  [ ] DNR  [ ] No feeding tube  [ ] MOLST in chart  [ ] MOLST completed today   [ ] Unknown    ***Projected Discharge Plan:  [ ] Home  [x ] Rehab  [ ] SNF  [ ] Other living facility    ***Discussion:  [ ] Discharge plan discussed with patient and family

## 2019-10-03 NOTE — CONSULT NOTE ADULT - SUBJECTIVE AND OBJECTIVE BOX
HPI:    PAST MEDICAL & SURGICAL HISTORY:  Chronic kidney disease (CKD)  COPD (chronic obstructive pulmonary disease)  HTN (hypertension)  Hypothyroid  DM (diabetes mellitus)  Leg fracture: right ORIF with hardware   H/O cataract: bilateral   S/P cholecystectomy      FAMILY HISTORY:  Family history of hypertension: Father  FH: type 2 diabetes mellitus: Father  FHx: rheumatoid arthritis: Mother and Brother      SOCIAL HISTORY:  Smoking: __ packs x ___ years  EtOH Use:  Marital Status:  Occupation:  Exposures:  Recent Travel:    Allergies    No Known Allergies    Intolerances        HOME MEDICATIONS:    REVIEW OF SYSTEMS:  CONSTITUTIONAL: No weakness, fevers or chills  EYES/ENT: No visual changes;  No vertigo or throat pain   NECK: No pain or stiffness  RESPIRATORY: No cough, wheezing, hemoptysis; No shortness of breath  CARDIOVASCULAR: No chest pain or palpitations  GASTROINTESTINAL: No abdominal or epigastric pain. No nausea, vomiting, or hematemesis; No diarrhea or constipation. No melena or hematochezia.  GENITOURINARY: No dysuria, frequency or hematuria  NEUROLOGICAL: No numbness or weakness  SKIN: No itching, burning, rashes, or lesions   All other review of systems is negative unless indicated above.    OBJECTIVE:  ICU Vital Signs Last 24 Hrs  T(C): 36.4 (03 Oct 2019 09:34), Max: 37 (02 Oct 2019 22:00)  T(F): 97.5 (03 Oct 2019 09:34), Max: 98.6 (02 Oct 2019 22:00)  HR: 73 (03 Oct 2019 10:16) (65 - 85)  BP: 151/69 (03 Oct 2019 09:34) (147/54 - 163/57)  BP(mean): --  ABP: --  ABP(mean): --  RR: 24 (03 Oct 2019 09:34) (18 - 24)  SpO2: 88% (03 Oct 2019 09:34) (86% - 95%)        10-02 @ 07:01  -  10-03 @ 07:00  --------------------------------------------------------  IN: 0 mL / OUT: 1960 mL / NET: -1960 mL      CAPILLARY BLOOD GLUCOSE      POCT Blood Glucose.: 245 mg/dL (03 Oct 2019 11:45)      PHYSICAL EXAM:  General: WN/WD NAD  Neurology: A&Ox3, nonfocal, MOCTEZUMA x 4  Eyes: PERRLA/ EOMI, Gross vision intact  ENT/Neck: Neck supple, trachea midline, No JVD, Gross hearing intact  Respiratory: CTA B/L, No wheezing, rales, rhonchi  CV: RRR, +S1/S2, -S3/S4, no murmurs, rubs or gallops  Abdominal: Soft, NT, ND +BS, No HSM  MSK: 5/5 strength UE/LE bilaterally  Extremities: No edema, 2+ peripheral pulses  Skin: No Rashes, Hematoma, Ecchymosis  Incisions:   Tubes:    HOSPITAL MEDICATIONS:  MEDICATIONS  (STANDING):  ALBUTerol    90 MICROgram(s) HFA Inhaler 1 Puff(s) Inhalation every 4 hours  aspirin enteric coated 325 milliGRAM(s) Oral two times a day  buDESOnide  80 MICROgram(s)/formoterol 4.5 MICROgram(s) Inhaler 2 Puff(s) Inhalation two times a day  cefuroxime   Tablet 250 milliGRAM(s) Oral every 12 hours  dextrose 50% Injectable 25 Gram(s) IV Push once  furosemide   Injectable 20 milliGRAM(s) IV Push daily  insulin glargine Injectable (LANTUS) 10 Unit(s) SubCutaneous at bedtime  insulin lispro (HumaLOG) corrective regimen sliding scale   SubCutaneous Before meals and at bedtime  insulin lispro Injectable (HumaLOG) 3 Unit(s) SubCutaneous three times a day before meals  levothyroxine 88 MICROGram(s) Oral daily  pantoprazole    Tablet 40 milliGRAM(s) Oral before breakfast  predniSONE   Tablet   Oral   QUEtiapine 25 milliGRAM(s) Oral at bedtime  tiotropium 18 MICROgram(s) Capsule 1 Capsule(s) Inhalation daily  tiotropium 18 MICROgram(s) Capsule 1 Capsule(s) Inhalation daily  tiotropium 18 MICROgram(s) Capsule 1 Capsule(s) Inhalation daily    MEDICATIONS  (PRN):  acetaminophen    Suspension .. 650 milliGRAM(s) Oral every 6 hours PRN Mild Pain (1 - 3)  ALBUTerol    0.083% 2.5 milliGRAM(s) Nebulizer every 6 hours PRN Shortness of Breath and/or Wheezing  ALBUTerol    90 MICROgram(s) HFA Inhaler 2 Puff(s) Inhalation every 6 hours PRN Bronchospasm  ALBUTerol/ipratropium for Nebulization 3 milliLiter(s) Nebulizer every 6 hours PRN Shortness of Breath  benzocaine 15 mG/menthol 3.6 mG (Sugar-Free) Lozenge 1 Lozenge Oral every 6 hours PRN Sore Throat  glucagon  Injectable 1 milliGRAM(s) IntraMuscular once PRN Glucose LESS THAN 70 milligrams/deciliter  haloperidol    Injectable 2.5 milliGRAM(s) IV Push every 6 hours PRN agitation      LABS:                        9.2    12.69 )-----------( 408      ( 02 Oct 2019 05:02 )             29.8     10-02    140  |  98  |  28<H>  ----------------------------<  169<H>  3.7   |  32<H>  |  0.92    Ca    8.1<L>      02 Oct 2019 05:02  Phos  2.6     10-02  Mg     2.1     10-02        Urinalysis Basic - ( 02 Oct 2019 07:12 )    Color: LIGHT YELLOW / Appearance: CLEAR / S.012 / pH: 5.0  Gluc: NEGATIVE / Ketone: NEGATIVE  / Bili: NEGATIVE / Urobili: NORMAL   Blood: TRACE / Protein: 10 / Nitrite: NEGATIVE   Leuk Esterase: MODERATE / RBC: 0-2 / WBC 26-50   Sq Epi: OCC / Non Sq Epi: x / Bacteria: MANY            MICROBIOLOGY:     RADIOLOGY:  [ ] Reviewed by me    Point of Care Ultrasound Findings:    PFT:    EKG: HPI:  83 yo female with PMH of HTN, Hypothyroid, DMII, CKD stage 3, COPD c/w chronic hypoxic respiratory failure on 3L home O2 present after mechanical fall c/o left hip pain found to have L femoral neck fracture, s/p left hip hemiarthroplasty (), developed hypercarbic respiratory failure on POD#5 and intubated on , extubated on . While is SICU, patient was provided Solumedrol, duonebs, 3L IV crystalloids for a hospital total of ~5.5L IVF. After extubation patient was transferred to  on 10/1/2019 and found to have hypoxemic respiratory distress, was changed to a prednisone taper, and chest imaging with bilateral small pleural effusions since . Patient states that her memory since her hip surgery has not been that good and she doesn't remember being intubated or mechanically ventilated. She states that she wears O2 at home but has never required a BiPaP machine before. She is having a difficult time remembering whether or not she takes inhaler's at home for her COPD, but does endorse "pulmicort". She currently denies SOB, LOGAN, chest pain, fevers, chills, N/V/D/C. She wants to be able to recuperate from her complex hospital course and make a full recovery.     On exam she is not ill appearing. She is pleasant to speak with; however, her recent memory is admittedly hazy. Her SpO2 was noted to be 82%; however, patient has fake nails with polish, so unsure if this saturation is correct. Furthermore, patient's bicarbonate has been noted to increase from admission to now from 23->32. Pulmonary was consulted for COPD management in the context of recent hypercarbic respiratory failure c/b hypoxemia beyond her baseline.     PAST MEDICAL & SURGICAL HISTORY:  Chronic kidney disease (CKD)  COPD (chronic obstructive pulmonary disease)  HTN (hypertension)  Hypothyroid  DM (diabetes mellitus)  Leg fracture: right ORIF with hardware   H/O cataract: bilateral   S/P cholecystectomy      FAMILY HISTORY:  Family history of hypertension: Father  FH: type 2 diabetes mellitus: Father  FHx: rheumatoid arthritis: Mother and Brother      SOCIAL HISTORY:  Smoking: former  EtOH Use: denies  Marital Status: single  Occupation: none  Exposures: none  Recent Travel: none    Allergies    No Known Allergies    Intolerances        HOME MEDICATIONS:    REVIEW OF SYSTEMS:  CONSTITUTIONAL: +weakness. No fevers or chills  EYES/ENT: No visual changes;  No vertigo or throat pain   NECK: No pain or stiffness  RESPIRATORY: No cough, wheezing, hemoptysis; No shortness of breath  CARDIOVASCULAR: No chest pain or palpitations  GASTROINTESTINAL: No abdominal or epigastric pain. No nausea, vomiting, or hematemesis; No diarrhea or constipation. No melena or hematochezia.  GENITOURINARY: No dysuria, frequency or hematuria  NEUROLOGICAL: No numbness or weakness  SKIN: No itching, burning, rashes, or lesions   All other review of systems is negative unless indicated above.    OBJECTIVE:  ICU Vital Signs Last 24 Hrs  T(C): 36.4 (03 Oct 2019 09:34), Max: 37 (02 Oct 2019 22:00)  T(F): 97.5 (03 Oct 2019 09:34), Max: 98.6 (02 Oct 2019 22:00)  HR: 73 (03 Oct 2019 10:16) (65 - 85)  BP: 151/69 (03 Oct 2019 09:34) (147/54 - 163/57)  BP(mean): --  ABP: --  ABP(mean): --  RR: 24 (03 Oct 2019 09:34) (18 - 24)  SpO2: 88% (03 Oct 2019 09:34) (86% - 95%)        10-02 @ 07:01  -  10-03 @ 07:00  --------------------------------------------------------  IN: 0 mL / OUT: 1960 mL / NET: -1960 mL      CAPILLARY BLOOD GLUCOSE      POCT Blood Glucose.: 245 mg/dL (03 Oct 2019 11:45)      PHYSICAL EXAM:  General: WN/WD NAD  Neurology: A&Ox3, nonfocal, MOCTEZUMA x 4  Eyes: PERRLA/ EOMI, Gross vision intact  ENT/Neck: Neck supple, trachea midline, No JVD, Gross hearing intact  Respiratory: Decreased BS at the bases. Otherwise CTA bilaterally.  CV: RRR, +S1/S2, -S3/S4, no murmurs, rubs or gallops  Abdominal: Soft, NT, ND +BS, No HSM  MSK: 5/5 strength UE/LE bilaterally  Extremities: No edema, 2+ peripheral pulses  Skin: No Rashes, Hematoma, Ecchymosis      HOSPITAL MEDICATIONS:  MEDICATIONS  (STANDING):  ALBUTerol    90 MICROgram(s) HFA Inhaler 1 Puff(s) Inhalation every 4 hours  aspirin enteric coated 325 milliGRAM(s) Oral two times a day  buDESOnide  80 MICROgram(s)/formoterol 4.5 MICROgram(s) Inhaler 2 Puff(s) Inhalation two times a day  cefuroxime   Tablet 250 milliGRAM(s) Oral every 12 hours  dextrose 50% Injectable 25 Gram(s) IV Push once  furosemide   Injectable 20 milliGRAM(s) IV Push daily  insulin glargine Injectable (LANTUS) 10 Unit(s) SubCutaneous at bedtime  insulin lispro (HumaLOG) corrective regimen sliding scale   SubCutaneous Before meals and at bedtime  insulin lispro Injectable (HumaLOG) 3 Unit(s) SubCutaneous three times a day before meals  levothyroxine 88 MICROGram(s) Oral daily  pantoprazole    Tablet 40 milliGRAM(s) Oral before breakfast  predniSONE   Tablet   Oral   QUEtiapine 25 milliGRAM(s) Oral at bedtime  tiotropium 18 MICROgram(s) Capsule 1 Capsule(s) Inhalation daily  tiotropium 18 MICROgram(s) Capsule 1 Capsule(s) Inhalation daily  tiotropium 18 MICROgram(s) Capsule 1 Capsule(s) Inhalation daily    MEDICATIONS  (PRN):  acetaminophen    Suspension .. 650 milliGRAM(s) Oral every 6 hours PRN Mild Pain (1 - 3)  ALBUTerol    0.083% 2.5 milliGRAM(s) Nebulizer every 6 hours PRN Shortness of Breath and/or Wheezing  ALBUTerol    90 MICROgram(s) HFA Inhaler 2 Puff(s) Inhalation every 6 hours PRN Bronchospasm  ALBUTerol/ipratropium for Nebulization 3 milliLiter(s) Nebulizer every 6 hours PRN Shortness of Breath  benzocaine 15 mG/menthol 3.6 mG (Sugar-Free) Lozenge 1 Lozenge Oral every 6 hours PRN Sore Throat  glucagon  Injectable 1 milliGRAM(s) IntraMuscular once PRN Glucose LESS THAN 70 milligrams/deciliter  haloperidol    Injectable 2.5 milliGRAM(s) IV Push every 6 hours PRN agitation      LABS:                        9.2    12.69 )-----------( 408      ( 02 Oct 2019 05:02 )             29.8     10-    140  |  98  |  28<H>  ----------------------------<  169<H>  3.7   |  32<H>  |  0.92    Ca    8.1<L>      02 Oct 2019 05:02  Phos  2.6     10-02  Mg     2.1     10        Urinalysis Basic - ( 02 Oct 2019 07:12 )    Color: LIGHT YELLOW / Appearance: CLEAR / S.012 / pH: 5.0  Gluc: NEGATIVE / Ketone: NEGATIVE  / Bili: NEGATIVE / Urobili: NORMAL   Blood: TRACE / Protein: 10 / Nitrite: NEGATIVE   Leuk Esterase: MODERATE / RBC: 0-2 / WBC 26-50   Sq Epi: OCC / Non Sq Epi: x / Bacteria: MANY    Blood Gas Profile - Arterial (10.03.19 @ 13:00)    pH, Arterial: 7.46 pH    pCO2, Arterial: 57 mmHg    pO2, Arterial: 53 mmHg    HCO3, Arterial: 38 mmol/L    Base Excess, Arterial: 14.8: BASE EXCESS: REFERENCE RANGE = 0 (+/-) 2 mmol/l mmol/L    Oxygen Saturation, Arterial: 88.8 %            MICROBIOLOGY:     RADIOLOGY:  [x] Reviewed by me    CXR: 10/2  - Bilateral pleural effusions  - Atelectasis bilaterally    Point of Care Ultrasound Findings:  - Lungs: Small Bilateral PLEFFs. A-line predominant with scant anterior B-lines bilaterally c/w pulmonary edema.    PFT:  FVC: 1.00/41%  FEV1: 0.59/33%--> 0.78/43% (+) BD challenge  FEV1/FVC: 59%  DLCO: 35%

## 2019-10-03 NOTE — PROGRESS NOTE ADULT - PROBLEM SELECTOR PLAN 2
- now extubated on bipap at night  -will need to check nocturnal pulse Ox tonight off Bipap to see if patient needs Bipap at night. Patient would rather not be on bipap.   - Patient was on Solucortef 50mg IV q6hr x 6 days.; d/c'ed  solucortef and started Prednisone taper.   - encouraged incentive spirometry  -cont O2 with 3LNC to maintain Spo2 88-92. Patient does desat to low 80s with ambulation as per PT.   -Nocturnal pulse Ox did desaturate to low 80s overnight and improved with increase in oxygen to 3.5 liters.   -Patient's pulmonologist is Dr Brooke so will consult; May need new nocturnal bipap.   -c/w spiriva, budesonide

## 2019-10-03 NOTE — CONSULT NOTE ADULT - ASSESSMENT
83 yo female with PMH of HTN, Hypothyroid, DMII, CKD stage 3, COPD c/w chronic hypoxic respiratory failure on 3L home O2 present after mechanical fall c/o left hip pain found to have L femoral neck fracture, scheduled for left hip hemiarthroplasty.
83 yo female with PMH of HTN, Hypothyroid, DMII, CKD stage 3, COPD c/w chronic hypoxic respiratory failure on 3L home O2 present after mechanical fall c/o left hip pain found to have L femoral neck fracture, s/p left hip hemiarthroplasty (), developed hypercarbic respiratory failure on POD#5 and intubated on , extubated on , currently found to have bilateral pleural effusions and pulmonary edema in the context of multiple recent IVF boluses.     # Hypercarbic and hypoxemic respiratory failure  - Patient currently s/p intubation for hypercarbic respiratory failure likely 2/2 hypoventilation 2/2 opiate medication in the context of possible COPD exacerbation.   - Currently patient has uncompensated metabolic alkalosis 2/2 respiratory acidosis that is currently resolving faster than bicarb can downtrend. She is also hypoxemia on 3L NC: AB.49/57/53  - Hypoxemia likely 2/2 to combination between established V/Q mismatch from COPD and pulmonary edema with associated basal atelectasis. Treatment should focus on decreasing V/Q inequality as best as possible  - Recommend 20mg IV lasix for diuresis  - Please check and replete lytes daily: K>4, Mg>2.  - Currently w/o wheezing or sputum production. Has received 5d of steroids. Patient not clinically in COPD exacerbation. Can taper prednisone quickly: decrease by 20mg daily  - Please have patient on symbicort and spiriva  - albuterol q6h (NOT DUONEBS) standing until hypoxemia resolves  - No need for Bilevel NIV at this time or overnight  - Titrate supplemental O2 to Spo2 > 88% and less than 94%. Recommend using nail-polish remover to obtain reliable SpO2 saturations.  - If patient does not require protonix, can discontinue at this time  - Patient needs to us IS 10x/hour daily to prevent post-operative and now post-ICU PNA  - Abx per primary team        Roque Ozuna MD  PGY-5  Pulmonary and Critical Care Fellow  Pager: [phone]

## 2019-10-03 NOTE — CONSULT NOTE ADULT - ATTENDING COMMENTS
Patient with history and hospital course as above, hip fracture s/p arthroplasty c/b acute on chronic hypoxic and hypercapnic respiratory failure requiring intubation and tx to SICU, extubated.  She has outpatient studies that show she needs oxygen at least at night.  She would likely benefit from bipap at night given her hypercapnia, but her latest ABG shows she does not need it at this time as she has an alkalosis.  No need for bipap now, though may need it if she gets any sedating agents.      Recommend:  - diuretics for pleural effusions  - bipap if she is to get any sedating agents  - LABA/LAMA/ICS; spiriva and symbicort  - supplemental O2 to keep O2 sat above 88%

## 2019-10-03 NOTE — PROGRESS NOTE ADULT - SUBJECTIVE AND OBJECTIVE BOX
Pt seen/examined. Doing well. Pain controlled. No acute overnight complaints or events.    T(C): 36.7 (10-03-19 @ 05:41), Max: 37.2 (10-02-19 @ 10:03)  HR: 75 (10-03-19 @ 05:41) (65 - 79)  BP: 148/56 (10-03-19 @ 05:41) (147/54 - 163/66)  RR: 18 (10-03-19 @ 05:41) (18 - 24)  SpO2: 91% (10-03-19 @ 05:41) (86% - 95%)  Wt(kg): --    - Gen: NAD  - LLE: Dressing C/D/I; SILT TN/SPN/DPN/SN; TA/EHL/gs intact    84yFemale s/p L HHA    - Pain control  - DVT ppx  - OOB/PT

## 2019-10-04 LAB
ANION GAP SERPL CALC-SCNC: 14 MMO/L — SIGNIFICANT CHANGE UP (ref 7–14)
APPEARANCE UR: CLEAR — SIGNIFICANT CHANGE UP
BACTERIA # UR AUTO: NEGATIVE — SIGNIFICANT CHANGE UP
BILIRUB UR-MCNC: NEGATIVE — SIGNIFICANT CHANGE UP
BLOOD UR QL VISUAL: NEGATIVE — SIGNIFICANT CHANGE UP
BUN SERPL-MCNC: 25 MG/DL — HIGH (ref 7–23)
CALCIUM SERPL-MCNC: 8.2 MG/DL — LOW (ref 8.4–10.5)
CHLORIDE SERPL-SCNC: 94 MMOL/L — LOW (ref 98–107)
CO2 SERPL-SCNC: 34 MMOL/L — HIGH (ref 22–31)
COLOR SPEC: SIGNIFICANT CHANGE UP
CREAT SERPL-MCNC: 0.95 MG/DL — SIGNIFICANT CHANGE UP (ref 0.5–1.3)
GLUCOSE BLDC GLUCOMTR-MCNC: 133 MG/DL — HIGH (ref 70–99)
GLUCOSE BLDC GLUCOMTR-MCNC: 232 MG/DL — HIGH (ref 70–99)
GLUCOSE BLDC GLUCOMTR-MCNC: 266 MG/DL — HIGH (ref 70–99)
GLUCOSE BLDC GLUCOMTR-MCNC: 96 MG/DL — SIGNIFICANT CHANGE UP (ref 70–99)
GLUCOSE SERPL-MCNC: 80 MG/DL — SIGNIFICANT CHANGE UP (ref 70–99)
GLUCOSE UR-MCNC: NEGATIVE — SIGNIFICANT CHANGE UP
HCT VFR BLD CALC: 38.7 % — SIGNIFICANT CHANGE UP (ref 34.5–45)
HGB BLD-MCNC: 11.4 G/DL — LOW (ref 11.5–15.5)
HYALINE CASTS # UR AUTO: NEGATIVE — SIGNIFICANT CHANGE UP
KETONES UR-MCNC: NEGATIVE — SIGNIFICANT CHANGE UP
LEUKOCYTE ESTERASE UR-ACNC: NEGATIVE — SIGNIFICANT CHANGE UP
MCHC RBC-ENTMCNC: 29.5 % — LOW (ref 32–36)
MCHC RBC-ENTMCNC: 30.6 PG — SIGNIFICANT CHANGE UP (ref 27–34)
MCV RBC AUTO: 104 FL — HIGH (ref 80–100)
NITRITE UR-MCNC: NEGATIVE — SIGNIFICANT CHANGE UP
NRBC # FLD: 0 K/UL — SIGNIFICANT CHANGE UP (ref 0–0)
PH UR: 7 — SIGNIFICANT CHANGE UP (ref 5–8)
PLATELET # BLD AUTO: 435 K/UL — HIGH (ref 150–400)
PMV BLD: 8.6 FL — SIGNIFICANT CHANGE UP (ref 7–13)
POTASSIUM SERPL-MCNC: 4.5 MMOL/L — SIGNIFICANT CHANGE UP (ref 3.5–5.3)
POTASSIUM SERPL-SCNC: 4.5 MMOL/L — SIGNIFICANT CHANGE UP (ref 3.5–5.3)
PROT UR-MCNC: 20 — SIGNIFICANT CHANGE UP
RBC # BLD: 3.72 M/UL — LOW (ref 3.8–5.2)
RBC # FLD: 13.5 % — SIGNIFICANT CHANGE UP (ref 10.3–14.5)
RBC CASTS # UR COMP ASSIST: SIGNIFICANT CHANGE UP (ref 0–?)
SODIUM SERPL-SCNC: 142 MMOL/L — SIGNIFICANT CHANGE UP (ref 135–145)
SP GR SPEC: 1.01 — SIGNIFICANT CHANGE UP (ref 1–1.04)
SQUAMOUS # UR AUTO: SIGNIFICANT CHANGE UP
UROBILINOGEN FLD QL: NORMAL — SIGNIFICANT CHANGE UP
WBC # BLD: 18.53 K/UL — HIGH (ref 3.8–10.5)
WBC # FLD AUTO: 18.53 K/UL — HIGH (ref 3.8–10.5)
WBC UR QL: SIGNIFICANT CHANGE UP (ref 0–?)

## 2019-10-04 PROCEDURE — 99233 SBSQ HOSP IP/OBS HIGH 50: CPT

## 2019-10-04 PROCEDURE — 99232 SBSQ HOSP IP/OBS MODERATE 35: CPT | Mod: GC

## 2019-10-04 RX ADMIN — Medication 30 MILLIGRAM(S): at 06:48

## 2019-10-04 RX ADMIN — Medication 250 MILLIGRAM(S): at 17:11

## 2019-10-04 RX ADMIN — Medication 325 MILLIGRAM(S): at 06:48

## 2019-10-04 RX ADMIN — Medication 250 MILLIGRAM(S): at 06:48

## 2019-10-04 RX ADMIN — Medication 325 MILLIGRAM(S): at 17:11

## 2019-10-04 RX ADMIN — Medication 3 UNIT(S): at 12:08

## 2019-10-04 RX ADMIN — Medication 3 UNIT(S): at 17:10

## 2019-10-04 RX ADMIN — Medication 88 MICROGRAM(S): at 06:48

## 2019-10-04 RX ADMIN — INSULIN GLARGINE 10 UNIT(S): 100 INJECTION, SOLUTION SUBCUTANEOUS at 21:24

## 2019-10-04 RX ADMIN — Medication 20 MILLIGRAM(S): at 06:48

## 2019-10-04 RX ADMIN — TIOTROPIUM BROMIDE 1 CAPSULE(S): 18 CAPSULE ORAL; RESPIRATORY (INHALATION) at 10:28

## 2019-10-04 RX ADMIN — Medication 3 UNIT(S): at 07:55

## 2019-10-04 RX ADMIN — PANTOPRAZOLE SODIUM 40 MILLIGRAM(S): 20 TABLET, DELAYED RELEASE ORAL at 06:48

## 2019-10-04 RX ADMIN — BUDESONIDE AND FORMOTEROL FUMARATE DIHYDRATE 2 PUFF(S): 160; 4.5 AEROSOL RESPIRATORY (INHALATION) at 08:56

## 2019-10-04 RX ADMIN — Medication 6: at 17:11

## 2019-10-04 RX ADMIN — QUETIAPINE FUMARATE 25 MILLIGRAM(S): 200 TABLET, FILM COATED ORAL at 21:24

## 2019-10-04 RX ADMIN — Medication 4: at 12:08

## 2019-10-04 RX ADMIN — BUDESONIDE AND FORMOTEROL FUMARATE DIHYDRATE 2 PUFF(S): 160; 4.5 AEROSOL RESPIRATORY (INHALATION) at 21:24

## 2019-10-04 NOTE — PROGRESS NOTE ADULT - SUBJECTIVE AND OBJECTIVE BOX
CHIEF COMPLAINT:    Interval Events:    REVIEW OF SYSTEMS:  CONSTITUTIONAL: No weakness, fevers or chills  EYES/ENT: No visual changes;  No vertigo or throat pain   NECK: No pain or stiffness  RESPIRATORY: No cough, wheezing, hemoptysis; No shortness of breath  CARDIOVASCULAR: No chest pain or palpitations  GASTROINTESTINAL: No abdominal or epigastric pain. No nausea, vomiting, or hematemesis; No diarrhea or constipation. No melena or hematochezia.  GENITOURINARY: No dysuria, frequency or hematuria  NEUROLOGICAL: No numbness or weakness  SKIN: No itching, burning, rashes, or lesions   All other review of systems is negative unless indicated above.    OBJECTIVE:  ICU Vital Signs Last 24 Hrs  T(C): 36.9 (04 Oct 2019 06:33), Max: 36.9 (04 Oct 2019 01:49)  T(F): 98.4 (04 Oct 2019 06:33), Max: 98.5 (04 Oct 2019 01:49)  HR: 73 (04 Oct 2019 06:33) (65 - 85)  BP: 156/74 (04 Oct 2019 06:33) (151/69 - 169/71)  BP(mean): --  ABP: --  ABP(mean): --  RR: 24 (04 Oct 2019 06:33) (23 - 24)  SpO2: 91% (04 Oct 2019 06:33) (88% - 94%)        10-03 @ 07:01  -  10-04 @ 07:00  --------------------------------------------------------  IN: 0 mL / OUT: 950 mL / NET: -950 mL      CAPILLARY BLOOD GLUCOSE      POCT Blood Glucose.: 149 mg/dL (03 Oct 2019 21:25)      PHYSICAL EXAM:  General: WN/WD NAD  Neurology: A&Ox3, nonfocal, MOCTEZUMA x 4  Eyes: PERRLA/ EOMI, Gross vision intact  ENT/Neck: Neck supple, trachea midline, No JVD, Gross hearing intact  Respiratory: CTA B/L, No wheezing, rales, rhonchi  CV: RRR, +S1/S2, -S3/S4, no murmurs, rubs or gallops  Abdominal: Soft, NT, ND +BS, No HSM  MSK: 5/5 strength UE/LE bilaterally  Extremities: No edema, 2+ peripheral pulses  Skin: No Rashes, Hematoma, Ecchymosis  Incisions:   Tubes:    HOSPITAL MEDICATIONS:  MEDICATIONS  (STANDING):  ALBUTerol    90 MICROgram(s) HFA Inhaler 1 Puff(s) Inhalation every 4 hours  aspirin enteric coated 325 milliGRAM(s) Oral two times a day  buDESOnide  80 MICROgram(s)/formoterol 4.5 MICROgram(s) Inhaler 2 Puff(s) Inhalation two times a day  cefuroxime   Tablet 250 milliGRAM(s) Oral every 12 hours  dextrose 50% Injectable 25 Gram(s) IV Push once  furosemide   Injectable 20 milliGRAM(s) IV Push daily  insulin glargine Injectable (LANTUS) 10 Unit(s) SubCutaneous at bedtime  insulin lispro (HumaLOG) corrective regimen sliding scale   SubCutaneous Before meals and at bedtime  insulin lispro Injectable (HumaLOG) 3 Unit(s) SubCutaneous three times a day before meals  levothyroxine 88 MICROGram(s) Oral daily  pantoprazole    Tablet 40 milliGRAM(s) Oral before breakfast  predniSONE   Tablet   Oral   predniSONE   Tablet 30 milliGRAM(s) Oral daily  QUEtiapine 25 milliGRAM(s) Oral at bedtime  tiotropium 18 MICROgram(s) Capsule 1 Capsule(s) Inhalation daily  tiotropium 18 MICROgram(s) Capsule 1 Capsule(s) Inhalation daily  tiotropium 18 MICROgram(s) Capsule 1 Capsule(s) Inhalation daily    MEDICATIONS  (PRN):  acetaminophen    Suspension .. 650 milliGRAM(s) Oral every 6 hours PRN Mild Pain (1 - 3)  ALBUTerol    0.083% 2.5 milliGRAM(s) Nebulizer every 6 hours PRN Shortness of Breath and/or Wheezing  ALBUTerol    90 MICROgram(s) HFA Inhaler 2 Puff(s) Inhalation every 6 hours PRN Bronchospasm  ALBUTerol/ipratropium for Nebulization 3 milliLiter(s) Nebulizer every 6 hours PRN Shortness of Breath  benzocaine 15 mG/menthol 3.6 mG (Sugar-Free) Lozenge 1 Lozenge Oral every 6 hours PRN Sore Throat  glucagon  Injectable 1 milliGRAM(s) IntraMuscular once PRN Glucose LESS THAN 70 milligrams/deciliter  haloperidol    Injectable 2.5 milliGRAM(s) IV Push every 6 hours PRN agitation      LABS:    Hgb Trend: 9.2<--, 8.0<--, 8.3<--, 8.3<--, 7.3<--        Creatinine Trend: 0.92<--, 1.04<--, 1.06<--, 1.13<--, 1.35<--, 1.22<--    Urinalysis Basic - ( 02 Oct 2019 07:12 )    Color: LIGHT YELLOW / Appearance: CLEAR / S.012 / pH: 5.0  Gluc: NEGATIVE / Ketone: NEGATIVE  / Bili: NEGATIVE / Urobili: NORMAL   Blood: TRACE / Protein: 10 / Nitrite: NEGATIVE   Leuk Esterase: MODERATE / RBC: 0-2 / WBC 26-50   Sq Epi: OCC / Non Sq Epi: x / Bacteria: MANY      Arterial Blood Gas:  10- @ 13:00  7.46/57/53/38/88.8/14.8  ABG lactate: --        MICROBIOLOGY:     Culture - Urine (collected 02 Oct 2019 10:46)  Source: URINE MIDSTREAM  Final Report (03 Oct 2019 11:27):    Culture grew 3 or more types of organisms which indicate    collection contamination; consider recollection only if    clinically indicated.        RADIOLOGY:  [ ] Reviewed by me    PULMONARY FUNCTION TESTS:    EKG: CHIEF COMPLAINT:    Interval Events: Patient feeling mildly SOB. Better than yesterday. States that she has been using IS from yesterday. States that she has been pee'ing a lot.     REVIEW OF SYSTEMS:  CONSTITUTIONAL: No weakness, fevers or chills  EYES/ENT: No visual changes;  No vertigo or throat pain   NECK: No pain or stiffness  RESPIRATORY: No cough, wheezing, hemoptysis; +shortness of breath  CARDIOVASCULAR: No chest pain or palpitations  GASTROINTESTINAL: No abdominal or epigastric pain. No nausea, vomiting, or hematemesis; No diarrhea or constipation. No melena or hematochezia.  GENITOURINARY: No dysuria, frequency or hematuria  NEUROLOGICAL: No numbness or weakness  SKIN: No itching, burning, rashes, or lesions   All other review of systems is negative unless indicated above.    OBJECTIVE:  ICU Vital Signs Last 24 Hrs  T(C): 36.9 (04 Oct 2019 06:33), Max: 36.9 (04 Oct 2019 01:49)  T(F): 98.4 (04 Oct 2019 06:33), Max: 98.5 (04 Oct 2019 01:49)  HR: 73 (04 Oct 2019 06:33) (65 - 85)  BP: 156/74 (04 Oct 2019 06:33) (151/69 - 169/71)  BP(mean): --  ABP: --  ABP(mean): --  RR: 24 (04 Oct 2019 06:33) (23 - 24)  SpO2: 91% (04 Oct 2019 06:33) (88% - 94%)        10-03 @ 07:01  -  10-04 @ 07:00  --------------------------------------------------------  IN: 0 mL / OUT: 950 mL / NET: -950 mL      CAPILLARY BLOOD GLUCOSE      POCT Blood Glucose.: 149 mg/dL (03 Oct 2019 21:25)      PHYSICAL EXAM:  General: WN/WD NAD  Neurology: A&Ox3, nonfocal, MOCTEZUMA x 4  Eyes: PERRLA/ EOMI, Gross vision intact  ENT/Neck: Neck supple, trachea midline, No JVD, Gross hearing intact  Respiratory: Decreased BS bilaterally  CV: RRR, +S1/S2, -S3/S4, no murmurs, rubs or gallops  Abdominal: Soft, NT, ND +BS, No HSM  MSK: 5/5 strength UE/LE bilaterally  Extremities: No edema, 2+ peripheral pulses  Skin: No Rashes, Hematoma, Ecchymosis      HOSPITAL MEDICATIONS:  MEDICATIONS  (STANDING):  ALBUTerol    90 MICROgram(s) HFA Inhaler 1 Puff(s) Inhalation every 4 hours  aspirin enteric coated 325 milliGRAM(s) Oral two times a day  buDESOnide  80 MICROgram(s)/formoterol 4.5 MICROgram(s) Inhaler 2 Puff(s) Inhalation two times a day  cefuroxime   Tablet 250 milliGRAM(s) Oral every 12 hours  dextrose 50% Injectable 25 Gram(s) IV Push once  furosemide   Injectable 20 milliGRAM(s) IV Push daily  insulin glargine Injectable (LANTUS) 10 Unit(s) SubCutaneous at bedtime  insulin lispro (HumaLOG) corrective regimen sliding scale   SubCutaneous Before meals and at bedtime  insulin lispro Injectable (HumaLOG) 3 Unit(s) SubCutaneous three times a day before meals  levothyroxine 88 MICROGram(s) Oral daily  pantoprazole    Tablet 40 milliGRAM(s) Oral before breakfast  predniSONE   Tablet   Oral   predniSONE   Tablet 30 milliGRAM(s) Oral daily  QUEtiapine 25 milliGRAM(s) Oral at bedtime  tiotropium 18 MICROgram(s) Capsule 1 Capsule(s) Inhalation daily  tiotropium 18 MICROgram(s) Capsule 1 Capsule(s) Inhalation daily  tiotropium 18 MICROgram(s) Capsule 1 Capsule(s) Inhalation daily    MEDICATIONS  (PRN):  acetaminophen    Suspension .. 650 milliGRAM(s) Oral every 6 hours PRN Mild Pain (1 - 3)  ALBUTerol    0.083% 2.5 milliGRAM(s) Nebulizer every 6 hours PRN Shortness of Breath and/or Wheezing  ALBUTerol    90 MICROgram(s) HFA Inhaler 2 Puff(s) Inhalation every 6 hours PRN Bronchospasm  ALBUTerol/ipratropium for Nebulization 3 milliLiter(s) Nebulizer every 6 hours PRN Shortness of Breath  benzocaine 15 mG/menthol 3.6 mG (Sugar-Free) Lozenge 1 Lozenge Oral every 6 hours PRN Sore Throat  glucagon  Injectable 1 milliGRAM(s) IntraMuscular once PRN Glucose LESS THAN 70 milligrams/deciliter  haloperidol    Injectable 2.5 milliGRAM(s) IV Push every 6 hours PRN agitation      LABS:    Hgb Trend: 9.2<--, 8.0<--, 8.3<--, 8.3<--, 7.3<--        Creatinine Trend: 0.92<--, 1.04<--, 1.06<--, 1.13<--, 1.35<--, 1.22<--    Urinalysis Basic - ( 02 Oct 2019 07:12 )    Color: LIGHT YELLOW / Appearance: CLEAR / S.012 / pH: 5.0  Gluc: NEGATIVE / Ketone: NEGATIVE  / Bili: NEGATIVE / Urobili: NORMAL   Blood: TRACE / Protein: 10 / Nitrite: NEGATIVE   Leuk Esterase: MODERATE / RBC: 0-2 / WBC 26-50   Sq Epi: OCC / Non Sq Epi: x / Bacteria: MANY      Arterial Blood Gas:  10-03 @ 13:00  7.46/57/53/38/88.8/14.8  ABG lactate: --        MICROBIOLOGY:     Culture - Urine (collected 02 Oct 2019 10:46)  Source: URINE MIDSTREAM  Final Report (03 Oct 2019 11:27):    Culture grew 3 or more types of organisms which indicate    collection contamination; consider recollection only if    clinically indicated.        POCUS  - Small bilateral pleural effusions. A-line predominant c/w normal aeration pattern.

## 2019-10-04 NOTE — PROGRESS NOTE ADULT - SUBJECTIVE AND OBJECTIVE BOX
Patient is a 84y old  Female who presents with a chief complaint of left hip fracture s/p fall 1D (04 Oct 2019 07:07)      SUBJECTIVE / OVERNIGHT EVENTS:    MEDICATIONS  (STANDING):  ALBUTerol    90 MICROgram(s) HFA Inhaler 1 Puff(s) Inhalation every 4 hours  aspirin enteric coated 325 milliGRAM(s) Oral two times a day  buDESOnide  80 MICROgram(s)/formoterol 4.5 MICROgram(s) Inhaler 2 Puff(s) Inhalation two times a day  cefuroxime   Tablet 250 milliGRAM(s) Oral every 12 hours  dextrose 50% Injectable 25 Gram(s) IV Push once  furosemide   Injectable 20 milliGRAM(s) IV Push daily  insulin glargine Injectable (LANTUS) 10 Unit(s) SubCutaneous at bedtime  insulin lispro (HumaLOG) corrective regimen sliding scale   SubCutaneous Before meals and at bedtime  insulin lispro Injectable (HumaLOG) 3 Unit(s) SubCutaneous three times a day before meals  levothyroxine 88 MICROGram(s) Oral daily  pantoprazole    Tablet 40 milliGRAM(s) Oral before breakfast  predniSONE   Tablet   Oral   predniSONE   Tablet 30 milliGRAM(s) Oral daily  QUEtiapine 25 milliGRAM(s) Oral at bedtime  tiotropium 18 MICROgram(s) Capsule 1 Capsule(s) Inhalation daily  tiotropium 18 MICROgram(s) Capsule 1 Capsule(s) Inhalation daily  tiotropium 18 MICROgram(s) Capsule 1 Capsule(s) Inhalation daily    MEDICATIONS  (PRN):  acetaminophen    Suspension .. 650 milliGRAM(s) Oral every 6 hours PRN Mild Pain (1 - 3)  ALBUTerol    0.083% 2.5 milliGRAM(s) Nebulizer every 6 hours PRN Shortness of Breath and/or Wheezing  ALBUTerol    90 MICROgram(s) HFA Inhaler 2 Puff(s) Inhalation every 6 hours PRN Bronchospasm  ALBUTerol/ipratropium for Nebulization 3 milliLiter(s) Nebulizer every 6 hours PRN Shortness of Breath  benzocaine 15 mG/menthol 3.6 mG (Sugar-Free) Lozenge 1 Lozenge Oral every 6 hours PRN Sore Throat  glucagon  Injectable 1 milliGRAM(s) IntraMuscular once PRN Glucose LESS THAN 70 milligrams/deciliter  haloperidol    Injectable 2.5 milliGRAM(s) IV Push every 6 hours PRN agitation      Vital Signs Last 24 Hrs  T(C): 36.9 (04 Oct 2019 06:33), Max: 36.9 (04 Oct 2019 01:49)  T(F): 98.4 (04 Oct 2019 06:33), Max: 98.5 (04 Oct 2019 01:49)  HR: 73 (04 Oct 2019 06:33) (65 - 85)  BP: 156/74 (04 Oct 2019 06:33) (151/69 - 169/71)  BP(mean): --  RR: 24 (04 Oct 2019 06:33) (23 - 24)  SpO2: 91% (04 Oct 2019 06:33) (88% - 94%)  CAPILLARY BLOOD GLUCOSE      POCT Blood Glucose.: 96 mg/dL (04 Oct 2019 07:30)  POCT Blood Glucose.: 149 mg/dL (03 Oct 2019 21:25)  POCT Blood Glucose.: 232 mg/dL (03 Oct 2019 17:05)  POCT Blood Glucose.: 245 mg/dL (03 Oct 2019 11:45)    I&O's Summary    03 Oct 2019 07:01  -  04 Oct 2019 07:00  --------------------------------------------------------  IN: 0 mL / OUT: 950 mL / NET: -950 mL        PHYSICAL EXAM:  GENERAL: NAD, well-developed  HEAD:  Atraumatic, Normocephalic  EYES: EOMI, PERRLA, conjunctiva and sclera clear  NECK: Supple, No JVD  CHEST/LUNG: Clear to auscultation bilaterally; No wheeze  HEART: Regular rate and rhythm; No murmurs, rubs, or gallops  ABDOMEN: Soft, Nontender, Nondistended; Bowel sounds present  EXTREMITIES:  2+ Peripheral Pulses, No clubbing, cyanosis, or edema  PSYCH: AAOx3  NEUROLOGY: non-focal  SKIN: No rashes or lesions    LABS:    10-04    142  |  94<L>  |  25<H>  ----------------------------<  80  4.5   |  34<H>  |  0.95    Ca    8.2<L>      04 Oct 2019 05:50                RADIOLOGY & ADDITIONAL TESTS:    Imaging Personally Reviewed:    Consultant(s) Notes Reviewed:      Care Discussed with Consultants/Other Providers: ortho Patient is a 84y old  Female who presents with a chief complaint of left hip fracture s/p fall 1D (04 Oct 2019 07:07)      SUBJECTIVE / OVERNIGHT EVENTS: Patient c/o increased SOB in a.m. with O2 Sat 85% on 3LNC . But able to speak in complete sentences and nonlabored respirations. No cp, abdominal pain pain, N/V. She reports increased urination overnight after lasix started.     MEDICATIONS  (STANDING):  ALBUTerol    90 MICROgram(s) HFA Inhaler 1 Puff(s) Inhalation every 4 hours  aspirin enteric coated 325 milliGRAM(s) Oral two times a day  buDESOnide  80 MICROgram(s)/formoterol 4.5 MICROgram(s) Inhaler 2 Puff(s) Inhalation two times a day  cefuroxime   Tablet 250 milliGRAM(s) Oral every 12 hours  dextrose 50% Injectable 25 Gram(s) IV Push once  furosemide   Injectable 20 milliGRAM(s) IV Push daily  insulin glargine Injectable (LANTUS) 10 Unit(s) SubCutaneous at bedtime  insulin lispro (HumaLOG) corrective regimen sliding scale   SubCutaneous Before meals and at bedtime  insulin lispro Injectable (HumaLOG) 3 Unit(s) SubCutaneous three times a day before meals  levothyroxine 88 MICROGram(s) Oral daily  pantoprazole    Tablet 40 milliGRAM(s) Oral before breakfast  predniSONE   Tablet   Oral   predniSONE   Tablet 30 milliGRAM(s) Oral daily  QUEtiapine 25 milliGRAM(s) Oral at bedtime  tiotropium 18 MICROgram(s) Capsule 1 Capsule(s) Inhalation daily  tiotropium 18 MICROgram(s) Capsule 1 Capsule(s) Inhalation daily  tiotropium 18 MICROgram(s) Capsule 1 Capsule(s) Inhalation daily    MEDICATIONS  (PRN):  acetaminophen    Suspension .. 650 milliGRAM(s) Oral every 6 hours PRN Mild Pain (1 - 3)  ALBUTerol    0.083% 2.5 milliGRAM(s) Nebulizer every 6 hours PRN Shortness of Breath and/or Wheezing  ALBUTerol    90 MICROgram(s) HFA Inhaler 2 Puff(s) Inhalation every 6 hours PRN Bronchospasm  ALBUTerol/ipratropium for Nebulization 3 milliLiter(s) Nebulizer every 6 hours PRN Shortness of Breath  benzocaine 15 mG/menthol 3.6 mG (Sugar-Free) Lozenge 1 Lozenge Oral every 6 hours PRN Sore Throat  glucagon  Injectable 1 milliGRAM(s) IntraMuscular once PRN Glucose LESS THAN 70 milligrams/deciliter  haloperidol    Injectable 2.5 milliGRAM(s) IV Push every 6 hours PRN agitation      Vital Signs Last 24 Hrs  T(C): 36.9 (04 Oct 2019 06:33), Max: 36.9 (04 Oct 2019 01:49)  T(F): 98.4 (04 Oct 2019 06:33), Max: 98.5 (04 Oct 2019 01:49)  HR: 73 (04 Oct 2019 06:33) (65 - 85)  BP: 156/74 (04 Oct 2019 06:33) (151/69 - 169/71)  BP(mean): --  RR: 24 (04 Oct 2019 06:33) (23 - 24)  SpO2: 91% (04 Oct 2019 06:33) (88% - 94%)  CAPILLARY BLOOD GLUCOSE      POCT Blood Glucose.: 96 mg/dL (04 Oct 2019 07:30)  POCT Blood Glucose.: 149 mg/dL (03 Oct 2019 21:25)  POCT Blood Glucose.: 232 mg/dL (03 Oct 2019 17:05)  POCT Blood Glucose.: 245 mg/dL (03 Oct 2019 11:45)    I&O's Summary    03 Oct 2019 07:01  -  04 Oct 2019 07:00  --------------------------------------------------------  IN: 0 mL / OUT: 950 mL / NET: -950 mL        PHYSICAL EXAM:  GENERAL: NAD, well-developed  HEAD:  Atraumatic, Normocephalic  EYES: EOMI, PERRLA, conjunctiva and sclera clear  NECK: Supple, No JVD  CHEST/LUNG: decreased BS bilaterally; No wheeze  HEART: Regular rate and rhythm; No murmurs, rubs, or gallops  ABDOMEN: Soft, Nontender, Nondistended; Bowel sounds present  EXTREMITIES:  2+ Peripheral Pulses, No clubbing, cyanosis, or edema  PSYCH: AAOx3  NEUROLOGY: non-focal  SKIN: No rashes or lesions    LABS:    10-04    142  |  94<L>  |  25<H>  ----------------------------<  80  4.5   |  34<H>  |  0.95    Ca    8.2<L>      04 Oct 2019 05:50                RADIOLOGY & ADDITIONAL TESTS:    Imaging Personally Reviewed:    Consultant(s) Notes Reviewed:      Care Discussed with Consultants/Other Providers: ortho Patient is a 84y old  Female who presents with a chief complaint of left hip fracture s/p fall 1D (04 Oct 2019 07:07)      SUBJECTIVE / OVERNIGHT EVENTS: Patient c/o increased SOB in a.m. with O2 Sat 85% on 3LNC . But able to speak in complete sentences and nonlabored respirations. No cp, abdominal pain pain, N/V. She reports increased urination overnight after lasix started.     MEDICATIONS  (STANDING):  ALBUTerol    90 MICROgram(s) HFA Inhaler 1 Puff(s) Inhalation every 4 hours  aspirin enteric coated 325 milliGRAM(s) Oral two times a day  buDESOnide  80 MICROgram(s)/formoterol 4.5 MICROgram(s) Inhaler 2 Puff(s) Inhalation two times a day  cefuroxime   Tablet 250 milliGRAM(s) Oral every 12 hours  dextrose 50% Injectable 25 Gram(s) IV Push once  furosemide   Injectable 20 milliGRAM(s) IV Push daily  insulin glargine Injectable (LANTUS) 10 Unit(s) SubCutaneous at bedtime  insulin lispro (HumaLOG) corrective regimen sliding scale   SubCutaneous Before meals and at bedtime  insulin lispro Injectable (HumaLOG) 3 Unit(s) SubCutaneous three times a day before meals  levothyroxine 88 MICROGram(s) Oral daily  pantoprazole    Tablet 40 milliGRAM(s) Oral before breakfast  predniSONE   Tablet   Oral   predniSONE   Tablet 30 milliGRAM(s) Oral daily  QUEtiapine 25 milliGRAM(s) Oral at bedtime  tiotropium 18 MICROgram(s) Capsule 1 Capsule(s) Inhalation daily  tiotropium 18 MICROgram(s) Capsule 1 Capsule(s) Inhalation daily  tiotropium 18 MICROgram(s) Capsule 1 Capsule(s) Inhalation daily    MEDICATIONS  (PRN):  acetaminophen    Suspension .. 650 milliGRAM(s) Oral every 6 hours PRN Mild Pain (1 - 3)  ALBUTerol    0.083% 2.5 milliGRAM(s) Nebulizer every 6 hours PRN Shortness of Breath and/or Wheezing  ALBUTerol    90 MICROgram(s) HFA Inhaler 2 Puff(s) Inhalation every 6 hours PRN Bronchospasm  ALBUTerol/ipratropium for Nebulization 3 milliLiter(s) Nebulizer every 6 hours PRN Shortness of Breath  benzocaine 15 mG/menthol 3.6 mG (Sugar-Free) Lozenge 1 Lozenge Oral every 6 hours PRN Sore Throat  glucagon  Injectable 1 milliGRAM(s) IntraMuscular once PRN Glucose LESS THAN 70 milligrams/deciliter  haloperidol    Injectable 2.5 milliGRAM(s) IV Push every 6 hours PRN agitation      Vital Signs Last 24 Hrs  T(C): 36.9 (04 Oct 2019 06:33), Max: 36.9 (04 Oct 2019 01:49)  T(F): 98.4 (04 Oct 2019 06:33), Max: 98.5 (04 Oct 2019 01:49)  HR: 73 (04 Oct 2019 06:33) (65 - 85)  BP: 156/74 (04 Oct 2019 06:33) (151/69 - 169/71)  BP(mean): --  RR: 24 (04 Oct 2019 06:33) (23 - 24)  SpO2: 91% (04 Oct 2019 06:33) (88% - 94%)  CAPILLARY BLOOD GLUCOSE      POCT Blood Glucose.: 96 mg/dL (04 Oct 2019 07:30)  POCT Blood Glucose.: 149 mg/dL (03 Oct 2019 21:25)  POCT Blood Glucose.: 232 mg/dL (03 Oct 2019 17:05)  POCT Blood Glucose.: 245 mg/dL (03 Oct 2019 11:45)    I&O's Summary    03 Oct 2019 07:01  -  04 Oct 2019 07:00  --------------------------------------------------------  IN: 0 mL / OUT: 950 mL / NET: -950 mL        PHYSICAL EXAM:  GENERAL: NAD, well-developed  HEAD:  Atraumatic, Normocephalic  EYES: EOMI, PERRLA, conjunctiva and sclera clear  NECK: Supple, No JVD  CHEST/LUNG: decreased BS bilaterally; No wheeze  HEART: Regular rate and rhythm; No murmurs, rubs, or gallops  ABDOMEN: Soft, Nontender, Nondistended; Bowel sounds present  EXTREMITIES:  2+ Peripheral Pulses, No clubbing, cyanosis, or edema  PSYCH: AAOx3  NEUROLOGY: non-focal  SKIN: No rashes or lesions    LABS:    10-04    142  |  94<L>  |  25<H>  ----------------------------<  80  4.5   |  34<H>  |  0.95    Ca    8.2<L>      04 Oct 2019 05:50                RADIOLOGY & ADDITIONAL TESTS:    Imaging Personally Reviewed:    Consultant(s) Notes Reviewed:      Care Discussed with Consultants/Other Providers: ortho    ***History Limited to due to:   [ ] Dementia   [ ] Delirium   [ ] Condition    ***Delirium screen:   Patient knows the day of the week: [x ] YES [ ] NO  Patient can state the days of the week or months of the year backwards: [ x] YES [ ] NO    ***Function:   [ ] Independent  [x ] Assistance  [ ] Total care  [ ] Non-ambulatory    ***Living Situation/home resources:  [x ] Lives alone  [ ] Lives with family  [ ] Has a home health aide    ***Advanced Directives:  [ ] DNR  [ ] No feeding tube  [ ] MOLST in chart  [ ] MOLST completed today   [ ] Unknown    ***Projected Discharge Plan:  [ ] Home  [x ] Rehab  [ ] SNF  [ ] Other living facility    ***Discussion:  [ ] Discharge plan discussed with patient and family Patient is a 84y old  Female who presents with a chief complaint of left hip fracture s/p fall 1D (04 Oct 2019 07:07)      SUBJECTIVE / OVERNIGHT EVENTS: Patient c/o increased SOB in a.m. with O2 Sat 85% on 3LNC. But able to speak in complete sentences and nonlabored respirations. O2 Sat improved to 88% after NC increased to 3.5L with improved symptoms. No cp, abdominal pain pain, N/V. She reports increased urination overnight after lasix started.     MEDICATIONS  (STANDING):  ALBUTerol    90 MICROgram(s) HFA Inhaler 1 Puff(s) Inhalation every 4 hours  aspirin enteric coated 325 milliGRAM(s) Oral two times a day  buDESOnide  80 MICROgram(s)/formoterol 4.5 MICROgram(s) Inhaler 2 Puff(s) Inhalation two times a day  cefuroxime   Tablet 250 milliGRAM(s) Oral every 12 hours  dextrose 50% Injectable 25 Gram(s) IV Push once  furosemide   Injectable 20 milliGRAM(s) IV Push daily  insulin glargine Injectable (LANTUS) 10 Unit(s) SubCutaneous at bedtime  insulin lispro (HumaLOG) corrective regimen sliding scale   SubCutaneous Before meals and at bedtime  insulin lispro Injectable (HumaLOG) 3 Unit(s) SubCutaneous three times a day before meals  levothyroxine 88 MICROGram(s) Oral daily  pantoprazole    Tablet 40 milliGRAM(s) Oral before breakfast  predniSONE   Tablet   Oral   predniSONE   Tablet 30 milliGRAM(s) Oral daily  QUEtiapine 25 milliGRAM(s) Oral at bedtime  tiotropium 18 MICROgram(s) Capsule 1 Capsule(s) Inhalation daily  tiotropium 18 MICROgram(s) Capsule 1 Capsule(s) Inhalation daily  tiotropium 18 MICROgram(s) Capsule 1 Capsule(s) Inhalation daily    MEDICATIONS  (PRN):  acetaminophen    Suspension .. 650 milliGRAM(s) Oral every 6 hours PRN Mild Pain (1 - 3)  ALBUTerol    0.083% 2.5 milliGRAM(s) Nebulizer every 6 hours PRN Shortness of Breath and/or Wheezing  ALBUTerol    90 MICROgram(s) HFA Inhaler 2 Puff(s) Inhalation every 6 hours PRN Bronchospasm  ALBUTerol/ipratropium for Nebulization 3 milliLiter(s) Nebulizer every 6 hours PRN Shortness of Breath  benzocaine 15 mG/menthol 3.6 mG (Sugar-Free) Lozenge 1 Lozenge Oral every 6 hours PRN Sore Throat  glucagon  Injectable 1 milliGRAM(s) IntraMuscular once PRN Glucose LESS THAN 70 milligrams/deciliter  haloperidol    Injectable 2.5 milliGRAM(s) IV Push every 6 hours PRN agitation      Vital Signs Last 24 Hrs  T(C): 36.9 (04 Oct 2019 06:33), Max: 36.9 (04 Oct 2019 01:49)  T(F): 98.4 (04 Oct 2019 06:33), Max: 98.5 (04 Oct 2019 01:49)  HR: 73 (04 Oct 2019 06:33) (65 - 85)  BP: 156/74 (04 Oct 2019 06:33) (151/69 - 169/71)  BP(mean): --  RR: 24 (04 Oct 2019 06:33) (23 - 24)  SpO2: 91% (04 Oct 2019 06:33) (88% - 94%)  CAPILLARY BLOOD GLUCOSE      POCT Blood Glucose.: 96 mg/dL (04 Oct 2019 07:30)  POCT Blood Glucose.: 149 mg/dL (03 Oct 2019 21:25)  POCT Blood Glucose.: 232 mg/dL (03 Oct 2019 17:05)  POCT Blood Glucose.: 245 mg/dL (03 Oct 2019 11:45)    I&O's Summary    03 Oct 2019 07:01  -  04 Oct 2019 07:00  --------------------------------------------------------  IN: 0 mL / OUT: 950 mL / NET: -950 mL        PHYSICAL EXAM:  GENERAL: NAD, well-developed  HEAD:  Atraumatic, Normocephalic  EYES: EOMI, PERRLA, conjunctiva and sclera clear  NECK: Supple, No JVD  CHEST/LUNG: decreased BS bilaterally; No wheeze  HEART: Regular rate and rhythm; No murmurs, rubs, or gallops  ABDOMEN: Soft, Nontender, Nondistended; Bowel sounds present  EXTREMITIES:  2+ Peripheral Pulses, No clubbing, cyanosis, or edema  PSYCH: AAOx3  NEUROLOGY: non-focal  SKIN: No rashes or lesions    LABS:    10-04    142  |  94<L>  |  25<H>  ----------------------------<  80  4.5   |  34<H>  |  0.95    Ca    8.2<L>      04 Oct 2019 05:50                RADIOLOGY & ADDITIONAL TESTS:    Imaging Personally Reviewed:    Consultant(s) Notes Reviewed:      Care Discussed with Consultants/Other Providers: ortho    ***History Limited to due to:   [ ] Dementia   [ ] Delirium   [ ] Condition    ***Delirium screen:   Patient knows the day of the week: [x ] YES [ ] NO  Patient can state the days of the week or months of the year backwards: [ x] YES [ ] NO    ***Function:   [ ] Independent  [x ] Assistance  [ ] Total care  [ ] Non-ambulatory    ***Living Situation/home resources:  [x ] Lives alone  [ ] Lives with family  [ ] Has a home health aide    ***Advanced Directives:  [ ] DNR  [ ] No feeding tube  [ ] MOLST in chart  [ ] MOLST completed today   [ ] Unknown    ***Projected Discharge Plan:  [ ] Home  [x ] Rehab  [ ] SNF  [ ] Other living facility    ***Discussion:  [ ] Discharge plan discussed with patient and family

## 2019-10-04 NOTE — PROGRESS NOTE ADULT - ASSESSMENT
85 yo female with PMH of HTN, Hypothyroid, DMII, CKD stage 3, COPD c/w chronic hypoxic respiratory failure on 3L home O2 present after mechanical fall c/o left hip pain found to have L femoral neck fracture, s/p left hip hemiarthroplasty (), developed hypercarbic respiratory failure on POD#5 and intubated on , extubated on , currently found to have bilateral pleural effusions and pulmonary edema in the context of multiple recent IVF boluses. Currently diuresing well.     # Hypercarbic and hypoxemic respiratory failure  - Patient currently s/p intubation for hypercarbic respiratory failure likely 2/2 hypoventilation 2/2 opiate medication in the context of possible COPD exacerbation.   - Currently patient has uncompensated metabolic alkalosis 2/2 respiratory acidosis that is currently resolving faster than bicarb can downtrend. She is also hypoxemia on 3L NC: AB.49/57/53  - Hypoxemia likely 2/2 to combination between established V/Q mismatch from COPD and pulmonary edema with associated basal atelectasis. Treatment should focus on decreasing V/Q inequality as best as possible  - Recommend 20mg IV lasix for diuresis. Please have patient on standing 20mg lasix IVP. Will re-assess daily.  - Please check and replete lytes daily: K>4, Mg>2.  - Currently w/o wheezing or sputum production. Has received 5d of steroids. Patient not clinically in COPD exacerbation. Can taper prednisone quickly: decrease by 20mg daily  - Please have patient on symbicort and spiriva  - albuterol q6h (NOT DUONEBS) standing until hypoxemia resolves  - No need for Bilevel NIV at this time or overnight  - Titrate supplemental O2 to Spo2 > 88% and less than 94%. Recommend using nail-polish remover to obtain reliable SpO2 saturations.  - If patient does not require protonix, can discontinue at this time  - Patient needs to us IS 10x/hour daily to prevent post-operative and now post-ICU PNA  - Abx per primary team        Roque Ozuna MD  PGY-5  Pulmonary and Critical Care Fellow  Pager: [phone]

## 2019-10-04 NOTE — PROGRESS NOTE ADULT - PROBLEM SELECTOR PLAN 5
UA positive  s/p correa  F/U urine Cx  c/w ceftin 250mg bid D#3/3 UA positive  s/p correa  urine Cx contaminated so repeat UA  c/w ceftin 250mg bid D#3/3

## 2019-10-04 NOTE — PROGRESS NOTE ADULT - PROBLEM SELECTOR PLAN 4
FS improved.   -hold home metformin  - c/w Humalog 3 Units qAC and Lantus 10 Units qHs FS improved but still elevated  -hold home metformin  Increase Humalog to 4 Units qAc  - c/w Lantus 10 Units qHs

## 2019-10-04 NOTE — PROGRESS NOTE ADULT - SUBJECTIVE AND OBJECTIVE BOX
Pt seen/examined. Doing well. Pain controlled. No acute overnight complaints or events.    T(C): 36.9 (10-04-19 @ 01:49), Max: 36.9 (10-04-19 @ 01:49)  HR: 65 (10-04-19 @ 01:49) (65 - 85)  BP: 169/71 (10-04-19 @ 01:49) (151/69 - 169/71)  RR: 24 (10-04-19 @ 01:49) (23 - 24)  SpO2: 94% (10-04-19 @ 01:49) (88% - 94%)  Wt(kg): --    - Gen: NAD  - LLE: Dressing C/D/I; SILT TN/SPN/DPN/SN; TA?EHL/gs intact    84yFemale s/p L HHA    - Pain control  - DVT ppx  - OOB/PT  - Staples out today  - DC planning

## 2019-10-05 LAB
ANION GAP SERPL CALC-SCNC: 11 MMO/L — SIGNIFICANT CHANGE UP (ref 7–14)
BASE EXCESS BLDA CALC-SCNC: 21.6 MMOL/L — SIGNIFICANT CHANGE UP
BUN SERPL-MCNC: 30 MG/DL — HIGH (ref 7–23)
CALCIUM SERPL-MCNC: 7.8 MG/DL — LOW (ref 8.4–10.5)
CHLORIDE SERPL-SCNC: 93 MMOL/L — LOW (ref 98–107)
CO2 SERPL-SCNC: 38 MMOL/L — HIGH (ref 22–31)
CREAT SERPL-MCNC: 0.92 MG/DL — SIGNIFICANT CHANGE UP (ref 0.5–1.3)
GLUCOSE BLDA-MCNC: 117 MG/DL — HIGH (ref 70–99)
GLUCOSE BLDC GLUCOMTR-MCNC: 111 MG/DL — HIGH (ref 70–99)
GLUCOSE BLDC GLUCOMTR-MCNC: 229 MG/DL — HIGH (ref 70–99)
GLUCOSE BLDC GLUCOMTR-MCNC: 245 MG/DL — HIGH (ref 70–99)
GLUCOSE BLDC GLUCOMTR-MCNC: 266 MG/DL — HIGH (ref 70–99)
GLUCOSE SERPL-MCNC: 123 MG/DL — HIGH (ref 70–99)
HCO3 BLDA-SCNC: 44 MMOL/L — HIGH (ref 22–26)
HCT VFR BLDA CALC: 32.9 % — LOW (ref 34.5–46.5)
HGB BLDA-MCNC: 10.6 G/DL — LOW (ref 11.5–15.5)
PCO2 BLDA: 70 MMHG — CRITICAL HIGH (ref 32–48)
PH BLDA: 7.44 PH — SIGNIFICANT CHANGE UP (ref 7.35–7.45)
PO2 BLDA: 64 MMHG — LOW (ref 83–108)
POTASSIUM BLDA-SCNC: 3.5 MMOL/L — SIGNIFICANT CHANGE UP (ref 3.4–4.5)
POTASSIUM SERPL-MCNC: 4 MMOL/L — SIGNIFICANT CHANGE UP (ref 3.5–5.3)
POTASSIUM SERPL-SCNC: 4 MMOL/L — SIGNIFICANT CHANGE UP (ref 3.5–5.3)
SAO2 % BLDA: 94.1 % — LOW (ref 95–99)
SODIUM BLDA-SCNC: 140 MMOL/L — SIGNIFICANT CHANGE UP (ref 136–146)
SODIUM SERPL-SCNC: 142 MMOL/L — SIGNIFICANT CHANGE UP (ref 135–145)

## 2019-10-05 PROCEDURE — 99233 SBSQ HOSP IP/OBS HIGH 50: CPT

## 2019-10-05 RX ADMIN — Medication 250 MILLIGRAM(S): at 06:37

## 2019-10-05 RX ADMIN — Medication 3 UNIT(S): at 17:30

## 2019-10-05 RX ADMIN — PANTOPRAZOLE SODIUM 40 MILLIGRAM(S): 20 TABLET, DELAYED RELEASE ORAL at 06:37

## 2019-10-05 RX ADMIN — Medication 88 MICROGRAM(S): at 06:36

## 2019-10-05 RX ADMIN — Medication 6: at 12:27

## 2019-10-05 RX ADMIN — Medication 20 MILLIGRAM(S): at 06:37

## 2019-10-05 RX ADMIN — Medication 3 UNIT(S): at 12:26

## 2019-10-05 RX ADMIN — INSULIN GLARGINE 10 UNIT(S): 100 INJECTION, SOLUTION SUBCUTANEOUS at 21:49

## 2019-10-05 RX ADMIN — Medication 325 MILLIGRAM(S): at 17:30

## 2019-10-05 RX ADMIN — QUETIAPINE FUMARATE 25 MILLIGRAM(S): 200 TABLET, FILM COATED ORAL at 21:49

## 2019-10-05 RX ADMIN — Medication 4: at 17:30

## 2019-10-05 RX ADMIN — TIOTROPIUM BROMIDE 1 CAPSULE(S): 18 CAPSULE ORAL; RESPIRATORY (INHALATION) at 11:58

## 2019-10-05 RX ADMIN — Medication 30 MILLIGRAM(S): at 06:36

## 2019-10-05 RX ADMIN — BUDESONIDE AND FORMOTEROL FUMARATE DIHYDRATE 2 PUFF(S): 160; 4.5 AEROSOL RESPIRATORY (INHALATION) at 11:58

## 2019-10-05 RX ADMIN — Medication 3 UNIT(S): at 08:09

## 2019-10-05 RX ADMIN — Medication 325 MILLIGRAM(S): at 06:36

## 2019-10-05 RX ADMIN — Medication 4: at 21:49

## 2019-10-05 RX ADMIN — BUDESONIDE AND FORMOTEROL FUMARATE DIHYDRATE 2 PUFF(S): 160; 4.5 AEROSOL RESPIRATORY (INHALATION) at 21:49

## 2019-10-05 NOTE — PROGRESS NOTE ADULT - ASSESSMENT
85 yo female with PMH of HTN, Hypothyroid, DMII, CKD stage 3, COPD c/w chronic hypoxic respiratory failure on 3L home O2 present after mechanical fall c/o left hip pain found to have L femoral neck fracture, s/p left hip hemiarthroplasty (9/20), developed hypercarbic respiratory failure on POD#5 and intubated on 9/26, extubated on 9/28, currently found to have bilateral pleural effusions and pulmonary edema in the context of multiple recent IVF boluses. Currently diuresing well.     # Hypercarbic and hypoxemic respiratory failure - resolved  - Hypoxemia was likely 2/2 to combination between established V/Q mismatch from COPD and pulmonary edema with associated basal atelectasis. Treatment should focus on decreasing V/Q inequality as best as possible  - At this time, please stop lasix as patient is near her euvolemic baseline  - make sure that steroids have been stopped  - c/w symbicort and spiriva  - Titrate supplemental O2 to Spo2 > 88% and less than 94%. Recommend using nail-polish remover to obtain reliable SpO2 saturations.  - Patient needs to us IS 10x/hour daily to prevent post-operative and now post-ICU PNA  - Abx per primary team    Will sign off this case for now. Please feel free to re-consult if there are any further management questions that we can help with.        Roque Ozuna MD  PGY-5  Pulmonary and Critical Care Fellow  Pager: [phone]

## 2019-10-05 NOTE — PROGRESS NOTE ADULT - ASSESSMENT
83 yo female w/ hx COPD c/b chronic respiratory failure on 3LNC at home, HTN, DM2, Hypothyroidism, CKD stage 3 p/w mechanical fall found to have L femoral neck fracture, s/p left hip hemiarthroplasty, POD 12. Hospital course c/b acute hypercarbic respiratory failure requiring intubation,  IV steroids, and SICU stay.

## 2019-10-05 NOTE — PROGRESS NOTE ADULT - SUBJECTIVE AND OBJECTIVE BOX
Pt seen and examined. Doing very well, getting oob, ambulating with walker. No complaints of hip pain at all. Has been getting steroids for COPD flare. Currently still on O2. WBC elevated to 18 yesterday morning, most likely due to steroid use. Afebrile.    LLE: NVI. +very tiny spotting of fibrinous drainage on dressing. This was changed. Underlying steristrips are intact.

## 2019-10-05 NOTE — PROGRESS NOTE ADULT - PROBLEM SELECTOR PLAN 2
On Prednisone taper.   - encouraged incentive spirometry  -Maintain Spo2 88-92   -Pulmonary consult appreciated. Lasix 20mg IV qdaily started for pleural effusions and to help w/ hypoxemia. Monitoring now off bipap.   -c/w spiriva, budesonide

## 2019-10-05 NOTE — PROGRESS NOTE ADULT - SUBJECTIVE AND OBJECTIVE BOX
CHIEF COMPLAINT:    Interval Events:    REVIEW OF SYSTEMS:  CONSTITUTIONAL: No weakness, fevers or chills  EYES/ENT: No visual changes;  No vertigo or throat pain   NECK: No pain or stiffness  RESPIRATORY: No cough, wheezing, hemoptysis; No shortness of breath  CARDIOVASCULAR: No chest pain or palpitations  GASTROINTESTINAL: No abdominal or epigastric pain. No nausea, vomiting, or hematemesis; No diarrhea or constipation. No melena or hematochezia.  GENITOURINARY: No dysuria, frequency or hematuria  NEUROLOGICAL: No numbness or weakness  SKIN: No itching, burning, rashes, or lesions   All other review of systems is negative unless indicated above.    OBJECTIVE:  ICU Vital Signs Last 24 Hrs  T(C): 36.9 (05 Oct 2019 13:55), Max: 37.2 (04 Oct 2019 21:21)  T(F): 98.5 (05 Oct 2019 13:55), Max: 98.9 (04 Oct 2019 21:21)  HR: 84 (05 Oct 2019 13:55) (69 - 84)  BP: 131/42 (05 Oct 2019 13:55) (131/42 - 158/67)  BP(mean): --  ABP: --  ABP(mean): --  RR: 24 (05 Oct 2019 13:55) (24 - 26)  SpO2: 93% (05 Oct 2019 13:55) (91% - 98%)        10-04 @ :  -  10-05 @ 07:00  --------------------------------------------------------  IN: 720 mL / OUT: 1440 mL / NET: -720 mL    10-05 @ 07:  -  10-05 @ 16:36  --------------------------------------------------------  IN: 0 mL / OUT: 300 mL / NET: -300 mL      CAPILLARY BLOOD GLUCOSE      POCT Blood Glucose.: 266 mg/dL (05 Oct 2019 12:07)      PHYSICAL EXAM:  General: WN/WD NAD  Neurology: A&Ox3, nonfocal, MOCTEZUMA x 4  Eyes: PERRLA/ EOMI, Gross vision intact  ENT/Neck: Neck supple, trachea midline, No JVD, Gross hearing intact  Respiratory: CTA B/L, No wheezing, rales, rhonchi  CV: RRR, +S1/S2, -S3/S4, no murmurs, rubs or gallops  Abdominal: Soft, NT, ND +BS, No HSM  MSK: 5/5 strength UE/LE bilaterally  Extremities: No edema, 2+ peripheral pulses  Skin: No Rashes, Hematoma, Ecchymosis  Incisions:   Tubes:    HOSPITAL MEDICATIONS:  MEDICATIONS  (STANDING):  ALBUTerol    90 MICROgram(s) HFA Inhaler 1 Puff(s) Inhalation every 4 hours  aspirin enteric coated 325 milliGRAM(s) Oral two times a day  buDESOnide  80 MICROgram(s)/formoterol 4.5 MICROgram(s) Inhaler 2 Puff(s) Inhalation two times a day  dextrose 50% Injectable 25 Gram(s) IV Push once  furosemide   Injectable 20 milliGRAM(s) IV Push daily  insulin glargine Injectable (LANTUS) 10 Unit(s) SubCutaneous at bedtime  insulin lispro (HumaLOG) corrective regimen sliding scale   SubCutaneous Before meals and at bedtime  insulin lispro Injectable (HumaLOG) 3 Unit(s) SubCutaneous three times a day before meals  levothyroxine 88 MICROGram(s) Oral daily  pantoprazole    Tablet 40 milliGRAM(s) Oral before breakfast  predniSONE   Tablet   Oral   QUEtiapine 25 milliGRAM(s) Oral at bedtime  tiotropium 18 MICROgram(s) Capsule 1 Capsule(s) Inhalation daily  tiotropium 18 MICROgram(s) Capsule 1 Capsule(s) Inhalation daily  tiotropium 18 MICROgram(s) Capsule 1 Capsule(s) Inhalation daily    MEDICATIONS  (PRN):  acetaminophen    Suspension .. 650 milliGRAM(s) Oral every 6 hours PRN Mild Pain (1 - 3)  ALBUTerol    0.083% 2.5 milliGRAM(s) Nebulizer every 6 hours PRN Shortness of Breath and/or Wheezing  ALBUTerol    90 MICROgram(s) HFA Inhaler 2 Puff(s) Inhalation every 6 hours PRN Bronchospasm  ALBUTerol/ipratropium for Nebulization 3 milliLiter(s) Nebulizer every 6 hours PRN Shortness of Breath  benzocaine 15 mG/menthol 3.6 mG (Sugar-Free) Lozenge 1 Lozenge Oral every 6 hours PRN Sore Throat  glucagon  Injectable 1 milliGRAM(s) IntraMuscular once PRN Glucose LESS THAN 70 milligrams/deciliter  haloperidol    Injectable 2.5 milliGRAM(s) IV Push every 6 hours PRN agitation      LABS:                        11.4   18.53 )-----------( 435      ( 04 Oct 2019 09:00 )             38.7     Hgb Trend: 11.4<--, 9.2<--, 8.0<--, 8.3<--, 8.3<--  10-05    142  |  93<L>  |  30<H>  ----------------------------<  123<H>  4.0   |  38<H>  |  0.92    Ca    7.8<L>      05 Oct 2019 06:06      Creatinine Trend: 0.92<--, 0.95<--, 0.92<--, 1.04<--, 1.06<--, 1.13<--    Urinalysis Basic - ( 04 Oct 2019 13:08 )    Color: LIGHT YELLOW / Appearance: CLEAR / S.012 / pH: 7.0  Gluc: NEGATIVE / Ketone: NEGATIVE  / Bili: NEGATIVE / Urobili: NORMAL   Blood: NEGATIVE / Protein: 20 / Nitrite: NEGATIVE   Leuk Esterase: NEGATIVE / RBC: 0-2 / WBC 0-2   Sq Epi: OCC / Non Sq Epi: x / Bacteria: NEGATIVE      Arterial Blood Gas:  10-05 @ 07:50  7.44/70/64/44/94.1/21.6  ABG lactate: --        MICROBIOLOGY:       RADIOLOGY:  [ ] Reviewed by me    PULMONARY FUNCTION TESTS:    EKG: CHIEF COMPLAINT:    Interval Events: Currently breathing well. No overnight events. ABG in AM demonstrates compensated hypercarbia. BMP shows elevated bicarb.     REVIEW OF SYSTEMS:  CONSTITUTIONAL: No weakness, fevers or chills  EYES/ENT: No visual changes;  No vertigo or throat pain   NECK: No pain or stiffness  RESPIRATORY: No cough, wheezing, hemoptysis; No shortness of breath  CARDIOVASCULAR: No chest pain or palpitations  GASTROINTESTINAL: No abdominal or epigastric pain. No nausea, vomiting, or hematemesis; No diarrhea or constipation. No melena or hematochezia.  GENITOURINARY: No dysuria, frequency or hematuria  NEUROLOGICAL: No numbness or weakness  SKIN: No itching, burning, rashes, or lesions   All other review of systems is negative unless indicated above.    OBJECTIVE:  ICU Vital Signs Last 24 Hrs  T(C): 36.9 (05 Oct 2019 13:55), Max: 37.2 (04 Oct 2019 21:21)  T(F): 98.5 (05 Oct 2019 13:55), Max: 98.9 (04 Oct 2019 21:21)  HR: 84 (05 Oct 2019 13:55) (69 - 84)  BP: 131/42 (05 Oct 2019 13:55) (131/42 - 158/67)  BP(mean): --  ABP: --  ABP(mean): --  RR: 24 (05 Oct 2019 13:55) (24 - 26)  SpO2: 93% (05 Oct 2019 13:55) (91% - 98%)        10-04 @ :01  -  10-05 @ 07:00  --------------------------------------------------------  IN: 720 mL / OUT: 1440 mL / NET: -720 mL    10-05 @ :01  -  10-05 @ 16:36  --------------------------------------------------------  IN: 0 mL / OUT: 300 mL / NET: -300 mL      CAPILLARY BLOOD GLUCOSE      POCT Blood Glucose.: 266 mg/dL (05 Oct 2019 12:07)      PHYSICAL EXAM:  General: WN/WD NAD  Neurology: A&Ox3, nonfocal, MOCTEZUMA x 4  Eyes: PERRLA/ EOMI, Gross vision intact  ENT/Neck: Neck supple, trachea midline, No JVD, Gross hearing intact  Respiratory: CTA B/L, No wheezing, rales, rhonchi  CV: RRR, +S1/S2, -S3/S4, no murmurs, rubs or gallops  Abdominal: Soft, NT, ND +BS, No HSM  MSK: 5/5 strength UE/LE bilaterally  Extremities: No edema, 2+ peripheral pulses  Skin: No Rashes, Hematoma, Ecchymosis      HOSPITAL MEDICATIONS:  MEDICATIONS  (STANDING):  ALBUTerol    90 MICROgram(s) HFA Inhaler 1 Puff(s) Inhalation every 4 hours  aspirin enteric coated 325 milliGRAM(s) Oral two times a day  buDESOnide  80 MICROgram(s)/formoterol 4.5 MICROgram(s) Inhaler 2 Puff(s) Inhalation two times a day  dextrose 50% Injectable 25 Gram(s) IV Push once  furosemide   Injectable 20 milliGRAM(s) IV Push daily  insulin glargine Injectable (LANTUS) 10 Unit(s) SubCutaneous at bedtime  insulin lispro (HumaLOG) corrective regimen sliding scale   SubCutaneous Before meals and at bedtime  insulin lispro Injectable (HumaLOG) 3 Unit(s) SubCutaneous three times a day before meals  levothyroxine 88 MICROGram(s) Oral daily  pantoprazole    Tablet 40 milliGRAM(s) Oral before breakfast  predniSONE   Tablet   Oral   QUEtiapine 25 milliGRAM(s) Oral at bedtime  tiotropium 18 MICROgram(s) Capsule 1 Capsule(s) Inhalation daily  tiotropium 18 MICROgram(s) Capsule 1 Capsule(s) Inhalation daily  tiotropium 18 MICROgram(s) Capsule 1 Capsule(s) Inhalation daily    MEDICATIONS  (PRN):  acetaminophen    Suspension .. 650 milliGRAM(s) Oral every 6 hours PRN Mild Pain (1 - 3)  ALBUTerol    0.083% 2.5 milliGRAM(s) Nebulizer every 6 hours PRN Shortness of Breath and/or Wheezing  ALBUTerol    90 MICROgram(s) HFA Inhaler 2 Puff(s) Inhalation every 6 hours PRN Bronchospasm  ALBUTerol/ipratropium for Nebulization 3 milliLiter(s) Nebulizer every 6 hours PRN Shortness of Breath  benzocaine 15 mG/menthol 3.6 mG (Sugar-Free) Lozenge 1 Lozenge Oral every 6 hours PRN Sore Throat  glucagon  Injectable 1 milliGRAM(s) IntraMuscular once PRN Glucose LESS THAN 70 milligrams/deciliter  haloperidol    Injectable 2.5 milliGRAM(s) IV Push every 6 hours PRN agitation      LABS:                        11.4   18.53 )-----------( 435      ( 04 Oct 2019 09:00 )             38.7     Hgb Trend: 11.4<--, 9.2<--, 8.0<--, 8.3<--, 8.3<--  10-05    142  |  93<L>  |  30<H>  ----------------------------<  123<H>  4.0   |  38<H>  |  0.92    Ca    7.8<L>      05 Oct 2019 06:06      Creatinine Trend: 0.92<--, 0.95<--, 0.92<--, 1.04<--, 1.06<--, 1.13<--    Urinalysis Basic - ( 04 Oct 2019 13:08 )    Color: LIGHT YELLOW / Appearance: CLEAR / S.012 / pH: 7.0  Gluc: NEGATIVE / Ketone: NEGATIVE  / Bili: NEGATIVE / Urobili: NORMAL   Blood: NEGATIVE / Protein: 20 / Nitrite: NEGATIVE   Leuk Esterase: NEGATIVE / RBC: 0-2 / WBC 0-2   Sq Epi: OCC / Non Sq Epi: x / Bacteria: NEGATIVE      Arterial Blood Gas:  10-05 @ 07:50  7.44/70/64/44/94.1/21.6  ABG lactate: --

## 2019-10-05 NOTE — PROGRESS NOTE ADULT - SUBJECTIVE AND OBJECTIVE BOX
Patient is a 84y old  Female who presents with a chief complaint of left hip fracture s/p fall 1D (05 Oct 2019 07:47)      SUBJECTIVE / OVERNIGHT EVENTS:    No events overnight. This AM, patient without f/c/n/v/d/cp/sob.    MEDICATIONS  (STANDING):  ALBUTerol    90 MICROgram(s) HFA Inhaler 1 Puff(s) Inhalation every 4 hours  aspirin enteric coated 325 milliGRAM(s) Oral two times a day  buDESOnide  80 MICROgram(s)/formoterol 4.5 MICROgram(s) Inhaler 2 Puff(s) Inhalation two times a day  dextrose 50% Injectable 25 Gram(s) IV Push once  furosemide   Injectable 20 milliGRAM(s) IV Push daily  insulin glargine Injectable (LANTUS) 10 Unit(s) SubCutaneous at bedtime  insulin lispro (HumaLOG) corrective regimen sliding scale   SubCutaneous Before meals and at bedtime  insulin lispro Injectable (HumaLOG) 3 Unit(s) SubCutaneous three times a day before meals  levothyroxine 88 MICROGram(s) Oral daily  pantoprazole    Tablet 40 milliGRAM(s) Oral before breakfast  predniSONE   Tablet   Oral   QUEtiapine 25 milliGRAM(s) Oral at bedtime  tiotropium 18 MICROgram(s) Capsule 1 Capsule(s) Inhalation daily  tiotropium 18 MICROgram(s) Capsule 1 Capsule(s) Inhalation daily  tiotropium 18 MICROgram(s) Capsule 1 Capsule(s) Inhalation daily    MEDICATIONS  (PRN):  acetaminophen    Suspension .. 650 milliGRAM(s) Oral every 6 hours PRN Mild Pain (1 - 3)  ALBUTerol    0.083% 2.5 milliGRAM(s) Nebulizer every 6 hours PRN Shortness of Breath and/or Wheezing  ALBUTerol    90 MICROgram(s) HFA Inhaler 2 Puff(s) Inhalation every 6 hours PRN Bronchospasm  ALBUTerol/ipratropium for Nebulization 3 milliLiter(s) Nebulizer every 6 hours PRN Shortness of Breath  benzocaine 15 mG/menthol 3.6 mG (Sugar-Free) Lozenge 1 Lozenge Oral every 6 hours PRN Sore Throat  glucagon  Injectable 1 milliGRAM(s) IntraMuscular once PRN Glucose LESS THAN 70 milligrams/deciliter  haloperidol    Injectable 2.5 milliGRAM(s) IV Push every 6 hours PRN agitation      PHYSICAL EXAM:  T(C): 36.6 (10-05-19 @ 10:11), Max: 37.2 (10-04-19 @ 21:21)  HR: 78 (10-05-19 @ 10:11) (69 - 87)  BP: 137/47 (10-05-19 @ 10:11) (137/47 - 158/67)  RR: 24 (10-05-19 @ 10:11) (22 - 26)  SpO2: 92% (10-05-19 @ 10:11) (90% - 98%)  I&O's Summary    04 Oct 2019 07:  -  05 Oct 2019 07:00  --------------------------------------------------------  IN: 720 mL / OUT: 1440 mL / NET: -720 mL    05 Oct 2019 07:  -  05 Oct 2019 11:46  --------------------------------------------------------  IN: 0 mL / OUT: 300 mL / NET: -300 mL      GENERAL: NAD, well-developed, using O2 via NC  CHEST/LUNG: Clear to auscultation bilaterally; No wheeze; breathing with supplemental O2 via NC  HEART: Regular rate and rhythm; No murmurs, rubs, or gallops  ABDOMEN: Soft, Nontender, Nondistended; Bowel sounds present  EXTREMITIES:  2+ Peripheral Pulses, No clubbing, cyanosis, or edema; L leg dressing c/d/i  PSYCH: AAOx3  NEUROLOGY: CN II-XII grossly intact, moving all extremities  SKIN: No rashes or lesions    LABS:  CAPILLARY BLOOD GLUCOSE      POCT Blood Glucose.: 111 mg/dL (05 Oct 2019 07:52)  POCT Blood Glucose.: 133 mg/dL (04 Oct 2019 21:18)  POCT Blood Glucose.: 266 mg/dL (04 Oct 2019 16:53)  POCT Blood Glucose.: 232 mg/dL (04 Oct 2019 11:58)                          11.4   18.53 )-----------( 435      ( 04 Oct 2019 09:00 )             38.7     10-05    142  |  93<L>  |  30<H>  ----------------------------<  123<H>  4.0   |  38<H>  |  0.92    Ca    7.8<L>      05 Oct 2019 06:06            Urinalysis Basic - ( 04 Oct 2019 13:08 )    Color: LIGHT YELLOW / Appearance: CLEAR / S.012 / pH: 7.0  Gluc: NEGATIVE / Ketone: NEGATIVE  / Bili: NEGATIVE / Urobili: NORMAL   Blood: NEGATIVE / Protein: 20 / Nitrite: NEGATIVE   Leuk Esterase: NEGATIVE / RBC: 0-2 / WBC 0-2   Sq Epi: OCC / Non Sq Epi: x / Bacteria: NEGATIVE        RADIOLOGY & ADDITIONAL TESTS:    Telemetry Personally Reviewed -     Imaging Personally Reviewed -     Imaging Reviewed -     Consultant(s) Notes Reviewed -       Care Discussed with Consultants/Other Providers -

## 2019-10-05 NOTE — PROGRESS NOTE ADULT - SUBJECTIVE AND OBJECTIVE BOX
Pt seen/examined. Doing well. Pain controlled. No acute overnight complaints or events.    Vitals 24hrs  Vital Signs Last 24 Hrs  T(C): 36.8 (05 Oct 2019 06:03), Max: 37.2 (04 Oct 2019 21:21)  T(F): 98.3 (05 Oct 2019 06:03), Max: 98.9 (04 Oct 2019 21:21)  HR: 69 (05 Oct 2019 06:03) (69 - 87)  BP: 158/67 (05 Oct 2019 06:03) (140/57 - 158/67)  BP(mean): --  RR: 24 (05 Oct 2019 06:03) (22 - 26)  SpO2: 98% (05 Oct 2019 06:03) (90% - 98%)      10-04-19 @ 07:01  -  10-05-19 @ 07:00  --------------------------------------------------------  IN: 720 mL / OUT: 1440 mL / NET: -720 mL        Lab Results 24hrs:                        11.4   18.53 )-----------( 435      ( 04 Oct 2019 09:00 )             38.7     10-05    142  |  93<L>  |  30<H>  ----------------------------<  123<H>  4.0   |  38<H>  |  0.92    Ca    7.8<L>      05 Oct 2019 06:06        - Gen: NAD  - LLE: Dressing C/D/I; SILT TN/SPN/DPN/SN; TA/EHL/gs intact    84yFemale s/p L HHA    - Pain control  - DVT ppx  - OOB/PT  - Follow up AM ABG  - DC planning: Rehab 10/7

## 2019-10-06 LAB
ANION GAP SERPL CALC-SCNC: 9 MMO/L — SIGNIFICANT CHANGE UP (ref 7–14)
BUN SERPL-MCNC: 31 MG/DL — HIGH (ref 7–23)
CALCIUM SERPL-MCNC: 8.5 MG/DL — SIGNIFICANT CHANGE UP (ref 8.4–10.5)
CHLORIDE SERPL-SCNC: 91 MMOL/L — LOW (ref 98–107)
CO2 SERPL-SCNC: 45 MMOL/L — CRITICAL HIGH (ref 22–31)
CREAT SERPL-MCNC: 0.94 MG/DL — SIGNIFICANT CHANGE UP (ref 0.5–1.3)
GLUCOSE BLDC GLUCOMTR-MCNC: 161 MG/DL — HIGH (ref 70–99)
GLUCOSE BLDC GLUCOMTR-MCNC: 203 MG/DL — HIGH (ref 70–99)
GLUCOSE BLDC GLUCOMTR-MCNC: 214 MG/DL — HIGH (ref 70–99)
GLUCOSE BLDC GLUCOMTR-MCNC: 90 MG/DL — SIGNIFICANT CHANGE UP (ref 70–99)
GLUCOSE SERPL-MCNC: 90 MG/DL — SIGNIFICANT CHANGE UP (ref 70–99)
HCT VFR BLD CALC: 37.7 % — SIGNIFICANT CHANGE UP (ref 34.5–45)
HGB BLD-MCNC: 11.3 G/DL — LOW (ref 11.5–15.5)
MCHC RBC-ENTMCNC: 30 % — LOW (ref 32–36)
MCHC RBC-ENTMCNC: 30.8 PG — SIGNIFICANT CHANGE UP (ref 27–34)
MCV RBC AUTO: 102.7 FL — HIGH (ref 80–100)
NRBC # FLD: 0 K/UL — SIGNIFICANT CHANGE UP (ref 0–0)
PLATELET # BLD AUTO: 369 K/UL — SIGNIFICANT CHANGE UP (ref 150–400)
PMV BLD: 8.7 FL — SIGNIFICANT CHANGE UP (ref 7–13)
POTASSIUM SERPL-MCNC: 3.8 MMOL/L — SIGNIFICANT CHANGE UP (ref 3.5–5.3)
POTASSIUM SERPL-SCNC: 3.8 MMOL/L — SIGNIFICANT CHANGE UP (ref 3.5–5.3)
RBC # BLD: 3.67 M/UL — LOW (ref 3.8–5.2)
RBC # FLD: 13.7 % — SIGNIFICANT CHANGE UP (ref 10.3–14.5)
SODIUM SERPL-SCNC: 145 MMOL/L — SIGNIFICANT CHANGE UP (ref 135–145)
WBC # BLD: 12.06 K/UL — HIGH (ref 3.8–10.5)
WBC # FLD AUTO: 12.06 K/UL — HIGH (ref 3.8–10.5)

## 2019-10-06 PROCEDURE — 99233 SBSQ HOSP IP/OBS HIGH 50: CPT

## 2019-10-06 RX ORDER — ACETAZOLAMIDE 250 MG/1
500 TABLET ORAL ONCE
Refills: 0 | Status: COMPLETED | OUTPATIENT
Start: 2019-10-06 | End: 2019-10-06

## 2019-10-06 RX ADMIN — Medication 2: at 23:42

## 2019-10-06 RX ADMIN — Medication 4: at 12:16

## 2019-10-06 RX ADMIN — Medication 4: at 17:01

## 2019-10-06 RX ADMIN — Medication 88 MICROGRAM(S): at 06:26

## 2019-10-06 RX ADMIN — PANTOPRAZOLE SODIUM 40 MILLIGRAM(S): 20 TABLET, DELAYED RELEASE ORAL at 06:26

## 2019-10-06 RX ADMIN — BUDESONIDE AND FORMOTEROL FUMARATE DIHYDRATE 2 PUFF(S): 160; 4.5 AEROSOL RESPIRATORY (INHALATION) at 12:16

## 2019-10-06 RX ADMIN — Medication 3 UNIT(S): at 12:16

## 2019-10-06 RX ADMIN — TIOTROPIUM BROMIDE 1 CAPSULE(S): 18 CAPSULE ORAL; RESPIRATORY (INHALATION) at 12:17

## 2019-10-06 RX ADMIN — Medication 20 MILLIGRAM(S): at 06:26

## 2019-10-06 RX ADMIN — QUETIAPINE FUMARATE 25 MILLIGRAM(S): 200 TABLET, FILM COATED ORAL at 21:29

## 2019-10-06 RX ADMIN — INSULIN GLARGINE 10 UNIT(S): 100 INJECTION, SOLUTION SUBCUTANEOUS at 23:42

## 2019-10-06 RX ADMIN — Medication 3 UNIT(S): at 17:01

## 2019-10-06 RX ADMIN — Medication 3 UNIT(S): at 08:12

## 2019-10-06 RX ADMIN — ACETAZOLAMIDE 500 MILLIGRAM(S): 250 TABLET ORAL at 18:56

## 2019-10-06 RX ADMIN — Medication 325 MILLIGRAM(S): at 06:26

## 2019-10-06 RX ADMIN — BUDESONIDE AND FORMOTEROL FUMARATE DIHYDRATE 2 PUFF(S): 160; 4.5 AEROSOL RESPIRATORY (INHALATION) at 21:29

## 2019-10-06 RX ADMIN — Medication 325 MILLIGRAM(S): at 17:02

## 2019-10-06 NOTE — PROGRESS NOTE ADULT - PROBLEM SELECTOR PLAN 2
On Prednisone taper.   - encouraged incentive spirometry  -Maintain Spo2 88-92   -Pulmonary consult appreciated. Euvolemic. Monitoring now off bipap.   -c/w spiriva, budesonide

## 2019-10-06 NOTE — PROVIDER CONTACT NOTE (CRITICAL VALUE NOTIFICATION) - RECOMMENDATIONS
pulmonary toileting, Incentive spirometer, proper positioning
Incentive spirometer use encouraged
consider SICU consult for hypercarbia - patient unable to protect own airway at this betty due to lethargy

## 2019-10-06 NOTE — PROGRESS NOTE ADULT - SUBJECTIVE AND OBJECTIVE BOX
Orthopedic Surgery Progress Note    Patient seen and examined this morning. No acute events overnight. Pain is well controlled.     O:  Vital Signs Last 24 Hrs  T(C): 36.8 (06 Oct 2019 01:14), Max: 36.9 (05 Oct 2019 13:55)  T(F): 98.2 (06 Oct 2019 01:14), Max: 98.5 (05 Oct 2019 13:55)  HR: 69 (06 Oct 2019 01:14) (69 - 84)  BP: 129/49 (06 Oct 2019 01:14) (129/49 - 158/67)  BP(mean): --  RR: 18 (06 Oct 2019 01:14) (18 - 24)  SpO2: 96% (06 Oct 2019 01:14) (92% - 98%)    Gen: NAD  LLE  Dressing C/D/I. changed today  EHL/FHL/TA/GS intact  SILT DP/SP/WOO/Sa  WWP distally    Labs:                        11.4   18.53 )-----------( 435      ( 04 Oct 2019 09:00 )             38.7       10-05    142  |  93<L>  |  30<H>  ----------------------------<  123<H>  4.0   |  38<H>  |  0.92

## 2019-10-06 NOTE — PROVIDER CONTACT NOTE (CRITICAL VALUE NOTIFICATION) - ACTION/TREATMENT ORDERED:
none at this time
MD to contact SICU for consult.
Will get in contact with Pulmonology. No further actions at this time.

## 2019-10-06 NOTE — PROGRESS NOTE ADULT - SUBJECTIVE AND OBJECTIVE BOX
Patient is a 84y old  Female who presents with a chief complaint of left hip fracture s/p fall 1D (06 Oct 2019 05:27)      SUBJECTIVE / OVERNIGHT EVENTS:    No events overnight. This AM, patient without f/c/n/v/d/cp/sob.    MEDICATIONS  (STANDING):  acetazolamide    Tablet 500 milliGRAM(s) Oral once  ALBUTerol    90 MICROgram(s) HFA Inhaler 1 Puff(s) Inhalation every 4 hours  aspirin enteric coated 325 milliGRAM(s) Oral two times a day  buDESOnide  80 MICROgram(s)/formoterol 4.5 MICROgram(s) Inhaler 2 Puff(s) Inhalation two times a day  dextrose 50% Injectable 25 Gram(s) IV Push once  insulin glargine Injectable (LANTUS) 10 Unit(s) SubCutaneous at bedtime  insulin lispro (HumaLOG) corrective regimen sliding scale   SubCutaneous Before meals and at bedtime  insulin lispro Injectable (HumaLOG) 3 Unit(s) SubCutaneous three times a day before meals  levothyroxine 88 MICROGram(s) Oral daily  pantoprazole    Tablet 40 milliGRAM(s) Oral before breakfast  predniSONE   Tablet   Oral   predniSONE   Tablet 20 milliGRAM(s) Oral daily  QUEtiapine 25 milliGRAM(s) Oral at bedtime  tiotropium 18 MICROgram(s) Capsule 1 Capsule(s) Inhalation daily  tiotropium 18 MICROgram(s) Capsule 1 Capsule(s) Inhalation daily  tiotropium 18 MICROgram(s) Capsule 1 Capsule(s) Inhalation daily    MEDICATIONS  (PRN):  acetaminophen    Suspension .. 650 milliGRAM(s) Oral every 6 hours PRN Mild Pain (1 - 3)  ALBUTerol    0.083% 2.5 milliGRAM(s) Nebulizer every 6 hours PRN Shortness of Breath and/or Wheezing  ALBUTerol    90 MICROgram(s) HFA Inhaler 2 Puff(s) Inhalation every 6 hours PRN Bronchospasm  ALBUTerol/ipratropium for Nebulization 3 milliLiter(s) Nebulizer every 6 hours PRN Shortness of Breath  benzocaine 15 mG/menthol 3.6 mG (Sugar-Free) Lozenge 1 Lozenge Oral every 6 hours PRN Sore Throat  glucagon  Injectable 1 milliGRAM(s) IntraMuscular once PRN Glucose LESS THAN 70 milligrams/deciliter  haloperidol    Injectable 2.5 milliGRAM(s) IV Push every 6 hours PRN agitation      PHYSICAL EXAM:  T(C): 36.9 (10-06-19 @ 06:25), Max: 36.9 (10-05-19 @ 13:55)  HR: 69 (10-06-19 @ 06:25) (69 - 84)  BP: 148/62 (10-06-19 @ 06:25) (129/49 - 148/62)  RR: 18 (10-06-19 @ 06:25) (18 - 24)  SpO2: 99% (10-06-19 @ 06:25) (92% - 99%)  I&O's Summary    05 Oct 2019 07:01  -  06 Oct 2019 07:00  --------------------------------------------------------  IN: 0 mL / OUT: 1000 mL / NET: -1000 mL      GENERAL: NAD, well-developed, using O2 via NC  CHEST/LUNG: Clear to auscultation bilaterally; No wheeze; breathing with supplemental O2 via NC  HEART: Regular rate and rhythm; No murmurs, rubs, or gallops  ABDOMEN: Soft, Nontender, Nondistended; Bowel sounds present  EXTREMITIES:  2+ Peripheral Pulses, No clubbing, cyanosis, or edema; L leg dressing c/d/i  PSYCH: AAOx3  NEUROLOGY: CN II-XII grossly intact, moving all extremities  SKIN: No rashes or lesions    LABS:  CAPILLARY BLOOD GLUCOSE      POCT Blood Glucose.: 214 mg/dL (06 Oct 2019 12:00)  POCT Blood Glucose.: 90 mg/dL (06 Oct 2019 07:40)  POCT Blood Glucose.: 245 mg/dL (05 Oct 2019 21:39)  POCT Blood Glucose.: 229 mg/dL (05 Oct 2019 16:57)                          11.3   12.06 )-----------( 369      ( 06 Oct 2019 06:16 )             37.7     10-06    145  |  91<L>  |  31<H>  ----------------------------<  90  3.8   |  45<HH>  |  0.94    Ca    8.5      06 Oct 2019 06:16                RADIOLOGY & ADDITIONAL TESTS:    Telemetry Personally Reviewed -     Imaging Personally Reviewed -     Imaging Reviewed -     Consultant(s) Notes Reviewed -       Care Discussed with Consultants/Other Providers -

## 2019-10-06 NOTE — PROVIDER CONTACT NOTE (CRITICAL VALUE NOTIFICATION) - ASSESSMENT
pt awake alert, stating feels well, o2 3l nc in place able to feed herself breakfast,
Pt is awake and alert, no s/s of distress. Pt using 3 L NC O2.
Pt resting with no disgns of distress. 02 oxygenation 93-96% on 3L via NC

## 2019-10-06 NOTE — PROGRESS NOTE ADULT - ASSESSMENT
A/P 84y year old female s/p L hip basilio POD#16. Staples Dc'd POD#14. Dressing changed today  Pain Control  DVT PPX  PT/OOB  WBAT   Dispo Planning - rehab

## 2019-10-07 LAB
ANION GAP SERPL CALC-SCNC: 11 MMO/L — SIGNIFICANT CHANGE UP (ref 7–14)
BUN SERPL-MCNC: 38 MG/DL — HIGH (ref 7–23)
CALCIUM SERPL-MCNC: 8.6 MG/DL — SIGNIFICANT CHANGE UP (ref 8.4–10.5)
CHLORIDE SERPL-SCNC: 97 MMOL/L — LOW (ref 98–107)
CO2 SERPL-SCNC: 33 MMOL/L — HIGH (ref 22–31)
CREAT SERPL-MCNC: 1.38 MG/DL — HIGH (ref 0.5–1.3)
GLUCOSE BLDC GLUCOMTR-MCNC: 130 MG/DL — HIGH (ref 70–99)
GLUCOSE BLDC GLUCOMTR-MCNC: 192 MG/DL — HIGH (ref 70–99)
GLUCOSE BLDC GLUCOMTR-MCNC: 202 MG/DL — HIGH (ref 70–99)
GLUCOSE BLDC GLUCOMTR-MCNC: 304 MG/DL — HIGH (ref 70–99)
GLUCOSE SERPL-MCNC: 139 MG/DL — HIGH (ref 70–99)
HCT VFR BLD CALC: 31.9 % — LOW (ref 34.5–45)
HGB BLD-MCNC: 9.6 G/DL — LOW (ref 11.5–15.5)
MCHC RBC-ENTMCNC: 30.1 % — LOW (ref 32–36)
MCHC RBC-ENTMCNC: 31.3 PG — SIGNIFICANT CHANGE UP (ref 27–34)
MCV RBC AUTO: 103.9 FL — HIGH (ref 80–100)
NRBC # FLD: 0 K/UL — SIGNIFICANT CHANGE UP (ref 0–0)
PLATELET # BLD AUTO: 309 K/UL — SIGNIFICANT CHANGE UP (ref 150–400)
PMV BLD: 9 FL — SIGNIFICANT CHANGE UP (ref 7–13)
POTASSIUM SERPL-MCNC: 4.2 MMOL/L — SIGNIFICANT CHANGE UP (ref 3.5–5.3)
POTASSIUM SERPL-SCNC: 4.2 MMOL/L — SIGNIFICANT CHANGE UP (ref 3.5–5.3)
RBC # BLD: 3.07 M/UL — LOW (ref 3.8–5.2)
RBC # FLD: 13.7 % — SIGNIFICANT CHANGE UP (ref 10.3–14.5)
SODIUM SERPL-SCNC: 141 MMOL/L — SIGNIFICANT CHANGE UP (ref 135–145)
WBC # BLD: 11.54 K/UL — HIGH (ref 3.8–10.5)
WBC # FLD AUTO: 11.54 K/UL — HIGH (ref 3.8–10.5)

## 2019-10-07 PROCEDURE — 99233 SBSQ HOSP IP/OBS HIGH 50: CPT

## 2019-10-07 PROCEDURE — 99232 SBSQ HOSP IP/OBS MODERATE 35: CPT | Mod: GC

## 2019-10-07 RX ORDER — ALBUTEROL 90 UG/1
1 AEROSOL, METERED ORAL
Qty: 0 | Refills: 0 | DISCHARGE
Start: 2019-10-07

## 2019-10-07 RX ORDER — IPRATROPIUM/ALBUTEROL SULFATE 18-103MCG
3 AEROSOL WITH ADAPTER (GRAM) INHALATION
Qty: 0 | Refills: 0 | DISCHARGE
Start: 2019-10-07

## 2019-10-07 RX ORDER — QUETIAPINE FUMARATE 200 MG/1
1 TABLET, FILM COATED ORAL
Qty: 0 | Refills: 0 | DISCHARGE
Start: 2019-10-07

## 2019-10-07 RX ORDER — ALBUTEROL 90 UG/1
2 AEROSOL, METERED ORAL
Qty: 0 | Refills: 0 | DISCHARGE
Start: 2019-10-07

## 2019-10-07 RX ADMIN — Medication 3 UNIT(S): at 07:51

## 2019-10-07 RX ADMIN — Medication 20 MILLIGRAM(S): at 06:12

## 2019-10-07 RX ADMIN — BUDESONIDE AND FORMOTEROL FUMARATE DIHYDRATE 2 PUFF(S): 160; 4.5 AEROSOL RESPIRATORY (INHALATION) at 10:35

## 2019-10-07 RX ADMIN — Medication 2: at 22:17

## 2019-10-07 RX ADMIN — Medication 4: at 17:11

## 2019-10-07 RX ADMIN — Medication 8: at 11:46

## 2019-10-07 RX ADMIN — QUETIAPINE FUMARATE 25 MILLIGRAM(S): 200 TABLET, FILM COATED ORAL at 21:29

## 2019-10-07 RX ADMIN — BUDESONIDE AND FORMOTEROL FUMARATE DIHYDRATE 2 PUFF(S): 160; 4.5 AEROSOL RESPIRATORY (INHALATION) at 21:29

## 2019-10-07 RX ADMIN — PANTOPRAZOLE SODIUM 40 MILLIGRAM(S): 20 TABLET, DELAYED RELEASE ORAL at 06:13

## 2019-10-07 RX ADMIN — INSULIN GLARGINE 10 UNIT(S): 100 INJECTION, SOLUTION SUBCUTANEOUS at 22:17

## 2019-10-07 RX ADMIN — Medication 325 MILLIGRAM(S): at 17:11

## 2019-10-07 RX ADMIN — TIOTROPIUM BROMIDE 1 CAPSULE(S): 18 CAPSULE ORAL; RESPIRATORY (INHALATION) at 10:35

## 2019-10-07 RX ADMIN — Medication 3 UNIT(S): at 17:11

## 2019-10-07 RX ADMIN — Medication 3 UNIT(S): at 11:46

## 2019-10-07 RX ADMIN — Medication 88 MICROGRAM(S): at 06:13

## 2019-10-07 RX ADMIN — Medication 325 MILLIGRAM(S): at 06:12

## 2019-10-07 NOTE — PROGRESS NOTE ADULT - PROBLEM SELECTOR PLAN 3
Creatitine elevated today most likely due to Lasix.  lasix d/c'ed   encouraged PO intake of free water  -cont to monitor creat.

## 2019-10-07 NOTE — PROGRESS NOTE ADULT - NSHPATTENDINGPLANDISCUSS_GEN_ALL_CORE
Plan discussed with resident/fellow/nurse.
sicu team
Dr. Garibay
sicu team
sicu team
pt, ortho pa, RN
pt, ortho pa, RN

## 2019-10-07 NOTE — PROGRESS NOTE ADULT - PROBLEM SELECTOR PLAN 2
-hypoxia resolved with Lasix for pleural effusions.   - c/w Prednisone taper.   - encouraged incentive spirometry  -Maintain Spo2 88-92   -now off Bipap  -Pulmonary consult appreciated.   -c/w spiriva, budesonide  - Lasix d/c'ed

## 2019-10-07 NOTE — PROGRESS NOTE ADULT - SUBJECTIVE AND OBJECTIVE BOX
Interval Events: Patient was seen and examined at bedside. No overnight events.    Patient feels well, currently on 3L NC near her home O2. No additional complaints. SOB improved and near baseline.     REVIEW OF SYSTEMS:  Constitutional: [ ] fevers [ ] chills [ ] weight loss [ ] weight gain  CV: [ ] chest pain [ ] orthopnea [ ] palpitations [ ] murmur  Resp: [ ] cough [x] shortness of breath [ ] dyspnea [ ] wheezing [ ] sputum [ ] hemoptysis  [x All other systems negative  [ ] Unable to assess ROS because ________    OBJECTIVE:  ICU Vital Signs Last 24 Hrs  T(C): 37.1 (07 Oct 2019 14:03), Max: 37.1 (06 Oct 2019 17:27)  T(F): 98.8 (07 Oct 2019 14:03), Max: 98.8 (06 Oct 2019 17:27)  HR: 68 (07 Oct 2019 14:03) (68 - 82)  BP: 122/44 (07 Oct 2019 14:03) (121/62 - 139/49)  BP(mean): --  ABP: --  ABP(mean): --  RR: 18 (07 Oct 2019 14:03) (16 - 18)  SpO2: 96% (07 Oct 2019 14:03) (91% - 100%)        10-06 @ 07:01  -  10-07 @ 07:00  --------------------------------------------------------  IN: 0 mL / OUT: 400 mL / NET: -400 mL    10-07 @ 07:01  -  10-07 @ 15:15  --------------------------------------------------------  IN: 0 mL / OUT: 150 mL / NET: -150 mL      CAPILLARY BLOOD GLUCOSE      POCT Blood Glucose.: 304 mg/dL (07 Oct 2019 11:41)      PHYSICAL EXAM:  GENERAL: NAD, well-developed  HEAD:  Atraumatic, Normocephalic  EYES: EOMI, PERRLA, conjunctiva and sclera clear  NECK: Supple, No JVD  CHEST/LUNG: decreased BS bilaterally; No wheeze  HEART: Regular rate and rhythm; No murmurs, rubs, or gallops  ABDOMEN: Soft, Nontender, Nondistended; Bowel sounds present  EXTREMITIES:  2+ Peripheral Pulses, No clubbing, cyanosis, or edema  PSYCH: AAOx3  NEUROLOGY: non-focal  SKIN: No rashes or lesions      HOSPITAL MEDICATIONS:  MEDICATIONS  (STANDING):  ALBUTerol    90 MICROgram(s) HFA Inhaler 1 Puff(s) Inhalation every 4 hours  aspirin enteric coated 325 milliGRAM(s) Oral two times a day  buDESOnide  80 MICROgram(s)/formoterol 4.5 MICROgram(s) Inhaler 2 Puff(s) Inhalation two times a day  dextrose 50% Injectable 25 Gram(s) IV Push once  insulin glargine Injectable (LANTUS) 10 Unit(s) SubCutaneous at bedtime  insulin lispro (HumaLOG) corrective regimen sliding scale   SubCutaneous Before meals and at bedtime  insulin lispro Injectable (HumaLOG) 3 Unit(s) SubCutaneous three times a day before meals  levothyroxine 88 MICROGram(s) Oral daily  pantoprazole    Tablet 40 milliGRAM(s) Oral before breakfast  predniSONE   Tablet   Oral   QUEtiapine 25 milliGRAM(s) Oral at bedtime  tiotropium 18 MICROgram(s) Capsule 1 Capsule(s) Inhalation daily  tiotropium 18 MICROgram(s) Capsule 1 Capsule(s) Inhalation daily  tiotropium 18 MICROgram(s) Capsule 1 Capsule(s) Inhalation daily    MEDICATIONS  (PRN):  acetaminophen    Suspension .. 650 milliGRAM(s) Oral every 6 hours PRN Mild Pain (1 - 3)  ALBUTerol    90 MICROgram(s) HFA Inhaler 2 Puff(s) Inhalation every 6 hours PRN Bronchospasm  benzocaine 15 mG/menthol 3.6 mG (Sugar-Free) Lozenge 1 Lozenge Oral every 6 hours PRN Sore Throat  glucagon  Injectable 1 milliGRAM(s) IntraMuscular once PRN Glucose LESS THAN 70 milligrams/deciliter      LABS:                        9.6    11.54 )-----------( 309      ( 07 Oct 2019 05:49 )             31.9     Hgb Trend: 9.6<--, 11.3<--, 11.4<--, 9.2<--, 8.0<--  10-07    141  |  97<L>  |  38<H>  ----------------------------<  139<H>  4.2   |  33<H>  |  1.38<H>    Ca    8.6      07 Oct 2019 05:49      Creatinine Trend: 1.38<--, 0.94<--, 0.92<--, 0.95<--, 0.92<--, 1.04<--          MICROBIOLOGY:     RADIOLOGY:  [ ] Reviewed and interpreted by me Interval Events: Patient was seen and examined at bedside. No overnight events.    Patient states she is doing better, currently on 3 L NS, SOB is close to her baseline.     REVIEW OF SYSTEMS:  Constitutional: [ ] fevers [ ] chills [ ] weight loss [ ] weight gain  CV: [ ] chest pain [ ] orthopnea [ ] palpitations [ ] murmur  Resp: [ ] cough [x] shortness of breath [x] dyspnea [ ] wheezing [ ] sputum [ ] hemoptysis  [ ] All other systems negative  [x] Unable to assess ROS because ________    OBJECTIVE:  ICU Vital Signs Last 24 Hrs  T(C): 37.1 (07 Oct 2019 14:03), Max: 37.1 (06 Oct 2019 17:27)  T(F): 98.8 (07 Oct 2019 14:03), Max: 98.8 (06 Oct 2019 17:27)  HR: 68 (07 Oct 2019 14:03) (68 - 82)  BP: 122/44 (07 Oct 2019 14:03) (121/62 - 139/49)  BP(mean): --  ABP: --  ABP(mean): --  RR: 18 (07 Oct 2019 14:03) (16 - 18)  SpO2: 96% (07 Oct 2019 14:03) (91% - 100%)        10-06 @ 07:01  -  10-07 @ 07:00  --------------------------------------------------------  IN: 0 mL / OUT: 400 mL / NET: -400 mL    10-07 @ 07:01  -  10-07 @ 15:34  --------------------------------------------------------  IN: 0 mL / OUT: 150 mL / NET: -150 mL      CAPILLARY BLOOD GLUCOSE      POCT Blood Glucose.: 304 mg/dL (07 Oct 2019 11:41)      PHYSICAL EXAM:    Constitutional: well-developed; well-groomed; well-nourished; no distress,  Eyes: PERRL; EOMI  ENMT: Normal oropharnxy,  Neck:  Supple; no JVD; normal thyroid gland  MSK/Back: normal shape; ROM intact; strength intact, no CVA tenderness. Normal strength in all ext,  Respiratory: airway patent; breath sounds equal; good air movement, no wheezing, no crackes, no rhonchi. no increase in WOB  Cardiovascular: regular rate and rhythm  no rub , no murmur, no gallops.   Gastrointestinal: soft; no distention, normal BS, no TTP, no rebound, no guarding, no mass, no organomegaly, no ascites.  Extremities: no clubbing; no cyanosis; no pedal edema,  Neurological: alert and oriented x 3; responds to pain;  Skin: warm and dry; color normal: no rash: no ulcers  Psychiatric: Calm, no SI/HI          HOSPITAL MEDICATIONS:  MEDICATIONS  (STANDING):  ALBUTerol    90 MICROgram(s) HFA Inhaler 1 Puff(s) Inhalation every 4 hours  aspirin enteric coated 325 milliGRAM(s) Oral two times a day  buDESOnide  80 MICROgram(s)/formoterol 4.5 MICROgram(s) Inhaler 2 Puff(s) Inhalation two times a day  dextrose 50% Injectable 25 Gram(s) IV Push once  insulin glargine Injectable (LANTUS) 10 Unit(s) SubCutaneous at bedtime  insulin lispro (HumaLOG) corrective regimen sliding scale   SubCutaneous Before meals and at bedtime  insulin lispro Injectable (HumaLOG) 3 Unit(s) SubCutaneous three times a day before meals  levothyroxine 88 MICROGram(s) Oral daily  pantoprazole    Tablet 40 milliGRAM(s) Oral before breakfast  predniSONE   Tablet   Oral   QUEtiapine 25 milliGRAM(s) Oral at bedtime  tiotropium 18 MICROgram(s) Capsule 1 Capsule(s) Inhalation daily  tiotropium 18 MICROgram(s) Capsule 1 Capsule(s) Inhalation daily  tiotropium 18 MICROgram(s) Capsule 1 Capsule(s) Inhalation daily    MEDICATIONS  (PRN):  acetaminophen    Suspension .. 650 milliGRAM(s) Oral every 6 hours PRN Mild Pain (1 - 3)  ALBUTerol    90 MICROgram(s) HFA Inhaler 2 Puff(s) Inhalation every 6 hours PRN Bronchospasm  benzocaine 15 mG/menthol 3.6 mG (Sugar-Free) Lozenge 1 Lozenge Oral every 6 hours PRN Sore Throat  glucagon  Injectable 1 milliGRAM(s) IntraMuscular once PRN Glucose LESS THAN 70 milligrams/deciliter      LABS:                        9.6    11.54 )-----------( 309      ( 07 Oct 2019 05:49 )             31.9     Hgb Trend: 9.6<--, 11.3<--, 11.4<--, 9.2<--, 8.0<--  10-07    141  |  97<L>  |  38<H>  ----------------------------<  139<H>  4.2   |  33<H>  |  1.38<H>    Ca    8.6      07 Oct 2019 05:49      Creatinine Trend: 1.38<--, 0.94<--, 0.92<--, 0.95<--, 0.92<--, 1.04<--          MICROBIOLOGY:     RADIOLOGY:  [ ] Reviewed and interpreted by me

## 2019-10-07 NOTE — PROGRESS NOTE ADULT - ATTENDING COMMENTS
83 y/o F with admission for hip fracture s/p arthroplasty c/b acute on chronic hypoxic and hypercapnic respiratory failure requiring intubation and tx to SICU, extubated, required bipap in setting of fluid overload and pain medication/sedation    recs:  - can hold diuretics as patient appears to have a rise in her serum bicarbonate and e/o contraction alkalosis  - Dyspnea improved, would let volume status normalize  - no need for bipap at this time; would use bipap if giving sedating agents  - spiriva and symbicort  - supplemental O2 to keep sat above 88%.
Agree with above.   Pt seen and examined. Doing well, sitting in chair. Got OOB with PT. Dressing, incision CDI, dressing changed.   Follow up in office in 2-3 weeks. Continue ASA BID for 6 weeks postop.   All questions answered. Discharge planning to rehab.
Agree with above. Pt doing very well, comfortable in bed. NVI. Will plan for discharge to rehab in a few days.
Agree with above. Pt seen and examined. Appears very anxious. Discussed plan with SICU attending. Continue care per SICU.
Agree with above. Pt seen and examined. Mental status improved, now following commands. She is AOX2 currently. Denies any pain. Feels disoriented and believes she is in a hotel. I have offered her reassurance. Will f/u with the medical team for further assessment.
Agree with above. Wean to extubate per SICU.
Agree with above. Wean to extubate.
Pt seen and examined in SICU. Agree with above. Pt intubated d/t COPD exacerbation. LLE Dressing CDI, spontaneously moves limb, Toes warm, BCR. Discussed plan with proxy (Essie) and SICU attending. Will attempt extubation later today.
agree with above
hip fracture s/p arthroplasty c/b acute on chronic hypoxic and hypercapnic respiratory failure requiring intubation and tx to SICU, extubated, required bipap in setting of fluid overload and pain medication/sedation    reccs:  - continue with diuretics for pleural effusion  - check ABG in AM given rising bicarb  - no need for bipap at this time; would use bipap if giving sedating agents  - spiriva and symbicort  - supplemental O2 to keep sat above 88%
Agree with above. Pt seen and examined. Doing very well, no complaints of pain. AOX3.
agree with above
s/p fall and hip arthroplasty postoperatively with COPD exacerbation intubated, started on duonebs and solumedrol. Patient overnight in severe pain    N - pain control with around the clock tylenol, dilaudid PRN, will d/c precedex 2/2 to bradycardia  resp - failed SBT however in pain - will control pain then reattempt sbt  cv - no changes  gi - ngt and start enteral feeding  gu/renal - consider diuresis - cxr with worsening right sided opacities - will perform lung ultrasound and diurese  heme - dvt ppx  id - no antibiotics  endo - synthroid, continue steroids for copd exacerbation, started NPH for basal insulin check fs q4    The patient is a critical care patient with life threatening hemodynamic and metabolic instability in SICU.  I have personally interviewed when possible and examined the patient, reviewed data and laboratory tests/x-rays and all pertinent electronic images.  I was physically present for the key portions of the evaluation and management (E/M) service provided.   The SICU team has a constant risk benefit analyzes discussion with the primary team, all consultants, House Staff and PA's on all decisions.  These diagnoses are unrelated to the surgical procedure noted above.  I meet with family if needed to get further history, discuss the case and make care decisions for this patient who might not be able to participate.  Time involved in performance of separately billable procedures was not counted toward my critical care time. There is no overlap.  I spent 55-75 minutes ( 0800Hrs-0915Hrs in AM/ 1600Hrs-1715Hrs in PM, or other time indicated) of critical care time for the diagnoses, assessment, plan and interventions.  This time excludes time spent on separate procedures and teaching.
83 yo female with PMH of HTN, Hypothyroid, DMII, CKD stage 3, COPD c/w chronic hypoxic respiratory failure on 3L home O2 present after mechanical fall c/o left hip pain found to have L femoral neck fracture, s/p left hip hemiarthroplasty (9/20), developed hypercarbic respiratory failure on POD#5 and intubated on 9/26, extubated on 9/28, currently found to have bilateral pleural effusions and pulmonary edema in the context of multiple recent IVF boluses. Currently diuresing well.     - Patient currently back to respiratory baseline according to patient.   - Please discontinue continuous fluids. No need for continuous diuresis at this time as the patient appears euvolemic.  - Cont Spiriva/symbicort  - Discontinue steroid taper after 5 days  - Keep oxygen between 2-3L to maintain O2>88%  - No need for noninvasive ventilation at this point.   - Can follow up with Dr. Jan Brooke as an outpatient    Thank you for your consult. Will follow peripherally. Please call if any immediate issues arise.
s/p hip arthroplasty with COPD exacerbation started on steroids and intubated. Overnight issues with agitation and delirum likely 2/2 from acute pain related to surgery. Given dilaudid with improvement. This am CPAP/PS 5/5 on 30% abg with good gas exchange plan to extubate to bipap    n pain control, po tylenol, and dilaudid  resp extubate to bipap, continue duonebs  cv no changes  gi hold tube feeds, will start regular diet  gu/renal - slight chong from diuresis - will hold diuresis today, cxr reviewed appears similar from previous day with right effusion  heme dvt ppx  id no changes  endo 5 day course of solumedrol    The patient is a critical care patient with life threatening hemodynamic and metabolic instability in SICU.  I have personally interviewed when possible and examined the patient, reviewed data and laboratory tests/x-rays and all pertinent electronic images.  I was physically present for the key portions of the evaluation and management (E/M) service provided.   The SICU team has a constant risk benefit analyzes discussion with the primary team, all consultants, House Staff and PA's on all decisions.  These diagnoses are unrelated to the surgical procedure noted above.  I meet with family if needed to get further history, discuss the case and make care decisions for this patient who might not be able to participate.  Time involved in performance of separately billable procedures was not counted toward my critical care time. There is no overlap.  I spent 55-75 minutes ( 0800Hrs-0915Hrs in AM/ 1600Hrs-1715Hrs in PM, or other time indicated) of critical care time for the diagnoses, assessment, plan and interventions.  This time excludes time spent on separate procedures and teaching.
s/p hip arthroplasty with COPD exacerbation requiring intubation extubated yesterday to BiPAP    N pain controlled, agitation - seroquel and haldol for hyperactive delirium likely narcotic and icu induced delirium, will attempt bed mobilization  resp maintain BiPAP 14/7, titrate SPO2 to 90%, abg, and will wean to nasal cannula today  cv no changes  gi continue tube feeds, consider speech and swallow eval tomorrow  gu/renal holding off on diuresis, improving JANELLE  heme dvt ppx  id no changes  endo solumedrol plan for 5 day course    The patient is a critical care patient with life threatening hemodynamic and metabolic instability in SICU.  I have personally interviewed when possible and examined the patient, reviewed data and laboratory tests/x-rays and all pertinent electronic images.  I was physically present for the key portions of the evaluation and management (E/M) service provided.   The SICU team has a constant risk benefit analyzes discussion with the primary team, all consultants, House Staff and PA's on all decisions.  These diagnoses are unrelated to the surgical procedure noted above.  I meet with family if needed to get further history, discuss the case and make care decisions for this patient who might not be able to participate.  Time involved in performance of separately billable procedures was not counted toward my critical care time. There is no overlap.  I spent 55-75 minutes ( 0800Hrs-0915Hrs in AM/ 1600Hrs-1715Hrs in PM, or other time indicated) of critical care time for the diagnoses, assessment, plan and interventions.  This time excludes time spent on separate procedures and teaching.
I saw and examined the patient. I was physically present for the key portions of the evaluation and management (E/M) service provided.  I agree with the above history, physical, and plan which I have reviewed and edited where appropriate.      I spent 45min reviewing history, data, images and in discussion/coordination of care.    Shalom Alvarado MD (Cell: [phone])  Acute and Critical Care Surgery    [facility] Acute Care Surgery (B Team) Attending Group Practice:  Dr. Zeenat Thomason, Dr. Mick Lee, Dr. Carmen Glover, Dr. Shalom Alvarado, and Dr. Roque Cárdenas    Urgent issues - spectra [phone] or [phone]  Nonurgent issues - [phone]  Patient appointments or after hours - [phone]

## 2019-10-07 NOTE — PROGRESS NOTE ADULT - SUBJECTIVE AND OBJECTIVE BOX
Patient is a 84y old  Female who presents with a chief complaint of left hip fracture s/p fall 1D (07 Oct 2019 06:06)      SUBJECTIVE / OVERNIGHT EVENTS:  Patient has no new complaints. Denies cp, SOB, abdominal pain, N/V/D     MEDICATIONS  (STANDING):  ALBUTerol    90 MICROgram(s) HFA Inhaler 1 Puff(s) Inhalation every 4 hours  aspirin enteric coated 325 milliGRAM(s) Oral two times a day  buDESOnide  80 MICROgram(s)/formoterol 4.5 MICROgram(s) Inhaler 2 Puff(s) Inhalation two times a day  dextrose 50% Injectable 25 Gram(s) IV Push once  insulin glargine Injectable (LANTUS) 10 Unit(s) SubCutaneous at bedtime  insulin lispro (HumaLOG) corrective regimen sliding scale   SubCutaneous Before meals and at bedtime  insulin lispro Injectable (HumaLOG) 3 Unit(s) SubCutaneous three times a day before meals  levothyroxine 88 MICROGram(s) Oral daily  pantoprazole    Tablet 40 milliGRAM(s) Oral before breakfast  predniSONE   Tablet   Oral   QUEtiapine 25 milliGRAM(s) Oral at bedtime  tiotropium 18 MICROgram(s) Capsule 1 Capsule(s) Inhalation daily  tiotropium 18 MICROgram(s) Capsule 1 Capsule(s) Inhalation daily  tiotropium 18 MICROgram(s) Capsule 1 Capsule(s) Inhalation daily    MEDICATIONS  (PRN):  acetaminophen    Suspension .. 650 milliGRAM(s) Oral every 6 hours PRN Mild Pain (1 - 3)  ALBUTerol    90 MICROgram(s) HFA Inhaler 2 Puff(s) Inhalation every 6 hours PRN Bronchospasm  benzocaine 15 mG/menthol 3.6 mG (Sugar-Free) Lozenge 1 Lozenge Oral every 6 hours PRN Sore Throat  glucagon  Injectable 1 milliGRAM(s) IntraMuscular once PRN Glucose LESS THAN 70 milligrams/deciliter      Vital Signs Last 24 Hrs  T(C): 37 (07 Oct 2019 09:46), Max: 37.2 (06 Oct 2019 14:30)  T(F): 98.6 (07 Oct 2019 09:46), Max: 99 (06 Oct 2019 14:30)  HR: 82 (07 Oct 2019 09:46) (72 - 82)  BP: 133/43 (07 Oct 2019 09:46) (121/62 - 139/49)  BP(mean): --  RR: 17 (07 Oct 2019 09:46) (16 - 18)  SpO2: 91% (07 Oct 2019 09:46) (91% - 100%)  CAPILLARY BLOOD GLUCOSE      POCT Blood Glucose.: 130 mg/dL (07 Oct 2019 07:38)  POCT Blood Glucose.: 161 mg/dL (06 Oct 2019 23:30)  POCT Blood Glucose.: 203 mg/dL (06 Oct 2019 16:37)  POCT Blood Glucose.: 214 mg/dL (06 Oct 2019 12:00)    I&O's Summary    06 Oct 2019 07:01  -  07 Oct 2019 07:00  --------------------------------------------------------  IN: 0 mL / OUT: 400 mL / NET: -400 mL    07 Oct 2019 07:01  -  07 Oct 2019 11:35  --------------------------------------------------------  IN: 0 mL / OUT: 150 mL / NET: -150 mL        PHYSICAL EXAM:  GENERAL: NAD, well-developed  HEAD:  Atraumatic, Normocephalic  EYES: EOMI, PERRLA, conjunctiva and sclera clear  NECK: Supple, No JVD  CHEST/LUNG: decreased BS bilaterally; No wheeze  HEART: Regular rate and rhythm; No murmurs, rubs, or gallops  ABDOMEN: Soft, Nontender, Nondistended; Bowel sounds present  EXTREMITIES:  2+ Peripheral Pulses, No clubbing, cyanosis, or edema  PSYCH: AAOx3  NEUROLOGY: non-focal  SKIN: No rashes or lesions    LABS:                        9.6    11.54 )-----------( 309      ( 07 Oct 2019 05:49 )             31.9     10-07    141  |  97<L>  |  38<H>  ----------------------------<  139<H>  4.2   |  33<H>  |  1.38<H>    Ca    8.6      07 Oct 2019 05:49                RADIOLOGY & ADDITIONAL TESTS:    Imaging Personally Reviewed:    Consultant(s) Notes Reviewed:      Care Discussed with Consultants/Other Providers:

## 2019-10-07 NOTE — PROGRESS NOTE ADULT - SUBJECTIVE AND OBJECTIVE BOX
Pt seen/examined. Doing well. Pain controlled. No acute overnight complaints or events.    T(C): 36.7 (10-06-19 @ 21:09), Max: 37.2 (10-06-19 @ 14:30)  HR: 77 (10-06-19 @ 21:09) (69 - 78)  BP: 138/58 (10-06-19 @ 21:09) (124/58 - 148/62)  RR: 16 (10-06-19 @ 21:09) (16 - 18)  SpO2: 98% (10-06-19 @ 21:09) (94% - 99%)  Wt(kg): --    - Gen: NAD  - LLE: Dressing C/D/I; SILT TN/SPN/DPN/SN; TA?EHL/gs intact    84yFemale s/p L HHA    - Pain control  - DVT ppx  - OOB/PT

## 2019-10-08 VITALS
SYSTOLIC BLOOD PRESSURE: 132 MMHG | RESPIRATION RATE: 16 BRPM | HEART RATE: 71 BPM | TEMPERATURE: 97 F | DIASTOLIC BLOOD PRESSURE: 40 MMHG | OXYGEN SATURATION: 95 %

## 2019-10-08 LAB
GLUCOSE BLDC GLUCOMTR-MCNC: 138 MG/DL — HIGH (ref 70–99)
GLUCOSE BLDC GLUCOMTR-MCNC: 265 MG/DL — HIGH (ref 70–99)

## 2019-10-08 PROCEDURE — 99233 SBSQ HOSP IP/OBS HIGH 50: CPT

## 2019-10-08 RX ADMIN — Medication 3 UNIT(S): at 07:59

## 2019-10-08 RX ADMIN — TIOTROPIUM BROMIDE 1 CAPSULE(S): 18 CAPSULE ORAL; RESPIRATORY (INHALATION) at 09:16

## 2019-10-08 RX ADMIN — Medication 10 MILLIGRAM(S): at 05:38

## 2019-10-08 RX ADMIN — Medication 325 MILLIGRAM(S): at 05:37

## 2019-10-08 RX ADMIN — PANTOPRAZOLE SODIUM 40 MILLIGRAM(S): 20 TABLET, DELAYED RELEASE ORAL at 07:58

## 2019-10-08 RX ADMIN — BUDESONIDE AND FORMOTEROL FUMARATE DIHYDRATE 2 PUFF(S): 160; 4.5 AEROSOL RESPIRATORY (INHALATION) at 09:17

## 2019-10-08 RX ADMIN — Medication 88 MICROGRAM(S): at 05:37

## 2019-10-08 RX ADMIN — Medication 6: at 12:13

## 2019-10-08 RX ADMIN — Medication 3 UNIT(S): at 12:13

## 2019-10-08 NOTE — PROGRESS NOTE ADULT - PROBLEM SELECTOR PLAN 1
s/p left hip hemiarthroplasty  -wound care as per ortho  -pain control with oxycodone prn  -DVT ppx with ASA  -PT/OT
s/p left hip hemiarthroplasty  -wound care as per ortho  -pain control with oxycodone prn  -DVT ppx with ASA  -PT/OT  d/c to rehab as per primary team
s/p left hip hemiarthroplasty  -wound care as per ortho  -pain control with oxycodone prn  -DVT ppx with ASA  -PT/OT  d/c to rehab as per primary team
s/p left hip hemiarthroplasty POD 12  -wound care as per ortho  -pain control with oxycodone prn  -DVT ppx with ASA  -PT/OT
s/p left hip hemiarthroplasty POD 13  -wound care as per ortho  -pain control with oxycodone prn  -DVT ppx with ASA  -PT/OT
s/p left hip hemiarthroplasty POD 14  -wound care as per ortho  -pain control with oxycodone prn  -DVT ppx with ASA  -PT/OT
s/p left hip hemiarthroplasty POD 14  -wound care as per ortho  -pain control with oxycodone prn  -DVT ppx with ASA  -PT/OT
s/p left hip hemiarthroplasty POD 2 doing well  -wound care as per ortho  -pain control with oxycodone prn  -DVT ppx with ASA  -PT/OT
s/p left hip hemiarthroplasty POD 4  -wound care as per ortho  -pain control with oxycodone prn  -DVT ppx with ASA  -PT/OT
s/p left hip hemiarthroplasty POD 5  -wound care as per ortho  -pain control with oxycodone prn  -DVT ppx with ASA  -PT/OT
s/p left hip hemiarthroplasty POD 2 doing well  -wound care as per ortho  -pain control with oxycodone prn  -DVT ppx with ASA  -PT/OT
s/p left hip hemiarthroplasty POD 1 doing well  -mild hgb drop noted in am lab, expected after surgery. cont to monitor   -wound care as per ortho  -pain control with oxycodone prn  -DVT ppx with ASA  -PT/OT

## 2019-10-08 NOTE — PROGRESS NOTE ADULT - SUBJECTIVE AND OBJECTIVE BOX
Patient is a 84y old  Female who presents with a chief complaint of left hip fracture s/p fall 1D (08 Oct 2019 06:30)      SUBJECTIVE / OVERNIGHT EVENTS:  Patient has no new complaints. Denies cp, SOB, abdominal pain, N/V/D     MEDICATIONS  (STANDING):  ALBUTerol    90 MICROgram(s) HFA Inhaler 1 Puff(s) Inhalation every 4 hours  aspirin enteric coated 325 milliGRAM(s) Oral two times a day  buDESOnide  80 MICROgram(s)/formoterol 4.5 MICROgram(s) Inhaler 2 Puff(s) Inhalation two times a day  dextrose 50% Injectable 25 Gram(s) IV Push once  insulin glargine Injectable (LANTUS) 10 Unit(s) SubCutaneous at bedtime  insulin lispro (HumaLOG) corrective regimen sliding scale   SubCutaneous Before meals and at bedtime  insulin lispro Injectable (HumaLOG) 3 Unit(s) SubCutaneous three times a day before meals  levothyroxine 88 MICROGram(s) Oral daily  pantoprazole    Tablet 40 milliGRAM(s) Oral before breakfast  QUEtiapine 25 milliGRAM(s) Oral at bedtime  tiotropium 18 MICROgram(s) Capsule 1 Capsule(s) Inhalation daily  tiotropium 18 MICROgram(s) Capsule 1 Capsule(s) Inhalation daily  tiotropium 18 MICROgram(s) Capsule 1 Capsule(s) Inhalation daily    MEDICATIONS  (PRN):  acetaminophen    Suspension .. 650 milliGRAM(s) Oral every 6 hours PRN Mild Pain (1 - 3)  ALBUTerol    90 MICROgram(s) HFA Inhaler 2 Puff(s) Inhalation every 6 hours PRN Bronchospasm  benzocaine 15 mG/menthol 3.6 mG (Sugar-Free) Lozenge 1 Lozenge Oral every 6 hours PRN Sore Throat  glucagon  Injectable 1 milliGRAM(s) IntraMuscular once PRN Glucose LESS THAN 70 milligrams/deciliter      Vital Signs Last 24 Hrs  T(C): 36.3 (08 Oct 2019 10:20), Max: 37.1 (07 Oct 2019 14:03)  T(F): 97.3 (08 Oct 2019 10:20), Max: 98.8 (07 Oct 2019 14:03)  HR: 71 (08 Oct 2019 10:20) (66 - 74)  BP: 132/40 (08 Oct 2019 10:20) (122/44 - 140/49)  BP(mean): --  RR: 16 (08 Oct 2019 10:20) (16 - 18)  SpO2: 95% (08 Oct 2019 10:20) (94% - 97%)  CAPILLARY BLOOD GLUCOSE      POCT Blood Glucose.: 138 mg/dL (08 Oct 2019 07:33)  POCT Blood Glucose.: 192 mg/dL (07 Oct 2019 21:32)  POCT Blood Glucose.: 202 mg/dL (07 Oct 2019 17:06)  POCT Blood Glucose.: 304 mg/dL (07 Oct 2019 11:41)    I&O's Summary    07 Oct 2019 07:01  -  08 Oct 2019 07:00  --------------------------------------------------------  IN: 0 mL / OUT: 1050 mL / NET: -1050 mL    08 Oct 2019 07:01  -  08 Oct 2019 10:40  --------------------------------------------------------  IN: 0 mL / OUT: 300 mL / NET: -300 mL        PHYSICAL EXAM:  GENERAL: NAD, well-developed  HEAD:  Atraumatic, Normocephalic  EYES: EOMI, PERRLA, conjunctiva and sclera clear  NECK: Supple, No JVD  CHEST/LUNG: decreased BS bilaterally; No wheeze  HEART: Regular rate and rhythm; No murmurs, rubs, or gallops  ABDOMEN: Soft, Nontender, Nondistended; Bowel sounds present  EXTREMITIES:  2+ Peripheral Pulses, No clubbing, cyanosis, or edema  PSYCH: AAOx3  NEUROLOGY: non-focal  SKIN: No rashes or lesions    LABS:                        9.6    11.54 )-----------( 309      ( 07 Oct 2019 05:49 )             31.9     10-07    141  |  97<L>  |  38<H>  ----------------------------<  139<H>  4.2   |  33<H>  |  1.38<H>    Ca    8.6      07 Oct 2019 05:49                RADIOLOGY & ADDITIONAL TESTS:    Imaging Personally Reviewed:    Consultant(s) Notes Reviewed:      Care Discussed with Consultants/Other Providers: Ortho

## 2019-10-08 NOTE — PROGRESS NOTE ADULT - SUBJECTIVE AND OBJECTIVE BOX
Pt seen/examined. Doing well. Pain controlled. No acute overnight complaints or events.    T(C): 36.9 (10-08-19 @ 05:35), Max: 37.1 (10-07-19 @ 14:03)  HR: 69 (10-08-19 @ 05:35) (66 - 82)  BP: 140/49 (10-08-19 @ 05:35) (122/44 - 140/49)  RR: 18 (10-08-19 @ 05:35) (17 - 18)  SpO2: 97% (10-08-19 @ 05:35) (91% - 97%)  Wt(kg): --    - Gen: NAD  - LLE: Dressing C/D/I; SILT TN/SPN/DPN/SN; TA/EHL/gs intact    84yFemale s/p L HHA    - Pain control  - mechanical/DVT ppx  - OOB/PT

## 2019-10-08 NOTE — PROGRESS NOTE ADULT - PROBLEM SELECTOR PLAN 3
Creatitine elevated yesterday most likely due to Lasix.  lasix d/c'ed   encouraged PO intake of free water and now patient more alert and energetic today.  -cont to monitor creat.

## 2019-10-08 NOTE — PROGRESS NOTE ADULT - PROBLEM SELECTOR PROBLEM 7
Acquired hypothyroidism

## 2019-10-08 NOTE — PROGRESS NOTE ADULT - PROVIDER SPECIALTY LIST ADULT
Hospitalist
Internal Medicine
Orthopedics
Pulmonology
SICU
Orthopedics
Hospitalist
Internal Medicine

## 2019-10-08 NOTE — PROGRESS NOTE ADULT - PROBLEM SELECTOR PLAN 7
stable  -cont synthroid.

## 2019-10-08 NOTE — PROGRESS NOTE ADULT - PROBLEM SELECTOR PROBLEM 1
Closed fracture of left hip, initial encounter

## 2019-10-08 NOTE — PROGRESS NOTE ADULT - REASON FOR ADMISSION
left hip fracture s/p fall 1D
left hip fracture s/p fall
left hip fracture s/p fall
left hip fracture s/p fall 1D

## 2019-10-15 NOTE — PATIENT PROFILE ADULT - NSPROGENDIFFINTUB_GEN_A_NUR
OPERATION PERFORMED:10/07/19  Mini sternotomy, aortic valve replacement with 21 Inspira pericardial bovine pericardial valve. Left atrial appendage stapling removal.     Received call from PCP's office. Patient had complaints of SOB during visit today. CT obtained demonstrating bilateral PE's. Spoke with Candi HERZOG regarding situation. Patient is no currently anticoagulated.  Rec: Admission to hospital, patient will be admitted to [facility]. Spoke with PCP's office regarding recommendation. Patient aware of situation.   
never intubated

## 2019-10-22 ENCOUNTER — RX RENEWAL (OUTPATIENT)
Age: 84
End: 2019-10-22

## 2019-10-27 ENCOUNTER — FORM ENCOUNTER (OUTPATIENT)
Age: 84
End: 2019-10-27

## 2019-10-27 PROBLEM — S72.002A CLOSED FRACTURE OF NECK OF LEFT FEMUR, INITIAL ENCOUNTER: Status: ACTIVE | Noted: 2019-10-27

## 2019-10-28 ENCOUNTER — APPOINTMENT (OUTPATIENT)
Dept: ORTHOPEDIC SURGERY | Facility: CLINIC | Age: 84
End: 2019-10-28
Payer: MEDICARE

## 2019-10-28 ENCOUNTER — APPOINTMENT (OUTPATIENT)
Dept: ULTRASOUND IMAGING | Facility: IMAGING CENTER | Age: 84
End: 2019-10-28
Payer: MEDICARE

## 2019-10-28 ENCOUNTER — APPOINTMENT (OUTPATIENT)
Dept: PULMONOLOGY | Facility: CLINIC | Age: 84
End: 2019-10-28
Payer: MEDICARE

## 2019-10-28 ENCOUNTER — OUTPATIENT (OUTPATIENT)
Dept: OUTPATIENT SERVICES | Facility: HOSPITAL | Age: 84
LOS: 1 days | End: 2019-10-28
Payer: MEDICARE

## 2019-10-28 VITALS
BODY MASS INDEX: 26.29 KG/M2 | HEIGHT: 64 IN | SYSTOLIC BLOOD PRESSURE: 148 MMHG | TEMPERATURE: 97.8 F | WEIGHT: 154 LBS | DIASTOLIC BLOOD PRESSURE: 72 MMHG | HEART RATE: 77 BPM | RESPIRATION RATE: 16 BRPM

## 2019-10-28 VITALS
WEIGHT: 154 LBS | SYSTOLIC BLOOD PRESSURE: 118 MMHG | HEIGHT: 64 IN | BODY MASS INDEX: 26.29 KG/M2 | HEART RATE: 91 BPM | DIASTOLIC BLOOD PRESSURE: 66 MMHG

## 2019-10-28 DIAGNOSIS — Z86.69 PERSONAL HISTORY OF OTHER DISEASES OF THE NERVOUS SYSTEM AND SENSE ORGANS: Chronic | ICD-10-CM

## 2019-10-28 DIAGNOSIS — S82.90XA UNSPECIFIED FRACTURE OF UNSPECIFIED LOWER LEG, INITIAL ENCOUNTER FOR CLOSED FRACTURE: Chronic | ICD-10-CM

## 2019-10-28 DIAGNOSIS — R60.0 LOCALIZED EDEMA: ICD-10-CM

## 2019-10-28 DIAGNOSIS — Z90.49 ACQUIRED ABSENCE OF OTHER SPECIFIED PARTS OF DIGESTIVE TRACT: Chronic | ICD-10-CM

## 2019-10-28 DIAGNOSIS — S72.002A FRACTURE OF UNSPECIFIED PART OF NECK OF LEFT FEMUR, INITIAL ENCOUNTER FOR CLOSED FRACTURE: ICD-10-CM

## 2019-10-28 LAB
BASOPHILS # BLD AUTO: 0.03 K/UL
BASOPHILS NFR BLD AUTO: 0.4 %
EOSINOPHIL # BLD AUTO: 0.26 K/UL
EOSINOPHIL NFR BLD AUTO: 3.4 %
HCT VFR BLD CALC: 33.9 %
HGB BLD-MCNC: 10.8 G/DL
IMM GRANULOCYTES NFR BLD AUTO: 0.4 %
LYMPHOCYTES # BLD AUTO: 2.01 K/UL
LYMPHOCYTES NFR BLD AUTO: 26.4 %
MAN DIFF?: NORMAL
MCHC RBC-ENTMCNC: 31.9 GM/DL
MCHC RBC-ENTMCNC: 32.7 PG
MCV RBC AUTO: 102.7 FL
MONOCYTES # BLD AUTO: 0.97 K/UL
MONOCYTES NFR BLD AUTO: 12.8 %
NEUTROPHILS # BLD AUTO: 4.3 K/UL
NEUTROPHILS NFR BLD AUTO: 56.6 %
PLATELET # BLD AUTO: 433 K/UL
RBC # BLD: 3.3 M/UL
RBC # FLD: 13.5 %
WBC # FLD AUTO: 7.6 K/UL

## 2019-10-28 PROCEDURE — 99214 OFFICE O/P EST MOD 30 MIN: CPT

## 2019-10-28 PROCEDURE — 99024 POSTOP FOLLOW-UP VISIT: CPT

## 2019-10-28 PROCEDURE — 93970 EXTREMITY STUDY: CPT

## 2019-10-28 PROCEDURE — 93970 EXTREMITY STUDY: CPT | Mod: 26

## 2019-10-28 PROCEDURE — 73502 X-RAY EXAM HIP UNI 2-3 VIEWS: CPT | Mod: LT

## 2019-10-28 RX ORDER — TIOTROPIUM BROMIDE 18 UG/1
18 CAPSULE ORAL; RESPIRATORY (INHALATION)
Qty: 30 | Refills: 0 | Status: DISCONTINUED | COMMUNITY
Start: 2017-12-19 | End: 2019-10-28

## 2019-10-28 RX ORDER — LISINOPRIL 5 MG/1
5 TABLET ORAL
Refills: 0 | Status: DISCONTINUED | COMMUNITY
End: 2019-10-28

## 2019-10-28 RX ORDER — HYDROCHLOROTHIAZIDE 12.5 MG/1
12.5 CAPSULE ORAL DAILY
Qty: 30 | Refills: 0 | Status: DISCONTINUED | COMMUNITY
End: 2019-10-28

## 2019-10-28 NOTE — REASON FOR VISIT
[Follow-Up - From Hospitalization] : a hospitalization follow-up [COPD] : COPD [Shortness of Breath] : shortness of Breath [Friend] : friend

## 2019-10-28 NOTE — HISTORY OF PRESENT ILLNESS
[Procedure: ___] : status post [unfilled] [Indication: ___] : for [unfilled] [___ Weeks Post Op] : [unfilled] weeks post op [de-identified] : Ms. Welsh is an 84-year-old female with a history of DM, COPD, HTN, CKD, hypothyroidism, who underwent a left hip hemiarthroplasty on 9/20/2019 due to a traumatic left femoral neck fracture. Her hospital course was complicated by an admission to the SICU due to respiratory distress. She was ultimately stabilized and discharged to a rehabilitation center. Was discharged home from rehab last week. Has not taken Aspirin as instructed for the past week. Today is her first postoperative visit. \par \par She says she has no pain whatsoever. Has been ambulating with a rolling walker. Denies any fevers or wound drainage. Has seen her pulmonologist, Dr. Brooke as well. Currently on home O2.  [de-identified] : There are no respiratory, cardiac or GI deficits on exam. \par \par LLE:\par Skin CDI, incisional scar well healed. Minimal erythema, w/o any drainage or fluctuance. ROM: 90 flexion, 30ER. 10 IR.  No TTP. No palpable masses. Mild to moderate lymphedema distally.\par EHL/FHL/GS/TA 5/5. S/S/SP/DP/T SILT. Toes warm, BCR. Compartments soft.\par \par Gait: Patient seen ambulating fluidly with a walker, w/o pain.  [de-identified] : AP Pelvis and L hip xrays: Well positioned implant without loosening or evidence of fx/dislocation. [de-identified] : This is an 84-year-old female now nearly 6 weeks status post left hip hemiarthroplasty last he is doing very well. [de-identified] : FU in 3 months for new xrays. May d/c Aspirin pending duplex (prescribed). Continue PT, WBAT w/ posterior hip precautions. Nutrition consult and endocrine consult to optimize diet and treat underlying osteoporosis. Patient was notified to return if there are any signs of infection or increased pain. This plan was discussed in detail with the patient who was in full agreement. All questions were answered.

## 2019-10-28 NOTE — ASSESSMENT
[FreeTextEntry1] : the patient is currently compliant with her oxygen and use. She does have significant peripheral edema. Blood work would be gone and her diuretic readjusted. She will require a chest radiograph at some point and likely will need a nebulizer. She reports that she doesn't have any. She will followup with me in approximately one month.

## 2019-10-28 NOTE — HISTORY OF PRESENT ILLNESS
[FreeTextEntry1] : 84-year-old female accompanied by a friend and health proxy recently hospitalized for fractured hip with accompanying respiratory failure. Went to rehabilitation and is now home. She was on oxygen and continuously at 3 L per minute. She is using incruse daily but is not using any nebulized medication. She is a former significant smoker who has severe COPD as well as diastolic cardiac dysfunction. She also suffers from hypertension and is currently on amlodipine.

## 2019-10-28 NOTE — PHYSICAL EXAM
[General Appearance - In No Acute Distress] : no acute distress [Normal Conjunctiva] : the conjunctiva exhibited no abnormalities [Neck Appearance] : the appearance of the neck was normal [Heart Rate And Rhythm] : heart rate and rhythm were normal [Heart Sounds] : normal S1 and S2 [] : no respiratory distress [Respiration, Rhythm And Depth] : normal respiratory rhythm and effort [Auscultation Breath Sounds / Voice Sounds] : lungs were clear to auscultation bilaterally [FreeTextEntry1] : using a walker [Nail Clubbing] : no clubbing of the fingernails [Cyanosis, Localized] : no localized cyanosis [2+ Pitting] : 2+  pitting [FreeTextEntry2] : 2+ left 1+ right [No Focal Deficits] : no focal deficits

## 2019-10-28 NOTE — REVIEW OF SYSTEMS
[As Noted in HPI] : as noted in HPI [Hypertension] : ~T hypertension [Arthralgias] : arthralgias [Diabetes] : diabetes mellitus [Negative] : HEENT

## 2019-10-29 ENCOUNTER — MEDICATION RENEWAL (OUTPATIENT)
Age: 84
End: 2019-10-29

## 2019-10-29 LAB
ALBUMIN SERPL ELPH-MCNC: 4.4 G/DL
ALP BLD-CCNC: 103 U/L
ALT SERPL-CCNC: 8 U/L
ANION GAP SERPL CALC-SCNC: 14 MMOL/L
AST SERPL-CCNC: 20 U/L
BILIRUB SERPL-MCNC: 0.3 MG/DL
BUN SERPL-MCNC: 35 MG/DL
CALCIUM SERPL-MCNC: 10.2 MG/DL
CHLORIDE SERPL-SCNC: 100 MMOL/L
CO2 SERPL-SCNC: 28 MMOL/L
CREAT SERPL-MCNC: 1.21 MG/DL
POTASSIUM SERPL-SCNC: 4.8 MMOL/L
PROT SERPL-MCNC: 7.2 G/DL
SODIUM SERPL-SCNC: 142 MMOL/L

## 2019-10-31 ENCOUNTER — RX CHANGE (OUTPATIENT)
Age: 84
End: 2019-10-31

## 2019-11-01 ENCOUNTER — APPOINTMENT (OUTPATIENT)
Dept: CARDIOLOGY | Facility: CLINIC | Age: 84
End: 2019-11-01
Payer: MEDICARE

## 2019-11-01 VITALS
DIASTOLIC BLOOD PRESSURE: 73 MMHG | HEART RATE: 87 BPM | WEIGHT: 146 LBS | BODY MASS INDEX: 24.92 KG/M2 | HEIGHT: 64 IN | SYSTOLIC BLOOD PRESSURE: 120 MMHG

## 2019-11-01 PROCEDURE — 99214 OFFICE O/P EST MOD 30 MIN: CPT

## 2019-11-01 RX ORDER — AMLODIPINE BESYLATE 2.5 MG/1
2.5 TABLET ORAL DAILY
Qty: 30 | Refills: 2 | Status: DISCONTINUED | COMMUNITY
Start: 2019-09-06 | End: 2019-11-01

## 2019-11-01 NOTE — DISCUSSION/SUMMARY
[FreeTextEntry1] : 84F with severe COPD on home O2, poorly compliant, HTN, HFpEF, JANELLE (crt stable) recent hip fracture\par \par -Increase lasix to 40mg daily, watch creatinine, K\par -Watch salt intake, fluid intake \par -Stop amlodipine\par -Home O2, breathing stable \par -DM care per PCP, cont metformin, cautious use with crt\par \par RV in 1 month to reassess symptoms, need for diuretic\par \par As documented in other notes, her social situation is complex as she lives alone, and would benefit from some assistance at home with medication administration and oxygen management.  She will have an aid 3 days a week \par

## 2019-11-01 NOTE — REVIEW OF SYSTEMS
[Fever] : no fever [Chills] : no chills [Shortness Of Breath] : no shortness of breath [Dyspnea on exertion] : dyspnea during exertion [Lower Ext Edema] : lower extremity edema [Cough] : no cough [Wheezing] : no wheezing [Confusion] : no confusion was observed [Anxiety] : no anxiety

## 2019-11-01 NOTE — HISTORY OF PRESENT ILLNESS
[FreeTextEntry1] : 84F with severe COPD supposed to be on home O2 but noncompliant, HFpEF who presents for follow up after hip fracture.\par \par Late September 2019, had a ?mechanical fall at home, femoral neck fracture s/p surgery. Post op course complicated by hypercarbic respiratory failure s/p intubation in SICU, subsequent extubation.  Discharged to [facility] and was discharged home last week.  Feels well without symptoms of CP, SOB.  Notes leg has been swollen since hospital.  Has been taking lasix. Was sent for LE duplex by ortho earlier this week and was negative for DVT. \par \par Pt was hospitalized at [facility] August 2018 for COPD exacerbation.  Longstanding COPD, not always compliant with home O2 or with medications.  She was seen by a cardiologist in 2014 and was noted that she had diastolic dysfunction.  She had an echo during her hospitalization August 2018 which did not record any diastolic dysfunction. She lives alone. \par \par First seen October 2018 for fluid overload, LE edema, worsening HFpEF.  Was started on lasix with marked improvement in symptoms.  Uses home o2 prn.  25 lb weight loss since initial visit. \par \par Former smoker. \par \par ECG: SR, TWI laterally\par TTE 8/2019: Normal LV, eccentric hypertrophy\par NST (2014):  No ischemia, EF >70%. \par \par Metformin 1g BID, furosemide 20mg, amlodipine 2.5mg

## 2019-11-01 NOTE — PHYSICAL EXAM
[General Appearance - Well Developed] : well developed [General Appearance - Well Nourished] : well nourished [Normal Conjunctiva] : the conjunctiva exhibited no abnormalities [Eyelids - No Xanthelasma] : the eyelids demonstrated no xanthelasmas [Normal Oral Mucosa] : normal oral mucosa [No Oral Pallor] : no oral pallor [No Oral Cyanosis] : no oral cyanosis [Respiration, Rhythm And Depth] : normal respiratory rhythm and effort [Auscultation Breath Sounds / Voice Sounds] : lungs were clear to auscultation bilaterally [Heart Rate And Rhythm] : heart rate and rhythm were normal [Heart Sounds] : normal S1 and S2 [Murmurs] : no murmurs present [FreeTextEntry1] : 2+ bilateral lower extremity edema, venous stasis changes [Abdomen Soft] : soft [Abdomen Tenderness] : non-tender [Abdomen Mass (___ Cm)] : no abdominal mass palpated [Abnormal Walk] : normal gait [Nail Clubbing] : no clubbing of the fingernails [Cyanosis, Localized] : no localized cyanosis [Petechial Hemorrhages (___cm)] : no petechial hemorrhages [Skin Color & Pigmentation] : normal skin color and pigmentation [] : no rash [No Venous Stasis] : no venous stasis [Skin Lesions] : no skin lesions [No Skin Ulcers] : no skin ulcer [No Xanthoma] : no  xanthoma was observed [Oriented To Time, Place, And Person] : oriented to person, place, and time [Affect] : the affect was normal [Mood] : the mood was normal [No Anxiety] : not feeling anxious

## 2019-11-20 ENCOUNTER — FORM ENCOUNTER (OUTPATIENT)
Age: 84
End: 2019-11-20

## 2019-11-21 ENCOUNTER — OUTPATIENT (OUTPATIENT)
Dept: OUTPATIENT SERVICES | Facility: HOSPITAL | Age: 84
LOS: 1 days | End: 2019-11-21
Payer: MEDICARE

## 2019-11-21 ENCOUNTER — APPOINTMENT (OUTPATIENT)
Dept: ULTRASOUND IMAGING | Facility: CLINIC | Age: 84
End: 2019-11-21
Payer: MEDICARE

## 2019-11-21 ENCOUNTER — APPOINTMENT (OUTPATIENT)
Dept: PULMONOLOGY | Facility: CLINIC | Age: 84
End: 2019-11-21
Payer: MEDICARE

## 2019-11-21 ENCOUNTER — APPOINTMENT (OUTPATIENT)
Dept: ORTHOPEDIC SURGERY | Facility: CLINIC | Age: 84
End: 2019-11-21
Payer: MEDICARE

## 2019-11-21 VITALS
DIASTOLIC BLOOD PRESSURE: 64 MMHG | HEIGHT: 64 IN | HEART RATE: 83 BPM | WEIGHT: 147 LBS | BODY MASS INDEX: 25.1 KG/M2 | SYSTOLIC BLOOD PRESSURE: 150 MMHG

## 2019-11-21 VITALS
DIASTOLIC BLOOD PRESSURE: 73 MMHG | SYSTOLIC BLOOD PRESSURE: 149 MMHG | RESPIRATION RATE: 15 BRPM | HEART RATE: 79 BPM | TEMPERATURE: 97.3 F | BODY MASS INDEX: 26.29 KG/M2 | HEIGHT: 64 IN | WEIGHT: 154 LBS | OXYGEN SATURATION: 90 %

## 2019-11-21 DIAGNOSIS — Z90.49 ACQUIRED ABSENCE OF OTHER SPECIFIED PARTS OF DIGESTIVE TRACT: Chronic | ICD-10-CM

## 2019-11-21 DIAGNOSIS — Z86.69 PERSONAL HISTORY OF OTHER DISEASES OF THE NERVOUS SYSTEM AND SENSE ORGANS: Chronic | ICD-10-CM

## 2019-11-21 DIAGNOSIS — R60.0 LOCALIZED EDEMA: ICD-10-CM

## 2019-11-21 DIAGNOSIS — S82.90XA UNSPECIFIED FRACTURE OF UNSPECIFIED LOWER LEG, INITIAL ENCOUNTER FOR CLOSED FRACTURE: Chronic | ICD-10-CM

## 2019-11-21 DIAGNOSIS — E66.2 MORBID (SEVERE) OBESITY WITH ALVEOLAR HYPOVENTILATION: ICD-10-CM

## 2019-11-21 PROCEDURE — 99024 POSTOP FOLLOW-UP VISIT: CPT

## 2019-11-21 PROCEDURE — 99214 OFFICE O/P EST MOD 30 MIN: CPT

## 2019-11-21 PROCEDURE — 93970 EXTREMITY STUDY: CPT | Mod: 26

## 2019-11-21 PROCEDURE — 93970 EXTREMITY STUDY: CPT

## 2019-11-21 NOTE — HISTORY OF PRESENT ILLNESS
[FreeTextEntry1] : 84yo female recently hospitalized ([facility] 9/20-10/8) for fractured L hip with accompanying respiratory failure.  When seen last month, she had significant lower extremity swelling and was started on furosemide 20mg.  This was increased to 40mg on 11/1 by her cardiologist.  Her amlodipine was stopped.  Her leg swelling is significantly worse today and she has gained 8 pounds since last OV.  She was a significant former smoker and has underlying diastolic dysfunction, as well as COPD.  She has supplemental oxygen at 3L that she claims to use with sleep on occasion, despite past discussions to use more during the daytime additionally.  She is also taking Incruse without requiring albuterol.  \par \par LLE more swollen than RLE.  LLE Doppler completed which was negative for DVT.

## 2019-11-21 NOTE — ASSESSMENT
[FreeTextEntry1] : 86yo female with COPD and diastolic cardiac dysfunction presenting with worsening b/l LE edema.  Furosemide increased to 40mg QD, which patient admits to taking daily.  Her family member who is with her told me that she is uncertain whether she is compliant with her medications or supplemental oxygen.  Past chart notes have shown that she has been reluctant to use her oxygen.  CBC w/diff and CMP checked today.  Will add spironolactone 25mg QD in addition to her furosemide.  She will weigh herself daily.  Instructed to use 3L supplemental oxygen 24 hours daily.  Hopefully she will comply.  Patient has f/u with cardiology in December.

## 2019-11-21 NOTE — HISTORY OF PRESENT ILLNESS
[de-identified] : 2months s/p L hip hemiarthroplasty (9/20/19) [de-identified] : The patient returns now 2 months status post left hip hemiarthroplasty d/t left lower extremity edema. She is otherwise doing well and has been ambulating using a walker. She denies any pain or use of pain medication. She was seeing her today by her pulmonologist Dr. Brooke who recommended 24/7 O2. She is currently no longer on blood thinners. Denies any fevers, drainage or trauma. [de-identified] : There are no respiratory, cardiac, ENT or GI deficits on exam. \par Psych: Normal mood and affect, non-pressured speech, alert and oriented X3. \par \par \par LLE:\par \par Skin CDI. Incisional scar well healed. Moderate to severe LLE edema, worse than right. Compartments are soft, nontender throughout. \par No instability. No palpable masses. No lymphedema.\par ROM: FF: 90. IR: 5. ER: 30. \par EHL/FHL/GS/TA 5/5. S/S/SP/DP/T SILT. Toes warm, BCR.  [de-identified] : This is an 85-year-old female now 2 months status post left hip hemiarthroplasty with worsening unilateral left lower extremity edema. I have recommended an urgent ultrasound to rule out DVT. If she does in fact have a DVT I will recommend anti-coagulation and followup with her PCP, Dr. Cheek. The patient would like to know if she is allowed to drive. I have told her that there are no contraindications from an orthopedic standpoint to driving although she should be cleared medically from her PCP. Otherwise follow up in 3 months or as needed.

## 2019-11-21 NOTE — PHYSICAL EXAM
[General Appearance - In No Acute Distress] : no acute distress [Normal Conjunctiva] : the conjunctiva exhibited no abnormalities [Neck Appearance] : the appearance of the neck was normal [Heart Rate And Rhythm] : heart rate and rhythm were normal [Heart Sounds] : normal S1 and S2 [Auscultation Breath Sounds / Voice Sounds] : lungs were clear to auscultation bilaterally [Respiration, Rhythm And Depth] : normal respiratory rhythm and effort [Nail Clubbing] : no clubbing of the fingernails [2+ Pitting] : 2+  pitting [Cyanosis, Localized] : no localized cyanosis [No Focal Deficits] : no focal deficits [Normal Appearance] : normal appearance [] : no rash [Affect] : the affect was normal [Mood] : the mood was normal [FreeTextEntry1] : using a walker

## 2019-11-21 NOTE — REASON FOR VISIT
[COPD] : COPD [Shortness of Breath] : shortness of Breath [Follow-Up] : a follow-up visit [Friend] : friend

## 2019-11-21 NOTE — REVIEW OF SYSTEMS
[Hypertension] : ~T hypertension [Arthralgias] : arthralgias [Diabetes] : diabetes mellitus [Negative] : HEENT [Recent Wt Gain (___ Lbs)] : recent [unfilled] ~Ulb weight gain [As Noted in HPI] : as noted in HPI [Dyspnea] : dyspnea [Edema] : ~T edema was present [Cough] : no cough [Chest Tightness] : no chest tightness [Wheezing] : no wheezing [Chest Discomfort] : no chest discomfort

## 2019-11-22 LAB
ALBUMIN SERPL ELPH-MCNC: 4.2 G/DL
ALP BLD-CCNC: 92 U/L
ALT SERPL-CCNC: 5 U/L
ANION GAP SERPL CALC-SCNC: 14 MMOL/L
AST SERPL-CCNC: 14 U/L
BASOPHILS # BLD AUTO: 0.04 K/UL
BASOPHILS NFR BLD AUTO: 0.5 %
BILIRUB SERPL-MCNC: 0.2 MG/DL
BUN SERPL-MCNC: 44 MG/DL
CALCIUM SERPL-MCNC: 9.8 MG/DL
CHLORIDE SERPL-SCNC: 100 MMOL/L
CO2 SERPL-SCNC: 27 MMOL/L
CREAT SERPL-MCNC: 1.51 MG/DL
EOSINOPHIL # BLD AUTO: 0.3 K/UL
EOSINOPHIL NFR BLD AUTO: 3.6 %
HCT VFR BLD CALC: 35.8 %
HGB BLD-MCNC: 11.1 G/DL
IMM GRANULOCYTES NFR BLD AUTO: 0.2 %
LYMPHOCYTES # BLD AUTO: 2.3 K/UL
LYMPHOCYTES NFR BLD AUTO: 27.5 %
MAN DIFF?: NORMAL
MCHC RBC-ENTMCNC: 31 GM/DL
MCHC RBC-ENTMCNC: 31.8 PG
MCV RBC AUTO: 102.6 FL
MONOCYTES # BLD AUTO: 0.94 K/UL
MONOCYTES NFR BLD AUTO: 11.2 %
NEUTROPHILS # BLD AUTO: 4.76 K/UL
NEUTROPHILS NFR BLD AUTO: 57 %
PLATELET # BLD AUTO: 368 K/UL
POTASSIUM SERPL-SCNC: 5 MMOL/L
PROT SERPL-MCNC: 7.2 G/DL
RBC # BLD: 3.49 M/UL
RBC # FLD: 12.6 %
SODIUM SERPL-SCNC: 141 MMOL/L
WBC # FLD AUTO: 8.36 K/UL

## 2019-11-26 ENCOUNTER — MOBILE ON CALL (OUTPATIENT)
Age: 84
End: 2019-11-26

## 2019-12-02 ENCOUNTER — APPOINTMENT (OUTPATIENT)
Dept: CARDIOLOGY | Facility: CLINIC | Age: 84
End: 2019-12-02
Payer: MEDICARE

## 2019-12-02 VITALS
SYSTOLIC BLOOD PRESSURE: 117 MMHG | HEIGHT: 64 IN | OXYGEN SATURATION: 90 % | WEIGHT: 147 LBS | BODY MASS INDEX: 25.1 KG/M2 | DIASTOLIC BLOOD PRESSURE: 76 MMHG | HEART RATE: 67 BPM

## 2019-12-02 DIAGNOSIS — R06.89 OTHER ABNORMALITIES OF BREATHING: ICD-10-CM

## 2019-12-02 PROCEDURE — 99214 OFFICE O/P EST MOD 30 MIN: CPT

## 2019-12-02 NOTE — PHYSICAL EXAM
[General Appearance - Well Developed] : well developed [General Appearance - Well Nourished] : well nourished [Eyelids - No Xanthelasma] : the eyelids demonstrated no xanthelasmas [Normal Conjunctiva] : the conjunctiva exhibited no abnormalities [No Oral Pallor] : no oral pallor [Normal Oral Mucosa] : normal oral mucosa [No Oral Cyanosis] : no oral cyanosis [Respiration, Rhythm And Depth] : normal respiratory rhythm and effort [Auscultation Breath Sounds / Voice Sounds] : lungs were clear to auscultation bilaterally [Heart Rate And Rhythm] : heart rate and rhythm were normal [Heart Sounds] : normal S1 and S2 [Murmurs] : no murmurs present [Abdomen Tenderness] : non-tender [Abdomen Soft] : soft [Abnormal Walk] : normal gait [Abdomen Mass (___ Cm)] : no abdominal mass palpated [Nail Clubbing] : no clubbing of the fingernails [Cyanosis, Localized] : no localized cyanosis [Petechial Hemorrhages (___cm)] : no petechial hemorrhages [Skin Color & Pigmentation] : normal skin color and pigmentation [] : no rash [No Venous Stasis] : no venous stasis [Skin Lesions] : no skin lesions [No Skin Ulcers] : no skin ulcer [No Xanthoma] : no  xanthoma was observed [Affect] : the affect was normal [Oriented To Time, Place, And Person] : oriented to person, place, and time [Mood] : the mood was normal [No Anxiety] : not feeling anxious [FreeTextEntry1] : 2+ left lower extremity edema, 1+ RLE, venous stasis changes

## 2019-12-02 NOTE — DISCUSSION/SUMMARY
[FreeTextEntry1] : 84F with severe COPD on home O2, poorly compliant, HTN, HFpEF, JANELLE (crt stable) recent hip fracture\par \par 1. LE edema:  Likely combination of venous stasis, diastolic dysfunction\par \par -Cont lasix 40mg daily\par -Check BMP today\par -D/c spironolactone, start metolazone 2.5mg MWF\par -Watch salt intake, fluid intake \par \par 2. HTN: Excellent today, continue current meds\par \par 3. COPD: Home O2, breathing stable. Seeing pulm\par \par 4. DM care per PCP, cont metformin, cautious use with crt\par \par RV in 2 months to reassess symptoms, need for diuretic\par \par As documented in other notes, her social situation is complex as she lives alone, and would benefit from some assistance at home with medication administration and oxygen management.  She will have an aid 3 days a week \par

## 2019-12-02 NOTE — HISTORY OF PRESENT ILLNESS
[FreeTextEntry1] : 85F with severe COPD supposed to be on home O2 but noncompliant, HFpEF who presents for follow up after hip fracture, medication adjustment\par \par Continues to heal from hip fracture, back to driving\par Spironolactone added by pulm, crt bump, decreased to three times weekly. Still taking every day\par Remains on lasix 40mg daily\par LLE swelling > RLE \par Breathing has been stable\par \par Pt was hospitalized at [facility] August 2018 for COPD exacerbation.  Longstanding COPD, not always compliant with home O2 or with medications.  She was seen by a cardiologist in 2014 and was noted that she had diastolic dysfunction.  She had an echo during her hospitalization August 2018 which did not record any diastolic dysfunction\par \par First seen October 2018 for fluid overload, LE edema, worsening HFpEF.  Was started on lasix with marked improvement in symptoms.  Uses home o2 prn.  \par \par Late September 2019, had a ?mechanical fall at home, femoral neck fracture s/p surgery. Post op course complicated by hypercarbic respiratory failure s/p intubation in SICU, subsequent extubation.  Discharged to [facility] and was discharged home last week.  Feels well without symptoms of CP, SOB. Was sent for LE duplex by ortho earlier this week and was negative for DVT. \par \par Former smoker. She lives alone\par \par ECG: SR, TWI laterally\par TTE 8/2019: Normal LV, eccentric hypertrophy\par NST (2014):  No ischemia, EF >70%. \par \par Metformin 1g BID, furosemide 40mg, Aldactone 25mg TIW

## 2019-12-03 LAB
ANION GAP SERPL CALC-SCNC: 13 MMOL/L
BUN SERPL-MCNC: 47 MG/DL
CALCIUM SERPL-MCNC: 9.9 MG/DL
CHLORIDE SERPL-SCNC: 99 MMOL/L
CO2 SERPL-SCNC: 30 MMOL/L
CREAT SERPL-MCNC: 1.62 MG/DL
GLUCOSE SERPL-MCNC: 115 MG/DL
POTASSIUM SERPL-SCNC: 5.7 MMOL/L
SODIUM SERPL-SCNC: 142 MMOL/L

## 2019-12-04 ENCOUNTER — MEDICATION RENEWAL (OUTPATIENT)
Age: 84
End: 2019-12-04

## 2019-12-14 ENCOUNTER — RECORD ABSTRACTING (OUTPATIENT)
Age: 84
End: 2019-12-14

## 2019-12-16 ENCOUNTER — RX RENEWAL (OUTPATIENT)
Age: 84
End: 2019-12-16

## 2020-01-08 ENCOUNTER — APPOINTMENT (OUTPATIENT)
Dept: FAMILY MEDICINE | Facility: CLINIC | Age: 85
End: 2020-01-08

## 2020-01-19 ENCOUNTER — RX RENEWAL (OUTPATIENT)
Age: 85
End: 2020-01-19

## 2020-01-26 ENCOUNTER — RX RENEWAL (OUTPATIENT)
Age: 85
End: 2020-01-26

## 2020-01-27 ENCOUNTER — APPOINTMENT (OUTPATIENT)
Dept: NEPHROLOGY | Facility: CLINIC | Age: 85
End: 2020-01-27

## 2020-02-24 ENCOUNTER — NON-APPOINTMENT (OUTPATIENT)
Age: 85
End: 2020-02-24

## 2020-02-26 ENCOUNTER — RX RENEWAL (OUTPATIENT)
Age: 85
End: 2020-02-26

## 2020-02-27 ENCOUNTER — APPOINTMENT (OUTPATIENT)
Dept: FAMILY MEDICINE | Facility: CLINIC | Age: 85
End: 2020-02-27
Payer: COMMERCIAL

## 2020-02-27 VITALS
HEART RATE: 62 BPM | BODY MASS INDEX: 24.75 KG/M2 | OXYGEN SATURATION: 94 % | WEIGHT: 145 LBS | SYSTOLIC BLOOD PRESSURE: 110 MMHG | HEIGHT: 64 IN | DIASTOLIC BLOOD PRESSURE: 80 MMHG

## 2020-02-27 PROCEDURE — 36415 COLL VENOUS BLD VENIPUNCTURE: CPT

## 2020-02-27 PROCEDURE — 99214 OFFICE O/P EST MOD 30 MIN: CPT | Mod: 25

## 2020-02-29 LAB
ALBUMIN SERPL ELPH-MCNC: 4.3 G/DL
ALP BLD-CCNC: 84 U/L
ALT SERPL-CCNC: 5 U/L
ANION GAP SERPL CALC-SCNC: 13 MMOL/L
AST SERPL-CCNC: 13 U/L
BASOPHILS # BLD AUTO: 0.03 K/UL
BASOPHILS NFR BLD AUTO: 0.4 %
BILIRUB SERPL-MCNC: 0.2 MG/DL
BUN SERPL-MCNC: 29 MG/DL
CALCIUM SERPL-MCNC: 9 MG/DL
CHLORIDE SERPL-SCNC: 102 MMOL/L
CHOLEST SERPL-MCNC: 202 MG/DL
CHOLEST/HDLC SERPL: 4 RATIO
CO2 SERPL-SCNC: 27 MMOL/L
CREAT SERPL-MCNC: 1.3 MG/DL
EOSINOPHIL # BLD AUTO: 0.19 K/UL
EOSINOPHIL NFR BLD AUTO: 2.8 %
ESTIMATED AVERAGE GLUCOSE: 143 MG/DL
GLUCOSE SERPL-MCNC: 83 MG/DL
HBA1C MFR BLD HPLC: 6.6 %
HCT VFR BLD CALC: 40.9 %
HDLC SERPL-MCNC: 50 MG/DL
HGB BLD-MCNC: 12.9 G/DL
IMM GRANULOCYTES NFR BLD AUTO: 0.3 %
LDLC SERPL CALC-MCNC: 121 MG/DL
LYMPHOCYTES # BLD AUTO: 2 K/UL
LYMPHOCYTES NFR BLD AUTO: 29.1 %
MAN DIFF?: NORMAL
MCHC RBC-ENTMCNC: 30.5 PG
MCHC RBC-ENTMCNC: 31.5 GM/DL
MCV RBC AUTO: 96.7 FL
MONOCYTES # BLD AUTO: 0.77 K/UL
MONOCYTES NFR BLD AUTO: 11.2 %
NEUTROPHILS # BLD AUTO: 3.86 K/UL
NEUTROPHILS NFR BLD AUTO: 56.2 %
PLATELET # BLD AUTO: 255 K/UL
POTASSIUM SERPL-SCNC: 4.9 MMOL/L
PROT SERPL-MCNC: 7 G/DL
RBC # BLD: 4.23 M/UL
RBC # FLD: 14.1 %
SODIUM SERPL-SCNC: 141 MMOL/L
TRIGL SERPL-MCNC: 152 MG/DL
TSH SERPL-ACNC: 3.31 UIU/ML
WBC # FLD AUTO: 6.87 K/UL

## 2020-03-02 ENCOUNTER — APPOINTMENT (OUTPATIENT)
Dept: CARDIOLOGY | Facility: CLINIC | Age: 85
End: 2020-03-02

## 2020-03-03 NOTE — ED ADULT NURSE NOTE - CAS EDN DISCHARGE INTERVENTIONS
Refill call for Epidiolex. Spoke to patient's mother, Rachael, who confirmed need for refill. Verified to ship on Wednesday 3/4 for delivery on Thursday 3/5 - acknowledged signature required. $0.00 copay at 004.  verified.     Henry Jha, PharmD  Clinical Pharmacist  [facility]  P: [phone]     IV intact

## 2020-04-06 ENCOUNTER — RX RENEWAL (OUTPATIENT)
Age: 85
End: 2020-04-06

## 2020-06-25 NOTE — PROCEDURE NOTE - TRACHEAL INTUBATION: TUBE SECURED AT (CM):
Subjective:      Patient ID: Zackary Astudillo is a 80 y.o. male. HPI  This is an 81 yo male with HTN, GERD, OA, Depression, and diagnosed with an elevated creat and found to have urinary retention and bilateral hydronephrosis by U/S in 3/19. He had a catheter placed at that time for a 1600 cc PVR and in follow-up had a CT that showed hydronephrosis resolution (atrophic Lt kidney) but has persistent elevation in the Creat. He had cystoscopy and U/D with TRUS on 4/17/19 and showed bi-lobar prostate hypertrophy and PV 51cc with decreased sensation of bladder filling as he had 750 of filling and little sensation to void but did show good Pdet when voided but did not void to completion. Since last seen on 5/21/20 for catheter change (failed voiding in 9/19), he has no pain or leakage. He has no hematuria. He has no other new complaints except some lower ext swelling that has resolved. Past Medical History:   Diagnosis Date    Anxiety     Chest pain, non-cardiac     Depression     GERD (gastroesophageal reflux disease)     History of TB (tuberculosis)     History of tobacco use     HTN (hypertension)     Osteoarthritis     LEFT KNEE    Rectal disorder     Testicular disorder      Past Surgical History:   Procedure Laterality Date    ANUS SURGERY  1991    ABSCESS    CORNEAL TRANSPLANT  2973/2049    VASECTOMY  1980     Social History     Socioeconomic History    Marital status:       Spouse name: None    Number of children: None    Years of education: None    Highest education level: None   Occupational History    None   Social Needs    Financial resource strain: None    Food insecurity     Worry: None     Inability: None    Transportation needs     Medical: None     Non-medical: None   Tobacco Use    Smoking status: Never Smoker    Smokeless tobacco: Never Used   Substance and Sexual Activity    Alcohol use: No    Drug use: No    Sexual activity: None   Lifestyle    Physical catheter was removed and under sterile conditions and with 2 % Urojet, a 16 Fr Coude catheter was replaced with resistance at the prostatic urethra. The catheter irrigated easily with 30 cc of sterile saline and 75 cc of clear urine was drained.     Assessment: This is an 79 yo male with HTN, GERD, OA, Depression, and with several yr h/o voiding problems and urinary retention   that caused bilateral hydronephrosis   (resolved by CT) and acute renal failure (improved) and catheter was changed today. He will continue with catheter for management of the retention.       Plan:      1.  F/U 1 mo for catheter change        Karan Ontiveros MD 22

## 2020-07-28 ENCOUNTER — RX RENEWAL (OUTPATIENT)
Age: 85
End: 2020-07-28

## 2020-08-10 ENCOUNTER — RX CHANGE (OUTPATIENT)
Age: 85
End: 2020-08-10

## 2020-09-24 ENCOUNTER — NON-APPOINTMENT (OUTPATIENT)
Age: 85
End: 2020-09-24

## 2020-10-19 ENCOUNTER — APPOINTMENT (OUTPATIENT)
Dept: FAMILY MEDICINE | Facility: CLINIC | Age: 85
End: 2020-10-19

## 2020-10-22 DIAGNOSIS — M25.552 PAIN IN LEFT HIP: ICD-10-CM

## 2020-10-23 ENCOUNTER — APPOINTMENT (OUTPATIENT)
Dept: ORTHOPEDIC SURGERY | Facility: CLINIC | Age: 85
End: 2020-10-23
Payer: MEDICARE

## 2020-10-23 ENCOUNTER — APPOINTMENT (OUTPATIENT)
Dept: ORTHOPEDIC SURGERY | Facility: CLINIC | Age: 85
End: 2020-10-23

## 2020-10-23 VITALS — HEART RATE: 84 BPM | OXYGEN SATURATION: 84 %

## 2020-10-23 DIAGNOSIS — M70.62 TROCHANTERIC BURSITIS, LEFT HIP: ICD-10-CM

## 2020-10-23 PROCEDURE — 99213 OFFICE O/P EST LOW 20 MIN: CPT | Mod: 25

## 2020-10-23 PROCEDURE — 20610 DRAIN/INJ JOINT/BURSA W/O US: CPT | Mod: LT

## 2020-10-23 PROCEDURE — 72170 X-RAY EXAM OF PELVIS: CPT

## 2020-10-23 PROCEDURE — 99072 ADDL SUPL MATRL&STAF TM PHE: CPT

## 2020-10-23 NOTE — HISTORY OF PRESENT ILLNESS
[FreeTextEntry1] : Ms. Welsh is an 85-year-old female with a history of DM, COPD, HTN, CKD, hypothyroidism, who underwent a left hip hemiarthroplasty 1 year ago on 9/20/2019 due to a traumatic left femoral neck fracture. Her hospital course was complicated by an admission to the SICU due to respiratory distress. She was ultimately stabilized and progressed very well, making a substantial recovery.  She has now reported several weeks of left-sided hip pain.  She has been taking Tylenol which has offered some relief.  She does report an unrelated fall onto both of her knees 8 weeks ago however did not have any pain at that time.  She says the pain has been substantially affecting her ability to walk.  Denies any fevers or wound drainage.

## 2020-10-23 NOTE — DISCUSSION/SUMMARY
[de-identified] : This is an 85-year-old female with left greater trochanteric hip bursitis.  I have administered a steroid injection today.  She has had complete resolution of symptoms post injection due to the lidocaine.  I explained that steroids will take in the next few days, up to 2 weeks.  She may resume full weightbearing without restriction.  May take meloxicam on a as needed basis.  She may follow-up on a as needed basis.

## 2020-10-23 NOTE — PROCEDURE
[Injection] : Injection [Left] : left [Greater Trochanter] : greater trochanteric bursa [Inflammation] : Inflammation [Patient] : patient [Risk] : risk [Benefits] : benefits [Bleeding] : bleeding [Infection] : infection [Verbal Consent Obtained] : verbal consent was obtained prior to the procedure [Alcohol] : Alcohol [Ethyl Chloride Spray] : ethyl chloride spray was used as a topical anesthetic [Direct] : direct [25] : a 25-gauge [1% Lidocaine___(mL)] : [unfilled] mL of 1% Lidocaine [Methylpred. 40mg/mL___(mL)] : [unfilled] mL 40mg/mL methylprednisolone [Bandage Applied] : a bandage [Tolerated Well] : the patient tolerated the procedure well [de-identified] : Complete resolution of symptoms post injection.

## 2020-10-23 NOTE — PHYSICAL EXAM
[FreeTextEntry1] : General: well nourished, in no acute distress, alert and oriented to person, place and time.\par Psychiatric: normal mood and affect, no abnormal movements or speech patterns.\par Eyes: vision intact without deficits, sclera and conjunctiva were normal, pupils were equal in size. \par ENT: Ears and nose were normal in appearance. No thyromegaly.\par Lymph: no enlarged nodes, no lymphedema in extremity.\par Respiratory: Normal respiratory rhythm and effort. No wheezing detected without auscultation. No shortness of breath or respiratory distress.\par Cardiac: no cardiac related leg swelling, 2+ peripheral pulses.\par Neurology: normal gross sensation in extremities to light touch.\par Abdomen: soft, non-tender, tympanic, no masses.\par \par LLE:\par \par Incisional scar well-healed. No ecchymosis or swelling. \par No instability. \par +TTP over GT. No palpable masses. No lymphedema.\par Negative LR, HS, SLR. \par ROM: FF: 80. IR: 5. ER: 40. \par EHL/FHL/GS/TA 5/5. S/S/SP/DP/T SILT. Toes warm, BCR. Compartments soft.\par Gait: Patient is seen ambulating without assistance, however with significant left-sided hip pain.  After the injection the hip pain completely resolved, and she had normal nonantalgic gait.

## 2020-10-23 NOTE — DATA REVIEWED
[de-identified] : 10/23/2020–AP pelvis and frog: Well-positioned implant without any signs periprosthetic fracture or loosening.  No signs of dislocation or osseous lesions.

## 2020-11-05 ENCOUNTER — RX RENEWAL (OUTPATIENT)
Age: 85
End: 2020-11-05

## 2020-11-15 ENCOUNTER — RX RENEWAL (OUTPATIENT)
Age: 85
End: 2020-11-15

## 2020-11-21 ENCOUNTER — RX RENEWAL (OUTPATIENT)
Age: 85
End: 2020-11-21

## 2020-11-25 ENCOUNTER — RX RENEWAL (OUTPATIENT)
Age: 85
End: 2020-11-25

## 2020-11-28 ENCOUNTER — RX RENEWAL (OUTPATIENT)
Age: 85
End: 2020-11-28

## 2020-12-01 ENCOUNTER — APPOINTMENT (OUTPATIENT)
Dept: FAMILY MEDICINE | Facility: CLINIC | Age: 85
End: 2020-12-01
Payer: MEDICARE

## 2020-12-01 VITALS
SYSTOLIC BLOOD PRESSURE: 125 MMHG | OXYGEN SATURATION: 85 % | BODY MASS INDEX: 23.76 KG/M2 | DIASTOLIC BLOOD PRESSURE: 80 MMHG | HEART RATE: 97 BPM | HEIGHT: 64 IN | WEIGHT: 139.19 LBS

## 2020-12-01 DIAGNOSIS — Z23 ENCOUNTER FOR IMMUNIZATION: ICD-10-CM

## 2020-12-01 PROCEDURE — 99214 OFFICE O/P EST MOD 30 MIN: CPT | Mod: 25

## 2020-12-01 PROCEDURE — 36415 COLL VENOUS BLD VENIPUNCTURE: CPT

## 2020-12-01 PROCEDURE — 90662 IIV NO PRSV INCREASED AG IM: CPT

## 2020-12-01 PROCEDURE — G0008: CPT

## 2020-12-01 PROCEDURE — 99072 ADDL SUPL MATRL&STAF TM PHE: CPT

## 2020-12-01 RX ORDER — FUROSEMIDE 40 MG/1
40 TABLET ORAL DAILY
Qty: 30 | Refills: 0 | Status: DISCONTINUED | COMMUNITY
Start: 2019-11-21 | End: 2020-12-01

## 2020-12-01 RX ORDER — METOLAZONE 2.5 MG/1
2.5 TABLET ORAL DAILY
Qty: 30 | Refills: 2 | Status: DISCONTINUED | COMMUNITY
Start: 2019-12-02 | End: 2020-12-01

## 2020-12-01 RX ORDER — UMECLIDINIUM 62.5 UG/1
62.5 AEROSOL, POWDER ORAL
Qty: 1 | Refills: 3 | Status: DISCONTINUED | COMMUNITY
Start: 2019-10-29 | End: 2020-12-01

## 2020-12-01 RX ORDER — MELOXICAM 15 MG/1
15 TABLET ORAL DAILY
Qty: 90 | Refills: 0 | Status: ACTIVE | COMMUNITY
Start: 2020-10-20 | End: 1900-01-01

## 2020-12-01 RX ORDER — LEVOTHYROXINE SODIUM 0.09 MG/1
88 TABLET ORAL DAILY
Qty: 90 | Refills: 0 | Status: DISCONTINUED | COMMUNITY
Start: 2019-10-22 | End: 2020-12-01

## 2020-12-01 RX ORDER — BUDESONIDE AND FORMOTEROL FUMARATE DIHYDRATE 160; 4.5 UG/1; UG/1
160-4.5 AEROSOL RESPIRATORY (INHALATION) TWICE DAILY
Qty: 1 | Refills: 11 | Status: DISCONTINUED | COMMUNITY
Start: 2020-07-28 | End: 2020-12-01

## 2020-12-01 RX ORDER — FUROSEMIDE 40 MG/1
40 TABLET ORAL DAILY
Qty: 30 | Refills: 3 | Status: DISCONTINUED | COMMUNITY
End: 2020-12-01

## 2020-12-01 RX ORDER — UMECLIDINIUM 62.5 UG/1
AEROSOL, POWDER ORAL
Refills: 0 | Status: DISCONTINUED | COMMUNITY
End: 2020-12-01

## 2020-12-02 ENCOUNTER — TRANSCRIPTION ENCOUNTER (OUTPATIENT)
Age: 85
End: 2020-12-02

## 2020-12-03 LAB
ALBUMIN SERPL ELPH-MCNC: 4.2 G/DL
ALP BLD-CCNC: 115 U/L
ALT SERPL-CCNC: 7 U/L
ANION GAP SERPL CALC-SCNC: 11 MMOL/L
AST SERPL-CCNC: 14 U/L
BASOPHILS # BLD AUTO: 0.03 K/UL
BASOPHILS NFR BLD AUTO: 0.4 %
BILIRUB SERPL-MCNC: 0.3 MG/DL
BUN SERPL-MCNC: 37 MG/DL
CALCIUM SERPL-MCNC: 9.3 MG/DL
CHLORIDE SERPL-SCNC: 106 MMOL/L
CO2 SERPL-SCNC: 23 MMOL/L
CREAT SERPL-MCNC: 1.43 MG/DL
EOSINOPHIL # BLD AUTO: 0.09 K/UL
EOSINOPHIL NFR BLD AUTO: 1.2 %
ESTIMATED AVERAGE GLUCOSE: 143 MG/DL
GLUCOSE SERPL-MCNC: 219 MG/DL
HBA1C MFR BLD HPLC: 6.6 %
HCT VFR BLD CALC: 45.8 %
HGB BLD-MCNC: 14.3 G/DL
IMM GRANULOCYTES NFR BLD AUTO: 0.3 %
LYMPHOCYTES # BLD AUTO: 1.59 K/UL
LYMPHOCYTES NFR BLD AUTO: 20.8 %
MAN DIFF?: NORMAL
MCHC RBC-ENTMCNC: 31.2 GM/DL
MCHC RBC-ENTMCNC: 31.5 PG
MCV RBC AUTO: 100.9 FL
MONOCYTES # BLD AUTO: 0.64 K/UL
MONOCYTES NFR BLD AUTO: 8.4 %
NEUTROPHILS # BLD AUTO: 5.29 K/UL
NEUTROPHILS NFR BLD AUTO: 68.9 %
PLATELET # BLD AUTO: 287 K/UL
POTASSIUM SERPL-SCNC: 6.2 MMOL/L
PROT SERPL-MCNC: 7 G/DL
RBC # BLD: 4.54 M/UL
RBC # FLD: 13 %
SODIUM SERPL-SCNC: 140 MMOL/L
TSH SERPL-ACNC: 9.33 UIU/ML
WBC # FLD AUTO: 7.66 K/UL

## 2020-12-17 NOTE — ED PROVIDER NOTE - BREATH SOUNDS
"Daily progress note    Chief complaint  Doing little better   No specific complaints  Still short of breath with exertion    History of present illness  89-year-old white female who is well-known to our service with history of hypertension seizure disorder depression chronic anemia and gastroesophageal reflux disease who is a nursing home resident brought to the emergency room multiple times in few days with shortness of breath but no fever cough abdominal pain nausea vomiting diarrhea.  Patient has some chest discomfort.  Patient was diagnosed with COVID-19 and was discharged back to nursing home sent back again with increased shortness of breath and this time she was found to be hypoxic admit for management.  Patient remained awake and alert and answer all questions appropriately.  Patient is DNR per her wishes.  Patient also found to have acute UTI     REVIEW OF SYSTEMS  All systems reviewed and marked as negative except as listed in HPI      PHYSICAL EXAM  Blood pressure 153/87, pulse 74, temperature 98.2 °F (36.8 °C), temperature source Oral, resp. rate 16, height 157.5 cm (62\"), weight 108 kg (237 lb), SpO2 92 %, not currently breastfeeding.    GENERAL: Alert well developed, well nourished in no distress  HENT: NCAT, neck supple, trachea midline  EYES: no scleral icterus, PERRL, normal conjunctivae  CV: regular rhythm, regular rate, no murmur  RESPIRATORY: unlabored effort, scattered wheezes, rales in the bilateral bases, mild tachypnea  ABDOMEN: soft, nontender, nondistended, bowel sounds present  MUSCULOSKELETAL: no gross deformity  NEURO: alert,  sensory and motor function of extremities grossly intact, speech clear, mental status normal/baseline  SKIN: warm, dry, no rash  PSYCH:  Appropriate mood and affect    LAB RESULTS  Lab Results (last 24 hours)     Procedure Component Value Units Date/Time    Blood Culture - Blood, Arm, Left [[id number]]  (Abnormal) Collected: 12/16/20 1351    Specimen: Blood from " Arm, Left Updated: 12/17/20 1228     Blood Culture Abnormal Stain     Gram Stain Anaerobic Bottle Gram positive cocci in clusters    Blood Culture - Blood, Arm, Right [[id number]]  (Abnormal) Collected: 12/16/20 1351    Specimen: Blood from Arm, Right Updated: 12/17/20 1228     Blood Culture Abnormal Stain     Gram Stain Anaerobic Bottle Gram positive cocci in clusters      Aerobic Bottle Gram positive cocci in clusters    Blood Culture ID, PCR - Blood, Arm, Right [[id number]]  (Abnormal) Collected: 12/16/20 1351    Specimen: Blood from Arm, Right Updated: 12/17/20 1144     BCID, PCR Staphylococcus spp, not aureus. Identification by BCID PCR.     BOTTLE TYPE Anaerobic Bottle    CBC & Differential [[id number]]  (Abnormal) Collected: 12/17/20 1008    Specimen: Blood Updated: 12/17/20 1045    Narrative:      The following orders were created for panel order CBC & Differential.  Procedure                               Abnormality         Status                     ---------                               -----------         ------                     CBC Auto Differential[[id number]]        Abnormal            Final result                 Please view results for these tests on the individual orders.    CBC Auto Differential [[id number]]  (Abnormal) Collected: 12/17/20 1008    Specimen: Blood Updated: 12/17/20 1045     WBC 4.48 10*3/mm3      RBC 3.29 10*6/mm3      Hemoglobin 11.0 g/dL      Hematocrit 33.2 %      .9 fL      MCH 33.4 pg      MCHC 33.1 g/dL      RDW 11.8 %      RDW-SD 43.5 fl      MPV 9.4 fL      Platelets 92 10*3/mm3      Neutrophil % 65.8 %      Lymphocyte % 29.2 %      Monocyte % 4.2 %      Eosinophil % 0.0 %      Basophil % 0.4 %      Immature Grans % 0.4 %      Neutrophils, Absolute 2.94 10*3/mm3      Lymphocytes, Absolute 1.31 10*3/mm3      Monocytes, Absolute 0.19 10*3/mm3      Eosinophils, Absolute 0.00 10*3/mm3      Basophils, Absolute 0.02 10*3/mm3      Immature Grans, Absolute 0.02 10*3/mm3       normal/tachypneic nRBC 0.0 /100 WBC     Hemoglobin A1c [[id number]]  (Normal) Collected: 12/17/20 1008    Specimen: Blood Updated: 12/17/20 1045     Hemoglobin A1C 5.59 %     Narrative:      Hemoglobin A1C Ranges:    Increased Risk for Diabetes  5.7% to 6.4%  Diabetes                     >= 6.5%  Diabetic Goal                < 7.0%    BNP [[id number]]  (Abnormal) Collected: 12/17/20 0644    Specimen: Blood Updated: 12/17/20 0800     proBNP 3,307.0 pg/mL     Narrative:      Among patients with dyspnea, NT-proBNP is highly sensitive for the detection of acute congestive heart failure. In addition NT-proBNP of <300 pg/ml effectively rules out acute congestive heart failure with 99% negative predictive value.    Results may be falsely decreased if patient taking Biotin.      TSH [[id number]]  (Normal) Collected: 12/17/20 0644    Specimen: Blood Updated: 12/17/20 0800     TSH 0.891 uIU/mL     Ferritin [[id number]]  (Abnormal) Collected: 12/17/20 0644    Specimen: Blood Updated: 12/17/20 0800     Ferritin 853.00 ng/mL     Narrative:      Results may be falsely decreased if patient taking Biotin.      Comprehensive Metabolic Panel [[id number]]  (Abnormal) Collected: 12/17/20 0644    Specimen: Blood Updated: 12/17/20 0756     Glucose 84 mg/dL      BUN 26 mg/dL      Creatinine 1.25 mg/dL      Sodium 148 mmol/L      Potassium 3.7 mmol/L      Chloride 105 mmol/L      CO2 28.8 mmol/L      Calcium 8.3 mg/dL      Total Protein 6.5 g/dL      Albumin 3.50 g/dL      ALT (SGPT) 13 U/L      AST (SGOT) 29 U/L      Alkaline Phosphatase 63 U/L      Total Bilirubin 0.3 mg/dL      eGFR Non African Amer 40 mL/min/1.73      Globulin 3.0 gm/dL      A/G Ratio 1.2 g/dL      BUN/Creatinine Ratio 20.8     Anion Gap 14.2 mmol/L     Narrative:      GFR Normal >60  Chronic Kidney Disease <60  Kidney Failure <15      C-reactive Protein [[id number]]  (Abnormal) Collected: 12/17/20 0644    Specimen: Blood Updated: 12/17/20 0756     C-Reactive Protein 7.78 mg/dL     Bilirubin,  Direct [[id number]]  (Normal) Collected: 12/17/20 0644    Specimen: Blood Updated: 12/17/20 0756     Bilirubin, Direct 0.2 mg/dL     Lipid Panel [[id number]]  (Abnormal) Collected: 12/17/20 0644    Specimen: Blood Updated: 12/17/20 0756     Total Cholesterol 128 mg/dL      Triglycerides 115 mg/dL      HDL Cholesterol 32 mg/dL      LDL Cholesterol  75 mg/dL      VLDL Cholesterol 21 mg/dL      LDL/HDL Ratio 2.28    Narrative:      Cholesterol Reference Ranges  (U.S. Department of Health and Human Services ATP III Classifications)    Desirable          <200 mg/dL  Borderline High    200-239 mg/dL  High Risk          >240 mg/dL      Triglyceride Reference Ranges  (U.S. Department of Health and Human Services ATP III Classifications)    Normal           <150 mg/dL  Borderline High  150-199 mg/dL  High             200-499 mg/dL  Very High        >500 mg/dL    HDL Reference Ranges  (U.S. Department of Health and Human Services ATP III Classifcations)    Low     <40 mg/dl (major risk factor for CHD)  High    >60 mg/dl ('negative' risk factor for CHD)        LDL Reference Ranges  (U.S. Department of Health and Human Services ATP III Classifcations)    Optimal          <100 mg/dL  Near Optimal     100-129 mg/dL  Borderline High  130-159 mg/dL  High             160-189 mg/dL  Very High        >189 mg/dL    Hepatic Function Panel [[id number]] Collected: 12/16/20 2133    Specimen: Blood Updated: 12/16/20 2228     Total Protein 7.0 g/dL      Albumin 3.80 g/dL      ALT (SGPT) 17 U/L      AST (SGOT) 27 U/L      Alkaline Phosphatase 62 U/L      Total Bilirubin 0.5 mg/dL      Bilirubin, Direct 0.2 mg/dL      Bilirubin, Indirect 0.3 mg/dL     Creatinine, Serum [[id number]]  (Abnormal) Collected: 12/16/20 2133    Specimen: Blood Updated: 12/16/20 2228     Creatinine 1.35 mg/dL      eGFR Non African Amer 37 mL/min/1.73     Narrative:      GFR Normal >60  Chronic Kidney Disease <60  Kidney Failure <15      Lactic Acid, Plasma [[id number]]   (Normal) Collected: 12/16/20 2133    Specimen: Blood Updated: 12/16/20 2204     Lactate 0.9 mmol/L     Blood Culture - Blood, Arm, Left [[id number]] Collected: 12/16/20 2133    Specimen: Blood from Arm, Left Updated: 12/16/20 2148    D-dimer, Quantitative [[id number]]  (Abnormal) Collected: 12/16/20 1348    Specimen: Blood Updated: 12/16/20 1649     D-Dimer, Quantitative 1.18 MCGFEU/mL     Narrative:      The [employer] D-Dimer test used in conjunction with a clinical pretest probability (PTP) assessment model, has been approved by the FDA to rule out the presence of venous thromboembolism (VTE) in outpatients suspected of deep venous thrombosis (DVT) or pulmonary embolism (PE). The cut-off for negative predictive value is <0.50 MCGFEU/mL.    COVID PRE-OP / PRE-PROCEDURE SCREENING ORDER (NO ISOLATION) - Swab, Nasopharynx [[id number]]  (Abnormal) Collected: 12/16/20 1353    Specimen: Swab from Nasopharynx Updated: 12/16/20 1501    Narrative:      The following orders were created for panel order COVID PRE-OP / PRE-PROCEDURE SCREENING ORDER (NO ISOLATION) - Swab, Nasopharynx.  Procedure                               Abnormality         Status                     ---------                               -----------         ------                     Respiratory Panel PCR w/...[[id number]]  Abnormal            Final result                 Please view results for these tests on the individual orders.    Respiratory Panel PCR w/COVID-19(SARS-CoV-2) SACHIN/JENNIFER/TOMA/PAD/COR/MAD/SHANNAN In-House, NP Swab in [place], 3-4 HR TAT - Swab, Nasopharynx [[id number]]  (Abnormal) Collected: 12/16/20 1353    Specimen: Swab from Nasopharynx Updated: 12/16/20 1508     ADENOVIRUS, PCR Not Detected     Coronavirus 229E Not Detected     Coronavirus HKU1 Not Detected     Coronavirus NL63 Not Detected     Coronavirus OC43 Not Detected     COVID19 Detected     Human Metapneumovirus Not Detected     Human Rhinovirus/Enterovirus Not Detected     Influenza A PCR  Not Detected     Influenza B PCR Not Detected     Parainfluenza Virus 1 Not Detected     Parainfluenza Virus 2 Not Detected     Parainfluenza Virus 3 Not Detected     Parainfluenza Virus 4 Not Detected     RSV, PCR Not Detected     Bordetella pertussis pcr Not Detected     Bordetella parapertussis PCR Not Detected     Chlamydophila pneumoniae PCR Not Detected     Mycoplasma pneumo by PCR Not Detected    Narrative:      Fact sheet for providers: https://docs.[employer]/wp-content/uploads/ARX1238-8472-TA5.1-EUA-Provider-Fact-Sheet-3.pdf    Fact sheet for patients: https://docs.[employer]/wp-content/uploads/UGR3985-0504-OP0.1-EUA-Patient-Fact-Sheet-1.pdf    Test performed by PCR.    [place] Draw [[id number]] Collected: 12/16/20 1348    Specimen: Blood Updated: 12/16/20 1501    Narrative:      The following orders were created for panel order [place] Draw.  Procedure                               Abnormality         Status                     ---------                               -----------         ------                     Light Blue Top[[id number]]                                   Final result               Green Top (Gel)[[id number]]                                  Final result               Lavender Top[[id number]]                                     Final result               Gold Top - SST[[id number]]                                   Final result                 Please view results for these tests on the individual orders.    Light Blue Top [[id number]] Collected: 12/16/20 1348    Specimen: Blood Updated: 12/16/20 1501     Extra Tube hold for add-on     Comment: Auto resulted       Green Top (Gel) [[id number]] Collected: 12/16/20 1348    Specimen: Blood Updated: 12/16/20 1501     Extra Tube Hold for add-ons.     Comment: Auto resulted.       Lavender Top [[id number]] Collected: 12/16/20 1348    Specimen: Blood Updated: 12/16/20 1501     Extra Tube hold for add-on     Comment: Auto resulted       Gold Top -  SST [[id number]] Collected: 12/16/20 1348    Specimen: Blood Updated: 12/16/20 1501     Extra Tube Hold for add-ons.     Comment: Auto resulted.           Imaging Results (Last 24 Hours)     Procedure Component Value Units Date/Time    CT Head Without Contrast [[id number]] Collected: 12/16/20 1529     Updated: 12/16/20 1534    Narrative:      CT OF THE BRAIN WITHOUT CONTRAST 12/16/2020     HISTORY: Altered mental status.     Axial images were obtained through the brain without intravenous  contrast.     There is mild to moderate diffuse atrophy. There is mild decreased  attenuation of the periventricular white matter bilaterally consistent  with mild small vessel white matter ischemic disease. Tiny old lacunar  infarction is seen in the head of the caudate nucleus on the right. This  was also present on the 07/05/2020 study.     There is no evidence of acute infarction, hemorrhage, midline shift or  mass effect.     No bony abnormalities are seen.       Impression:      No acute process identified.     Radiation dose reduction techniques were utilized, including automated  exposure control and exposure modulation based on body size.     This report was finalized on 12/16/2020 3:31 PM by Dr. Dony Patton M.D.              ECG 12 Lead               HEART RATE= 85  bpm  RR Interval= 708  ms  KY Interval= 219  ms  P Horizontal Axis= 23  deg  P Front Axis= 11  deg  QRSD Interval= 94  ms  QT Interval= 410  ms  QRS Axis= -46  deg  T Wave Axis= -23  deg  - ABNORMAL ECG -  Sinus rhythm  Borderline prolonged KY interval  LAD, consider left anterior fascicular block  Nonspecific T abnormalities, inferior leads  Borderline prolonged QT interval             Current Facility-Administered Medications:   •  ascorbic acid (VITAMIN C) tablet 500 mg, 500 mg, Oral, Daily, Juan Carlos Harper MD, 500 mg at 12/17/20 1255  •  budesonide-formoterol (SYMBICORT) 160-4.5 MCG/ACT inhaler 2 puff, 2 puff, Inhalation, BID - RT, Juan Carlos Harper MD, 2  puff at 12/17/20 0728  •  cefTRIAXone (ROCEPHIN) IVPB 1 g, 1 g, Intravenous, Q24H, Juan Carlos Harper MD, Last Rate: 100 mL/hr at 12/17/20 0119, 1 g at 12/17/20 0119  •  cetirizine (zyrTEC) tablet 5 mg, 5 mg, Oral, Nightly, Juan Carlos Harper MD  •  cholecalciferol (VITAMIN D3) tablet 1,000 Units, 1,000 Units, Oral, Daily, Juan Carlos Harper MD  •  citalopram (CeleXA) tablet 20 mg, 20 mg, Oral, Nightly, Juan Carlos Harper MD  •  dexamethasone sodium phosphate injection 6 mg, 6 mg, Intravenous, Daily, Juan Carlos Harper MD, 6 mg at 12/17/20 0757  •  enoxaparin (LOVENOX) syringe 40 mg, 40 mg, Subcutaneous, BID, Juan Carlos Harper MD, 40 mg at 12/17/20 1255  •  famotidine (PEPCID) tablet 20 mg, 20 mg, Oral, BID, Juan Carlos Harper MD, 20 mg at 12/17/20 1305  •  ferrous sulfate tablet 325 mg, 325 mg, Oral, Daily, Juan Carlos Harper MD  •  levETIRAcetam (KEPPRA) tablet 750 mg, 750 mg, Oral, BID, Juan Carlos Harper MD  •  mirtazapine (REMERON) tablet 15 mg, 15 mg, Oral, Nightly, Juan Carlos Harper MD  •  Pharmacy Consult - Remdesivir, , Does not apply, Continuous PRN, Myra Sheppard MD  •  polyethylene glycol (MIRALAX) packet 17 g, 17 g, Oral, Daily, Juan Carlos Harper MD, 17 g at 12/17/20 1255  •  [COMPLETED] remdesivir 200 mg in sodium chloride 0.9 % 250 mL IVPB (powder vial), 200 mg, Intravenous, Q24H, 200 mg at 12/16/20 2226 **FOLLOWED BY** remdesivir 100 mg in sodium chloride 0.9 % 250 mL IVPB (powder vial), 100 mg, Intravenous, Q24H, Myra Sheppard MD  •  sodium chloride 0.45 % infusion, 75 mL/hr, Intravenous, Continuous, Juan Carlos Harper MD, Last Rate: 75 mL/hr at 12/16/20 2348, 75 mL/hr at 12/16/20 2348  •  [COMPLETED] Insert peripheral IV, , , Once **AND** sodium chloride 0.9 % flush 10 mL, 10 mL, Intravenous, PRN, Dori De La Cruz, APRN  •  zinc sulfate (ZINCATE) capsule 220 mg, 220 mg, Oral, Daily, Juan Carlos Harper MD, 220 mg at 12/17/20 1255     ASSESSMENT  COVID-19 infection  GPC bacteremia probably contamination  Dehydration  Acute UTI  Acute  hypoxia  Hypertension  Seizure disorder  Chronic anemia  Depression  Gastroesophageal reflux disease    PLAN  CPM  Supplement oxygen  IV fluid  IV antibiotics  Symptomatic treatment for COVID-19 infection  Adjust nursing home medications  Stress ulcer DVT prophylaxis  Infectious disease consult pending  Supportive care  DNR   Discussed with nursing staff  Follow closely further recommendation current hospital course    GAETANO ACOSTA MD

## 2021-01-10 ENCOUNTER — APPOINTMENT (OUTPATIENT)
Dept: FAMILY MEDICINE | Facility: CLINIC | Age: 86
End: 2021-01-10
Payer: MEDICARE

## 2021-01-10 VITALS
HEIGHT: 64 IN | HEART RATE: 87 BPM | DIASTOLIC BLOOD PRESSURE: 80 MMHG | SYSTOLIC BLOOD PRESSURE: 130 MMHG | WEIGHT: 139 LBS | OXYGEN SATURATION: 87 % | BODY MASS INDEX: 23.73 KG/M2

## 2021-01-10 DIAGNOSIS — R53.81 OTHER MALAISE: ICD-10-CM

## 2021-01-10 PROCEDURE — 99213 OFFICE O/P EST LOW 20 MIN: CPT | Mod: 25

## 2021-01-10 PROCEDURE — 99072 ADDL SUPL MATRL&STAF TM PHE: CPT

## 2021-01-10 PROCEDURE — 36415 COLL VENOUS BLD VENIPUNCTURE: CPT

## 2021-01-11 LAB
ALBUMIN SERPL ELPH-MCNC: 4.4 G/DL
ALP BLD-CCNC: 96 U/L
ALT SERPL-CCNC: 10 U/L
ANION GAP SERPL CALC-SCNC: 18 MMOL/L
AST SERPL-CCNC: 18 U/L
BASOPHILS # BLD AUTO: 0.03 K/UL
BASOPHILS NFR BLD AUTO: 0.4 %
BILIRUB SERPL-MCNC: 0.3 MG/DL
BUN SERPL-MCNC: 32 MG/DL
CALCIUM SERPL-MCNC: 9.5 MG/DL
CHLORIDE SERPL-SCNC: 105 MMOL/L
CO2 SERPL-SCNC: 21 MMOL/L
CREAT SERPL-MCNC: 1.53 MG/DL
EOSINOPHIL # BLD AUTO: 0.1 K/UL
EOSINOPHIL NFR BLD AUTO: 1.5 %
GLUCOSE SERPL-MCNC: 133 MG/DL
HCT VFR BLD CALC: 39.2 %
HGB BLD-MCNC: 12.6 G/DL
IMM GRANULOCYTES NFR BLD AUTO: 0.3 %
LYMPHOCYTES # BLD AUTO: 1.82 K/UL
LYMPHOCYTES NFR BLD AUTO: 26.4 %
MAN DIFF?: NORMAL
MCHC RBC-ENTMCNC: 31.7 PG
MCHC RBC-ENTMCNC: 32.1 GM/DL
MCV RBC AUTO: 98.5 FL
MONOCYTES # BLD AUTO: 0.63 K/UL
MONOCYTES NFR BLD AUTO: 9.1 %
NEUTROPHILS # BLD AUTO: 4.29 K/UL
NEUTROPHILS NFR BLD AUTO: 62.3 %
PLATELET # BLD AUTO: 263 K/UL
POTASSIUM SERPL-SCNC: 5 MMOL/L
PROT SERPL-MCNC: 7.2 G/DL
RBC # BLD: 3.98 M/UL
RBC # FLD: 12.8 %
SODIUM SERPL-SCNC: 144 MMOL/L
TSH SERPL-ACNC: 5.23 UIU/ML
WBC # FLD AUTO: 6.89 K/UL

## 2021-02-05 ENCOUNTER — NON-APPOINTMENT (OUTPATIENT)
Age: 86
End: 2021-02-05

## 2021-02-21 ENCOUNTER — APPOINTMENT (OUTPATIENT)
Dept: FAMILY MEDICINE | Facility: CLINIC | Age: 86
End: 2021-02-21
Payer: MEDICARE

## 2021-02-21 VITALS
WEIGHT: 139 LBS | BODY MASS INDEX: 23.73 KG/M2 | HEIGHT: 64 IN | SYSTOLIC BLOOD PRESSURE: 114 MMHG | DIASTOLIC BLOOD PRESSURE: 80 MMHG | HEART RATE: 95 BPM | OXYGEN SATURATION: 86 % | TEMPERATURE: 97.2 F

## 2021-02-21 DIAGNOSIS — M79.89 OTHER SPECIFIED SOFT TISSUE DISORDERS: ICD-10-CM

## 2021-02-21 PROCEDURE — 99213 OFFICE O/P EST LOW 20 MIN: CPT

## 2021-03-03 ENCOUNTER — APPOINTMENT (OUTPATIENT)
Dept: VASCULAR SURGERY | Facility: CLINIC | Age: 86
End: 2021-03-03
Payer: MEDICARE

## 2021-03-03 VITALS
DIASTOLIC BLOOD PRESSURE: 77 MMHG | HEIGHT: 65 IN | WEIGHT: 150 LBS | SYSTOLIC BLOOD PRESSURE: 158 MMHG | BODY MASS INDEX: 24.99 KG/M2 | HEART RATE: 97 BPM | TEMPERATURE: 97.3 F

## 2021-03-03 DIAGNOSIS — L03.119 CELLULITIS OF UNSPECIFIED PART OF LIMB: ICD-10-CM

## 2021-03-03 PROCEDURE — 93971 EXTREMITY STUDY: CPT

## 2021-03-03 PROCEDURE — 99203 OFFICE O/P NEW LOW 30 MIN: CPT

## 2021-03-03 NOTE — ASSESSMENT
[FreeTextEntry1] : Problem #1 Bilateral lower extremity cellulitis, left worse than right\par - Recommended patient to go to emergency department tonight as her diabetes puts her at risk for quick deterioration or worsening of cellulitis into progressive soft tissue infection. She would benefit from course of IV abx. However, she is refusing to go to the hospital. We will provide a short course of Augmentin and have instructed her to go to the ED if her cellulitis persists or worsens despite oral antibiotics.\par - No evidence of PAD\par - Unlikely to have venous insufficiency as this is her first episode of swelling\par \par Problem #2 Left leg swelling\par - No DVT or SVT in left leg\par - Swelling related to infectious process\par \par

## 2021-03-03 NOTE — PHYSICAL EXAM
[Respiratory Effort] : normal respiratory effort [Normal Rate and Rhythm] : normal rate and rhythm [1+] : right 1+ [2+] : right 2+ [0] : left 0 [Skin Induration] : induration [Alert] : alert [Oriented to Person] : oriented to person [Oriented to Place] : oriented to place [Oriented to Time] : oriented to time [Calm] : calm [JVD] : no jugular venous distention  [de-identified] : Appears well [de-identified] : Normocephalic, atraumatic [de-identified] : left leg swelling below knee associated with blanching erythema, leg is tender to palpation

## 2021-03-03 NOTE — HISTORY OF PRESENT ILLNESS
[FreeTextEntry1] : 86 year old female with multiple medical comorbidities including diabetes who presents with left lower extremity swelling and erythema as well as right ankle erythema. She states she has never had swelling like this before but it has been getting progressively worse over the last week or so. She denies fevers or chills. She states her leg is painful to touch. She denies claudication or rest pain.

## 2021-03-05 ENCOUNTER — EMERGENCY (EMERGENCY)
Facility: HOSPITAL | Age: 86
LOS: 1 days | Discharge: HOME | End: 2021-03-05
Attending: EMERGENCY MEDICINE | Admitting: EMERGENCY MEDICINE
Payer: MEDICARE

## 2021-03-05 VITALS
DIASTOLIC BLOOD PRESSURE: 76 MMHG | HEART RATE: 66 BPM | OXYGEN SATURATION: 98 % | TEMPERATURE: 98 F | RESPIRATION RATE: 16 BRPM | SYSTOLIC BLOOD PRESSURE: 142 MMHG

## 2021-03-05 VITALS
RESPIRATION RATE: 16 BRPM | DIASTOLIC BLOOD PRESSURE: 69 MMHG | HEART RATE: 65 BPM | OXYGEN SATURATION: 92 % | TEMPERATURE: 98 F | HEIGHT: 64 IN | SYSTOLIC BLOOD PRESSURE: 148 MMHG

## 2021-03-05 DIAGNOSIS — S82.90XA UNSPECIFIED FRACTURE OF UNSPECIFIED LOWER LEG, INITIAL ENCOUNTER FOR CLOSED FRACTURE: Chronic | ICD-10-CM

## 2021-03-05 DIAGNOSIS — Z86.69 PERSONAL HISTORY OF OTHER DISEASES OF THE NERVOUS SYSTEM AND SENSE ORGANS: Chronic | ICD-10-CM

## 2021-03-05 DIAGNOSIS — Z90.49 ACQUIRED ABSENCE OF OTHER SPECIFIED PARTS OF DIGESTIVE TRACT: Chronic | ICD-10-CM

## 2021-03-05 LAB
ALBUMIN SERPL ELPH-MCNC: 4.4 G/DL — SIGNIFICANT CHANGE UP (ref 3.3–5)
ALP SERPL-CCNC: 114 U/L — SIGNIFICANT CHANGE UP (ref 40–120)
ALT FLD-CCNC: 7 U/L — SIGNIFICANT CHANGE UP (ref 4–33)
ANION GAP SERPL CALC-SCNC: 10 MMOL/L — SIGNIFICANT CHANGE UP (ref 7–14)
AST SERPL-CCNC: 16 U/L — SIGNIFICANT CHANGE UP (ref 4–32)
BASOPHILS # BLD AUTO: 0.03 K/UL — SIGNIFICANT CHANGE UP (ref 0–0.2)
BASOPHILS NFR BLD AUTO: 0.4 % — SIGNIFICANT CHANGE UP (ref 0–2)
BILIRUB SERPL-MCNC: 0.3 MG/DL — SIGNIFICANT CHANGE UP (ref 0.2–1.2)
BUN SERPL-MCNC: 35 MG/DL — HIGH (ref 7–23)
CALCIUM SERPL-MCNC: 9.1 MG/DL — SIGNIFICANT CHANGE UP (ref 8.4–10.5)
CHLORIDE SERPL-SCNC: 101 MMOL/L — SIGNIFICANT CHANGE UP (ref 98–107)
CO2 SERPL-SCNC: 29 MMOL/L — SIGNIFICANT CHANGE UP (ref 22–31)
CREAT SERPL-MCNC: 1.44 MG/DL — HIGH (ref 0.5–1.3)
EOSINOPHIL # BLD AUTO: 0.18 K/UL — SIGNIFICANT CHANGE UP (ref 0–0.5)
EOSINOPHIL NFR BLD AUTO: 2.4 % — SIGNIFICANT CHANGE UP (ref 0–6)
GLUCOSE SERPL-MCNC: 131 MG/DL — HIGH (ref 70–99)
HCT VFR BLD CALC: 39.1 % — SIGNIFICANT CHANGE UP (ref 34.5–45)
HGB BLD-MCNC: 12.5 G/DL — SIGNIFICANT CHANGE UP (ref 11.5–15.5)
IANC: 4.7 K/UL — SIGNIFICANT CHANGE UP (ref 1.5–8.5)
IMM GRANULOCYTES NFR BLD AUTO: 0.5 % — SIGNIFICANT CHANGE UP (ref 0–1.5)
LYMPHOCYTES # BLD AUTO: 1.59 K/UL — SIGNIFICANT CHANGE UP (ref 1–3.3)
LYMPHOCYTES # BLD AUTO: 21.6 % — SIGNIFICANT CHANGE UP (ref 13–44)
MAGNESIUM SERPL-MCNC: 1.8 MG/DL — SIGNIFICANT CHANGE UP (ref 1.6–2.6)
MCHC RBC-ENTMCNC: 32 GM/DL — SIGNIFICANT CHANGE UP (ref 32–36)
MCHC RBC-ENTMCNC: 32.1 PG — SIGNIFICANT CHANGE UP (ref 27–34)
MCV RBC AUTO: 100.3 FL — HIGH (ref 80–100)
MONOCYTES # BLD AUTO: 0.81 K/UL — SIGNIFICANT CHANGE UP (ref 0–0.9)
MONOCYTES NFR BLD AUTO: 11 % — SIGNIFICANT CHANGE UP (ref 2–14)
NEUTROPHILS # BLD AUTO: 4.7 K/UL — SIGNIFICANT CHANGE UP (ref 1.8–7.4)
NEUTROPHILS NFR BLD AUTO: 64.1 % — SIGNIFICANT CHANGE UP (ref 43–77)
NRBC # BLD: 0 /100 WBCS — SIGNIFICANT CHANGE UP
NRBC # FLD: 0 K/UL — SIGNIFICANT CHANGE UP
PHOSPHATE SERPL-MCNC: 3.4 MG/DL — SIGNIFICANT CHANGE UP (ref 2.5–4.5)
PLATELET # BLD AUTO: 285 K/UL — SIGNIFICANT CHANGE UP (ref 150–400)
POTASSIUM SERPL-MCNC: 4.9 MMOL/L — SIGNIFICANT CHANGE UP (ref 3.5–5.3)
POTASSIUM SERPL-SCNC: 4.9 MMOL/L — SIGNIFICANT CHANGE UP (ref 3.5–5.3)
PROT SERPL-MCNC: 7.9 G/DL — SIGNIFICANT CHANGE UP (ref 6–8.3)
RBC # BLD: 3.9 M/UL — SIGNIFICANT CHANGE UP (ref 3.8–5.2)
RBC # FLD: 12.7 % — SIGNIFICANT CHANGE UP (ref 10.3–14.5)
SODIUM SERPL-SCNC: 140 MMOL/L — SIGNIFICANT CHANGE UP (ref 135–145)
WBC # BLD: 7.35 K/UL — SIGNIFICANT CHANGE UP (ref 3.8–10.5)
WBC # FLD AUTO: 7.35 K/UL — SIGNIFICANT CHANGE UP (ref 3.8–10.5)

## 2021-03-05 PROCEDURE — 99284 EMERGENCY DEPT VISIT MOD MDM: CPT

## 2021-03-05 RX ADMIN — Medication 100 MILLIGRAM(S): at 08:42

## 2021-03-05 RX ADMIN — Medication 600 MILLIGRAM(S): at 09:26

## 2021-03-05 NOTE — ED PROVIDER NOTE - IV ALTEPLASE EXCL REL HIDDEN
[id number] [employer]. [address] [facility] [place], [address] [place] [address]. show

## 2021-03-05 NOTE — ED ADULT NURSE NOTE - OBJECTIVE STATEMENT
Pt received in rm #6, 86Y F Aox4, ambulatory at baseline. PMHX T2DM, COPD. Pt sent by PMD for left leg cellulitis. Upon arrival pt declining to change into hospital gown. Pt minimally cooperative. Pt reports left leg has been red x2 weeks and painful to touch. Denies any other complaints. Pt VS as charted, received on 2L NC from ambay O2 sat 97%. Pt appears comfortable in NAD, VS as charted. Pending MD reagan will continue to monitor.

## 2021-03-05 NOTE — PROVIDER CONTACT NOTE (OTHER) - BACKGROUND
As per ED Resident, pt is ready to be discharged to home.  Resident asked ED SW to arrange a cab.  Cab pickup by [employer] [phone] at approximately 12 noon today.  Pt has her keys to enter her

## 2021-03-05 NOTE — ED PROVIDER NOTE - NSFOLLOWUPINSTRUCTIONS_ED_ALL_ED_FT
Cellulitis is a skin infection caused by bacteria. Cellulitis may go away on its own or you may need treatment. Your healthcare provider may draw a [place] around the outside edges of your cellulitis. If your cellulitis spreads, your healthcare provider will see it outside of the [place].    Seek care immediately if:  •Your wound gets larger and more painful.  •You feel a crackling under your skin when you touch it.  •You have purple dots or bumps on your skin, or you see bleeding under your skin.  •You have new swelling and pain in your legs.  •The red, warm, swollen area gets larger.  •You see red streaks coming from the infected area.    Contact your healthcare provider if:  •You have a fever.  •Your fever or pain does not go away or gets worse.  •The area does not get smaller after 2 days of antibiotics.  •Your skin is flaking or peeling off.  •You have questions or concerns about your condition or care.    Take the antibiotics prescribed as directed

## 2021-03-05 NOTE — ED PROVIDER NOTE - OBJECTIVE STATEMENT
86F COPD, HTN, DM, hypothyroid presenting with LLE swelling and erythema x 2 weeks.  Pt saw PCP 1 week ago who prescribed ciprofloxacin for cellulitis,  however pt never took the medication because "I don't like taking medications". Pt saw vascular doctor on Wednesday who prescribed her another antibiotic however she has not picked that up from pharmacy. Pt states she was referred to ED by the vascular doctor.   Pt denies fever, chills, chest pain, SOB, nausea, vomiting, diarrhea, dysuria.   Pt denies increased difficulty with ambulation since onset of RAQUEL    Home Meds: metformin, levothyroxine and inhaler (can't remember name)  Surg Hx: L-THR 1 yr ago  Allergies: NKDA  Soc Hx: former smoker (quit 20 yr ago, 20pk/yr), no ETOH, lives alone, retired, has brothers in [facility]

## 2021-03-05 NOTE — ED ADULT TRIAGE NOTE - CHIEF COMPLAINT QUOTE
pt c/o left leg swelling x 2 weeks. saw vascular doctor on Friday, was told she needs IV antibiotics for cellulitis. PMH NIDDM, hypothyroid, COPD. pt c/o left leg swelling x 2 weeks. saw vascular doctor on Friday, was told she needs IV antibiotics for cellulitis. PMH NIDDM, hypothyroid, COPD. 92% O2 on room air, unknown baseline pt c/o mild SOB placed on 2lpm nasal cannula.

## 2021-03-05 NOTE — ED PROVIDER NOTE - PHYSICAL EXAMINATION
GENERAL: NAD, well appearing   EYES: EOMI, PERRL  ENT: MMM, no oropharyngeal lesions or erythema appreciated  Pulm: normal work of breathing, CTABL  CV: RRR, S1&S2+, no m/r/g appreciated  ABDOMEN: +BS, soft, nt, nd, no hepatosplenomegaly  MSK: nl ROM  EXTREMITIES: +left lower extremity swelling and erythema up to mid tibia, pulses intact, mild pitting edema of right lower extremity   Neuro: A&Ox3, no focal deficits, ambulatory   SKIN: warm and dry, no visible rash

## 2021-03-05 NOTE — ED PROVIDER NOTE - NS ED ROS FT
CONSTITUTIONAL: No weakness, fevers or chills  EYES: No blindness, no eye pain   ENT:  No throat pain, No rhinorrhea   NECK: No pain or stiffness  RESPIRATORY: No cough, wheezing, hemoptysis; No shortness of breath  CARDIOVASCULAR: No chest pain or palpitations  GASTROINTESTINAL: No abdominal or epigastric pain. No nausea, vomiting, or diarrhea   GENITOURINARY: No dysuria, change in frequency or hematuria  NEUROLOGICAL: No numbness or weakness  SKIN: +Left LE erythema and swelling x 2 weeks  PSYCH: No depression or anxiety  All other review of systems is negative unless indicated above.

## 2021-03-05 NOTE — ED ADULT NURSE REASSESSMENT NOTE - NS ED NURSE REASSESS COMMENT FT1
#20g IV placed to LAC, lab work collected as ordered. Pt tolerated procedure well. Will continue to monitor.

## 2021-03-05 NOTE — ED PROVIDER NOTE - CLINICAL SUMMARY MEDICAL DECISION MAKING FREE TEXT BOX
86F COPD, DM, HTN, hypothyroid p/w Left LE swelling/erythema x 2 weeks, likely cellulitis  Plan: 86F COPD, DM, HTN, hypothyroid p/w Left LE swelling/erythema x 2 weeks consistent with non-purulent cellulitis  Plan:  f/u CMP and CBC, clindamycin 600 IV x 1, can likely discharge home on PO clindamycin;

## 2021-03-05 NOTE — ED PROVIDER NOTE - ATTENDING CONTRIBUTION TO CARE
MD Palm:  I performed a face to face bedside interview with patient regarding history of present illness, review of symptoms and past medical history. I completed an independent physical exam(documented below).  I have discussed patient's plan of care with resident.   I agree with note as stated above, having amended the EMR as needed to reflect my findings. I have discussed the assessment and plan of care.  This includes during the time I functioned as the attending physician for this patient.  PE:  Gen: Alert, NAD  Head: NC, AT,  EOMI, normal lids/conjunctiva  ENT:  normal hearing, patent oropharynx without erythema/exudate  Neck: +supple, no tenderness/meningismus/JVD, +Trachea midline  Chest: no chest wall tenderness, equal chest rise  Pulm: Bilateral BS, normal resp effort, no wheeze/stridor/retractions  CV: RRR, no M/R/G, +dist pulses  Abd: +BS, soft, NT/ND  Rectal: deferred  Mskel: +edema and erythema of LLE up to mid tibia  Skin: no rash  Neuro: AAOx3  MDM:  87yo F w/ pmh of htn, DM, recently diagnosed w/ cellulitis of LLE (outpatient by pmd and confirmed by vascular surgeon), but did not take antibiotics prescribed to her because "I don't like medications", presenting for eval of unchanged LLE redness and swelling. H&P most consistent w/ cellulitis. Denies fever or chills. Pt is well-appearing with stable vitals and now agreeing to take abx outpt. Labs, first dose IV abx and dc on abx w/ pmd f/u.

## 2021-03-05 NOTE — ED ADULT NURSE NOTE - CHIEF COMPLAINT QUOTE
pt c/o left leg swelling x 2 weeks. saw vascular doctor on Friday, was told she needs IV antibiotics for cellulitis. PMH NIDDM, hypothyroid, COPD. 92% O2 on room air, unknown baseline pt c/o mild SOB placed on 2lpm nasal cannula.

## 2021-03-05 NOTE — ED ADULT NURSE NOTE - NSIMPLEMENTINTERV_GEN_ALL_ED
Implemented All Universal Safety Interventions:  [place] to call system. Call bell, personal items and telephone within reach. Instruct patient to call for assistance. Room bathroom lighting operational. Non-slip footwear when patient is off stretcher. Physically safe environment: no spills, clutter or unnecessary equipment. Stretcher in lowest position, wheels locked, appropriate side rails in place.

## 2021-03-05 NOTE — ED PROVIDER NOTE - PATIENT PORTAL LINK FT
You can access the FollowMyHealth Patient Portal offered by [facility] by registering at the following website: [email or web address]. By joining [employer]’s FollowMyHealth portal, you will also be able to view your health information using other applications (apps) compatible with our system.

## 2021-03-11 PROBLEM — E66.2 OBESITY HYPOVENTILATION SYNDROME: Status: ACTIVE | Noted: 2018-03-19

## 2021-03-26 NOTE — PROVIDER CONTACT NOTE (CRITICAL VALUE NOTIFICATION) - NS PROVIDER READ BACK TO LAB
1. Your joints look typical for osteoarthritis. Let's obtain baseline xrays to be sure there aren't surprises; if your larger joints ever become hot/swollen let me know and we can bring you in quickly for further workup and possibly injections. 2. For pain, you can add tylenol 650mg up to 4x/day to most other pain medications. Sparing use of Aleve, 1-2 pills up to twice a day can be helpful though increases the risk of stomach bleeds and stroke/heart attack particulalry when used consistently. Topical treatments such as Voltaren can be helpful for the small joints; numbing topical treatments can be helpful, eg Salonpas, Tigerbalm, Blue Emu Oil 3. Herbal treatments such as glucosamine-chondroitin has been shown to be helpful for some people with knee osteoarthritis; turmeric also may be helpful. Wobenzyme is a proprietary blend of bromelain and papain which can perform similarly to Aleve with fewer side effects. 4. There is a a fourfold reduction in knee forces for every unit of weight lost (Arthritis Rheum. 2005 Jul;52(7):2026-32). 5. Keep working with your PCP on pain management; if something changes, let us know.
yes

## 2021-04-01 ENCOUNTER — RX RENEWAL (OUTPATIENT)
Age: 86
End: 2021-04-01

## 2021-04-06 ENCOUNTER — APPOINTMENT (OUTPATIENT)
Dept: FAMILY MEDICINE | Facility: CLINIC | Age: 86
End: 2021-04-06

## 2021-04-08 ENCOUNTER — NON-APPOINTMENT (OUTPATIENT)
Age: 86
End: 2021-04-08

## 2021-04-08 ENCOUNTER — APPOINTMENT (OUTPATIENT)
Dept: FAMILY MEDICINE | Facility: CLINIC | Age: 86
End: 2021-04-08
Payer: MEDICARE

## 2021-04-08 VITALS
WEIGHT: 152 LBS | SYSTOLIC BLOOD PRESSURE: 134 MMHG | DIASTOLIC BLOOD PRESSURE: 60 MMHG | BODY MASS INDEX: 25.29 KG/M2 | RESPIRATION RATE: 18 BRPM | OXYGEN SATURATION: 88 % | HEART RATE: 44 BPM

## 2021-04-08 DIAGNOSIS — I49.9 CARDIAC ARRHYTHMIA, UNSPECIFIED: ICD-10-CM

## 2021-04-08 DIAGNOSIS — N39.41 URGE INCONTINENCE: ICD-10-CM

## 2021-04-08 PROCEDURE — 93000 ELECTROCARDIOGRAM COMPLETE: CPT

## 2021-04-08 PROCEDURE — 99214 OFFICE O/P EST MOD 30 MIN: CPT | Mod: 25

## 2021-04-09 ENCOUNTER — TRANSCRIPTION ENCOUNTER (OUTPATIENT)
Age: 86
End: 2021-04-09

## 2021-04-15 RX ORDER — DIAPER,BRIEF,ADULT, DISPOSABLE
EACH MISCELLANEOUS
Qty: 90 | Refills: 3 | Status: ACTIVE | COMMUNITY
Start: 2021-04-08 | End: 1900-01-01

## 2021-04-15 RX ORDER — BENZALKONIUM CHLORIDE 0.1 %
ADHESIVE PATCH, MEDICATED TOPICAL
Qty: 90 | Refills: 3 | Status: ACTIVE | COMMUNITY
Start: 2021-04-08 | End: 1900-01-01

## 2021-04-27 ENCOUNTER — APPOINTMENT (OUTPATIENT)
Dept: FAMILY MEDICINE | Facility: CLINIC | Age: 86
End: 2021-04-27

## 2021-04-27 ENCOUNTER — APPOINTMENT (OUTPATIENT)
Dept: CARDIOLOGY | Facility: CLINIC | Age: 86
End: 2021-04-27
Payer: MEDICARE

## 2021-04-27 ENCOUNTER — APPOINTMENT (OUTPATIENT)
Dept: PULMONOLOGY | Facility: CLINIC | Age: 86
End: 2021-04-27

## 2021-04-27 VITALS — HEART RATE: 72 BPM | DIASTOLIC BLOOD PRESSURE: 63 MMHG | SYSTOLIC BLOOD PRESSURE: 150 MMHG

## 2021-04-27 DIAGNOSIS — R60.0 LOCALIZED EDEMA: ICD-10-CM

## 2021-04-27 PROCEDURE — 99214 OFFICE O/P EST MOD 30 MIN: CPT

## 2021-04-27 RX ORDER — SPIRONOLACTONE 25 MG/1
25 TABLET ORAL DAILY
Qty: 30 | Refills: 3 | Status: DISCONTINUED | COMMUNITY
Start: 2019-11-21 | End: 2021-04-27

## 2021-04-27 NOTE — HISTORY OF PRESENT ILLNESS
[FreeTextEntry1] : 86F with severe COPD supposed to be on home O2 but noncompliant, HFpEF, PVC's who presents for follow up,  LE edema\par \par Last seen Dec 2019\par Notes LE swelling has been worsening- off lasix due to worsening kidney function, off aldactone due to hyperkalemia\par Chronic dyspnea, no CP \par Saw vascular surgery in early March for LE swelling, was given abx for cellulitis\par \par HISTORY:\par \par Pt was hospitalized at [facility] August 2018 for COPD exacerbation.  Longstanding COPD, not always compliant with home O2 or with medications.  She was seen by a cardiologist in 2014 and was noted that she had diastolic dysfunction.  She had an echo during her hospitalization August 2018 which did not record any diastolic dysfunction\par \par First seen October 2018 for fluid overload, LE edema, worsening HFpEF.  Was started on lasix with marked improvement in symptoms.  Uses home o2 prn.  \par \par Late September 2019, had a ?mechanical fall at home, femoral neck fracture s/p surgery. Post op course complicated by hypercarbic respiratory failure s/p intubation in SICU, subsequent extubation.  Discharged to [facility] \par \par Former smoker. She lives alone\par \par ECG: SR, TWI laterally\par TTE 8/2019: Normal LV, eccentric hypertrophy\par NST (2014):  No ischemia, EF >70%. \par

## 2021-04-27 NOTE — DISCUSSION/SUMMARY
[FreeTextEntry1] : 86F with severe COPD on home O2, poorly compliant, HTN, HFpEF, CKD\par \par LE edema, mildly red but not warm, does not appear like cellulitis. Completed abx last month\par Will trial Lasix 20mg daily x 10 days, watch crt very closely\par Check BMP today\par Watch salt intake, fluid intake, leg elevation \par \par Ventricular bigeminy: Noted on ECG, can consider repeat echo, possible BB. Pt asymptomatic at present, poorly compliant. Would like to focus on fluid overload first. \par \par HTN: Acceptable for age, will now be on lasix \par \par COPD: Home O2, breathing stable. Seeing pulm\par \par DM care per PCP- caution with metformin and renal function \par \par As documented in other notes, her social situation is complex as she lives alone, and would benefit from some assistance at home with medication administration and oxygen management.  No aid \par \par RV 2-3 weeks \par

## 2021-04-27 NOTE — REVIEW OF SYSTEMS
[Lower Ext Edema] : lower extremity edema [Negative] : Heme/Lymph [Dyspnea on exertion] : not dyspnea during exertion [Chest Discomfort] : no chest discomfort [Palpitations] : no palpitations [Cough] : no cough [Wheezing] : no wheezing

## 2021-04-29 LAB
ANION GAP SERPL CALC-SCNC: 13 MMOL/L
BUN SERPL-MCNC: 45 MG/DL
CALCIUM SERPL-MCNC: 9.3 MG/DL
CHLORIDE SERPL-SCNC: 104 MMOL/L
CO2 SERPL-SCNC: 24 MMOL/L
CREAT SERPL-MCNC: 1.47 MG/DL
GLUCOSE SERPL-MCNC: 99 MG/DL
POTASSIUM SERPL-SCNC: 5 MMOL/L
SODIUM SERPL-SCNC: 141 MMOL/L

## 2021-05-06 ENCOUNTER — APPOINTMENT (OUTPATIENT)
Dept: CARDIOLOGY | Facility: CLINIC | Age: 86
End: 2021-05-06

## 2021-05-16 NOTE — PROGRESS NOTE ADULT - PROBLEM SELECTOR PROBLEM 3
Neurosurgery progress note    S: no acute events overnight    O:  Temp:  [96.6  F (35.9  C)-98.4  F (36.9  C)] 96.6  F (35.9  C)  Pulse:  [52-90] 53  Resp:  [7-29] 7  BP: (135-158)/(62-71) 158/71  MAP:  [71 mmHg-121 mmHg] 71 mmHg  Arterial Line BP: (115-180)/(55-89) 115/55  FiO2 (%):  [30 %] 30 %  SpO2:  [95 %-100 %] 98 %    Exam:  Extubated  Garbled speech, says name  FCx4      ASSESSMENT: 68 year old man presented with AMS found to have aSAH with right supraclinoid aneurysm. mFS4 and HH5. GCS 5. S/p EVD placement, DCA,s/p aneurysm clipping on 5/16.     The following conditions are also present, complicating the patient's current management, as described in the Assessment and Plan:   mechanical ventilation on day of admission, intracerebral hematoma, brain compression, acute respiratory failure, coma, based on current GCS of 5 and cerebral aneurysm rupture with hunt sims scale 5 modified lowery 4        RECOMMENDATIONS:  - EVD @ 10  - CTA/CTP (completed)  - Q1hr neuro exam  - SBP<140  - HOB > 30 degrees  - Continuous cardiac monitoring while in ICU   - Nimodipine  - Glucose < 180  - Continue glucose checks  - Platelets > 100,000  - INR < 1.5  - Hemoglobin > 8  - DVT: SCDs while in bed  - Disposition: 4A  - PT/OT consulted        Samuel Nolasco MD, PhD  Neurosurgery Resident PGY-2    Please contact neurosurgery resident on call with questions.    Dial * * [phone], enter [phone] when prompted.        CKD (chronic kidney disease), stage III

## 2021-06-10 ENCOUNTER — APPOINTMENT (OUTPATIENT)
Dept: FAMILY MEDICINE | Facility: CLINIC | Age: 86
End: 2021-06-10

## 2021-06-14 ENCOUNTER — APPOINTMENT (OUTPATIENT)
Dept: FAMILY MEDICINE | Facility: CLINIC | Age: 86
End: 2021-06-14
Payer: MEDICARE

## 2021-06-14 VITALS
SYSTOLIC BLOOD PRESSURE: 130 MMHG | HEART RATE: 72 BPM | BODY MASS INDEX: 25.33 KG/M2 | TEMPERATURE: 97.6 F | OXYGEN SATURATION: 90 % | WEIGHT: 152 LBS | DIASTOLIC BLOOD PRESSURE: 82 MMHG | HEIGHT: 65 IN

## 2021-06-14 DIAGNOSIS — J40 BRONCHITIS, NOT SPECIFIED AS ACUTE OR CHRONIC: ICD-10-CM

## 2021-06-14 PROCEDURE — 99213 OFFICE O/P EST LOW 20 MIN: CPT

## 2021-06-14 RX ORDER — AZITHROMYCIN 250 MG/1
250 TABLET, FILM COATED ORAL
Qty: 1 | Refills: 0 | Status: ACTIVE | COMMUNITY
Start: 2021-06-14 | End: 1900-01-01

## 2021-06-14 RX ORDER — AMOXICILLIN AND CLAVULANATE POTASSIUM 875; 125 MG/1; MG/1
875-125 TABLET, COATED ORAL
Qty: 14 | Refills: 0 | Status: DISCONTINUED | COMMUNITY
Start: 2021-03-03 | End: 2021-06-14

## 2021-06-14 RX ORDER — ACETAMINOPHEN 325 MG/1
325 TABLET ORAL EVERY 6 HOURS
Qty: 60 | Refills: 0 | Status: DISCONTINUED | COMMUNITY
Start: 2019-08-26 | End: 2021-06-14

## 2021-06-14 RX ORDER — CIPROFLOXACIN HYDROCHLORIDE 500 MG/1
500 TABLET, FILM COATED ORAL TWICE DAILY
Qty: 20 | Refills: 0 | Status: DISCONTINUED | COMMUNITY
Start: 2021-02-21 | End: 2021-06-14

## 2021-06-14 RX ORDER — DOXYCYCLINE 100 MG/1
100 CAPSULE ORAL TWICE DAILY
Qty: 20 | Refills: 0 | Status: DISCONTINUED | COMMUNITY
Start: 2021-04-08 | End: 2021-06-14

## 2021-06-16 ENCOUNTER — INPATIENT (INPATIENT)
Facility: HOSPITAL | Age: 86
LOS: 7 days | Discharge: NURSING, REHAB OR LONG-TERM CARE | End: 2021-06-24
Attending: HOSPITALIST | Admitting: HOSPITALIST
Payer: MEDICARE

## 2021-06-16 VITALS
HEART RATE: 75 BPM | OXYGEN SATURATION: 95 % | HEIGHT: 64 IN | SYSTOLIC BLOOD PRESSURE: 150 MMHG | RESPIRATION RATE: 24 BRPM | TEMPERATURE: 99 F | DIASTOLIC BLOOD PRESSURE: 66 MMHG

## 2021-06-16 DIAGNOSIS — Z86.69 PERSONAL HISTORY OF OTHER DISEASES OF THE NERVOUS SYSTEM AND SENSE ORGANS: Chronic | ICD-10-CM

## 2021-06-16 DIAGNOSIS — S82.90XA UNSPECIFIED FRACTURE OF UNSPECIFIED LOWER LEG, INITIAL ENCOUNTER FOR CLOSED FRACTURE: Chronic | ICD-10-CM

## 2021-06-16 DIAGNOSIS — Z90.49 ACQUIRED ABSENCE OF OTHER SPECIFIED PARTS OF DIGESTIVE TRACT: Chronic | ICD-10-CM

## 2021-06-16 LAB
ALBUMIN SERPL ELPH-MCNC: 3.8 G/DL — SIGNIFICANT CHANGE UP (ref 3.3–5)
ALP SERPL-CCNC: 90 U/L — SIGNIFICANT CHANGE UP (ref 40–120)
ALT FLD-CCNC: 8 U/L — SIGNIFICANT CHANGE UP (ref 4–33)
ANION GAP SERPL CALC-SCNC: 12 MMOL/L — SIGNIFICANT CHANGE UP (ref 7–14)
ANISOCYTOSIS BLD QL: SLIGHT — SIGNIFICANT CHANGE UP
AST SERPL-CCNC: 25 U/L — SIGNIFICANT CHANGE UP (ref 4–32)
BASE EXCESS BLDV CALC-SCNC: 3.8 MMOL/L — HIGH (ref -3–2)
BASOPHILS # BLD AUTO: 0 K/UL — SIGNIFICANT CHANGE UP (ref 0–0.2)
BASOPHILS NFR BLD AUTO: 0 % — SIGNIFICANT CHANGE UP (ref 0–2)
BILIRUB SERPL-MCNC: 0.3 MG/DL — SIGNIFICANT CHANGE UP (ref 0.2–1.2)
BLOOD GAS VENOUS - CREATININE: SIGNIFICANT CHANGE UP MG/DL (ref 0.5–1.3)
BLOOD GAS VENOUS COMPREHENSIVE RESULT: SIGNIFICANT CHANGE UP
BUN SERPL-MCNC: 35 MG/DL — HIGH (ref 7–23)
CALCIUM SERPL-MCNC: 8.8 MG/DL — SIGNIFICANT CHANGE UP (ref 8.4–10.5)
CHLORIDE BLDV-SCNC: 104 MMOL/L — SIGNIFICANT CHANGE UP (ref 96–108)
CHLORIDE SERPL-SCNC: 103 MMOL/L — SIGNIFICANT CHANGE UP (ref 98–107)
CO2 SERPL-SCNC: 24 MMOL/L — SIGNIFICANT CHANGE UP (ref 22–31)
CREAT SERPL-MCNC: 1.37 MG/DL — HIGH (ref 0.5–1.3)
EOSINOPHIL # BLD AUTO: 0 K/UL — SIGNIFICANT CHANGE UP (ref 0–0.5)
EOSINOPHIL NFR BLD AUTO: 0 % — SIGNIFICANT CHANGE UP (ref 0–6)
GAS PNL BLDV: 139 MMOL/L — SIGNIFICANT CHANGE UP (ref 136–146)
GLUCOSE BLDV-MCNC: 120 MG/DL — HIGH (ref 70–99)
GLUCOSE SERPL-MCNC: 113 MG/DL — HIGH (ref 70–99)
HCO3 BLDV-SCNC: 25 MMOL/L — SIGNIFICANT CHANGE UP (ref 20–27)
HCT VFR BLD CALC: 43.2 % — SIGNIFICANT CHANGE UP (ref 34.5–45)
HCT VFR BLDA CALC: 40.5 % — SIGNIFICANT CHANGE UP (ref 34.5–46.5)
HGB BLD CALC-MCNC: 13.2 G/DL — SIGNIFICANT CHANGE UP (ref 11.5–15.5)
HGB BLD-MCNC: 13.5 G/DL — SIGNIFICANT CHANGE UP (ref 11.5–15.5)
IANC: 3.9 K/UL — SIGNIFICANT CHANGE UP (ref 1.5–8.5)
LACTATE BLDV-MCNC: 1 MMOL/L — SIGNIFICANT CHANGE UP (ref 0.5–2)
LYMPHOCYTES # BLD AUTO: 1.42 K/UL — SIGNIFICANT CHANGE UP (ref 1–3.3)
LYMPHOCYTES # BLD AUTO: 19.1 % — SIGNIFICANT CHANGE UP (ref 13–44)
MACROCYTES BLD QL: SLIGHT — SIGNIFICANT CHANGE UP
MAGNESIUM SERPL-MCNC: 1.8 MG/DL — SIGNIFICANT CHANGE UP (ref 1.6–2.6)
MCHC RBC-ENTMCNC: 30.5 PG — SIGNIFICANT CHANGE UP (ref 27–34)
MCHC RBC-ENTMCNC: 31.3 GM/DL — LOW (ref 32–36)
MCV RBC AUTO: 97.7 FL — SIGNIFICANT CHANGE UP (ref 80–100)
MONOCYTES # BLD AUTO: 1.55 K/UL — HIGH (ref 0–0.9)
MONOCYTES NFR BLD AUTO: 20.9 % — HIGH (ref 2–14)
NEUTROPHILS # BLD AUTO: 4.26 K/UL — SIGNIFICANT CHANGE UP (ref 1.8–7.4)
NEUTROPHILS NFR BLD AUTO: 57.4 % — SIGNIFICANT CHANGE UP (ref 43–77)
PCO2 BLDV: 64 MMHG — HIGH (ref 41–51)
PH BLDV: 7.29 — LOW (ref 7.32–7.43)
PHOSPHATE SERPL-MCNC: 2.7 MG/DL — SIGNIFICANT CHANGE UP (ref 2.5–4.5)
PLAT MORPH BLD: NORMAL — SIGNIFICANT CHANGE UP
PLATELET # BLD AUTO: 218 K/UL — SIGNIFICANT CHANGE UP (ref 150–400)
PLATELET COUNT - ESTIMATE: NORMAL — SIGNIFICANT CHANGE UP
PO2 BLDV: 29 MMHG — LOW (ref 35–40)
POLYCHROMASIA BLD QL SMEAR: SLIGHT — SIGNIFICANT CHANGE UP
POTASSIUM BLDV-SCNC: 4.1 MMOL/L — SIGNIFICANT CHANGE UP (ref 3.4–4.5)
POTASSIUM SERPL-MCNC: 4.3 MMOL/L — SIGNIFICANT CHANGE UP (ref 3.5–5.3)
POTASSIUM SERPL-SCNC: 4.3 MMOL/L — SIGNIFICANT CHANGE UP (ref 3.5–5.3)
PROT SERPL-MCNC: 7.5 G/DL — SIGNIFICANT CHANGE UP (ref 6–8.3)
RBC # BLD: 4.42 M/UL — SIGNIFICANT CHANGE UP (ref 3.8–5.2)
RBC # FLD: 12.6 % — SIGNIFICANT CHANGE UP (ref 10.3–14.5)
RBC BLD AUTO: ABNORMAL
SAO2 % BLDV: 51.5 % — LOW (ref 60–85)
SODIUM SERPL-SCNC: 139 MMOL/L — SIGNIFICANT CHANGE UP (ref 135–145)
VARIANT LYMPHS # BLD: 2.6 % — SIGNIFICANT CHANGE UP (ref 0–6)
WBC # BLD: 7.42 K/UL — SIGNIFICANT CHANGE UP (ref 3.8–10.5)
WBC # FLD AUTO: 7.42 K/UL — SIGNIFICANT CHANGE UP (ref 3.8–10.5)

## 2021-06-16 PROCEDURE — 93010 ELECTROCARDIOGRAM REPORT: CPT

## 2021-06-16 PROCEDURE — 71045 X-RAY EXAM CHEST 1 VIEW: CPT | Mod: 26

## 2021-06-16 PROCEDURE — 99285 EMERGENCY DEPT VISIT HI MDM: CPT | Mod: 25

## 2021-06-16 RX ORDER — ALBUTEROL 90 UG/1
2 AEROSOL, METERED ORAL ONCE
Refills: 0 | Status: COMPLETED | OUTPATIENT
Start: 2021-06-16 | End: 2021-06-16

## 2021-06-16 RX ORDER — AZITHROMYCIN 500 MG/1
500 TABLET, FILM COATED ORAL ONCE
Refills: 0 | Status: COMPLETED | OUTPATIENT
Start: 2021-06-16 | End: 2021-06-16

## 2021-06-16 RX ADMIN — AZITHROMYCIN 255 MILLIGRAM(S): 500 TABLET, FILM COATED ORAL at 22:38

## 2021-06-16 RX ADMIN — Medication 125 MILLIGRAM(S): at 22:38

## 2021-06-16 RX ADMIN — ALBUTEROL 2 PUFF(S): 90 AEROSOL, METERED ORAL at 22:38

## 2021-06-16 NOTE — ED PROVIDER NOTE - PHYSICAL EXAMINATION
CONSTITUTIONAL: Well-developed; well-nourished; in no acute distress however tachypneic  SKIN: Skin exam is warm and dry, no acute rash.  hyperpigmentation and 1+ pitting edema on LE bilaterally  HEAD: Normocephalic; atraumatic.  EYES: PERRL, EOM intact; conjunctiva and sclera clear.  NECK: Supple; non tender.  CARD: S1, S2 normal; no murmurs, gallops, or rubs. Regular rate and rhythm.  RESP: No wheezes, rales or rhonchi. Reduced breath sounds bilaterally, poor air entry  ABD: Normal bowel sounds; soft; non-distended; non-tender  EXT: Normal ROM. No clubbing, cyanosis or edema. NO point tenderness  LYMPH: No acute cervical adenopathy.  NEURO: Alert, oriented x4. Grossly unremarkable. No focal deficits.  PSYCH: Cooperative, appropriate. CONSTITUTIONAL: Well-developed; well-nourished; in no acute distress however tachypneic  SKIN: Skin exam is warm and dry, no acute rash.  hyperpigmentation and 1+ pitting edema on LE bilaterally  HEAD: Normocephalic; atraumatic.  EYES: PERRL, EOM intact; conjunctiva and sclera clear.  NECK: Supple; non tender.  CARD: S1, S2 normal; no murmurs, gallops, or rubs. Regular rate and rhythm.  RESP: No wheezes, rales or rhonchi. Reduced breath sounds bilaterally, poor air entry  ABD: Normal bowel sounds; soft; non-distended; non-tender  EXT: Normal ROM. No clubbing, cyanosis or edema. NO point tenderness. Dry crusted over lesions on L upper thigh  LYMPH: No acute cervical adenopathy.  NEURO: Alert, oriented x4. Grossly unremarkable. No focal deficits.  PSYCH: Cooperative, appropriate.

## 2021-06-16 NOTE — ED ADULT TRIAGE NOTE - CHIEF COMPLAINT QUOTE
Pt states that she has been having cough with white sputum, chill for the past few days, pt was seen by her PMD on Monday was started on unk antibiotic but has only taken 2 doses and is not feeling better.  Past medical history: COPD, DM2

## 2021-06-16 NOTE — ED ADULT NURSE NOTE - OBJECTIVE STATEMENT
pt received to room 14, accompanied by brother. per brother pt was found laying on floor today, recently diagnosed with bronchitis did not take prescribed medications. ambulatory at baseline uses rolling cart. O2 93% on room air. pt received to room 14, accompanied by brother. per brother pt was found laying on floor today, recently diagnosed with bronchitis did not take prescribed medications. ambulatory at baseline uses rolling cart. O2 93% on room air. rectally afebrile. many dry patched on buttock and legs bilaterally. lower extremities red, swollen with pitting edema. MD Sanchez, Bloch aware to assess patient. pt received to room 14, accompanied by brother. per brother pt was found laying on floor today, recently diagnosed with bronchitis did not take prescribed medications. ambulatory at baseline uses rolling cart. O2 93% on room air. rectally afebrile. many dry patched on left buttock and leg. lower extremities red, swollen with pitting edema. MD Sanchez, Bloch aware to assess patient. pt received to room 14, accompanied by brother. per brother pt was found laying on floor today, recently diagnosed with bronchitis did not take prescribed medications. ambulatory at baseline uses rolling cart. O2 93% on room air. rectally afebrile. many dry patched on left buttock and leg. lower extremities red, swollen with pitting edema. MD Sanchez, Bloch aware to assess patient. regarding fall, pt states "I think I fell asleep" unknown LOC head trauma.

## 2021-06-16 NOTE — ED PROVIDER NOTE - CLINICAL SUMMARY MEDICAL DECISION MAKING FREE TEXT BOX
87 yo F PMH COPD DM2, HTN, Hypothyroid, former smoker presenting for cough and chills for 3 days. Noncompliant with home medications and home O2. Will do CXR , obtain rectal temp, and empiric antibiotics and cultures. Possible COPD exacerbation vs PNA vs Bronchitis.

## 2021-06-16 NOTE — ED PROVIDER NOTE - ATTENDING CONTRIBUTION TO CARE
DR. BLOCH, ATTENDING MD-  I performed a face to face bedside interview with patient regarding history of present illness, review of symptoms and past medical history. I completed an independent physical exam.  I have discussed patient's plan of care with the resident.  Patient alert and oriented, HEENT nmml Heart s1s2, lungs decreased BS diffusely, occasional wheeze, abd soft nontender, CVS changes, pulses intact, no calf tenderness,  )2 sat 89-90 on RM air 92 on 3 liters.

## 2021-06-16 NOTE — ED ADULT NURSE NOTE - INTERVENTIONS DEFINITIONS
[place] to call system/Call bell, personal items and telephone within reach/Instruct patient to call for assistance/Non-slip footwear when patient is off stretcher/Physically safe environment: no spills, clutter or unnecessary equipment/Stretcher in lowest position, wheels locked, appropriate side rails in place/Monitor for mental status changes and reorient to person, place, and time

## 2021-06-16 NOTE — ED PROVIDER NOTE - OBJECTIVE STATEMENT
87 yo F PMH COPD (prescribed 3L NC however noncompliant) DM2, HTN, Hypothyroid, former smoker presenting for cough and chills for 3 days. Called PMD monday and was prescribed antibiotics for bronchitis, has been noncompliant with those medications or her home medications. This morning brother visited her and patient was found on floor. Patient AAOx4, states that she was sitting in chair and slipped when trying to get up, fell on her L side but denies any trauma or pain. Denies dizzyness or LOC. Patient ambulates with walker at baseline but has felt weak recently. Denies N/V/D/urinary changes, abdominal pain, chest pain. Endorses SOB. Denies sputum production. Patient lives alone. 85 yo F PMH COPD (prescribed 3L NC however noncompliant) DM2, HTN, Hypothyroid, former smoker presenting for cough productive of white sputum and chills for 3 days. Called PMD Monday and was prescribed antibiotics for bronchitis, has been noncompliant with those medications or her home medications. This morning brother visited her and patient was found on floor. Patient AAOx4, states that she was sitting in chair and slipped when trying to get up, fell on her L side but denies any trauma or pain. Denies dizzyness or LOC. Patient ambulates with walker at baseline but has felt weak recently. Denies N/V/D/urinary changes, abdominal pain, chest pain. Endorses SOB. Patient lives alone.

## 2021-06-16 NOTE — ED PROVIDER NOTE - FAMILY HISTORY
Mother  Still living? Unknown  FHx: rheumatoid arthritis, Age at diagnosis: Age Unknown     Father  Still living? Unknown  FH: type 2 diabetes mellitus, Age at diagnosis: Age Unknown  Family history of hypertension, Age at diagnosis: Age Unknown

## 2021-06-17 DIAGNOSIS — E03.9 HYPOTHYROIDISM, UNSPECIFIED: ICD-10-CM

## 2021-06-17 DIAGNOSIS — J44.9 CHRONIC OBSTRUCTIVE PULMONARY DISEASE, UNSPECIFIED: ICD-10-CM

## 2021-06-17 DIAGNOSIS — J44.1 CHRONIC OBSTRUCTIVE PULMONARY DISEASE WITH (ACUTE) EXACERBATION: ICD-10-CM

## 2021-06-17 DIAGNOSIS — Z96.642 PRESENCE OF LEFT ARTIFICIAL HIP JOINT: Chronic | ICD-10-CM

## 2021-06-17 DIAGNOSIS — N18.32 CHRONIC KIDNEY DISEASE, STAGE 3B: ICD-10-CM

## 2021-06-17 DIAGNOSIS — I10 ESSENTIAL (PRIMARY) HYPERTENSION: ICD-10-CM

## 2021-06-17 DIAGNOSIS — N39.0 URINARY TRACT INFECTION, SITE NOT SPECIFIED: ICD-10-CM

## 2021-06-17 DIAGNOSIS — B02.9 ZOSTER WITHOUT COMPLICATIONS: ICD-10-CM

## 2021-06-17 DIAGNOSIS — E11.9 TYPE 2 DIABETES MELLITUS WITHOUT COMPLICATIONS: ICD-10-CM

## 2021-06-17 DIAGNOSIS — Z91.14 PATIENT'S OTHER NONCOMPLIANCE WITH MEDICATION REGIMEN: ICD-10-CM

## 2021-06-17 DIAGNOSIS — Z29.9 ENCOUNTER FOR PROPHYLACTIC MEASURES, UNSPECIFIED: ICD-10-CM

## 2021-06-17 LAB
A1C WITH ESTIMATED AVERAGE GLUCOSE RESULT: 6.8 % — HIGH (ref 4–5.6)
ANION GAP SERPL CALC-SCNC: 11 MMOL/L — SIGNIFICANT CHANGE UP (ref 7–14)
APPEARANCE UR: CLEAR — SIGNIFICANT CHANGE UP
B PERT DNA SPEC QL NAA+PROBE: SIGNIFICANT CHANGE UP
BACTERIA # UR AUTO: ABNORMAL
BASE EXCESS BLDV CALC-SCNC: 1.9 MMOL/L — SIGNIFICANT CHANGE UP (ref -3–2)
BILIRUB UR-MCNC: NEGATIVE — SIGNIFICANT CHANGE UP
BUN SERPL-MCNC: 34 MG/DL — HIGH (ref 7–23)
C PNEUM DNA SPEC QL NAA+PROBE: SIGNIFICANT CHANGE UP
CALCIUM SERPL-MCNC: 8.7 MG/DL — SIGNIFICANT CHANGE UP (ref 8.4–10.5)
CHLORIDE SERPL-SCNC: 103 MMOL/L — SIGNIFICANT CHANGE UP (ref 98–107)
CO2 SERPL-SCNC: 23 MMOL/L — SIGNIFICANT CHANGE UP (ref 22–31)
COLOR SPEC: YELLOW — SIGNIFICANT CHANGE UP
CREAT SERPL-MCNC: 1.3 MG/DL — SIGNIFICANT CHANGE UP (ref 0.5–1.3)
DIFF PNL FLD: ABNORMAL
EPI CELLS # UR: 1 /HPF — SIGNIFICANT CHANGE UP (ref 0–5)
ESTIMATED AVERAGE GLUCOSE: 148 MG/DL — HIGH (ref 68–114)
FLUAV SUBTYP SPEC NAA+PROBE: SIGNIFICANT CHANGE UP
FLUBV RNA SPEC QL NAA+PROBE: SIGNIFICANT CHANGE UP
GLUCOSE BLDC GLUCOMTR-MCNC: 188 MG/DL — HIGH (ref 70–99)
GLUCOSE BLDC GLUCOMTR-MCNC: 199 MG/DL — HIGH (ref 70–99)
GLUCOSE BLDC GLUCOMTR-MCNC: 230 MG/DL — HIGH (ref 70–99)
GLUCOSE BLDC GLUCOMTR-MCNC: 240 MG/DL — HIGH (ref 70–99)
GLUCOSE BLDC GLUCOMTR-MCNC: 320 MG/DL — HIGH (ref 70–99)
GLUCOSE SERPL-MCNC: 220 MG/DL — HIGH (ref 70–99)
GLUCOSE UR QL: NEGATIVE — SIGNIFICANT CHANGE UP
HADV DNA SPEC QL NAA+PROBE: SIGNIFICANT CHANGE UP
HCO3 BLDV-SCNC: 25 MMOL/L — SIGNIFICANT CHANGE UP (ref 20–27)
HCOV 229E RNA SPEC QL NAA+PROBE: SIGNIFICANT CHANGE UP
HCOV HKU1 RNA SPEC QL NAA+PROBE: SIGNIFICANT CHANGE UP
HCOV NL63 RNA SPEC QL NAA+PROBE: SIGNIFICANT CHANGE UP
HCOV OC43 RNA SPEC QL NAA+PROBE: SIGNIFICANT CHANGE UP
HCT VFR BLD CALC: 39.1 % — SIGNIFICANT CHANGE UP (ref 34.5–45)
HGB BLD-MCNC: 12.6 G/DL — SIGNIFICANT CHANGE UP (ref 11.5–15.5)
HMPV RNA SPEC QL NAA+PROBE: SIGNIFICANT CHANGE UP
HPIV1 RNA SPEC QL NAA+PROBE: SIGNIFICANT CHANGE UP
HPIV2 RNA SPEC QL NAA+PROBE: SIGNIFICANT CHANGE UP
HPIV3 RNA SPEC QL NAA+PROBE: DETECTED
HPIV4 RNA SPEC QL NAA+PROBE: SIGNIFICANT CHANGE UP
HYALINE CASTS # UR AUTO: 3 /LPF — SIGNIFICANT CHANGE UP (ref 0–7)
KETONES UR-MCNC: NEGATIVE — SIGNIFICANT CHANGE UP
LEUKOCYTE ESTERASE UR-ACNC: ABNORMAL
MCHC RBC-ENTMCNC: 31.3 PG — SIGNIFICANT CHANGE UP (ref 27–34)
MCHC RBC-ENTMCNC: 32.2 GM/DL — SIGNIFICANT CHANGE UP (ref 32–36)
MCV RBC AUTO: 97 FL — SIGNIFICANT CHANGE UP (ref 80–100)
NITRITE UR-MCNC: POSITIVE
NRBC # BLD: 0 /100 WBCS — SIGNIFICANT CHANGE UP
NRBC # FLD: 0 K/UL — SIGNIFICANT CHANGE UP
PCO2 BLDV: 55 MMHG — HIGH (ref 41–51)
PH BLDV: 7.32 — SIGNIFICANT CHANGE UP (ref 7.32–7.43)
PH UR: 6 — SIGNIFICANT CHANGE UP (ref 5–8)
PLATELET # BLD AUTO: 205 K/UL — SIGNIFICANT CHANGE UP (ref 150–400)
PO2 BLDV: 63 MMHG — HIGH (ref 35–40)
POTASSIUM SERPL-MCNC: 4.4 MMOL/L — SIGNIFICANT CHANGE UP (ref 3.5–5.3)
POTASSIUM SERPL-SCNC: 4.4 MMOL/L — SIGNIFICANT CHANGE UP (ref 3.5–5.3)
PROT UR-MCNC: ABNORMAL
RAPID RVP RESULT: DETECTED
RBC # BLD: 4.03 M/UL — SIGNIFICANT CHANGE UP (ref 3.8–5.2)
RBC # FLD: 12.4 % — SIGNIFICANT CHANGE UP (ref 10.3–14.5)
RBC CASTS # UR COMP ASSIST: 1 /HPF — SIGNIFICANT CHANGE UP (ref 0–4)
RSV RNA SPEC QL NAA+PROBE: SIGNIFICANT CHANGE UP
RV+EV RNA SPEC QL NAA+PROBE: SIGNIFICANT CHANGE UP
SAO2 % BLDV: 91.2 % — HIGH (ref 60–85)
SARS-COV-2 RNA SPEC QL NAA+PROBE: SIGNIFICANT CHANGE UP
SARS-COV-2 RNA SPEC QL NAA+PROBE: SIGNIFICANT CHANGE UP
SODIUM SERPL-SCNC: 137 MMOL/L — SIGNIFICANT CHANGE UP (ref 135–145)
SP GR SPEC: 1.02 — SIGNIFICANT CHANGE UP (ref 1.01–1.02)
TSH SERPL-MCNC: 1.66 UIU/ML — SIGNIFICANT CHANGE UP (ref 0.27–4.2)
UROBILINOGEN FLD QL: SIGNIFICANT CHANGE UP
WBC # BLD: 3.79 K/UL — LOW (ref 3.8–10.5)
WBC # FLD AUTO: 3.79 K/UL — LOW (ref 3.8–10.5)
WBC UR QL: 14 /HPF — HIGH (ref 0–5)

## 2021-06-17 PROCEDURE — 99223 1ST HOSP IP/OBS HIGH 75: CPT

## 2021-06-17 RX ORDER — SODIUM CHLORIDE 9 MG/ML
1000 INJECTION, SOLUTION INTRAVENOUS
Refills: 0 | Status: DISCONTINUED | OUTPATIENT
Start: 2021-06-17 | End: 2021-06-24

## 2021-06-17 RX ORDER — IPRATROPIUM/ALBUTEROL SULFATE 18-103MCG
3 AEROSOL WITH ADAPTER (GRAM) INHALATION EVERY 6 HOURS
Refills: 0 | Status: DISCONTINUED | OUTPATIENT
Start: 2021-06-17 | End: 2021-06-23

## 2021-06-17 RX ORDER — DEXTROSE 50 % IN WATER 50 %
15 SYRINGE (ML) INTRAVENOUS ONCE
Refills: 0 | Status: DISCONTINUED | OUTPATIENT
Start: 2021-06-17 | End: 2021-06-24

## 2021-06-17 RX ORDER — HEPARIN SODIUM 5000 [USP'U]/ML
5000 INJECTION INTRAVENOUS; SUBCUTANEOUS EVERY 8 HOURS
Refills: 0 | Status: DISCONTINUED | OUTPATIENT
Start: 2021-06-17 | End: 2021-06-24

## 2021-06-17 RX ORDER — MONTELUKAST 4 MG/1
10 TABLET, CHEWABLE ORAL DAILY
Refills: 0 | Status: DISCONTINUED | OUTPATIENT
Start: 2021-06-17 | End: 2021-06-24

## 2021-06-17 RX ORDER — ASCORBIC ACID 60 MG
500 TABLET,CHEWABLE ORAL DAILY
Refills: 0 | Status: DISCONTINUED | OUTPATIENT
Start: 2021-06-17 | End: 2021-06-24

## 2021-06-17 RX ORDER — AZITHROMYCIN 500 MG/1
500 TABLET, FILM COATED ORAL EVERY 24 HOURS
Refills: 0 | Status: DISCONTINUED | OUTPATIENT
Start: 2021-06-17 | End: 2021-06-21

## 2021-06-17 RX ORDER — DEXTROSE 50 % IN WATER 50 %
25 SYRINGE (ML) INTRAVENOUS ONCE
Refills: 0 | Status: DISCONTINUED | OUTPATIENT
Start: 2021-06-17 | End: 2021-06-24

## 2021-06-17 RX ORDER — GLUCAGON INJECTION, SOLUTION 0.5 MG/.1ML
1 INJECTION, SOLUTION SUBCUTANEOUS ONCE
Refills: 0 | Status: DISCONTINUED | OUTPATIENT
Start: 2021-06-17 | End: 2021-06-24

## 2021-06-17 RX ORDER — INSULIN LISPRO 100/ML
VIAL (ML) SUBCUTANEOUS
Refills: 0 | Status: DISCONTINUED | OUTPATIENT
Start: 2021-06-17 | End: 2021-06-17

## 2021-06-17 RX ORDER — INSULIN LISPRO 100/ML
VIAL (ML) SUBCUTANEOUS
Refills: 0 | Status: DISCONTINUED | OUTPATIENT
Start: 2021-06-17 | End: 2021-06-24

## 2021-06-17 RX ORDER — LEVOTHYROXINE SODIUM 125 MCG
112 TABLET ORAL DAILY
Refills: 0 | Status: DISCONTINUED | OUTPATIENT
Start: 2021-06-17 | End: 2021-06-24

## 2021-06-17 RX ORDER — CEFTRIAXONE 500 MG/1
1000 INJECTION, POWDER, FOR SOLUTION INTRAMUSCULAR; INTRAVENOUS EVERY 24 HOURS
Refills: 0 | Status: COMPLETED | OUTPATIENT
Start: 2021-06-18 | End: 2021-06-20

## 2021-06-17 RX ORDER — LANOLIN ALCOHOL/MO/W.PET/CERES
3 CREAM (GRAM) TOPICAL AT BEDTIME
Refills: 0 | Status: DISCONTINUED | OUTPATIENT
Start: 2021-06-17 | End: 2021-06-24

## 2021-06-17 RX ORDER — BUDESONIDE AND FORMOTEROL FUMARATE DIHYDRATE 160; 4.5 UG/1; UG/1
2 AEROSOL RESPIRATORY (INHALATION)
Refills: 0 | Status: DISCONTINUED | OUTPATIENT
Start: 2021-06-17 | End: 2021-06-24

## 2021-06-17 RX ORDER — METFORMIN HYDROCHLORIDE 850 MG/1
2 TABLET ORAL
Qty: 0 | Refills: 0 | DISCHARGE

## 2021-06-17 RX ORDER — INSULIN LISPRO 100/ML
VIAL (ML) SUBCUTANEOUS AT BEDTIME
Refills: 0 | Status: DISCONTINUED | OUTPATIENT
Start: 2021-06-17 | End: 2021-06-17

## 2021-06-17 RX ORDER — DEXTROSE 50 % IN WATER 50 %
12.5 SYRINGE (ML) INTRAVENOUS ONCE
Refills: 0 | Status: DISCONTINUED | OUTPATIENT
Start: 2021-06-17 | End: 2021-06-24

## 2021-06-17 RX ORDER — CEFTRIAXONE 500 MG/1
1000 INJECTION, POWDER, FOR SOLUTION INTRAMUSCULAR; INTRAVENOUS ONCE
Refills: 0 | Status: COMPLETED | OUTPATIENT
Start: 2021-06-17 | End: 2021-06-17

## 2021-06-17 RX ORDER — TIOTROPIUM BROMIDE 18 UG/1
1 CAPSULE ORAL; RESPIRATORY (INHALATION) DAILY
Refills: 0 | Status: DISCONTINUED | OUTPATIENT
Start: 2021-06-17 | End: 2021-06-24

## 2021-06-17 RX ORDER — INSULIN LISPRO 100/ML
VIAL (ML) SUBCUTANEOUS AT BEDTIME
Refills: 0 | Status: DISCONTINUED | OUTPATIENT
Start: 2021-06-17 | End: 2021-06-24

## 2021-06-17 RX ADMIN — Medication 112 MICROGRAM(S): at 13:16

## 2021-06-17 RX ADMIN — CEFTRIAXONE 100 MILLIGRAM(S): 500 INJECTION, POWDER, FOR SOLUTION INTRAMUSCULAR; INTRAVENOUS at 01:59

## 2021-06-17 RX ADMIN — Medication 3 MILLILITER(S): at 16:20

## 2021-06-17 RX ADMIN — HEPARIN SODIUM 5000 UNIT(S): 5000 INJECTION INTRAVENOUS; SUBCUTANEOUS at 21:57

## 2021-06-17 RX ADMIN — HEPARIN SODIUM 5000 UNIT(S): 5000 INJECTION INTRAVENOUS; SUBCUTANEOUS at 13:16

## 2021-06-17 RX ADMIN — Medication 4: at 17:52

## 2021-06-17 RX ADMIN — Medication 40 MILLIGRAM(S): at 09:29

## 2021-06-17 RX ADMIN — AZITHROMYCIN 255 MILLIGRAM(S): 500 TABLET, FILM COATED ORAL at 21:57

## 2021-06-17 RX ADMIN — Medication 500 MILLIGRAM(S): at 13:16

## 2021-06-17 RX ADMIN — Medication 1: at 09:29

## 2021-06-17 RX ADMIN — Medication 1 TABLET(S): at 13:16

## 2021-06-17 RX ADMIN — Medication 8: at 13:55

## 2021-06-17 RX ADMIN — MONTELUKAST 10 MILLIGRAM(S): 4 TABLET, CHEWABLE ORAL at 13:16

## 2021-06-17 RX ADMIN — Medication 3 MILLILITER(S): at 09:29

## 2021-06-17 NOTE — H&P ADULT - PROBLEM SELECTOR PLAN 3
- Hold oral hypoglycemics while inpatient  - Start on Insulin Sliding Scale  - Monitor carbohydrate intake  -  on proper Diet and Exercise - Renally dose medications, Avoid nephrotoxic medications  - monitor CBCs, electrolytes  - Monitor for urinary symptoms and edema  - Maintain proper sugar and pressure control

## 2021-06-17 NOTE — H&P ADULT - PROBLEM SELECTOR PLAN 5
- Patient reports no hx of HTN but found to have elevated pressures in ED  - Monitor Vitals  - DASH diet - Continue at home Levothyroxine   - Check TSH

## 2021-06-17 NOTE — H&P ADULT - NSICDXFAMILYHX_GEN_ALL_CORE_FT
FAMILY HISTORY:  Father  Still living? Unknown  Family history of hypertension, Age at diagnosis: Age Unknown  FH: type 2 diabetes mellitus, Age at diagnosis: Age Unknown    Mother  Still living? Unknown  FHx: rheumatoid arthritis, Age at diagnosis: Age Unknown

## 2021-06-17 NOTE — H&P ADULT - SKIN COMMENTS
- Noted shingles rash on L thigh; noted AKs on face, back and legs; Redness on left ankle s/p "growth" removal L anterior-lateral thigh dry and crusted vesicles with scabbing (pt reports she recently had shingles)

## 2021-06-17 NOTE — H&P ADULT - NEGATIVE GENERAL GENITOURINARY SYMPTOMS
no hematuria/no renal colic/no flank pain L/no flank pain R/no urine discoloration/no gas in urine/no bladder infections/no dysuria/no urinary hesitancy/normal urinary frequency/no nocturia

## 2021-06-17 NOTE — H&P ADULT - NEGATIVE GASTROINTESTINAL SYMPTOMS
no nausea/no vomiting/no change in bowel habits/no abdominal pain/no melena/no hematochezia/no steatorrhea/no jaundice/no hiccoughs

## 2021-06-17 NOTE — H&P ADULT - PROBLEM SELECTOR PLAN 6
- Monitor for symptoms and pain  - PO Tylenol 650 Q6hrs PRN for pain Monitor BP per routine  DASH diet  -monitor off meds for now.

## 2021-06-17 NOTE — H&P ADULT - PROBLEM SELECTOR PLAN 2
- Renally dose medications, Avoid nephrotoxic medications  - Check CBCs, electrolytes, Iron studies  - Monitor for urinary symptoms and edema  - Maintain proper sugar and pressure control continue IV Ceftriaxone 1g Q24h  -F/U Urine Cx

## 2021-06-17 NOTE — H&P ADULT - NSHPSOCIALHISTORY_GEN_ALL_CORE
Pt is  with no current partner. She lives in a private home in [place] by herself. She reports that she has been unable to clean, cook and function how she normally would. Admits to possibly needing a home aid to help with cleaning and cooking and caring for herself. Pt was previously a consultant but retired about 10 years ago. She reports a 20 pack year history but quit 20+ years ago. She reports having a glass of Red Wine once every week to every other week. She denies the current or past use of other illicit drugs.    COVID-19 Vax: 2nd dose Mid May (around 05/15)  Flu Shot: September (unknown date)  PneumoVax: Reports receiving, doesn't know when

## 2021-06-17 NOTE — H&P ADULT - PROBLEM SELECTOR PLAN 1
- Patient with 10 days of SOB and cough with worsening SOB over last day. Admitted to medicine for infectious workup. R/o pneumonia. Pt s/p IV Azithromycin 500mg and Ceftriaxone 1000mg.  - Continue Azithromycin 500mg QD and Ceftriaxone 1000mg QD   - Continue at home medications of Inhaled Spiriva 18mcg QD, Inhaled Symbicort 160/4.5mcgs 2puffs BID, PO Prednisone 40mg QD   - Consult Pulmonology, recommendations appreciated  - Monitor vitals, continue 3L NC, ween down as tolerated  - Start Nebulized Duoneb 3mL Q6hrs  - Start Albuterol 2puffs PRN for SOB Continuous SpO2 monitoring  -DuoNebs Q6h  - Patient with 10 days of SOB and cough with worsening SOB over last day. Admitted to medicine for infectious workup. R/o pneumonia. Pt s/p IV Azithromycin 500mg and IV SoluMedrol by ED.  - Continue IV Azithromycin 500mg Q24h and  start Prednisone 40mg QD   - Continue at home medications of Inhaled Spiriva 18mcg QD, Inhaled Symbicort 160/4.5mcgs 2puffs BID, PO   - Monitor vitals, continue 3L NC, ween down as tolerated

## 2021-06-17 NOTE — H&P ADULT - PROBLEM SELECTOR PLAN 4
- Continue at home Levothyroxine 88mg QD  - Check TSH - Hold oral hypoglycemics while inpatient  -Monitor blood glucose per routine  - Start on Insulin Sliding Scale  - Monitor carbohydrate intake  -  on proper Diet and Exercise

## 2021-06-17 NOTE — H&P ADULT - RS GEN PE MLT RESP DETAILS PC
normal/no chest wall tenderness/no intercostal retractions/no rales/no rhonchi/no subcutaneous emphysema/diminished breath sounds, L/diminished breath sounds, R

## 2021-06-17 NOTE — H&P ADULT - PROBLEM SELECTOR PLAN 8
- DVT ppx: SQ Heparin 5000 units Q8hr, encourage early ambulation as tolerated  - Fall risk: ambulates w/ walker at baseline  - GI ppx: Consider Protonix

## 2021-06-17 NOTE — H&P ADULT - NSICDXPASTMEDICALHX_GEN_ALL_CORE_FT
PAST MEDICAL HISTORY:  Chronic kidney disease (CKD)     COPD (chronic obstructive pulmonary disease)     DM (diabetes mellitus)     HTN (hypertension)     Hypothyroid     Shingles

## 2021-06-17 NOTE — H&P ADULT - PROBLEM SELECTOR PLAN 7
- Pt reports poor compliance with medications at home. Reports needed help to care for self and home as well.  - Consult Case Work  - Teach back medication method

## 2021-06-17 NOTE — H&P ADULT - ATTENDING COMMENTS
87 y/o F with PMH of COPD, CKD, T2DM, HTN, hypothyroid, cataracts who presents for cough and SOB, found to have COPD exacerbation 2/2 parainfluenzae and a UTI    #COPD exacerbation, reports feeling better. 2/2 parainfluenzae virus. S/p solumedrol, nebs, ceftriaxone and azithromycin. VSS. CXR appears clear. Pt on NC and comfortable. Diffuse crackles on exam without wheezing. Plan to continue azithromycin and prednisone 40mg qd for a 5 day course. Supportive care for parainfluenzae.     #UTI, positive UA. Reporting months of incontinence/frequent urination. Ceftriaxone 1g x3 days.     #Zoster, patient self diagnosed herself with Zoster over her right thigh. Has crusted lesions. Reports shooting pain occasionally. No need for isolation (lesions crusted over). Continue with supportive management.     Plan discussed with ACP.

## 2021-06-17 NOTE — H&P ADULT - NSHPLABSRESULTS_GEN_ALL_CORE
Vital Signs Last 24 Hrs  T(C): 36.4 (2021 09:06), Max: 37.7 (2021 22:01)  T(F): 97.5 (2021 09:06), Max: 99.8 (2021 22:01)  HR: 81 (2021 09:06) (65 - 81)  BP: 130/56 (2021 09:06) (111/44 - 159/56)  BP(mean): --  RR: 19 (2021 09:06) (16 - 24)  SpO2: 95% (2021 09:06) (95% - 98%)               12.6   3.79  )-----------( 205      ( 2021 08:12 )             39.1     06-17    137  |  103  |  34<H>  ----------------------------<  220<H>  4.4   |  23  |  1.30    Ca    8.7      2021 08:12  Phos  2.7     06-16  Mg     1.8     06-16    TPro  7.5  /  Alb  3.8  /  TBili  0.3  /  DBili  x   /  AST  25  /  ALT  8   /  AlkPhos  90  06-16    Urinalysis Basic - ( 2021 00:02 )    Color: Yellow / Appearance: Clear / S.021 / pH: x  Gluc: x / Ketone: Negative  / Bili: Negative / Urobili: <2 mg/dL   Blood: x / Protein: 100 mg/dL / Nitrite: Positive   Leuk Esterase: Small / RBC: 1 /HPF / WBC 14 /HPF   Sq Epi: x / Non Sq Epi: 1 /HPF / Bacteria: Many    Urine Culture: Pending    LIVER FUNCTIONS - ( 2021 22:22 )  Alb: 3.8 g/dL / Pro: 7.5 g/dL / ALK PHOS: 90 U/L / ALT: 8 U/L / AST: 25 U/L / GGT: x           POCT Blood Glucose.: 188 mg/dL (2021 08:31)  POCT Blood Glucose.: 199 mg/dL (2021 03:25)    EKG: NSR @ 77, QTc 432, TWI leads I, aVL, V4, V5, V6 (unchanged from comparison EKG in 2019)    CXR:     COVID-19: Neg  RapidRVR: Pending Vital Signs Last 24 Hrs  T(C): 36.4 (2021 09:06), Max: 37.7 (2021 22:01)  T(F): 97.5 (2021 09:06), Max: 99.8 (2021 22:01)  HR: 81 (2021 09:06) (65 - 81)  BP: 130/56 (2021 09:06) (111/44 - 159/56)  BP(mean): --  RR: 19 (2021 09:06) (16 - 24)  SpO2: 95% (2021 09:06) (95% - 98%)               12.6   3.79  )-----------( 205      ( 2021 08:12 )             39.1     06-17    137  |  103  |  34<H>  ----------------------------<  220<H>  4.4   |  23  |  1.30    Ca    8.7      2021 08:12  Phos  2.7     06-16  Mg     1.8     06-16    TPro  7.5  /  Alb  3.8  /  TBili  0.3  /  DBili  x   /  AST  25  /  ALT  8   /  AlkPhos  90  06-16    Urinalysis Basic - ( 2021 00:02 )    Color: Yellow / Appearance: Clear / S.021 / pH: x  Gluc: x / Ketone: Negative  / Bili: Negative / Urobili: <2 mg/dL   Blood: x / Protein: 100 mg/dL / Nitrite: Positive   Leuk Esterase: Small / RBC: 1 /HPF / WBC 14 /HPF   Sq Epi: x / Non Sq Epi: 1 /HPF / Bacteria: Many    Urine Culture: Pending    LIVER FUNCTIONS - ( 2021 22:22 )  Alb: 3.8 g/dL / Pro: 7.5 g/dL / ALK PHOS: 90 U/L / ALT: 8 U/L / AST: 25 U/L / GGT: x           POCT Blood Glucose.: 188 mg/dL (2021 08:31)  POCT Blood Glucose.: 199 mg/dL (2021 03:25)    EKG: NSR @ 77, QTc 432, TWI leads I, aVL, V4, V5, V6 (unchanged from comparison EKG in 2019)    CXR: neg    COVID-19: Neg  RapidRVR: Pending

## 2021-06-17 NOTE — H&P ADULT - ASSESSMENT
87 y/o female w/ pmhx significant for COPD (non-compliant on 3L NC at home), CKD, T2DM, HTN, Hypothyroid and cataracts s/p removal (2013), presents to the [facility] ED for a cough and SOB x10 days. Patient recently seen by PMD and prescribed ABX, but was non-compliant and had worsening SOB over last day. Patient being admitted to medicine for infectious workup and monitoring.     87 y/o female w/ pmhx significant for COPD (non-compliant on 3L NC at home), CKD, T2DM, HTN, Hypothyroid and cataracts s/p removal (2013), presents to the [facility] ED for a cough and SOB x10 days. Patient recently seen by PMD and prescribed ABX, but was non-compliant and had worsening SOB over last day, admitted for COPD exacerbation and UTI.

## 2021-06-17 NOTE — H&P ADULT - MUSCULOSKELETAL
normal/ROM intact/no joint swelling/no joint erythema/no joint warmth/no calf tenderness/normal strength detailed exam details…

## 2021-06-17 NOTE — H&P ADULT - HISTORY OF PRESENT ILLNESS
85 y/o female w/ pmhx significant for COPD (non-compliant on 3L NC at home), CKD, T2DM, HTN, Hypothyroid and cataracts s/p removal (2013), presents to the [facility] ED for a cough and SOB x10 days. Pt states that she started developing a cold last week and was seen by her PMD (Dr. Cheek) on Monday (06/14) and prescribed medication (pt unsure but described a Z-pack). Pt reports non-compliance with the abx (took 2 days of the medication then stopped), as well as non-compliance with some of her home medications (including Spiriva, O2 supplementation). Patient states that she has LOGAN w/ ADLs at baseline, but reports worsening of symptoms over the last day. She says she doesn't overuse her Spiriva but also endorses that she does not have a rescue inhaler. She denies further attempting to relieve her symptoms with medications. Pt reports that yesterday (06/16) her brother visited and found her on the floor of her home. She reports dozing off when she sat down in the morning and slid out of the chair onto her bottom, where she remained from 8am-11:30am until her brother arrived and convinced her to come to the ED because she "did not look so good". She denies LOC/syncope, trauma, pain or injury. She denies chest pain, palpitations, pleuritic pain, dizziness, headache, fevers, diarrhea, constipation, nausea, vomiting, dysuria, hematuria or recent sick contacts.    85 y/o female w/ pmhx significant for COPD (non-compliant on 3L NC at home), CKD, T2DM, HTN, Hypothyroid and cataracts s/p removal (2013), presents to the [facility] ED for a cough and SOB x10 days. Pt states that she started developing cold last week and was seen by her PMD (Dr. Cheek) on Monday (06/14) and prescribed medication (pt unsure but described a Z-pack). Pt reports non-compliance with the abx (took 2 days of the medication then stopped), as well as non-compliance with some of her home medications (including Spiriva, O2 supplementation). Patient states that she has LOGAN w/ ADLs at baseline, but reports worsening of symptoms over the last day. She says she doesn't like to her Spiriva but also endorses that she does not have a rescue inhaler. She denies further attempting to relieve her symptoms with medications. Pt reports that yesterday (06/16) her brother visited and found her on the floor of her home. She reports dozing off when she sat down in the morning and slid out of the chair onto her bottom, where she remained from 8am-11:30am until her brother arrived and convinced her to come to the ED because she "did not look so good". She denies LOC/syncope, trauma, pain or injury. She denies chest pain, palpitations, pleuritic pain, dizziness, headache, fevers, diarrhea, constipation, nausea, vomiting, dysuria, hematuria or recent sick contacts. 85 y/o female w/ pmhx significant for COPD (non-compliant on 3L NC at home), CKD, T2DM, HTN, Hypothyroid and cataracts s/p removal (2013), presents to the [facility] ED for a cough and SOB x10 days. Pt states that she started developing cold last week and was seen by her PMD (Dr. Cheek) on Monday (06/14) and prescribed medication (pt unsure but described a Z-pack). Pt reports non-compliance with the abx (took 2 days of the medication then stopped), as well as non-compliance with some of her home medications (including Spiriva, O2 supplementation). Patient states that she has LOGAN w/ ADLs at baseline, but reports worsening of symptoms over the last day. She says she doesn't like to her Spiriva but also endorses that she does not have a rescue inhaler. She denies further attempting to relieve her symptoms with medications. Pt reports that yesterday (06/16) her brother visited and found her on the floor of her home. She reports dozing off when she sat down in the morning and slid out of the chair onto her bottom, where she remained from 8am-11:30am until her brother arrived and convinced her to come to the ED because she "did not look so good". She denies LOC/syncope, trauma, pain or injury. She denies chest pain, palpitations, pleuritic pain, dizziness, headache, fevers, diarrhea, constipation, nausea, vomiting, dysuria, hematuria or recent sick contacts.

## 2021-06-18 DIAGNOSIS — R60.9 EDEMA, UNSPECIFIED: ICD-10-CM

## 2021-06-18 LAB
ANION GAP SERPL CALC-SCNC: 11 MMOL/L — SIGNIFICANT CHANGE UP (ref 7–14)
BUN SERPL-MCNC: 41 MG/DL — HIGH (ref 7–23)
CALCIUM SERPL-MCNC: 8.6 MG/DL — SIGNIFICANT CHANGE UP (ref 8.4–10.5)
CHLORIDE SERPL-SCNC: 102 MMOL/L — SIGNIFICANT CHANGE UP (ref 98–107)
CO2 SERPL-SCNC: 24 MMOL/L — SIGNIFICANT CHANGE UP (ref 22–31)
COVID-19 SPIKE DOMAIN AB INTERP: POSITIVE
COVID-19 SPIKE DOMAIN ANTIBODY RESULT: 69.5 U/ML — HIGH
CREAT SERPL-MCNC: 1.46 MG/DL — HIGH (ref 0.5–1.3)
GLUCOSE BLDC GLUCOMTR-MCNC: 134 MG/DL — HIGH (ref 70–99)
GLUCOSE BLDC GLUCOMTR-MCNC: 141 MG/DL — HIGH (ref 70–99)
GLUCOSE BLDC GLUCOMTR-MCNC: 214 MG/DL — HIGH (ref 70–99)
GLUCOSE BLDC GLUCOMTR-MCNC: 274 MG/DL — HIGH (ref 70–99)
GLUCOSE SERPL-MCNC: 128 MG/DL — HIGH (ref 70–99)
HCT VFR BLD CALC: 34.1 % — LOW (ref 34.5–45)
HGB BLD-MCNC: 11.3 G/DL — LOW (ref 11.5–15.5)
MCHC RBC-ENTMCNC: 31.8 PG — SIGNIFICANT CHANGE UP (ref 27–34)
MCHC RBC-ENTMCNC: 33.1 GM/DL — SIGNIFICANT CHANGE UP (ref 32–36)
MCV RBC AUTO: 96.1 FL — SIGNIFICANT CHANGE UP (ref 80–100)
NRBC # BLD: 0 /100 WBCS — SIGNIFICANT CHANGE UP
NRBC # FLD: 0 K/UL — SIGNIFICANT CHANGE UP
PLATELET # BLD AUTO: 208 K/UL — SIGNIFICANT CHANGE UP (ref 150–400)
POTASSIUM SERPL-MCNC: 4.1 MMOL/L — SIGNIFICANT CHANGE UP (ref 3.5–5.3)
POTASSIUM SERPL-SCNC: 4.1 MMOL/L — SIGNIFICANT CHANGE UP (ref 3.5–5.3)
RBC # BLD: 3.55 M/UL — LOW (ref 3.8–5.2)
RBC # FLD: 12.4 % — SIGNIFICANT CHANGE UP (ref 10.3–14.5)
SARS-COV-2 IGG+IGM SERPL QL IA: 69.5 U/ML — HIGH
SARS-COV-2 IGG+IGM SERPL QL IA: POSITIVE
SODIUM SERPL-SCNC: 137 MMOL/L — SIGNIFICANT CHANGE UP (ref 135–145)
WBC # BLD: 5.81 K/UL — SIGNIFICANT CHANGE UP (ref 3.8–10.5)
WBC # FLD AUTO: 5.81 K/UL — SIGNIFICANT CHANGE UP (ref 3.8–10.5)

## 2021-06-18 PROCEDURE — 99233 SBSQ HOSP IP/OBS HIGH 50: CPT

## 2021-06-18 RX ADMIN — AZITHROMYCIN 255 MILLIGRAM(S): 500 TABLET, FILM COATED ORAL at 21:32

## 2021-06-18 RX ADMIN — Medication 3 MILLILITER(S): at 15:56

## 2021-06-18 RX ADMIN — HEPARIN SODIUM 5000 UNIT(S): 5000 INJECTION INTRAVENOUS; SUBCUTANEOUS at 06:11

## 2021-06-18 RX ADMIN — Medication 3 MILLILITER(S): at 23:15

## 2021-06-18 RX ADMIN — Medication 6: at 12:54

## 2021-06-18 RX ADMIN — HEPARIN SODIUM 5000 UNIT(S): 5000 INJECTION INTRAVENOUS; SUBCUTANEOUS at 21:32

## 2021-06-18 RX ADMIN — Medication 1 TABLET(S): at 12:52

## 2021-06-18 RX ADMIN — HEPARIN SODIUM 5000 UNIT(S): 5000 INJECTION INTRAVENOUS; SUBCUTANEOUS at 12:55

## 2021-06-18 RX ADMIN — Medication 40 MILLIGRAM(S): at 06:10

## 2021-06-18 RX ADMIN — Medication 500 MILLIGRAM(S): at 12:53

## 2021-06-18 RX ADMIN — Medication 112 MICROGRAM(S): at 06:10

## 2021-06-18 RX ADMIN — CEFTRIAXONE 100 MILLIGRAM(S): 500 INJECTION, POWDER, FOR SOLUTION INTRAMUSCULAR; INTRAVENOUS at 06:11

## 2021-06-18 RX ADMIN — MONTELUKAST 10 MILLIGRAM(S): 4 TABLET, CHEWABLE ORAL at 12:53

## 2021-06-18 NOTE — PROGRESS NOTE ADULT - ASSESSMENT
85 y/o female w/ pmhx significant for COPD (non-compliant on 3L NC at home), CKD, T2DM, HTN, Hypothyroid and cataracts s/p removal (2013), presents to the [facility] ED for a cough and SOB x10 days. Patient recently seen by PMD and prescribed ABX, but was non-compliant and had worsening SOB over last day, admitted for COPD exacerbation and UTI.

## 2021-06-18 NOTE — PROGRESS NOTE ADULT - PROBLEM SELECTOR PLAN 8
- DVT ppx: SQ Heparin 5000 units Q8hr, encourage early ambulation as tolerated  - Fall risk: ambulates w/ walker at baseline  - GI ppx: Consider Protonix - Pt reports poor compliance with medications at home. Reports needed help to care for self and home as well.  - Consult Case Work  - Teach back medication method

## 2021-06-18 NOTE — PROGRESS NOTE ADULT - PROBLEM SELECTOR PLAN 1
Continuous SpO2 monitoring  -DuoNebs Q6h  - Patient with 10 days of SOB and cough with worsening SOB over last day. Admitted to medicine for infectious workup. R/o pneumonia. Pt s/p IV Azithromycin 500mg and IV SoluMedrol by ED.  - Continue IV Azithromycin 500mg Q24h and  start Prednisone 40mg QD   - Continue at home medications of Inhaled Spiriva 18mcg QD, Inhaled Symbicort 160/4.5mcgs 2puffs BID, PO   - Monitor vitals, continue 3L NC, ween down as tolerated

## 2021-06-18 NOTE — PROGRESS NOTE ADULT - SUBJECTIVE AND OBJECTIVE BOX
Patient is a 86y old  Female who presents with a chief complaint of SOB and Cough (2021 09:00)      SUBJECTIVE / OVERNIGHT EVENTS:    MEDICATIONS  (STANDING):  albuterol/ipratropium for Nebulization 3 milliLiter(s) Nebulizer every 6 hours  ascorbic acid 500 milliGRAM(s) Oral daily  azithromycin  IVPB 500 milliGRAM(s) IV Intermittent every 24 hours  budesonide 160 MICROgram(s)/formoterol 4.5 MICROgram(s) Inhaler 2 Puff(s) Inhalation two times a day  calcium carbonate 1250 mG  + Vitamin D (OsCal 500 + D) 1 Tablet(s) Oral daily  cefTRIAXone   IVPB 1000 milliGRAM(s) IV Intermittent every 24 hours  dextrose 40% Gel 15 Gram(s) Oral once  dextrose 5%. 1000 milliLiter(s) (50 mL/Hr) IV Continuous <Continuous>  dextrose 5%. 1000 milliLiter(s) (100 mL/Hr) IV Continuous <Continuous>  dextrose 50% Injectable 25 Gram(s) IV Push once  dextrose 50% Injectable 12.5 Gram(s) IV Push once  dextrose 50% Injectable 25 Gram(s) IV Push once  glucagon  Injectable 1 milliGRAM(s) IntraMuscular once  heparin   Injectable 5000 Unit(s) SubCutaneous every 8 hours  insulin lispro (ADMELOG) corrective regimen sliding scale   SubCutaneous three times a day before meals  insulin lispro (ADMELOG) corrective regimen sliding scale   SubCutaneous at bedtime  levothyroxine 112 MICROGram(s) Oral daily  montelukast 10 milliGRAM(s) Oral daily  predniSONE   Tablet 40 milliGRAM(s) Oral daily  tiotropium 18 MICROgram(s) Capsule 1 Capsule(s) Inhalation daily    MEDICATIONS  (PRN):  melatonin 3 milliGRAM(s) Oral at bedtime PRN Insomnia      Vital Signs Last 24 Hrs  T(C): 36.4 (2021 06:08), Max: 36.9 (2021 18:55)  T(F): 97.5 (2021 06:08), Max: 98.4 (2021 18:55)  HR: 56 (2021 06:08) (56 - 85)  BP: 105/64 (2021 06:08) (105/64 - 124/55)  BP(mean): --  RR: 18 (2021 06:08) (18 - 19)  SpO2: 95% (2021 06:08) (94% - 95%)  CAPILLARY BLOOD GLUCOSE      POCT Blood Glucose.: 134 mg/dL (2021 08:38)  POCT Blood Glucose.: 240 mg/dL (2021 21:59)  POCT Blood Glucose.: 230 mg/dL (2021 17:38)  POCT Blood Glucose.: 320 mg/dL (2021 13:24)    I&O's Summary      PHYSICAL EXAM:  GENERAL: NAD, well-developed  HEAD:  Atraumatic, Normocephalic  EYES: EOMI, PERRLA, conjunctiva and sclera clear  NECK: Supple, No JVD  CHEST/LUNG: Clear to auscultation bilaterally; No wheeze  HEART: Regular rate and rhythm; No murmurs, rubs, or gallops  ABDOMEN: Soft, Nontender, Nondistended; Bowel sounds present  EXTREMITIES:  2+ Peripheral Pulses, No clubbing, cyanosis, or edema  PSYCH: AAOx3  NEUROLOGY: non-focal  SKIN: No rashes or lesions    LABS:                        11.3   5.81  )-----------( 208      ( 2021 07:53 )             34.1     06-18    137  |  102  |  41<H>  ----------------------------<  128<H>  4.1   |  24  |  1.46<H>    Ca    8.6      2021 07:53  Phos  2.7     06-16  Mg     1.8     06-16    TPro  7.5  /  Alb  3.8  /  TBili  0.3  /  DBili  x   /  AST  25  /  ALT  8   /  AlkPhos  90  06-16          Urinalysis Basic - ( 2021 00:02 )    Color: Yellow / Appearance: Clear / S.021 / pH: x  Gluc: x / Ketone: Negative  / Bili: Negative / Urobili: <2 mg/dL   Blood: x / Protein: 100 mg/dL / Nitrite: Positive   Leuk Esterase: Small / RBC: 1 /HPF / WBC 14 /HPF   Sq Epi: x / Non Sq Epi: 1 /HPF / Bacteria: Many        RADIOLOGY & ADDITIONAL TESTS:    Imaging Personally Reviewed:    Consultant(s) Notes Reviewed:      Care Discussed with Consultants/Other Providers:   Patient is a 86y old  Female who presents with a chief complaint of SOB and Cough (2021 09:00)      SUBJECTIVE / OVERNIGHT EVENTS:  patient seen and examined by bedside, pt feeling better than before, SOb improved, has cough with phlegm, denies headache, dizziness,, CP, Palpitations , N/V/D, abdominal pain        MEDICATIONS  (STANDING):  albuterol/ipratropium for Nebulization 3 milliLiter(s) Nebulizer every 6 hours  ascorbic acid 500 milliGRAM(s) Oral daily  azithromycin  IVPB 500 milliGRAM(s) IV Intermittent every 24 hours  budesonide 160 MICROgram(s)/formoterol 4.5 MICROgram(s) Inhaler 2 Puff(s) Inhalation two times a day  calcium carbonate 1250 mG  + Vitamin D (OsCal 500 + D) 1 Tablet(s) Oral daily  cefTRIAXone   IVPB 1000 milliGRAM(s) IV Intermittent every 24 hours  dextrose 40% Gel 15 Gram(s) Oral once  dextrose 5%. 1000 milliLiter(s) (50 mL/Hr) IV Continuous <Continuous>  dextrose 5%. 1000 milliLiter(s) (100 mL/Hr) IV Continuous <Continuous>  dextrose 50% Injectable 25 Gram(s) IV Push once  dextrose 50% Injectable 12.5 Gram(s) IV Push once  dextrose 50% Injectable 25 Gram(s) IV Push once  glucagon  Injectable 1 milliGRAM(s) IntraMuscular once  heparin   Injectable 5000 Unit(s) SubCutaneous every 8 hours  insulin lispro (ADMELOG) corrective regimen sliding scale   SubCutaneous three times a day before meals  insulin lispro (ADMELOG) corrective regimen sliding scale   SubCutaneous at bedtime  levothyroxine 112 MICROGram(s) Oral daily  montelukast 10 milliGRAM(s) Oral daily  predniSONE   Tablet 40 milliGRAM(s) Oral daily  tiotropium 18 MICROgram(s) Capsule 1 Capsule(s) Inhalation daily    MEDICATIONS  (PRN):  melatonin 3 milliGRAM(s) Oral at bedtime PRN Insomnia      Vital Signs Last 24 Hrs  T(C): 36.4 (2021 06:08), Max: 36.9 (2021 18:55)  T(F): 97.5 (2021 06:08), Max: 98.4 (2021 18:55)  HR: 56 (2021 06:08) (56 - 85)  BP: 105/64 (2021 06:08) (105/64 - 124/55)  BP(mean): --  RR: 18 (2021 06:08) (18 - 19)  SpO2: 95% (2021 06:08) (94% - 95%)  CAPILLARY BLOOD GLUCOSE      POCT Blood Glucose.: 134 mg/dL (2021 08:38)  POCT Blood Glucose.: 240 mg/dL (2021 21:59)  POCT Blood Glucose.: 230 mg/dL (2021 17:38)  POCT Blood Glucose.: 320 mg/dL (2021 13:24)    I&O's Summary      PHYSICAL EXAM:  GENERAL: NAD, well-developed  HEAD:  Atraumatic, Normocephalic  EYES: EOMI, PERRLA, conjunctiva and sclera clear  CHEST/LUNG: Diminished breath sounds b/l bases ; No wheeze  HEART: Regular rate and rhythm;   ABDOMEN: Soft, Nontender, Nondistended; Bowel sounds present  EXTREMITIES:  2+ Peripheral Pulses,  LLE >RLE, slightly more erythematous , no calf tenderness   PSYCH: AAOx3  NEUROLOGY: non-focal  SKIN: L anterior-lateral thigh dry and crusted vesicles with scabbing (pt reports she recently had shingles  LABS:                        11.3   5.81  )-----------( 208      ( 2021 07:53 )             34.1     06-18    137  |  102  |  41<H>  ----------------------------<  128<H>  4.1   |  24  |  1.46<H>    Ca    8.6      2021 07:53  Phos  2.7     06-16  Mg     1.8     -16    TPro  7.5  /  Alb  3.8  /  TBili  0.3  /  DBili  x   /  AST  25  /  ALT  8   /  AlkPhos  90  -16          Urinalysis Basic - ( 2021 00:02 )    Color: Yellow / Appearance: Clear / S.021 / pH: x  Gluc: x / Ketone: Negative  / Bili: Negative / Urobili: <2 mg/dL   Blood: x / Protein: 100 mg/dL / Nitrite: Positive   Leuk Esterase: Small / RBC: 1 /HPF / WBC 14 /HPF   Sq Epi: x / Non Sq Epi: 1 /HPF / Bacteria: Many        RADIOLOGY & ADDITIONAL TESTS:    Imaging Personally Reviewed:    Consultant(s) Notes Reviewed:      Care Discussed with Consultants/Other Providers:

## 2021-06-18 NOTE — PROGRESS NOTE ADULT - PROBLEM SELECTOR PLAN 7
- Pt reports poor compliance with medications at home. Reports needed help to care for self and home as well.  - Consult Case Work  - Teach back medication method LLE>RLE , slightly more erythematous also   pt s/p LTHR in 2020, had stopped PT due to COVID   will check doppler to r/o DVT

## 2021-06-18 NOTE — PROGRESS NOTE ADULT - PROBLEM SELECTOR PLAN 4
- Hold oral hypoglycemics while inpatient  -Monitor blood glucose per routine  - Start on Insulin Sliding Scale  - Monitor carbohydrate intake  -  on proper Diet and Exercise - Hold oral hypoglycemics while inpatient  -Monitor blood glucose per routine  - Start on Insulin Sliding Scale  - Monitor carbohydrate intake  -HbA1c 6.8   -  on proper Diet and Exercise

## 2021-06-19 DIAGNOSIS — Z71.89 OTHER SPECIFIED COUNSELING: ICD-10-CM

## 2021-06-19 LAB
ANION GAP SERPL CALC-SCNC: 12 MMOL/L — SIGNIFICANT CHANGE UP (ref 7–14)
ANION GAP SERPL CALC-SCNC: 12 MMOL/L — SIGNIFICANT CHANGE UP (ref 7–14)
ANION GAP SERPL CALC-SCNC: 14 MMOL/L — SIGNIFICANT CHANGE UP (ref 7–14)
BUN SERPL-MCNC: 44 MG/DL — HIGH (ref 7–23)
BUN SERPL-MCNC: 47 MG/DL — HIGH (ref 7–23)
BUN SERPL-MCNC: 49 MG/DL — HIGH (ref 7–23)
CALCIUM SERPL-MCNC: 8.8 MG/DL — SIGNIFICANT CHANGE UP (ref 8.4–10.5)
CALCIUM SERPL-MCNC: 8.9 MG/DL — SIGNIFICANT CHANGE UP (ref 8.4–10.5)
CALCIUM SERPL-MCNC: 9 MG/DL — SIGNIFICANT CHANGE UP (ref 8.4–10.5)
CHLORIDE SERPL-SCNC: 100 MMOL/L — SIGNIFICANT CHANGE UP (ref 98–107)
CHLORIDE SERPL-SCNC: 100 MMOL/L — SIGNIFICANT CHANGE UP (ref 98–107)
CHLORIDE SERPL-SCNC: 99 MMOL/L — SIGNIFICANT CHANGE UP (ref 98–107)
CO2 SERPL-SCNC: 22 MMOL/L — SIGNIFICANT CHANGE UP (ref 22–31)
CO2 SERPL-SCNC: 23 MMOL/L — SIGNIFICANT CHANGE UP (ref 22–31)
CO2 SERPL-SCNC: 26 MMOL/L — SIGNIFICANT CHANGE UP (ref 22–31)
CREAT SERPL-MCNC: 1.31 MG/DL — HIGH (ref 0.5–1.3)
CREAT SERPL-MCNC: 1.45 MG/DL — HIGH (ref 0.5–1.3)
CREAT SERPL-MCNC: 1.55 MG/DL — HIGH (ref 0.5–1.3)
CULTURE RESULTS: SIGNIFICANT CHANGE UP
GLUCOSE BLDC GLUCOMTR-MCNC: 157 MG/DL — HIGH (ref 70–99)
GLUCOSE BLDC GLUCOMTR-MCNC: 176 MG/DL — HIGH (ref 70–99)
GLUCOSE BLDC GLUCOMTR-MCNC: 229 MG/DL — HIGH (ref 70–99)
GLUCOSE BLDC GLUCOMTR-MCNC: 364 MG/DL — HIGH (ref 70–99)
GLUCOSE SERPL-MCNC: 191 MG/DL — HIGH (ref 70–99)
GLUCOSE SERPL-MCNC: 265 MG/DL — HIGH (ref 70–99)
GLUCOSE SERPL-MCNC: 337 MG/DL — HIGH (ref 70–99)
HCT VFR BLD CALC: 39.6 % — SIGNIFICANT CHANGE UP (ref 34.5–45)
HGB BLD-MCNC: 12.8 G/DL — SIGNIFICANT CHANGE UP (ref 11.5–15.5)
MAGNESIUM SERPL-MCNC: 1.8 MG/DL — SIGNIFICANT CHANGE UP (ref 1.6–2.6)
MAGNESIUM SERPL-MCNC: 1.9 MG/DL — SIGNIFICANT CHANGE UP (ref 1.6–2.6)
MAGNESIUM SERPL-MCNC: 1.9 MG/DL — SIGNIFICANT CHANGE UP (ref 1.6–2.6)
MCHC RBC-ENTMCNC: 31.8 PG — SIGNIFICANT CHANGE UP (ref 27–34)
MCHC RBC-ENTMCNC: 32.3 GM/DL — SIGNIFICANT CHANGE UP (ref 32–36)
MCV RBC AUTO: 98.3 FL — SIGNIFICANT CHANGE UP (ref 80–100)
NRBC # BLD: 0 /100 WBCS — SIGNIFICANT CHANGE UP
NRBC # FLD: 0 K/UL — SIGNIFICANT CHANGE UP
PHOSPHATE SERPL-MCNC: 2.6 MG/DL — SIGNIFICANT CHANGE UP (ref 2.5–4.5)
PHOSPHATE SERPL-MCNC: 2.6 MG/DL — SIGNIFICANT CHANGE UP (ref 2.5–4.5)
PHOSPHATE SERPL-MCNC: 2.9 MG/DL — SIGNIFICANT CHANGE UP (ref 2.5–4.5)
PLATELET # BLD AUTO: 221 K/UL — SIGNIFICANT CHANGE UP (ref 150–400)
POTASSIUM SERPL-MCNC: 5 MMOL/L — SIGNIFICANT CHANGE UP (ref 3.5–5.3)
POTASSIUM SERPL-MCNC: 5.5 MMOL/L — HIGH (ref 3.5–5.3)
POTASSIUM SERPL-MCNC: 5.6 MMOL/L — HIGH (ref 3.5–5.3)
POTASSIUM SERPL-SCNC: 5 MMOL/L — SIGNIFICANT CHANGE UP (ref 3.5–5.3)
POTASSIUM SERPL-SCNC: 5.5 MMOL/L — HIGH (ref 3.5–5.3)
POTASSIUM SERPL-SCNC: 5.6 MMOL/L — HIGH (ref 3.5–5.3)
RBC # BLD: 4.03 M/UL — SIGNIFICANT CHANGE UP (ref 3.8–5.2)
RBC # FLD: 12.4 % — SIGNIFICANT CHANGE UP (ref 10.3–14.5)
SODIUM SERPL-SCNC: 135 MMOL/L — SIGNIFICANT CHANGE UP (ref 135–145)
SODIUM SERPL-SCNC: 135 MMOL/L — SIGNIFICANT CHANGE UP (ref 135–145)
SODIUM SERPL-SCNC: 138 MMOL/L — SIGNIFICANT CHANGE UP (ref 135–145)
SPECIMEN SOURCE: SIGNIFICANT CHANGE UP
WBC # BLD: 6.2 K/UL — SIGNIFICANT CHANGE UP (ref 3.8–10.5)
WBC # FLD AUTO: 6.2 K/UL — SIGNIFICANT CHANGE UP (ref 3.8–10.5)

## 2021-06-19 PROCEDURE — 93010 ELECTROCARDIOGRAM REPORT: CPT

## 2021-06-19 PROCEDURE — 99233 SBSQ HOSP IP/OBS HIGH 50: CPT

## 2021-06-19 RX ORDER — ACETAMINOPHEN 500 MG
650 TABLET ORAL ONCE
Refills: 0 | Status: COMPLETED | OUTPATIENT
Start: 2021-06-19 | End: 2021-06-19

## 2021-06-19 RX ORDER — SODIUM ZIRCONIUM CYCLOSILICATE 10 G/10G
10 POWDER, FOR SUSPENSION ORAL ONCE
Refills: 0 | Status: COMPLETED | OUTPATIENT
Start: 2021-06-19 | End: 2021-06-19

## 2021-06-19 RX ORDER — NYSTATIN CREAM 100000 [USP'U]/G
1 CREAM TOPICAL
Refills: 0 | Status: COMPLETED | OUTPATIENT
Start: 2021-06-19 | End: 2021-06-24

## 2021-06-19 RX ADMIN — Medication 112 MICROGRAM(S): at 05:31

## 2021-06-19 RX ADMIN — NYSTATIN CREAM 1 APPLICATION(S): 100000 CREAM TOPICAL at 22:16

## 2021-06-19 RX ADMIN — Medication 10: at 12:32

## 2021-06-19 RX ADMIN — CEFTRIAXONE 100 MILLIGRAM(S): 500 INJECTION, POWDER, FOR SOLUTION INTRAMUSCULAR; INTRAVENOUS at 05:31

## 2021-06-19 RX ADMIN — Medication 4: at 17:48

## 2021-06-19 RX ADMIN — Medication 650 MILLIGRAM(S): at 23:06

## 2021-06-19 RX ADMIN — HEPARIN SODIUM 5000 UNIT(S): 5000 INJECTION INTRAVENOUS; SUBCUTANEOUS at 05:31

## 2021-06-19 RX ADMIN — Medication 3 MILLILITER(S): at 15:14

## 2021-06-19 RX ADMIN — HEPARIN SODIUM 5000 UNIT(S): 5000 INJECTION INTRAVENOUS; SUBCUTANEOUS at 12:34

## 2021-06-19 RX ADMIN — AZITHROMYCIN 255 MILLIGRAM(S): 500 TABLET, FILM COATED ORAL at 21:26

## 2021-06-19 RX ADMIN — MONTELUKAST 10 MILLIGRAM(S): 4 TABLET, CHEWABLE ORAL at 12:34

## 2021-06-19 RX ADMIN — SODIUM ZIRCONIUM CYCLOSILICATE 10 GRAM(S): 10 POWDER, FOR SUSPENSION ORAL at 19:53

## 2021-06-19 RX ADMIN — Medication 500 MILLIGRAM(S): at 12:34

## 2021-06-19 RX ADMIN — Medication 1 TABLET(S): at 12:33

## 2021-06-19 RX ADMIN — Medication 40 MILLIGRAM(S): at 05:31

## 2021-06-19 RX ADMIN — Medication 3 MILLILITER(S): at 10:09

## 2021-06-19 RX ADMIN — Medication 3 MILLILITER(S): at 21:48

## 2021-06-19 RX ADMIN — HEPARIN SODIUM 5000 UNIT(S): 5000 INJECTION INTRAVENOUS; SUBCUTANEOUS at 21:26

## 2021-06-19 RX ADMIN — Medication 3 MILLILITER(S): at 04:18

## 2021-06-19 NOTE — PROGRESS NOTE ADULT - PROBLEM SELECTOR PLAN 7
LLE>RLE , slightly more erythematous also   pt s/p LTHR in 2020, had stopped PT due to COVID   will check doppler to r/o DVT

## 2021-06-19 NOTE — PROGRESS NOTE ADULT - SUBJECTIVE AND OBJECTIVE BOX
Patient is a 86y old  Female who presents with a chief complaint of SOB and Cough (18 Jun 2021 10:29)      SUBJECTIVE / OVERNIGHT EVENTS:    MEDICATIONS  (STANDING):  albuterol/ipratropium for Nebulization 3 milliLiter(s) Nebulizer every 6 hours  ascorbic acid 500 milliGRAM(s) Oral daily  azithromycin  IVPB 500 milliGRAM(s) IV Intermittent every 24 hours  budesonide 160 MICROgram(s)/formoterol 4.5 MICROgram(s) Inhaler 2 Puff(s) Inhalation two times a day  calcium carbonate 1250 mG  + Vitamin D (OsCal 500 + D) 1 Tablet(s) Oral daily  cefTRIAXone   IVPB 1000 milliGRAM(s) IV Intermittent every 24 hours  dextrose 40% Gel 15 Gram(s) Oral once  dextrose 5%. 1000 milliLiter(s) (50 mL/Hr) IV Continuous <Continuous>  dextrose 5%. 1000 milliLiter(s) (100 mL/Hr) IV Continuous <Continuous>  dextrose 50% Injectable 25 Gram(s) IV Push once  dextrose 50% Injectable 12.5 Gram(s) IV Push once  dextrose 50% Injectable 25 Gram(s) IV Push once  glucagon  Injectable 1 milliGRAM(s) IntraMuscular once  heparin   Injectable 5000 Unit(s) SubCutaneous every 8 hours  insulin lispro (ADMELOG) corrective regimen sliding scale   SubCutaneous three times a day before meals  insulin lispro (ADMELOG) corrective regimen sliding scale   SubCutaneous at bedtime  levothyroxine 112 MICROGram(s) Oral daily  montelukast 10 milliGRAM(s) Oral daily  predniSONE   Tablet 40 milliGRAM(s) Oral daily  tiotropium 18 MICROgram(s) Capsule 1 Capsule(s) Inhalation daily    MEDICATIONS  (PRN):  melatonin 3 milliGRAM(s) Oral at bedtime PRN Insomnia      Vital Signs Last 24 Hrs  T(C): 36.5 (19 Jun 2021 05:30), Max: 36.9 (18 Jun 2021 21:20)  T(F): 97.7 (19 Jun 2021 05:30), Max: 98.4 (18 Jun 2021 21:20)  HR: 70 (19 Jun 2021 10:13) (62 - 86)  BP: 136/72 (19 Jun 2021 05:30) (102/68 - 136/72)  BP(mean): --  RR: 18 (19 Jun 2021 10:12) (18 - 18)  SpO2: 96% (19 Jun 2021 10:13) (94% - 98%)  CAPILLARY BLOOD GLUCOSE      POCT Blood Glucose.: 157 mg/dL (19 Jun 2021 08:26)  POCT Blood Glucose.: 214 mg/dL (18 Jun 2021 22:16)  POCT Blood Glucose.: 141 mg/dL (18 Jun 2021 17:07)  POCT Blood Glucose.: 274 mg/dL (18 Jun 2021 12:14)    I&O's Summary      PHYSICAL EXAM:  GENERAL: NAD, well-developed  HEAD:  Atraumatic, Normocephalic  EYES: EOMI, PERRLA, conjunctiva and sclera clear  NECK: Supple, No JVD  CHEST/LUNG: Clear to auscultation bilaterally; No wheeze  HEART: Regular rate and rhythm; No murmurs, rubs, or gallops  ABDOMEN: Soft, Nontender, Nondistended; Bowel sounds present  EXTREMITIES:  2+ Peripheral Pulses, No clubbing, cyanosis, or edema  PSYCH: AAOx3  NEUROLOGY: non-focal  SKIN: No rashes or lesions    LABS:                        11.3   5.81  )-----------( 208      ( 18 Jun 2021 07:53 )             34.1     06-18    137  |  102  |  41<H>  ----------------------------<  128<H>  4.1   |  24  |  1.46<H>    Ca    8.6      18 Jun 2021 07:53                RADIOLOGY & ADDITIONAL TESTS:    Imaging Personally Reviewed:    Consultant(s) Notes Reviewed:      Care Discussed with Consultants/Other Providers:   Patient is a 86y old  Female who presents with a chief complaint of SOB and Cough (18 Jun 2021 10:29)      SUBJECTIVE / OVERNIGHT EVENTS: patient seen and examined by bedside ,pt feeling better than before, SOB improving , denies headache, dizziness, , CP, Palpitations , N/V/D, abdominal pain        MEDICATIONS  (STANDING):  albuterol/ipratropium for Nebulization 3 milliLiter(s) Nebulizer every 6 hours  ascorbic acid 500 milliGRAM(s) Oral daily  azithromycin  IVPB 500 milliGRAM(s) IV Intermittent every 24 hours  budesonide 160 MICROgram(s)/formoterol 4.5 MICROgram(s) Inhaler 2 Puff(s) Inhalation two times a day  calcium carbonate 1250 mG  + Vitamin D (OsCal 500 + D) 1 Tablet(s) Oral daily  cefTRIAXone   IVPB 1000 milliGRAM(s) IV Intermittent every 24 hours  dextrose 40% Gel 15 Gram(s) Oral once  dextrose 5%. 1000 milliLiter(s) (50 mL/Hr) IV Continuous <Continuous>  dextrose 5%. 1000 milliLiter(s) (100 mL/Hr) IV Continuous <Continuous>  dextrose 50% Injectable 25 Gram(s) IV Push once  dextrose 50% Injectable 12.5 Gram(s) IV Push once  dextrose 50% Injectable 25 Gram(s) IV Push once  glucagon  Injectable 1 milliGRAM(s) IntraMuscular once  heparin   Injectable 5000 Unit(s) SubCutaneous every 8 hours  insulin lispro (ADMELOG) corrective regimen sliding scale   SubCutaneous three times a day before meals  insulin lispro (ADMELOG) corrective regimen sliding scale   SubCutaneous at bedtime  levothyroxine 112 MICROGram(s) Oral daily  montelukast 10 milliGRAM(s) Oral daily  predniSONE   Tablet 40 milliGRAM(s) Oral daily  tiotropium 18 MICROgram(s) Capsule 1 Capsule(s) Inhalation daily    MEDICATIONS  (PRN):  melatonin 3 milliGRAM(s) Oral at bedtime PRN Insomnia      Vital Signs Last 24 Hrs  T(C): 36.5 (19 Jun 2021 05:30), Max: 36.9 (18 Jun 2021 21:20)  T(F): 97.7 (19 Jun 2021 05:30), Max: 98.4 (18 Jun 2021 21:20)  HR: 70 (19 Jun 2021 10:13) (62 - 86)  BP: 136/72 (19 Jun 2021 05:30) (102/68 - 136/72)  BP(mean): --  RR: 18 (19 Jun 2021 10:12) (18 - 18)  SpO2: 96% (19 Jun 2021 10:13) (94% - 98%)  CAPILLARY BLOOD GLUCOSE      POCT Blood Glucose.: 157 mg/dL (19 Jun 2021 08:26)  POCT Blood Glucose.: 214 mg/dL (18 Jun 2021 22:16)  POCT Blood Glucose.: 141 mg/dL (18 Jun 2021 17:07)  POCT Blood Glucose.: 274 mg/dL (18 Jun 2021 12:14)    I&O's Summary    PHYSICAL EXAM:  GENERAL: NAD, well-developed  HEAD:  Atraumatic, Normocephalic  EYES: EOMI, PERRLA, conjunctiva and sclera clear  CHEST/LUNG: Diminished breath sounds b/l bases ; No wheeze  HEART: Regular rate and rhythm;   ABDOMEN: Soft, Nontender, Nondistended; Bowel sounds present  EXTREMITIES:  2+ Peripheral Pulses,  LLE >RLE, slightly more erythematous , no calf tenderness   PSYCH: AAOx3  NEUROLOGY: non-focal  SKIN: L anterior-lateral thigh dry and crusted vesicles with scabbing (pt reports she recently had shingles        LABS:                        11.3   5.81  )-----------( 208      ( 18 Jun 2021 07:53 )             34.1     06-18    137  |  102  |  41<H>  ----------------------------<  128<H>  4.1   |  24  |  1.46<H>    Ca    8.6      18 Jun 2021 07:53                RADIOLOGY & ADDITIONAL TESTS:    Imaging Personally Reviewed:    Consultant(s) Notes Reviewed:      Care Discussed with Consultants/Other Providers:

## 2021-06-19 NOTE — PROGRESS NOTE ADULT - PROBLEM SELECTOR PLAN 10
ZEKE discussed with patient   pt lives alone, doing her ADLS & IADLs herself including Grocery , cooking and driving   Next of Kin  is her brother and nephew   does not have HCP   Code status discussed , pt says she would like to be resuscitated and intubated if she if is ok , but does not want to become vegetative,  so if her condition is irreversible would like to have the plug removed   Pt full Code at this time  Encouraged pt to appoint HCP who is aware of her wishes

## 2021-06-19 NOTE — PROGRESS NOTE ADULT - ASSESSMENT
85 y/o female w/ pmhx significant for COPD (non-compliant on 3L NC at home), CKD, T2DM, HTN, Hypothyroid and cataracts s/p removal (2013), presents to the [facility] ED for a cough and SOB x10 days. Patient recently seen by PMD and prescribed ABX, but was non-compliant and had worsening SOB over last day, admitted for COPD exacerbation and UTI.

## 2021-06-19 NOTE — PROGRESS NOTE ADULT - PROBLEM SELECTOR PLAN 9
- DVT ppx: SQ Heparin 5000 units Q8hr, encourage early ambulation as tolerated  - Fall risk: ambulates w/ walker at baseline  - GI ppx: Consider Protonix - DVT ppx: SQ Heparin 5000 units Q8hr, encourage early ambulation as tolerated  - Fall risk: ambulates w/ walker at baseline  - GI ppx: Consider Protonix  -PT eval

## 2021-06-19 NOTE — CHART NOTE - NSCHARTNOTEFT_GEN_A_CORE
Patient CBC clotted today and BMP came back with high glucose and K 5.6 - possible lab error? K was 4.1 yesterday and creatinine improved today.   Repeat labs sent, CBC stable. Pending results of repeat BMP (in lab) to confirm if K actually high prior to treatment.  Patient currently stable, will continue to monitor closely.  Case and plan discussed with Dr. Lopez. Patient CBC clotted today and BMP came back with high glucose and K 5.6 - possible lab error? K was 4.1 yesterday and creatinine improved today.   Repeat labs sent, CBC stable. Pending results of repeat BMP (in lab) to confirm if K actually high prior to treatment.  Patient currently stable, will continue to monitor closely.  Case and plan discussed with Dr. Lopez.    Addendum:  Repeat K still elevated at 5.5 from 5.6. Not hemolyzed.  Will treat with lokelma 10g PO x 1.   EKG ordered.  Will repeat BMP overnight to ensure correction.  Will sign out as above to night team.

## 2021-06-19 NOTE — PROGRESS NOTE ADULT - PROBLEM SELECTOR PLAN 2
continue IV Ceftriaxone 1g Q24h  -F/U Urine Cx continue IV Ceftriaxone 1g Q24h  -Urine Cx, with >3 organisms, will not repeat as pt already on Abx

## 2021-06-20 LAB
ANION GAP SERPL CALC-SCNC: 10 MMOL/L — SIGNIFICANT CHANGE UP (ref 7–14)
BUN SERPL-MCNC: 46 MG/DL — HIGH (ref 7–23)
CALCIUM SERPL-MCNC: 9.1 MG/DL — SIGNIFICANT CHANGE UP (ref 8.4–10.5)
CHLORIDE SERPL-SCNC: 101 MMOL/L — SIGNIFICANT CHANGE UP (ref 98–107)
CO2 SERPL-SCNC: 27 MMOL/L — SIGNIFICANT CHANGE UP (ref 22–31)
CREAT SERPL-MCNC: 1.45 MG/DL — HIGH (ref 0.5–1.3)
GLUCOSE BLDC GLUCOMTR-MCNC: 144 MG/DL — HIGH (ref 70–99)
GLUCOSE BLDC GLUCOMTR-MCNC: 152 MG/DL — HIGH (ref 70–99)
GLUCOSE BLDC GLUCOMTR-MCNC: 221 MG/DL — HIGH (ref 70–99)
GLUCOSE BLDC GLUCOMTR-MCNC: 321 MG/DL — HIGH (ref 70–99)
GLUCOSE SERPL-MCNC: 122 MG/DL — HIGH (ref 70–99)
HCT VFR BLD CALC: 37.7 % — SIGNIFICANT CHANGE UP (ref 34.5–45)
HGB BLD-MCNC: 12.3 G/DL — SIGNIFICANT CHANGE UP (ref 11.5–15.5)
MAGNESIUM SERPL-MCNC: 2 MG/DL — SIGNIFICANT CHANGE UP (ref 1.6–2.6)
MCHC RBC-ENTMCNC: 31.4 PG — SIGNIFICANT CHANGE UP (ref 27–34)
MCHC RBC-ENTMCNC: 32.6 GM/DL — SIGNIFICANT CHANGE UP (ref 32–36)
MCV RBC AUTO: 96.2 FL — SIGNIFICANT CHANGE UP (ref 80–100)
NRBC # BLD: 0 /100 WBCS — SIGNIFICANT CHANGE UP
NRBC # FLD: 0 K/UL — SIGNIFICANT CHANGE UP
PHOSPHATE SERPL-MCNC: 2.8 MG/DL — SIGNIFICANT CHANGE UP (ref 2.5–4.5)
PLATELET # BLD AUTO: 230 K/UL — SIGNIFICANT CHANGE UP (ref 150–400)
POTASSIUM SERPL-MCNC: 4.6 MMOL/L — SIGNIFICANT CHANGE UP (ref 3.5–5.3)
POTASSIUM SERPL-SCNC: 4.6 MMOL/L — SIGNIFICANT CHANGE UP (ref 3.5–5.3)
RBC # BLD: 3.92 M/UL — SIGNIFICANT CHANGE UP (ref 3.8–5.2)
RBC # FLD: 12.4 % — SIGNIFICANT CHANGE UP (ref 10.3–14.5)
SODIUM SERPL-SCNC: 138 MMOL/L — SIGNIFICANT CHANGE UP (ref 135–145)
WBC # BLD: 7.43 K/UL — SIGNIFICANT CHANGE UP (ref 3.8–10.5)
WBC # FLD AUTO: 7.43 K/UL — SIGNIFICANT CHANGE UP (ref 3.8–10.5)

## 2021-06-20 PROCEDURE — 99232 SBSQ HOSP IP/OBS MODERATE 35: CPT

## 2021-06-20 RX ORDER — GABAPENTIN 400 MG/1
100 CAPSULE ORAL THREE TIMES A DAY
Refills: 0 | Status: DISCONTINUED | OUTPATIENT
Start: 2021-06-20 | End: 2021-06-24

## 2021-06-20 RX ADMIN — Medication 3 MILLILITER(S): at 04:58

## 2021-06-20 RX ADMIN — Medication 1 TABLET(S): at 12:37

## 2021-06-20 RX ADMIN — HEPARIN SODIUM 5000 UNIT(S): 5000 INJECTION INTRAVENOUS; SUBCUTANEOUS at 06:01

## 2021-06-20 RX ADMIN — Medication 500 MILLIGRAM(S): at 12:37

## 2021-06-20 RX ADMIN — Medication 3 MILLILITER(S): at 15:42

## 2021-06-20 RX ADMIN — Medication 4: at 17:39

## 2021-06-20 RX ADMIN — HEPARIN SODIUM 5000 UNIT(S): 5000 INJECTION INTRAVENOUS; SUBCUTANEOUS at 21:55

## 2021-06-20 RX ADMIN — GABAPENTIN 100 MILLIGRAM(S): 400 CAPSULE ORAL at 21:55

## 2021-06-20 RX ADMIN — GABAPENTIN 100 MILLIGRAM(S): 400 CAPSULE ORAL at 17:38

## 2021-06-20 RX ADMIN — HEPARIN SODIUM 5000 UNIT(S): 5000 INJECTION INTRAVENOUS; SUBCUTANEOUS at 12:38

## 2021-06-20 RX ADMIN — Medication 3 MILLILITER(S): at 10:09

## 2021-06-20 RX ADMIN — AZITHROMYCIN 255 MILLIGRAM(S): 500 TABLET, FILM COATED ORAL at 21:55

## 2021-06-20 RX ADMIN — Medication 2: at 08:39

## 2021-06-20 RX ADMIN — Medication 40 MILLIGRAM(S): at 06:01

## 2021-06-20 RX ADMIN — NYSTATIN CREAM 1 APPLICATION(S): 100000 CREAM TOPICAL at 17:39

## 2021-06-20 RX ADMIN — Medication 112 MICROGRAM(S): at 06:02

## 2021-06-20 RX ADMIN — Medication 3 MILLILITER(S): at 22:56

## 2021-06-20 RX ADMIN — CEFTRIAXONE 100 MILLIGRAM(S): 500 INJECTION, POWDER, FOR SOLUTION INTRAMUSCULAR; INTRAVENOUS at 06:02

## 2021-06-20 RX ADMIN — Medication 8: at 12:33

## 2021-06-20 RX ADMIN — NYSTATIN CREAM 1 APPLICATION(S): 100000 CREAM TOPICAL at 06:23

## 2021-06-20 RX ADMIN — MONTELUKAST 10 MILLIGRAM(S): 4 TABLET, CHEWABLE ORAL at 12:37

## 2021-06-20 RX ADMIN — Medication 650 MILLIGRAM(S): at 00:06

## 2021-06-20 NOTE — PROGRESS NOTE ADULT - PROBLEM SELECTOR PLAN 4
- Hold oral hypoglycemics while inpatient  -Monitor blood glucose per routine  - Start on Insulin Sliding Scale  - Monitor carbohydrate intake  -HbA1c 6.8   -  on proper Diet and Exercise

## 2021-06-20 NOTE — PROGRESS NOTE ADULT - SUBJECTIVE AND OBJECTIVE BOX
Patient is a 86y old  Female who presents with a chief complaint of SOB and Cough (19 Jun 2021 10:46)      SUBJECTIVE / OVERNIGHT EVENTS:    MEDICATIONS  (STANDING):  albuterol/ipratropium for Nebulization 3 milliLiter(s) Nebulizer every 6 hours  ascorbic acid 500 milliGRAM(s) Oral daily  azithromycin  IVPB 500 milliGRAM(s) IV Intermittent every 24 hours  budesonide 160 MICROgram(s)/formoterol 4.5 MICROgram(s) Inhaler 2 Puff(s) Inhalation two times a day  calcium carbonate 1250 mG  + Vitamin D (OsCal 500 + D) 1 Tablet(s) Oral daily  dextrose 40% Gel 15 Gram(s) Oral once  dextrose 5%. 1000 milliLiter(s) (50 mL/Hr) IV Continuous <Continuous>  dextrose 5%. 1000 milliLiter(s) (100 mL/Hr) IV Continuous <Continuous>  dextrose 50% Injectable 25 Gram(s) IV Push once  dextrose 50% Injectable 25 Gram(s) IV Push once  dextrose 50% Injectable 12.5 Gram(s) IV Push once  glucagon  Injectable 1 milliGRAM(s) IntraMuscular once  heparin   Injectable 5000 Unit(s) SubCutaneous every 8 hours  insulin lispro (ADMELOG) corrective regimen sliding scale   SubCutaneous three times a day before meals  insulin lispro (ADMELOG) corrective regimen sliding scale   SubCutaneous at bedtime  levothyroxine 112 MICROGram(s) Oral daily  montelukast 10 milliGRAM(s) Oral daily  nystatin Powder 1 Application(s) Topical two times a day  predniSONE   Tablet 40 milliGRAM(s) Oral daily  tiotropium 18 MICROgram(s) Capsule 1 Capsule(s) Inhalation daily    MEDICATIONS  (PRN):  melatonin 3 milliGRAM(s) Oral at bedtime PRN Insomnia      Vital Signs Last 24 Hrs  T(C): 36.8 (20 Jun 2021 06:00), Max: 37.1 (19 Jun 2021 15:24)  T(F): 98.2 (20 Jun 2021 06:00), Max: 98.8 (19 Jun 2021 15:24)  HR: 86 (20 Jun 2021 06:00) (67 - 95)  BP: 148/78 (20 Jun 2021 06:00) (138/72 - 148/78)  BP(mean): --  RR: 18 (20 Jun 2021 06:00) (18 - 18)  SpO2: 97% (20 Jun 2021 06:00) (95% - 97%)  CAPILLARY BLOOD GLUCOSE      POCT Blood Glucose.: 152 mg/dL (20 Jun 2021 08:23)  POCT Blood Glucose.: 176 mg/dL (19 Jun 2021 22:25)  POCT Blood Glucose.: 229 mg/dL (19 Jun 2021 17:20)  POCT Blood Glucose.: 364 mg/dL (19 Jun 2021 12:05)    I&O's Summary      PHYSICAL EXAM:  GENERAL: NAD, well-developed  HEAD:  Atraumatic, Normocephalic  EYES: EOMI, PERRLA, conjunctiva and sclera clear  NECK: Supple, No JVD  CHEST/LUNG: Clear to auscultation bilaterally; No wheeze  HEART: Regular rate and rhythm; No murmurs, rubs, or gallops  ABDOMEN: Soft, Nontender, Nondistended; Bowel sounds present  EXTREMITIES:  2+ Peripheral Pulses, No clubbing, cyanosis, or edema  PSYCH: AAOx3  NEUROLOGY: non-focal  SKIN: No rashes or lesions    LABS:                        12.3   7.43  )-----------( 230      ( 20 Jun 2021 07:03 )             37.7     06-20    138  |  101  |  46<H>  ----------------------------<  122<H>  4.6   |  27  |  1.45<H>    Ca    9.1      20 Jun 2021 07:03  Phos  2.8     06-20  Mg     2.0     06-20                RADIOLOGY & ADDITIONAL TESTS:    Imaging Personally Reviewed:    Consultant(s) Notes Reviewed:      Care Discussed with Consultants/Other Providers:   Patient is a 86y old  Female who presents with a chief complaint of SOB and Cough (19 Jun 2021 10:46)      SUBJECTIVE / OVERNIGHT EVENTS: patient seen and examined by bedside, SOB improved, still has the cough, afebrile, denies headache, dizziness, , CP, Palpitations , N/V/D, abdominal pain  pt reports urinary incontinence whish has been chronic   pt also c/o intermittent shooting pain in LLE after shingles         MEDICATIONS  (STANDING):  albuterol/ipratropium for Nebulization 3 milliLiter(s) Nebulizer every 6 hours  ascorbic acid 500 milliGRAM(s) Oral daily  azithromycin  IVPB 500 milliGRAM(s) IV Intermittent every 24 hours  budesonide 160 MICROgram(s)/formoterol 4.5 MICROgram(s) Inhaler 2 Puff(s) Inhalation two times a day  calcium carbonate 1250 mG  + Vitamin D (OsCal 500 + D) 1 Tablet(s) Oral daily  dextrose 40% Gel 15 Gram(s) Oral once  dextrose 5%. 1000 milliLiter(s) (50 mL/Hr) IV Continuous <Continuous>  dextrose 5%. 1000 milliLiter(s) (100 mL/Hr) IV Continuous <Continuous>  dextrose 50% Injectable 25 Gram(s) IV Push once  dextrose 50% Injectable 25 Gram(s) IV Push once  dextrose 50% Injectable 12.5 Gram(s) IV Push once  glucagon  Injectable 1 milliGRAM(s) IntraMuscular once  heparin   Injectable 5000 Unit(s) SubCutaneous every 8 hours  insulin lispro (ADMELOG) corrective regimen sliding scale   SubCutaneous three times a day before meals  insulin lispro (ADMELOG) corrective regimen sliding scale   SubCutaneous at bedtime  levothyroxine 112 MICROGram(s) Oral daily  montelukast 10 milliGRAM(s) Oral daily  nystatin Powder 1 Application(s) Topical two times a day  predniSONE   Tablet 40 milliGRAM(s) Oral daily  tiotropium 18 MICROgram(s) Capsule 1 Capsule(s) Inhalation daily    MEDICATIONS  (PRN):  melatonin 3 milliGRAM(s) Oral at bedtime PRN Insomnia      Vital Signs Last 24 Hrs  T(C): 36.8 (20 Jun 2021 06:00), Max: 37.1 (19 Jun 2021 15:24)  T(F): 98.2 (20 Jun 2021 06:00), Max: 98.8 (19 Jun 2021 15:24)  HR: 86 (20 Jun 2021 06:00) (67 - 95)  BP: 148/78 (20 Jun 2021 06:00) (138/72 - 148/78)  BP(mean): --  RR: 18 (20 Jun 2021 06:00) (18 - 18)  SpO2: 97% (20 Jun 2021 06:00) (95% - 97%)  CAPILLARY BLOOD GLUCOSE      POCT Blood Glucose.: 152 mg/dL (20 Jun 2021 08:23)  POCT Blood Glucose.: 176 mg/dL (19 Jun 2021 22:25)  POCT Blood Glucose.: 229 mg/dL (19 Jun 2021 17:20)  POCT Blood Glucose.: 364 mg/dL (19 Jun 2021 12:05)    I&O's Summary      PHYSICAL EXAM:  GENERAL: NAD, well-developed  HEAD:  Atraumatic, Normocephalic  EYES: EOMI, PERRLA, conjunctiva and sclera clear  CHEST/LUNG: Diminished breath sounds b/l bases ; No wheeze  HEART: Regular rate and rhythm;   ABDOMEN: Soft, Nontender, Nondistended; Bowel sounds present  EXTREMITIES:  2+ Peripheral Pulses,  LLE >RLE, slightly more erythematous , no calf tenderness   PSYCH: AAOx3  NEUROLOGY: non-focal  SKIN: L anterior-lateral thigh dry and crusted vesicles with scabbing (pt reports she recently had shingles        LABS:                        12.3   7.43  )-----------( 230      ( 20 Jun 2021 07:03 )             37.7     06-20    138  |  101  |  46<H>  ----------------------------<  122<H>  4.6   |  27  |  1.45<H>    Ca    9.1      20 Jun 2021 07:03  Phos  2.8     06-20  Mg     2.0     06-20                RADIOLOGY & ADDITIONAL TESTS:    Imaging Personally Reviewed:    Consultant(s) Notes Reviewed:      Care Discussed with Consultants/Other Providers:

## 2021-06-20 NOTE — PROGRESS NOTE ADULT - ASSESSMENT
87 y/o female w/ pmhx significant for COPD (non-compliant on 3L NC at home), CKD, T2DM, HTN, Hypothyroid and cataracts s/p removal (2013), presents to the [facility] ED for a cough and SOB x10 days. Patient recently seen by PMD and prescribed ABX, but was non-compliant and had worsening SOB over last day, admitted for COPD exacerbation and UTI.

## 2021-06-20 NOTE — PROGRESS NOTE ADULT - PROBLEM SELECTOR PLAN 9
- DVT ppx: SQ Heparin 5000 units Q8hr, encourage early ambulation as tolerated  - Fall risk: ambulates w/ walker at baseline  - GI ppx: Consider Protonix  -PT eval - DVT ppx: SQ Heparin 5000 units Q8hr, encourage early ambulation as tolerated  - Fall risk: ambulates w/ walker at baseline  - GI ppx: Consider Protonix  -PT eval; no skilled PT needs

## 2021-06-20 NOTE — PROGRESS NOTE ADULT - PROBLEM SELECTOR PLAN 1
Continuous SpO2 monitoring  -DuoNebs Q6h  - Patient with 10 days of SOB and cough with worsening SOB over last day. Admitted to medicine for infectious workup. R/o pneumonia. Pt s/p IV Azithromycin 500mg and IV SoluMedrol by ED.  - Continue IV Azithromycin 500mg Q24h and  start Prednisone 40mg QD   - Continue at home medications of Inhaled Spiriva 18mcg QD, Inhaled Symbicort 160/4.5mcgs 2puffs BID, PO   - Monitor vitals, continue 3L NC, ween down as tolerated -triggered by parainfluenza infection   - Patient with 10 days of SOB and cough with worsening SOB PTA . Admitted to medicine for infectious workup. R/o pneumonia. Pt s/p IV Azithromycin 500mg and IV SoluMedrol by ED.  - Continue IV Azithromycin 500mg Q24h and   Prednisone 40mg QD   - Continue at home medications of Inhaled Spiriva 18mcg QD, Inhaled Symbicort 160/4.5mcgs 2puffs BID, PO   -Continuous SpO2 monitoring  -Marisel Q6h  - Monitor vitals, continue 3L NC, ween down as tolerated

## 2021-06-20 NOTE — PHYSICAL THERAPY INITIAL EVALUATION ADULT - PERTINENT HX OF CURRENT PROBLEM, REHAB EVAL
Patient is 86 year old female admitted history of COPD (non-compliant on 3 liters oxygen at home), CKD, T2DM, HTN, Hypothyroid and cataracts s/p removal (2013), presents with cough and SOB x10 days.

## 2021-06-20 NOTE — PHYSICAL THERAPY INITIAL EVALUATION ADULT - PLANNED THERAPY INTERVENTIONS, PT EVAL
RN ROUNDS:

PATIENT IS LAYING IN BED ASLEEP. NO SIGNS OF DISTRESS OR SHORTNESS OF BREATH NOTED. IV SITE 
IS PATENT WITH NO SIGNS OF INFILTRATION. SAFETY AND FALL PRECAUTIONS ARE IN PLACE.  BED 
LOCKED IN LOWEST POSITION WITH CALL LIGHT IN REACH. WILL CONTINUE TO MONITOR PATIENT FOR ANY 
CHANGES. balance training/gait training/strengthening

## 2021-06-21 ENCOUNTER — TRANSCRIPTION ENCOUNTER (OUTPATIENT)
Age: 86
End: 2021-06-21

## 2021-06-21 PROBLEM — B02.9 ZOSTER WITHOUT COMPLICATIONS: Chronic | Status: ACTIVE | Noted: 2021-06-17

## 2021-06-21 LAB
ANION GAP SERPL CALC-SCNC: 11 MMOL/L — SIGNIFICANT CHANGE UP (ref 7–14)
BUN SERPL-MCNC: 46 MG/DL — HIGH (ref 7–23)
CALCIUM SERPL-MCNC: 9.8 MG/DL — SIGNIFICANT CHANGE UP (ref 8.4–10.5)
CHLORIDE SERPL-SCNC: 101 MMOL/L — SIGNIFICANT CHANGE UP (ref 98–107)
CO2 SERPL-SCNC: 26 MMOL/L — SIGNIFICANT CHANGE UP (ref 22–31)
CREAT SERPL-MCNC: 1.43 MG/DL — HIGH (ref 0.5–1.3)
GLUCOSE BLDC GLUCOMTR-MCNC: 134 MG/DL — HIGH (ref 70–99)
GLUCOSE BLDC GLUCOMTR-MCNC: 277 MG/DL — HIGH (ref 70–99)
GLUCOSE BLDC GLUCOMTR-MCNC: 390 MG/DL — HIGH (ref 70–99)
GLUCOSE SERPL-MCNC: 122 MG/DL — HIGH (ref 70–99)
HCT VFR BLD CALC: 42.7 % — SIGNIFICANT CHANGE UP (ref 34.5–45)
HGB BLD-MCNC: 13.9 G/DL — SIGNIFICANT CHANGE UP (ref 11.5–15.5)
MAGNESIUM SERPL-MCNC: 2.2 MG/DL — SIGNIFICANT CHANGE UP (ref 1.6–2.6)
MCHC RBC-ENTMCNC: 31.9 PG — SIGNIFICANT CHANGE UP (ref 27–34)
MCHC RBC-ENTMCNC: 32.6 GM/DL — SIGNIFICANT CHANGE UP (ref 32–36)
MCV RBC AUTO: 97.9 FL — SIGNIFICANT CHANGE UP (ref 80–100)
NRBC # BLD: 0 /100 WBCS — SIGNIFICANT CHANGE UP
NRBC # FLD: 0 K/UL — SIGNIFICANT CHANGE UP
PHOSPHATE SERPL-MCNC: 3 MG/DL — SIGNIFICANT CHANGE UP (ref 2.5–4.5)
PLATELET # BLD AUTO: 219 K/UL — SIGNIFICANT CHANGE UP (ref 150–400)
POTASSIUM SERPL-MCNC: 5.4 MMOL/L — HIGH (ref 3.5–5.3)
POTASSIUM SERPL-SCNC: 5.4 MMOL/L — HIGH (ref 3.5–5.3)
RBC # BLD: 4.36 M/UL — SIGNIFICANT CHANGE UP (ref 3.8–5.2)
RBC # FLD: 12.3 % — SIGNIFICANT CHANGE UP (ref 10.3–14.5)
SODIUM SERPL-SCNC: 138 MMOL/L — SIGNIFICANT CHANGE UP (ref 135–145)
WBC # BLD: 7.01 K/UL — SIGNIFICANT CHANGE UP (ref 3.8–10.5)
WBC # FLD AUTO: 7.01 K/UL — SIGNIFICANT CHANGE UP (ref 3.8–10.5)

## 2021-06-21 PROCEDURE — 99231 SBSQ HOSP IP/OBS SF/LOW 25: CPT

## 2021-06-21 RX ADMIN — Medication 3 MILLILITER(S): at 10:20

## 2021-06-21 RX ADMIN — NYSTATIN CREAM 1 APPLICATION(S): 100000 CREAM TOPICAL at 06:24

## 2021-06-21 RX ADMIN — Medication 500 MILLIGRAM(S): at 12:41

## 2021-06-21 RX ADMIN — GABAPENTIN 100 MILLIGRAM(S): 400 CAPSULE ORAL at 12:44

## 2021-06-21 RX ADMIN — HEPARIN SODIUM 5000 UNIT(S): 5000 INJECTION INTRAVENOUS; SUBCUTANEOUS at 21:28

## 2021-06-21 RX ADMIN — MONTELUKAST 10 MILLIGRAM(S): 4 TABLET, CHEWABLE ORAL at 12:41

## 2021-06-21 RX ADMIN — GABAPENTIN 100 MILLIGRAM(S): 400 CAPSULE ORAL at 21:27

## 2021-06-21 RX ADMIN — NYSTATIN CREAM 1 APPLICATION(S): 100000 CREAM TOPICAL at 17:25

## 2021-06-21 RX ADMIN — HEPARIN SODIUM 5000 UNIT(S): 5000 INJECTION INTRAVENOUS; SUBCUTANEOUS at 06:23

## 2021-06-21 RX ADMIN — Medication 10: at 12:44

## 2021-06-21 RX ADMIN — Medication 112 MICROGRAM(S): at 06:23

## 2021-06-21 RX ADMIN — Medication 1 TABLET(S): at 12:43

## 2021-06-21 RX ADMIN — GABAPENTIN 100 MILLIGRAM(S): 400 CAPSULE ORAL at 06:23

## 2021-06-21 RX ADMIN — Medication 3 MILLILITER(S): at 04:20

## 2021-06-21 RX ADMIN — Medication 3 MILLILITER(S): at 21:30

## 2021-06-21 RX ADMIN — HEPARIN SODIUM 5000 UNIT(S): 5000 INJECTION INTRAVENOUS; SUBCUTANEOUS at 12:44

## 2021-06-21 RX ADMIN — Medication 3 MILLILITER(S): at 16:28

## 2021-06-21 RX ADMIN — Medication 40 MILLIGRAM(S): at 06:23

## 2021-06-21 RX ADMIN — Medication 6: at 17:25

## 2021-06-21 NOTE — PROGRESS NOTE ADULT - SUBJECTIVE AND OBJECTIVE BOX
Patient is a 86y old  Female who presents with a chief complaint of SOB and Cough (20 Jun 2021 10:07)    SUBJECTIVE / OVERNIGHT EVENTS:  Patient at baseline O2 requirements and able to get out of bed. However, pt still feels weak and does not feel ready to go home. She has non-productive cough, but no chest pain or SOB. Pt requesting chocolate Ensure. Pt admits she needs more help at home to adequate meet her ADL needs. Pt does not want to live with her brother, who resides in [facility] and would not want to be in a nursing facility at this time. She is agreeable to home services if she qualifies. Pt without fever, chills, n/v or abdominal pain. Pt with BM yesterday and normal urinary habits.     MEDICATIONS  (STANDING):  albuterol/ipratropium for Nebulization 3 milliLiter(s) Nebulizer every 6 hours  ascorbic acid 500 milliGRAM(s) Oral daily  budesonide 160 MICROgram(s)/formoterol 4.5 MICROgram(s) Inhaler 2 Puff(s) Inhalation two times a day  calcium carbonate 1250 mG  + Vitamin D (OsCal 500 + D) 1 Tablet(s) Oral daily  dextrose 40% Gel 15 Gram(s) Oral once  dextrose 5%. 1000 milliLiter(s) (50 mL/Hr) IV Continuous <Continuous>  dextrose 5%. 1000 milliLiter(s) (100 mL/Hr) IV Continuous <Continuous>  dextrose 50% Injectable 25 Gram(s) IV Push once  dextrose 50% Injectable 12.5 Gram(s) IV Push once  dextrose 50% Injectable 25 Gram(s) IV Push once  gabapentin 100 milliGRAM(s) Oral three times a day  glucagon  Injectable 1 milliGRAM(s) IntraMuscular once  heparin   Injectable 5000 Unit(s) SubCutaneous every 8 hours  insulin lispro (ADMELOG) corrective regimen sliding scale   SubCutaneous three times a day before meals  insulin lispro (ADMELOG) corrective regimen sliding scale   SubCutaneous at bedtime  levothyroxine 112 MICROGram(s) Oral daily  montelukast 10 milliGRAM(s) Oral daily  nystatin Powder 1 Application(s) Topical two times a day  predniSONE   Tablet 40 milliGRAM(s) Oral daily  tiotropium 18 MICROgram(s) Capsule 1 Capsule(s) Inhalation daily    MEDICATIONS  (PRN):  melatonin 3 milliGRAM(s) Oral at bedtime PRN Insomnia      Vital Signs Last 24 Hrs  T(C): 36.8 (21 Jun 2021 06:28), Max: 36.8 (21 Jun 2021 06:28)  T(F): 98.2 (21 Jun 2021 06:28), Max: 98.2 (21 Jun 2021 06:28)  HR: 77 (21 Jun 2021 10:40) (70 - 85)  BP: 132/78 (21 Jun 2021 06:28) (132/78 - 140/72)  RR: 18 (21 Jun 2021 06:28) (18 - 18)  SpO2: 91% (21 Jun 2021 10:40) (91% - 97%)      CAPILLARY BLOOD GLUCOSE  POCT Blood Glucose.: 390 mg/dL (21 Jun 2021 12:27)  POCT Blood Glucose.: 134 mg/dL (21 Jun 2021 08:22)  POCT Blood Glucose.: 144 mg/dL (20 Jun 2021 22:22)  POCT Blood Glucose.: 221 mg/dL (20 Jun 2021 17:20)        PHYSICAL EXAM  GENERAL: NAD, non-toxic appearing, speaking in full sentences on ~3L O2 via NC  CHEST/LUNG: Clear to auscultation bilaterally; No wheeze  HEART: Regular rate and rhythm; No murmurs, rubs, or gallops  ABDOMEN: Soft, Nontender, protuberant; Bowel sounds present  EXTREMITIES:  2+ Peripheral Pulses, No clubbing, cyanosis, or edema  PSYCH: AAOx3  SKIN: Mild erythema on left lower leg, healed skin graft site on distal left shin. Dry, scaly spots with hyperpigmentation on legs b/l        LABS:                        13.9   7.01  )-----------( 219      ( 21 Jun 2021 07:19 )             42.7     06-21    138  |  101  |  46<H>  ----------------------------<  122<H>  5.4<H>   |  26  |  1.43<H>    Ca    9.8      21 Jun 2021 07:19  Phos  3.0     06-21  Mg     2.2     06-21

## 2021-06-21 NOTE — PROGRESS NOTE ADULT - PROBLEM SELECTOR PLAN 2
Urine Cx, with >3 organisms. Pt completed empiric course of ceftriaxone. Pt with baseline urinary incontinence.

## 2021-06-21 NOTE — PROGRESS NOTE ADULT - PROBLEM SELECTOR PLAN 10
GOTOYA discussed with patient   pt lives alone, doing her ADLS & IADLs herself including Grocery , cooking and driving   Next of Kin  is her brother and nephew   does not have HCP   Code status discussed , pt says she would like to be resuscitated and intubated if she if is ok , but does not want to become vegetative,  so if her condition is irreversible would like to have the plug removed   Pt full Code at this time  Encouraged pt to appoint HCP who is aware of her wishes      DISPO: Anticipate likely d/c home tomorrow with home care referral if pt remains clinically stable.

## 2021-06-21 NOTE — PHARMACOTHERAPY INTERVENTION NOTE - COMMENTS
Patient counseled on relevant medication indications, dosing, administration, side effects, and monitoring.  Also reviewed importance of follow-up with outpatient care providers, medication adherence, and self-care.  Patient demonstrated understanding.    Zan Mejia, PharmD  Clinical Pharmacist- Internal Medicine  Spectra [phone]

## 2021-06-21 NOTE — DISCHARGE NOTE PROVIDER - NSDCMRMEDTOKEN_GEN_ALL_CORE_FT
acetaminophen 325 mg oral tablet: 2 tab(s) orally every 8 hours as needed for Mild pain  ascorbic acid 500 mg oral tablet: 1 tab(s) orally once a day  calcium-vitamin D 500 mg-200 intl units oral tablet: 1 tab(s) orally once a day  ipratropium-albuterol 0.5 mg-2.5 mg/3 mLinhalation solution: 3 milliliter(s) inhaled every 6 hours, As needed, Shortness of Breath  levothyroxine 112 mcg (0.112 mg) oral tablet: 1 tab(s) orally once a day  melatonin 3 mg oral tablet: 1 tab(s) orally once a day (at bedtime), As needed, Insomnia  metFORMIN 500 mg oral tablet: 1 tab(s) orally 2 times a day  montelukast 10 mg oral tablet: 1 tab(s) orally once a day  Symbicort 160 mcg-4.5 mcg/inh inhalation aerosol: 2 puff(s) inhaled 2 times a day  tiotropium 18 mcg inhalation capsule: 1 cap(s) inhaled once a day   acetaminophen 325 mg oral tablet: 2 tab(s) orally every 8 hours as needed for Mild pain  ascorbic acid 500 mg oral tablet: 1 tab(s) orally once a day  calcium-vitamin D 500 mg-200 intl units oral tablet: 1 tab(s) orally once a day  gabapentin 100 mg oral capsule: 1 cap(s) orally 3 times a day  ipratropium-albuterol 0.5 mg-2.5 mg/3 mLinhalation solution: 3 milliliter(s) inhaled every 6 hours, As needed, Shortness of Breath  levothyroxine 112 mcg (0.112 mg) oral tablet: 1 tab(s) orally once a day  melatonin 3 mg oral tablet: 1 tab(s) orally once a day (at bedtime), As needed, Insomnia  metFORMIN 500 mg oral tablet: 1 tab(s) orally 2 times a day  montelukast 10 mg oral tablet: 1 tab(s) orally once a day  Symbicort 160 mcg-4.5 mcg/inh inhalation aerosol: 2 puff(s) inhaled 2 times a day  tiotropium 18 mcg inhalation capsule: 1 cap(s) inhaled once a day

## 2021-06-21 NOTE — DISCHARGE NOTE PROVIDER - HOSPITAL COURSE
86 F PMHx COPD (non-compliant on 3L NC at home), CKD, T2DM (A1c 6.8), HTN, hypothyroid and cataracts S/P removal (2013), presents to the [facility] ED for a cough and SOB x10 days. Pt recently seen by PMD and prescribed ABX, but was non-compliant. Admitted for COPD exacerbation. COVID: neg. CXR: clear lungs. EKG:  NSR @ 77bpm TWI I, AVL, V4-V6 (unchanged from 2019). AZT for COPD exacerbation     UA positive. Started on CTX (completed 6/20). UCx contaminated     Hospital course c/b hyperkalemia. S/P Lokelma with improvement.      Ms. Welsh is an 87 y/o woman w/ pmhx significant for COPD c/b chronic hypoxic respiratory failure on 3L NC at home, CKD stage 3, T2DM, HTN, Hypothyroid and cataracts s/p removal (2013) admitted for COPD exacerbation, found to test positive for parainfluenza and possible UTI. However, pt wit contaminated urine cx. Pt clinically stable and awaiting placement to rehab facility    COPD exacerbation:  - Resolved. Triggered by parainfluenza infection. Pt s/p 5-day course of azithro 500mg and 6-day course of steroids.  - Continue Spiriva 18mcg QD, Inhaled Symbicort 160/4.5mcgs 2puffs BID, and montelukast  - PRN albuterol inhaler for SOB/wheezing  - guaifenesin/dextromethorphan PRN  - Monitor vitals, continue 3L NC (at baseline)  - outpatient follow up with pulmonary Dr. Brooke on 6/29 @ 11:30AM - follow up as scheduled    U.T.I:  - Urine Cx, with >3 organisms  - Pt completed empiric course of ceftriaxone. Pt with baseline urinary incontinence.   - outpatient follow up    Stage 3b chronic kidney disease:  - Stable. Renally dose medications, Avoid nephrotoxic medications.   - noted to be hyperkalemic during hospital stay which was resolved with lokelma x 1  - f/u repeat BMP within 1 week after discharge for continued monitoring     T2DM:  - Hold oral hypoglycemics while inpatient. Noted hyperglycemia while on steroids  - Monitor blood glucose per routine  - c/w Insulin Sliding Scale  - Monitor carbohydrate intake  - HbA1c 6.8   - Counseled on proper Diet and Exercise  - pt to resume metformin 500mg BID upon discharge    Hypothyroid:  - Continue at home Levothyroxine 112mcg  -  TSH wnl.   - outpatient follow up    HTN:  - BP at acceptable range. No need for antihypertensive meds  - DASH diet  - outpatient f/u.    Edema:  - Pt with reported LLE>RLE. However, on exam, no edema noted. Pt with erythema but appears stable and pt without tenderness. Pt s/p left hip replacement in 2020, had stopped PT due to COVID. Evaluated by PT - pt now candidate for rehab facility  - will continue to monitor LLE.     Poor compliance with medication:  - Pt reports poor compliance with medications at home. Reports needed help to care for self and home as well.  - case management is aware - home care referral initially made. Pt now planned to be discharged to rehab.  - Teach back medication method encouraged     On 6/24/21 this case was reviewed with Dr. Qureshi, the patient is medically stable and optimized for discharge. All medications discharge medications were reviewed and discharge plan discussed.

## 2021-06-21 NOTE — PROGRESS NOTE ADULT - PROBLEM SELECTOR PLAN 8
- Pt reports poor compliance with medications at home. Reports needed help to care for self and home as well.  - case management is aware - home care referral made  - Teach back medication method

## 2021-06-21 NOTE — DISCHARGE NOTE PROVIDER - NSDCCPCAREPLAN_GEN_ALL_CORE_FT
PRINCIPAL DISCHARGE DIAGNOSIS  Diagnosis: COPD (chronic obstructive pulmonary disease)  Assessment and Plan of Treatment: You were treated with steroids, antibiotics and inhalers with improvement. Continue with inhalers as recommended. Follow up outpatient pulmonologist in 1-2 weeks for further management.      SECONDARY DISCHARGE DIAGNOSES  Diagnosis: T2DM (type 2 diabetes mellitus)  Assessment and Plan of Treatment: Continue consistent carbohydrate diet.  Monitor blood glucose levels throughout the day before meals and at bedtime.  Record blood sugars and bring to outpatient providers appointment in order to be reviewed by your doctor for management modifications.  Be aware of diabetes management symptoms including feeling cool and clammy may be related to low glucose levels.  Feeling hot and dry may indicate high glucose levels. If you feel these symptoms, check your blood sugar.  Make regular podiatry appointments in order to have feet checked for wounds and toe nails cut by a doctor to prevent infections, as well as, appointments with an ophthalmologist to monitor your vision.      Diagnosis: Hypothyroid  Assessment and Plan of Treatment: Continue your thyroid medications as recommended and follow-up with your outpatient provider for continual thyroid function testing and further medication management.      Diagnosis: HTN (hypertension)  Assessment and Plan of Treatment: Continue blood pressure medication regimen as directed. Monitor for any visual changes, headaches or dizziness.  Monitor blood pressure regularly.  Follow up with your PCP for further management for high blood pressure.      Diagnosis: Stage 3b chronic kidney disease  Assessment and Plan of Treatment: In order to prevent further disease progression, continue to follow recommendations made by your primary provider/nephrologist. Continue a diet that is low in sodium and avoid foods that are concentrated in potassium and phosphorus. Continue your medications/supplementations as directed and avoid over-the-counter drugs that are harmful to kidneys, such as, Non-Steroidal Anti-Inflammatory Drugs (NSAIDs). Follow-up as outpatient to monitor your kidney function, as well as, vitamin D, Calcium, potassium, and phosphorus levels.       PRINCIPAL DISCHARGE DIAGNOSIS  Diagnosis: COPD (chronic obstructive pulmonary disease)  Assessment and Plan of Treatment: You were treated with steroids, antibiotics and inhalers with improvement. Continue with inhalers as recommended. Likely triggered by parainfluenza infection.  outpatient follow up with pulmonary Dr. Brooke on 6/29 @ 11:30AM - follow up as scheduled.      SECONDARY DISCHARGE DIAGNOSES  Diagnosis: Stage 3b chronic kidney disease  Assessment and Plan of Treatment: In order to prevent further disease progression, continue to follow recommendations made by your primary provider/nephrologist. Continue a diet that is low in sodium and avoid foods that are concentrated in potassium and phosphorus. Continue your medications/supplementations as directed and avoid over-the-counter drugs that are harmful to kidneys, such as, Non-Steroidal Anti-Inflammatory Drugs (NSAIDs). Follow-up as outpatient to monitor your kidney function, as well as, vitamin D, Calcium, potassium, and phosphorus levels. Please have basic metabolic panel checked within 1 week to monitor your potassium level.       Diagnosis: T2DM (type 2 diabetes mellitus)  Assessment and Plan of Treatment: Continue consistent carbohydrate diet.  Monitor blood glucose levels throughout the day before meals and at bedtime.  Record blood sugars and bring to outpatient providers appointment in order to be reviewed by your doctor for management modifications.  Be aware of diabetes management symptoms including feeling cool and clammy may be related to low glucose levels.  Feeling hot and dry may indicate high glucose levels. If you feel these symptoms, check your blood sugar.  Make regular podiatry appointments in order to have feet checked for wounds and toe nails cut by a doctor to prevent infections, as well as, appointments with an ophthalmologist to monitor your vision.      Diagnosis: Hypothyroid  Assessment and Plan of Treatment: Continue your thyroid medications as recommended and follow-up with your outpatient provider for continual thyroid function testing and further medication management.      Diagnosis: HTN (hypertension)  Assessment and Plan of Treatment: Continue blood pressure medication regimen as directed. Monitor for any visual changes, headaches or dizziness.  Monitor blood pressure regularly.  Follow up with your PCP for further management for high blood pressure.      Diagnosis: UTI (urinary tract infection), bacterial  Assessment and Plan of Treatment: You were started on antibiotics for a urinary infection. To help prevent future infections maintain healthy hygiene and avoid taking long baths. Wipe from front to back and empty your bladder frequently by keeping yourself properly hydrated. Monitor for signs/symptoms of infection, such as, fever/chills, burning/pain with urination, urinary frequency/hesitancy, cloudy urine, or blood in urine and follow-up with your primary care provider as outpatient for further care.    Diagnosis: Lower extremity edema  Assessment and Plan of Treatment: No edema noted on exam. Continue physical therapy at rehab and continue to monitor lower extremities.

## 2021-06-21 NOTE — PROGRESS NOTE ADULT - PROBLEM SELECTOR PLAN 1
-triggered by parainfluenza infection. Pt s/p 5-day course of azithro 500mg and 6-day course of steroids.  - d/c Prednisone 40mg QD   - Continue Spiriva 18mcg QD, Inhaled Symbicort 160/4.5mcgs 2puffs BID, and montelukast  - c/w contact isolation for parainfluenza  - transition to PRN DuoNebs Q6h  - Monitor vitals, continue 3L NC, ween down as tolerated

## 2021-06-21 NOTE — DISCHARGE NOTE PROVIDER - PROVIDER TOKENS
PROVIDER:[TOKEN:[[id number]]] PROVIDER:[TOKEN:[[id number]]],PROVIDER:[TOKEN:[[id number]]]

## 2021-06-21 NOTE — DISCHARGE NOTE PROVIDER - CARE PROVIDERS DIRECT ADDRESSES
[email or web address]

## 2021-06-21 NOTE — PROGRESS NOTE ADULT - PROBLEM SELECTOR PLAN 9
- DVT ppx: SQ Heparin 5000 units Q8hr, encourage early ambulation as tolerated  - Fall risk: ambulates w/ walker at baseline  -PT eval; no skilled PT needs

## 2021-06-21 NOTE — PROGRESS NOTE ADULT - PROBLEM SELECTOR PLAN 4
- Hold oral hypoglycemics while inpatient. Noted hyperglycemia while on steroids  - Monitor blood glucose per routine  - c/w Insulin Sliding Scale  - Monitor carbohydrate intake  -HbA1c 6.8   -  on proper Diet and Exercise

## 2021-06-21 NOTE — DISCHARGE NOTE PROVIDER - NSFOLLOWUPCLINICS_GEN_ALL_ED_FT
[facility]  Internal Medicine  [address] [place] [address]  Phone: [phone]  Fax: [phone]

## 2021-06-21 NOTE — PROGRESS NOTE ADULT - PROBLEM SELECTOR PLAN 7
Pt with reported LLE>RLE. However, on exam today, no edema noted. Pt with erythema but appears stable and pt without tenderness. Pt s/p left hip replacement in 2020, had stopped PT due to COVID. Evaluated by PT - pt with no skilled PT needs  - defer inpatient LE duplex at this time.   - will continue to monitor LLE

## 2021-06-21 NOTE — PROGRESS NOTE ADULT - ASSESSMENT
87 y/o female w/ pmhx significant for COPD (non-compliant on 3L NC at home), CKD, T2DM, HTN, Hypothyroid and cataracts s/p removal (2013) admitted for COPD exacerbation, found to test positive for parainfluenza and possible UTI. However, pt wit contaminated urine cx.

## 2021-06-21 NOTE — DISCHARGE NOTE PROVIDER - CARE PROVIDER_API CALL
Jan Brooke)  Medicine  Pulmonary Medicine  [address] [place] [address], [place] [address]  Phone: [phone]  Fax: [phone]  Follow Up Time:    Jan Brooke)  Medicine  Pulmonary Medicine  [address] [facility], [address]  [place] [address]  Phone: [phone]  Fax: [phone]  Follow Up Time:     Mane Cheek)  Family Medicine  [address] [place] [address], [address] [place] [address]  Phone: [phone]  Fax: [phone]  Follow Up Time:

## 2021-06-21 NOTE — DISCHARGE NOTE PROVIDER - NSDCFUSCHEDAPPT_GEN_ALL_CORE_FT
DEBORAH RIVERA ; 07/08/2021 ; [facility] [address] DEBORAH RIVERA ; 06/29/2021 ; NPP Med Pulm [address] [facility]  DEBORAH RIVERA ; 07/08/2021 ; NPP [facility] [address] [facility]

## 2021-06-22 LAB
GLUCOSE BLDC GLUCOMTR-MCNC: 104 MG/DL — HIGH (ref 70–99)
GLUCOSE BLDC GLUCOMTR-MCNC: 129 MG/DL — HIGH (ref 70–99)
GLUCOSE BLDC GLUCOMTR-MCNC: 140 MG/DL — HIGH (ref 70–99)
GLUCOSE BLDC GLUCOMTR-MCNC: 176 MG/DL — HIGH (ref 70–99)
GLUCOSE BLDC GLUCOMTR-MCNC: 228 MG/DL — HIGH (ref 70–99)

## 2021-06-22 PROCEDURE — 99231 SBSQ HOSP IP/OBS SF/LOW 25: CPT

## 2021-06-22 RX ORDER — GUAIFENESIN/DEXTROMETHORPHAN 600MG-30MG
10 TABLET, EXTENDED RELEASE 12 HR ORAL EVERY 4 HOURS
Refills: 0 | Status: DISCONTINUED | OUTPATIENT
Start: 2021-06-22 | End: 2021-06-24

## 2021-06-22 RX ADMIN — GABAPENTIN 100 MILLIGRAM(S): 400 CAPSULE ORAL at 21:22

## 2021-06-22 RX ADMIN — Medication 3 MILLILITER(S): at 22:00

## 2021-06-22 RX ADMIN — Medication 3 MILLILITER(S): at 15:57

## 2021-06-22 RX ADMIN — NYSTATIN CREAM 1 APPLICATION(S): 100000 CREAM TOPICAL at 21:23

## 2021-06-22 RX ADMIN — Medication 500 MILLIGRAM(S): at 12:42

## 2021-06-22 RX ADMIN — HEPARIN SODIUM 5000 UNIT(S): 5000 INJECTION INTRAVENOUS; SUBCUTANEOUS at 06:23

## 2021-06-22 RX ADMIN — HEPARIN SODIUM 5000 UNIT(S): 5000 INJECTION INTRAVENOUS; SUBCUTANEOUS at 18:10

## 2021-06-22 RX ADMIN — Medication 1 TABLET(S): at 12:42

## 2021-06-22 RX ADMIN — Medication 4: at 12:41

## 2021-06-22 RX ADMIN — MONTELUKAST 10 MILLIGRAM(S): 4 TABLET, CHEWABLE ORAL at 12:43

## 2021-06-22 RX ADMIN — GABAPENTIN 100 MILLIGRAM(S): 400 CAPSULE ORAL at 06:23

## 2021-06-22 RX ADMIN — GABAPENTIN 100 MILLIGRAM(S): 400 CAPSULE ORAL at 18:09

## 2021-06-22 RX ADMIN — Medication 112 MICROGRAM(S): at 06:24

## 2021-06-22 RX ADMIN — Medication 3 MILLILITER(S): at 03:28

## 2021-06-22 RX ADMIN — NYSTATIN CREAM 1 APPLICATION(S): 100000 CREAM TOPICAL at 06:24

## 2021-06-22 RX ADMIN — Medication 3 MILLILITER(S): at 09:28

## 2021-06-22 RX ADMIN — Medication 3 MILLIGRAM(S): at 21:22

## 2021-06-22 NOTE — PROGRESS NOTE ADULT - PROBLEM SELECTOR PLAN 2
continue IV Ceftriaxone 1g Q24h  -Urine Cx, with >3 organisms, will not repeat as pt already on Abx  -pt reports ingoing urinary incontinence , lifestyle modifications discussed including scheduled toileting, limit caffiene intake specially in the evening, Kegel exercises, and advised f/u with PMD for pharmacological intervention if persistent Urine Cx, with >3 organisms. Pt completed empiric course of ceftriaxone. Pt with baseline urinary incontinence.

## 2021-06-22 NOTE — PROGRESS NOTE ADULT - PROBLEM SELECTOR PLAN 10
ZEKE discussed with patient   pt lives alone, doing her ADLS & IADLs herself including Grocery , cooking and driving   Next of Kin  is her brother and nephew   does not have HCP   Code status discussed , pt says she would like to be resuscitated and intubated if she if is ok , but does not want to become vegetative,  so if her condition is irreversible would like to have the plug removed   Pt full Code at this time  Encouraged pt to appoint HCP who is aware of her wishes [facility] discussed with patient   pt lives alone, doing her ADLS & IADLs herself including Grocery , cooking and driving   Next of Kin  is her brother and nephew   does not have HCP   Code status discussed , pt says she would like to be resuscitated and intubated if she if is ok , but does not want to become vegetative,  so if her condition is irreversible would like to have the plug removed   Pt full Code at this time  Encouraged pt to appoint HCP who is aware of her wishes      DISPO: Pt re-evaluated by PT today and deemed candidate for rehab. However, patient reluctant to go at this time. However, upon chart review, pt with complex social situation at home. She currently lives alone and her family leaves ~2 hours away in [facility]. Pt with hx of medication and home O2 non-adherence based on outpatient notes. Will continue to obtain collateral information regarding if discharging pt home is a safe disposition plan if pt refuses to go to rehab. However, will further discuss rehab options with pt and her family. CM and SW to follow-up

## 2021-06-22 NOTE — PROGRESS NOTE ADULT - SUBJECTIVE AND OBJECTIVE BOX
Patient is a 86y old  Female who presents with a chief complaint of SOB and Cough (21 Jun 2021 15:31)      SUBJECTIVE / OVERNIGHT EVENTS:      MEDICATIONS  (STANDING):  albuterol/ipratropium for Nebulization 3 milliLiter(s) Nebulizer every 6 hours  ascorbic acid 500 milliGRAM(s) Oral daily  budesonide 160 MICROgram(s)/formoterol 4.5 MICROgram(s) Inhaler 2 Puff(s) Inhalation two times a day  calcium carbonate 1250 mG  + Vitamin D (OsCal 500 + D) 1 Tablet(s) Oral daily  dextrose 40% Gel 15 Gram(s) Oral once  dextrose 5%. 1000 milliLiter(s) (50 mL/Hr) IV Continuous <Continuous>  dextrose 5%. 1000 milliLiter(s) (100 mL/Hr) IV Continuous <Continuous>  dextrose 50% Injectable 25 Gram(s) IV Push once  dextrose 50% Injectable 12.5 Gram(s) IV Push once  dextrose 50% Injectable 25 Gram(s) IV Push once  gabapentin 100 milliGRAM(s) Oral three times a day  glucagon  Injectable 1 milliGRAM(s) IntraMuscular once  heparin   Injectable 5000 Unit(s) SubCutaneous every 8 hours  insulin lispro (ADMELOG) corrective regimen sliding scale   SubCutaneous three times a day before meals  insulin lispro (ADMELOG) corrective regimen sliding scale   SubCutaneous at bedtime  levothyroxine 112 MICROGram(s) Oral daily  montelukast 10 milliGRAM(s) Oral daily  nystatin Powder 1 Application(s) Topical two times a day  tiotropium 18 MICROgram(s) Capsule 1 Capsule(s) Inhalation daily    MEDICATIONS  (PRN):  melatonin 3 milliGRAM(s) Oral at bedtime PRN Insomnia      Vital Signs Last 24 Hrs  T(C): 36.5 (22 Jun 2021 06:43), Max: 36.8 (21 Jun 2021 15:29)  T(F): 97.7 (22 Jun 2021 06:43), Max: 98.2 (21 Jun 2021 15:29)  HR: 68 (22 Jun 2021 06:43) (68 - 78)  BP: 148/61 (22 Jun 2021 06:43) (135/75 - 148/61)  BP(mean): --  RR: 18 (22 Jun 2021 06:43) (18 - 18)  SpO2: 94% (22 Jun 2021 06:43) (90% - 97%)  CAPILLARY BLOOD GLUCOSE      POCT Blood Glucose.: 129 mg/dL (22 Jun 2021 08:19)  POCT Blood Glucose.: 140 mg/dL (22 Jun 2021 00:04)  POCT Blood Glucose.: 277 mg/dL (21 Jun 2021 17:16)  POCT Blood Glucose.: 390 mg/dL (21 Jun 2021 12:27)    I&O's Summary        PHYSICAL EXAM  GENERAL: NAD, well-developed  HEAD:  Atraumatic, Normocephalic  EYES: EOMI, PERRLA, conjunctiva and sclera clear  NECK: Supple, No JVD  CHEST/LUNG: Clear to auscultation bilaterally; No wheeze  HEART: Regular rate and rhythm; No murmurs, rubs, or gallops  ABDOMEN: Soft, Nontender, Nondistended; Bowel sounds present  EXTREMITIES:  2+ Peripheral Pulses, No clubbing, cyanosis, or edema  PSYCH: AAOx3  SKIN: No rashes or lesions    LABS:                        13.9   7.01  )-----------( 219      ( 21 Jun 2021 07:19 )             42.7     06-21    138  |  101  |  46<H>  ----------------------------<  122<H>  5.4<H>   |  26  |  1.43<H>    Ca    9.8      21 Jun 2021 07:19  Phos  3.0     06-21  Mg     2.2     06-21                RADIOLOGY & ADDITIONAL TESTS:    Imaging Personally Reviewed:  Consultant(s) Notes Reviewed:    Care Discussed with Consultants/Other Providers:   Patient is a 86y old  Female who presents with a chief complaint of SOB and Cough (21 Jun 2021 15:31)    SUBJECTIVE / OVERNIGHT EVENTS:  Patient participated with PT earlier this morning and pt stated she feels weak and is not ready to go home. Pt stated she needs a few more days. Pt admits that her niece, who is also nurse, recommends pt go to rehab if she is a candidate. However, she is reluctant to go to rehab because she does not have any clothes with her. Pt complaining of b/l side pain from coughing. No SOB. No chest pain or fever. Pt mentioned she only typically uses her O2 at rest but not when she ambulates at home.     MEDICATIONS  (STANDING):  albuterol/ipratropium for Nebulization 3 milliLiter(s) Nebulizer every 6 hours  ascorbic acid 500 milliGRAM(s) Oral daily  budesonide 160 MICROgram(s)/formoterol 4.5 MICROgram(s) Inhaler 2 Puff(s) Inhalation two times a day  calcium carbonate 1250 mG  + Vitamin D (OsCal 500 + D) 1 Tablet(s) Oral daily  dextrose 40% Gel 15 Gram(s) Oral once  dextrose 5%. 1000 milliLiter(s) (50 mL/Hr) IV Continuous <Continuous>  dextrose 5%. 1000 milliLiter(s) (100 mL/Hr) IV Continuous <Continuous>  dextrose 50% Injectable 25 Gram(s) IV Push once  dextrose 50% Injectable 12.5 Gram(s) IV Push once  dextrose 50% Injectable 25 Gram(s) IV Push once  gabapentin 100 milliGRAM(s) Oral three times a day  glucagon  Injectable 1 milliGRAM(s) IntraMuscular once  heparin   Injectable 5000 Unit(s) SubCutaneous every 8 hours  insulin lispro (ADMELOG) corrective regimen sliding scale   SubCutaneous three times a day before meals  insulin lispro (ADMELOG) corrective regimen sliding scale   SubCutaneous at bedtime  levothyroxine 112 MICROGram(s) Oral daily  montelukast 10 milliGRAM(s) Oral daily  nystatin Powder 1 Application(s) Topical two times a day  tiotropium 18 MICROgram(s) Capsule 1 Capsule(s) Inhalation daily    MEDICATIONS  (PRN):  melatonin 3 milliGRAM(s) Oral at bedtime PRN Insomnia      Vital Signs Last 24 Hrs  T(C): 36.5 (22 Jun 2021 06:43), Max: 36.8 (21 Jun 2021 15:29)  T(F): 97.7 (22 Jun 2021 06:43), Max: 98.2 (21 Jun 2021 15:29)  HR: 68 (22 Jun 2021 06:43) (68 - 78)  BP: 148/61 (22 Jun 2021 06:43) (135/75 - 148/61)  RR: 18 (22 Jun 2021 06:43) (18 - 18)  SpO2: 94% (22 Jun 2021 06:43) (90% - 97%)  CAPILLARY BLOOD GLUCOSE      POCT Blood Glucose.: 129 mg/dL (22 Jun 2021 08:19)  POCT Blood Glucose.: 140 mg/dL (22 Jun 2021 00:04)  POCT Blood Glucose.: 277 mg/dL (21 Jun 2021 17:16)  POCT Blood Glucose.: 390 mg/dL (21 Jun 2021 12:27)            PHYSICAL EXAM  GENERAL: NAD, non-toxic appearing, speaking in full sentences on ~3L O2 via NC  CHEST/LUNG: Clear to auscultation bilaterally; No wheeze  HEART: Regular rate and rhythm; No murmurs, rubs, or gallops  ABDOMEN: Soft, Nontender, protuberant; Bowel sounds present  EXTREMITIES:  2+ Peripheral Pulses, No clubbing, cyanosis, or edema  PSYCH: AAOx3  SKIN: Blanching erythema on left lower leg, not warm to touch; healed skin graft site on distal left shin. Dry, scaly spots with hyperpigmentation on legs b/l          NO LABS            Consultant(s) Notes Reviewed:    Care Discussed with Consultants/Other Providers:

## 2021-06-22 NOTE — PROGRESS NOTE ADULT - PROBLEM SELECTOR PLAN 1
-triggered by parainfluenza infection   - Patient with 10 days of SOB and cough with worsening SOB PTA . Admitted to medicine for infectious workup. R/o pneumonia. Pt s/p IV Azithromycin 500mg and IV SoluMedrol by ED.  - Continue IV Azithromycin 500mg Q24h and   Prednisone 40mg QD   - Continue at home medications of Inhaled Spiriva 18mcg QD, Inhaled Symbicort 160/4.5mcgs 2puffs BID, PO   -Continuous SpO2 monitoring  -Marisel Q6h  - Monitor vitals, continue 3L NC, ween down as tolerated -triggered by parainfluenza infection. Pt s/p 5-day course of azithro 500mg and 6-day course of steroids.  - Continue Spiriva 18mcg QD, Inhaled Symbicort 160/4.5mcgs 2puffs BID, and montelukast  - d/c contact isolation for parainfluenza  - PRN DuoNebs Q6h  - guaifenesin/dextromethorphan   - Monitor vitals, continue 3L NC

## 2021-06-22 NOTE — PROGRESS NOTE ADULT - PROBLEM SELECTOR PLAN 7
LLE>RLE , slightly more erythematous also   pt s/p LTHR in 2020, had stopped PT due to COVID   will check doppler to r/o DVT Pt with reported LLE>RLE. However, on exam today, no edema noted. Pt with erythema but appears stable and pt without tenderness. Pt s/p left hip replacement in 2020, had stopped PT due to COVID. Evaluated by PT - pt with no skilled PT needs  - defer inpatient LE duplex at this time.   - will continue to monitor LLE

## 2021-06-22 NOTE — PROGRESS NOTE ADULT - PROBLEM SELECTOR PLAN 4
- Hold oral hypoglycemics while inpatient  -Monitor blood glucose per routine  - Start on Insulin Sliding Scale  - Monitor carbohydrate intake  -HbA1c 6.8   -  on proper Diet and Exercise - Hold oral hypoglycemics while inpatient. Noted hyperglycemia while on steroids  - Monitor blood glucose per routine  - c/w Insulin Sliding Scale  - Monitor carbohydrate intake  -HbA1c 6.8   -  on proper Diet and Exercise

## 2021-06-22 NOTE — PROGRESS NOTE ADULT - PROBLEM SELECTOR PLAN 9
- DVT ppx: SQ Heparin 5000 units Q8hr, encourage early ambulation as tolerated  - Fall risk: ambulates w/ walker at baseline  - GI ppx: Consider Protonix  -PT eval; no skilled PT needs - DVT ppx: SQ Heparin 5000 units Q8hr, encourage early ambulation as tolerated  - Fall risk: ambulates w/ walker at baseline  -PT eval; no skilled PT needs

## 2021-06-22 NOTE — PROGRESS NOTE ADULT - PROBLEM SELECTOR PLAN 8
- Pt reports poor compliance with medications at home. Reports needed help to care for self and home as well.  - Consult Case Work  - Teach back medication method - Pt reports poor compliance with medications at home. Reports needed help to care for self and home as well.  - case management is aware - home care referral made  - Teach back medication method

## 2021-06-22 NOTE — PROGRESS NOTE ADULT - PROBLEM SELECTOR PLAN 6
Monitor BP per routine  DASH diet  -monitor off meds for now. BP at acceptable range. No need for antihypertensive meds  DASH diet  outpatient f/u

## 2021-06-22 NOTE — PROGRESS NOTE ADULT - ASSESSMENT
85 y/o female w/ pmhx significant for COPD (non-compliant on 3L NC at home), CKD, T2DM, HTN, Hypothyroid and cataracts s/p removal (2013), presents to the [facility] ED for a cough and SOB x10 days. Patient recently seen by PMD and prescribed ABX, but was non-compliant and had worsening SOB over last day, admitted for COPD exacerbation and UTI. Ms. Welsh is an 87 y/o woman w/ pmhx significant for COPD (non-compliant on 3L NC at home), CKD, T2DM, HTN, Hypothyroid and cataracts s/p removal (2013) admitted for COPD exacerbation, found to test positive for parainfluenza and possible UTI. However, pt wit contaminated urine cx.

## 2021-06-23 LAB
GLUCOSE BLDC GLUCOMTR-MCNC: 117 MG/DL — HIGH (ref 70–99)
GLUCOSE BLDC GLUCOMTR-MCNC: 193 MG/DL — HIGH (ref 70–99)
GLUCOSE BLDC GLUCOMTR-MCNC: 201 MG/DL — HIGH (ref 70–99)
GLUCOSE BLDC GLUCOMTR-MCNC: 211 MG/DL — HIGH (ref 70–99)
SARS-COV-2 RNA SPEC QL NAA+PROBE: SIGNIFICANT CHANGE UP

## 2021-06-23 PROCEDURE — 99231 SBSQ HOSP IP/OBS SF/LOW 25: CPT

## 2021-06-23 RX ORDER — ALBUTEROL 90 UG/1
2 AEROSOL, METERED ORAL EVERY 4 HOURS
Refills: 0 | Status: DISCONTINUED | OUTPATIENT
Start: 2021-06-23 | End: 2021-06-24

## 2021-06-23 RX ADMIN — Medication 3 MILLIGRAM(S): at 21:28

## 2021-06-23 RX ADMIN — Medication 112 MICROGRAM(S): at 05:50

## 2021-06-23 RX ADMIN — NYSTATIN CREAM 1 APPLICATION(S): 100000 CREAM TOPICAL at 17:24

## 2021-06-23 RX ADMIN — Medication 3 MILLILITER(S): at 15:59

## 2021-06-23 RX ADMIN — GABAPENTIN 100 MILLIGRAM(S): 400 CAPSULE ORAL at 05:50

## 2021-06-23 RX ADMIN — HEPARIN SODIUM 5000 UNIT(S): 5000 INJECTION INTRAVENOUS; SUBCUTANEOUS at 21:28

## 2021-06-23 RX ADMIN — Medication 3 MILLILITER(S): at 03:02

## 2021-06-23 RX ADMIN — NYSTATIN CREAM 1 APPLICATION(S): 100000 CREAM TOPICAL at 05:49

## 2021-06-23 RX ADMIN — HEPARIN SODIUM 5000 UNIT(S): 5000 INJECTION INTRAVENOUS; SUBCUTANEOUS at 13:39

## 2021-06-23 RX ADMIN — HEPARIN SODIUM 5000 UNIT(S): 5000 INJECTION INTRAVENOUS; SUBCUTANEOUS at 05:50

## 2021-06-23 RX ADMIN — Medication 4: at 12:28

## 2021-06-23 RX ADMIN — ALBUTEROL 2 PUFF(S): 90 AEROSOL, METERED ORAL at 17:37

## 2021-06-23 RX ADMIN — Medication 2: at 08:22

## 2021-06-23 RX ADMIN — GABAPENTIN 100 MILLIGRAM(S): 400 CAPSULE ORAL at 13:39

## 2021-06-23 RX ADMIN — GABAPENTIN 100 MILLIGRAM(S): 400 CAPSULE ORAL at 21:28

## 2021-06-23 RX ADMIN — Medication 1 TABLET(S): at 11:56

## 2021-06-23 RX ADMIN — Medication 3 MILLILITER(S): at 11:00

## 2021-06-23 RX ADMIN — MONTELUKAST 10 MILLIGRAM(S): 4 TABLET, CHEWABLE ORAL at 11:55

## 2021-06-23 RX ADMIN — Medication 500 MILLIGRAM(S): at 12:28

## 2021-06-23 NOTE — PROGRESS NOTE ADULT - SUBJECTIVE AND OBJECTIVE BOX
Patient is a 86y old  Female who presents with a chief complaint of COPD exacerbation 2/2 parainfluenza (22 Jun 2021 10:09)    SUBJECTIVE / OVERNIGHT EVENTS:  Patient without any acute complaints. Pt agreeable to go to rehab facility. Pt with no fever, chills, chest pain, SOB, n/v or abdominal pain    MEDICATIONS  (STANDING):  ascorbic acid 500 milliGRAM(s) Oral daily  budesonide 160 MICROgram(s)/formoterol 4.5 MICROgram(s) Inhaler 2 Puff(s) Inhalation two times a day  calcium carbonate 1250 mG  + Vitamin D (OsCal 500 + D) 1 Tablet(s) Oral daily  dextrose 40% Gel 15 Gram(s) Oral once  dextrose 5%. 1000 milliLiter(s) (50 mL/Hr) IV Continuous <Continuous>  dextrose 5%. 1000 milliLiter(s) (100 mL/Hr) IV Continuous <Continuous>  dextrose 50% Injectable 25 Gram(s) IV Push once  dextrose 50% Injectable 12.5 Gram(s) IV Push once  dextrose 50% Injectable 25 Gram(s) IV Push once  gabapentin 100 milliGRAM(s) Oral three times a day  glucagon  Injectable 1 milliGRAM(s) IntraMuscular once  heparin   Injectable 5000 Unit(s) SubCutaneous every 8 hours  insulin lispro (ADMELOG) corrective regimen sliding scale   SubCutaneous three times a day before meals  insulin lispro (ADMELOG) corrective regimen sliding scale   SubCutaneous at bedtime  levothyroxine 112 MICROGram(s) Oral daily  montelukast 10 milliGRAM(s) Oral daily  nystatin Powder 1 Application(s) Topical two times a day  tiotropium 18 MICROgram(s) Capsule 1 Capsule(s) Inhalation daily    MEDICATIONS  (PRN):  ALBUTerol    90 MICROgram(s) HFA Inhaler 2 Puff(s) Inhalation every 4 hours PRN Shortness of Breath and/or Wheezing  guaifenesin/dextromethorphan Oral Liquid 10 milliLiter(s) Oral every 4 hours PRN Cough  melatonin 3 milliGRAM(s) Oral at bedtime PRN Insomnia      Vital Signs Last 24 Hrs  T(C): 37.5 (23 Jun 2021 14:29), Max: 37.5 (23 Jun 2021 14:29)  T(F): 99.5 (23 Jun 2021 14:29), Max: 99.5 (23 Jun 2021 14:29)  HR: 76 (23 Jun 2021 16:00) (64 - 86)  BP: 120/63 (23 Jun 2021 14:29) (120/63 - 128/65)  RR: 17 (23 Jun 2021 14:29) (16 - 17)  SpO2: 98% (23 Jun 2021 16:00) (96% - 98%)  CAPILLARY BLOOD GLUCOSE      POCT Blood Glucose.: 117 mg/dL (23 Jun 2021 17:22)  POCT Blood Glucose.: 211 mg/dL (23 Jun 2021 12:02)  POCT Blood Glucose.: 193 mg/dL (23 Jun 2021 08:19)  POCT Blood Glucose.: 176 mg/dL (22 Jun 2021 21:27)          PHYSICAL EXAM  GENERAL: NAD, non-toxic appearing, speaking in full sentences on ~3L O2 via NC, sitting in chair comfortably  CHEST/LUNG: Scattered low pitched wheezes with good air movement, no crackles  HEART: Regular rate and rhythm; No murmurs, rubs, or gallops  ABDOMEN: Soft, Nontender, protuberant; Bowel sounds present  EXTREMITIES:  Warm and well perfused, No clubbing, cyanosis, or edema  PSYCH: AAOx3  SKIN: Mild erythema on left lower leg, healed skin graft site on distal left shin. Dry, scaly spots with hyperpigmentation on legs b/l

## 2021-06-23 NOTE — PROGRESS NOTE ADULT - PROBLEM SELECTOR PLAN 1
Resolved. Triggered by parainfluenza infection. Pt s/p 5-day course of azithro 500mg and 6-day course of steroids.  - Continue Spiriva 18mcg QD, Inhaled Symbicort 160/4.5mcgs 2puffs BID, and montelukast  - d/c DuoNebs Q6h --> transition to PRN albuterol inhaler for SOB/wheezing  - guaifenesin/dextromethorphan PRN  - Monitor vitals, continue 3L NC

## 2021-06-23 NOTE — PROGRESS NOTE ADULT - PROBLEM SELECTOR PLAN 7
Pt with reported LLE>RLE. However, on exam today, no edema noted. Pt with erythema but appears stable and pt without tenderness. Pt s/p left hip replacement in 2020, had stopped PT due to COVID. Evaluated by PT - pt now candidate for rehab facility  - defer inpatient LE duplex at this time.   - will continue to monitor LLE

## 2021-06-23 NOTE — PROGRESS NOTE ADULT - PROBLEM SELECTOR PLAN 8
- Pt reports poor compliance with medications at home. Reports needed help to care for self and home as well.  - case management is aware - home care referral initially made. Pt now planned to be discharged to rehab.  - Teach back medication method

## 2021-06-23 NOTE — PROGRESS NOTE ADULT - PROBLEM SELECTOR PLAN 10
[facility] discussed with patient   pt lives alone, doing her ADLS & IADLs herself including Grocery , cooking and driving   Next of Kin  is her brother and nephew   does not have HCP   Code status discussed , pt says she would like to be resuscitated and intubated if she if is ok , but does not want to become vegetative,  so if her condition is irreversible would like to have the plug removed   Pt full Code at this time  Encouraged pt to appoint HCP who is aware of her wishes      DISPO: Pt re-evaluated by PT and deemed candidate for rehab. However, patient now agreeable. Upon chart review, pt with complex social situation at home. She currently lives alone and her family lives ~2 hours away in [facility]. Pt with hx of medication and home O2 non-adherence based on outpatient notes. CM and SW to follow-up

## 2021-06-23 NOTE — PROGRESS NOTE ADULT - PROBLEM SELECTOR PLAN 9
- DVT ppx: SQ Heparin 5000 units Q8hr, encourage early ambulation as tolerated  - Fall risk: ambulates w/ walker at baseline

## 2021-06-23 NOTE — PROGRESS NOTE ADULT - ASSESSMENT
Ms. Welsh is an 87 y/o woman w/ pmhx significant for COPD (non-compliant on 3L NC at home), CKD, T2DM, HTN, Hypothyroid and cataracts s/p removal (2013) admitted for COPD exacerbation, found to test positive for parainfluenza and possible UTI. However, pt wit contaminated urine cx. Pt clinically stable and awaiting placement to rehab facility

## 2021-06-24 ENCOUNTER — TRANSCRIPTION ENCOUNTER (OUTPATIENT)
Age: 86
End: 2021-06-24

## 2021-06-24 VITALS
OXYGEN SATURATION: 95 % | TEMPERATURE: 98 F | DIASTOLIC BLOOD PRESSURE: 59 MMHG | HEART RATE: 79 BPM | RESPIRATION RATE: 16 BRPM | SYSTOLIC BLOOD PRESSURE: 138 MMHG

## 2021-06-24 LAB
GLUCOSE BLDC GLUCOMTR-MCNC: 148 MG/DL — HIGH (ref 70–99)
GLUCOSE BLDC GLUCOMTR-MCNC: 188 MG/DL — HIGH (ref 70–99)

## 2021-06-24 PROCEDURE — 99239 HOSP IP/OBS DSCHRG MGMT >30: CPT

## 2021-06-24 RX ORDER — GUAIFENESIN/DEXTROMETHORPHAN 600MG-30MG
5 TABLET, EXTENDED RELEASE 12 HR ORAL
Qty: 0 | Refills: 0 | DISCHARGE
Start: 2021-06-24

## 2021-06-24 RX ORDER — GABAPENTIN 400 MG/1
1 CAPSULE ORAL
Qty: 0 | Refills: 0 | DISCHARGE
Start: 2021-06-24

## 2021-06-24 RX ADMIN — GABAPENTIN 100 MILLIGRAM(S): 400 CAPSULE ORAL at 14:13

## 2021-06-24 RX ADMIN — MONTELUKAST 10 MILLIGRAM(S): 4 TABLET, CHEWABLE ORAL at 12:43

## 2021-06-24 RX ADMIN — Medication 112 MICROGRAM(S): at 06:30

## 2021-06-24 RX ADMIN — HEPARIN SODIUM 5000 UNIT(S): 5000 INJECTION INTRAVENOUS; SUBCUTANEOUS at 14:13

## 2021-06-24 RX ADMIN — HEPARIN SODIUM 5000 UNIT(S): 5000 INJECTION INTRAVENOUS; SUBCUTANEOUS at 06:28

## 2021-06-24 RX ADMIN — Medication 2: at 12:42

## 2021-06-24 RX ADMIN — Medication 1 TABLET(S): at 12:42

## 2021-06-24 RX ADMIN — NYSTATIN CREAM 1 APPLICATION(S): 100000 CREAM TOPICAL at 06:29

## 2021-06-24 RX ADMIN — GABAPENTIN 100 MILLIGRAM(S): 400 CAPSULE ORAL at 06:27

## 2021-06-24 RX ADMIN — Medication 500 MILLIGRAM(S): at 12:43

## 2021-06-24 NOTE — PROGRESS NOTE ADULT - PROBLEM SELECTOR PLAN 3
- Renally dose medications, Avoid nephrotoxic medications  - monitor CBCs, electrolytes  - Monitor for urinary symptoms and edema  - Maintain proper sugar and pressure control
Stable. Renally dose medications, Avoid nephrotoxic medications
Stable. Renally dose medications, Avoid nephrotoxic medications
- Renally dose medications, Avoid nephrotoxic medications  - monitor CBCs, electrolytes  - Monitor for urinary symptoms and edema  - Maintain proper sugar and pressure control

## 2021-06-24 NOTE — PROGRESS NOTE ADULT - SUBJECTIVE AND OBJECTIVE BOX
Patient is a 86y old  Female who presents with a chief complaint of COPD exacerbation 2/2 parainfluenza (23 Jun 2021 17:30)      SUBJECTIVE / OVERNIGHT EVENTS:  Patient feels fine. Patient agreeable to go to [facility] rehab facility today. Pt is concerned that she will not have her clothes though. She has no fever, chills, chest pain, SOB, n/v or abdominal pain. Pt mentioned her left leg has chronic redness due to prior hx of skin graft surgery.       MEDICATIONS  (STANDING):  ascorbic acid 500 milliGRAM(s) Oral daily  budesonide 160 MICROgram(s)/formoterol 4.5 MICROgram(s) Inhaler 2 Puff(s) Inhalation two times a day  calcium carbonate 1250 mG  + Vitamin D (OsCal 500 + D) 1 Tablet(s) Oral daily  dextrose 40% Gel 15 Gram(s) Oral once  dextrose 5%. 1000 milliLiter(s) (50 mL/Hr) IV Continuous <Continuous>  dextrose 5%. 1000 milliLiter(s) (100 mL/Hr) IV Continuous <Continuous>  dextrose 50% Injectable 25 Gram(s) IV Push once  dextrose 50% Injectable 12.5 Gram(s) IV Push once  dextrose 50% Injectable 25 Gram(s) IV Push once  gabapentin 100 milliGRAM(s) Oral three times a day  glucagon  Injectable 1 milliGRAM(s) IntraMuscular once  heparin   Injectable 5000 Unit(s) SubCutaneous every 8 hours  insulin lispro (ADMELOG) corrective regimen sliding scale   SubCutaneous three times a day before meals  insulin lispro (ADMELOG) corrective regimen sliding scale   SubCutaneous at bedtime  levothyroxine 112 MICROGram(s) Oral daily  montelukast 10 milliGRAM(s) Oral daily  tiotropium 18 MICROgram(s) Capsule 1 Capsule(s) Inhalation daily    MEDICATIONS  (PRN):  ALBUTerol    90 MICROgram(s) HFA Inhaler 2 Puff(s) Inhalation every 4 hours PRN Shortness of Breath and/or Wheezing  guaifenesin/dextromethorphan Oral Liquid 10 milliLiter(s) Oral every 4 hours PRN Cough  melatonin 3 milliGRAM(s) Oral at bedtime PRN Insomnia      Vital Signs Last 24 Hrs  T(C): 36.4 (24 Jun 2021 06:30), Max: 37.5 (23 Jun 2021 14:29)  T(F): 97.6 (24 Jun 2021 06:30), Max: 99.5 (23 Jun 2021 14:29)  HR: 69 (24 Jun 2021 06:30) (69 - 82)  BP: 112/50 (24 Jun 2021 06:30) (112/50 - 148/62)  RR: 17 (24 Jun 2021 06:30) (17 - 17)  SpO2: 95% (24 Jun 2021 06:30) (94% - 98%)  CAPILLARY BLOOD GLUCOSE      POCT Blood Glucose.: 148 mg/dL (24 Jun 2021 08:02)  POCT Blood Glucose.: 201 mg/dL (23 Jun 2021 21:58)  POCT Blood Glucose.: 117 mg/dL (23 Jun 2021 17:22)  POCT Blood Glucose.: 211 mg/dL (23 Jun 2021 12:02)          PHYSICAL EXAM  GENERAL: NAD, non-toxic appearing, speaking in full sentences on ~3L O2 via NC, O2 sat 93-95%  CHEST/LUNG: CTAB with good air movement, no crackles  HEART: Regular rate and rhythm; No murmurs, rubs, or gallops  ABDOMEN: Soft, Nontender, protuberant; Bowel sounds present  EXTREMITIES:  Warm and well perfused, No clubbing, cyanosis, or edema  PSYCH: AAOx3  SKIN: Mild erythema on left lower leg, healed skin graft site on distal left shin. Dry, scaly spots with hyperpigmentation on legs b/l

## 2021-06-24 NOTE — PROGRESS NOTE ADULT - ASSESSMENT
Ms. Welsh is an 85 y/o woman w/ pmhx significant for COPD c/b chronic hypoxic respiratory failure on 3L NC at home, CKD stage 3, T2DM, HTN, Hypothyroid and cataracts s/p removal (2013) admitted for COPD exacerbation, found to test positive for parainfluenza and possible UTI. However, pt wit contaminated urine cx. Pt clinically stable and awaiting placement to rehab facility

## 2021-06-24 NOTE — PROGRESS NOTE ADULT - PROBLEM SELECTOR PROBLEM 3
Stage 3b chronic kidney disease

## 2021-06-24 NOTE — PROGRESS NOTE ADULT - PROBLEM SELECTOR PLAN 10
[facility] discussed with patient   pt lives alone, doing her ADLS & IADLs herself including Grocery , cooking and driving   Next of Kin  is her brother and nephew   does not have HCP   Code status discussed , pt says she would like to be resuscitated and intubated if she if is ok , but does not want to become vegetative,  so if her condition is irreversible would like to have the plug removed   Pt full Code at this time  Encouraged pt to appoint HCP who is aware of her wishes      DISPO: Pt re-evaluated by PT and deemed candidate for rehab. Patient agreeable. Pt with bed and medical stable to be discharged to [facility] rehab facility today. Spent 38 min coordinating discharge plan and counseling pt on her condition.

## 2021-06-24 NOTE — PROGRESS NOTE ADULT - PROBLEM SELECTOR PLAN 2
Will prescribe Abreva to use 5 times daily for 10 days over the lips.  Avoid kissing and sexual contact until bumps are gone.      Herpes: Caring for Sores  Good hygiene matters when you have herpes. Take care of your sores to speed healing. Neglected sores can lead to other infections.     Warm baths can ease itching and burning caused by sores.   To ease your symptoms    Take any medicines as directed.    Take acetaminophen or ibuprofen for pain.    Take warm or cool baths to relieve itching of sores. And don t share towels when you have a sore.    Urinate in a tub of warm water to prevent burning. This works for women with genital herpes.    Don t wear tight clothes or nylon underwear. They can trap moisture, cause chafing, and prevent sores from healing.  To speed healing    Wash the sores with mild soap and water. And wash your hands after you touch a sore.    Dry the affected area completely by patting a towel over it. Don't rub. Don't share towels.    Don t bandage sores. The dry air helps them heal.    Avoid ointments unless they are prescribed. They retain moisture and may cause other infections.    Don t pick at the sores. This can slow healing.    Don t touch your eyes when you have a sore. The virus may travel from your fingertips to your eyes.  Resources  [employer] STI Hotline  [phone]  www.ashastd.org  Centers for Disease Control and Prevention  888.627.1550  www.cdc.gov/std   Date Last Reviewed: 1/1/2017 2000-2017 [employer]. [address] [place] [address], [place] [address]. All rights reserved. This information is not intended as a substitute for professional medical care. Always follow your healthcare professional's instructions.         Urine Cx, with >3 organisms. Pt completed empiric course of ceftriaxone. Pt with baseline urinary incontinence. Statement Selected

## 2021-06-24 NOTE — PROGRESS NOTE ADULT - PROBLEM SELECTOR PLAN 4
- Hold oral hypoglycemics while inpatient. Noted hyperglycemia while on steroids  - Monitor blood glucose per routine  - c/w Insulin Sliding Scale  - Monitor carbohydrate intake  -HbA1c 6.8   -  on proper Diet and Exercise  - pt to resume metformin 500mg BID upon discharge

## 2021-06-24 NOTE — PROGRESS NOTE ADULT - PROBLEM SELECTOR PLAN 1
Resolved. Triggered by parainfluenza infection. Pt s/p 5-day course of azithro 500mg and 6-day course of steroids.  - Continue Spiriva 18mcg QD, Inhaled Symbicort 160/4.5mcgs 2puffs BID, and montelukast  - PRN albuterol inhaler for SOB/wheezing  - guaifenesin/dextromethorphan PRN  - Monitor vitals, continue 3L NC

## 2021-06-24 NOTE — PROGRESS NOTE ADULT - PROBLEM SELECTOR PLAN 7
Pt with reported LLE>RLE. However, on exam, no edema noted. Pt with erythema but appears stable and pt without tenderness. Pt s/p left hip replacement in 2020, had stopped PT due to COVID. Evaluated by PT - pt now candidate for rehab facility  - will continue to monitor LLE

## 2021-06-24 NOTE — DISCHARGE NOTE NURSING/CASE MANAGEMENT/SOCIAL WORK - PATIENT PORTAL LINK FT
You can access the FollowMyHealth Patient Portal offered by [facility] by registering at the following website: [email or web address]. By joining [employer]’s FollowMyHealth portal, you will also be able to view your health information using other applications (apps) compatible with our system.

## 2021-06-24 NOTE — PROGRESS NOTE ADULT - REASON FOR ADMISSION
SOB and Cough
COPD exacerbation 2/2 parainfluenza
SOB and Cough
SOB and Cough
COPD exacerbation

## 2021-06-24 NOTE — DISCHARGE NOTE NURSING/CASE MANAGEMENT/SOCIAL WORK - NSDCVIVACCINE_GEN_ALL_CORE_FT
Influenza, high dose seasonal; 24-Sep-2019 12:44; Shellie Hannah (Student, Nursing); [employer]; [id number] (Exp. Date: 24-Apr-2020); IntraMuscular; Deltoid Right.; 0.5 milliLiter(s); VIS (VIS Published: 15-Aug-2019, VIS Presented: 24-Sep-2019);

## 2021-06-25 ENCOUNTER — RESULT REVIEW (OUTPATIENT)
Age: 86
End: 2021-06-25

## 2021-06-25 ENCOUNTER — NON-APPOINTMENT (OUTPATIENT)
Age: 86
End: 2021-06-25

## 2021-06-26 ENCOUNTER — INPATIENT (INPATIENT)
Facility: HOSPITAL | Age: 86
LOS: 3 days | Discharge: NURSING, REHAB OR LONG-TERM CARE | DRG: 641 | End: 2021-06-30
Attending: HOSPITALIST | Admitting: HOSPITALIST
Payer: MEDICARE

## 2021-06-26 VITALS
HEART RATE: 71 BPM | RESPIRATION RATE: 16 BRPM | DIASTOLIC BLOOD PRESSURE: 61 MMHG | HEIGHT: 65 IN | WEIGHT: 154.98 LBS | SYSTOLIC BLOOD PRESSURE: 133 MMHG | TEMPERATURE: 98 F | OXYGEN SATURATION: 94 %

## 2021-06-26 DIAGNOSIS — S82.90XA UNSPECIFIED FRACTURE OF UNSPECIFIED LOWER LEG, INITIAL ENCOUNTER FOR CLOSED FRACTURE: Chronic | ICD-10-CM

## 2021-06-26 DIAGNOSIS — Z86.69 PERSONAL HISTORY OF OTHER DISEASES OF THE NERVOUS SYSTEM AND SENSE ORGANS: Chronic | ICD-10-CM

## 2021-06-26 DIAGNOSIS — E87.5 HYPERKALEMIA: ICD-10-CM

## 2021-06-26 DIAGNOSIS — E03.9 HYPOTHYROIDISM, UNSPECIFIED: ICD-10-CM

## 2021-06-26 DIAGNOSIS — N18.32 CHRONIC KIDNEY DISEASE, STAGE 3B: ICD-10-CM

## 2021-06-26 DIAGNOSIS — Z29.9 ENCOUNTER FOR PROPHYLACTIC MEASURES, UNSPECIFIED: ICD-10-CM

## 2021-06-26 DIAGNOSIS — E11.9 TYPE 2 DIABETES MELLITUS WITHOUT COMPLICATIONS: ICD-10-CM

## 2021-06-26 DIAGNOSIS — Z90.49 ACQUIRED ABSENCE OF OTHER SPECIFIED PARTS OF DIGESTIVE TRACT: Chronic | ICD-10-CM

## 2021-06-26 DIAGNOSIS — Z79.899 OTHER LONG TERM (CURRENT) DRUG THERAPY: ICD-10-CM

## 2021-06-26 DIAGNOSIS — Z96.642 PRESENCE OF LEFT ARTIFICIAL HIP JOINT: Chronic | ICD-10-CM

## 2021-06-26 DIAGNOSIS — J44.9 CHRONIC OBSTRUCTIVE PULMONARY DISEASE, UNSPECIFIED: ICD-10-CM

## 2021-06-26 LAB
ALBUMIN SERPL ELPH-MCNC: 3.3 G/DL — SIGNIFICANT CHANGE UP (ref 3.3–5)
ALP SERPL-CCNC: 70 U/L — SIGNIFICANT CHANGE UP (ref 40–120)
ALT FLD-CCNC: 22 U/L — SIGNIFICANT CHANGE UP (ref 10–45)
ANION GAP SERPL CALC-SCNC: 10 MMOL/L — SIGNIFICANT CHANGE UP (ref 5–17)
ANION GAP SERPL CALC-SCNC: 12 MMOL/L — SIGNIFICANT CHANGE UP (ref 5–17)
APPEARANCE UR: CLEAR — SIGNIFICANT CHANGE UP
AST SERPL-CCNC: 30 U/L — SIGNIFICANT CHANGE UP (ref 10–40)
BACTERIA # UR AUTO: NEGATIVE — SIGNIFICANT CHANGE UP
BASE EXCESS BLDV CALC-SCNC: 4.4 MMOL/L — HIGH (ref -2–2)
BASOPHILS # BLD AUTO: 0.02 K/UL — SIGNIFICANT CHANGE UP (ref 0–0.2)
BASOPHILS NFR BLD AUTO: 0.2 % — SIGNIFICANT CHANGE UP (ref 0–2)
BILIRUB SERPL-MCNC: 0.3 MG/DL — SIGNIFICANT CHANGE UP (ref 0.2–1.2)
BILIRUB UR-MCNC: NEGATIVE — SIGNIFICANT CHANGE UP
BUN SERPL-MCNC: 41 MG/DL — HIGH (ref 7–23)
BUN SERPL-MCNC: 43 MG/DL — HIGH (ref 7–23)
CA-I SERPL-SCNC: 1.22 MMOL/L — SIGNIFICANT CHANGE UP (ref 1.12–1.3)
CALCIUM SERPL-MCNC: 9.1 MG/DL — SIGNIFICANT CHANGE UP (ref 8.4–10.5)
CALCIUM SERPL-MCNC: 9.2 MG/DL — SIGNIFICANT CHANGE UP (ref 8.4–10.5)
CHLORIDE BLDV-SCNC: 101 MMOL/L — SIGNIFICANT CHANGE UP (ref 96–108)
CHLORIDE SERPL-SCNC: 99 MMOL/L — SIGNIFICANT CHANGE UP (ref 96–108)
CHLORIDE SERPL-SCNC: 99 MMOL/L — SIGNIFICANT CHANGE UP (ref 96–108)
CO2 BLDV-SCNC: 34 MMOL/L — HIGH (ref 22–30)
CO2 SERPL-SCNC: 27 MMOL/L — SIGNIFICANT CHANGE UP (ref 22–31)
CO2 SERPL-SCNC: 29 MMOL/L — SIGNIFICANT CHANGE UP (ref 22–31)
COLOR SPEC: SIGNIFICANT CHANGE UP
CREAT SERPL-MCNC: 1.23 MG/DL — SIGNIFICANT CHANGE UP (ref 0.5–1.3)
CREAT SERPL-MCNC: 1.31 MG/DL — HIGH (ref 0.5–1.3)
DIFF PNL FLD: NEGATIVE — SIGNIFICANT CHANGE UP
EOSINOPHIL # BLD AUTO: 0.11 K/UL — SIGNIFICANT CHANGE UP (ref 0–0.5)
EOSINOPHIL NFR BLD AUTO: 1.2 % — SIGNIFICANT CHANGE UP (ref 0–6)
EPI CELLS # UR: 0 /HPF — SIGNIFICANT CHANGE UP
GAS PNL BLDV: 135 MMOL/L — SIGNIFICANT CHANGE UP (ref 135–145)
GAS PNL BLDV: SIGNIFICANT CHANGE UP
GAS PNL BLDV: SIGNIFICANT CHANGE UP
GLUCOSE BLDV-MCNC: 197 MG/DL — HIGH (ref 70–99)
GLUCOSE SERPL-MCNC: 101 MG/DL — HIGH (ref 70–99)
GLUCOSE SERPL-MCNC: 207 MG/DL — HIGH (ref 70–99)
GLUCOSE UR QL: NEGATIVE — SIGNIFICANT CHANGE UP
HCO3 BLDV-SCNC: 32 MMOL/L — HIGH (ref 21–29)
HCT VFR BLD CALC: 38.5 % — SIGNIFICANT CHANGE UP (ref 34.5–45)
HCT VFR BLDA CALC: 41 % — SIGNIFICANT CHANGE UP (ref 39–50)
HGB BLD CALC-MCNC: 13.2 G/DL — SIGNIFICANT CHANGE UP (ref 11.5–15.5)
HGB BLD-MCNC: 12.3 G/DL — SIGNIFICANT CHANGE UP (ref 11.5–15.5)
IMM GRANULOCYTES NFR BLD AUTO: 0.6 % — SIGNIFICANT CHANGE UP (ref 0–1.5)
KETONES UR-MCNC: NEGATIVE — SIGNIFICANT CHANGE UP
LACTATE BLDV-MCNC: 1.7 MMOL/L — SIGNIFICANT CHANGE UP (ref 0.7–2)
LEUKOCYTE ESTERASE UR-ACNC: NEGATIVE — SIGNIFICANT CHANGE UP
LYMPHOCYTES # BLD AUTO: 1.23 K/UL — SIGNIFICANT CHANGE UP (ref 1–3.3)
LYMPHOCYTES # BLD AUTO: 13.3 % — SIGNIFICANT CHANGE UP (ref 13–44)
MAGNESIUM SERPL-MCNC: 2.2 MG/DL — SIGNIFICANT CHANGE UP (ref 1.6–2.6)
MCHC RBC-ENTMCNC: 31.9 GM/DL — LOW (ref 32–36)
MCHC RBC-ENTMCNC: 31.9 PG — SIGNIFICANT CHANGE UP (ref 27–34)
MCV RBC AUTO: 99.7 FL — SIGNIFICANT CHANGE UP (ref 80–100)
MONOCYTES # BLD AUTO: 1.29 K/UL — HIGH (ref 0–0.9)
MONOCYTES NFR BLD AUTO: 13.9 % — SIGNIFICANT CHANGE UP (ref 2–14)
NEUTROPHILS # BLD AUTO: 6.55 K/UL — SIGNIFICANT CHANGE UP (ref 1.8–7.4)
NEUTROPHILS NFR BLD AUTO: 70.8 % — SIGNIFICANT CHANGE UP (ref 43–77)
NITRITE UR-MCNC: NEGATIVE — SIGNIFICANT CHANGE UP
NRBC # BLD: 0 /100 WBCS — SIGNIFICANT CHANGE UP (ref 0–0)
PCO2 BLDV: 62 MMHG — HIGH (ref 35–50)
PH BLDV: 7.33 — LOW (ref 7.35–7.45)
PH UR: 6.5 — SIGNIFICANT CHANGE UP (ref 5–8)
PHOSPHATE SERPL-MCNC: 3.4 MG/DL — SIGNIFICANT CHANGE UP (ref 2.5–4.5)
PLATELET # BLD AUTO: 261 K/UL — SIGNIFICANT CHANGE UP (ref 150–400)
PO2 BLDV: 44 MMHG — SIGNIFICANT CHANGE UP (ref 25–45)
POTASSIUM BLDV-SCNC: 5.4 MMOL/L — HIGH (ref 3.5–5.3)
POTASSIUM SERPL-MCNC: 5 MMOL/L — SIGNIFICANT CHANGE UP (ref 3.5–5.3)
POTASSIUM SERPL-MCNC: 6.2 MMOL/L — CRITICAL HIGH (ref 3.5–5.3)
POTASSIUM SERPL-SCNC: 5 MMOL/L — SIGNIFICANT CHANGE UP (ref 3.5–5.3)
POTASSIUM SERPL-SCNC: 6.2 MMOL/L — CRITICAL HIGH (ref 3.5–5.3)
PROT SERPL-MCNC: 7 G/DL — SIGNIFICANT CHANGE UP (ref 6–8.3)
PROT UR-MCNC: ABNORMAL
RBC # BLD: 3.86 M/UL — SIGNIFICANT CHANGE UP (ref 3.8–5.2)
RBC # FLD: 12.2 % — SIGNIFICANT CHANGE UP (ref 10.3–14.5)
RBC CASTS # UR COMP ASSIST: 3 /HPF — SIGNIFICANT CHANGE UP (ref 0–4)
SAO2 % BLDV: 75 % — SIGNIFICANT CHANGE UP (ref 67–88)
SARS-COV-2 RNA SPEC QL NAA+PROBE: SIGNIFICANT CHANGE UP
SODIUM SERPL-SCNC: 136 MMOL/L — SIGNIFICANT CHANGE UP (ref 135–145)
SODIUM SERPL-SCNC: 140 MMOL/L — SIGNIFICANT CHANGE UP (ref 135–145)
SP GR SPEC: 1.01 — SIGNIFICANT CHANGE UP (ref 1.01–1.02)
UROBILINOGEN FLD QL: NEGATIVE — SIGNIFICANT CHANGE UP
WBC # BLD: 9.26 K/UL — SIGNIFICANT CHANGE UP (ref 3.8–10.5)
WBC # FLD AUTO: 9.26 K/UL — SIGNIFICANT CHANGE UP (ref 3.8–10.5)
WBC UR QL: 1 /HPF — SIGNIFICANT CHANGE UP (ref 0–5)

## 2021-06-26 PROCEDURE — 99285 EMERGENCY DEPT VISIT HI MDM: CPT | Mod: GC

## 2021-06-26 PROCEDURE — 99308 SBSQ NF CARE LOW MDM 20: CPT | Mod: GC

## 2021-06-26 PROCEDURE — 99223 1ST HOSP IP/OBS HIGH 75: CPT

## 2021-06-26 PROCEDURE — 93010 ELECTROCARDIOGRAM REPORT: CPT | Mod: GC

## 2021-06-26 RX ORDER — DEXTROSE 50 % IN WATER 50 %
50 SYRINGE (ML) INTRAVENOUS ONCE
Refills: 0 | Status: COMPLETED | OUTPATIENT
Start: 2021-06-26 | End: 2021-06-26

## 2021-06-26 RX ORDER — ALBUTEROL 90 UG/1
2 AEROSOL, METERED ORAL
Refills: 0 | Status: DISCONTINUED | OUTPATIENT
Start: 2021-06-26 | End: 2021-06-26

## 2021-06-26 RX ORDER — SODIUM ZIRCONIUM CYCLOSILICATE 10 G/10G
5 POWDER, FOR SUSPENSION ORAL ONCE
Refills: 0 | Status: COMPLETED | OUTPATIENT
Start: 2021-06-26 | End: 2021-06-26

## 2021-06-26 RX ORDER — DEXTROSE 50 % IN WATER 50 %
12.5 SYRINGE (ML) INTRAVENOUS ONCE
Refills: 0 | Status: DISCONTINUED | OUTPATIENT
Start: 2021-06-26 | End: 2021-06-30

## 2021-06-26 RX ORDER — ALBUTEROL 90 UG/1
2 AEROSOL, METERED ORAL EVERY 6 HOURS
Refills: 0 | Status: DISCONTINUED | OUTPATIENT
Start: 2021-06-26 | End: 2021-06-30

## 2021-06-26 RX ORDER — HEPARIN SODIUM 5000 [USP'U]/ML
5000 INJECTION INTRAVENOUS; SUBCUTANEOUS EVERY 12 HOURS
Refills: 0 | Status: DISCONTINUED | OUTPATIENT
Start: 2021-06-26 | End: 2021-06-30

## 2021-06-26 RX ORDER — BUDESONIDE AND FORMOTEROL FUMARATE DIHYDRATE 160; 4.5 UG/1; UG/1
2 AEROSOL RESPIRATORY (INHALATION)
Refills: 0 | Status: DISCONTINUED | OUTPATIENT
Start: 2021-06-26 | End: 2021-06-30

## 2021-06-26 RX ORDER — SODIUM CHLORIDE 9 MG/ML
1000 INJECTION, SOLUTION INTRAVENOUS
Refills: 0 | Status: DISCONTINUED | OUTPATIENT
Start: 2021-06-26 | End: 2021-06-30

## 2021-06-26 RX ORDER — LANOLIN ALCOHOL/MO/W.PET/CERES
3 CREAM (GRAM) TOPICAL AT BEDTIME
Refills: 0 | Status: DISCONTINUED | OUTPATIENT
Start: 2021-06-26 | End: 2021-06-30

## 2021-06-26 RX ORDER — ALBUTEROL 90 UG/1
2.5 AEROSOL, METERED ORAL ONCE
Refills: 0 | Status: COMPLETED | OUTPATIENT
Start: 2021-06-26 | End: 2021-06-26

## 2021-06-26 RX ORDER — LEVOTHYROXINE SODIUM 125 MCG
112 TABLET ORAL DAILY
Refills: 0 | Status: DISCONTINUED | OUTPATIENT
Start: 2021-06-26 | End: 2021-06-30

## 2021-06-26 RX ORDER — INSULIN LISPRO 100/ML
VIAL (ML) SUBCUTANEOUS AT BEDTIME
Refills: 0 | Status: DISCONTINUED | OUTPATIENT
Start: 2021-06-26 | End: 2021-06-30

## 2021-06-26 RX ORDER — INSULIN HUMAN 100 [IU]/ML
10 INJECTION, SOLUTION SUBCUTANEOUS ONCE
Refills: 0 | Status: COMPLETED | OUTPATIENT
Start: 2021-06-26 | End: 2021-06-26

## 2021-06-26 RX ORDER — GLUCAGON INJECTION, SOLUTION 0.5 MG/.1ML
1 INJECTION, SOLUTION SUBCUTANEOUS ONCE
Refills: 0 | Status: DISCONTINUED | OUTPATIENT
Start: 2021-06-26 | End: 2021-06-30

## 2021-06-26 RX ORDER — DEXTROSE 50 % IN WATER 50 %
25 SYRINGE (ML) INTRAVENOUS ONCE
Refills: 0 | Status: DISCONTINUED | OUTPATIENT
Start: 2021-06-26 | End: 2021-06-30

## 2021-06-26 RX ORDER — TIOTROPIUM BROMIDE 18 UG/1
1 CAPSULE ORAL; RESPIRATORY (INHALATION) DAILY
Refills: 0 | Status: DISCONTINUED | OUTPATIENT
Start: 2021-06-26 | End: 2021-06-30

## 2021-06-26 RX ORDER — GABAPENTIN 400 MG/1
100 CAPSULE ORAL THREE TIMES A DAY
Refills: 0 | Status: DISCONTINUED | OUTPATIENT
Start: 2021-06-26 | End: 2021-06-30

## 2021-06-26 RX ORDER — DEXTROSE 50 % IN WATER 50 %
15 SYRINGE (ML) INTRAVENOUS ONCE
Refills: 0 | Status: DISCONTINUED | OUTPATIENT
Start: 2021-06-26 | End: 2021-06-30

## 2021-06-26 RX ORDER — INSULIN LISPRO 100/ML
VIAL (ML) SUBCUTANEOUS
Refills: 0 | Status: DISCONTINUED | OUTPATIENT
Start: 2021-06-26 | End: 2021-06-30

## 2021-06-26 RX ORDER — MONTELUKAST 4 MG/1
10 TABLET, CHEWABLE ORAL AT BEDTIME
Refills: 0 | Status: DISCONTINUED | OUTPATIENT
Start: 2021-06-26 | End: 2021-06-30

## 2021-06-26 RX ADMIN — Medication 1: at 18:26

## 2021-06-26 RX ADMIN — GABAPENTIN 100 MILLIGRAM(S): 400 CAPSULE ORAL at 21:08

## 2021-06-26 RX ADMIN — MONTELUKAST 10 MILLIGRAM(S): 4 TABLET, CHEWABLE ORAL at 21:08

## 2021-06-26 RX ADMIN — HEPARIN SODIUM 5000 UNIT(S): 5000 INJECTION INTRAVENOUS; SUBCUTANEOUS at 17:34

## 2021-06-26 RX ADMIN — INSULIN HUMAN 10 UNIT(S): 100 INJECTION, SOLUTION SUBCUTANEOUS at 13:07

## 2021-06-26 RX ADMIN — SODIUM ZIRCONIUM CYCLOSILICATE 5 GRAM(S): 10 POWDER, FOR SUSPENSION ORAL at 13:08

## 2021-06-26 RX ADMIN — ALBUTEROL 2.5 MILLIGRAM(S): 90 AEROSOL, METERED ORAL at 13:29

## 2021-06-26 RX ADMIN — Medication 50 MILLILITER(S): at 13:06

## 2021-06-26 RX ADMIN — Medication 112 MICROGRAM(S): at 17:35

## 2021-06-26 RX ADMIN — TIOTROPIUM BROMIDE 1 CAPSULE(S): 18 CAPSULE ORAL; RESPIRATORY (INHALATION) at 21:08

## 2021-06-26 RX ADMIN — BUDESONIDE AND FORMOTEROL FUMARATE DIHYDRATE 2 PUFF(S): 160; 4.5 AEROSOL RESPIRATORY (INHALATION) at 17:34

## 2021-06-26 NOTE — H&P ADULT - NSHPPHYSICALEXAM_GEN_ALL_CORE
PHYSICAL EXAM:  Vital Signs Last 24 Hrs  T(C): 37.6 (26 Jun 2021 15:49), Max: 37.7 (26 Jun 2021 11:06)  T(F): 99.7 (26 Jun 2021 15:49), Max: 99.8 (26 Jun 2021 11:06)  HR: 77 (26 Jun 2021 15:49) (71 - 80)  BP: 126/56 (26 Jun 2021 15:49) (120/59 - 144/74)  BP(mean): --  RR: 22 (26 Jun 2021 15:49) (16 - 22)  SpO2: 96% (26 Jun 2021 15:49) (94% - 97%)    CONSTITUTIONAL: NAD, well-developed, disheveled   EYES: PERRL; conjunctiva and sclera clear  ENMT: Moist oral mucosa, no pharyngeal injection or exudates  NECK: Supple, no palpable masses; no thyromegaly  RESPIRATORY: Normal respiratory effort; decreased breath sounds  CARDIOVASCULAR: Regular rate and rhythm, normal S1 and S2, no murmur; +1 lower extremity edema  ABDOMEN: Nontender to palpation, normoactive bowel sounds, no rebound/guarding  MUSCULOSKELETAL:  no clubbing or cyanosis of digits; no joint swelling or tenderness to palpation  PSYCH: A+O to person, place, and time; affect appropriate  NEUROLOGY: CN 2-12 are intact and symmetric; no gross sensory deficits   SKIN: No rashes; LE erythematous b/l, no warmth or tenderness

## 2021-06-26 NOTE — H&P ADULT - NSHPREVIEWOFSYSTEMS_GEN_ALL_CORE
Review of Systems:   CONSTITUTIONAL: No fever, weight loss  EYES: No eye pain, visual disturbances, or discharge  ENMT:  No difficulty hearing, tinnitus, vertigo; No sinus or throat pain  RESPIRATORY: No SOB. No wheezing, chills or hemoptysis  CARDIOVASCULAR: No chest pain, palpitations, dizziness, or leg swelling  GASTROINTESTINAL: No abdominal or epigastric pain. No nausea, vomiting, or hematemesis; No diarrhea or constipation. No melena or hematochezia.  GENITOURINARY: No dysuria, frequency, hematuria, or incontinence  NEUROLOGICAL: No headaches, memory loss, loss of strength, numbness, or tremors  SKIN: No itching, burning, rashes, or lesions   LYMPH NODES: No enlarged glands  ENDOCRINE: No heat or cold intolerance; No hair loss  MUSCULOSKELETAL: No joint pain or swelling; No muscle, back pain  PSYCHIATRIC: No depression, anxiety, mood swings, or difficulty sleeping  HEME/LYMPH: No easy bruising, or bleeding gums

## 2021-06-26 NOTE — ED PROVIDER NOTE - PMH
Chronic kidney disease (CKD)    COPD (chronic obstructive pulmonary disease)    DM (diabetes mellitus)    HTN (hypertension)    Hypothyroid    Shingles

## 2021-06-26 NOTE — ED PROVIDER NOTE - PSH
H/O cataract  bilateral 2013  Leg fracture  right ORIF with hardware 2010  S/P cholecystectomy    S/P hip replacement, left  2019

## 2021-06-26 NOTE — PATIENT PROFILE ADULT - SAFE PLACE TO LIVE
81y Male with history of [place]'s disease on florinef and prednisone presents with SOB. Nephrology consulted for elevated Scr.    1) YUKI: Non-oliguric in setting of sepsis, hypotension and rhabdomyolysis likely ATN given granular casts on UA. Scr had been improving, now stable.  Continue with IVF as patient remains NPO. Avoid nephrotoxins. Monitor electrolytes.    2) HTN: BP controlled. Monitor off medications.    3) Hypernatremia: Secondary to free water deficit with partial contribution from DI given urine osm not fully concentrated as one would expect.   c/w D5W to 100 ml/hour. Monitor serum Na.    4) Hyperphosphatemia: Phosphorus acceptable. No need for phos binders. Monitor serum calcium and phosphorus.    5) Anemia: Due to hematomas and BRBPR S/P blood products s/p colonoscopy. F/U GI and Heme. Monitor Hb.    6) Metabolic acidosis: In setting of renal insufficiency overall improving. Continue with sodium bicarbonate 2 tabs twice daily. Monitor serum CO2. no

## 2021-06-26 NOTE — ED ADULT NURSE NOTE - BREATH SOUNDS, LEFT
You are doing very well.    We will refill your meds.    See you in a year.    Dao Redd     diminished

## 2021-06-26 NOTE — H&P ADULT - NSHPSOCIALHISTORY_GEN_ALL_CORE
Prior to admissions lived alone. Family is [facility] (brother + nephews/nieces) and in [place].   Has had difficulty taking care of herself with ADLs and medication administration. She wants to consider being in a facility in [facility] to be close to her brother.   Former smoker, 20 pk years but quit over 20 years ago. Occasionally drinks wine.

## 2021-06-26 NOTE — ED PROVIDER NOTE - PHYSICAL EXAMINATION
Vitals: I have reviewed the patients vital signs  General: well appearing, well nourished, no acute distress  HEENT: atraumatic, normocephalic, airway patent, EOMI and appropriate tracking  Neck: no JVD, no tracheal deviation, no goiter  Chest/Lungs: no trauma, symmetric chest rise, lung sounds clear bilaterally, speaking in complete sentences  Heart: Regular rate, regular rhythm, skin well perfused, no peripheral edema  Abdomen: Soft and nontender throughout, no rebound or guarding  Neuro: A+Ox3, non dysarthric speech, neurovascular intact exrtremities   Eyes: EOMI, no conjunctival injection  MSK: all limbs at baseline strength, no wasting or atrophy,   Skin: no bleeding, no cyanosis, no jaundice, no new emergent lesions

## 2021-06-26 NOTE — H&P ADULT - PROBLEM SELECTOR PLAN 5
- On 3L NC at home, currently on home requirements  - Still w/ occasional cough after parainfluenza infection but otherwise no SOB or wheezing  - c/w symbicort, spiriva, singular  - albuterol PRN

## 2021-06-26 NOTE — H&P ADULT - PROBLEM SELECTOR PLAN 7
Patient does not know her medications, restarted medications from d/c 6/24 by MELISSA. Has difficulty at home with self-administration. Will need facility on d/c Patient does not know her medications, restarted medications from d/c 6/24 by MELISSA. Has difficulty at home with self-administration. Will need facility on d/c -  [place]

## 2021-06-26 NOTE — H&P ADULT - HISTORY OF PRESENT ILLNESS
This is a 85 y/o female w/ pmhx significant for COPD (on 3L NC at home), CKD 3, T2DM, HTN, HFpEF (EF 60% 2019), Hypothyroid and cataracts s/p removal (2013) who was sent in from [facility] rehab for hyperkalemia on labs. Patient was just recently admitted at [facility] from 6/17-6/26 for COPD exasterbation 2/2 parainfluenza infection and cystitis. She completed 5 days of azithro, 6 days of steroids, and an empiric course of ceftriaxone. Patient herself is unable to verbalize why she was sent in from the rehab - she says she did not let them do any tests but they told her something was wrong and she needs evaluation. Overall per chart review appeared to have difficulty managing her outpatient medications and not always taking what was prescribed. Endorses a residual cough but no SOB, CP, dizziness, HA, palpitations. Has a brother, nephews and nieces that live in [facility].    Has a hx of hyperkalemia in the past - needed lokelma last admission and was taken off spironolactone by her cardiologist due to hyperkalemia.     In the ED, tmax 99.7, HR 70-80, /60, RR 18-22, sat 95% on 3L NC. Given insulin, lokelma, albuterol in the ED.

## 2021-06-26 NOTE — ED ADULT NURSE NOTE - OBJECTIVE STATEMENT
pt 87 yo female sent from [facility] for elevated potassium per patient on arrival pt does not know when such lab was drawn pt states recently in hospital for flu pt skin warm dry very scaly spots pt states recntly had shingles pt 85 yo female sent from [facility] for elevated potassium per patient on arrival pt does not know when such lab was drawn pt states recently in hospital for flu pt skin warm dry very scaly spots pt states recntly had shingles pt has oxygen via nasal canula in use on arrival pt denies any sob no chest discomfort pt ekg completed and labs sent as ordered

## 2021-06-26 NOTE — H&P ADULT - NSICDXPASTSURGICALHX_GEN_ALL_CORE_FT
PAST SURGICAL HISTORY:  H/O cataract bilateral 2013    Leg fracture right ORIF with hardware 2010    S/P cholecystectomy     S/P hip replacement, left 2019

## 2021-06-26 NOTE — H&P ADULT - NSHPLABSRESULTS_GEN_ALL_CORE
LABS:                        12.3   9.26  )-----------( 261      ( 26 Jun 2021 11:20 )             38.5     06-26    140  |  99  |  41<H>  ----------------------------<  101<H>  5.0   |  29  |  1.31<H>    Ca    9.1      26 Jun 2021 15:18  Phos  3.4     06-26  Mg     2.2     06-26    TPro  7.0  /  Alb  3.3  /  TBili  0.3  /  DBili  x   /  AST  30  /  ALT  22  /  AlkPhos  70  06-26                RADIOLOGY & ADDITIONAL TESTS:  Results Reviewed:   Imaging Personally Reviewed: CXR without consolidation or effusion, similar to prior  Electrocardiogram Personally Reviewed: NSR, TWI seen in 2019, no peaked T waves    COORDINATION OF CARE:  Care Discussed with Consultants/Other Providers [Y/N]:  Prior or Outpatient Records Reviewed [Y/N]:

## 2021-06-26 NOTE — ED PROVIDER NOTE - OBJECTIVE STATEMENT
87 y/o F hx COPD, CDK, DM, here from [facility] (admit for hip fx) due to hyperkalemia. Patient has no complaints, denies cp, sob, n/v/d, syncope. Per chart patient has been more tired as of late.

## 2021-06-26 NOTE — ED PROVIDER NOTE - NS ED ROS FT
Constitutional: (-) fever (-) vomiting  Eyes/ENT: (-) vision changes  Cardiovascular: (-) chest pain, (-) wheezing  Respiratory: (-) cough, (-) shortness of breath  Gastrointestinal: (-) vomiting, (-) diarrhea, (-) abdominal pain  : (-) dysuria   Musculoskeletal: (-) back pain  Integumentary: (-) rash, (-) edema  Neurological: (-)loc  Allergic/Immunologic: (-) pruritus  Endocrine: No history of thyroid disease

## 2021-06-26 NOTE — H&P ADULT - PROBLEM SELECTOR PLAN 1
- Etiology unclear, possibly dietary. Patient endorses drinking orange juice every day   - No known new medications   - Has a prior hx, no etiology identified  - Improved after insulin, albuterol in the ED to 5.0. No EKG changes  - Continue to monitor BMP  - Low potassium diet, nutrition c/s

## 2021-06-26 NOTE — H&P ADULT - ASSESSMENT
This is a 87 y/o female w/ pmhx significant for COPD (on 3L NC at home), CKD 3, T2DM, HTN, HFpEF (EF 60% 2019), Hypothyroid and cataracts s/p removal (2013) who was sent in from [facility] rehab for hyperkalemia on labs. Has a prior hx of hyperkalemia w/ CKD, no new medication changes. Possibly related to diet.

## 2021-06-26 NOTE — ED PROVIDER NOTE - CLINICAL SUMMARY MEDICAL DECISION MAKING FREE TEXT BOX
87 y/o F hx COPD, CKD, DM, here from rehab for hyperk, no complaints. Level 6.0 per chart. Unclear if hemolysis. Exam nonfocal. Will repeat labs, ekg stat, reassess. 85 y/o F hx COPD, CKD, DM, here from rehab for possible  hyperk, no complaints. Level 6.0 per chart. Unclear if hemolysis. Exam nonfocal. Will repeat labs, ekg stat, reassess .ZR

## 2021-06-27 LAB
ANION GAP SERPL CALC-SCNC: 11 MMOL/L — SIGNIFICANT CHANGE UP (ref 5–17)
BASOPHILS # BLD AUTO: 0.02 K/UL — SIGNIFICANT CHANGE UP (ref 0–0.2)
BASOPHILS NFR BLD AUTO: 0.2 % — SIGNIFICANT CHANGE UP (ref 0–2)
BUN SERPL-MCNC: 40 MG/DL — HIGH (ref 7–23)
CALCIUM SERPL-MCNC: 8.8 MG/DL — SIGNIFICANT CHANGE UP (ref 8.4–10.5)
CHLORIDE SERPL-SCNC: 100 MMOL/L — SIGNIFICANT CHANGE UP (ref 96–108)
CO2 SERPL-SCNC: 27 MMOL/L — SIGNIFICANT CHANGE UP (ref 22–31)
COVID-19 SPIKE DOMAIN AB INTERP: POSITIVE
COVID-19 SPIKE DOMAIN ANTIBODY RESULT: 56.4 U/ML — HIGH
CREAT SERPL-MCNC: 1.3 MG/DL — SIGNIFICANT CHANGE UP (ref 0.5–1.3)
EOSINOPHIL # BLD AUTO: 0.17 K/UL — SIGNIFICANT CHANGE UP (ref 0–0.5)
EOSINOPHIL NFR BLD AUTO: 1.9 % — SIGNIFICANT CHANGE UP (ref 0–6)
GLUCOSE SERPL-MCNC: 141 MG/DL — HIGH (ref 70–99)
HCT VFR BLD CALC: 40.7 % — SIGNIFICANT CHANGE UP (ref 34.5–45)
HGB BLD-MCNC: 12.9 G/DL — SIGNIFICANT CHANGE UP (ref 11.5–15.5)
IMM GRANULOCYTES NFR BLD AUTO: 0.4 % — SIGNIFICANT CHANGE UP (ref 0–1.5)
LYMPHOCYTES # BLD AUTO: 1.65 K/UL — SIGNIFICANT CHANGE UP (ref 1–3.3)
LYMPHOCYTES # BLD AUTO: 18.3 % — SIGNIFICANT CHANGE UP (ref 13–44)
MAGNESIUM SERPL-MCNC: 2.1 MG/DL — SIGNIFICANT CHANGE UP (ref 1.6–2.6)
MCHC RBC-ENTMCNC: 31.3 PG — SIGNIFICANT CHANGE UP (ref 27–34)
MCHC RBC-ENTMCNC: 31.7 GM/DL — LOW (ref 32–36)
MCV RBC AUTO: 98.8 FL — SIGNIFICANT CHANGE UP (ref 80–100)
MONOCYTES # BLD AUTO: 1.3 K/UL — HIGH (ref 0–0.9)
MONOCYTES NFR BLD AUTO: 14.4 % — HIGH (ref 2–14)
NEUTROPHILS # BLD AUTO: 5.85 K/UL — SIGNIFICANT CHANGE UP (ref 1.8–7.4)
NEUTROPHILS NFR BLD AUTO: 64.8 % — SIGNIFICANT CHANGE UP (ref 43–77)
NRBC # BLD: 0 /100 WBCS — SIGNIFICANT CHANGE UP (ref 0–0)
PHOSPHATE SERPL-MCNC: 3.1 MG/DL — SIGNIFICANT CHANGE UP (ref 2.5–4.5)
PLATELET # BLD AUTO: 247 K/UL — SIGNIFICANT CHANGE UP (ref 150–400)
POTASSIUM SERPL-MCNC: 5.3 MMOL/L — SIGNIFICANT CHANGE UP (ref 3.5–5.3)
POTASSIUM SERPL-SCNC: 5.3 MMOL/L — SIGNIFICANT CHANGE UP (ref 3.5–5.3)
RBC # BLD: 4.12 M/UL — SIGNIFICANT CHANGE UP (ref 3.8–5.2)
RBC # FLD: 12.2 % — SIGNIFICANT CHANGE UP (ref 10.3–14.5)
SARS-COV-2 IGG+IGM SERPL QL IA: 56.4 U/ML — HIGH
SARS-COV-2 IGG+IGM SERPL QL IA: POSITIVE
SODIUM SERPL-SCNC: 138 MMOL/L — SIGNIFICANT CHANGE UP (ref 135–145)
WBC # BLD: 9.03 K/UL — SIGNIFICANT CHANGE UP (ref 3.8–10.5)
WBC # FLD AUTO: 9.03 K/UL — SIGNIFICANT CHANGE UP (ref 3.8–10.5)

## 2021-06-27 PROCEDURE — 99233 SBSQ HOSP IP/OBS HIGH 50: CPT

## 2021-06-27 RX ADMIN — BUDESONIDE AND FORMOTEROL FUMARATE DIHYDRATE 2 PUFF(S): 160; 4.5 AEROSOL RESPIRATORY (INHALATION) at 19:10

## 2021-06-27 RX ADMIN — MONTELUKAST 10 MILLIGRAM(S): 4 TABLET, CHEWABLE ORAL at 21:58

## 2021-06-27 RX ADMIN — HEPARIN SODIUM 5000 UNIT(S): 5000 INJECTION INTRAVENOUS; SUBCUTANEOUS at 05:12

## 2021-06-27 RX ADMIN — GABAPENTIN 100 MILLIGRAM(S): 400 CAPSULE ORAL at 21:58

## 2021-06-27 RX ADMIN — TIOTROPIUM BROMIDE 1 CAPSULE(S): 18 CAPSULE ORAL; RESPIRATORY (INHALATION) at 15:12

## 2021-06-27 RX ADMIN — Medication 100 MILLIGRAM(S): at 21:58

## 2021-06-27 RX ADMIN — Medication 3 MILLIGRAM(S): at 21:59

## 2021-06-27 RX ADMIN — HEPARIN SODIUM 5000 UNIT(S): 5000 INJECTION INTRAVENOUS; SUBCUTANEOUS at 19:10

## 2021-06-27 RX ADMIN — BUDESONIDE AND FORMOTEROL FUMARATE DIHYDRATE 2 PUFF(S): 160; 4.5 AEROSOL RESPIRATORY (INHALATION) at 05:12

## 2021-06-27 RX ADMIN — Medication 112 MICROGRAM(S): at 05:12

## 2021-06-27 RX ADMIN — GABAPENTIN 100 MILLIGRAM(S): 400 CAPSULE ORAL at 13:00

## 2021-06-27 RX ADMIN — Medication 100 MILLIGRAM(S): at 15:22

## 2021-06-27 RX ADMIN — GABAPENTIN 100 MILLIGRAM(S): 400 CAPSULE ORAL at 05:12

## 2021-06-27 NOTE — PHYSICAL THERAPY INITIAL EVALUATION ADULT - PERTINENT HX OF CURRENT PROBLEM, REHAB EVAL
87 y/o female w/ pmhx significant for COPD (on 3L NC at home), CKD 3, T2DM, HTN, HFpEF (EF 60% 2019), Hypothyroid and cataracts s/p removal (2013) who was sent in from [facility] rehab for hyperkalemia on labs. Has a prior hx of hyperkalemia w/ CKD, no new medication changes. Possibly related to diet. Surgeon/Pathologist Verbiage (Will Incorporate Name Of Surgeon From Intro If Not Blank): operated in two distinct and integrated capacities as the surgeon and pathologist.

## 2021-06-27 NOTE — PHYSICAL THERAPY INITIAL EVALUATION ADULT - BALANCE TRAINING, PT EVAL
GOAL: Pt will demonstrate improved static/dynamic balance in sitting/standing where deficient by at least 1 grade to improve stability, decrease fall risk, and increase independence in ADLs within 4 weeks.

## 2021-06-27 NOTE — DIETITIAN INITIAL EVALUATION ADULT. - OTHER INFO
Pt seen for: consult                                  GI issues: denies N/V           Last  BM: 6/26              Food Allergies/Intolerances: NKFA               Vitamins/Supplements: none   Wt hx: pt reports gradual wt loss over past couple of years, used to weigh 175 lb (no significant changes in past year). Dosing wt 6/26 155 lb.                                Skin: no pressure injuries documented     Subjective/Objective: pt has DM hx, doesn't check BG at home, A1c this adm 6.8 in desirable range. Discussed high K foods that she should avoid. Current K is WDL.    Wt used for nutrition needs: dosing wt 6/25 70.3 kg

## 2021-06-27 NOTE — PHYSICAL THERAPY INITIAL EVALUATION ADULT - ADDITIONAL COMMENTS
lives w/ Pt admitted from [facility] however prior to hospitalizations, pt lived alone in an apartment, +elevator access. Pt owns rollator however states uses shopping cart to ambulate in & outside the apartment.

## 2021-06-27 NOTE — DIETITIAN INITIAL EVALUATION ADULT. - ORAL INTAKE PTA/DIET HISTORY
Pt reports trying to limit CHO and Na intake, no K restrictions PTA (was drinking OJ regularly). Had good appetite.

## 2021-06-28 LAB
ANION GAP SERPL CALC-SCNC: 12 MMOL/L — SIGNIFICANT CHANGE UP (ref 5–17)
BUN SERPL-MCNC: 44 MG/DL — HIGH (ref 7–23)
CALCIUM SERPL-MCNC: 8.7 MG/DL — SIGNIFICANT CHANGE UP (ref 8.4–10.5)
CHLORIDE SERPL-SCNC: 101 MMOL/L — SIGNIFICANT CHANGE UP (ref 96–108)
CO2 SERPL-SCNC: 24 MMOL/L — SIGNIFICANT CHANGE UP (ref 22–31)
CREAT SERPL-MCNC: 1.51 MG/DL — HIGH (ref 0.5–1.3)
GLUCOSE BLDC GLUCOMTR-MCNC: 121 MG/DL — HIGH (ref 70–99)
GLUCOSE BLDC GLUCOMTR-MCNC: 130 MG/DL — HIGH (ref 70–99)
GLUCOSE BLDC GLUCOMTR-MCNC: 171 MG/DL — HIGH (ref 70–99)
GLUCOSE SERPL-MCNC: 117 MG/DL — HIGH (ref 70–99)
POTASSIUM SERPL-MCNC: 5.3 MMOL/L — SIGNIFICANT CHANGE UP (ref 3.5–5.3)
POTASSIUM SERPL-SCNC: 5.3 MMOL/L — SIGNIFICANT CHANGE UP (ref 3.5–5.3)
SODIUM SERPL-SCNC: 137 MMOL/L — SIGNIFICANT CHANGE UP (ref 135–145)

## 2021-06-28 PROCEDURE — 99232 SBSQ HOSP IP/OBS MODERATE 35: CPT

## 2021-06-28 RX ORDER — SODIUM ZIRCONIUM CYCLOSILICATE 10 G/10G
5 POWDER, FOR SUSPENSION ORAL ONCE
Refills: 0 | Status: COMPLETED | OUTPATIENT
Start: 2021-06-28 | End: 2021-06-29

## 2021-06-28 RX ORDER — SODIUM ZIRCONIUM CYCLOSILICATE 10 G/10G
5 POWDER, FOR SUSPENSION ORAL DAILY
Refills: 0 | Status: DISCONTINUED | OUTPATIENT
Start: 2021-06-29 | End: 2021-06-30

## 2021-06-28 RX ADMIN — TIOTROPIUM BROMIDE 1 CAPSULE(S): 18 CAPSULE ORAL; RESPIRATORY (INHALATION) at 12:42

## 2021-06-28 RX ADMIN — GABAPENTIN 100 MILLIGRAM(S): 400 CAPSULE ORAL at 21:27

## 2021-06-28 RX ADMIN — HEPARIN SODIUM 5000 UNIT(S): 5000 INJECTION INTRAVENOUS; SUBCUTANEOUS at 05:22

## 2021-06-28 RX ADMIN — Medication 112 MICROGRAM(S): at 05:20

## 2021-06-28 RX ADMIN — MONTELUKAST 10 MILLIGRAM(S): 4 TABLET, CHEWABLE ORAL at 21:27

## 2021-06-28 RX ADMIN — BUDESONIDE AND FORMOTEROL FUMARATE DIHYDRATE 2 PUFF(S): 160; 4.5 AEROSOL RESPIRATORY (INHALATION) at 18:40

## 2021-06-28 RX ADMIN — BUDESONIDE AND FORMOTEROL FUMARATE DIHYDRATE 2 PUFF(S): 160; 4.5 AEROSOL RESPIRATORY (INHALATION) at 05:21

## 2021-06-28 RX ADMIN — Medication 1: at 12:40

## 2021-06-28 RX ADMIN — Medication 100 MILLIGRAM(S): at 05:23

## 2021-06-28 RX ADMIN — HEPARIN SODIUM 5000 UNIT(S): 5000 INJECTION INTRAVENOUS; SUBCUTANEOUS at 18:40

## 2021-06-28 RX ADMIN — GABAPENTIN 100 MILLIGRAM(S): 400 CAPSULE ORAL at 05:21

## 2021-06-28 RX ADMIN — GABAPENTIN 100 MILLIGRAM(S): 400 CAPSULE ORAL at 12:41

## 2021-06-28 NOTE — PROGRESS NOTE ADULT - PROBLEM SELECTOR PLAN 7
Patient does not know her medications, restarted medications from d/c 6/24 by MELISSA. Has difficulty at home with self-administration. Will need facility on d/c -  [place]
Patient does not know her medications, restarted medications from d/c 6/24 by MELISSA. Has difficulty at home with self-administration. Will need facility on d/c -  [place]

## 2021-06-29 ENCOUNTER — APPOINTMENT (OUTPATIENT)
Dept: PULMONOLOGY | Facility: CLINIC | Age: 86
End: 2021-06-29

## 2021-06-29 ENCOUNTER — TRANSCRIPTION ENCOUNTER (OUTPATIENT)
Age: 86
End: 2021-06-29

## 2021-06-29 LAB
ANION GAP SERPL CALC-SCNC: 12 MMOL/L — SIGNIFICANT CHANGE UP (ref 5–17)
BUN SERPL-MCNC: 51 MG/DL — HIGH (ref 7–23)
CALCIUM SERPL-MCNC: 8.5 MG/DL — SIGNIFICANT CHANGE UP (ref 8.4–10.5)
CHLORIDE SERPL-SCNC: 101 MMOL/L — SIGNIFICANT CHANGE UP (ref 96–108)
CO2 SERPL-SCNC: 24 MMOL/L — SIGNIFICANT CHANGE UP (ref 22–31)
CREAT SERPL-MCNC: 1.82 MG/DL — HIGH (ref 0.5–1.3)
GLUCOSE BLDC GLUCOMTR-MCNC: 108 MG/DL — HIGH (ref 70–99)
GLUCOSE BLDC GLUCOMTR-MCNC: 129 MG/DL — HIGH (ref 70–99)
GLUCOSE BLDC GLUCOMTR-MCNC: 174 MG/DL — HIGH (ref 70–99)
GLUCOSE BLDC GLUCOMTR-MCNC: 99 MG/DL — SIGNIFICANT CHANGE UP (ref 70–99)
GLUCOSE SERPL-MCNC: 106 MG/DL — HIGH (ref 70–99)
POTASSIUM SERPL-MCNC: 5.6 MMOL/L — HIGH (ref 3.5–5.3)
POTASSIUM SERPL-SCNC: 5.6 MMOL/L — HIGH (ref 3.5–5.3)
SODIUM SERPL-SCNC: 137 MMOL/L — SIGNIFICANT CHANGE UP (ref 135–145)

## 2021-06-29 PROCEDURE — 99232 SBSQ HOSP IP/OBS MODERATE 35: CPT

## 2021-06-29 RX ADMIN — Medication 1: at 11:57

## 2021-06-29 RX ADMIN — SODIUM ZIRCONIUM CYCLOSILICATE 5 GRAM(S): 10 POWDER, FOR SUSPENSION ORAL at 16:56

## 2021-06-29 RX ADMIN — GABAPENTIN 100 MILLIGRAM(S): 400 CAPSULE ORAL at 14:22

## 2021-06-29 RX ADMIN — Medication 112 MICROGRAM(S): at 05:04

## 2021-06-29 RX ADMIN — BUDESONIDE AND FORMOTEROL FUMARATE DIHYDRATE 2 PUFF(S): 160; 4.5 AEROSOL RESPIRATORY (INHALATION) at 05:04

## 2021-06-29 RX ADMIN — GABAPENTIN 100 MILLIGRAM(S): 400 CAPSULE ORAL at 05:04

## 2021-06-29 RX ADMIN — SODIUM ZIRCONIUM CYCLOSILICATE 5 GRAM(S): 10 POWDER, FOR SUSPENSION ORAL at 09:13

## 2021-06-29 RX ADMIN — TIOTROPIUM BROMIDE 1 CAPSULE(S): 18 CAPSULE ORAL; RESPIRATORY (INHALATION) at 14:24

## 2021-06-29 RX ADMIN — HEPARIN SODIUM 5000 UNIT(S): 5000 INJECTION INTRAVENOUS; SUBCUTANEOUS at 05:04

## 2021-06-29 RX ADMIN — GABAPENTIN 100 MILLIGRAM(S): 400 CAPSULE ORAL at 20:43

## 2021-06-29 RX ADMIN — HEPARIN SODIUM 5000 UNIT(S): 5000 INJECTION INTRAVENOUS; SUBCUTANEOUS at 18:01

## 2021-06-29 RX ADMIN — BUDESONIDE AND FORMOTEROL FUMARATE DIHYDRATE 2 PUFF(S): 160; 4.5 AEROSOL RESPIRATORY (INHALATION) at 18:01

## 2021-06-29 RX ADMIN — MONTELUKAST 10 MILLIGRAM(S): 4 TABLET, CHEWABLE ORAL at 20:43

## 2021-06-29 NOTE — DISCHARGE NOTE PROVIDER - NSDCCPCAREPLAN_GEN_ALL_CORE_FT
PRINCIPAL DISCHARGE DIAGNOSIS  Diagnosis: Hyperkalemia  Assessment and Plan of Treatment:       SECONDARY DISCHARGE DIAGNOSES  Diagnosis: COPD (chronic obstructive pulmonary disease)  Assessment and Plan of Treatment: COPD (chronic obstructive pulmonary disease)    Diagnosis: Stage 3b chronic kidney disease  Assessment and Plan of Treatment: Stage 3b chronic kidney disease     PRINCIPAL DISCHARGE DIAGNOSIS  Diagnosis: Hyperkalemia  Assessment and Plan of Treatment: Please take Lokelma as ordered.  Follow up with your PMD as an outpatient.      SECONDARY DISCHARGE DIAGNOSES  Diagnosis: Stage 3b chronic kidney disease  Assessment and Plan of Treatment: Avoid taking (NSAIDs) - (ex: Ibuprofen, Advil, Celebrex, Naprosyn)  Avoid taking any nephrotoxic agents (can harm kidneys) - Intravenous contrast for diagnostic testing, combination cold medications.  Have all medications adjusted for your renal function by your Health Care Provider.  Blood pressure control is important.  Take all medication as prescribed.      Diagnosis: COPD (chronic obstructive pulmonary disease)  Assessment and Plan of Treatment: Call your Health Care provider upon arrival home to make a follow up appointment within one week.  Take all inhalers as prescribed by your Health Care Provider.  Take steroids as prescribed by your Health Care Provider.  If your cough increases infrequency and severity and/or you have shortness of breath or increased shortness of breath call your Health Care Provider.  If you develop fever, chills, night sweats, malaise, and/or change in mental status call your Health care Provider.  Nutrition is very important.  Eat small frequent meals.  Increase your activity as tolerated.  Do not stay in bed all day      Diagnosis: DM (diabetes mellitus)  Assessment and Plan of Treatment: HgA1C this admission 6.8.  Make sure you get your HgA1c checked every three months.  If you take oral diabetes medications, check your blood glucose two times a day.  If you take insulin, check your blood glucose before meals and at bedtime.  It's important not to skip any meals.  Keep a log of your blood glucose results and always take it with you to your doctor appointments.  Keep a list of your current medications including injectables and over the counter medications and bring this medication list with you to all your doctor appointments.  If you have not seen your opthalmologist this year call for appointment.  Check your feet daily for redness, sores, or openings. Do not self treat. If no improvement in two days call your primary care physician for an appointment.  Low blood sugar (hypoglycemia) is a blood sugar below 70mg/dl. Check your blood sugar if you feel signs/symptoms of hypoglycemia. If your blood sugar is below 70 take 15 grams of carbohydrates (ex 4 oz of apple juice, 3-4 glucosr tablets, or 4-6 oz of regular soda) wait 15 minutes and repeat blood sugar to make sure it comes up above 70.  If your blood sugar is above 70 and you are due for a meal, have a meal.  If you are not due for a meal have a snack.  This snack helps keeps your blood sugar at a safe range.

## 2021-06-29 NOTE — DISCHARGE NOTE PROVIDER - HOSPITAL COURSE
This is a 85 y/o female w/ pmhx significant for COPD (on 3L NC at home), CKD 3, T2DM, HTN, HFpEF (EF 60% 2019), Hypothyroid and cataracts s/p removal (2013)  Sent in from [facility] for hyperkalemia on labs. Has a prior hx of hyperkalemia w/ CKD, no new medication changes. Possibly related to diet.     ·  Problem: Hyperkalemia.  Plan: - seems chronic in setting of CKD. dietary contribution as well.   - No EKG changes.  BMP monitored  - Low potassium diet,   - Started on lokelma 5 mg daily for chronic hyperkalemia.     ·  Problem: Stage 3b chronic kidney disease.  Plan: - Cr. at baseline, continue to monitor.     ·  Problem: DM (diabetes mellitus).  Plan: - hbg A1c 6.9 in 2019  - Was on metformin BID as an outpatient  - low dose sliding scale while in- patient    ·  Problem: Hypothyroid.  Plan: - c/w synthroid, TSH 1.66 06/17/21.     ·  Problem: COPD (chronic obstructive pulmonary disease).  Plan: - On PRN 3L NC at home currently on 4 liters  - Still w/ occasional cough but no SOB or wheezing  - c/w Symbicort, Spiriva,    Requesting MEGAN transfer to [facility] to be close to family (brother)

## 2021-06-29 NOTE — DISCHARGE NOTE PROVIDER - NSDCMRMEDTOKEN_GEN_ALL_CORE_FT
acetaminophen 325 mg oral tablet: 2 tab(s) orally every 8 hours as needed for Mild pain  ascorbic acid 500 mg oral tablet: 1 tab(s) orally once a day  calcium-vitamin D 500 mg-200 intl units oral tablet: 1 tab(s) orally once a day  gabapentin 100 mg oral capsule: 1 cap(s) orally 3 times a day  ipratropium-albuterol 0.5 mg-2.5 mg/3 mLinhalation solution: 3 milliliter(s) inhaled every 6 hours, As needed, Shortness of Breath  levothyroxine 112 mcg (0.112 mg) oral tablet: 1 tab(s) orally once a day  melatonin 3 mg oral tablet: 1 tab(s) orally once a day (at bedtime), As needed, Insomnia  metFORMIN 500 mg oral tablet: 1 tab(s) orally 2 times a day  montelukast 10 mg oral tablet: 1 tab(s) orally once a day  Symbicort 160 mcg-4.5 mcg/inh inhalation aerosol: 2 puff(s) inhaled 2 times a day  tiotropium 18 mcg inhalation capsule: 1 cap(s) inhaled once a day   acetaminophen 325 mg oral tablet: 2 tab(s) orally every 8 hours as needed for Mild pain  ascorbic acid 500 mg oral tablet: 1 tab(s) orally once a day  calcium-vitamin D 500 mg-200 intl units oral tablet: 1 tab(s) orally once a day  gabapentin 100 mg oral capsule: 1 cap(s) orally 3 times a day  guaiFENesin 100 mg/5 mL oral liquid: 5 milliliter(s) orally every 6 hours, As needed, Cough  ipratropium-albuterol 0.5 mg-2.5 mg/3 mLinhalation solution: 3 milliliter(s) inhaled every 6 hours, As needed, Shortness of Breath  levothyroxine 112 mcg (0.112 mg) oral tablet: 1 tab(s) orally once a day  melatonin 3 mg oral tablet: 1 tab(s) orally once a day (at bedtime), As needed, Insomnia  metFORMIN 500 mg oral tablet: 1 tab(s) orally 2 times a day  montelukast 10 mg oral tablet: 1 tab(s) orally once a day (at bedtime)  sodium zirconium cyclosilicate: 5 gram(s) orally once a day  Symbicort 160 mcg-4.5 mcg/inh inhalation aerosol: 2 puff(s) inhaled 2 times a day  tiotropium 18 mcg inhalation capsule: 1 cap(s) inhaled once a day

## 2021-06-29 NOTE — DISCHARGE NOTE PROVIDER - CARE PROVIDERS DIRECT ADDRESSES
,DirectAddress_Unknown,DirectAddress_Unknown [email or web address]

## 2021-06-29 NOTE — DISCHARGE NOTE PROVIDER - PROVIDER TOKENS
FREE:[LAST:[Provider at [facility]],PHONE:[(   )    -],FAX:[(   )    -]],FREE:[LAST:[Primary Care Provider],PHONE:[(   )    -],FAX:[(   )    -]] PROVIDER:[TOKEN:[[id number]],SCHEDULEDAPPT:[07/08/2021],SCHEDULEDAPPTTIME:[09:30 AM]]

## 2021-06-29 NOTE — DISCHARGE NOTE PROVIDER - CARE PROVIDER_API CALL
Provider at [facility],   Phone: (   )    -  Fax: (   )    -  Follow Up Time:     Primary Care Provider,   Phone: (   )    -  Fax: (   )    -  Follow Up Time:    Mane Cheek)  Family Medicine  [address], [address] [place] [address]  Phone: [phone]  Fax: [phone]  Scheduled Appointment: 07/08/2021 09:30 AM

## 2021-06-30 ENCOUNTER — TRANSCRIPTION ENCOUNTER (OUTPATIENT)
Age: 86
End: 2021-06-30

## 2021-06-30 VITALS
DIASTOLIC BLOOD PRESSURE: 58 MMHG | OXYGEN SATURATION: 95 % | RESPIRATION RATE: 18 BRPM | TEMPERATURE: 98 F | SYSTOLIC BLOOD PRESSURE: 131 MMHG | HEART RATE: 95 BPM

## 2021-06-30 LAB
ANION GAP SERPL CALC-SCNC: 12 MMOL/L — SIGNIFICANT CHANGE UP (ref 5–17)
BUN SERPL-MCNC: 49 MG/DL — HIGH (ref 7–23)
CALCIUM SERPL-MCNC: 8.9 MG/DL — SIGNIFICANT CHANGE UP (ref 8.4–10.5)
CHLORIDE SERPL-SCNC: 100 MMOL/L — SIGNIFICANT CHANGE UP (ref 96–108)
CO2 SERPL-SCNC: 27 MMOL/L — SIGNIFICANT CHANGE UP (ref 22–31)
CREAT SERPL-MCNC: 1.69 MG/DL — HIGH (ref 0.5–1.3)
GLUCOSE BLDC GLUCOMTR-MCNC: 119 MG/DL — HIGH (ref 70–99)
GLUCOSE BLDC GLUCOMTR-MCNC: 141 MG/DL — HIGH (ref 70–99)
GLUCOSE BLDC GLUCOMTR-MCNC: 150 MG/DL — HIGH (ref 70–99)
GLUCOSE BLDC GLUCOMTR-MCNC: 174 MG/DL — HIGH (ref 70–99)
GLUCOSE SERPL-MCNC: 116 MG/DL — HIGH (ref 70–99)
HCT VFR BLD CALC: 37.5 % — SIGNIFICANT CHANGE UP (ref 34.5–45)
HGB BLD-MCNC: 11.9 G/DL — SIGNIFICANT CHANGE UP (ref 11.5–15.5)
MCHC RBC-ENTMCNC: 31.7 GM/DL — LOW (ref 32–36)
MCHC RBC-ENTMCNC: 32.1 PG — SIGNIFICANT CHANGE UP (ref 27–34)
MCV RBC AUTO: 101.1 FL — HIGH (ref 80–100)
NRBC # BLD: 0 /100 WBCS — SIGNIFICANT CHANGE UP (ref 0–0)
PLATELET # BLD AUTO: 281 K/UL — SIGNIFICANT CHANGE UP (ref 150–400)
POTASSIUM SERPL-MCNC: 4.9 MMOL/L — SIGNIFICANT CHANGE UP (ref 3.5–5.3)
POTASSIUM SERPL-SCNC: 4.9 MMOL/L — SIGNIFICANT CHANGE UP (ref 3.5–5.3)
RBC # BLD: 3.71 M/UL — LOW (ref 3.8–5.2)
RBC # FLD: 11.9 % — SIGNIFICANT CHANGE UP (ref 10.3–14.5)
SARS-COV-2 RNA SPEC QL NAA+PROBE: SIGNIFICANT CHANGE UP
SODIUM SERPL-SCNC: 139 MMOL/L — SIGNIFICANT CHANGE UP (ref 135–145)
WBC # BLD: 7.34 K/UL — SIGNIFICANT CHANGE UP (ref 3.8–10.5)
WBC # FLD AUTO: 7.34 K/UL — SIGNIFICANT CHANGE UP (ref 3.8–10.5)

## 2021-06-30 PROCEDURE — 84295 ASSAY OF SERUM SODIUM: CPT

## 2021-06-30 PROCEDURE — 82962 GLUCOSE BLOOD TEST: CPT

## 2021-06-30 PROCEDURE — 85018 HEMOGLOBIN: CPT

## 2021-06-30 PROCEDURE — 99285 EMERGENCY DEPT VISIT HI MDM: CPT | Mod: 25

## 2021-06-30 PROCEDURE — 99239 HOSP IP/OBS DSCHRG MGMT >30: CPT

## 2021-06-30 PROCEDURE — U0005: CPT

## 2021-06-30 PROCEDURE — 96374 THER/PROPH/DIAG INJ IV PUSH: CPT

## 2021-06-30 PROCEDURE — U0003: CPT

## 2021-06-30 PROCEDURE — 94640 AIRWAY INHALATION TREATMENT: CPT

## 2021-06-30 PROCEDURE — 83735 ASSAY OF MAGNESIUM: CPT

## 2021-06-30 PROCEDURE — 97161 PT EVAL LOW COMPLEX 20 MIN: CPT

## 2021-06-30 PROCEDURE — 85014 HEMATOCRIT: CPT

## 2021-06-30 PROCEDURE — 82803 BLOOD GASES ANY COMBINATION: CPT

## 2021-06-30 PROCEDURE — 85025 COMPLETE CBC W/AUTO DIFF WBC: CPT

## 2021-06-30 PROCEDURE — 86769 SARS-COV-2 COVID-19 ANTIBODY: CPT

## 2021-06-30 PROCEDURE — 85027 COMPLETE CBC AUTOMATED: CPT

## 2021-06-30 PROCEDURE — 83605 ASSAY OF LACTIC ACID: CPT

## 2021-06-30 PROCEDURE — 81001 URINALYSIS AUTO W/SCOPE: CPT

## 2021-06-30 PROCEDURE — 84100 ASSAY OF PHOSPHORUS: CPT

## 2021-06-30 PROCEDURE — 82435 ASSAY OF BLOOD CHLORIDE: CPT

## 2021-06-30 PROCEDURE — 80053 COMPREHEN METABOLIC PANEL: CPT

## 2021-06-30 PROCEDURE — 82330 ASSAY OF CALCIUM: CPT

## 2021-06-30 PROCEDURE — 80048 BASIC METABOLIC PNL TOTAL CA: CPT

## 2021-06-30 PROCEDURE — 84132 ASSAY OF SERUM POTASSIUM: CPT

## 2021-06-30 PROCEDURE — 82947 ASSAY GLUCOSE BLOOD QUANT: CPT

## 2021-06-30 PROCEDURE — 71045 X-RAY EXAM CHEST 1 VIEW: CPT

## 2021-06-30 RX ORDER — MONTELUKAST 4 MG/1
1 TABLET, CHEWABLE ORAL
Qty: 0 | Refills: 0 | DISCHARGE

## 2021-06-30 RX ORDER — MONTELUKAST 4 MG/1
1 TABLET, CHEWABLE ORAL
Qty: 0 | Refills: 0 | DISCHARGE
Start: 2021-06-30

## 2021-06-30 RX ORDER — GABAPENTIN 400 MG/1
1 CAPSULE ORAL
Qty: 0 | Refills: 0 | DISCHARGE
Start: 2021-06-30

## 2021-06-30 RX ORDER — LEVOTHYROXINE SODIUM 125 MCG
1 TABLET ORAL
Qty: 0 | Refills: 0 | DISCHARGE

## 2021-06-30 RX ORDER — LANOLIN ALCOHOL/MO/W.PET/CERES
1 CREAM (GRAM) TOPICAL
Qty: 0 | Refills: 0 | DISCHARGE
Start: 2021-06-30

## 2021-06-30 RX ORDER — SODIUM ZIRCONIUM CYCLOSILICATE 10 G/10G
5 POWDER, FOR SUSPENSION ORAL
Qty: 0 | Refills: 0 | DISCHARGE
Start: 2021-06-30

## 2021-06-30 RX ORDER — LEVOTHYROXINE SODIUM 125 MCG
1 TABLET ORAL
Qty: 0 | Refills: 0 | DISCHARGE
Start: 2021-06-30

## 2021-06-30 RX ADMIN — Medication 1: at 08:00

## 2021-06-30 RX ADMIN — BUDESONIDE AND FORMOTEROL FUMARATE DIHYDRATE 2 PUFF(S): 160; 4.5 AEROSOL RESPIRATORY (INHALATION) at 05:21

## 2021-06-30 RX ADMIN — BUDESONIDE AND FORMOTEROL FUMARATE DIHYDRATE 2 PUFF(S): 160; 4.5 AEROSOL RESPIRATORY (INHALATION) at 17:42

## 2021-06-30 RX ADMIN — TIOTROPIUM BROMIDE 1 CAPSULE(S): 18 CAPSULE ORAL; RESPIRATORY (INHALATION) at 11:39

## 2021-06-30 RX ADMIN — HEPARIN SODIUM 5000 UNIT(S): 5000 INJECTION INTRAVENOUS; SUBCUTANEOUS at 17:42

## 2021-06-30 RX ADMIN — HEPARIN SODIUM 5000 UNIT(S): 5000 INJECTION INTRAVENOUS; SUBCUTANEOUS at 05:21

## 2021-06-30 RX ADMIN — GABAPENTIN 100 MILLIGRAM(S): 400 CAPSULE ORAL at 05:20

## 2021-06-30 RX ADMIN — SODIUM ZIRCONIUM CYCLOSILICATE 5 GRAM(S): 10 POWDER, FOR SUSPENSION ORAL at 11:38

## 2021-06-30 RX ADMIN — GABAPENTIN 100 MILLIGRAM(S): 400 CAPSULE ORAL at 14:01

## 2021-06-30 RX ADMIN — Medication 112 MICROGRAM(S): at 05:20

## 2021-06-30 NOTE — PROGRESS NOTE ADULT - PROBLEM SELECTOR PLAN 4
- c/w synthroid, TSH 1.66 06/17/21

## 2021-06-30 NOTE — PROGRESS NOTE ADULT - PROBLEM SELECTOR PLAN 3
- hbg A1c 6.9 in 2019  - Was on metformin BID as an outpatient  - low dose sliding scale

## 2021-06-30 NOTE — PROGRESS NOTE ADULT - PROBLEM SELECTOR PLAN 1
- seems chronic in setting of CKD. dietary contribution as well.   - No EKG changes. f/u BMP  - Low potassium diet, nutrition c/s  - cont  lokelma 5 mg daily for chronic hyperkalemia.
- seems chronic in setting of CKD. dietary contribution as well.   - No EKG changes. today stable at 5.3  - Continue to monitor BMP  - Low potassium diet, nutrition c/s  - Will add low dose lokelma 5 mg daily for chronic hyperkalemia.
- Etiology unclear, possibly dietary. Patient endorses drinking orange juice every day   - No known new medications   - Has a prior hx, no etiology identified  - Improved after insulin, albuterol in the ED to 5.0. No EKG changes. today stable at 5.3  - Continue to monitor BMP  - Low potassium diet, nutrition c/s
- seems chronic in setting of CKD. dietary contribution as well.   - No EKG changes. f/u BMP  - Low potassium diet, nutrition c/s  - cont  lokelma 5 mg daily for chronic hyperkalemia.

## 2021-06-30 NOTE — DISCHARGE NOTE NURSING/CASE MANAGEMENT/SOCIAL WORK - NSDCVIVACCINE_GEN_ALL_CORE_FT
Influenza, high dose seasonal; 24-Sep-2019 12:44; Shellie Hannah (Student, Nursing); [employer]; [id number] (Exp. Date: 24-Apr-2020); IntraMuscular; Deltoid Right.; 0.5 milliLiter(s); VIS (VIS Published: 15-Aug-2019, VIS Presented: 24-Sep-2019);

## 2021-06-30 NOTE — PROGRESS NOTE ADULT - PROBLEM SELECTOR PLAN 5
- On PRN 3L NC at home  - Still w/ occasional cough but no SOB or wheezing  - c/w symbicort, spiriva, singular  - albuterol PRN
- On 3L NC at home, currently on home requirements  - Still w/ occasional cough after parainfluenza infection but otherwise no SOB or wheezing  - c/w symbicort, spiriva, singular  - albuterol PRN
- On PRN 3L NC at home currently on 4 liters  - Still w/ occasional cough but no SOB or wheezing  - c/w symbicort, spiriva, singular  - albuterol PRN
- On PRN 3L NC at home currently on 4 liters  - Still w/ occasional cough but no SOB or wheezing  - c/w symbicort, spiriva, singular  - albuterol PRN

## 2021-06-30 NOTE — PROGRESS NOTE ADULT - ASSESSMENT
This is a 85 y/o female w/ pmhx significant for COPD (on 3L NC at home), CKD 3, T2DM, HTN, HFpEF (EF 60% 2019), Hypothyroid and cataracts s/p removal (2013) who was sent in from [facility] rehab for hyperkalemia on labs. Has a prior hx of hyperkalemia w/ CKD, no new medication changes. Possibly related to diet. 
This is a 87 y/o female w/ pmhx significant for COPD (on 3L NC at home), CKD 3, T2DM, HTN, HFpEF (EF 60% 2019), Hypothyroid and cataracts s/p removal (2013) who was sent in from [facility] rehab for hyperkalemia on labs. Has a prior hx of hyperkalemia w/ CKD, no new medication changes. Possibly related to diet. 
This is a 87 y/o female w/ pmhx significant for COPD (on 3L NC at home), CKD 3, T2DM, HTN, HFpEF (EF 60% 2019), Hypothyroid and cataracts s/p removal (2013) who was sent in from [facility] rehab for hyperkalemia on labs. Has a prior hx of hyperkalemia w/ CKD, no new medication changes. Possibly related to diet. 
This is a 85 y/o female w/ pmhx significant for COPD (on 3L NC at home), CKD 3, T2DM, HTN, HFpEF (EF 60% 2019), Hypothyroid and cataracts s/p removal (2013) who was sent in from [facility] rehab for hyperkalemia on labs. Has a prior hx of hyperkalemia w/ CKD, no new medication changes. Possibly related to diet.

## 2021-06-30 NOTE — PROGRESS NOTE ADULT - PROBLEM SELECTOR PLAN 6
DVT ppx: hsq  PT
DVT ppx: hsq  PT back to rehab

## 2021-06-30 NOTE — PROGRESS NOTE ADULT - SUBJECTIVE AND OBJECTIVE BOX
[facility] Division of Hospital Medicine  Diana Martinez DO pager [phone]    Patient is a 86y old  Female who presents with a chief complaint of hyperkalemia (2021 15:50)      SUBJECTIVE / OVERNIGHT EVENTS:  No complaints this AM. No SOB, CP, palpitations, abd pain.   ADDITIONAL REVIEW OF SYSTEMS:    MEDICATIONS  (STANDING):  budesonide 160 MICROgram(s)/formoterol 4.5 MICROgram(s) Inhaler 2 Puff(s) Inhalation two times a day  dextrose 40% Gel 15 Gram(s) Oral once  dextrose 5%. 1000 milliLiter(s) (50 mL/Hr) IV Continuous <Continuous>  dextrose 5%. 1000 milliLiter(s) (100 mL/Hr) IV Continuous <Continuous>  dextrose 50% Injectable 25 Gram(s) IV Push once  dextrose 50% Injectable 12.5 Gram(s) IV Push once  dextrose 50% Injectable 25 Gram(s) IV Push once  gabapentin 100 milliGRAM(s) Oral three times a day  glucagon  Injectable 1 milliGRAM(s) IntraMuscular once  heparin   Injectable 5000 Unit(s) SubCutaneous every 12 hours  insulin lispro (ADMELOG) corrective regimen sliding scale   SubCutaneous three times a day before meals  insulin lispro (ADMELOG) corrective regimen sliding scale   SubCutaneous at bedtime  levothyroxine 112 MICROGram(s) Oral daily  montelukast 10 milliGRAM(s) Oral at bedtime  tiotropium 18 MICROgram(s) Capsule 1 Capsule(s) Inhalation daily    MEDICATIONS  (PRN):  ALBUTerol    90 MICROgram(s) HFA Inhaler 2 Puff(s) Inhalation every 6 hours PRN Shortness of Breath and/or Wheezing  melatonin 3 milliGRAM(s) Oral at bedtime PRN Insomnia      CAPILLARY BLOOD GLUCOSE      POCT Blood Glucose.: 122 mg/dL (2021 07:34)  POCT Blood Glucose.: 115 mg/dL (2021 21:04)  POCT Blood Glucose.: 152 mg/dL (2021 18:18)  POCT Blood Glucose.: 163 mg/dL (2021 13:50)    I&O's Summary    2021 07:01  -  2021 07:00  --------------------------------------------------------  IN: 220 mL / OUT: 0 mL / NET: 220 mL        PHYSICAL EXAM:  Vital Signs Last 24 Hrs  T(C): 36.6 (2021 04:00), Max: 37.7 (2021 11:06)  T(F): 97.9 (2021 04:00), Max: 99.8 (2021 11:06)  HR: 73 (2021 04:00) (73 - 83)  BP: 159/77 (2021 04:00) (95/54 - 159/77)  BP(mean): --  RR: 18 (2021 04:00) (18 - 22)  SpO2: 92% (2021 04:00) (92% - 97%)    CONSTITUTIONAL: NAD, well-developed  ENMT: Moist oral mucosa, no pharyngeal injection or exudates  RESPIRATORY: Normal respiratory effort; decrease breath sounds bilaterally  CARDIOVASCULAR: Regular rate and rhythm, normal S1 and S2; trace lower extremity edema  ABDOMEN: Nontender to palpation, normoactive bowel sounds, no rebound/guarding  MUSCULOSKELETAL:  no clubbing or cyanosis of digits; no joint swelling or tenderness to palpation  PSYCH: A+O to person, place, and time; affect appropriate  NEUROLOGY: CN 2-12 are intact and symmetric; moving all extremities         LABS:                        12.9   9.03  )-----------( 247      ( 2021 09:07 )             40.7     06-27    138  |  100  |  40<H>  ----------------------------<  141<H>  5.3   |  27  |  1.30    Ca    8.8      2021 09:07  Phos  3.1     06-  Mg     2.1     -    TPro  7.0  /  Alb  3.3  /  TBili  0.3  /  DBili  x   /  AST  30  /  ALT  22  /  AlkPhos  70  06-          Urinalysis Basic - ( 2021 16:33 )    Color: Light Yellow / Appearance: Clear / S.012 / pH: x  Gluc: x / Ketone: Negative  / Bili: Negative / Urobili: Negative   Blood: x / Protein: Trace / Nitrite: Negative   Leuk Esterase: Negative / RBC: 3 /hpf / WBC 1 /HPF   Sq Epi: x / Non Sq Epi: 0 /hpf / Bacteria: Negative          RADIOLOGY & ADDITIONAL TESTS:  Results Reviewed:   Imaging Personally Reviewed:  Electrocardiogram Personally Reviewed:    COORDINATION OF CARE:  Care Discussed with Consultants/Other Providers [Y/N]:  Prior or Outpatient Records Reviewed [Y/N]:  
[facility] Division of Hospital Medicine  Patience Lockwood MD  Pager (M-F, 9Q-2E): [phone]  Other Times:  [phone]    Patient is a 86y old  Female who presents with a chief complaint of hyperkalemia (29 Jun 2021 17:41)      SUBJECTIVE / OVERNIGHT EVENTS:  afebrile feeling ok. no acute overnight issues.      ADDITIONAL REVIEW OF SYSTEMS: otherwise neg    MEDICATIONS  (STANDING):  budesonide 160 MICROgram(s)/formoterol 4.5 MICROgram(s) Inhaler 2 Puff(s) Inhalation two times a day  dextrose 40% Gel 15 Gram(s) Oral once  dextrose 5%. 1000 milliLiter(s) (50 mL/Hr) IV Continuous <Continuous>  dextrose 5%. 1000 milliLiter(s) (100 mL/Hr) IV Continuous <Continuous>  dextrose 50% Injectable 25 Gram(s) IV Push once  dextrose 50% Injectable 12.5 Gram(s) IV Push once  dextrose 50% Injectable 25 Gram(s) IV Push once  gabapentin 100 milliGRAM(s) Oral three times a day  glucagon  Injectable 1 milliGRAM(s) IntraMuscular once  heparin   Injectable 5000 Unit(s) SubCutaneous every 12 hours  insulin lispro (ADMELOG) corrective regimen sliding scale   SubCutaneous three times a day before meals  insulin lispro (ADMELOG) corrective regimen sliding scale   SubCutaneous at bedtime  levothyroxine 112 MICROGram(s) Oral daily  montelukast 10 milliGRAM(s) Oral at bedtime  sodium zirconium cyclosilicate 5 Gram(s) Oral daily  tiotropium 18 MICROgram(s) Capsule 1 Capsule(s) Inhalation daily    MEDICATIONS  (PRN):  ALBUTerol    90 MICROgram(s) HFA Inhaler 2 Puff(s) Inhalation every 6 hours PRN Shortness of Breath and/or Wheezing  guaiFENesin Oral Liquid (Sugar-Free) 100 milliGRAM(s) Oral every 6 hours PRN Cough  melatonin 3 milliGRAM(s) Oral at bedtime PRN Insomnia      CAPILLARY BLOOD GLUCOSE      POCT Blood Glucose.: 119 mg/dL (30 Jun 2021 11:59)  POCT Blood Glucose.: 174 mg/dL (30 Jun 2021 07:27)  POCT Blood Glucose.: 129 mg/dL (29 Jun 2021 21:00)  POCT Blood Glucose.: 99 mg/dL (29 Jun 2021 16:26)    I&O's Summary    29 Jun 2021 07:01  -  30 Jun 2021 07:00  --------------------------------------------------------  IN: 0 mL / OUT: 1000 mL / NET: -1000 mL        PHYSICAL EXAM:  Vital Signs Last 24 Hrs  T(C): 37.1 (30 Jun 2021 11:57), Max: 37.1 (30 Jun 2021 05:18)  T(F): 98.7 (30 Jun 2021 11:57), Max: 98.8 (30 Jun 2021 05:18)  HR: 65 (30 Jun 2021 11:57) (65 - 77)  BP: 112/68 (30 Jun 2021 11:57) (109/63 - 121/74)  BP(mean): --  RR: 18 (30 Jun 2021 11:57) (18 - 18)  SpO2: 95% (30 Jun 2021 11:57) (90% - 95%)    CONSTITUTIONAL: NAD  ENMT: Moist oral mucosa, no pharyngeal injection or exudates  RESPIRATORY: Normal respiratory effort; decrease breath sounds bilaterally  CARDIOVASCULAR: Regular rate and rhythm, normal S1 and S2; trace lower extremity edema  ABDOMEN: Nontender to palpation, normoactive bowel sounds, no rebound/guarding  MUSCULOSKELETAL:  no clubbing or cyanosis of digits; no joint swelling or tenderness to palpation  PSYCH: A+O to person, place, and time; affect appropriate  NEUROLOGY: CN 2-12 are intact and symmetric; moving all extremities   Skin: no rash     LABS:                        11.9   7.34  )-----------( 281      ( 30 Jun 2021 05:53 )             37.5     06-30    139  |  100  |  49<H>  ----------------------------<  116<H>  4.9   |  27  |  1.69<H>    Ca    8.9      30 Jun 2021 05:53                  RADIOLOGY & ADDITIONAL TESTS:  Results Reviewed:   Imaging Personally Reviewed:  Electrocardiogram Personally Reviewed:    COORDINATION OF CARE:  Care Discussed with Consultants/Other Providers [Y/N]:  Prior or Outpatient Records Reviewed [Y/N]:  
[facility] Division of Hospital Medicine  Patience Lockwood MD  Pager (M-F, 6Y-9Z): [phone]  Other Times:  [phone]      SUBJECTIVE / OVERNIGHT EVENTS:  feeling ok. denies any specific complaints, afebrile.     ADDITIONAL REVIEW OF SYSTEMS: otherwise neg    MEDICATIONS  (STANDING):  budesonide 160 MICROgram(s)/formoterol 4.5 MICROgram(s) Inhaler 2 Puff(s) Inhalation two times a day  dextrose 40% Gel 15 Gram(s) Oral once  dextrose 5%. 1000 milliLiter(s) (100 mL/Hr) IV Continuous <Continuous>  dextrose 5%. 1000 milliLiter(s) (50 mL/Hr) IV Continuous <Continuous>  dextrose 50% Injectable 25 Gram(s) IV Push once  dextrose 50% Injectable 12.5 Gram(s) IV Push once  dextrose 50% Injectable 25 Gram(s) IV Push once  gabapentin 100 milliGRAM(s) Oral three times a day  glucagon  Injectable 1 milliGRAM(s) IntraMuscular once  heparin   Injectable 5000 Unit(s) SubCutaneous every 12 hours  insulin lispro (ADMELOG) corrective regimen sliding scale   SubCutaneous three times a day before meals  insulin lispro (ADMELOG) corrective regimen sliding scale   SubCutaneous at bedtime  levothyroxine 112 MICROGram(s) Oral daily  montelukast 10 milliGRAM(s) Oral at bedtime  sodium zirconium cyclosilicate 5 Gram(s) Oral daily  tiotropium 18 MICROgram(s) Capsule 1 Capsule(s) Inhalation daily    MEDICATIONS  (PRN):  ALBUTerol    90 MICROgram(s) HFA Inhaler 2 Puff(s) Inhalation every 6 hours PRN Shortness of Breath and/or Wheezing  guaiFENesin Oral Liquid (Sugar-Free) 100 milliGRAM(s) Oral every 6 hours PRN Cough  melatonin 3 milliGRAM(s) Oral at bedtime PRN Insomnia      I&O's Summary    28 Jun 2021 07:01  -  29 Jun 2021 07:00  --------------------------------------------------------  IN: 240 mL / OUT: 800 mL / NET: -560 mL        PHYSICAL EXAM:  Vital Signs Last 24 Hrs  T(C): 36.8 (29 Jun 2021 11:26), Max: 37.2 (28 Jun 2021 20:21)  T(F): 98.3 (29 Jun 2021 11:26), Max: 98.9 (28 Jun 2021 20:21)  HR: 83 (29 Jun 2021 11:26) (63 - 83)  BP: 119/53 (29 Jun 2021 11:26) (108/63 - 119/53)  BP(mean): --  RR: 18 (29 Jun 2021 11:26) (18 - 18)  SpO2: 92% (29 Jun 2021 11:26) (92% - 95%)    CONSTITUTIONAL: NAD  ENMT: Moist oral mucosa, no pharyngeal injection or exudates  RESPIRATORY: Normal respiratory effort; decrease breath sounds bilaterally  CARDIOVASCULAR: Regular rate and rhythm, normal S1 and S2; trace lower extremity edema  ABDOMEN: Nontender to palpation, normoactive bowel sounds, no rebound/guarding  MUSCULOSKELETAL:  no clubbing or cyanosis of digits; no joint swelling or tenderness to palpation  PSYCH: A+O to person, place, and time; affect appropriate  NEUROLOGY: CN 2-12 are intact and symmetric; moving all extremities   Skin: no rash     LABS:    06-28    137  |  101  |  44<H>  ----------------------------<  117<H>  5.3   |  24  |  1.51<H>    Ca    8.7      28 Jun 2021 06:21                  RADIOLOGY & ADDITIONAL TESTS:  Results Reviewed:   Imaging Personally Reviewed:  Electrocardiogram Personally Reviewed:    COORDINATION OF CARE:  Care Discussed with Consultants/Other Providers [Y/N]:  Prior or Outpatient Records Reviewed [Y/N]:  
[facility] Division of Hospital Medicine  Patience Lockwood MD  Pager (M-F, 2M-0S): [phone]  Other Times:  [phone]    Patient is a 86y old  Female who presents with a chief complaint of hyperkalemia (27 Jun 2021 11:04)      SUBJECTIVE / OVERNIGHT EVENTS:  afebrile, SOb with exertion with PT . desat to 88% on 4 liters.     ADDITIONAL REVIEW OF SYSTEMS: otherwise neg    MEDICATIONS  (STANDING):  budesonide 160 MICROgram(s)/formoterol 4.5 MICROgram(s) Inhaler 2 Puff(s) Inhalation two times a day  dextrose 40% Gel 15 Gram(s) Oral once  dextrose 5%. 1000 milliLiter(s) (50 mL/Hr) IV Continuous <Continuous>  dextrose 5%. 1000 milliLiter(s) (100 mL/Hr) IV Continuous <Continuous>  dextrose 50% Injectable 25 Gram(s) IV Push once  dextrose 50% Injectable 12.5 Gram(s) IV Push once  dextrose 50% Injectable 25 Gram(s) IV Push once  gabapentin 100 milliGRAM(s) Oral three times a day  glucagon  Injectable 1 milliGRAM(s) IntraMuscular once  heparin   Injectable 5000 Unit(s) SubCutaneous every 12 hours  insulin lispro (ADMELOG) corrective regimen sliding scale   SubCutaneous three times a day before meals  insulin lispro (ADMELOG) corrective regimen sliding scale   SubCutaneous at bedtime  levothyroxine 112 MICROGram(s) Oral daily  montelukast 10 milliGRAM(s) Oral at bedtime  sodium zirconium cyclosilicate 5 Gram(s) Oral once  tiotropium 18 MICROgram(s) Capsule 1 Capsule(s) Inhalation daily    MEDICATIONS  (PRN):  ALBUTerol    90 MICROgram(s) HFA Inhaler 2 Puff(s) Inhalation every 6 hours PRN Shortness of Breath and/or Wheezing  guaiFENesin Oral Liquid (Sugar-Free) 100 milliGRAM(s) Oral every 6 hours PRN Cough  melatonin 3 milliGRAM(s) Oral at bedtime PRN Insomnia      CAPILLARY BLOOD GLUCOSE      POCT Blood Glucose.: 121 mg/dL (28 Jun 2021 16:18)  POCT Blood Glucose.: 171 mg/dL (28 Jun 2021 11:44)  POCT Blood Glucose.: 143 mg/dL (28 Jun 2021 07:44)  POCT Blood Glucose.: 125 mg/dL (27 Jun 2021 21:29)    I&O's Summary    27 Jun 2021 07:01  -  28 Jun 2021 07:00  --------------------------------------------------------  IN: 200 mL / OUT: 0 mL / NET: 200 mL        PHYSICAL EXAM:  Vital Signs Last 24 Hrs  T(C): 36.7 (28 Jun 2021 13:32), Max: 36.9 (28 Jun 2021 04:03)  T(F): 98.1 (28 Jun 2021 13:32), Max: 98.4 (28 Jun 2021 04:03)  HR: 67 (28 Jun 2021 13:32) (64 - 84)  BP: 102/64 (28 Jun 2021 13:32) (102/64 - 126/62)  BP(mean): --  RR: 18 (28 Jun 2021 13:32) (18 - 18)  SpO2: 95% (28 Jun 2021 13:32) (90% - 97%)    CONSTITUTIONAL: NAD  ENMT: Moist oral mucosa, no pharyngeal injection or exudates  RESPIRATORY: Normal respiratory effort; decrease breath sounds bilaterally  CARDIOVASCULAR: Regular rate and rhythm, normal S1 and S2; trace lower extremity edema  ABDOMEN: Nontender to palpation, normoactive bowel sounds, no rebound/guarding  MUSCULOSKELETAL:  no clubbing or cyanosis of digits; no joint swelling or tenderness to palpation  PSYCH: A+O to person, place, and time; affect appropriate  NEUROLOGY: CN 2-12 are intact and symmetric; moving all extremities   Skin: no rash       LABS:                        12.9   9.03  )-----------( 247      ( 27 Jun 2021 09:07 )             40.7     06-28    137  |  101  |  44<H>  ----------------------------<  117<H>  5.3   |  24  |  1.51<H>    Ca    8.7      28 Jun 2021 06:21  Phos  3.1     06-27  Mg     2.1     06-27                  RADIOLOGY & ADDITIONAL TESTS:  Results Reviewed:   Imaging Personally Reviewed:  Electrocardiogram Personally Reviewed:    COORDINATION OF CARE:  Care Discussed with Consultants/Other Providers [Y/N]:  Prior or Outpatient Records Reviewed [Y/N]:

## 2021-06-30 NOTE — PROGRESS NOTE ADULT - PROBLEM SELECTOR PROBLEM 2
Stage 3b chronic kidney disease

## 2021-06-30 NOTE — PROGRESS NOTE ADULT - PROBLEM SELECTOR PLAN 2
- Cr. at baseline, continue to monitor

## 2021-06-30 NOTE — PROGRESS NOTE ADULT - NSPROGADDITIONALINFOA_GEN_ALL_CORE
d/w ACP Sandra
d/w ACP   d/c planning to upstae MEGAN  d/c time 40 mins
d/w ACP   awaiting placement [facility].
d/w ACP Sandra

## 2021-06-30 NOTE — DISCHARGE NOTE NURSING/CASE MANAGEMENT/SOCIAL WORK - PATIENT PORTAL LINK FT
You can access the FollowMyHealth Patient Portal offered by [facility] by registering at the following website: [email or web address]. By joining [employer]’s FollowMyHealth portal, you will also be able to view your health information using other applications (apps) compatible with our system.

## 2021-07-08 ENCOUNTER — NON-APPOINTMENT (OUTPATIENT)
Age: 86
End: 2021-07-08

## 2021-07-08 ENCOUNTER — APPOINTMENT (OUTPATIENT)
Dept: FAMILY MEDICINE | Facility: CLINIC | Age: 86
End: 2021-07-08

## 2021-08-16 ENCOUNTER — NON-APPOINTMENT (OUTPATIENT)
Age: 86
End: 2021-08-16

## 2021-08-19 ENCOUNTER — APPOINTMENT (OUTPATIENT)
Dept: ORTHOPEDIC SURGERY | Facility: CLINIC | Age: 86
End: 2021-08-19
Payer: MEDICARE

## 2021-08-19 VITALS
WEIGHT: 152 LBS | HEART RATE: 80 BPM | OXYGEN SATURATION: 90 % | DIASTOLIC BLOOD PRESSURE: 78 MMHG | HEIGHT: 65 IN | SYSTOLIC BLOOD PRESSURE: 171 MMHG | BODY MASS INDEX: 25.33 KG/M2

## 2021-08-19 DIAGNOSIS — Z96.642 PRESENCE OF LEFT ARTIFICIAL HIP JOINT: ICD-10-CM

## 2021-08-19 PROCEDURE — 99213 OFFICE O/P EST LOW 20 MIN: CPT

## 2021-08-19 PROCEDURE — 73502 X-RAY EXAM HIP UNI 2-3 VIEWS: CPT | Mod: LT

## 2021-08-19 NOTE — DISCUSSION/SUMMARY
[de-identified] : This is an 86-year-old female now nearly 2 years status post left hip hemiarthroplasty, doing very well.  Her current complaints are related to weakness and imbalance therefore I have recommended physical therapy for further strengthening and gait training.  Follow-up on a as needed basis.

## 2021-08-19 NOTE — DATA REVIEWED
[de-identified] : 8/19/2021–left hip x-rays (AP, lateral): Well-positioned hip hemiarthroplasty without periprosthetic fractures or dislocations.

## 2021-08-19 NOTE — PHYSICAL EXAM
[FreeTextEntry1] : General: well nourished, in no acute distress, alert and oriented to person, place and time.\par Psychiatric: normal mood and affect, no abnormal movements or speech patterns.\par Eyes: vision intact without deficits, sclera and conjunctiva were normal, pupils were equal in size. \par ENT: Ears and nose were normal in appearance. No thyromegaly.\par Lymph: no enlarged nodes, no lymphedema in extremity.\par Respiratory: Normal respiratory rhythm and effort. No wheezing detected without auscultation. No shortness of breath or respiratory distress.\par Cardiac: no cardiac related leg swelling.\par Neurology: normal gross sensation in extremities to light touch.\par Abdomen: soft, non-tender, tympanic, no masses.\par \par LLE:\par \par Incision well-healed CDI. No ecchymosis or swelling. No TTP.\par No instability. No TTP over GT. No palpable masses. No lymphedema.\par Negative LR, HS, SLR. \par ROM: FF: 90. IR: 5. ER: 30. \par EHL/FHL/GS/TA 5/5. S/S/SP/DP/T SILT. Toes warm, BCR. Compartments soft.\par Gait: Patient is seen able to ambulate using the walker without any pain.

## 2021-08-19 NOTE — HISTORY OF PRESENT ILLNESS
[FreeTextEntry1] : Ms. Welsh is an 85-year-old female with a history of DM, COPD, HTN, CKD, hypothyroidism, who underwent a left hip hemiarthroplasty nearly 2 years ago on 9/20/2019 due to a traumatic left femoral neck fracture. Her hospital course was complicated by an admission to the SICU due to respiratory distress. She was ultimately stabilized and progressed very well, making a substantial recovery. Recently had a left hip steroid injection for GT bursitis (10/2020), which resolved her pain at that time. Today, she is presenting because of difficulty walking without a walker.  She does not have any pain, and just has weakness and imbalance.  Has not had any falls.

## 2021-09-12 NOTE — PROGRESS NOTE ADULT - PROBLEM/PLAN-4
DISPLAY PLAN FREE TEXT
non-verbal indicators present

## 2021-09-16 ENCOUNTER — RX RENEWAL (OUTPATIENT)
Age: 86
End: 2021-09-16

## 2021-09-17 NOTE — PATIENT PROFILE ADULT - NSPROHMSYMPCOND_GEN_A_NUR
PRESCRIPTION REFILL REMINDER:  All medication refills should be requested prior to [facility] on Friday  Any refill requests after noon on Friday would be addressed the following Monday  Please protect yourself from COVID-19 and the delta variant! Even though we do not good antiviral drugs for this infection, the following tools can help you stay healthy:    = Wash your hands! Soap and water, or hand  with at least 60% alcohol, are both effective at killing the virus  = Wear a mask! This will help protect others from any virus particles you might spread  Your mouth and nose BOTH need to be covered  = Keep the distance! Keep 6 feet of distance from other people, even if they seem healthy  Keeping distance protects you from the other person's virus spread     = Get a vaccine! Three vaccines are approved for emergency use in the [place]; they are made by [employer]sey, and [employer]  These vaccines have been shown to be 90+% effective at preventing severe infections when combined with masking, hand-washing, and distancing  These vaccines do provide protection against the dangerous delta variant!  + Pfizer may be given to all persons age 15 and older   + Lisa Venita may be given to all adults age 25 and older   + [employer] may be given to all adults age 25 and older  (Serious blood clot side effects have only been reported in reproductive-age women, and these are about 1-in-a-million  We do NOT recommend J&J for people who have had Guillan-Bondurant syndrome )  = You may get a shot from many other locations outside [facility]: [facility], [employer], [place], [place], and certain [facility] locations  + As of 8/13/21, the CDC recommends booster shots on Pfizer and Moderna vaccines for immune-suppressed populations  Ask your doctor if you qualify    https://www [employer]/      We are recommending that ALL our patients get a complete series of one of the three vaccines, as early as it is available to them  Please keep an eye on the [employer], [address], or call 5-930-RYNRNXY (Choose Option 7 for faster service!) to see when you will become eligible  If you are eligible by the criteria above, please ask our team to help get you a shot! Informacion en espanol sobre vacunas, de nos companeros [address] --  [employer]      Numbers of Coronavirus cases (and COVID deaths) are worsening in many [place] States, among unvaccinated people, we think this is because vaccines are working, but only if you get one! As of 7/1/21, we do NOT advise travel OUTSIDE the [address], and we encourage you to be very careful when planning travel INSIDE the [address]  Check out [employer] for Jim data that are updated daily:    http://www [employer]/     Global Epidemics  Org, from [facility] will give you [employer] information on virus cases and vaccination rates:    Https://globalepidemics  org/    Frequently Asked Questions about COVID, answered by [facility]    Security es diabetes

## 2021-10-10 ENCOUNTER — APPOINTMENT (OUTPATIENT)
Dept: FAMILY MEDICINE | Facility: CLINIC | Age: 86
End: 2021-10-10
Payer: MEDICARE

## 2021-10-10 VITALS
DIASTOLIC BLOOD PRESSURE: 72 MMHG | SYSTOLIC BLOOD PRESSURE: 140 MMHG | OXYGEN SATURATION: 88 % | RESPIRATION RATE: 18 BRPM | BODY MASS INDEX: 25.83 KG/M2 | HEART RATE: 73 BPM | WEIGHT: 155 LBS | HEIGHT: 65 IN | TEMPERATURE: 97.9 F

## 2021-10-10 PROCEDURE — 99214 OFFICE O/P EST MOD 30 MIN: CPT | Mod: 25

## 2021-10-10 PROCEDURE — 36415 COLL VENOUS BLD VENIPUNCTURE: CPT

## 2021-10-11 ENCOUNTER — APPOINTMENT (OUTPATIENT)
Dept: FAMILY MEDICINE | Facility: CLINIC | Age: 86
End: 2021-10-11

## 2021-10-19 NOTE — PATIENT PROFILE ADULT. - CENTRAL VENOUS CATHETER
Procedure Performed:  Bilateral L3,4,5 MEDIAL BRANCH RADIOFREQUENCY ABLATION     Preoperative Diagnoses:   1. Lumbar spondylosis    2. Intractable back pain           Postoperative Diagnoses:    1. Lumbar spondylosis    2. Intractable back pain           Surgeon:  Marlene Pulliam MD     Assistant: none    Anethesia:  IV sedation was given by the RN per my orders until moderate sedation was achieved.  The patient was conversant throughout the procedure.      Description of Procedure:  After obtaining an informed written consent, a heplock was placed in the patient's arm.  The patient was brought to the procedure room and placed prone on the procedure room table.  Pillows were placed under the abdomen to decrease the lumbar lordosis.  Monitors were applied and the patient was monitored continuously throughout the procedure.  The back was prepped and draped in the usual sterile fashion.  The lumbar vertebrae were identified using fluoroscopy. For the right L3,4,5 medial branch nerve, the L4,5 and S1 vertebra was identified by fluoroscopy respectively.  The end plates were squared by rotating the C-arm craniocaudad.   The C-arm was then rotated ipsilaterally to obtain an oblique view of the respective vertebra.  The skin and subcutaneous tissue was infiltrated with 1% lidocaine using a 25 gauge 1.5 inch needle.  A 18 gauge 150 mm radiofrequency curved tip venom needle with a 10 mm active tip was then inserted under fluoroscopic guidance and advanced towards the junction of the superior margin of the transverse process and the lateral border of the superior articulating process of vertebrae of till bone was encountered. For the right L5 medial branch nerve, the target point was at supermedial aspect of the sacral ala just lateral to the superior articulating process of S1.    The curved needle tip was then rotated superolaterally.  The needle was advanced 2 mm to lie along the path of the right L3,4,5 medial branch nerve.   The position of the needle was verified in the AP and lateral views.  A careful aspiration was done to rule out blood and cerebrospinal fluid.  The electrode was then inserted.  Sensory and motor stimulation were then performed which elicited deep local back discomfort but no evidence of motor stimulation in the gluteal muscles or extremities.  0.5 ml of preservative free plain 2% lidocaine was administered carefully through the RF needle.  Subsequently a medial branch nerve denervation was performed for 90 seconds at 80 degrees.  Following the ablation, 0.5 ml of a mixture containing 80 mg depomedrol and 9 ml preservative free plain 0.25% bupivacaine was instilled through the RF needle.  The needle was then restyleted and withdrawn. Procedure was then repeated on the left side in the exact same manner described above. Patient tolerated procedue well. No complications were encountered.    EBL: none    Specimen removed : none    Follow-up Instructions:    The patient was taken to the recovery room and oral fluids were given.  The patient was allowed to ambulate about 2 hours after the procedure.  There was no subjective or objective loss of motor strength.  Vital signs were obtained.  The patient was instructed not to drive, shower or perform any strenuous physical activities for that day, but could shower and drive the next day.  The patient was informed of the usual post procedural symptoms like increased stiffness and pain locally for the next 24 to 48 hours.  The patient was instructed to use NSAID's and ice the area.  The patient was told to resume preoperative medications and to use additional pain medication if needed.  The patient was instructed to call the answering service for the physician on call for any unusual symptoms or to call the physician with any questions that they might have.  Patient demonstrated understanding and agreed and was discharged from the facility in stable condition.       no

## 2021-10-22 ENCOUNTER — RX RENEWAL (OUTPATIENT)
Age: 86
End: 2021-10-22

## 2021-11-18 ENCOUNTER — APPOINTMENT (OUTPATIENT)
Dept: PULMONOLOGY | Facility: CLINIC | Age: 86
End: 2021-11-18
Payer: MEDICARE

## 2021-11-18 VITALS
TEMPERATURE: 97 F | WEIGHT: 138 LBS | BODY MASS INDEX: 22.99 KG/M2 | DIASTOLIC BLOOD PRESSURE: 68 MMHG | RESPIRATION RATE: 15 BRPM | OXYGEN SATURATION: 84 % | HEIGHT: 65 IN | SYSTOLIC BLOOD PRESSURE: 169 MMHG | HEART RATE: 105 BPM

## 2021-11-18 PROCEDURE — 99213 OFFICE O/P EST LOW 20 MIN: CPT

## 2021-11-18 NOTE — END OF VISIT
[FreeTextEntry3] : I obtained the history and examined the patient and developed a plan of care  Jan Brooke MD\par

## 2021-11-18 NOTE — HISTORY OF PRESENT ILLNESS
[FreeTextEntry1] : 88yo female hospitalized ([facility] 9/20/19-10/8/19) for fractured L hip with accompanying respiratory failure.  She continues to take lasix 20 mg po daily.  She was a significant former smoker and has underlying diastolic dysfunction, as well as COPD.  She has supplemental oxygen at 3L that she claims to use with sleep on occasion, despite past discussions to use more during the daytime additionally.  She is taking Symbicort and Spiriva as well as albuterol. \par She was in the hospital over the summer for parainfluenza and then sent to rehab further [facility].  She continues to live alone in [place].

## 2021-11-18 NOTE — ASSESSMENT
[FreeTextEntry1] : 88yo female with COPD.  Her oxygen saturation at the time of her exam was 83% on room air.  She stated her oxygen was in the car.  \par . The patient does not use her portable oxygen because it's too heavy and is requesting a lighter  portable system. She has severe COPD and is currently on Symbicort, Spiriva and albuterol.\par apparently she lives alone and is still driving.\par Her resting oxygen saturation on room air was 89%\par Her oxygen saturation on exertion was 83% on room air\par Her oxygen saturation at rest on 2 L of oxygen per minute was 94%.\par Her oxygen saturation while walking on 2 L of oxygen was 91%.

## 2021-11-18 NOTE — HISTORY OF PRESENT ILLNESS
[FreeTextEntry1] : 86yo female hospitalized ([facility] 9/20/19-10/8/19) for fractured L hip with accompanying respiratory failure.  She continues to take lasix 20 mg po daily.  She was a significant former smoker and has underlying diastolic dysfunction, as well as COPD.  She has supplemental oxygen at 3L that she claims to use with sleep on occasion, despite past discussions to use more during the daytime additionally.  She is taking Symbicort and Spiriva as well as albuterol. \par She was in the hospital over the summer for parainfluenza and then sent to rehab further [facility].  She continues to live alone in [place].

## 2021-11-18 NOTE — PHYSICAL EXAM
[No Acute Distress] : no acute distress [Normal Rate/Rhythm] : normal rate/rhythm [Murmur ___ / 6] : murmur [unfilled] / 6 [Normal S1, S2] : normal s1, s2 [No Resp Distress] : no resp distress [Clear to Auscultation Bilaterally] : clear to auscultation bilaterally [No Focal Deficits] : no focal deficits [Oriented x3] : oriented x3 [Normal Affect] : normal affect [TextBox_54] : 2/6 systolic regurgitant murmur left sternal border

## 2021-11-18 NOTE — END OF VISIT
[FreeTextEntry3] : I obtained the history and examined the patient and developed a plan of care  Jan rBooke MD\par

## 2021-12-14 NOTE — PATIENT PROFILE ADULT. - MENTAL HEALTH CONDITIONS/SYMPTOMS, PROFILE
Franny is a 64 year old who is being evaluated via a billable video visit.      How would you like to obtain your AVS? MyChart  If the video visit is dropped, the invitation should be resent by: Send to e-mail at: [email or web address]  Will anyone else be joining your video visit? No        Video-Visit Details    Type of service:  Video Visit    Originating Location (pt. Location): Home    Distant Location (provider location):  [facility] Page     Platform used for Video Visit: [employer]     none

## 2021-12-22 ENCOUNTER — NON-APPOINTMENT (OUTPATIENT)
Age: 86
End: 2021-12-22

## 2022-01-01 ENCOUNTER — NON-APPOINTMENT (OUTPATIENT)
Age: 87
End: 2022-01-01

## 2022-01-01 ENCOUNTER — TRANSCRIPTION ENCOUNTER (OUTPATIENT)
Age: 87
End: 2022-01-01

## 2022-01-01 ENCOUNTER — INPATIENT (INPATIENT)
Facility: HOSPITAL | Age: 87
LOS: 8 days | Discharge: HOME | End: 2022-12-27
Attending: HOSPITALIST | Admitting: HOSPITALIST
Payer: MEDICARE

## 2022-01-01 ENCOUNTER — APPOINTMENT (OUTPATIENT)
Dept: FAMILY MEDICINE | Facility: CLINIC | Age: 87
End: 2022-01-01

## 2022-01-01 ENCOUNTER — APPOINTMENT (OUTPATIENT)
Dept: PULMONOLOGY | Facility: CLINIC | Age: 87
End: 2022-01-01

## 2022-01-01 ENCOUNTER — RX RENEWAL (OUTPATIENT)
Age: 87
End: 2022-01-01

## 2022-01-01 ENCOUNTER — INPATIENT (INPATIENT)
Facility: HOSPITAL | Age: 87
LOS: 10 days | Discharge: NURSING, REHAB OR LONG-TERM CARE | End: 2022-09-21
Attending: HOSPITALIST | Admitting: HOSPITALIST

## 2022-01-01 VITALS
HEART RATE: 81 BPM | DIASTOLIC BLOOD PRESSURE: 72 MMHG | HEIGHT: 65 IN | BODY MASS INDEX: 26.66 KG/M2 | TEMPERATURE: 98 F | SYSTOLIC BLOOD PRESSURE: 140 MMHG | WEIGHT: 160 LBS | OXYGEN SATURATION: 93 %

## 2022-01-01 VITALS
RESPIRATION RATE: 18 BRPM | SYSTOLIC BLOOD PRESSURE: 129 MMHG | TEMPERATURE: 98 F | HEART RATE: 62 BPM | OXYGEN SATURATION: 98 % | DIASTOLIC BLOOD PRESSURE: 67 MMHG

## 2022-01-01 VITALS
TEMPERATURE: 97.6 F | HEART RATE: 54 BPM | DIASTOLIC BLOOD PRESSURE: 70 MMHG | HEIGHT: 65 IN | RESPIRATION RATE: 17 BRPM | OXYGEN SATURATION: 98 % | SYSTOLIC BLOOD PRESSURE: 154 MMHG

## 2022-01-01 VITALS
OXYGEN SATURATION: 92 % | SYSTOLIC BLOOD PRESSURE: 155 MMHG | HEART RATE: 62 BPM | RESPIRATION RATE: 18 BRPM | TEMPERATURE: 98 F | DIASTOLIC BLOOD PRESSURE: 58 MMHG

## 2022-01-01 VITALS
HEIGHT: 65 IN | TEMPERATURE: 97 F | RESPIRATION RATE: 25 BRPM | HEART RATE: 74 BPM | SYSTOLIC BLOOD PRESSURE: 142 MMHG | OXYGEN SATURATION: 100 % | DIASTOLIC BLOOD PRESSURE: 47 MMHG

## 2022-01-01 VITALS
RESPIRATION RATE: 17 BRPM | TEMPERATURE: 98 F | DIASTOLIC BLOOD PRESSURE: 55 MMHG | OXYGEN SATURATION: 94 % | HEART RATE: 78 BPM | SYSTOLIC BLOOD PRESSURE: 119 MMHG

## 2022-01-01 DIAGNOSIS — N18.30 CHRONIC KIDNEY DISEASE, STAGE 3 UNSPECIFIED: ICD-10-CM

## 2022-01-01 DIAGNOSIS — J44.9 CHRONIC OBSTRUCTIVE PULMONARY DISEASE, UNSPECIFIED: ICD-10-CM

## 2022-01-01 DIAGNOSIS — Z96.642 PRESENCE OF LEFT ARTIFICIAL HIP JOINT: Chronic | ICD-10-CM

## 2022-01-01 DIAGNOSIS — E03.9 HYPOTHYROIDISM, UNSPECIFIED: ICD-10-CM

## 2022-01-01 DIAGNOSIS — J44.1 CHRONIC OBSTRUCTIVE PULMONARY DISEASE WITH (ACUTE) EXACERBATION: ICD-10-CM

## 2022-01-01 DIAGNOSIS — S82.90XA UNSPECIFIED FRACTURE OF UNSPECIFIED LOWER LEG, INITIAL ENCOUNTER FOR CLOSED FRACTURE: Chronic | ICD-10-CM

## 2022-01-01 DIAGNOSIS — L60.8 OTHER NAIL DISORDERS: ICD-10-CM

## 2022-01-01 DIAGNOSIS — E87.5 HYPERKALEMIA: ICD-10-CM

## 2022-01-01 DIAGNOSIS — M54.41 LUMBAGO WITH SCIATICA, LEFT SIDE: ICD-10-CM

## 2022-01-01 DIAGNOSIS — I50.32 CHRONIC DIASTOLIC (CONGESTIVE) HEART FAILURE: ICD-10-CM

## 2022-01-01 DIAGNOSIS — R89.9 UNSPECIFIED ABNORMAL FINDING IN SPECIMENS FROM OTHER ORGANS, SYSTEMS AND TISSUES: ICD-10-CM

## 2022-01-01 DIAGNOSIS — D64.9 ANEMIA, UNSPECIFIED: ICD-10-CM

## 2022-01-01 DIAGNOSIS — Z86.69 PERSONAL HISTORY OF OTHER DISEASES OF THE NERVOUS SYSTEM AND SENSE ORGANS: Chronic | ICD-10-CM

## 2022-01-01 DIAGNOSIS — Z90.49 ACQUIRED ABSENCE OF OTHER SPECIFIED PARTS OF DIGESTIVE TRACT: Chronic | ICD-10-CM

## 2022-01-01 DIAGNOSIS — S92.901A UNSPECIFIED FRACTURE OF RIGHT FOOT, INITIAL ENCOUNTER FOR CLOSED FRACTURE: ICD-10-CM

## 2022-01-01 DIAGNOSIS — I51.89 OTHER ILL-DEFINED HEART DISEASES: ICD-10-CM

## 2022-01-01 DIAGNOSIS — N18.32 CHRONIC KIDNEY DISEASE, STAGE 3B: ICD-10-CM

## 2022-01-01 DIAGNOSIS — Z29.9 ENCOUNTER FOR PROPHYLACTIC MEASURES, UNSPECIFIED: ICD-10-CM

## 2022-01-01 DIAGNOSIS — M25.561 PAIN IN RIGHT KNEE: ICD-10-CM

## 2022-01-01 DIAGNOSIS — E11.9 TYPE 2 DIABETES MELLITUS WITHOUT COMPLICATIONS: ICD-10-CM

## 2022-01-01 DIAGNOSIS — N17.9 ACUTE KIDNEY FAILURE, UNSPECIFIED: ICD-10-CM

## 2022-01-01 DIAGNOSIS — I10 ESSENTIAL (PRIMARY) HYPERTENSION: ICD-10-CM

## 2022-01-01 DIAGNOSIS — N39.0 URINARY TRACT INFECTION, SITE NOT SPECIFIED: ICD-10-CM

## 2022-01-01 DIAGNOSIS — J96.12 CHRONIC RESPIRATORY FAILURE WITH HYPOXIA: ICD-10-CM

## 2022-01-01 DIAGNOSIS — M54.42 LUMBAGO WITH SCIATICA, LEFT SIDE: ICD-10-CM

## 2022-01-01 DIAGNOSIS — Z79.899 OTHER LONG TERM (CURRENT) DRUG THERAPY: ICD-10-CM

## 2022-01-01 DIAGNOSIS — R26.89 OTHER ABNORMALITIES OF GAIT AND MOBILITY: ICD-10-CM

## 2022-01-01 DIAGNOSIS — R39.15 URGENCY OF URINATION: ICD-10-CM

## 2022-01-01 DIAGNOSIS — G89.29 LUMBAGO WITH SCIATICA, LEFT SIDE: ICD-10-CM

## 2022-01-01 DIAGNOSIS — J96.21 ACUTE AND CHRONIC RESPIRATORY FAILURE WITH HYPOXIA: ICD-10-CM

## 2022-01-01 DIAGNOSIS — I47.2 VENTRICULAR TACHYCARDIA: ICD-10-CM

## 2022-01-01 DIAGNOSIS — J96.11 CHRONIC RESPIRATORY FAILURE WITH HYPOXIA: ICD-10-CM

## 2022-01-01 LAB
-  AMIKACIN: SIGNIFICANT CHANGE UP
-  AMOXICILLIN/CLAVULANIC ACID: SIGNIFICANT CHANGE UP
-  AMPICILLIN/SULBACTAM: SIGNIFICANT CHANGE UP
-  AMPICILLIN: SIGNIFICANT CHANGE UP
-  AZTREONAM: SIGNIFICANT CHANGE UP
-  CEFAZOLIN: SIGNIFICANT CHANGE UP
-  CEFEPIME: SIGNIFICANT CHANGE UP
-  CEFOXITIN: SIGNIFICANT CHANGE UP
-  CEFTRIAXONE: SIGNIFICANT CHANGE UP
-  CIPROFLOXACIN: SIGNIFICANT CHANGE UP
-  ERTAPENEM: SIGNIFICANT CHANGE UP
-  GENTAMICIN: SIGNIFICANT CHANGE UP
-  IMIPENEM: SIGNIFICANT CHANGE UP
-  LEVOFLOXACIN: SIGNIFICANT CHANGE UP
-  MEROPENEM: SIGNIFICANT CHANGE UP
-  NITROFURANTOIN: SIGNIFICANT CHANGE UP
-  PIPERACILLIN/TAZOBACTAM: SIGNIFICANT CHANGE UP
-  TIGECYCLINE: SIGNIFICANT CHANGE UP
-  TOBRAMYCIN: SIGNIFICANT CHANGE UP
-  TRIMETHOPRIM/SULFAMETHOXAZOLE: SIGNIFICANT CHANGE UP
A1C WITH ESTIMATED AVERAGE GLUCOSE RESULT: 5.9 % — HIGH (ref 4–5.6)
A1C WITH ESTIMATED AVERAGE GLUCOSE RESULT: 6 % — HIGH (ref 4–5.6)
ALBUMIN SERPL ELPH-MCNC: 3.7 G/DL — SIGNIFICANT CHANGE UP (ref 3.3–5)
ALBUMIN SERPL ELPH-MCNC: 4.1 G/DL — SIGNIFICANT CHANGE UP (ref 3.3–5)
ALBUMIN SERPL ELPH-MCNC: 4.2 G/DL — SIGNIFICANT CHANGE UP (ref 3.3–5)
ALBUMIN SERPL ELPH-MCNC: 4.3 G/DL
ALP BLD-CCNC: 72 U/L
ALP SERPL-CCNC: 69 U/L — SIGNIFICANT CHANGE UP (ref 40–120)
ALP SERPL-CCNC: 77 U/L — SIGNIFICANT CHANGE UP (ref 40–120)
ALP SERPL-CCNC: 83 U/L — SIGNIFICANT CHANGE UP (ref 40–120)
ALT FLD-CCNC: 11 U/L — SIGNIFICANT CHANGE UP (ref 4–33)
ALT FLD-CCNC: 12 U/L — SIGNIFICANT CHANGE UP (ref 4–33)
ALT FLD-CCNC: 16 U/L — SIGNIFICANT CHANGE UP (ref 4–33)
ALT SERPL-CCNC: 11 U/L
ANION GAP SERPL CALC-SCNC: 10 MMOL/L — SIGNIFICANT CHANGE UP (ref 7–14)
ANION GAP SERPL CALC-SCNC: 11 MMOL/L — SIGNIFICANT CHANGE UP (ref 7–14)
ANION GAP SERPL CALC-SCNC: 12 MMOL/L — SIGNIFICANT CHANGE UP (ref 7–14)
ANION GAP SERPL CALC-SCNC: 12 MMOL/L — SIGNIFICANT CHANGE UP (ref 7–14)
ANION GAP SERPL CALC-SCNC: 13 MMOL/L
ANION GAP SERPL CALC-SCNC: 7 MMOL/L — SIGNIFICANT CHANGE UP (ref 7–14)
ANION GAP SERPL CALC-SCNC: 8 MMOL/L — SIGNIFICANT CHANGE UP (ref 7–14)
ANION GAP SERPL CALC-SCNC: 9 MMOL/L — SIGNIFICANT CHANGE UP (ref 7–14)
APPEARANCE UR: ABNORMAL
APPEARANCE UR: CLEAR — SIGNIFICANT CHANGE UP
APPEARANCE UR: CLEAR — SIGNIFICANT CHANGE UP
AST SERPL-CCNC: 20 U/L
AST SERPL-CCNC: 21 U/L — SIGNIFICANT CHANGE UP (ref 4–32)
AST SERPL-CCNC: 25 U/L — SIGNIFICANT CHANGE UP (ref 4–32)
AST SERPL-CCNC: 49 U/L — HIGH (ref 4–32)
BACTERIA # UR AUTO: ABNORMAL
BASE EXCESS BLDV CALC-SCNC: 1 MMOL/L — SIGNIFICANT CHANGE UP (ref -2–3)
BASE EXCESS BLDV CALC-SCNC: 4.4 MMOL/L — HIGH (ref -2–3)
BASOPHILS # BLD AUTO: 0.01 K/UL
BASOPHILS # BLD AUTO: 0.03 K/UL — SIGNIFICANT CHANGE UP (ref 0–0.2)
BASOPHILS # BLD AUTO: 0.03 K/UL — SIGNIFICANT CHANGE UP (ref 0–0.2)
BASOPHILS NFR BLD AUTO: 0.1 %
BASOPHILS NFR BLD AUTO: 0.3 % — SIGNIFICANT CHANGE UP (ref 0–2)
BASOPHILS NFR BLD AUTO: 0.4 % — SIGNIFICANT CHANGE UP (ref 0–2)
BILIRUB SERPL-MCNC: 0.2 MG/DL
BILIRUB SERPL-MCNC: 0.2 MG/DL — SIGNIFICANT CHANGE UP (ref 0.2–1.2)
BILIRUB SERPL-MCNC: <0.2 MG/DL — SIGNIFICANT CHANGE UP (ref 0.2–1.2)
BILIRUB SERPL-MCNC: <0.2 MG/DL — SIGNIFICANT CHANGE UP (ref 0.2–1.2)
BILIRUB UR-MCNC: NEGATIVE — SIGNIFICANT CHANGE UP
BLOOD GAS VENOUS COMPREHENSIVE RESULT: SIGNIFICANT CHANGE UP
BLOOD GAS VENOUS COMPREHENSIVE RESULT: SIGNIFICANT CHANGE UP
BUN SERPL-MCNC: 33 MG/DL — HIGH (ref 7–23)
BUN SERPL-MCNC: 33 MG/DL — HIGH (ref 7–23)
BUN SERPL-MCNC: 35 MG/DL — HIGH (ref 7–23)
BUN SERPL-MCNC: 38 MG/DL — HIGH (ref 7–23)
BUN SERPL-MCNC: 39 MG/DL — HIGH (ref 7–23)
BUN SERPL-MCNC: 40 MG/DL — HIGH (ref 7–23)
BUN SERPL-MCNC: 44 MG/DL — HIGH (ref 7–23)
BUN SERPL-MCNC: 53 MG/DL — HIGH (ref 7–23)
BUN SERPL-MCNC: 54 MG/DL — HIGH (ref 7–23)
BUN SERPL-MCNC: 55 MG/DL — HIGH (ref 7–23)
BUN SERPL-MCNC: 59 MG/DL — HIGH (ref 7–23)
BUN SERPL-MCNC: 65 MG/DL — HIGH (ref 7–23)
BUN SERPL-MCNC: 67 MG/DL
BUN SERPL-MCNC: 72 MG/DL — HIGH (ref 7–23)
BUN SERPL-MCNC: 75 MG/DL — HIGH (ref 7–23)
BUN SERPL-MCNC: 77 MG/DL — HIGH (ref 7–23)
BUN SERPL-MCNC: 78 MG/DL — HIGH (ref 7–23)
BUN SERPL-MCNC: 78 MG/DL — HIGH (ref 7–23)
BUN SERPL-MCNC: 79 MG/DL — HIGH (ref 7–23)
BUN SERPL-MCNC: 81 MG/DL — HIGH (ref 7–23)
BUN SERPL-MCNC: 86 MG/DL — HIGH (ref 7–23)
BUN SERPL-MCNC: 86 MG/DL — HIGH (ref 7–23)
BUN SERPL-MCNC: 87 MG/DL — HIGH (ref 7–23)
BUN SERPL-MCNC: 90 MG/DL — HIGH (ref 7–23)
BUN SERPL-MCNC: 94 MG/DL — HIGH (ref 7–23)
CALCIUM SERPL-MCNC: 8.4 MG/DL — SIGNIFICANT CHANGE UP (ref 8.4–10.5)
CALCIUM SERPL-MCNC: 8.4 MG/DL — SIGNIFICANT CHANGE UP (ref 8.4–10.5)
CALCIUM SERPL-MCNC: 8.5 MG/DL — SIGNIFICANT CHANGE UP (ref 8.4–10.5)
CALCIUM SERPL-MCNC: 8.7 MG/DL — SIGNIFICANT CHANGE UP (ref 8.4–10.5)
CALCIUM SERPL-MCNC: 8.7 MG/DL — SIGNIFICANT CHANGE UP (ref 8.4–10.5)
CALCIUM SERPL-MCNC: 8.8 MG/DL — SIGNIFICANT CHANGE UP (ref 8.4–10.5)
CALCIUM SERPL-MCNC: 8.9 MG/DL — SIGNIFICANT CHANGE UP (ref 8.4–10.5)
CALCIUM SERPL-MCNC: 9 MG/DL — SIGNIFICANT CHANGE UP (ref 8.4–10.5)
CALCIUM SERPL-MCNC: 9.1 MG/DL — SIGNIFICANT CHANGE UP (ref 8.4–10.5)
CALCIUM SERPL-MCNC: 9.2 MG/DL — SIGNIFICANT CHANGE UP (ref 8.4–10.5)
CALCIUM SERPL-MCNC: 9.3 MG/DL — SIGNIFICANT CHANGE UP (ref 8.4–10.5)
CALCIUM SERPL-MCNC: 9.3 MG/DL — SIGNIFICANT CHANGE UP (ref 8.4–10.5)
CALCIUM SERPL-MCNC: 9.9 MG/DL
CHLORIDE BLDV-SCNC: 105 MMOL/L — SIGNIFICANT CHANGE UP (ref 96–108)
CHLORIDE BLDV-SCNC: 107 MMOL/L — SIGNIFICANT CHANGE UP (ref 96–108)
CHLORIDE SERPL-SCNC: 100 MMOL/L — SIGNIFICANT CHANGE UP (ref 98–107)
CHLORIDE SERPL-SCNC: 101 MMOL/L — SIGNIFICANT CHANGE UP (ref 98–107)
CHLORIDE SERPL-SCNC: 101 MMOL/L — SIGNIFICANT CHANGE UP (ref 98–107)
CHLORIDE SERPL-SCNC: 102 MMOL/L — SIGNIFICANT CHANGE UP (ref 98–107)
CHLORIDE SERPL-SCNC: 102 MMOL/L — SIGNIFICANT CHANGE UP (ref 98–107)
CHLORIDE SERPL-SCNC: 103 MMOL/L — SIGNIFICANT CHANGE UP (ref 98–107)
CHLORIDE SERPL-SCNC: 104 MMOL/L — SIGNIFICANT CHANGE UP (ref 98–107)
CHLORIDE SERPL-SCNC: 105 MMOL/L — SIGNIFICANT CHANGE UP (ref 98–107)
CHLORIDE SERPL-SCNC: 105 MMOL/L — SIGNIFICANT CHANGE UP (ref 98–107)
CHLORIDE SERPL-SCNC: 98 MMOL/L
CHLORIDE SERPL-SCNC: 99 MMOL/L — SIGNIFICANT CHANGE UP (ref 98–107)
CHLORIDE UR-SCNC: 33 MMOL/L — SIGNIFICANT CHANGE UP
CHOLEST SERPL-MCNC: 190 MG/DL
CO2 BLDV-SCNC: 29.5 MMOL/L — HIGH (ref 22–26)
CO2 BLDV-SCNC: 35 MMOL/L — HIGH (ref 22–26)
CO2 SERPL-SCNC: 24 MMOL/L — SIGNIFICANT CHANGE UP (ref 22–31)
CO2 SERPL-SCNC: 24 MMOL/L — SIGNIFICANT CHANGE UP (ref 22–31)
CO2 SERPL-SCNC: 25 MMOL/L — SIGNIFICANT CHANGE UP (ref 22–31)
CO2 SERPL-SCNC: 26 MMOL/L
CO2 SERPL-SCNC: 26 MMOL/L — SIGNIFICANT CHANGE UP (ref 22–31)
CO2 SERPL-SCNC: 27 MMOL/L — SIGNIFICANT CHANGE UP (ref 22–31)
CO2 SERPL-SCNC: 28 MMOL/L — SIGNIFICANT CHANGE UP (ref 22–31)
CO2 SERPL-SCNC: 29 MMOL/L — SIGNIFICANT CHANGE UP (ref 22–31)
CO2 SERPL-SCNC: 30 MMOL/L — SIGNIFICANT CHANGE UP (ref 22–31)
CO2 SERPL-SCNC: 30 MMOL/L — SIGNIFICANT CHANGE UP (ref 22–31)
COLOR SPEC: SIGNIFICANT CHANGE UP
COLOR SPEC: SIGNIFICANT CHANGE UP
COLOR SPEC: YELLOW — SIGNIFICANT CHANGE UP
CREAT ?TM UR-MCNC: 62 MG/DL — SIGNIFICANT CHANGE UP
CREAT SERPL-MCNC: 1.47 MG/DL — HIGH (ref 0.5–1.3)
CREAT SERPL-MCNC: 1.5 MG/DL — HIGH (ref 0.5–1.3)
CREAT SERPL-MCNC: 1.52 MG/DL — HIGH (ref 0.5–1.3)
CREAT SERPL-MCNC: 1.53 MG/DL — HIGH (ref 0.5–1.3)
CREAT SERPL-MCNC: 1.55 MG/DL — HIGH (ref 0.5–1.3)
CREAT SERPL-MCNC: 1.56 MG/DL — HIGH (ref 0.5–1.3)
CREAT SERPL-MCNC: 1.6 MG/DL — HIGH (ref 0.5–1.3)
CREAT SERPL-MCNC: 1.6 MG/DL — HIGH (ref 0.5–1.3)
CREAT SERPL-MCNC: 1.61 MG/DL — HIGH (ref 0.5–1.3)
CREAT SERPL-MCNC: 1.61 MG/DL — HIGH (ref 0.5–1.3)
CREAT SERPL-MCNC: 1.64 MG/DL — HIGH (ref 0.5–1.3)
CREAT SERPL-MCNC: 1.65 MG/DL
CREAT SERPL-MCNC: 1.65 MG/DL — HIGH (ref 0.5–1.3)
CREAT SERPL-MCNC: 1.67 MG/DL — HIGH (ref 0.5–1.3)
CREAT SERPL-MCNC: 1.69 MG/DL — HIGH (ref 0.5–1.3)
CREAT SERPL-MCNC: 1.74 MG/DL — HIGH (ref 0.5–1.3)
CREAT SERPL-MCNC: 1.81 MG/DL — HIGH (ref 0.5–1.3)
CREAT SERPL-MCNC: 1.83 MG/DL — HIGH (ref 0.5–1.3)
CREAT SERPL-MCNC: 1.88 MG/DL — HIGH (ref 0.5–1.3)
CREAT SERPL-MCNC: 1.89 MG/DL — HIGH (ref 0.5–1.3)
CREAT SERPL-MCNC: 1.98 MG/DL — HIGH (ref 0.5–1.3)
CREAT SERPL-MCNC: 2.07 MG/DL — HIGH (ref 0.5–1.3)
CREAT SERPL-MCNC: 2.09 MG/DL — HIGH (ref 0.5–1.3)
CREAT SERPL-MCNC: 2.16 MG/DL — HIGH (ref 0.5–1.3)
CREAT SERPL-MCNC: 2.31 MG/DL — HIGH (ref 0.5–1.3)
CULTURE RESULTS: SIGNIFICANT CHANGE UP
DIFF PNL FLD: NEGATIVE — SIGNIFICANT CHANGE UP
EGFR: 20 ML/MIN/1.73M2 — LOW
EGFR: 22 ML/MIN/1.73M2 — LOW
EGFR: 23 ML/MIN/1.73M2 — LOW
EGFR: 23 ML/MIN/1.73M2 — LOW
EGFR: 24 ML/MIN/1.73M2 — LOW
EGFR: 25 ML/MIN/1.73M2 — LOW
EGFR: 26 ML/MIN/1.73M2 — LOW
EGFR: 26 ML/MIN/1.73M2 — LOW
EGFR: 27 ML/MIN/1.73M2 — LOW
EGFR: 28 ML/MIN/1.73M2 — LOW
EGFR: 29 ML/MIN/1.73M2 — LOW
EGFR: 29 ML/MIN/1.73M2 — LOW
EGFR: 30 ML/MIN/1.73M2
EGFR: 30 ML/MIN/1.73M2 — LOW
EGFR: 30 ML/MIN/1.73M2 — LOW
EGFR: 31 ML/MIN/1.73M2 — LOW
EGFR: 32 ML/MIN/1.73M2 — LOW
EGFR: 32 ML/MIN/1.73M2 — LOW
EGFR: 33 ML/MIN/1.73M2 — LOW
EGFR: 33 ML/MIN/1.73M2 — LOW
EGFR: 34 ML/MIN/1.73M2 — LOW
EGFR: 34 ML/MIN/1.73M2 — LOW
EOSINOPHIL # BLD AUTO: 0.08 K/UL
EOSINOPHIL # BLD AUTO: 0.11 K/UL — SIGNIFICANT CHANGE UP (ref 0–0.5)
EOSINOPHIL # BLD AUTO: 0.13 K/UL — SIGNIFICANT CHANGE UP (ref 0–0.5)
EOSINOPHIL NFR BLD AUTO: 1 %
EOSINOPHIL NFR BLD AUTO: 1.4 % — SIGNIFICANT CHANGE UP (ref 0–6)
EOSINOPHIL NFR BLD AUTO: 1.5 % — SIGNIFICANT CHANGE UP (ref 0–6)
EPI CELLS # UR: 1 /HPF — SIGNIFICANT CHANGE UP (ref 0–5)
EPI CELLS # UR: 1 /HPF — SIGNIFICANT CHANGE UP (ref 0–5)
EPI CELLS # UR: 2 /HPF — SIGNIFICANT CHANGE UP (ref 0–5)
ESTIMATED AVERAGE GLUCOSE: 123 — SIGNIFICANT CHANGE UP
ESTIMATED AVERAGE GLUCOSE: 126 — SIGNIFICANT CHANGE UP
ESTIMATED AVERAGE GLUCOSE: 151 MG/DL
FERRITIN SERPL-MCNC: 82 NG/ML — SIGNIFICANT CHANGE UP (ref 15–150)
FLUAV AG NPH QL: SIGNIFICANT CHANGE UP
FLUBV AG NPH QL: SIGNIFICANT CHANGE UP
FOLATE SERPL-MCNC: 20 NG/ML — HIGH (ref 3.1–17.5)
GAS PNL BLDV: 136 MMOL/L — SIGNIFICANT CHANGE UP (ref 136–145)
GAS PNL BLDV: 139 MMOL/L — SIGNIFICANT CHANGE UP (ref 136–145)
GLUCOSE BLDC GLUCOMTR-MCNC: 100 MG/DL — HIGH (ref 70–99)
GLUCOSE BLDC GLUCOMTR-MCNC: 101 MG/DL — HIGH (ref 70–99)
GLUCOSE BLDC GLUCOMTR-MCNC: 101 MG/DL — HIGH (ref 70–99)
GLUCOSE BLDC GLUCOMTR-MCNC: 104 MG/DL — HIGH (ref 70–99)
GLUCOSE BLDC GLUCOMTR-MCNC: 112 MG/DL — HIGH (ref 70–99)
GLUCOSE BLDC GLUCOMTR-MCNC: 113 MG/DL — HIGH (ref 70–99)
GLUCOSE BLDC GLUCOMTR-MCNC: 113 MG/DL — HIGH (ref 70–99)
GLUCOSE BLDC GLUCOMTR-MCNC: 114 MG/DL — HIGH (ref 70–99)
GLUCOSE BLDC GLUCOMTR-MCNC: 114 MG/DL — HIGH (ref 70–99)
GLUCOSE BLDC GLUCOMTR-MCNC: 115 MG/DL — HIGH (ref 70–99)
GLUCOSE BLDC GLUCOMTR-MCNC: 117 MG/DL — HIGH (ref 70–99)
GLUCOSE BLDC GLUCOMTR-MCNC: 119 MG/DL — HIGH (ref 70–99)
GLUCOSE BLDC GLUCOMTR-MCNC: 120 MG/DL — HIGH (ref 70–99)
GLUCOSE BLDC GLUCOMTR-MCNC: 123 MG/DL — HIGH (ref 70–99)
GLUCOSE BLDC GLUCOMTR-MCNC: 124 MG/DL — HIGH (ref 70–99)
GLUCOSE BLDC GLUCOMTR-MCNC: 125 MG/DL — HIGH (ref 70–99)
GLUCOSE BLDC GLUCOMTR-MCNC: 125 MG/DL — HIGH (ref 70–99)
GLUCOSE BLDC GLUCOMTR-MCNC: 126 MG/DL — HIGH (ref 70–99)
GLUCOSE BLDC GLUCOMTR-MCNC: 127 MG/DL — HIGH (ref 70–99)
GLUCOSE BLDC GLUCOMTR-MCNC: 127 MG/DL — HIGH (ref 70–99)
GLUCOSE BLDC GLUCOMTR-MCNC: 129 MG/DL — HIGH (ref 70–99)
GLUCOSE BLDC GLUCOMTR-MCNC: 129 MG/DL — HIGH (ref 70–99)
GLUCOSE BLDC GLUCOMTR-MCNC: 133 MG/DL — HIGH (ref 70–99)
GLUCOSE BLDC GLUCOMTR-MCNC: 134 MG/DL — HIGH (ref 70–99)
GLUCOSE BLDC GLUCOMTR-MCNC: 135 MG/DL — HIGH (ref 70–99)
GLUCOSE BLDC GLUCOMTR-MCNC: 136 MG/DL — HIGH (ref 70–99)
GLUCOSE BLDC GLUCOMTR-MCNC: 136 MG/DL — HIGH (ref 70–99)
GLUCOSE BLDC GLUCOMTR-MCNC: 137 MG/DL — HIGH (ref 70–99)
GLUCOSE BLDC GLUCOMTR-MCNC: 137 MG/DL — HIGH (ref 70–99)
GLUCOSE BLDC GLUCOMTR-MCNC: 138 MG/DL — HIGH (ref 70–99)
GLUCOSE BLDC GLUCOMTR-MCNC: 138 MG/DL — HIGH (ref 70–99)
GLUCOSE BLDC GLUCOMTR-MCNC: 139 MG/DL — HIGH (ref 70–99)
GLUCOSE BLDC GLUCOMTR-MCNC: 140 MG/DL — HIGH (ref 70–99)
GLUCOSE BLDC GLUCOMTR-MCNC: 142 MG/DL — HIGH (ref 70–99)
GLUCOSE BLDC GLUCOMTR-MCNC: 143 MG/DL — HIGH (ref 70–99)
GLUCOSE BLDC GLUCOMTR-MCNC: 144 MG/DL — HIGH (ref 70–99)
GLUCOSE BLDC GLUCOMTR-MCNC: 144 MG/DL — HIGH (ref 70–99)
GLUCOSE BLDC GLUCOMTR-MCNC: 145 MG/DL — HIGH (ref 70–99)
GLUCOSE BLDC GLUCOMTR-MCNC: 147 MG/DL — HIGH (ref 70–99)
GLUCOSE BLDC GLUCOMTR-MCNC: 149 MG/DL — HIGH (ref 70–99)
GLUCOSE BLDC GLUCOMTR-MCNC: 151 MG/DL — HIGH (ref 70–99)
GLUCOSE BLDC GLUCOMTR-MCNC: 155 MG/DL — HIGH (ref 70–99)
GLUCOSE BLDC GLUCOMTR-MCNC: 155 MG/DL — HIGH (ref 70–99)
GLUCOSE BLDC GLUCOMTR-MCNC: 157 MG/DL — HIGH (ref 70–99)
GLUCOSE BLDC GLUCOMTR-MCNC: 157 MG/DL — HIGH (ref 70–99)
GLUCOSE BLDC GLUCOMTR-MCNC: 160 MG/DL — HIGH (ref 70–99)
GLUCOSE BLDC GLUCOMTR-MCNC: 165 MG/DL — HIGH (ref 70–99)
GLUCOSE BLDC GLUCOMTR-MCNC: 165 MG/DL — HIGH (ref 70–99)
GLUCOSE BLDC GLUCOMTR-MCNC: 167 MG/DL — HIGH (ref 70–99)
GLUCOSE BLDC GLUCOMTR-MCNC: 172 MG/DL — HIGH (ref 70–99)
GLUCOSE BLDC GLUCOMTR-MCNC: 173 MG/DL — HIGH (ref 70–99)
GLUCOSE BLDC GLUCOMTR-MCNC: 174 MG/DL — HIGH (ref 70–99)
GLUCOSE BLDC GLUCOMTR-MCNC: 176 MG/DL — HIGH (ref 70–99)
GLUCOSE BLDC GLUCOMTR-MCNC: 176 MG/DL — HIGH (ref 70–99)
GLUCOSE BLDC GLUCOMTR-MCNC: 178 MG/DL — HIGH (ref 70–99)
GLUCOSE BLDC GLUCOMTR-MCNC: 179 MG/DL — HIGH (ref 70–99)
GLUCOSE BLDC GLUCOMTR-MCNC: 188 MG/DL — HIGH (ref 70–99)
GLUCOSE BLDC GLUCOMTR-MCNC: 188 MG/DL — HIGH (ref 70–99)
GLUCOSE BLDC GLUCOMTR-MCNC: 197 MG/DL — HIGH (ref 70–99)
GLUCOSE BLDC GLUCOMTR-MCNC: 199 MG/DL — HIGH (ref 70–99)
GLUCOSE BLDC GLUCOMTR-MCNC: 215 MG/DL — HIGH (ref 70–99)
GLUCOSE BLDC GLUCOMTR-MCNC: 222 MG/DL — HIGH (ref 70–99)
GLUCOSE BLDC GLUCOMTR-MCNC: 224 MG/DL — HIGH (ref 70–99)
GLUCOSE BLDC GLUCOMTR-MCNC: 227 MG/DL — HIGH (ref 70–99)
GLUCOSE BLDC GLUCOMTR-MCNC: 242 MG/DL — HIGH (ref 70–99)
GLUCOSE BLDC GLUCOMTR-MCNC: 246 MG/DL — HIGH (ref 70–99)
GLUCOSE BLDC GLUCOMTR-MCNC: 75 MG/DL — SIGNIFICANT CHANGE UP (ref 70–99)
GLUCOSE BLDC GLUCOMTR-MCNC: 83 MG/DL — SIGNIFICANT CHANGE UP (ref 70–99)
GLUCOSE BLDC GLUCOMTR-MCNC: 98 MG/DL — SIGNIFICANT CHANGE UP (ref 70–99)
GLUCOSE BLDC GLUCOMTR-MCNC: 99 MG/DL — SIGNIFICANT CHANGE UP (ref 70–99)
GLUCOSE BLDV-MCNC: 117 MG/DL — HIGH (ref 70–99)
GLUCOSE BLDV-MCNC: 149 MG/DL — HIGH (ref 70–99)
GLUCOSE SERPL-MCNC: 114 MG/DL — HIGH (ref 70–99)
GLUCOSE SERPL-MCNC: 124 MG/DL
GLUCOSE SERPL-MCNC: 124 MG/DL — HIGH (ref 70–99)
GLUCOSE SERPL-MCNC: 126 MG/DL — HIGH (ref 70–99)
GLUCOSE SERPL-MCNC: 127 MG/DL — HIGH (ref 70–99)
GLUCOSE SERPL-MCNC: 127 MG/DL — HIGH (ref 70–99)
GLUCOSE SERPL-MCNC: 128 MG/DL — HIGH (ref 70–99)
GLUCOSE SERPL-MCNC: 129 MG/DL — HIGH (ref 70–99)
GLUCOSE SERPL-MCNC: 131 MG/DL — HIGH (ref 70–99)
GLUCOSE SERPL-MCNC: 132 MG/DL — HIGH (ref 70–99)
GLUCOSE SERPL-MCNC: 134 MG/DL — HIGH (ref 70–99)
GLUCOSE SERPL-MCNC: 141 MG/DL — HIGH (ref 70–99)
GLUCOSE SERPL-MCNC: 144 MG/DL — HIGH (ref 70–99)
GLUCOSE SERPL-MCNC: 145 MG/DL — HIGH (ref 70–99)
GLUCOSE SERPL-MCNC: 146 MG/DL — HIGH (ref 70–99)
GLUCOSE SERPL-MCNC: 151 MG/DL — HIGH (ref 70–99)
GLUCOSE SERPL-MCNC: 152 MG/DL — HIGH (ref 70–99)
GLUCOSE SERPL-MCNC: 155 MG/DL — HIGH (ref 70–99)
GLUCOSE SERPL-MCNC: 160 MG/DL — HIGH (ref 70–99)
GLUCOSE SERPL-MCNC: 166 MG/DL — HIGH (ref 70–99)
GLUCOSE SERPL-MCNC: 207 MG/DL — HIGH (ref 70–99)
GLUCOSE SERPL-MCNC: 210 MG/DL — HIGH (ref 70–99)
GLUCOSE SERPL-MCNC: 214 MG/DL — HIGH (ref 70–99)
GLUCOSE SERPL-MCNC: 221 MG/DL — HIGH (ref 70–99)
GLUCOSE SERPL-MCNC: 90 MG/DL — SIGNIFICANT CHANGE UP (ref 70–99)
GLUCOSE UR QL: NEGATIVE — SIGNIFICANT CHANGE UP
HBA1C MFR BLD HPLC: 6.9 %
HCO3 BLDV-SCNC: 28 MMOL/L — SIGNIFICANT CHANGE UP (ref 22–29)
HCO3 BLDV-SCNC: 33 MMOL/L — HIGH (ref 22–29)
HCT VFR BLD CALC: 28.5 % — LOW (ref 34.5–45)
HCT VFR BLD CALC: 30 % — LOW (ref 34.5–45)
HCT VFR BLD CALC: 30.1 % — LOW (ref 34.5–45)
HCT VFR BLD CALC: 30.1 % — LOW (ref 34.5–45)
HCT VFR BLD CALC: 30.2 % — LOW (ref 34.5–45)
HCT VFR BLD CALC: 30.2 % — LOW (ref 34.5–45)
HCT VFR BLD CALC: 30.4 % — LOW (ref 34.5–45)
HCT VFR BLD CALC: 30.4 % — LOW (ref 34.5–45)
HCT VFR BLD CALC: 30.7 % — LOW (ref 34.5–45)
HCT VFR BLD CALC: 31.3 % — LOW (ref 34.5–45)
HCT VFR BLD CALC: 32 % — LOW (ref 34.5–45)
HCT VFR BLD CALC: 32 % — LOW (ref 34.5–45)
HCT VFR BLD CALC: 32.2 % — LOW (ref 34.5–45)
HCT VFR BLD CALC: 32.4 % — LOW (ref 34.5–45)
HCT VFR BLD CALC: 32.9 % — LOW (ref 34.5–45)
HCT VFR BLD CALC: 33.2 %
HCT VFR BLD CALC: 35.6 % — SIGNIFICANT CHANGE UP (ref 34.5–45)
HCT VFR BLD CALC: 36.5 % — SIGNIFICANT CHANGE UP (ref 34.5–45)
HCT VFR BLDA CALC: 29 % — LOW (ref 34.5–46.5)
HCT VFR BLDA CALC: 31 % — LOW (ref 34.5–46.5)
HDLC SERPL-MCNC: 68 MG/DL
HGB BLD CALC-MCNC: 10.2 G/DL — LOW (ref 11.5–15.5)
HGB BLD CALC-MCNC: 9.5 G/DL — LOW (ref 11.5–15.5)
HGB BLD-MCNC: 10 G/DL — LOW (ref 11.5–15.5)
HGB BLD-MCNC: 10 G/DL — LOW (ref 11.5–15.5)
HGB BLD-MCNC: 10.4 G/DL — LOW (ref 11.5–15.5)
HGB BLD-MCNC: 10.4 G/DL — LOW (ref 11.5–15.5)
HGB BLD-MCNC: 10.6 G/DL
HGB BLD-MCNC: 10.9 G/DL — LOW (ref 11.5–15.5)
HGB BLD-MCNC: 11.5 G/DL — SIGNIFICANT CHANGE UP (ref 11.5–15.5)
HGB BLD-MCNC: 9.2 G/DL — LOW (ref 11.5–15.5)
HGB BLD-MCNC: 9.4 G/DL — LOW (ref 11.5–15.5)
HGB BLD-MCNC: 9.5 G/DL — LOW (ref 11.5–15.5)
HGB BLD-MCNC: 9.6 G/DL — LOW (ref 11.5–15.5)
HGB BLD-MCNC: 9.6 G/DL — LOW (ref 11.5–15.5)
HGB BLD-MCNC: 9.7 G/DL — LOW (ref 11.5–15.5)
HGB BLD-MCNC: 9.8 G/DL — LOW (ref 11.5–15.5)
HGB BLD-MCNC: 9.9 G/DL — LOW (ref 11.5–15.5)
HGB BLD-MCNC: 9.9 G/DL — LOW (ref 11.5–15.5)
HYALINE CASTS # UR AUTO: 0 /LPF — SIGNIFICANT CHANGE UP (ref 0–7)
HYALINE CASTS # UR AUTO: 0 /LPF — SIGNIFICANT CHANGE UP (ref 0–7)
HYALINE CASTS # UR AUTO: 1 /LPF — SIGNIFICANT CHANGE UP (ref 0–7)
IANC: 4.8 K/UL — SIGNIFICANT CHANGE UP (ref 1.8–7.4)
IANC: 6.15 K/UL — SIGNIFICANT CHANGE UP (ref 1.8–7.4)
IMM GRANULOCYTES NFR BLD AUTO: 0.3 % — SIGNIFICANT CHANGE UP (ref 0–0.9)
IMM GRANULOCYTES NFR BLD AUTO: 0.3 % — SIGNIFICANT CHANGE UP (ref 0–1.5)
IMM GRANULOCYTES NFR BLD AUTO: 0.4 %
IRON SATN MFR SERPL: 16 % — SIGNIFICANT CHANGE UP (ref 14–50)
IRON SATN MFR SERPL: 85 UG/DL — SIGNIFICANT CHANGE UP (ref 30–160)
KETONES UR-MCNC: NEGATIVE — SIGNIFICANT CHANGE UP
LACTATE BLDV-MCNC: 0.6 MMOL/L — SIGNIFICANT CHANGE UP (ref 0.5–2)
LACTATE BLDV-MCNC: 1.3 MMOL/L — SIGNIFICANT CHANGE UP (ref 0.5–2)
LDLC SERPL CALC-MCNC: 110 MG/DL
LEUKOCYTE ESTERASE UR-ACNC: ABNORMAL
LEUKOCYTE ESTERASE UR-ACNC: ABNORMAL
LEUKOCYTE ESTERASE UR-ACNC: NEGATIVE — SIGNIFICANT CHANGE UP
LYMPHOCYTES # BLD AUTO: 1.38 K/UL
LYMPHOCYTES # BLD AUTO: 1.72 K/UL — SIGNIFICANT CHANGE UP (ref 1–3.3)
LYMPHOCYTES # BLD AUTO: 19.4 % — SIGNIFICANT CHANGE UP (ref 13–44)
LYMPHOCYTES # BLD AUTO: 2.09 K/UL — SIGNIFICANT CHANGE UP (ref 1–3.3)
LYMPHOCYTES # BLD AUTO: 26.9 % — SIGNIFICANT CHANGE UP (ref 13–44)
LYMPHOCYTES NFR BLD AUTO: 17.6 %
MAGNESIUM SERPL-MCNC: 1.6 MG/DL — SIGNIFICANT CHANGE UP (ref 1.6–2.6)
MAGNESIUM SERPL-MCNC: 1.6 MG/DL — SIGNIFICANT CHANGE UP (ref 1.6–2.6)
MAGNESIUM SERPL-MCNC: 1.9 MG/DL — SIGNIFICANT CHANGE UP (ref 1.6–2.6)
MAGNESIUM SERPL-MCNC: 2 MG/DL — SIGNIFICANT CHANGE UP (ref 1.6–2.6)
MAGNESIUM SERPL-MCNC: 2.2 MG/DL — SIGNIFICANT CHANGE UP (ref 1.6–2.6)
MAGNESIUM SERPL-MCNC: 2.2 MG/DL — SIGNIFICANT CHANGE UP (ref 1.6–2.6)
MAGNESIUM SERPL-MCNC: 2.3 MG/DL — SIGNIFICANT CHANGE UP (ref 1.6–2.6)
MAGNESIUM SERPL-MCNC: 2.4 MG/DL — SIGNIFICANT CHANGE UP (ref 1.6–2.6)
MAGNESIUM SERPL-MCNC: 2.5 MG/DL — SIGNIFICANT CHANGE UP (ref 1.6–2.6)
MAGNESIUM SERPL-MCNC: 2.6 MG/DL — SIGNIFICANT CHANGE UP (ref 1.6–2.6)
MAN DIFF?: NORMAL
MCHC RBC-ENTMCNC: 30.6 GM/DL — LOW (ref 32–36)
MCHC RBC-ENTMCNC: 30.9 GM/DL — LOW (ref 32–36)
MCHC RBC-ENTMCNC: 31.1 GM/DL — LOW (ref 32–36)
MCHC RBC-ENTMCNC: 31.1 GM/DL — LOW (ref 32–36)
MCHC RBC-ENTMCNC: 31.2 GM/DL — LOW (ref 32–36)
MCHC RBC-ENTMCNC: 31.3 GM/DL — LOW (ref 32–36)
MCHC RBC-ENTMCNC: 31.5 GM/DL — LOW (ref 32–36)
MCHC RBC-ENTMCNC: 31.5 PG — SIGNIFICANT CHANGE UP (ref 27–34)
MCHC RBC-ENTMCNC: 31.6 GM/DL — LOW (ref 32–36)
MCHC RBC-ENTMCNC: 31.6 PG — SIGNIFICANT CHANGE UP (ref 27–34)
MCHC RBC-ENTMCNC: 31.7 PG — SIGNIFICANT CHANGE UP (ref 27–34)
MCHC RBC-ENTMCNC: 31.7 PG — SIGNIFICANT CHANGE UP (ref 27–34)
MCHC RBC-ENTMCNC: 31.8 PG — SIGNIFICANT CHANGE UP (ref 27–34)
MCHC RBC-ENTMCNC: 31.8 PG — SIGNIFICANT CHANGE UP (ref 27–34)
MCHC RBC-ENTMCNC: 31.9 GM/DL
MCHC RBC-ENTMCNC: 31.9 PG — SIGNIFICANT CHANGE UP (ref 27–34)
MCHC RBC-ENTMCNC: 32 PG — SIGNIFICANT CHANGE UP (ref 27–34)
MCHC RBC-ENTMCNC: 32.1 GM/DL — SIGNIFICANT CHANGE UP (ref 32–36)
MCHC RBC-ENTMCNC: 32.1 GM/DL — SIGNIFICANT CHANGE UP (ref 32–36)
MCHC RBC-ENTMCNC: 32.1 PG — SIGNIFICANT CHANGE UP (ref 27–34)
MCHC RBC-ENTMCNC: 32.2 PG — SIGNIFICANT CHANGE UP (ref 27–34)
MCHC RBC-ENTMCNC: 32.3 GM/DL — SIGNIFICANT CHANGE UP (ref 32–36)
MCHC RBC-ENTMCNC: 32.3 PG — SIGNIFICANT CHANGE UP (ref 27–34)
MCHC RBC-ENTMCNC: 32.4 PG — SIGNIFICANT CHANGE UP (ref 27–34)
MCHC RBC-ENTMCNC: 32.7 PG
MCV RBC AUTO: 100 FL — SIGNIFICANT CHANGE UP (ref 80–100)
MCV RBC AUTO: 100.3 FL — HIGH (ref 80–100)
MCV RBC AUTO: 100.3 FL — HIGH (ref 80–100)
MCV RBC AUTO: 100.4 FL — HIGH (ref 80–100)
MCV RBC AUTO: 100.9 FL — HIGH (ref 80–100)
MCV RBC AUTO: 101.1 FL — HIGH (ref 80–100)
MCV RBC AUTO: 101.4 FL — HIGH (ref 80–100)
MCV RBC AUTO: 101.7 FL — HIGH (ref 80–100)
MCV RBC AUTO: 102 FL — HIGH (ref 80–100)
MCV RBC AUTO: 102 FL — HIGH (ref 80–100)
MCV RBC AUTO: 102.2 FL — HIGH (ref 80–100)
MCV RBC AUTO: 102.3 FL — HIGH (ref 80–100)
MCV RBC AUTO: 102.5 FL
MCV RBC AUTO: 103.2 FL — HIGH (ref 80–100)
MCV RBC AUTO: 103.6 FL — HIGH (ref 80–100)
MCV RBC AUTO: 103.7 FL — HIGH (ref 80–100)
MCV RBC AUTO: 103.8 FL — HIGH (ref 80–100)
METHOD TYPE: SIGNIFICANT CHANGE UP
MONOCYTES # BLD AUTO: 0.71 K/UL — SIGNIFICANT CHANGE UP (ref 0–0.9)
MONOCYTES # BLD AUTO: 0.78 K/UL
MONOCYTES # BLD AUTO: 0.82 K/UL — SIGNIFICANT CHANGE UP (ref 0–0.9)
MONOCYTES NFR BLD AUTO: 9.1 % — SIGNIFICANT CHANGE UP (ref 2–14)
MONOCYTES NFR BLD AUTO: 9.2 % — SIGNIFICANT CHANGE UP (ref 2–14)
MONOCYTES NFR BLD AUTO: 9.9 %
NEUTROPHILS # BLD AUTO: 4.8 K/UL — SIGNIFICANT CHANGE UP (ref 1.8–7.4)
NEUTROPHILS # BLD AUTO: 5.56 K/UL
NEUTROPHILS # BLD AUTO: 6.15 K/UL — SIGNIFICANT CHANGE UP (ref 1.8–7.4)
NEUTROPHILS NFR BLD AUTO: 61.9 % — SIGNIFICANT CHANGE UP (ref 43–77)
NEUTROPHILS NFR BLD AUTO: 69.3 % — SIGNIFICANT CHANGE UP (ref 43–77)
NEUTROPHILS NFR BLD AUTO: 71 %
NITRITE UR-MCNC: NEGATIVE — SIGNIFICANT CHANGE UP
NITRITE UR-MCNC: NEGATIVE — SIGNIFICANT CHANGE UP
NITRITE UR-MCNC: POSITIVE
NONHDLC SERPL-MCNC: 122 MG/DL
NRBC # BLD: 0 /100 WBCS — SIGNIFICANT CHANGE UP (ref 0–0)
NRBC # FLD: 0 K/UL — SIGNIFICANT CHANGE UP (ref 0–0)
ORGANISM # SPEC MICROSCOPIC CNT: SIGNIFICANT CHANGE UP
ORGANISM # SPEC MICROSCOPIC CNT: SIGNIFICANT CHANGE UP
OSMOLALITY UR: 464 MOSM/KG — SIGNIFICANT CHANGE UP (ref 50–1200)
PCO2 BLDV: 54 MMHG — HIGH (ref 39–42)
PCO2 BLDV: 73 MMHG — HIGH (ref 39–42)
PH BLDV: 7.26 — LOW (ref 7.32–7.43)
PH BLDV: 7.32 — SIGNIFICANT CHANGE UP (ref 7.32–7.43)
PH UR: 6 — SIGNIFICANT CHANGE UP (ref 5–8)
PH UR: 6 — SIGNIFICANT CHANGE UP (ref 5–8)
PH UR: 6.5 — SIGNIFICANT CHANGE UP (ref 5–8)
PHOSPHATE SERPL-MCNC: 3.2 MG/DL — SIGNIFICANT CHANGE UP (ref 2.5–4.5)
PHOSPHATE SERPL-MCNC: 3.8 MG/DL — SIGNIFICANT CHANGE UP (ref 2.5–4.5)
PHOSPHATE SERPL-MCNC: 4.4 MG/DL — SIGNIFICANT CHANGE UP (ref 2.5–4.5)
PHOSPHATE SERPL-MCNC: 4.4 MG/DL — SIGNIFICANT CHANGE UP (ref 2.5–4.5)
PHOSPHATE SERPL-MCNC: 4.5 MG/DL — SIGNIFICANT CHANGE UP (ref 2.5–4.5)
PHOSPHATE SERPL-MCNC: 4.6 MG/DL — HIGH (ref 2.5–4.5)
PHOSPHATE SERPL-MCNC: 4.7 MG/DL — HIGH (ref 2.5–4.5)
PHOSPHATE SERPL-MCNC: 4.9 MG/DL — HIGH (ref 2.5–4.5)
PHOSPHATE SERPL-MCNC: 4.9 MG/DL — HIGH (ref 2.5–4.5)
PHOSPHATE SERPL-MCNC: 5 MG/DL — HIGH (ref 2.5–4.5)
PHOSPHATE SERPL-MCNC: 5.3 MG/DL — HIGH (ref 2.5–4.5)
PLATELET # BLD AUTO: 215 K/UL — SIGNIFICANT CHANGE UP (ref 150–400)
PLATELET # BLD AUTO: 224 K/UL — SIGNIFICANT CHANGE UP (ref 150–400)
PLATELET # BLD AUTO: 234 K/UL — SIGNIFICANT CHANGE UP (ref 150–400)
PLATELET # BLD AUTO: 237 K/UL — SIGNIFICANT CHANGE UP (ref 150–400)
PLATELET # BLD AUTO: 239 K/UL — SIGNIFICANT CHANGE UP (ref 150–400)
PLATELET # BLD AUTO: 240 K/UL — SIGNIFICANT CHANGE UP (ref 150–400)
PLATELET # BLD AUTO: 242 K/UL — SIGNIFICANT CHANGE UP (ref 150–400)
PLATELET # BLD AUTO: 246 K/UL — SIGNIFICANT CHANGE UP (ref 150–400)
PLATELET # BLD AUTO: 247 K/UL — SIGNIFICANT CHANGE UP (ref 150–400)
PLATELET # BLD AUTO: 255 K/UL — SIGNIFICANT CHANGE UP (ref 150–400)
PLATELET # BLD AUTO: 259 K/UL — SIGNIFICANT CHANGE UP (ref 150–400)
PLATELET # BLD AUTO: 260 K/UL — SIGNIFICANT CHANGE UP (ref 150–400)
PLATELET # BLD AUTO: 263 K/UL — SIGNIFICANT CHANGE UP (ref 150–400)
PLATELET # BLD AUTO: 267 K/UL — SIGNIFICANT CHANGE UP (ref 150–400)
PLATELET # BLD AUTO: 269 K/UL — SIGNIFICANT CHANGE UP (ref 150–400)
PLATELET # BLD AUTO: 278 K/UL
PLATELET # BLD AUTO: 284 K/UL — SIGNIFICANT CHANGE UP (ref 150–400)
PO2 BLDV: 40 MMHG — SIGNIFICANT CHANGE UP
PO2 BLDV: 41 MMHG — SIGNIFICANT CHANGE UP
POTASSIUM BLDV-SCNC: 5.8 MMOL/L — HIGH (ref 3.5–5.1)
POTASSIUM BLDV-SCNC: SIGNIFICANT CHANGE UP MMOL/L (ref 3.5–5.1)
POTASSIUM SERPL-MCNC: 4.6 MMOL/L — SIGNIFICANT CHANGE UP (ref 3.5–5.3)
POTASSIUM SERPL-MCNC: 4.8 MMOL/L — SIGNIFICANT CHANGE UP (ref 3.5–5.3)
POTASSIUM SERPL-MCNC: 4.9 MMOL/L — SIGNIFICANT CHANGE UP (ref 3.5–5.3)
POTASSIUM SERPL-MCNC: 4.9 MMOL/L — SIGNIFICANT CHANGE UP (ref 3.5–5.3)
POTASSIUM SERPL-MCNC: 5 MMOL/L — SIGNIFICANT CHANGE UP (ref 3.5–5.3)
POTASSIUM SERPL-MCNC: 5.1 MMOL/L — SIGNIFICANT CHANGE UP (ref 3.5–5.3)
POTASSIUM SERPL-MCNC: 5.2 MMOL/L — SIGNIFICANT CHANGE UP (ref 3.5–5.3)
POTASSIUM SERPL-MCNC: 5.4 MMOL/L — HIGH (ref 3.5–5.3)
POTASSIUM SERPL-MCNC: 5.4 MMOL/L — HIGH (ref 3.5–5.3)
POTASSIUM SERPL-MCNC: 5.6 MMOL/L — HIGH (ref 3.5–5.3)
POTASSIUM SERPL-MCNC: 5.6 MMOL/L — HIGH (ref 3.5–5.3)
POTASSIUM SERPL-MCNC: 5.7 MMOL/L — HIGH (ref 3.5–5.3)
POTASSIUM SERPL-MCNC: 5.9 MMOL/L — HIGH (ref 3.5–5.3)
POTASSIUM SERPL-MCNC: 6.1 MMOL/L — HIGH (ref 3.5–5.3)
POTASSIUM SERPL-MCNC: 6.1 MMOL/L — HIGH (ref 3.5–5.3)
POTASSIUM SERPL-MCNC: 6.2 MMOL/L — CRITICAL HIGH (ref 3.5–5.3)
POTASSIUM SERPL-MCNC: SIGNIFICANT CHANGE UP MMOL/L (ref 3.5–5.3)
POTASSIUM SERPL-SCNC: 4.6 MMOL/L — SIGNIFICANT CHANGE UP (ref 3.5–5.3)
POTASSIUM SERPL-SCNC: 4.8 MMOL/L — SIGNIFICANT CHANGE UP (ref 3.5–5.3)
POTASSIUM SERPL-SCNC: 4.9 MMOL/L — SIGNIFICANT CHANGE UP (ref 3.5–5.3)
POTASSIUM SERPL-SCNC: 4.9 MMOL/L — SIGNIFICANT CHANGE UP (ref 3.5–5.3)
POTASSIUM SERPL-SCNC: 5 MMOL/L — SIGNIFICANT CHANGE UP (ref 3.5–5.3)
POTASSIUM SERPL-SCNC: 5.1 MMOL/L — SIGNIFICANT CHANGE UP (ref 3.5–5.3)
POTASSIUM SERPL-SCNC: 5.2 MMOL/L — SIGNIFICANT CHANGE UP (ref 3.5–5.3)
POTASSIUM SERPL-SCNC: 5.4 MMOL/L — HIGH (ref 3.5–5.3)
POTASSIUM SERPL-SCNC: 5.4 MMOL/L — HIGH (ref 3.5–5.3)
POTASSIUM SERPL-SCNC: 5.6 MMOL/L — HIGH (ref 3.5–5.3)
POTASSIUM SERPL-SCNC: 5.6 MMOL/L — HIGH (ref 3.5–5.3)
POTASSIUM SERPL-SCNC: 5.7 MMOL/L — HIGH (ref 3.5–5.3)
POTASSIUM SERPL-SCNC: 5.9 MMOL/L — HIGH (ref 3.5–5.3)
POTASSIUM SERPL-SCNC: 6.1 MMOL/L — HIGH (ref 3.5–5.3)
POTASSIUM SERPL-SCNC: 6.1 MMOL/L — HIGH (ref 3.5–5.3)
POTASSIUM SERPL-SCNC: 6.2 MMOL/L — CRITICAL HIGH (ref 3.5–5.3)
POTASSIUM SERPL-SCNC: 6.9 MMOL/L
POTASSIUM SERPL-SCNC: SIGNIFICANT CHANGE UP MMOL/L (ref 3.5–5.3)
POTASSIUM UR-SCNC: 34.8 MMOL/L — SIGNIFICANT CHANGE UP
POTASSIUM UR-SCNC: 35.3 MMOL/L — SIGNIFICANT CHANGE UP
PROT ?TM UR-MCNC: 23 MG/DL — SIGNIFICANT CHANGE UP
PROT SERPL-MCNC: 6.4 G/DL — SIGNIFICANT CHANGE UP (ref 6–8.3)
PROT SERPL-MCNC: 7.1 G/DL
PROT SERPL-MCNC: 7.3 G/DL — SIGNIFICANT CHANGE UP (ref 6–8.3)
PROT SERPL-MCNC: 7.9 G/DL — SIGNIFICANT CHANGE UP (ref 6–8.3)
PROT UR-MCNC: ABNORMAL
PROT/CREAT UR-RTO: 0.4 RATIO — HIGH (ref 0–0.2)
RBC # BLD: 2.84 M/UL — LOW (ref 3.8–5.2)
RBC # BLD: 2.91 M/UL — LOW (ref 3.8–5.2)
RBC # BLD: 2.95 M/UL — LOW (ref 3.8–5.2)
RBC # BLD: 2.96 M/UL — LOW (ref 3.8–5.2)
RBC # BLD: 2.98 M/UL — LOW (ref 3.8–5.2)
RBC # BLD: 3.02 M/UL — LOW (ref 3.8–5.2)
RBC # BLD: 3.06 M/UL — LOW (ref 3.8–5.2)
RBC # BLD: 3.09 M/UL — LOW (ref 3.8–5.2)
RBC # BLD: 3.15 M/UL — LOW (ref 3.8–5.2)
RBC # BLD: 3.17 M/UL — LOW (ref 3.8–5.2)
RBC # BLD: 3.23 M/UL — LOW (ref 3.8–5.2)
RBC # BLD: 3.24 M/UL
RBC # BLD: 3.28 M/UL — LOW (ref 3.8–5.2)
RBC # BLD: 3.45 M/UL — LOW (ref 3.8–5.2)
RBC # BLD: 3.61 M/UL — LOW (ref 3.8–5.2)
RBC # FLD: 11.9 % — SIGNIFICANT CHANGE UP (ref 10.3–14.5)
RBC # FLD: 12 %
RBC # FLD: 12 % — SIGNIFICANT CHANGE UP (ref 10.3–14.5)
RBC # FLD: 12.1 % — SIGNIFICANT CHANGE UP (ref 10.3–14.5)
RBC # FLD: 12.2 % — SIGNIFICANT CHANGE UP (ref 10.3–14.5)
RBC # FLD: 12.2 % — SIGNIFICANT CHANGE UP (ref 10.3–14.5)
RBC # FLD: 12.3 % — SIGNIFICANT CHANGE UP (ref 10.3–14.5)
RBC # FLD: 12.4 % — SIGNIFICANT CHANGE UP (ref 10.3–14.5)
RBC # FLD: 12.5 % — SIGNIFICANT CHANGE UP (ref 10.3–14.5)
RBC CASTS # UR COMP ASSIST: 1 /HPF — SIGNIFICANT CHANGE UP (ref 0–4)
RBC CASTS # UR COMP ASSIST: 2 /HPF — SIGNIFICANT CHANGE UP (ref 0–4)
RBC CASTS # UR COMP ASSIST: 3 /HPF — SIGNIFICANT CHANGE UP (ref 0–4)
RSV RNA NPH QL NAA+NON-PROBE: SIGNIFICANT CHANGE UP
SAO2 % BLDV: 65.9 % — SIGNIFICANT CHANGE UP
SAO2 % BLDV: 66.7 % — SIGNIFICANT CHANGE UP
SARS-COV-2 RNA SPEC QL NAA+PROBE: SIGNIFICANT CHANGE UP
SODIUM SERPL-SCNC: 135 MMOL/L — SIGNIFICANT CHANGE UP (ref 135–145)
SODIUM SERPL-SCNC: 137 MMOL/L
SODIUM SERPL-SCNC: 137 MMOL/L — SIGNIFICANT CHANGE UP (ref 135–145)
SODIUM SERPL-SCNC: 138 MMOL/L — SIGNIFICANT CHANGE UP (ref 135–145)
SODIUM SERPL-SCNC: 139 MMOL/L — SIGNIFICANT CHANGE UP (ref 135–145)
SODIUM SERPL-SCNC: 140 MMOL/L — SIGNIFICANT CHANGE UP (ref 135–145)
SODIUM SERPL-SCNC: 141 MMOL/L — SIGNIFICANT CHANGE UP (ref 135–145)
SODIUM SERPL-SCNC: 141 MMOL/L — SIGNIFICANT CHANGE UP (ref 135–145)
SODIUM SERPL-SCNC: 142 MMOL/L — SIGNIFICANT CHANGE UP (ref 135–145)
SODIUM UR-SCNC: 58 MMOL/L — SIGNIFICANT CHANGE UP
SODIUM UR-SCNC: 61 MMOL/L — SIGNIFICANT CHANGE UP
SP GR SPEC: 1.01 — SIGNIFICANT CHANGE UP (ref 1.01–1.05)
SP GR SPEC: 1.02 — SIGNIFICANT CHANGE UP (ref 1.01–1.05)
SP GR SPEC: 1.02 — SIGNIFICANT CHANGE UP (ref 1.01–1.05)
SPECIMEN SOURCE: SIGNIFICANT CHANGE UP
TIBC SERPL-MCNC: 536 UG/DL — HIGH (ref 220–430)
TRIGL SERPL-MCNC: 60 MG/DL
TSH SERPL-ACNC: 0.48 UIU/ML
TSH SERPL-MCNC: 0.49 UIU/ML — SIGNIFICANT CHANGE UP (ref 0.27–4.2)
TSH SERPL-MCNC: 1.24 UIU/ML — SIGNIFICANT CHANGE UP (ref 0.27–4.2)
UIBC SERPL-MCNC: 451 UG/DL — HIGH (ref 110–370)
UROBILINOGEN FLD QL: SIGNIFICANT CHANGE UP
UUN UR-MCNC: 450.8 MG/DL — SIGNIFICANT CHANGE UP
UUN UR-MCNC: 759 MG/DL — SIGNIFICANT CHANGE UP
VIT B12 SERPL-MCNC: 458 PG/ML — SIGNIFICANT CHANGE UP (ref 200–900)
WBC # BLD: 5.97 K/UL — SIGNIFICANT CHANGE UP (ref 3.8–10.5)
WBC # BLD: 6.06 K/UL — SIGNIFICANT CHANGE UP (ref 3.8–10.5)
WBC # BLD: 6.43 K/UL — SIGNIFICANT CHANGE UP (ref 3.8–10.5)
WBC # BLD: 7.17 K/UL — SIGNIFICANT CHANGE UP (ref 3.8–10.5)
WBC # BLD: 7.25 K/UL — SIGNIFICANT CHANGE UP (ref 3.8–10.5)
WBC # BLD: 7.74 K/UL — SIGNIFICANT CHANGE UP (ref 3.8–10.5)
WBC # BLD: 7.76 K/UL — SIGNIFICANT CHANGE UP (ref 3.8–10.5)
WBC # BLD: 7.76 K/UL — SIGNIFICANT CHANGE UP (ref 3.8–10.5)
WBC # BLD: 7.82 K/UL — SIGNIFICANT CHANGE UP (ref 3.8–10.5)
WBC # BLD: 7.99 K/UL — SIGNIFICANT CHANGE UP (ref 3.8–10.5)
WBC # BLD: 8.13 K/UL — SIGNIFICANT CHANGE UP (ref 3.8–10.5)
WBC # BLD: 8.3 K/UL — SIGNIFICANT CHANGE UP (ref 3.8–10.5)
WBC # BLD: 8.37 K/UL — SIGNIFICANT CHANGE UP (ref 3.8–10.5)
WBC # BLD: 8.42 K/UL — SIGNIFICANT CHANGE UP (ref 3.8–10.5)
WBC # BLD: 8.69 K/UL — SIGNIFICANT CHANGE UP (ref 3.8–10.5)
WBC # BLD: 8.88 K/UL — SIGNIFICANT CHANGE UP (ref 3.8–10.5)
WBC # FLD AUTO: 5.97 K/UL — SIGNIFICANT CHANGE UP (ref 3.8–10.5)
WBC # FLD AUTO: 6.06 K/UL — SIGNIFICANT CHANGE UP (ref 3.8–10.5)
WBC # FLD AUTO: 6.43 K/UL — SIGNIFICANT CHANGE UP (ref 3.8–10.5)
WBC # FLD AUTO: 7.17 K/UL — SIGNIFICANT CHANGE UP (ref 3.8–10.5)
WBC # FLD AUTO: 7.25 K/UL — SIGNIFICANT CHANGE UP (ref 3.8–10.5)
WBC # FLD AUTO: 7.74 K/UL — SIGNIFICANT CHANGE UP (ref 3.8–10.5)
WBC # FLD AUTO: 7.76 K/UL — SIGNIFICANT CHANGE UP (ref 3.8–10.5)
WBC # FLD AUTO: 7.76 K/UL — SIGNIFICANT CHANGE UP (ref 3.8–10.5)
WBC # FLD AUTO: 7.82 K/UL — SIGNIFICANT CHANGE UP (ref 3.8–10.5)
WBC # FLD AUTO: 7.84 K/UL
WBC # FLD AUTO: 7.99 K/UL — SIGNIFICANT CHANGE UP (ref 3.8–10.5)
WBC # FLD AUTO: 8.13 K/UL — SIGNIFICANT CHANGE UP (ref 3.8–10.5)
WBC # FLD AUTO: 8.3 K/UL — SIGNIFICANT CHANGE UP (ref 3.8–10.5)
WBC # FLD AUTO: 8.37 K/UL — SIGNIFICANT CHANGE UP (ref 3.8–10.5)
WBC # FLD AUTO: 8.42 K/UL — SIGNIFICANT CHANGE UP (ref 3.8–10.5)
WBC # FLD AUTO: 8.69 K/UL — SIGNIFICANT CHANGE UP (ref 3.8–10.5)
WBC # FLD AUTO: 8.88 K/UL — SIGNIFICANT CHANGE UP (ref 3.8–10.5)
WBC UR QL: 2 /HPF — SIGNIFICANT CHANGE UP (ref 0–5)
WBC UR QL: 7 /HPF — HIGH (ref 0–5)
WBC UR QL: 70 /HPF — HIGH (ref 0–5)

## 2022-01-01 PROCEDURE — 99232 SBSQ HOSP IP/OBS MODERATE 35: CPT

## 2022-01-01 PROCEDURE — 71045 X-RAY EXAM CHEST 1 VIEW: CPT | Mod: 26

## 2022-01-01 PROCEDURE — 99232 SBSQ HOSP IP/OBS MODERATE 35: CPT | Mod: FS

## 2022-01-01 PROCEDURE — 99233 SBSQ HOSP IP/OBS HIGH 50: CPT

## 2022-01-01 PROCEDURE — 99223 1ST HOSP IP/OBS HIGH 75: CPT

## 2022-01-01 PROCEDURE — 73620 X-RAY EXAM OF FOOT: CPT | Mod: 26,RT

## 2022-01-01 PROCEDURE — 99214 OFFICE O/P EST MOD 30 MIN: CPT | Mod: 25

## 2022-01-01 PROCEDURE — 99285 EMERGENCY DEPT VISIT HI MDM: CPT | Mod: GC

## 2022-01-01 PROCEDURE — 36415 COLL VENOUS BLD VENIPUNCTURE: CPT

## 2022-01-01 PROCEDURE — 99283 EMERGENCY DEPT VISIT LOW MDM: CPT | Mod: 25

## 2022-01-01 PROCEDURE — 93010 ELECTROCARDIOGRAM REPORT: CPT

## 2022-01-01 PROCEDURE — 99239 HOSP IP/OBS DSCHRG MGMT >30: CPT

## 2022-01-01 PROCEDURE — 99214 OFFICE O/P EST MOD 30 MIN: CPT

## 2022-01-01 PROCEDURE — 99223 1ST HOSP IP/OBS HIGH 75: CPT | Mod: 25,GC

## 2022-01-01 PROCEDURE — 73562 X-RAY EXAM OF KNEE 3: CPT | Mod: 26,RT

## 2022-01-01 PROCEDURE — 93306 TTE W/DOPPLER COMPLETE: CPT | Mod: 26

## 2022-01-01 RX ORDER — SODIUM ZIRCONIUM CYCLOSILICATE 10 G/10G
10 POWDER, FOR SUSPENSION ORAL DAILY
Refills: 0 | Status: DISCONTINUED | OUTPATIENT
Start: 2022-01-01 | End: 2022-01-01

## 2022-01-01 RX ORDER — MONTELUKAST 4 MG/1
10 TABLET, CHEWABLE ORAL AT BEDTIME
Refills: 0 | Status: DISCONTINUED | OUTPATIENT
Start: 2022-01-01 | End: 2022-01-01

## 2022-01-01 RX ORDER — ALBUTEROL 90 UG/1
2 AEROSOL, METERED ORAL EVERY 6 HOURS
Refills: 0 | Status: DISCONTINUED | OUTPATIENT
Start: 2022-01-01 | End: 2022-01-01

## 2022-01-01 RX ORDER — BUDESONIDE AND FORMOTEROL FUMARATE DIHYDRATE 160; 4.5 UG/1; UG/1
2 AEROSOL RESPIRATORY (INHALATION)
Refills: 0 | Status: DISCONTINUED | OUTPATIENT
Start: 2022-01-01 | End: 2022-01-01

## 2022-01-01 RX ORDER — SODIUM ZIRCONIUM CYCLOSILICATE 10 G/10G
10 POWDER, FOR SUSPENSION ORAL ONCE
Refills: 0 | Status: COMPLETED | OUTPATIENT
Start: 2022-01-01 | End: 2022-01-01

## 2022-01-01 RX ORDER — SODIUM CHLORIDE 9 MG/ML
1000 INJECTION, SOLUTION INTRAVENOUS
Refills: 0 | Status: DISCONTINUED | OUTPATIENT
Start: 2022-01-01 | End: 2022-01-01

## 2022-01-01 RX ORDER — INSULIN LISPRO 100/ML
VIAL (ML) SUBCUTANEOUS AT BEDTIME
Refills: 0 | Status: DISCONTINUED | OUTPATIENT
Start: 2022-01-01 | End: 2022-01-01

## 2022-01-01 RX ORDER — IPRATROPIUM/ALBUTEROL SULFATE 18-103MCG
3 AEROSOL WITH ADAPTER (GRAM) INHALATION EVERY 6 HOURS
Refills: 0 | Status: DISCONTINUED | OUTPATIENT
Start: 2022-01-01 | End: 2022-01-01

## 2022-01-01 RX ORDER — INSULIN LISPRO 100/ML
VIAL (ML) SUBCUTANEOUS
Refills: 0 | Status: DISCONTINUED | OUTPATIENT
Start: 2022-01-01 | End: 2022-01-01

## 2022-01-01 RX ORDER — DEXTROSE 50 % IN WATER 50 %
15 SYRINGE (ML) INTRAVENOUS ONCE
Refills: 0 | Status: DISCONTINUED | OUTPATIENT
Start: 2022-01-01 | End: 2022-01-01

## 2022-01-01 RX ORDER — SODIUM ZIRCONIUM CYCLOSILICATE 10 G/10G
5 POWDER, FOR SUSPENSION ORAL ONCE
Refills: 0 | Status: COMPLETED | OUTPATIENT
Start: 2022-01-01 | End: 2022-01-01

## 2022-01-01 RX ORDER — FUROSEMIDE 40 MG
20 TABLET ORAL ONCE
Refills: 0 | Status: COMPLETED | OUTPATIENT
Start: 2022-01-01 | End: 2022-01-01

## 2022-01-01 RX ORDER — ACETAMINOPHEN 500 MG
650 TABLET ORAL ONCE
Refills: 0 | Status: COMPLETED | OUTPATIENT
Start: 2022-01-01 | End: 2022-01-01

## 2022-01-01 RX ORDER — HEPARIN SODIUM 5000 [USP'U]/ML
5000 INJECTION INTRAVENOUS; SUBCUTANEOUS EVERY 8 HOURS
Refills: 0 | Status: DISCONTINUED | OUTPATIENT
Start: 2022-01-01 | End: 2022-01-01

## 2022-01-01 RX ORDER — CALCIUM GLUCONATE 100 MG/ML
1 VIAL (ML) INTRAVENOUS ONCE
Refills: 0 | Status: COMPLETED | OUTPATIENT
Start: 2022-01-01 | End: 2022-01-01

## 2022-01-01 RX ORDER — SODIUM CHLORIDE 9 MG/ML
1000 INJECTION INTRAMUSCULAR; INTRAVENOUS; SUBCUTANEOUS
Refills: 0 | Status: DISCONTINUED | OUTPATIENT
Start: 2022-01-01 | End: 2022-01-01

## 2022-01-01 RX ORDER — GLUCAGON INJECTION, SOLUTION 0.5 MG/.1ML
1 INJECTION, SOLUTION SUBCUTANEOUS ONCE
Refills: 0 | Status: DISCONTINUED | OUTPATIENT
Start: 2022-01-01 | End: 2022-01-01

## 2022-01-01 RX ORDER — TIOTROPIUM BROMIDE INHALATION SPRAY 3.12 UG/1
2.5 SPRAY, METERED RESPIRATORY (INHALATION) DAILY
Qty: 1 | Refills: 3 | Status: ACTIVE | COMMUNITY
Start: 2020-04-03 | End: 1900-01-01

## 2022-01-01 RX ORDER — SODIUM ZIRCONIUM CYCLOSILICATE 10 G/10G
10 POWDER, FOR SUSPENSION ORAL ONCE
Refills: 0 | Status: DISCONTINUED | OUTPATIENT
Start: 2022-01-01 | End: 2022-01-01

## 2022-01-01 RX ORDER — LEVOTHYROXINE SODIUM 125 MCG
112 TABLET ORAL DAILY
Refills: 0 | Status: DISCONTINUED | OUTPATIENT
Start: 2022-01-01 | End: 2022-01-01

## 2022-01-01 RX ORDER — FUROSEMIDE 40 MG
1 TABLET ORAL
Qty: 30 | Refills: 0
Start: 2022-01-01 | End: 2023-01-01

## 2022-01-01 RX ORDER — DEXTROSE 50 % IN WATER 50 %
50 SYRINGE (ML) INTRAVENOUS ONCE
Refills: 0 | Status: COMPLETED | OUTPATIENT
Start: 2022-01-01 | End: 2022-01-01

## 2022-01-01 RX ORDER — METOPROLOL TARTRATE 50 MG
0.5 TABLET ORAL
Qty: 30 | Refills: 0
Start: 2022-01-01 | End: 2023-01-01

## 2022-01-01 RX ORDER — LIDOCAINE 4 G/100G
1 CREAM TOPICAL DAILY
Refills: 0 | Status: DISCONTINUED | OUTPATIENT
Start: 2022-01-01 | End: 2022-01-01

## 2022-01-01 RX ORDER — LANOLIN ALCOHOL/MO/W.PET/CERES
3 CREAM (GRAM) TOPICAL AT BEDTIME
Refills: 0 | Status: DISCONTINUED | OUTPATIENT
Start: 2022-01-01 | End: 2022-01-01

## 2022-01-01 RX ORDER — DEXTROSE 50 % IN WATER 50 %
25 SYRINGE (ML) INTRAVENOUS ONCE
Refills: 0 | Status: DISCONTINUED | OUTPATIENT
Start: 2022-01-01 | End: 2022-01-01

## 2022-01-01 RX ORDER — LANOLIN ALCOHOL/MO/W.PET/CERES
1 CREAM (GRAM) TOPICAL
Qty: 0 | Refills: 0 | DISCHARGE
Start: 2022-01-01

## 2022-01-01 RX ORDER — FERROUS SULFATE 325(65) MG
325 TABLET ORAL DAILY
Refills: 0 | Status: DISCONTINUED | OUTPATIENT
Start: 2022-01-01 | End: 2022-01-01

## 2022-01-01 RX ORDER — FUROSEMIDE 40 MG
0 TABLET ORAL
Qty: 0 | Refills: 0 | DISCHARGE

## 2022-01-01 RX ORDER — CARBAMIDE PEROXIDE 81.86 MG/ML
5 SOLUTION/ DROPS AURICULAR (OTIC)
Refills: 0 | Status: COMPLETED | OUTPATIENT
Start: 2022-01-01 | End: 2022-01-01

## 2022-01-01 RX ORDER — FERROUS SULFATE TAB EC 325 MG (65 MG FE EQUIVALENT) 325 (65 FE) MG
325 (65 FE) TABLET DELAYED RESPONSE ORAL DAILY
Qty: 90 | Refills: 0 | Status: ACTIVE | COMMUNITY
Start: 2022-01-01 | End: 1900-01-01

## 2022-01-01 RX ORDER — ACETAMINOPHEN 500 MG
650 TABLET ORAL EVERY 6 HOURS
Refills: 0 | Status: DISCONTINUED | OUTPATIENT
Start: 2022-01-01 | End: 2022-01-01

## 2022-01-01 RX ORDER — FUROSEMIDE 40 MG
1 TABLET ORAL
Qty: 15 | Refills: 0
Start: 2022-01-01 | End: 2022-01-01

## 2022-01-01 RX ORDER — ONDANSETRON 8 MG/1
4 TABLET, FILM COATED ORAL EVERY 8 HOURS
Refills: 0 | Status: DISCONTINUED | OUTPATIENT
Start: 2022-01-01 | End: 2022-01-01

## 2022-01-01 RX ORDER — CARBAMIDE PEROXIDE 81.86 MG/ML
1 SOLUTION/ DROPS AURICULAR (OTIC)
Refills: 0 | Status: DISCONTINUED | OUTPATIENT
Start: 2022-01-01 | End: 2022-01-01

## 2022-01-01 RX ORDER — FUROSEMIDE 40 MG
TABLET ORAL
Refills: 0 | Status: DISCONTINUED | OUTPATIENT
Start: 2022-01-01 | End: 2022-01-01

## 2022-01-01 RX ORDER — INSULIN HUMAN 100 [IU]/ML
5 INJECTION, SOLUTION SUBCUTANEOUS ONCE
Refills: 0 | Status: COMPLETED | OUTPATIENT
Start: 2022-01-01 | End: 2022-01-01

## 2022-01-01 RX ORDER — FUROSEMIDE 40 MG
20 TABLET ORAL DAILY
Refills: 0 | Status: DISCONTINUED | OUTPATIENT
Start: 2022-01-01 | End: 2022-01-01

## 2022-01-01 RX ORDER — METOPROLOL TARTRATE 50 MG
25 TABLET ORAL
Refills: 0 | Status: DISCONTINUED | OUTPATIENT
Start: 2022-01-01 | End: 2022-01-01

## 2022-01-01 RX ORDER — DEXTROSE 50 % IN WATER 50 %
50 SYRINGE (ML) INTRAVENOUS ONCE
Refills: 0 | Status: DISCONTINUED | OUTPATIENT
Start: 2022-01-01 | End: 2022-01-01

## 2022-01-01 RX ORDER — DEXTROSE 50 % IN WATER 50 %
12.5 SYRINGE (ML) INTRAVENOUS ONCE
Refills: 0 | Status: DISCONTINUED | OUTPATIENT
Start: 2022-01-01 | End: 2022-01-01

## 2022-01-01 RX ORDER — METOPROLOL TARTRATE 50 MG
12.5 TABLET ORAL
Refills: 0 | Status: DISCONTINUED | OUTPATIENT
Start: 2022-01-01 | End: 2022-01-01

## 2022-01-01 RX ORDER — FUROSEMIDE 40 MG
20 TABLET ORAL ONCE
Refills: 0 | Status: DISCONTINUED | OUTPATIENT
Start: 2022-01-01 | End: 2022-01-01

## 2022-01-01 RX ORDER — FUROSEMIDE 40 MG
20 TABLET ORAL
Refills: 0 | Status: DISCONTINUED | OUTPATIENT
Start: 2022-01-01 | End: 2022-01-01

## 2022-01-01 RX ORDER — ALBUTEROL SULFATE 2.5 MG/3ML
(2.5 MG/3ML) SOLUTION RESPIRATORY (INHALATION)
Qty: 2 | Refills: 2 | Status: ACTIVE | COMMUNITY
Start: 2018-08-07 | End: 1900-01-01

## 2022-01-01 RX ORDER — INSULIN HUMAN 100 [IU]/ML
10 INJECTION, SOLUTION SUBCUTANEOUS ONCE
Refills: 0 | Status: DISCONTINUED | OUTPATIENT
Start: 2022-01-01 | End: 2022-01-01

## 2022-01-01 RX ORDER — INSULIN HUMAN 100 [IU]/ML
6 INJECTION, SOLUTION SUBCUTANEOUS ONCE
Refills: 0 | Status: COMPLETED | OUTPATIENT
Start: 2022-01-01 | End: 2022-01-01

## 2022-01-01 RX ORDER — SODIUM ZIRCONIUM CYCLOSILICATE 10 G/10G
10 POWDER, FOR SUSPENSION ORAL
Qty: 300 | Refills: 0
Start: 2022-01-01 | End: 2023-01-01

## 2022-01-01 RX ORDER — CEFTRIAXONE 500 MG/1
1000 INJECTION, POWDER, FOR SOLUTION INTRAMUSCULAR; INTRAVENOUS EVERY 24 HOURS
Refills: 0 | Status: COMPLETED | OUTPATIENT
Start: 2022-01-01 | End: 2022-01-01

## 2022-01-01 RX ORDER — METOPROLOL TARTRATE 50 MG
1 TABLET ORAL
Qty: 0 | Refills: 0 | DISCHARGE
Start: 2022-01-01

## 2022-01-01 RX ORDER — TIOTROPIUM BROMIDE 18 UG/1
1 CAPSULE ORAL; RESPIRATORY (INHALATION) DAILY
Refills: 0 | Status: DISCONTINUED | OUTPATIENT
Start: 2022-01-01 | End: 2022-01-01

## 2022-01-01 RX ADMIN — Medication 10 MILLIGRAM(S): at 16:35

## 2022-01-01 RX ADMIN — Medication 325 MILLIGRAM(S): at 11:19

## 2022-01-01 RX ADMIN — BUDESONIDE AND FORMOTEROL FUMARATE DIHYDRATE 2 PUFF(S): 160; 4.5 AEROSOL RESPIRATORY (INHALATION) at 21:11

## 2022-01-01 RX ADMIN — MONTELUKAST 10 MILLIGRAM(S): 4 TABLET, CHEWABLE ORAL at 21:32

## 2022-01-01 RX ADMIN — Medication 650 MILLIGRAM(S): at 09:48

## 2022-01-01 RX ADMIN — Medication 112 MICROGRAM(S): at 05:55

## 2022-01-01 RX ADMIN — Medication 112 MICROGRAM(S): at 08:05

## 2022-01-01 RX ADMIN — BUDESONIDE AND FORMOTEROL FUMARATE DIHYDRATE 2 PUFF(S): 160; 4.5 AEROSOL RESPIRATORY (INHALATION) at 09:56

## 2022-01-01 RX ADMIN — SODIUM ZIRCONIUM CYCLOSILICATE 10 GRAM(S): 10 POWDER, FOR SUSPENSION ORAL at 16:37

## 2022-01-01 RX ADMIN — Medication 12.5 MILLIGRAM(S): at 18:52

## 2022-01-01 RX ADMIN — Medication 3 MILLILITER(S): at 21:18

## 2022-01-01 RX ADMIN — Medication 2: at 11:58

## 2022-01-01 RX ADMIN — TIOTROPIUM BROMIDE 1 CAPSULE(S): 18 CAPSULE ORAL; RESPIRATORY (INHALATION) at 11:59

## 2022-01-01 RX ADMIN — BUDESONIDE AND FORMOTEROL FUMARATE DIHYDRATE 2 PUFF(S): 160; 4.5 AEROSOL RESPIRATORY (INHALATION) at 22:30

## 2022-01-01 RX ADMIN — BUDESONIDE AND FORMOTEROL FUMARATE DIHYDRATE 2 PUFF(S): 160; 4.5 AEROSOL RESPIRATORY (INHALATION) at 21:35

## 2022-01-01 RX ADMIN — BUDESONIDE AND FORMOTEROL FUMARATE DIHYDRATE 2 PUFF(S): 160; 4.5 AEROSOL RESPIRATORY (INHALATION) at 21:51

## 2022-01-01 RX ADMIN — Medication 12.5 MILLIGRAM(S): at 17:36

## 2022-01-01 RX ADMIN — Medication 2: at 12:13

## 2022-01-01 RX ADMIN — Medication 112 MICROGRAM(S): at 06:11

## 2022-01-01 RX ADMIN — Medication 3 MILLIGRAM(S): at 21:33

## 2022-01-01 RX ADMIN — Medication 10 MILLIGRAM(S): at 05:46

## 2022-01-01 RX ADMIN — CARBAMIDE PEROXIDE 5 DROP(S): 81.86 SOLUTION/ DROPS AURICULAR (OTIC) at 05:06

## 2022-01-01 RX ADMIN — HEPARIN SODIUM 5000 UNIT(S): 5000 INJECTION INTRAVENOUS; SUBCUTANEOUS at 22:14

## 2022-01-01 RX ADMIN — Medication 112 MICROGRAM(S): at 06:40

## 2022-01-01 RX ADMIN — LIDOCAINE 1 PATCH: 4 CREAM TOPICAL at 19:44

## 2022-01-01 RX ADMIN — Medication 1: at 12:10

## 2022-01-01 RX ADMIN — HEPARIN SODIUM 5000 UNIT(S): 5000 INJECTION INTRAVENOUS; SUBCUTANEOUS at 13:41

## 2022-01-01 RX ADMIN — Medication 25 MILLIGRAM(S): at 05:09

## 2022-01-01 RX ADMIN — Medication 10 MILLIGRAM(S): at 05:07

## 2022-01-01 RX ADMIN — LIDOCAINE 1 PATCH: 4 CREAM TOPICAL at 11:58

## 2022-01-01 RX ADMIN — MONTELUKAST 10 MILLIGRAM(S): 4 TABLET, CHEWABLE ORAL at 21:13

## 2022-01-01 RX ADMIN — BUDESONIDE AND FORMOTEROL FUMARATE DIHYDRATE 2 PUFF(S): 160; 4.5 AEROSOL RESPIRATORY (INHALATION) at 08:47

## 2022-01-01 RX ADMIN — LIDOCAINE 1 PATCH: 4 CREAM TOPICAL at 13:01

## 2022-01-01 RX ADMIN — Medication 25 MILLIGRAM(S): at 06:11

## 2022-01-01 RX ADMIN — Medication 3 MILLILITER(S): at 21:30

## 2022-01-01 RX ADMIN — CARBAMIDE PEROXIDE 5 DROP(S): 81.86 SOLUTION/ DROPS AURICULAR (OTIC) at 09:08

## 2022-01-01 RX ADMIN — Medication 1 TABLET(S): at 11:59

## 2022-01-01 RX ADMIN — Medication 3 MILLILITER(S): at 10:49

## 2022-01-01 RX ADMIN — BUDESONIDE AND FORMOTEROL FUMARATE DIHYDRATE 2 PUFF(S): 160; 4.5 AEROSOL RESPIRATORY (INHALATION) at 03:07

## 2022-01-01 RX ADMIN — BUDESONIDE AND FORMOTEROL FUMARATE DIHYDRATE 2 PUFF(S): 160; 4.5 AEROSOL RESPIRATORY (INHALATION) at 08:21

## 2022-01-01 RX ADMIN — SODIUM ZIRCONIUM CYCLOSILICATE 10 GRAM(S): 10 POWDER, FOR SUSPENSION ORAL at 17:28

## 2022-01-01 RX ADMIN — Medication 20 MILLIGRAM(S): at 05:40

## 2022-01-01 RX ADMIN — LIDOCAINE 1 PATCH: 4 CREAM TOPICAL at 19:30

## 2022-01-01 RX ADMIN — Medication 10 MILLIGRAM(S): at 17:14

## 2022-01-01 RX ADMIN — Medication 112 MICROGRAM(S): at 05:17

## 2022-01-01 RX ADMIN — Medication 25 MILLIGRAM(S): at 05:07

## 2022-01-01 RX ADMIN — Medication 20 MILLIGRAM(S): at 06:23

## 2022-01-01 RX ADMIN — TIOTROPIUM BROMIDE 1 CAPSULE(S): 18 CAPSULE ORAL; RESPIRATORY (INHALATION) at 09:57

## 2022-01-01 RX ADMIN — SODIUM ZIRCONIUM CYCLOSILICATE 10 GRAM(S): 10 POWDER, FOR SUSPENSION ORAL at 10:28

## 2022-01-01 RX ADMIN — Medication 3 MILLILITER(S): at 04:14

## 2022-01-01 RX ADMIN — LIDOCAINE 1 PATCH: 4 CREAM TOPICAL at 20:20

## 2022-01-01 RX ADMIN — Medication 325 MILLIGRAM(S): at 13:41

## 2022-01-01 RX ADMIN — Medication 10 MILLIGRAM(S): at 17:36

## 2022-01-01 RX ADMIN — LIDOCAINE 1 PATCH: 4 CREAM TOPICAL at 22:08

## 2022-01-01 RX ADMIN — Medication 20 MILLIGRAM(S): at 05:08

## 2022-01-01 RX ADMIN — HEPARIN SODIUM 5000 UNIT(S): 5000 INJECTION INTRAVENOUS; SUBCUTANEOUS at 06:40

## 2022-01-01 RX ADMIN — Medication 325 MILLIGRAM(S): at 12:43

## 2022-01-01 RX ADMIN — LIDOCAINE 1 PATCH: 4 CREAM TOPICAL at 12:16

## 2022-01-01 RX ADMIN — SODIUM ZIRCONIUM CYCLOSILICATE 5 GRAM(S): 10 POWDER, FOR SUSPENSION ORAL at 08:46

## 2022-01-01 RX ADMIN — Medication 1 TABLET(S): at 12:16

## 2022-01-01 RX ADMIN — HEPARIN SODIUM 5000 UNIT(S): 5000 INJECTION INTRAVENOUS; SUBCUTANEOUS at 13:07

## 2022-01-01 RX ADMIN — BUDESONIDE AND FORMOTEROL FUMARATE DIHYDRATE 2 PUFF(S): 160; 4.5 AEROSOL RESPIRATORY (INHALATION) at 08:14

## 2022-01-01 RX ADMIN — Medication 3 MILLILITER(S): at 15:35

## 2022-01-01 RX ADMIN — LIDOCAINE 1 PATCH: 4 CREAM TOPICAL at 17:53

## 2022-01-01 RX ADMIN — Medication 10 MILLIGRAM(S): at 05:09

## 2022-01-01 RX ADMIN — LIDOCAINE 1 PATCH: 4 CREAM TOPICAL at 06:43

## 2022-01-01 RX ADMIN — LIDOCAINE 1 PATCH: 4 CREAM TOPICAL at 10:35

## 2022-01-01 RX ADMIN — Medication 650 MILLIGRAM(S): at 07:05

## 2022-01-01 RX ADMIN — HEPARIN SODIUM 5000 UNIT(S): 5000 INJECTION INTRAVENOUS; SUBCUTANEOUS at 23:53

## 2022-01-01 RX ADMIN — Medication 3 MILLIGRAM(S): at 21:44

## 2022-01-01 RX ADMIN — TIOTROPIUM BROMIDE 1 CAPSULE(S): 18 CAPSULE ORAL; RESPIRATORY (INHALATION) at 08:21

## 2022-01-01 RX ADMIN — HEPARIN SODIUM 5000 UNIT(S): 5000 INJECTION INTRAVENOUS; SUBCUTANEOUS at 21:51

## 2022-01-01 RX ADMIN — LIDOCAINE 1 PATCH: 4 CREAM TOPICAL at 18:04

## 2022-01-01 RX ADMIN — HEPARIN SODIUM 5000 UNIT(S): 5000 INJECTION INTRAVENOUS; SUBCUTANEOUS at 16:35

## 2022-01-01 RX ADMIN — Medication 112 MICROGRAM(S): at 05:33

## 2022-01-01 RX ADMIN — Medication 112 MICROGRAM(S): at 05:39

## 2022-01-01 RX ADMIN — BUDESONIDE AND FORMOTEROL FUMARATE DIHYDRATE 2 PUFF(S): 160; 4.5 AEROSOL RESPIRATORY (INHALATION) at 21:43

## 2022-01-01 RX ADMIN — TIOTROPIUM BROMIDE 1 CAPSULE(S): 18 CAPSULE ORAL; RESPIRATORY (INHALATION) at 17:37

## 2022-01-01 RX ADMIN — Medication 3 MILLILITER(S): at 10:43

## 2022-01-01 RX ADMIN — BUDESONIDE AND FORMOTEROL FUMARATE DIHYDRATE 2 PUFF(S): 160; 4.5 AEROSOL RESPIRATORY (INHALATION) at 11:00

## 2022-01-01 RX ADMIN — Medication 112 MICROGRAM(S): at 07:24

## 2022-01-01 RX ADMIN — HEPARIN SODIUM 5000 UNIT(S): 5000 INJECTION INTRAVENOUS; SUBCUTANEOUS at 21:14

## 2022-01-01 RX ADMIN — BUDESONIDE AND FORMOTEROL FUMARATE DIHYDRATE 2 PUFF(S): 160; 4.5 AEROSOL RESPIRATORY (INHALATION) at 21:05

## 2022-01-01 RX ADMIN — Medication 3 MILLIGRAM(S): at 21:35

## 2022-01-01 RX ADMIN — BUDESONIDE AND FORMOTEROL FUMARATE DIHYDRATE 2 PUFF(S): 160; 4.5 AEROSOL RESPIRATORY (INHALATION) at 21:32

## 2022-01-01 RX ADMIN — LIDOCAINE 1 PATCH: 4 CREAM TOPICAL at 18:33

## 2022-01-01 RX ADMIN — Medication 10 MILLIGRAM(S): at 06:11

## 2022-01-01 RX ADMIN — MONTELUKAST 10 MILLIGRAM(S): 4 TABLET, CHEWABLE ORAL at 22:47

## 2022-01-01 RX ADMIN — Medication 3 MILLILITER(S): at 09:50

## 2022-01-01 RX ADMIN — CARBAMIDE PEROXIDE 5 DROP(S): 81.86 SOLUTION/ DROPS AURICULAR (OTIC) at 09:12

## 2022-01-01 RX ADMIN — Medication 2: at 08:13

## 2022-01-01 RX ADMIN — Medication 1: at 13:31

## 2022-01-01 RX ADMIN — Medication 112 MICROGRAM(S): at 05:46

## 2022-01-01 RX ADMIN — Medication 10 MILLIGRAM(S): at 06:24

## 2022-01-01 RX ADMIN — LIDOCAINE 1 PATCH: 4 CREAM TOPICAL at 01:01

## 2022-01-01 RX ADMIN — HEPARIN SODIUM 5000 UNIT(S): 5000 INJECTION INTRAVENOUS; SUBCUTANEOUS at 05:05

## 2022-01-01 RX ADMIN — Medication 112 MICROGRAM(S): at 14:30

## 2022-01-01 RX ADMIN — Medication 25 MILLIGRAM(S): at 17:39

## 2022-01-01 RX ADMIN — HEPARIN SODIUM 5000 UNIT(S): 5000 INJECTION INTRAVENOUS; SUBCUTANEOUS at 22:47

## 2022-01-01 RX ADMIN — LIDOCAINE 1 PATCH: 4 CREAM TOPICAL at 14:25

## 2022-01-01 RX ADMIN — LIDOCAINE 1 PATCH: 4 CREAM TOPICAL at 19:20

## 2022-01-01 RX ADMIN — LIDOCAINE 1 PATCH: 4 CREAM TOPICAL at 00:00

## 2022-01-01 RX ADMIN — Medication 3 MILLILITER(S): at 03:31

## 2022-01-01 RX ADMIN — TIOTROPIUM BROMIDE 1 CAPSULE(S): 18 CAPSULE ORAL; RESPIRATORY (INHALATION) at 12:15

## 2022-01-01 RX ADMIN — Medication 20 MILLIGRAM(S): at 05:33

## 2022-01-01 RX ADMIN — Medication 3 MILLILITER(S): at 08:47

## 2022-01-01 RX ADMIN — Medication 2: at 08:08

## 2022-01-01 RX ADMIN — MONTELUKAST 10 MILLIGRAM(S): 4 TABLET, CHEWABLE ORAL at 21:51

## 2022-01-01 RX ADMIN — CARBAMIDE PEROXIDE 5 DROP(S): 81.86 SOLUTION/ DROPS AURICULAR (OTIC) at 21:41

## 2022-01-01 RX ADMIN — SODIUM ZIRCONIUM CYCLOSILICATE 10 GRAM(S): 10 POWDER, FOR SUSPENSION ORAL at 18:50

## 2022-01-01 RX ADMIN — Medication 3 MILLILITER(S): at 17:21

## 2022-01-01 RX ADMIN — Medication 20 MILLIGRAM(S): at 05:43

## 2022-01-01 RX ADMIN — Medication 25 MILLIGRAM(S): at 17:31

## 2022-01-01 RX ADMIN — BUDESONIDE AND FORMOTEROL FUMARATE DIHYDRATE 2 PUFF(S): 160; 4.5 AEROSOL RESPIRATORY (INHALATION) at 08:39

## 2022-01-01 RX ADMIN — MONTELUKAST 10 MILLIGRAM(S): 4 TABLET, CHEWABLE ORAL at 22:15

## 2022-01-01 RX ADMIN — Medication 650 MILLIGRAM(S): at 08:48

## 2022-01-01 RX ADMIN — Medication 25 MILLIGRAM(S): at 17:52

## 2022-01-01 RX ADMIN — SODIUM CHLORIDE 40 MILLILITER(S): 9 INJECTION INTRAMUSCULAR; INTRAVENOUS; SUBCUTANEOUS at 17:02

## 2022-01-01 RX ADMIN — Medication 20 MILLIGRAM(S): at 07:24

## 2022-01-01 RX ADMIN — Medication 25 MILLIGRAM(S): at 17:42

## 2022-01-01 RX ADMIN — Medication 20 MILLIGRAM(S): at 05:09

## 2022-01-01 RX ADMIN — HEPARIN SODIUM 5000 UNIT(S): 5000 INJECTION INTRAVENOUS; SUBCUTANEOUS at 05:38

## 2022-01-01 RX ADMIN — SODIUM CHLORIDE 40 MILLILITER(S): 9 INJECTION INTRAMUSCULAR; INTRAVENOUS; SUBCUTANEOUS at 22:08

## 2022-01-01 RX ADMIN — Medication 20 MILLIGRAM(S): at 08:25

## 2022-01-01 RX ADMIN — MONTELUKAST 10 MILLIGRAM(S): 4 TABLET, CHEWABLE ORAL at 23:53

## 2022-01-01 RX ADMIN — Medication 25 MILLIGRAM(S): at 17:55

## 2022-01-01 RX ADMIN — Medication 20 MILLIGRAM(S): at 06:28

## 2022-01-01 RX ADMIN — Medication 10 MILLIGRAM(S): at 05:33

## 2022-01-01 RX ADMIN — HEPARIN SODIUM 5000 UNIT(S): 5000 INJECTION INTRAVENOUS; SUBCUTANEOUS at 17:54

## 2022-01-01 RX ADMIN — TIOTROPIUM BROMIDE 1 CAPSULE(S): 18 CAPSULE ORAL; RESPIRATORY (INHALATION) at 12:59

## 2022-01-01 RX ADMIN — BUDESONIDE AND FORMOTEROL FUMARATE DIHYDRATE 2 PUFF(S): 160; 4.5 AEROSOL RESPIRATORY (INHALATION) at 08:37

## 2022-01-01 RX ADMIN — SODIUM ZIRCONIUM CYCLOSILICATE 10 GRAM(S): 10 POWDER, FOR SUSPENSION ORAL at 13:41

## 2022-01-01 RX ADMIN — CARBAMIDE PEROXIDE 5 DROP(S): 81.86 SOLUTION/ DROPS AURICULAR (OTIC) at 21:50

## 2022-01-01 RX ADMIN — BUDESONIDE AND FORMOTEROL FUMARATE DIHYDRATE 2 PUFF(S): 160; 4.5 AEROSOL RESPIRATORY (INHALATION) at 14:02

## 2022-01-01 RX ADMIN — Medication 10 MILLIGRAM(S): at 17:54

## 2022-01-01 RX ADMIN — CEFTRIAXONE 100 MILLIGRAM(S): 500 INJECTION, POWDER, FOR SOLUTION INTRAMUSCULAR; INTRAVENOUS at 17:54

## 2022-01-01 RX ADMIN — Medication 325 MILLIGRAM(S): at 12:05

## 2022-01-01 RX ADMIN — Medication 3 MILLIGRAM(S): at 22:51

## 2022-01-01 RX ADMIN — Medication 325 MILLIGRAM(S): at 11:57

## 2022-01-01 RX ADMIN — Medication 50 MILLILITER(S): at 10:38

## 2022-01-01 RX ADMIN — Medication 20 MILLIGRAM(S): at 06:43

## 2022-01-01 RX ADMIN — INSULIN HUMAN 5 UNIT(S): 100 INJECTION, SOLUTION SUBCUTANEOUS at 14:54

## 2022-01-01 RX ADMIN — BUDESONIDE AND FORMOTEROL FUMARATE DIHYDRATE 2 PUFF(S): 160; 4.5 AEROSOL RESPIRATORY (INHALATION) at 10:13

## 2022-01-01 RX ADMIN — Medication 1 TABLET(S): at 12:58

## 2022-01-01 RX ADMIN — CEFTRIAXONE 100 MILLIGRAM(S): 500 INJECTION, POWDER, FOR SOLUTION INTRAMUSCULAR; INTRAVENOUS at 17:34

## 2022-01-01 RX ADMIN — Medication 4: at 12:19

## 2022-01-01 RX ADMIN — HEPARIN SODIUM 5000 UNIT(S): 5000 INJECTION INTRAVENOUS; SUBCUTANEOUS at 05:56

## 2022-01-01 RX ADMIN — MONTELUKAST 10 MILLIGRAM(S): 4 TABLET, CHEWABLE ORAL at 21:12

## 2022-01-01 RX ADMIN — BUDESONIDE AND FORMOTEROL FUMARATE DIHYDRATE 2 PUFF(S): 160; 4.5 AEROSOL RESPIRATORY (INHALATION) at 13:30

## 2022-01-01 RX ADMIN — Medication 325 MILLIGRAM(S): at 12:20

## 2022-01-01 RX ADMIN — LIDOCAINE 1 PATCH: 4 CREAM TOPICAL at 19:29

## 2022-01-01 RX ADMIN — Medication 650 MILLIGRAM(S): at 11:14

## 2022-01-01 RX ADMIN — SODIUM ZIRCONIUM CYCLOSILICATE 10 GRAM(S): 10 POWDER, FOR SUSPENSION ORAL at 10:49

## 2022-01-01 RX ADMIN — Medication 112 MICROGRAM(S): at 05:07

## 2022-01-01 RX ADMIN — TIOTROPIUM BROMIDE 1 CAPSULE(S): 18 CAPSULE ORAL; RESPIRATORY (INHALATION) at 12:42

## 2022-01-01 RX ADMIN — LIDOCAINE 1 PATCH: 4 CREAM TOPICAL at 06:44

## 2022-01-01 RX ADMIN — Medication 325 MILLIGRAM(S): at 13:00

## 2022-01-01 RX ADMIN — LIDOCAINE 1 PATCH: 4 CREAM TOPICAL at 01:40

## 2022-01-01 RX ADMIN — Medication 112 MICROGRAM(S): at 05:34

## 2022-01-01 RX ADMIN — Medication 650 MILLIGRAM(S): at 07:23

## 2022-01-01 RX ADMIN — Medication 3 MILLILITER(S): at 09:32

## 2022-01-01 RX ADMIN — Medication 10 MILLIGRAM(S): at 05:25

## 2022-01-01 RX ADMIN — Medication 12.5 MILLIGRAM(S): at 05:42

## 2022-01-01 RX ADMIN — Medication 10 MILLIGRAM(S): at 06:27

## 2022-01-01 RX ADMIN — Medication 10 MILLIGRAM(S): at 17:15

## 2022-01-01 RX ADMIN — Medication 50 MILLILITER(S): at 14:54

## 2022-01-01 RX ADMIN — Medication 325 MILLIGRAM(S): at 12:56

## 2022-01-01 RX ADMIN — TIOTROPIUM BROMIDE 1 CAPSULE(S): 18 CAPSULE ORAL; RESPIRATORY (INHALATION) at 10:43

## 2022-01-01 RX ADMIN — TIOTROPIUM BROMIDE 1 CAPSULE(S): 18 CAPSULE ORAL; RESPIRATORY (INHALATION) at 10:07

## 2022-01-01 RX ADMIN — LIDOCAINE 1 PATCH: 4 CREAM TOPICAL at 00:16

## 2022-01-01 RX ADMIN — Medication 3 MILLILITER(S): at 08:34

## 2022-01-01 RX ADMIN — Medication 1 TABLET(S): at 12:59

## 2022-01-01 RX ADMIN — BUDESONIDE AND FORMOTEROL FUMARATE DIHYDRATE 2 PUFF(S): 160; 4.5 AEROSOL RESPIRATORY (INHALATION) at 10:43

## 2022-01-01 RX ADMIN — Medication 12.5 MILLIGRAM(S): at 17:38

## 2022-01-01 RX ADMIN — HEPARIN SODIUM 5000 UNIT(S): 5000 INJECTION INTRAVENOUS; SUBCUTANEOUS at 06:28

## 2022-01-01 RX ADMIN — HEPARIN SODIUM 5000 UNIT(S): 5000 INJECTION INTRAVENOUS; SUBCUTANEOUS at 14:03

## 2022-01-01 RX ADMIN — Medication 3 MILLILITER(S): at 15:34

## 2022-01-01 RX ADMIN — CEFTRIAXONE 100 MILLIGRAM(S): 500 INJECTION, POWDER, FOR SOLUTION INTRAMUSCULAR; INTRAVENOUS at 17:37

## 2022-01-01 RX ADMIN — Medication 2: at 17:37

## 2022-01-01 RX ADMIN — LIDOCAINE 1 PATCH: 4 CREAM TOPICAL at 18:53

## 2022-01-01 RX ADMIN — MONTELUKAST 10 MILLIGRAM(S): 4 TABLET, CHEWABLE ORAL at 22:10

## 2022-01-01 RX ADMIN — Medication 1 TABLET(S): at 12:20

## 2022-01-01 RX ADMIN — Medication 2: at 12:05

## 2022-01-01 RX ADMIN — SODIUM ZIRCONIUM CYCLOSILICATE 10 GRAM(S): 10 POWDER, FOR SUSPENSION ORAL at 21:12

## 2022-01-01 RX ADMIN — LIDOCAINE 1 PATCH: 4 CREAM TOPICAL at 12:19

## 2022-01-01 RX ADMIN — SODIUM ZIRCONIUM CYCLOSILICATE 10 GRAM(S): 10 POWDER, FOR SUSPENSION ORAL at 14:54

## 2022-01-01 RX ADMIN — Medication 3 MILLILITER(S): at 20:50

## 2022-01-01 RX ADMIN — CARBAMIDE PEROXIDE 5 DROP(S): 81.86 SOLUTION/ DROPS AURICULAR (OTIC) at 22:14

## 2022-01-01 RX ADMIN — BUDESONIDE AND FORMOTEROL FUMARATE DIHYDRATE 2 PUFF(S): 160; 4.5 AEROSOL RESPIRATORY (INHALATION) at 10:32

## 2022-01-01 RX ADMIN — LIDOCAINE 1 PATCH: 4 CREAM TOPICAL at 13:07

## 2022-01-01 RX ADMIN — Medication 12.5 MILLIGRAM(S): at 06:23

## 2022-01-01 RX ADMIN — BUDESONIDE AND FORMOTEROL FUMARATE DIHYDRATE 2 PUFF(S): 160; 4.5 AEROSOL RESPIRATORY (INHALATION) at 21:18

## 2022-01-01 RX ADMIN — Medication 650 MILLIGRAM(S): at 09:46

## 2022-01-01 RX ADMIN — SODIUM ZIRCONIUM CYCLOSILICATE 10 GRAM(S): 10 POWDER, FOR SUSPENSION ORAL at 03:55

## 2022-01-01 RX ADMIN — Medication 325 MILLIGRAM(S): at 11:58

## 2022-01-01 RX ADMIN — Medication 1: at 13:11

## 2022-01-01 RX ADMIN — Medication 325 MILLIGRAM(S): at 17:55

## 2022-01-01 RX ADMIN — Medication 650 MILLIGRAM(S): at 10:02

## 2022-01-01 RX ADMIN — Medication 10 MILLIGRAM(S): at 05:54

## 2022-01-01 RX ADMIN — Medication 10 MILLIGRAM(S): at 17:04

## 2022-01-01 RX ADMIN — Medication 20 MILLIGRAM(S): at 05:56

## 2022-01-01 RX ADMIN — Medication 3 MILLILITER(S): at 15:59

## 2022-01-01 RX ADMIN — BUDESONIDE AND FORMOTEROL FUMARATE DIHYDRATE 2 PUFF(S): 160; 4.5 AEROSOL RESPIRATORY (INHALATION) at 09:09

## 2022-01-01 RX ADMIN — Medication 3 MILLIGRAM(S): at 22:10

## 2022-01-01 RX ADMIN — Medication 112 MICROGRAM(S): at 05:06

## 2022-01-01 RX ADMIN — Medication 3 MILLILITER(S): at 03:59

## 2022-01-01 RX ADMIN — Medication 2: at 12:04

## 2022-01-01 RX ADMIN — Medication 2: at 12:58

## 2022-01-01 RX ADMIN — SODIUM ZIRCONIUM CYCLOSILICATE 10 GRAM(S): 10 POWDER, FOR SUSPENSION ORAL at 12:57

## 2022-01-01 RX ADMIN — Medication 3 MILLILITER(S): at 16:18

## 2022-01-01 RX ADMIN — Medication 3 MILLILITER(S): at 16:22

## 2022-01-01 RX ADMIN — Medication 3 MILLILITER(S): at 21:01

## 2022-01-01 RX ADMIN — HEPARIN SODIUM 5000 UNIT(S): 5000 INJECTION INTRAVENOUS; SUBCUTANEOUS at 21:35

## 2022-01-01 RX ADMIN — Medication 1: at 12:34

## 2022-01-01 RX ADMIN — Medication 100 GRAM(S): at 10:26

## 2022-01-01 RX ADMIN — Medication 1 TABLET(S): at 12:05

## 2022-01-01 RX ADMIN — Medication 2: at 08:36

## 2022-01-01 RX ADMIN — SODIUM ZIRCONIUM CYCLOSILICATE 10 GRAM(S): 10 POWDER, FOR SUSPENSION ORAL at 13:07

## 2022-01-01 RX ADMIN — BUDESONIDE AND FORMOTEROL FUMARATE DIHYDRATE 2 PUFF(S): 160; 4.5 AEROSOL RESPIRATORY (INHALATION) at 22:09

## 2022-01-01 RX ADMIN — Medication 3 MILLILITER(S): at 03:54

## 2022-01-01 RX ADMIN — HEPARIN SODIUM 5000 UNIT(S): 5000 INJECTION INTRAVENOUS; SUBCUTANEOUS at 21:05

## 2022-01-01 RX ADMIN — Medication 25 MILLIGRAM(S): at 17:13

## 2022-01-01 RX ADMIN — Medication 25 MILLIGRAM(S): at 05:17

## 2022-01-01 RX ADMIN — LIDOCAINE 1 PATCH: 4 CREAM TOPICAL at 20:55

## 2022-01-01 RX ADMIN — LIDOCAINE 1 PATCH: 4 CREAM TOPICAL at 13:08

## 2022-01-01 RX ADMIN — Medication 325 MILLIGRAM(S): at 14:02

## 2022-01-01 RX ADMIN — Medication 20 MILLIGRAM(S): at 05:07

## 2022-01-01 RX ADMIN — Medication 325 MILLIGRAM(S): at 17:30

## 2022-01-01 RX ADMIN — BUDESONIDE AND FORMOTEROL FUMARATE DIHYDRATE 2 PUFF(S): 160; 4.5 AEROSOL RESPIRATORY (INHALATION) at 17:16

## 2022-01-01 RX ADMIN — Medication 325 MILLIGRAM(S): at 12:16

## 2022-01-01 RX ADMIN — BUDESONIDE AND FORMOTEROL FUMARATE DIHYDRATE 2 PUFF(S): 160; 4.5 AEROSOL RESPIRATORY (INHALATION) at 21:13

## 2022-01-01 RX ADMIN — Medication 10 MILLIGRAM(S): at 17:52

## 2022-01-01 RX ADMIN — SODIUM ZIRCONIUM CYCLOSILICATE 10 GRAM(S): 10 POWDER, FOR SUSPENSION ORAL at 17:54

## 2022-01-01 RX ADMIN — Medication 4: at 12:14

## 2022-01-01 RX ADMIN — LIDOCAINE 1 PATCH: 4 CREAM TOPICAL at 06:09

## 2022-01-01 RX ADMIN — Medication 3 MILLILITER(S): at 21:00

## 2022-01-01 RX ADMIN — INSULIN HUMAN 6 UNIT(S): 100 INJECTION, SOLUTION SUBCUTANEOUS at 10:28

## 2022-01-01 RX ADMIN — Medication 10 MILLIGRAM(S): at 05:39

## 2022-01-01 RX ADMIN — Medication 25 MILLIGRAM(S): at 17:14

## 2022-01-01 RX ADMIN — LIDOCAINE 1 PATCH: 4 CREAM TOPICAL at 18:55

## 2022-01-01 RX ADMIN — Medication 10 MILLIGRAM(S): at 05:05

## 2022-01-01 RX ADMIN — Medication 10 MILLIGRAM(S): at 17:11

## 2022-01-01 RX ADMIN — MONTELUKAST 10 MILLIGRAM(S): 4 TABLET, CHEWABLE ORAL at 22:14

## 2022-01-01 RX ADMIN — Medication 3 MILLILITER(S): at 10:46

## 2022-01-01 RX ADMIN — Medication 20 MILLIGRAM(S): at 05:06

## 2022-01-01 RX ADMIN — TIOTROPIUM BROMIDE 1 CAPSULE(S): 18 CAPSULE ORAL; RESPIRATORY (INHALATION) at 12:05

## 2022-01-01 RX ADMIN — Medication 12.5 MILLIGRAM(S): at 07:25

## 2022-01-01 RX ADMIN — Medication 1 TABLET(S): at 12:42

## 2022-01-01 RX ADMIN — LIDOCAINE 1 PATCH: 4 CREAM TOPICAL at 00:11

## 2022-01-01 RX ADMIN — TIOTROPIUM BROMIDE 1 CAPSULE(S): 18 CAPSULE ORAL; RESPIRATORY (INHALATION) at 12:19

## 2022-01-01 RX ADMIN — Medication 1 TABLET(S): at 11:58

## 2022-01-01 RX ADMIN — Medication 325 MILLIGRAM(S): at 18:49

## 2022-01-01 RX ADMIN — Medication 112 MICROGRAM(S): at 06:24

## 2022-01-01 RX ADMIN — BUDESONIDE AND FORMOTEROL FUMARATE DIHYDRATE 2 PUFF(S): 160; 4.5 AEROSOL RESPIRATORY (INHALATION) at 08:13

## 2022-01-01 RX ADMIN — Medication 12.5 MILLIGRAM(S): at 17:16

## 2022-01-01 RX ADMIN — Medication 325 MILLIGRAM(S): at 12:35

## 2022-01-01 RX ADMIN — MONTELUKAST 10 MILLIGRAM(S): 4 TABLET, CHEWABLE ORAL at 21:34

## 2022-01-01 RX ADMIN — SODIUM ZIRCONIUM CYCLOSILICATE 10 GRAM(S): 10 POWDER, FOR SUSPENSION ORAL at 22:08

## 2022-01-01 RX ADMIN — Medication 325 MILLIGRAM(S): at 16:35

## 2022-01-01 RX ADMIN — MONTELUKAST 10 MILLIGRAM(S): 4 TABLET, CHEWABLE ORAL at 21:52

## 2022-01-01 RX ADMIN — Medication 325 MILLIGRAM(S): at 13:06

## 2022-01-01 RX ADMIN — HEPARIN SODIUM 5000 UNIT(S): 5000 INJECTION INTRAVENOUS; SUBCUTANEOUS at 13:14

## 2022-01-01 RX ADMIN — LIDOCAINE 1 PATCH: 4 CREAM TOPICAL at 18:59

## 2022-01-01 RX ADMIN — MONTELUKAST 10 MILLIGRAM(S): 4 TABLET, CHEWABLE ORAL at 21:31

## 2022-01-01 RX ADMIN — SODIUM ZIRCONIUM CYCLOSILICATE 10 GRAM(S): 10 POWDER, FOR SUSPENSION ORAL at 14:02

## 2022-01-01 RX ADMIN — Medication 3 MILLIGRAM(S): at 23:44

## 2022-01-01 RX ADMIN — Medication 112 MICROGRAM(S): at 05:05

## 2022-01-01 RX ADMIN — Medication 25 MILLIGRAM(S): at 17:04

## 2022-01-01 RX ADMIN — LIDOCAINE 1 PATCH: 4 CREAM TOPICAL at 17:52

## 2022-01-01 RX ADMIN — BUDESONIDE AND FORMOTEROL FUMARATE DIHYDRATE 2 PUFF(S): 160; 4.5 AEROSOL RESPIRATORY (INHALATION) at 21:33

## 2022-01-01 RX ADMIN — MONTELUKAST 10 MILLIGRAM(S): 4 TABLET, CHEWABLE ORAL at 21:04

## 2022-01-01 RX ADMIN — LIDOCAINE 1 PATCH: 4 CREAM TOPICAL at 17:37

## 2022-01-01 RX ADMIN — Medication 325 MILLIGRAM(S): at 12:58

## 2022-01-01 RX ADMIN — Medication 10 MILLIGRAM(S): at 17:42

## 2022-01-01 RX ADMIN — Medication 50 MILLILITER(S): at 03:55

## 2022-01-01 RX ADMIN — Medication 10 MILLIGRAM(S): at 03:08

## 2022-01-01 RX ADMIN — BUDESONIDE AND FORMOTEROL FUMARATE DIHYDRATE 2 PUFF(S): 160; 4.5 AEROSOL RESPIRATORY (INHALATION) at 21:53

## 2022-01-01 RX ADMIN — Medication 25 MILLIGRAM(S): at 05:56

## 2022-01-01 RX ADMIN — SODIUM ZIRCONIUM CYCLOSILICATE 10 GRAM(S): 10 POWDER, FOR SUSPENSION ORAL at 03:08

## 2022-01-01 RX ADMIN — Medication 650 MILLIGRAM(S): at 08:23

## 2022-01-01 RX ADMIN — Medication 10 MILLIGRAM(S): at 17:37

## 2022-01-01 RX ADMIN — MONTELUKAST 10 MILLIGRAM(S): 4 TABLET, CHEWABLE ORAL at 21:43

## 2022-01-01 RX ADMIN — Medication 3 MILLIGRAM(S): at 21:51

## 2022-01-01 RX ADMIN — LIDOCAINE 1 PATCH: 4 CREAM TOPICAL at 12:59

## 2022-01-01 RX ADMIN — Medication 25 MILLIGRAM(S): at 17:33

## 2022-01-01 RX ADMIN — Medication 20 MILLIGRAM(S): at 14:53

## 2022-01-01 RX ADMIN — HEPARIN SODIUM 5000 UNIT(S): 5000 INJECTION INTRAVENOUS; SUBCUTANEOUS at 06:24

## 2022-01-01 RX ADMIN — Medication 10 MILLIGRAM(S): at 06:40

## 2022-01-01 RX ADMIN — Medication 10 MILLIGRAM(S): at 17:55

## 2022-01-01 RX ADMIN — Medication 12.5 MILLIGRAM(S): at 06:27

## 2022-01-01 RX ADMIN — Medication 10 MILLIGRAM(S): at 17:13

## 2022-01-01 RX ADMIN — INSULIN HUMAN 5 UNIT(S): 100 INJECTION, SOLUTION SUBCUTANEOUS at 03:54

## 2022-01-01 RX ADMIN — Medication 3 MILLILITER(S): at 10:39

## 2022-01-01 RX ADMIN — Medication 3 MILLIGRAM(S): at 21:34

## 2022-01-01 RX ADMIN — Medication 25 MILLIGRAM(S): at 18:53

## 2022-01-01 RX ADMIN — Medication 25 MILLIGRAM(S): at 17:11

## 2022-01-01 RX ADMIN — MONTELUKAST 10 MILLIGRAM(S): 4 TABLET, CHEWABLE ORAL at 22:30

## 2022-01-01 RX ADMIN — BUDESONIDE AND FORMOTEROL FUMARATE DIHYDRATE 2 PUFF(S): 160; 4.5 AEROSOL RESPIRATORY (INHALATION) at 21:34

## 2022-01-01 RX ADMIN — MONTELUKAST 10 MILLIGRAM(S): 4 TABLET, CHEWABLE ORAL at 22:34

## 2022-01-01 RX ADMIN — Medication 3 MILLILITER(S): at 15:42

## 2022-01-01 RX ADMIN — ALBUTEROL 2 PUFF(S): 90 AEROSOL, METERED ORAL at 21:46

## 2022-01-01 RX ADMIN — SODIUM ZIRCONIUM CYCLOSILICATE 10 GRAM(S): 10 POWDER, FOR SUSPENSION ORAL at 17:02

## 2022-01-01 RX ADMIN — Medication 3 MILLILITER(S): at 17:25

## 2022-01-01 RX ADMIN — Medication 3 MILLILITER(S): at 09:37

## 2022-01-01 RX ADMIN — BUDESONIDE AND FORMOTEROL FUMARATE DIHYDRATE 2 PUFF(S): 160; 4.5 AEROSOL RESPIRATORY (INHALATION) at 10:34

## 2022-01-01 RX ADMIN — Medication 3 MILLILITER(S): at 22:34

## 2022-01-01 RX ADMIN — Medication 325 MILLIGRAM(S): at 12:13

## 2022-01-01 RX ADMIN — Medication 12.5 MILLIGRAM(S): at 17:55

## 2022-01-01 RX ADMIN — Medication 20 MILLIGRAM(S): at 05:05

## 2022-01-01 RX ADMIN — MONTELUKAST 10 MILLIGRAM(S): 4 TABLET, CHEWABLE ORAL at 21:35

## 2022-01-01 RX ADMIN — Medication 10 MILLIGRAM(S): at 17:30

## 2022-01-01 RX ADMIN — Medication 25 MILLIGRAM(S): at 05:05

## 2022-01-01 RX ADMIN — Medication 3 MILLIGRAM(S): at 22:54

## 2022-01-01 RX ADMIN — HEPARIN SODIUM 5000 UNIT(S): 5000 INJECTION INTRAVENOUS; SUBCUTANEOUS at 14:12

## 2022-01-01 RX ADMIN — Medication 10 MILLIGRAM(S): at 05:55

## 2022-01-01 RX ADMIN — Medication 25 MILLIGRAM(S): at 23:53

## 2022-01-01 RX ADMIN — Medication 20 MILLIGRAM(S): at 14:30

## 2022-01-01 RX ADMIN — Medication 1 TABLET(S): at 17:32

## 2022-01-01 RX ADMIN — BUDESONIDE AND FORMOTEROL FUMARATE DIHYDRATE 2 PUFF(S): 160; 4.5 AEROSOL RESPIRATORY (INHALATION) at 09:55

## 2022-01-01 RX ADMIN — BUDESONIDE AND FORMOTEROL FUMARATE DIHYDRATE 2 PUFF(S): 160; 4.5 AEROSOL RESPIRATORY (INHALATION) at 22:25

## 2022-01-01 RX ADMIN — Medication 112 MICROGRAM(S): at 05:09

## 2022-01-01 RX ADMIN — Medication 1 TABLET(S): at 12:13

## 2022-01-01 RX ADMIN — LIDOCAINE 1 PATCH: 4 CREAM TOPICAL at 20:21

## 2022-01-01 RX ADMIN — Medication 3 MILLILITER(S): at 03:32

## 2022-01-01 RX ADMIN — BUDESONIDE AND FORMOTEROL FUMARATE DIHYDRATE 2 PUFF(S): 160; 4.5 AEROSOL RESPIRATORY (INHALATION) at 21:00

## 2022-01-01 RX ADMIN — Medication 10 MILLIGRAM(S): at 07:25

## 2022-01-01 RX ADMIN — HEPARIN SODIUM 5000 UNIT(S): 5000 INJECTION INTRAVENOUS; SUBCUTANEOUS at 23:09

## 2022-01-01 RX ADMIN — Medication 20 MILLIGRAM(S): at 05:17

## 2022-01-01 RX ADMIN — Medication 10 MILLIGRAM(S): at 17:38

## 2022-01-01 RX ADMIN — Medication 10 MILLIGRAM(S): at 18:52

## 2022-01-01 RX ADMIN — Medication 10 MILLIGRAM(S): at 17:33

## 2022-01-01 RX ADMIN — SODIUM ZIRCONIUM CYCLOSILICATE 10 GRAM(S): 10 POWDER, FOR SUSPENSION ORAL at 11:22

## 2022-01-01 RX ADMIN — SODIUM ZIRCONIUM CYCLOSILICATE 10 GRAM(S): 10 POWDER, FOR SUSPENSION ORAL at 10:04

## 2022-01-01 RX ADMIN — Medication 20 MILLIGRAM(S): at 17:03

## 2022-01-01 RX ADMIN — BUDESONIDE AND FORMOTEROL FUMARATE DIHYDRATE 2 PUFF(S): 160; 4.5 AEROSOL RESPIRATORY (INHALATION) at 09:08

## 2022-01-01 RX ADMIN — Medication 20 MILLIGRAM(S): at 05:55

## 2022-01-01 RX ADMIN — Medication 112 MICROGRAM(S): at 06:27

## 2022-01-01 RX ADMIN — HEPARIN SODIUM 5000 UNIT(S): 5000 INJECTION INTRAVENOUS; SUBCUTANEOUS at 22:34

## 2022-01-01 RX ADMIN — MONTELUKAST 10 MILLIGRAM(S): 4 TABLET, CHEWABLE ORAL at 23:13

## 2022-01-01 RX ADMIN — Medication 4: at 17:37

## 2022-01-01 RX ADMIN — BUDESONIDE AND FORMOTEROL FUMARATE DIHYDRATE 2 PUFF(S): 160; 4.5 AEROSOL RESPIRATORY (INHALATION) at 09:21

## 2022-01-01 RX ADMIN — Medication 112 MICROGRAM(S): at 07:25

## 2022-01-01 RX ADMIN — Medication 325 MILLIGRAM(S): at 14:30

## 2022-01-01 RX ADMIN — CARBAMIDE PEROXIDE 5 DROP(S): 81.86 SOLUTION/ DROPS AURICULAR (OTIC) at 21:43

## 2022-01-01 RX ADMIN — BUDESONIDE AND FORMOTEROL FUMARATE DIHYDRATE 2 PUFF(S): 160; 4.5 AEROSOL RESPIRATORY (INHALATION) at 22:47

## 2022-01-01 RX ADMIN — Medication 112 MICROGRAM(S): at 05:25

## 2022-01-01 RX ADMIN — Medication 20 MILLIGRAM(S): at 06:11

## 2022-01-01 RX ADMIN — HEPARIN SODIUM 5000 UNIT(S): 5000 INJECTION INTRAVENOUS; SUBCUTANEOUS at 05:18

## 2022-01-01 RX ADMIN — Medication 20 MILLIGRAM(S): at 05:34

## 2022-01-01 RX ADMIN — CARBAMIDE PEROXIDE 5 DROP(S): 81.86 SOLUTION/ DROPS AURICULAR (OTIC) at 12:16

## 2022-01-01 RX ADMIN — HEPARIN SODIUM 5000 UNIT(S): 5000 INJECTION INTRAVENOUS; SUBCUTANEOUS at 06:06

## 2022-01-01 RX ADMIN — Medication 10 MILLIGRAM(S): at 05:17

## 2022-01-01 RX ADMIN — BUDESONIDE AND FORMOTEROL FUMARATE DIHYDRATE 2 PUFF(S): 160; 4.5 AEROSOL RESPIRATORY (INHALATION) at 10:35

## 2022-01-01 RX ADMIN — Medication 3 MILLILITER(S): at 15:09

## 2022-01-01 RX ADMIN — Medication 650 MILLIGRAM(S): at 06:15

## 2022-01-01 RX ADMIN — Medication 4: at 12:34

## 2022-02-15 NOTE — PATIENT PROFILE ADULT - IS PATIENT POST-MENOPAUSAL?
decreased balance during turns/decreased sequencing ability/decreased step length/decreased weight-shifting ability yes

## 2022-04-18 NOTE — H&P ADULT - NSICDXPASTSURGICALHX_GEN_ALL_CORE_FT
Called patient advised meds have been sent to [facility] PAST SURGICAL HISTORY:  H/O cataract bilateral 2013    Leg fracture right ORIF with hardware 2010    S/P cholecystectomy     S/P hip replacement, left 2019

## 2022-05-10 RX ORDER — TOBRAMYCIN 3 MG/ML
0.3 SOLUTION/ DROPS OPHTHALMIC 4 TIMES DAILY
Qty: 1 | Refills: 0 | Status: ACTIVE | COMMUNITY
Start: 2022-05-10 | End: 1900-01-01

## 2022-05-20 ENCOUNTER — APPOINTMENT (OUTPATIENT)
Dept: PULMONOLOGY | Facility: CLINIC | Age: 87
End: 2022-05-20

## 2022-05-27 NOTE — ED ADULT NURSE NOTE - TEMPERATURE IN CELSIUS (DEGREES C)
Head,  normocephalic,  atraumatic,  Face,  Face within normal limits,  Ears,  External ears within normal limits,  Nose/Nasopharynx,  External nose  normal appearance,  nares patent,  no nasal discharge,  Mouth and Throat,  Oral cavity appearance normal,  Breath odor normal,  Lips,  Appearance normal 36.7

## 2022-06-13 ENCOUNTER — RX RENEWAL (OUTPATIENT)
Age: 87
End: 2022-06-13

## 2022-06-14 ENCOUNTER — RX RENEWAL (OUTPATIENT)
Age: 87
End: 2022-06-14

## 2022-06-26 ENCOUNTER — RX RENEWAL (OUTPATIENT)
Age: 87
End: 2022-06-26

## 2022-06-27 ENCOUNTER — APPOINTMENT (OUTPATIENT)
Dept: FAMILY MEDICINE | Facility: CLINIC | Age: 87
End: 2022-06-27

## 2022-06-27 ENCOUNTER — RX RENEWAL (OUTPATIENT)
Age: 87
End: 2022-06-27

## 2022-06-27 VITALS
WEIGHT: 160 LBS | OXYGEN SATURATION: 91 % | SYSTOLIC BLOOD PRESSURE: 138 MMHG | HEIGHT: 65 IN | TEMPERATURE: 97.9 F | BODY MASS INDEX: 26.66 KG/M2 | DIASTOLIC BLOOD PRESSURE: 70 MMHG | HEART RATE: 71 BPM

## 2022-06-27 DIAGNOSIS — I10 ESSENTIAL (PRIMARY) HYPERTENSION: ICD-10-CM

## 2022-06-27 DIAGNOSIS — E78.00 PURE HYPERCHOLESTEROLEMIA, UNSPECIFIED: ICD-10-CM

## 2022-06-27 DIAGNOSIS — J44.1 CHRONIC OBSTRUCTIVE PULMONARY DISEASE WITH (ACUTE) EXACERBATION: ICD-10-CM

## 2022-06-27 PROCEDURE — 99214 OFFICE O/P EST MOD 30 MIN: CPT

## 2022-06-27 RX ORDER — BUDESONIDE AND FORMOTEROL FUMARATE DIHYDRATE 160; 4.5 UG/1; UG/1
160-4.5 AEROSOL RESPIRATORY (INHALATION) TWICE DAILY
Qty: 1 | Refills: 0 | Status: ACTIVE | COMMUNITY
Start: 2019-07-25 | End: 1900-01-01

## 2022-07-14 NOTE — CONSULT NOTE ADULT - PROBLEM/RECOMMENDATION-3
Progress Notes      Mary Guerra MD at 2022  9:30 PM    Status: Signed      Recommending surgery be scheduled: at 39 weeks,  or later  - Procedure(s): Primary  section  - Facility: [facility]  - Length of Procedure: 2 hours  - Assist: Available partner  - Type of Admit: Day surgery  - PreOp Visit: no  - Medical Clearance from PMD: no  - Bowel Prep Needed? no  - Special Equipment: no     - Post-op Appointment: 1 week incision check if desires.        Diagnosis  Encounter For Preprocedural Laboratory Examination (Primary)     DISPLAY PLAN FREE TEXT

## 2022-07-19 ENCOUNTER — RX RENEWAL (OUTPATIENT)
Age: 87
End: 2022-07-19

## 2022-07-19 RX ORDER — ALBUTEROL SULFATE 90 UG/1
108 (90 BASE) AEROSOL, METERED RESPIRATORY (INHALATION)
Qty: 1 | Refills: 0 | Status: ACTIVE | COMMUNITY
Start: 2022-06-27 | End: 1900-01-01

## 2022-08-04 ENCOUNTER — RX RENEWAL (OUTPATIENT)
Age: 87
End: 2022-08-04

## 2022-08-17 NOTE — PROGRESS NOTE ADULT - PROBLEM SELECTOR PLAN 4
-A1c 7.9  -diabetic diet, diabetes examination  -ISS and sliding scale pre-meal and bedtime  -resume metformin on discharge Yes

## 2022-08-23 PROBLEM — R26.89 IMBALANCE: Status: ACTIVE | Noted: 2021-08-19

## 2022-09-01 PROBLEM — M54.42 CHRONIC BILATERAL LOW BACK PAIN WITH BILATERAL SCIATICA: Status: RESOLVED | Noted: 2019-08-26 | Resolved: 2022-01-01

## 2022-09-01 PROBLEM — D64.9 ANEMIA: Status: ACTIVE | Noted: 2022-01-01

## 2022-09-07 PROBLEM — R89.9 ABNORMAL LABORATORY TEST: Status: ACTIVE | Noted: 2022-01-01

## 2022-09-10 NOTE — ED ADULT NURSE NOTE - OBJECTIVE STATEMENT
Pt received in rm 27. Was sent to ED by her PCP for a potassium of more than 7. PMH COPD on 3L NC @ home, HTN, chronic renal insufficiency, DM. A&Ox4, ambulatory with a cane. Breathing even and unlabored. NSR on the cardiac monitor. No complaints of chest pain, headache, nausea, dizziness, vomiting  SOB, fever or chills. 20G IV placed on left arm. Labs drawn and sent. Pending urine sample

## 2022-09-10 NOTE — H&P ADULT - PROBLEM SELECTOR PLAN 5
Stable. No acute respiratory sx- not in exacerbation.  -Continue home inhaler. Albuterol PRN  -Also noted to have chronic hypercarbic/hypoxic respiratory failure currently on home O2 - will continue the same.

## 2022-09-10 NOTE — H&P ADULT - ASSESSMENT
87F with PMH of HTN, DM, COPD w/ chronic hypoxic/hypercarbic respiratory failure on home o2 3L, chronic diastolic HF, hypothyroidism, CKD3, hx of hyperkalemia who was sent to the hospital for evaluation of severe hyperkalemia. Follow up labs here show improved K to 5.7, but hemolyzed. Cr seems stable from baseline. Of note, she does have a new anemia compared to 2021 labs, but relatively stable compared to OP labs earlier this year.

## 2022-09-10 NOTE — H&P ADULT - HISTORY OF PRESENT ILLNESS
87F with PMH of HTN, DM, COPD w/ chronic hypoxic/hypercarbic respiratory failure on home o2 3L, chronic diastolic HF, hypothyroidism, CKD3, hx of hyperkalemia who was sent to the hospital for evaluation of severe hyperkalemia. Allscripts reviewed - initial lab drawn on 8/23 - K 6.9 [not hemolyzed]. Repeat on 9/8 showed persistent hyperkalemia 7.4  [did not remark on hemolyzed or not]. Pt states she had been feeling fine. Last PCP visit was 2 wks ago for routine follow up. Since then, she has denied any associated SOB/CP/palpitations/weakness/urinary changes. She denied any recent illnesses, sick contacts, f/c. She reports adherence w/ her usual meds (which she manages on her own). However, she is not able to provide name/dosage of meds. She had issues with high potassium back in 2019, requiring follow up with Nephrology Dr Castro.     ED Course: 142/47, 74, 25, 97F, 100% (3L NC)  Received Ca gluconate, D50, insulin, lokelma (1030AM)

## 2022-09-10 NOTE — ED ADULT TRIAGE NOTE - CHIEF COMPLAINT QUOTE
Pt was told by her PMD to come to ER as her potassium is more than 7 and also her kidney function is not good.  Denies CP or SOB. Pt with PMH of COPD is on oxygen 3L via NC , HTN, Chronic renal insufficiency, DM. Will obtain EKG.

## 2022-09-10 NOTE — H&P ADULT - NSHPLABSRESULTS_GEN_ALL_CORE
9.5    8.88  )-----------( 242      ( 10 Sep 2022 04:20 )             30.1     09-10    141  |  105  |  59<H>  ----------------------------<  210<H>  5.7<H>   |  29  |  1.53<H>    Ca    9.0      10 Sep 2022 05:49  Phos  3.8     09-10  Mg     2.20     09-10    TPro  6.4  /  Alb  3.7  /  TBili  <0.2  /  DBili  x   /  AST  21  /  ALT  11  /  AlkPhos  69  09-10      Urinalysis Basic - ( 10 Sep 2022 05:33 )    Color: Yellow / Appearance: Clear / S.020 / pH: x  Gluc: x / Ketone: Negative  / Bili: Negative / Urobili: <2 mg/dL   Blood: x / Protein: 100 mg/dL / Nitrite: Negative   Leuk Esterase: Negative / RBC: 1 /HPF / WBC 2 /HPF   Sq Epi: x / Non Sq Epi: 1 /HPF / Bacteria: Many            COVID-19 PCR: NotDetec (10 Sep 2022 03:40)        CXR personally reviewed/interpreted: Clear. Hyperinflated.  EKG personally reviewed/interpreted: SR 79. No acute ST/T changes.

## 2022-09-10 NOTE — H&P ADULT - PROBLEM SELECTOR PLAN 8
-On metformin at home.   -Check A1c.  -FS TIDACHS  -Start ISS TIDACHS.   -Re-eval 24h insulin needs and titrate regimen as needed.      #DVT ppx- Low improve score, encourage ambulation.      #Communication: Spoke w/ Essie Porter (HCP - [phone]) - reviewed plan of care with her. All questions/concerns addressed. Pt is full code per discussion, no advance directives.

## 2022-09-10 NOTE — ED PROVIDER NOTE - EKG ADDITIONAL INFORMATION FREE TEXT
No Peaked t-waves, sinusoidal waves, prolonged u waves indicative of hyperkalemia. QTc 412 No Peaked t-waves, no sinusoidal waves, QTc 412

## 2022-09-10 NOTE — ED PROVIDER NOTE - OBJECTIVE STATEMENT
88 y/o female w/ PMH CKD, COPD on 3L O2 via NC at baseline, HTN, DM, hypothyroidism sent by her PMD to come to ER as her potassium is more than 7. Otherwise asymptomatic.

## 2022-09-10 NOTE — ED PROVIDER NOTE - CLINICAL SUMMARY MEDICAL DECISION MAKING FREE TEXT BOX
Pt was told by her PMD to come to ER as her potassium is more than 7 and also her kidney function is not good. Will screen for hyperkalemia and treat as necessary, screen EKG for hyperK changes, and reassess.

## 2022-09-10 NOTE — ED PROVIDER NOTE - ATTENDING CONTRIBUTION TO CARE
88yo F with PMHX COPD on baseline 3L/min supplemental O2, CKD, presenting to ED after told by her PCP that she had elevated potassium and Creatinine levels on labs drawn routinely ~1-2wks ago.  Says she is feeling well and her baseline and reports normal urine output, no chest pains, no sob more than baseline.    General: Patient alert in no apparent distress  Skin: Dry and intact  HEENT: Head atraumatic. Oral mucosa moist.   Eyes: Conjunctiva normal  Cardiac: Regular rhythm and rate. No pretibial edema b/l  Respiratory: Lungs clear b/l and symmetric. No respiratory distress. Able to speak in complete sentences.  Gastrointestinal: Abdomen soft, nondistended, nontender  Musculoskeletal: Moves all extremities spontaneously  Neurological: alert and oriented to person, place, and time  Psychiatric: Calm and cooperative    EKG unchanged from baseline, no peaked T waves    a/p  known CKD hx with outpatient hyperK and worsening renal function reported  will rpt labs here  dispo based on results

## 2022-09-10 NOTE — H&P ADULT - NSHPSOCIALHISTORY_GEN_ALL_CORE
Lives alone.   Retired.  Has HHA 5d/wk - 5hr a day.  Former tobacco user - last >20yrs ago  Social ETOH use  No other drug use.

## 2022-09-10 NOTE — H&P ADULT - PROBLEM SELECTOR PLAN 9
-Pt unclear about med names/doses.  -Allscripts med list reviewed/Surescript also checked.  -Will request med rec pharmacist to confirm med list.

## 2022-09-10 NOTE — ED PROVIDER NOTE - NS ED ROS FT
GENERAL: No fever or chills  EYES: no change in vision   HEENT: no trouble swallowing or speaking   CARDIAC: no chest pain   PULMONARY: no cough or SOB  GI:  No abdominal pain  : No changes in urination   SKIN: no rashes   NEURO: no headache   MSK: No joint pain     All other ROS negative unless otherwise specified in HPI.

## 2022-09-10 NOTE — H&P ADULT - PROBLEM SELECTOR PLAN 1
-K improved to 5.7 (but hemolyzed). Etiology unclear - could be med related, intake related, vs CKD related. Allscripts reviewed. Seen by Dr Castro (nephr) in 2019 for Hyperkalemia. Was on Lokelma at the time. Med list reviewed - no on it any more. Pt also no longer on ACEi, as she was before. Recent visits w/ pulmonologist and PCP also reviewed as well.  -Will repeat labs now and trend K - if still elevated - would give another does of Lokelma.  -Monitoring on telemetry for arrhthymias  - no sig events noted thus far.  -Low K diet ordered.  -Continue lasix as that will help w/ K. -K improved to 5.7 (but hemolyzed). Etiology unclear - could be med related, intake related, vs CKD related. Allscripts reviewed. Seen by Dr Castro (nephr) in 2019 for Hyperkalemia. Was on Lokelma at the time. Med list reviewed - no on it any more. Pt also no longer on ACEi, as she was before. Recent visits w/ pulmonologist and PCP also reviewed as well.  -Of note - pt acknowledges making poor dietary choices. Due to the fact that she's on oxygen, she has not been able to cooked. She has been eating a lot of toast, cheese, and cold cuts, which may contribute to high K issue as well. Will request nutrition eval.   -Monitoring on telemetry for arrhthymias  - no sig events noted thus far.  -Low K diet ordered.  -Continue lasix as that will help w/ K.  -Repeat K 5.9 (not hemolyzed). Repeat Lokelma 10 this afternoon and later tonight. Repeat labs in AM.

## 2022-09-10 NOTE — H&P ADULT - NSHPPHYSICALEXAM_GEN_ALL_CORE
Vital Signs Last 24 Hrs  T(C): 36.7 (10 Sep 2022 08:55), Max: 36.7 (10 Sep 2022 08:55)  T(F): 98 (10 Sep 2022 08:55), Max: 98 (10 Sep 2022 08:55)  HR: 77 (10 Sep 2022 08:55) (72 - 77)  BP: 140/57 (10 Sep 2022 08:55) (138/55 - 142/47)  BP(mean): --  RR: 18 (10 Sep 2022 08:55) (18 - 25)  SpO2: 100% (10 Sep 2022 08:55) (100% - 100%)    Parameters below as of 10 Sep 2022 08:55  Patient On (Oxygen Delivery Method): nasal cannula  O2 Flow (L/min): 2      Gen- In bed, comfortable, NAD  Eyes- EOMI, PERRLA, nonicteric.  EMNT- Fair dentition. MMM. No tonsilar exudates. No posterior pharynx erythema.  Neck- Supple. No masses. No tracheal deviation.  Resp- CTAB, good effort. Distant breath sounds. Prolonged expiratory phase. No accessory muscle use.  CVS- RRR, S1S2, no g/r/m. No LE edema.  GI- Soft abd, NT, ND, +BSx4. No HSM.  MSK- No C/C. ROM intact. No crepitus.  Neuro- CN II-XII intact. Speech fluent/face symmetric. Sensation intact.  Skin- No rashes/ulcers. Warm/perfused.  Psych- AAOx3. Appropriate mood/affect.

## 2022-09-10 NOTE — H&P ADULT - PROBLEM SELECTOR PLAN 4
-Euvolemic. Hx of this as per chart review. No new ECHO. Not indicated at this time.  -Cont lasix. F/u as OP

## 2022-09-10 NOTE — PATIENT PROFILE ADULT - FALL HARM RISK - HARM RISK INTERVENTIONS

## 2022-09-10 NOTE — H&P ADULT - PROBLEM SELECTOR PLAN 2
-No active bleeding noted. Could be 2' AOCD.  -Macrocytic.  -Will check B12/folate. Check iron panel as well.  -Supplement as needed  -Cont home iron.  -Further workup pending course.

## 2022-09-10 NOTE — H&P ADULT - PROBLEM SELECTOR PLAN 3
-Cr is stable based on trend of labs.  -Monitor UOP.   -Dose meds renally if necessary.  -Has non anion gap met acidosis, likely 2' CKD. Serum bicarb is acceptable. Lactate negative. Monitor for now.  -Further follow up w/ neph as OP.

## 2022-09-11 NOTE — PROGRESS NOTE ADULT - SUBJECTIVE AND OBJECTIVE BOX
[facility] Division of Hospital Medicine  America Bansal MD  Pager (M-F, 8A-5P): [phone]      Patient is a 87y old  Female who presents with a chief complaint of Hyperkalemia (10 Sep 2022 12:38)      SUBJECTIVE / OVERNIGHT EVENTS:  overnight had an episode of NSVT   potassium normal today   Patient denies any complaints     MEDICATIONS  (STANDING):  budesonide 160 MICROgram(s)/formoterol 4.5 MICROgram(s) Inhaler 2 Puff(s) Inhalation two times a day  busPIRone 10 milliGRAM(s) Oral two times a day  dextrose 5%. 1000 milliLiter(s) (100 mL/Hr) IV Continuous <Continuous>  dextrose 5%. 1000 milliLiter(s) (50 mL/Hr) IV Continuous <Continuous>  dextrose 50% Injectable 25 Gram(s) IV Push once  dextrose 50% Injectable 12.5 Gram(s) IV Push once  dextrose 50% Injectable 25 Gram(s) IV Push once  ferrous    sulfate 325 milliGRAM(s) Oral daily  furosemide    Tablet 20 milliGRAM(s) Oral daily  glucagon  Injectable 1 milliGRAM(s) IntraMuscular once  insulin lispro (ADMELOG) corrective regimen sliding scale   SubCutaneous three times a day before meals  insulin lispro (ADMELOG) corrective regimen sliding scale   SubCutaneous at bedtime  levothyroxine 112 MICROGram(s) Oral daily  metoprolol tartrate 25 milliGRAM(s) Oral two times a day  montelukast 10 milliGRAM(s) Oral at bedtime  tiotropium 18 MICROgram(s) Capsule 1 Capsule(s) Inhalation daily    MEDICATIONS  (PRN):  acetaminophen     Tablet .. 650 milliGRAM(s) Oral every 6 hours PRN Temp greater or equal to 38C (100.4F), Mild Pain (1 - 3)  ALBUTerol    90 MICROgram(s) HFA Inhaler 2 Puff(s) Inhalation every 6 hours PRN Shortness of Breath and/or Wheezing  aluminum hydroxide/magnesium hydroxide/simethicone Suspension 30 milliLiter(s) Oral every 4 hours PRN Dyspepsia  dextrose Oral Gel 15 Gram(s) Oral once PRN Blood Glucose LESS THAN 70 milliGRAM(s)/deciliter  melatonin 3 milliGRAM(s) Oral at bedtime PRN Insomnia  ondansetron Injectable 4 milliGRAM(s) IV Push every 8 hours PRN Nausea and/or Vomiting      CAPILLARY BLOOD GLUCOSE      POCT Blood Glucose.: 227 mg/dL (11 Sep 2022 12:08)  POCT Blood Glucose.: 135 mg/dL (11 Sep 2022 07:46)  POCT Blood Glucose.: 114 mg/dL (10 Sep 2022 22:58)  POCT Blood Glucose.: 101 mg/dL (10 Sep 2022 21:36)  POCT Blood Glucose.: 167 mg/dL (10 Sep 2022 17:09)    I&O's Summary      PHYSICAL EXAM:  Vital Signs Last 24 Hrs  T(C): 36.6 (11 Sep 2022 14:13), Max: 36.6 (10 Sep 2022 22:20)  T(F): 97.9 (11 Sep 2022 14:13), Max: 97.9 (11 Sep 2022 05:00)  HR: 65 (11 Sep 2022 14:13) (65 - 72)  BP: 109/60 (11 Sep 2022 14:13) (109/60 - 150/68)  BP(mean): --  RR: 18 (11 Sep 2022 14:13) (18 - 18)  SpO2: 100% (11 Sep 2022 14:13) (98% - 100%)    Parameters below as of 11 Sep 2022 14:13  Patient On (Oxygen Delivery Method): nasal cannula  O2 Flow (L/min): 3    CONSTITUTIONAL: NAD,   EYES: EOMI; conjunctiva and sclera clear  NECK: Supple,   RESPIRATORY: Normal respiratory effort; lungs are clear to auscultation bilaterally  CARDIOVASCULAR: Regular rate and rhythm, normal S1 and S2, ;trace  lower extremity edema;   ABDOMEN: Nontender to palpation, normoactive bowel sounds, no rebound/guarding;   MUSKULOSKELETAL:  no clubbing or cyanosis of digits; no joint swelling or tenderness to palpation  PSYCH: A+O to person, place, and time; affect appropriate  NEUROLOGY: CN 2-12 are intact and symmetric; no gross sensory deficits;   SKIN: b/l skin changes LE ; no palpable lesions    LABS:                        9.9    6.43  )-----------( 234      ( 11 Sep 2022 06:05 )             32.0     09-11    140  |  103  |  54<H>  ----------------------------<  128<H>  4.8   |  28  |  1.61<H>    Ca    9.0      11 Sep 2022 06:05  Phos  3.8     09-10  Mg     2.20     -10    TPro  6.4  /  Alb  3.7  /  TBili  <0.2  /  DBili  x   /  AST  21  /  ALT  11  /  AlkPhos  69  09-10          Urinalysis Basic - ( 10 Sep 2022 05:33 )    Color: Yellow / Appearance: Clear / S.020 / pH: x  Gluc: x / Ketone: Negative  / Bili: Negative / Urobili: <2 mg/dL   Blood: x / Protein: 100 mg/dL / Nitrite: Negative   Leuk Esterase: Negative / RBC: 1 /HPF / WBC 2 /HPF   Sq Epi: x / Non Sq Epi: 1 /HPF / Bacteria: Many          RADIOLOGY & ADDITIONAL TESTS:  Results Reviewed:   Imaging Personally Reviewed:  Electrocardiogram Personally Reviewed:    COORDINATION OF CARE:  Care Discussed with Consultants/Other Providers [Y/N]:  Prior or Outpatient Records Reviewed [Y/N]:

## 2022-09-11 NOTE — PROGRESS NOTE ADULT - PROBLEM SELECTOR PLAN 2
-K improved to 4.8  . Etiology unclear - could be med related, intake related, vs CKD related. Allscripts reviewed. Seen by Dr Castro (nephr) in 2019 for Hyperkalemia. Was on Lokelma at the time. Med list reviewed - no on it any more. Pt also no longer on ACEi, as she was before. Recent visits w/ pulmonologist and PCP also reviewed as well.  -Of note - pt acknowledges making poor dietary choices. Due to the fact that she's on oxygen, she has not been able to cooked. She has been eating a lot of toast, cheese, and cold cuts, which may contribute to high K issue as well. Will request nutrition eval.   -Monitoring on telemetry for arrhthymias  - no sig events noted thus far.  -Low K diet ordered.  -Continue lasix as that will help w/ K.  . Repeat labs in AM.

## 2022-09-11 NOTE — PROGRESS NOTE ADULT - PROBLEM SELECTOR PLAN 1
on telemetry - 6 beats   Keep K>4 , Mag >2   TTE   Start metoprolol 25 mg po BID with holding parameters , adjust as tolerated   Consider cardiology consult

## 2022-09-11 NOTE — PROGRESS NOTE ADULT - PROBLEM SELECTOR PLAN 9
-On metformin at home.   -Check A1c.  -FS TIDACHS  -Start ISS TIDACHS.   -Re-eval 24h insulin needs and titrate regimen as needed.      #DVT ppx- Low improve score, encourage ambulation.      #Communication: Spoke w/ Essie Porter (HCP - [phone]) - reviewed plan of care with her. All questions/concerns addressed. Pt is full code per discussion, no advance directives.

## 2022-09-12 NOTE — PHYSICAL THERAPY INITIAL EVALUATION ADULT - GENERAL OBSERVATIONS, REHAB EVAL
Pt encountered seated @ edge of bed, no distress, AxOx4, with +IV, +cardiac monitor, +pulse oximeter, and +5L of O2 through nasal cannula. Pt agreeable to participate in PT evaluation.

## 2022-09-12 NOTE — PHYSICAL THERAPY INITIAL EVALUATION ADULT - DIAGNOSIS, PT EVAL
Pt admitted for Hyperkalemia; pt presents with decreased strength and decreased aerobic capacity/endurance.

## 2022-09-12 NOTE — DISCHARGE NOTE PROVIDER - NSDCFUSCHEDAPPT_GEN_ALL_CORE_FT
Jan Brooke  [facility]  [address]  Scheduled Appointment: 09/28/2022

## 2022-09-12 NOTE — DISCHARGE NOTE PROVIDER - PROVIDER TOKENS
PROVIDER:[TOKEN:[[id number]]],PROVIDER:[TOKEN:[[id number]],ESTABLISHEDPATIENT:[T]] PROVIDER:[TOKEN:[[id number]]],PROVIDER:[TOKEN:[[id number]],ESTABLISHEDPATIENT:[T]],PROVIDER:[TOKEN:[[id number]],FOLLOWUP:[1 week],ESTABLISHEDPATIENT:[T]]

## 2022-09-12 NOTE — PROGRESS NOTE ADULT - PROBLEM SELECTOR PLAN 9
-On metformin at home.   -Check A1c still pending  -FS TIDACHS  -Start ISS TIDACHS.         #DVT ppx- Low improve score, encourage ambulation.      #Communication: Spoke w/ Essie Porter (HCP - [phone]) - reviewed plan of care with her. All questions/concerns addressed. Pt is full code per discussion, no advance directives.

## 2022-09-12 NOTE — DIETITIAN INITIAL EVALUATION ADULT - PERTINENT LABORATORY DATA
09-12    137  |  100  |  65<H>  ----------------------------<  124<H>  5.1   |  28  |  1.74<H>    Ca    8.5      12 Sep 2022 05:15  Phos  5.0     09-12  Mg     2.30     09-12    POCT Blood Glucose.: 165 mg/dL (09-12-22 @ 11:32)

## 2022-09-12 NOTE — PHYSICAL THERAPY INITIAL EVALUATION ADULT - PATIENT PROFILE REVIEW, REHAB EVAL
ACTIVITY: Ambulate with Assistance; Spoke with RN Gogo Sharpe prior to PT evaluation--> Pt OK for PT consult/OOB activity./yes

## 2022-09-12 NOTE — PROGRESS NOTE ADULT - PROBLEM SELECTOR PLAN 3
-K improved. Etiology unclear - could be med related, intake related, vs CKD related. Allscripts reviewed. Seen by Dr Castro (nephr) in 2019 for Hyperkalemia. Was on Lokelma at the time. Med list reviewed - not on it any more. Pt also no longer on ACEi, as she was before. Recent visits w/ pulmonologist and PCP also reviewed as well.  -Of note - pt acknowledges making poor dietary choices. Due to the fact that she's on oxygen, she has not been able to cooked. She has been eating a lot of toast, cheese, and cold cuts, which may contribute to high K issue as well. Will request nutrition eval.   -Monitoring on telemetry for arrhthymias  - no sig events noted thus far.  -Low K diet ordered.  -Continue lasix as that will help w/ K.

## 2022-09-12 NOTE — DISCHARGE NOTE PROVIDER - CARE PROVIDERS DIRECT ADDRESSES
,DirectAddress_Unknown,[email or web address] ,DirectAddress_Unknown,[email or web address],DirectAddress_Unknown

## 2022-09-12 NOTE — DISCHARGE NOTE PROVIDER - NSDCMRMEDTOKEN_GEN_ALL_CORE_FT
busPIRone 10 mg oral tablet: 1 tab(s) orally 2 times a day  ferrous sulfate 325 mg (65 mg elemental iron) oral tablet: 1 tab(s) orally once a day  furosemide 20 mg tablet: 1 tablet orally once daily  levothyroxine 112 mcg (0.112 mg) oral tablet: 1 tab(s) orally once a day  metFORMIN 500 mg oral tablet: 1 tab(s) orally 2 times a day  montelukast 10 mg oral tablet: 1 tab(s) orally once a day (at bedtime)  Spiriva Respimat: 2.5 microgram(s) - two puffs inhaled once a day  Symbicort 160 mcg-4.5 mcg/inh inhalation aerosol: 2 puff(s) inhaled 2 times a day  Ventolin HFA 90 mcg/inh inhalation aerosol: 2 puff(s) inhaled every 6 hours, As Needed   busPIRone 10 mg oral tablet: 1 tab(s) orally 2 times a day  ferrous sulfate 325 mg (65 mg elemental iron) oral tablet: 1 tab(s) orally once a day  levothyroxine 112 mcg (0.112 mg) oral tablet: 1 tab(s) orally once a day  melatonin 3 mg oral tablet: 1 tab(s) orally once a day (at bedtime), As needed, Insomnia  metFORMIN 500 mg oral tablet: 1 tab(s) orally 2 times a day  metoprolol tartrate 25 mg oral tablet: 1 tab(s) orally 2 times a day  montelukast 10 mg oral tablet: 1 tab(s) orally once a day (at bedtime)  Spiriva Respimat: 2.5 microgram(s) - two puffs inhaled once a day  Symbicort 160 mcg-4.5 mcg/inh inhalation aerosol: 2 puff(s) inhaled 2 times a day  Ventolin HFA 90 mcg/inh inhalation aerosol: 2 puff(s) inhaled every 6 hours, As Needed

## 2022-09-12 NOTE — DIETITIAN INITIAL EVALUATION ADULT - ADD RECOMMEND
1. Encourage & assist Pt with meals; Monitor PO diet tolerance;   2. Honor food preferences;   3. Add Nephro-radha 1 cap daily for micronutrient coverage;   4. Monitor labs, hydration status;

## 2022-09-12 NOTE — DISCHARGE NOTE PROVIDER - HOSPITAL COURSE
87F with PMH of HTN, DM, COPD w/ chronic hypoxic/hypercarbic respiratory failure on home o2 3L, chronic diastolic HF, hypothyroidism, CKD3, hx of hyperkalemia who was sent to the hospital for evaluation of severe hyperkalemia.     Acute on chronic respiratory failure with hypoxia.   - Patient w/COPD on home O2, though states that up till now she has rarely had to use it. However, she now feels like her breathing has worsened. On symbicort, proventil, singulair and spiriva   - Patient states that she rarely requires her home O2, however now feels like her O2 needs have gotten worse  - Pulmonary consulted, c/w inhalers  - Outpatient follow up     NSVT (nonsustained ventricular tachycardia).   - on telemetry - 6 beats   - Keep K>4 , Mag >2   - Pending TTE______________________  - Start metoprolol 25 mg po BID with holding parameters, adjust as tolerated.     Hyperkalemia.   - K improved. Etiology unclear - could be med related, intake related, vs CKD related. Allscripts reviewed. Seen by Dr Castro (nephr) in 2019 for Hyperkalemia. Was on Lokelma at the time. Med list reviewed - not on it any more. Pt also no longer on ACEi, as she was before. Recent visits w/ pulmonologist and PCP also reviewed as well.  - Of note - pt acknowledges making poor dietary choices. Due to the fact that she's on oxygen, she has not been able to cooked. She has been eating a lot of toast, cheese, and cold cuts, which may contribute to high K issue as well.   - Monitoring on telemetry for arrhthymias  - no sig events noted thus far.  - Low K diet ordered.  - Continue lasix as that will help w/ K.    Anemia.   - No active bleeding noted. Could be 2' AOCD.  - Macrocytic.  - check B12/folate normal   - Supplement as needed  - Cont home iron.  - Further workup pending course.    Stage 3b chronic kidney disease.   - Cr is stable based on trend of labs.  - Monitor UOP.   - Dose meds renally if necessary.  - Has non anion gap met acidosis, likely 2' CKD. Serum bicarb is acceptable. Lactate negative. Monitor for now.  - Further follow up w/ neph as OP.    Chronic diastolic heart failure.   - Euvolemic. Hx of this as per chart review.   - Cont lasix. F/u as OP  - Considering NSVT, another echo pending____________________    HTN (hypertension).   - Stable/acceptable. Monitor for now.    Hypothyroidism.   - Stable. Cont LT4.  - TSH normal.    DM2 (diabetes mellitus, type 2).   - On metformin at home.   - Check A1c still pending  - FS TIDACHS  - Start ISS TIDACHS.     Patient is medically stable for discharge. Discussed with Dr Zabala.    Incomplete________________________________________________ 87F with PMH of HTN, DM, COPD w/ chronic hypoxic/hypercarbic respiratory failure on home o2 3L, chronic diastolic HF, hypothyroidism, CKD3, hx of hyperkalemia who was sent to the hospital for evaluation of severe hyperkalemia. Follow up labs here show improved K to 5.7, but hemolyzed. Cr seems stable from baseline. Of note, she does have a new anemia compared to 2021 labs, but relatively stable compared to OP labs earlier this year.     Acute on chronic respiratory failure with hypoxia.   ·  Plan: Patient w/COPD on home O2, though states that up till now she has rarely had to use it. on 9/12 stated breathing was worse than baseline. On symbicort, proventil, singulair and spiriva, on 9/15 reports some improvement.   Patient states that she rarely requires her home O2, however now feels like her O2 needs have gotten worse  Discussed with pulmonary fellow, pulm consulted appreciated, patient currently optimized on meds, she still appears closer to her current baseline.    Will need outpatient follow up.     Urinary tract infection.   ·  Plan: -U/A suggestive of UTI  cxs w/E. coli- f/u sensitivities   JANELLE improving   Will need outpatient follow up for urinary incontinence.    NSVT (nonsustained ventricular tachycardia).   ·  Plan: Keep K>4 , Mag >2   echo was technically difficult, but mostly unremarkable   Start metoprolol 25 mg po BID with holding parameters, adjust as tolerated.    Hyperkalemia.   ·  Plan: -K improved   Etiology unclear - could be med related, intake related, vs CKD related. Allscripts reviewed.   Seen by Dr Castro (nephr) in 2019 for Hyperkalemia. Was on Lokelma at the time. Med list reviewed - not on it any more. Pt also no longer on ACEi, as she was before. Recent visits w/ pulmonologist and PCP also reviewed as well.  -Of note - pt acknowledges making poor dietary choices. Due to the fact that she's on oxygen, she has not been able to cooked. She has been eating a lot of toast, cheese, and cold cuts, which may contribute to high K issue as well. Will request nutrition eval.   -Low K diet ordered.  -Continue lasix as that will help w/ K.    Anemia.   ·  Plan: -No active bleeding noted. Could be 2' AOCD.  -Macrocytic.  -B12/folate normal   -Supplement as needed  -Cont home iron.  -Further workup pending course.    Stage 3b chronic kidney disease.   ·  Plan: Had some JANELLE, now improving  -Monitor UOP.   -Dose meds renally if necessary.w.  -Further follow up w/ neph as OP.    Chronic diastolic heart failure.   ·  Plan: -Euvolemic. Hx of this as per chart review.   -Cont lasix. F/u as OP  Considering NSVT,  Echo at this time mostly unremarkable, however it was also noted to be a poor study.     HTN (hypertension).   ·  Plan: -Stable/acceptable. Monitor for now.    JANELLE (acute kidney injury).   ·  Plan: As above for UTI  patient w/UTI, on antibiotics, improvement in JANELLE noted since starting antibiotics.    Hypothyroidism.   ·  Plan; -Stable. Cont LT4.  TSH normal.    DM2 (diabetes mellitus, type 2).   ·  Plan: -On metformin at home.   -Check A1c- 6  -FS TIDACHS  -Start ISS TIDACHS.      On ___ this case was reviewed with  ____, the patient is medically stable and optimized for discharge. All medications were reviewed and prescriptions were sent to mutually agreed upon pharmacy.   87F with PMH of HTN, DM, COPD w/ chronic hypoxic/hypercarbic respiratory failure on home o2 3L, chronic diastolic HF, hypothyroidism, CKD3, hx of hyperkalemia who was sent to the hospital for evaluation of severe hyperkalemia. Follow up labs here show improved K to 5.7, but hemolyzed. Cr seems stable from baseline. Of note, she does have a new anemia compared to 2021 labs, but relatively stable compared to OP labs earlier this year.     Acute on chronic respiratory failure with hypoxia  Patient w/COPD on home O2, though states that up till now she has rarely had to use it. on 9/12 stated breathing was worse than baseline. On symbicort, proventil, singulair and spiriva, on 9/15 reports some improvement.   Pulmonology consulted appreciated, patient currently optimized on meds, appears closer to her current baseline.    Outpatient pulmonology follow up scheduled for 9/28    Urinary tract infection   U/A suggestive of UTI  cxs w/E. coli; s/p 3 days CTX    NSVT (nonsustained ventricular tachycardia)  Echo was technically difficult, but mostly unremarkable   Started on metoprolol 25 mg po BID with holding parameters, adjust as tolerated.    Hyperkalemia  Seen by Dr Castro (nephr) in 2019 for Hyperkalemia. Was on Lokelma at the time. Med list reviewed - not on it any more. Pt also no longer on ACEi, as she was before. Recent visits w/ pulmonologist and PCP also reviewed as well.  Nutrition eval ; low K diet ordered.  Continue lasix QOD as that will help w/ K.    Anemia  Stable without active bleeding; likely 2' AOCD  Macrocytic.  B12/folate normal; continue home iron.    Stage 3b chronic kidney disease w/ JANELLE  JANELLE, now improving s/p IVF  Outpatient nephrology follow up    Chronic diastolic heart failure   Euvolemic; chest xray clear lungs  Echo Estimated LVEF in the 65%  Continue with lasix QOD     Dystrophic toenails  Evaluated by podiatry, s/p debridement x 10  Patient educated on proper diabetic foot care and importance of regular examinations     HTN (hypertension)   Stable/acceptable continue home medications     Hypothyroidism  -Stable ; TSH wnl    DM2 (diabetes mellitus, type 2) A1C 6  Held home oral agent, FS & ISS    On 9/21/22 this case was reviewed with , the patient is medically stable and optimized for discharge.    87F with PMH of HTN, DM, COPD w/ chronic hypoxic/hypercarbic respiratory failure on home o2 3L, chronic diastolic HF, hypothyroidism, CKD3, hx of hyperkalemia who was sent to the hospital for evaluation of severe hyperkalemia. Follow up labs here show improved K to 5.7, but hemolyzed. Cr seems stable from baseline. Of note, she does have a new anemia compared to 2021 labs, but relatively stable compared to OP labs earlier this year.   Acute on chronic respiratory failure with hypoxia  Patient w/COPD on home O2, though states that up till now she has rarely had to use it. on 9/12 stated breathing was worse than baseline. On symbicort, proventil, singulair and spiriva, on 9/15 reports some improvement.   Pulmonology consulted appreciated, patient currently optimized on meds, her current baseline   Chest Xray: no acute pathology; Echo LVEF in the 65% but technically difficult study   Outpatient pulmonology follow up scheduled for 9/28    Urinary tract infection   U/A suggestive of UTI  cxs w/E. coli; s/p 3 days CTX    NSVT (nonsustained ventricular tachycardia)  Echo was technically difficult, but mostly unremarkable   Started on metoprolol 25 mg po BID with holding parameters, adjust as tolerated.    Hyperkalemia  Seen by Dr Castro (nephr) in 2019 for Hyperkalemia. Was on Lokelma at the time. Med list reviewed - not on it any more. Pt also no longer on ACEi, as she was before. Recent visits w/ pulmonologist and PCP also reviewed as well.  Nutrition eval ; low K diet ordered.  S/p Lokelma, IVF, and Lasix IV   Continue lasix QOD as that will help w/ K.    Anemia  Stable without active bleeding; likely 2' AOCD  Macrocytic.  B12/folate normal; continue home iron.    Stage 3b chronic kidney disease w/ JANELLE  JANELLE, now improving s/p IVF  Outpatient nephrology follow up    Chronic diastolic heart failure   Euvolemic; chest xray clear lungs  Echo Estimated LVEF in the 65%  Continue with lasix QOD     Dystrophic toenails  Evaluated by podiatry, s/p debridement x 10  Patient educated on proper diabetic foot care and importance of regular examinations     HTN (hypertension)   Stable/acceptable continue home medications     Hypothyroidism  -Stable ; TSH wnl    DM2 (diabetes mellitus, type 2) A1C 6  Held home oral agent, FS & ISS    On 9/21/22 this case was reviewed with , the patient is medically stable and optimized for discharge.

## 2022-09-12 NOTE — PROGRESS NOTE ADULT - SUBJECTIVE AND OBJECTIVE BOX
Ferdinand Zabala MD  [employer]  Pager [phone]  Email: [email or web address]  Available on [employer] Teams        PROGRESS NOTE:     Patient is a 87y old  Female who presents with a chief complaint of Hyperkalemia (11 Sep 2022 16:24)      SUBJECTIVE / OVERNIGHT EVENTS:  Patient seen and examined this morning. Patient states that she feels like her breathing is not at it's baseline, she would like to be taken off the oxygen if possible. She further states that being on oxygen makes her feel ashamed.   ADDITIONAL REVIEW OF SYSTEMS:  Denies f/c/n/v    MEDICATIONS  (STANDING):  budesonide 160 MICROgram(s)/formoterol 4.5 MICROgram(s) Inhaler 2 Puff(s) Inhalation two times a day  busPIRone 10 milliGRAM(s) Oral two times a day  dextrose 5%. 1000 milliLiter(s) (100 mL/Hr) IV Continuous <Continuous>  dextrose 5%. 1000 milliLiter(s) (50 mL/Hr) IV Continuous <Continuous>  dextrose 50% Injectable 25 Gram(s) IV Push once  dextrose 50% Injectable 12.5 Gram(s) IV Push once  dextrose 50% Injectable 25 Gram(s) IV Push once  ferrous    sulfate 325 milliGRAM(s) Oral daily  furosemide    Tablet 20 milliGRAM(s) Oral daily  glucagon  Injectable 1 milliGRAM(s) IntraMuscular once  insulin lispro (ADMELOG) corrective regimen sliding scale   SubCutaneous three times a day before meals  insulin lispro (ADMELOG) corrective regimen sliding scale   SubCutaneous at bedtime  levothyroxine 112 MICROGram(s) Oral daily  metoprolol tartrate 25 milliGRAM(s) Oral two times a day  montelukast 10 milliGRAM(s) Oral at bedtime  tiotropium 18 MICROgram(s) Capsule 1 Capsule(s) Inhalation daily    MEDICATIONS  (PRN):  acetaminophen     Tablet .. 650 milliGRAM(s) Oral every 6 hours PRN Temp greater or equal to 38C (100.4F), Mild Pain (1 - 3)  ALBUTerol    90 MICROgram(s) HFA Inhaler 2 Puff(s) Inhalation every 6 hours PRN Shortness of Breath and/or Wheezing  aluminum hydroxide/magnesium hydroxide/simethicone Suspension 30 milliLiter(s) Oral every 4 hours PRN Dyspepsia  dextrose Oral Gel 15 Gram(s) Oral once PRN Blood Glucose LESS THAN 70 milliGRAM(s)/deciliter  melatonin 3 milliGRAM(s) Oral at bedtime PRN Insomnia  ondansetron Injectable 4 milliGRAM(s) IV Push every 8 hours PRN Nausea and/or Vomiting      CAPILLARY BLOOD GLUCOSE      POCT Blood Glucose.: 165 mg/dL (12 Sep 2022 11:32)  POCT Blood Glucose.: 135 mg/dL (12 Sep 2022 07:39)  POCT Blood Glucose.: 129 mg/dL (11 Sep 2022 21:10)  POCT Blood Glucose.: 75 mg/dL (11 Sep 2022 17:01)    I&O's Summary      PHYSICAL EXAM:  Vital Signs Last 24 Hrs  T(C): 36.4 (12 Sep 2022 11:09), Max: 36.7 (12 Sep 2022 05:00)  T(F): 97.6 (12 Sep 2022 11:09), Max: 98.1 (12 Sep 2022 05:00)  HR: 65 (12 Sep 2022 11:09) (63 - 87)  BP: 115/54 (12 Sep 2022 11:09) (109/60 - 147/60)  BP(mean): --  RR: 18 (12 Sep 2022 11:09) (18 - 18)  SpO2: 99% (12 Sep 2022 11:09) (97% - 100%)    Parameters below as of 12 Sep 2022 11:09  Patient On (Oxygen Delivery Method): nasal cannula  O2 Flow (L/min): 3      CONSTITUTIONAL: NAD, well-developed, on supplemental O2 via NC, comfortable, speaking in full sentences. Very talkative.   RESPIRATORY: Normal respiratory effort; expiratory wheezing bilaterally at the bases  CARDIOVASCULAR: Regular rate and rhythm, normal S1 and S2, no murmur/rub/gallop; No lower extremity edema; Peripheral pulses are 2+ bilaterally  ABDOMEN: Nontender to palpation, normoactive bowel sounds, no rebound/guarding;   MUSCLOSKELETAL: no clubbing or cyanosis of digits; no joint swelling or tenderness to palpation  PSYCH: A+O to person, place and month.     LABS:                        9.6    6.06  )-----------( 215      ( 12 Sep 2022 05:15 )             30.4     09-12    137  |  100  |  65<H>  ----------------------------<  124<H>  5.1   |  28  |  1.74<H>    Ca    8.5      12 Sep 2022 05:15  Phos  5.0     09-12  Mg     2.30     09-12                  RADIOLOGY & ADDITIONAL TESTS:  Results Reviewed:   Imaging Personally Reviewed:  Electrocardiogram Personally Reviewed:    COORDINATION OF CARE:  Care Discussed with Consultants/Other Providers [Y/N]: Case discussed during interdisciplinary rounds with social work and case management  Prior or Outpatient Records Reviewed [Y/N]:

## 2022-09-12 NOTE — PROGRESS NOTE ADULT - PROBLEM SELECTOR PLAN 4
-No active bleeding noted. Could be 2' AOCD.  -Macrocytic.  -heck B12/folate normal   -Supplement as needed  -Cont home iron.  -Further workup pending course.

## 2022-09-12 NOTE — DIETITIAN INITIAL EVALUATION ADULT - OTHER INFO
Pt 88 yo female with PMHx of HTN, DM, COPD w/ chronic hypoxic/hypercarbic respiratory failure on home o2 3L, chronic diastolic HF, hypothyroidism, CKD3, hx of hyperkalemia presented for severe hyperkalemia evaluation - per chart review.     At time of visit, Pt awake, alert. Per Pt, her appetite usually good; no chewing or swallowing difficulty; no vomiting or diarrhea @ this time. +BM (9/11) per flow sheet. Per Pt, her height: ~64" & her weight: ~157#; no report of weight loss or weight changes PTA. At home Pt tries to avoid Regular sugar/salt reported. Therapeutic diet restrictions discussed with Pt including better food choices, foods to avoid. Written materials on therapeutic diet also provided. No other food related concern voiced @ present. RDN remains available, Pt made aware.

## 2022-09-12 NOTE — CONSULT NOTE ADULT - ATTENDING COMMENTS
87F with PMH of HTN, DM, COPD w/ chronic hypoxic/hypercarbic respiratory failure on home o2 3L, chronic diastolic HF, hypothyroidism, CKD3, hx of hyperkalemia who was sent to the hospital for evaluation of severe hyperkalemia. Pulmonary consulted for acute on chronic hypoxic respiratory failure. Patient is back at baseline objectively/subjectively - currently on 3L NC with O2Sat 96%.    Continue LABA/ICS inhaler, Singulair and Albuterol prn.  Daily lasix.    Pulmonary will sign off.    Meenakshi Darby MD 87F with PMH of HTN, DM, COPD w/ chronic hypoxic/hypercarbic respiratory failure on home o2 3L, chronic diastolic HF, hypothyroidism, CKD3, hx of hyperkalemia who was sent to the hospital for evaluation of severe hyperkalemia. Pulmonary consulted for acute on chronic hypoxic respiratory failure. Patient is back at baseline objectively/subjectively - currently on 3L NC with O2Sat 96%.    Continue LABA/ICS & LAMA inhalers, Singulair and Albuterol prn.  Daily lasix. Follow-up as scheduled with her Pulmonary physician Dr. Brooke.    Pulmonary will sign off.    Meenakshi Darby MD

## 2022-09-12 NOTE — PROGRESS NOTE ADULT - PROBLEM SELECTOR PLAN 1
Patient w/COPD on home O2, though states that up till now she has rarely had to use it. However, she now feels like her breathing has worsened. On symbicort, proventil, singulair and spiriva   Patient states that she rarely requires her home O2, however now feels like her O2 needs have gotten worse  Discussed with pulmonary fellow, patient to be seen, will follow up recs from pulmonary.

## 2022-09-12 NOTE — DISCHARGE NOTE PROVIDER - CARE PROVIDER_API CALL
STEPHEN MUNOZ  Pulmonary Critical Care Medicine  Phone: [phone]  Fax: [phone]  Follow Up Time:     Mane Cheek)  [address], [address]st [place] [address]  Phone: [phone]  Fax: [phone]  Established Patient  Follow Up Time:    STEPHEN MUNOZ  Pulmonary Critical Care Medicine  Phone: [phone]  Fax: [phone]  Follow Up Time:     Mane Cheek)  Family Medicine  [address] [facility], [address]st [place] [address]  Phone: [phone]  Fax: [phone]  Established Patient  Follow Up Time:     Tripp Castro)  Internal Medicine; Nephrology  [address] [place] [id number]  Phone: [phone]  Fax: [phone]  Established Patient  Follow Up Time: 1 week

## 2022-09-12 NOTE — PROGRESS NOTE ADULT - PROBLEM SELECTOR PLAN 6
-Euvolemic. Hx of this as per chart review.   -Cont lasix. F/u as OP  Considering NSVT, another echo pending

## 2022-09-12 NOTE — DISCHARGE NOTE PROVIDER - NSDCFUADDAPPT_GEN_ALL_CORE_FT
Follow up with your PCP in 1-2 weeks.    Follow up with Pulmonology.  Follow up with your PCP in 1-2 weeks.    Pulmonary appointment scheduled for 9/28 at 10:30 am with Dr Brooke. Follow up with your Primary Care Physician in 1-2 weeks.    Pulmonary appointment scheduled for 9/28 at 10:30 am with Dr Brooke. Follow up with your Primary Care Physician in 1-2 weeks.    Nephrologist as regularly scheduled     Pulmonary appointment scheduled for 9/28 at 10:30 am with Dr Brooke. Follow up with your Primary Care Physician in 1-2 weeks.    Nephrologist in 1-2 weeks for electrolyte monitoring.     Pulmonary appointment scheduled for 9/28 at 10:30 am with Dr Brooke.

## 2022-09-12 NOTE — DISCHARGE NOTE PROVIDER - NSDCCPCAREPLAN_GEN_ALL_CORE_FT
PRINCIPAL DISCHARGE DIAGNOSIS  Diagnosis: Hyperkalemia  Assessment and Plan of Treatment: You were seen in the hospital because your potassium levels were very high. You were treated with medication to help lower your potassium to a safer level. Your potassium responded well to the medications and is now within a safe level for discharge. Please follow up with your PCP and nephrologist.         SECONDARY DISCHARGE DIAGNOSES  Diagnosis: COPD without exacerbation  Assessment and Plan of Treatment: Please continue to use home oxygen to maintain an oxygen saturation between 88-92%. Please continue to use your inhalers as prescribed. Follow up with outpatient pulmonology.      Diagnosis: Anemia  Assessment and Plan of Treatment: Your labs were positive for anemia. Continue taking home iron supplements. Follow up with PCP.    Diagnosis: Hypothyroidism  Assessment and Plan of Treatment: Your TSH was within normal limits. Continue to comply with your thyroid medication.    Diagnosis: HTN (hypertension)  Assessment and Plan of Treatment: Continue blood pressure medication regimen as directed. Monitor for any visual changes, headaches or dizziness.  Monitor blood pressure regularly.  Follow up with your primary care provider for further management for high blood pressure.    Diagnosis: NSVT (nonsustained ventricular tachycardia)  Assessment and Plan of Treatment: You had a brief very fast heart rhythm while on telemetry monitoring. You were started on a medication Metorprolol 25 mg which is to be taken twice a day. Please follow up with cardiology and PCP.    Diagnosis: DM2 (diabetes mellitus, type 2)  Assessment and Plan of Treatment: Please continue to comply with your diabetic medication regimen and follow up with PCP.    Diagnosis: Chronic diastolic heart failure  Assessment and Plan of Treatment: Please continue to comply with Lasix and follow up with PCP.    Diagnosis: Stage 3b chronic kidney disease  Assessment and Plan of Treatment: Your Creatinine was stable during admission. Follow up with your nephrologist.     PRINCIPAL DISCHARGE DIAGNOSIS  Diagnosis: Hyperkalemia  Assessment and Plan of Treatment: You were seen in the hospital because your potassium levels were very high. You were treated with medication to help lower your potassium to a safer level. Your potassium responded well to the medications and is now within a safe level for discharge. Please avoid having any concentrated Potassium in your diet Please follow up with your Primary Care Physician and Nephrologist in 1 week following discharge for ongoing medical management.         SECONDARY DISCHARGE DIAGNOSES  Diagnosis: Stage 3b chronic kidney disease  Assessment and Plan of Treatment: Your Creatinine was stable during admission. Follow up with your nephrologist in 1 week following discharge for ongoing medical management.  In order to prevent further disease progression, continue to follow recommendations made by your primary provider/nephrologist. Continue a diet that is low in sodium and avoid foods that are concentrated in potassium and phosphorus. Continue your medications/supplementations as directed and avoid over-the-counter drugs that are harmful to kidneys, such as, Non-Steroidal Anti-Inflammatory Drugs (NSAIDs). Follow-up as outpatient to monitor your kidney function, as well as, vitamin D, Calcium, potassium, and phosphorus levels.      Diagnosis: COPD without exacerbation  Assessment and Plan of Treatment: Please continue to use home oxygen to maintain an oxygen saturation between 88-92%. Please continue to use your inhalers as prescribed. Follow up with outpatient with your pulmonologist in 1 week following discharge.      Diagnosis: HTN (hypertension)  Assessment and Plan of Treatment: Continue blood pressure medication regimen as directed. Monitor for any visual changes, headaches or dizziness.  Monitor blood pressure regularly.  Follow up with your primary care provider for further management for high blood pressure.    Diagnosis: DM2 (diabetes mellitus, type 2)  Assessment and Plan of Treatment: Your A1c is 6.0  Continue your medication regimen and a consistent carbohydrate diet (Meaning eating the same amount of carbohydrates at the same time each day). Monitor blood glucose levels throughout the day before meals and at bedtime. Record blood sugars and bring to outpatient providers appointment in order to be reviewed by your doctor for management modifications. If your sugars are more than 400 or less than 70 you should contact your PCP immediately. Monitor for signs/symptoms of low blood glucose, such as, dizziness, altered mental status, or cool/clammy skin. In addition, monitor for signs/symptoms of high blood glucose, such as, feeling hot, dry, fatigued, or with increased thirst/urination. Make regular podiatry appointments in order to have feet checked for wounds and uncontrolled toe nail growth to prevent infections, as well as, appointments with an ophthalmologist to monitor your vision.      Diagnosis: Anemia  Assessment and Plan of Treatment: Your labs were positive for anemia. Continue taking home iron supplements. Follow-up with your outpatient provider for further care/recommendations. Monitor for signs/symptoms indicating worsening of disease, such as, easy bleeding/bruising, pale skin, fatigue, dizziness, increased heart rate, or chest pain.      Diagnosis: NSVT (nonsustained ventricular tachycardia)  Assessment and Plan of Treatment: You had a brief very fast heart rhythm while on telemetry monitoring. You were started on a medication Metorprolol 25 mg which is to be taken twice a day. Please follow up with cardiologist and primary care physician in 1 week following discharge for ongoing medical management..    Diagnosis: Chronic diastolic heart failure  Assessment and Plan of Treatment: Please continue to comply with Lasix Continue medication regimen from hospital. Follow heart healthy diet. Monitor weight. If you develop severe lower extremity swelling and shortness of breath please seek medial attention. Monitor for signs/symptoms of fluid overload and electrolyte abnormalities, such as, shortness of breath, cough, swelling, chest discomfort, changes in heart rate, dizziness, fainting, or changes in mental status. Please follow up with cardiologist and primary care physician in 1 week following discharge for ongoing medical management..    Diagnosis: Hypothyroidism  Assessment and Plan of Treatment: Your TSH was within normal limits.  Continue your thyroid medications as recommended and follow-up with your outpatient provider for continual thyroid function testing and further medication management.      Diagnosis: UTI (urinary tract infection)  Assessment and Plan of Treatment: Continue antibiotics as directed and monitor for signs/symptoms of infection, such as, fever/chills, burning/pain with urination, urinary frequency/hesitancy, cloudy urine, or blood in urine.       PRINCIPAL DISCHARGE DIAGNOSIS  Diagnosis: Hyperkalemia  Assessment and Plan of Treatment: You were seen in the hospital because your potassium levels were very high. You were treated with medication to help lower your potassium to a safer level. Your potassium responded well to the medications and is now within a safe level for discharge. Please avoid having any concentrated Potassium in your diet Please follow up with your Primary Care Physician and Nephrologist in 1 week following discharge for ongoing medical management. [place] Use lasix every 48 hoys, next dose 9/22/22        SECONDARY DISCHARGE DIAGNOSES  Diagnosis: Stage 3b chronic kidney disease  Assessment and Plan of Treatment: Your Creatinine was stable during admission. Follow up with your nephrologist in 1 week following discharge for ongoing medical management.  In order to prevent further disease progression, continue to follow recommendations made by your primary provider/nephrologist. Continue a diet that is low in sodium and avoid foods that are concentrated in potassium and phosphorus. Continue your medications/supplementations as directed and avoid over-the-counter drugs that are harmful to kidneys, such as, Non-Steroidal Anti-Inflammatory Drugs (NSAIDs). Follow-up as outpatient to monitor your kidney function, as well as, vitamin D, Calcium, potassium, and phosphorus levels.Low K diet. also lasix 20 mg po q48 hrs -next dose 9/22/22.      Diagnosis: COPD without exacerbation  Assessment and Plan of Treatment: Please continue to use home oxygen to maintain an oxygen saturation between 88-92%. Please continue to use your inhalers as prescribed. Follow up with outpatient with your pulmonologist in 1 week following discharge.      Diagnosis: HTN (hypertension)  Assessment and Plan of Treatment: Continue blood pressure medication regimen as directed. Monitor for any visual changes, headaches or dizziness.  Monitor blood pressure regularly.  Follow up with your primary care provider for further management for high blood pressure.    Diagnosis: DM2 (diabetes mellitus, type 2)  Assessment and Plan of Treatment: Your A1c is 6.0  Continue your medication regimen and a consistent carbohydrate diet (Meaning eating the same amount of carbohydrates at the same time each day). Monitor blood glucose levels throughout the day before meals and at bedtime. Record blood sugars and bring to outpatient providers appointment in order to be reviewed by your doctor for management modifications. If your sugars are more than 400 or less than 70 you should contact your PCP immediately. Monitor for signs/symptoms of low blood glucose, such as, dizziness, altered mental status, or cool/clammy skin. In addition, monitor for signs/symptoms of high blood glucose, such as, feeling hot, dry, fatigued, or with increased thirst/urination. Make regular podiatry appointments in order to have feet checked for wounds and uncontrolled toe nail growth to prevent infections, as well as, appointments with an ophthalmologist to monitor your vision.      Diagnosis: Anemia  Assessment and Plan of Treatment: Your labs were positive for anemia. Continue taking home iron supplements. Follow-up with your outpatient provider for further care/recommendations. Monitor for signs/symptoms indicating worsening of disease, such as, easy bleeding/bruising, pale skin, fatigue, dizziness, increased heart rate, or chest pain.      Diagnosis: NSVT (nonsustained ventricular tachycardia)  Assessment and Plan of Treatment: You had a brief very fast heart rhythm while on telemetry monitoring. You were started on a medication Metorprolol 25 mg which is to be taken twice a day. Please follow up with cardiologist and primary care physician in 1 week following discharge for ongoing medical management..    Diagnosis: Chronic diastolic heart failure  Assessment and Plan of Treatment: Please continue to comply with Lasix Continue medication regimen from hospital. Follow heart healthy diet. Monitor weight. If you develop severe lower extremity swelling and shortness of breath please seek medial attention. Monitor for signs/symptoms of fluid overload and electrolyte abnormalities, such as, shortness of breath, cough, swelling, chest discomfort, changes in heart rate, dizziness, fainting, or changes in mental status. Please follow up with cardiologist and primary care physician in 1 week following discharge for ongoing medical management..    Diagnosis: Hypothyroidism  Assessment and Plan of Treatment: Your TSH was within normal limits.  Continue your thyroid medications as recommended and follow-up with your outpatient provider for continual thyroid function testing and further medication management.      Diagnosis: UTI (urinary tract infection)  Assessment and Plan of Treatment: Continue antibiotics as directed and monitor for signs/symptoms of infection, such as, fever/chills, burning/pain with urination, urinary frequency/hesitancy, cloudy urine, or blood in urine.       PRINCIPAL DISCHARGE DIAGNOSIS  Diagnosis: Hyperkalemia  Assessment and Plan of Treatment: You were seen in the hospital for elevated potassium levels. You were treated with medication to help lower your potassium to a safer level. Your potassium responded well to the medications and is now within a safe level for discharge. Please avoid having any concentrated Potassium in your diet. Please follow up with your Primary Care Physician and Nephrologist in 1 week following discharge for ongoing medical management. We also went down on your Lasix to every other day.        SECONDARY DISCHARGE DIAGNOSES  Diagnosis: Stage 3b chronic kidney disease  Assessment and Plan of Treatment: Follow up with your nephrologist in 1 week following discharge for ongoing medical management.  In order to prevent further disease progression, continue to follow recommendations made by your primary provider/nephrologist. Continue a diet that is low in sodium and avoid foods that are concentrated in potassium and phosphorus. Continue your medications/supplementations as directed and avoid over-the-counter drugs that are harmful to kidneys, such as, Non-Steroidal Anti-Inflammatory Drugs (NSAIDs). Follow-up as outpatient to monitor your kidney function, as well as, vitamin D, Calcium, potassium, and phosphorus levels.    Diagnosis: COPD without exacerbation  Assessment and Plan of Treatment: During your stay we consulted our pulmonologists who determined you were not having a COPD exacerbation. Please continue to use home oxygen to maintain an oxygen saturation between 88-92%. Please continue to use your inhalers as prescribed. Follow up with outpatient with your pulmonologist in 1 week following discharge.    Diagnosis: HTN (hypertension)  Assessment and Plan of Treatment: Continue blood pressure medication regimen as directed. Monitor for any visual changes, headaches or dizziness.  Monitor blood pressure regularly.  Follow up with your primary care provider for further management for high blood pressure.    Diagnosis: DM2 (diabetes mellitus, type 2)  Assessment and Plan of Treatment: Your A1c is 6.0  Continue your medication regimen and a consistent carbohydrate diet (Meaning eating the same amount of carbohydrates at the same time each day). Monitor blood glucose levels throughout the day before meals and at bedtime. Record blood sugars and bring to outpatient providers appointment in order to be reviewed by your doctor for management modifications. If your sugars are more than 400 or less than 70 you should contact your PCP immediately. Monitor for signs/symptoms of low blood glucose, such as, dizziness, altered mental status, or cool/clammy skin. In addition, monitor for signs/symptoms of high blood glucose, such as, feeling hot, dry, fatigued, or with increased thirst/urination. Make regular podiatry appointments in order to have feet checked for wounds and uncontrolled toe nail growth to prevent infections, as well as, appointments with an ophthalmologist to monitor your vision.      Diagnosis: Anemia  Assessment and Plan of Treatment: Your labs were positive for anemia. Continue taking home iron supplements. Follow-up with your outpatient provider for further care/recommendations. Monitor for signs/symptoms indicating worsening of disease, such as, easy bleeding/bruising, pale skin, fatigue, dizziness, increased heart rate, or chest pain.      Diagnosis: NSVT (nonsustained ventricular tachycardia)  Assessment and Plan of Treatment: You had a brief very fast heart rhythm while on telemetry monitoring. You were started on a medication Metorprolol 25 mg which is to be taken twice a day. Please follow up with cardiologist and primary care physician in 1 week following discharge for ongoing medical management..    Diagnosis: Chronic diastolic heart failure  Assessment and Plan of Treatment: Continue medication regimen from hospital. Follow heart healthy diet. Monitor weight. If you develop severe lower extremity swelling and shortness of breath please seek medial attention. Monitor for signs/symptoms of fluid overload and electrolyte abnormalities, such as, shortness of breath, cough, swelling, chest discomfort, changes in heart rate, dizziness, fainting, or changes in mental status. Please follow up with cardiologist and primary care physician in 1 week following discharge for ongoing medical management..    Diagnosis: Hypothyroidism  Assessment and Plan of Treatment: Your TSH was within normal limits.  Continue your thyroid medications as recommended and follow-up with your outpatient provider for continual thyroid function testing and further medication management.      Diagnosis: UTI (urinary tract infection)  Assessment and Plan of Treatment: During your stay you were given three days of antibiotics to treat your UTI.

## 2022-09-12 NOTE — PHYSICAL THERAPY INITIAL EVALUATION ADULT - PRECAUTIONS/LIMITATIONS, REHAB EVAL
+5L of O2 through nasal cannula/cardiac precautions/fall precautions/oxygen therapy device and L/min

## 2022-09-12 NOTE — PROGRESS NOTE ADULT - PROBLEM SELECTOR PLAN 2
on telemetry - 6 beats   Keep K>4 , Mag >2   Pending TTE   Start metoprolol 25 mg po BID with holding parameters, adjust as tolerated

## 2022-09-12 NOTE — DIETITIAN INITIAL EVALUATION ADULT - NS FNS DIET ORDER
Regular: Consistent Carbohydrate {No Snacks} (CSTCHO); No Concentrated Potassium (09-10-22 @ 12:48) [Active]

## 2022-09-12 NOTE — PHYSICAL THERAPY INITIAL EVALUATION ADULT - ADDITIONAL COMMENTS
Pt reports that she lives alone in an apartment with no steps to negotiate; elevator inside. Prior to hospital admission pt was ambulating independently using a rollator with household ambulation and a shopping cart with community ambulation. Pt denies any recent falls and uses +3L of Home O2 continuously. Pt has a home health aide 5days/week 5hours/day.    Pt left comfortable seated in chair, NAD, all lines intact, all precautions maintained, with call bell in reach, and RN aware of PT evaluation.

## 2022-09-13 NOTE — PROGRESS NOTE ADULT - SUBJECTIVE AND OBJECTIVE BOX
Ferdinand Zabala MD  [employer]  Pager [phone]  Email: [email or web address]  Available on [employer] Teams        PROGRESS NOTE:     Patient is a 87y old  Female who presents with a chief complaint of Hyperkalemia (12 Sep 2022 17:41)      SUBJECTIVE / OVERNIGHT EVENTS:  Patient seen and examined this morning. This morning the patient is c/o urine incontinence and urgency.   ADDITIONAL REVIEW OF SYSTEMS:  No f/c/n/v    MEDICATIONS  (STANDING):  budesonide 160 MICROgram(s)/formoterol 4.5 MICROgram(s) Inhaler 2 Puff(s) Inhalation two times a day  busPIRone 10 milliGRAM(s) Oral two times a day  dextrose 5%. 1000 milliLiter(s) (100 mL/Hr) IV Continuous <Continuous>  dextrose 5%. 1000 milliLiter(s) (50 mL/Hr) IV Continuous <Continuous>  dextrose 50% Injectable 25 Gram(s) IV Push once  dextrose 50% Injectable 12.5 Gram(s) IV Push once  dextrose 50% Injectable 25 Gram(s) IV Push once  ferrous    sulfate 325 milliGRAM(s) Oral daily  furosemide    Tablet 20 milliGRAM(s) Oral daily  glucagon  Injectable 1 milliGRAM(s) IntraMuscular once  insulin lispro (ADMELOG) corrective regimen sliding scale   SubCutaneous three times a day before meals  insulin lispro (ADMELOG) corrective regimen sliding scale   SubCutaneous at bedtime  levothyroxine 112 MICROGram(s) Oral daily  metoprolol tartrate 25 milliGRAM(s) Oral two times a day  montelukast 10 milliGRAM(s) Oral at bedtime  Nephro-radha 1 Tablet(s) Oral daily  tiotropium 18 MICROgram(s) Capsule 1 Capsule(s) Inhalation daily    MEDICATIONS  (PRN):  acetaminophen     Tablet .. 650 milliGRAM(s) Oral every 6 hours PRN Temp greater or equal to 38C (100.4F), Mild Pain (1 - 3)  ALBUTerol    90 MICROgram(s) HFA Inhaler 2 Puff(s) Inhalation every 6 hours PRN Shortness of Breath and/or Wheezing  aluminum hydroxide/magnesium hydroxide/simethicone Suspension 30 milliLiter(s) Oral every 4 hours PRN Dyspepsia  dextrose Oral Gel 15 Gram(s) Oral once PRN Blood Glucose LESS THAN 70 milliGRAM(s)/deciliter  melatonin 3 milliGRAM(s) Oral at bedtime PRN Insomnia  ondansetron Injectable 4 milliGRAM(s) IV Push every 8 hours PRN Nausea and/or Vomiting      CAPILLARY BLOOD GLUCOSE      POCT Blood Glucose.: 160 mg/dL (13 Sep 2022 11:46)  POCT Blood Glucose.: 155 mg/dL (13 Sep 2022 07:45)  POCT Blood Glucose.: 138 mg/dL (12 Sep 2022 22:00)  POCT Blood Glucose.: 136 mg/dL (12 Sep 2022 17:01)    I&O's Summary      PHYSICAL EXAM:  Vital Signs Last 24 Hrs  T(C): 36.7 (13 Sep 2022 12:51), Max: 36.7 (13 Sep 2022 12:51)  T(F): 98.1 (13 Sep 2022 12:51), Max: 98.1 (13 Sep 2022 12:51)  HR: 74 (13 Sep 2022 13:03) (57 - 74)  BP: 101/40 (13 Sep 2022 13:03) (101/40 - 132/70)  BP(mean): --  RR: 18 (13 Sep 2022 12:51) (18 - 18)  SpO2: 100% (13 Sep 2022 12:51) (99% - 100%)    Parameters below as of 13 Sep 2022 12:51  Patient On (Oxygen Delivery Method): nasal cannula  O2 Flow (L/min): 3      CONSTITUTIONAL: NAD, well-developed, on supplemental O2 via NC, comfortable, speaking in full sentences. Very talkative.   RESPIRATORY: Normal respiratory effort; expiratory wheezing bilaterally at the bases  CARDIOVASCULAR: Regular rate and rhythm, normal S1 and S2, no murmur/rub/gallop; No lower extremity edema; Peripheral pulses are 2+ bilaterally  ABDOMEN: Nontender to palpation, normoactive bowel sounds, no rebound/guarding;   MUSCLOSKELETAL: no clubbing or cyanosis of digits; no joint swelling or tenderness to palpation  PSYCH: A+O to person, place and month.     LABS:                        9.4    7.17  )-----------( 224      ( 13 Sep 2022 05:46 )             30.0     09-13    139  |  104  |  86<H>  ----------------------------<  160<H>  5.1   |  24  |  2.31<H>    Ca    8.4      13 Sep 2022 05:46  Phos  5.3     09-13  Mg     2.50     09-13                  RADIOLOGY & ADDITIONAL TESTS:  Results Reviewed:   Imaging Personally Reviewed:  Electrocardiogram Personally Reviewed:    COORDINATION OF CARE:  Care Discussed with Consultants/Other Providers [Y/N]: Case discussed during interdisciplinary rounds with social work and case management  Prior or Outpatient Records Reviewed [Y/N]:

## 2022-09-13 NOTE — PROGRESS NOTE ADULT - PROBLEM SELECTOR PLAN 1
Patient w/COPD on home O2, though states that up till now she has rarely had to use it. on 9/12 stated breathing was worse thanbaseline. On symbicort, proventil, singulair and spiriva   Patient states that she rarely requires her home O2, however now feels like her O2 needs have gotten worse  Discussed with pulmonary fellow, pulm consulted appreciated, patient currently optimized on meds, she still appears closer to her current baseline.    Will need outpatient follow up.

## 2022-09-13 NOTE — PROGRESS NOTE ADULT - PROBLEM SELECTOR PLAN 11
-On metformin at home.   -Check A1c- ordered again with AM labs  -FS TIDACHS  -Start ISS TIDACHS.         #DVT ppx- Low improve score, encourage ambulation.

## 2022-09-13 NOTE — PROGRESS NOTE ADULT - PROBLEM SELECTOR PLAN 8
Cr from 1.74 to 2.31  Patient also c/o worsening urgency  UA and urine studies ordered, will follow up

## 2022-09-14 NOTE — PROGRESS NOTE ADULT - SUBJECTIVE AND OBJECTIVE BOX
Ferdinand Zabala MD  Academic Hospitalist  Pager [phone]  Email: [email or web address]  Available on [employer] Teams        PROGRESS NOTE:     Patient is a 87y old  Female who presents with a chief complaint of Hyperkalemia (13 Sep 2022 14:07)      SUBJECTIVE / OVERNIGHT EVENTS:  Patient seen and examined this morning. States that her urinary symptoms have improved since being placed on antibiotics.  ADDITIONAL REVIEW OF SYSTEMS:  No f/c/n/v    MEDICATIONS  (STANDING):  budesonide 160 MICROgram(s)/formoterol 4.5 MICROgram(s) Inhaler 2 Puff(s) Inhalation two times a day  busPIRone 10 milliGRAM(s) Oral two times a day  cefTRIAXone   IVPB 1000 milliGRAM(s) IV Intermittent every 24 hours  dextrose 5%. 1000 milliLiter(s) (100 mL/Hr) IV Continuous <Continuous>  dextrose 5%. 1000 milliLiter(s) (50 mL/Hr) IV Continuous <Continuous>  dextrose 50% Injectable 25 Gram(s) IV Push once  dextrose 50% Injectable 12.5 Gram(s) IV Push once  dextrose 50% Injectable 25 Gram(s) IV Push once  ferrous    sulfate 325 milliGRAM(s) Oral daily  furosemide    Tablet 20 milliGRAM(s) Oral daily  glucagon  Injectable 1 milliGRAM(s) IntraMuscular once  insulin lispro (ADMELOG) corrective regimen sliding scale   SubCutaneous at bedtime  insulin lispro (ADMELOG) corrective regimen sliding scale   SubCutaneous three times a day before meals  levothyroxine 112 MICROGram(s) Oral daily  metoprolol tartrate 25 milliGRAM(s) Oral two times a day  montelukast 10 milliGRAM(s) Oral at bedtime  Nephro-radha 1 Tablet(s) Oral daily  tiotropium 18 MICROgram(s) Capsule 1 Capsule(s) Inhalation daily    MEDICATIONS  (PRN):  acetaminophen     Tablet .. 650 milliGRAM(s) Oral every 6 hours PRN Temp greater or equal to 38C (100.4F), Mild Pain (1 - 3)  ALBUTerol    90 MICROgram(s) HFA Inhaler 2 Puff(s) Inhalation every 6 hours PRN Shortness of Breath and/or Wheezing  aluminum hydroxide/magnesium hydroxide/simethicone Suspension 30 milliLiter(s) Oral every 4 hours PRN Dyspepsia  dextrose Oral Gel 15 Gram(s) Oral once PRN Blood Glucose LESS THAN 70 milliGRAM(s)/deciliter  melatonin 3 milliGRAM(s) Oral at bedtime PRN Insomnia  ondansetron Injectable 4 milliGRAM(s) IV Push every 8 hours PRN Nausea and/or Vomiting      CAPILLARY BLOOD GLUCOSE      POCT Blood Glucose.: 197 mg/dL (14 Sep 2022 11:33)  POCT Blood Glucose.: 138 mg/dL (14 Sep 2022 07:51)  POCT Blood Glucose.: 179 mg/dL (13 Sep 2022 22:36)  POCT Blood Glucose.: 124 mg/dL (13 Sep 2022 17:55)    I&O's Summary      PHYSICAL EXAM:  Vital Signs Last 24 Hrs  T(C): 36.6 (14 Sep 2022 12:20), Max: 36.7 (13 Sep 2022 20:42)  T(F): 97.8 (14 Sep 2022 12:20), Max: 98 (13 Sep 2022 20:42)  HR: 70 (14 Sep 2022 12:20) (60 - 74)  BP: 114/50 (14 Sep 2022 12:20) (114/50 - 163/71)  BP(mean): --  RR: 18 (14 Sep 2022 12:20) (18 - 18)  SpO2: 96% (14 Sep 2022 12:20) (95% - 97%)    Parameters below as of 14 Sep 2022 12:20  Patient On (Oxygen Delivery Method): nasal cannula  O2 Flow (L/min): 3      CONSTITUTIONAL: NAD, well-developed, on supplemental O2 via NC, comfortable, speaking in full sentences. Very talkative.   RESPIRATORY: Normal respiratory effort; expiratory wheezing bilaterally at the bases  CARDIOVASCULAR: Regular rate and rhythm, normal S1 and S2, no murmur/rub/gallop; No lower extremity edema; Peripheral pulses are 2+ bilaterally  ABDOMEN: Nontender to palpation, normoactive bowel sounds, no rebound/guarding;   MUSCLOSKELETAL: no clubbing or cyanosis of digits; no joint swelling or tenderness to palpation  PSYCH: A+O to person, place and month.       LABS:                        9.9    7.25  )-----------( 240      ( 14 Sep 2022 06:40 )             31.3     09-14    139  |  100  |  87<H>  ----------------------------<  152<H>  5.2   |  27  |  2.16<H>    Ca    8.7      14 Sep 2022 06:40  Phos  5.0       Mg     2.50                 Urinalysis Basic - ( 13 Sep 2022 14:50 )    Color: Light Yellow / Appearance: Clear / S.016 / pH: x  Gluc: x / Ketone: Negative  / Bili: Negative / Urobili: <2 mg/dL   Blood: x / Protein: Trace / Nitrite: Positive   Leuk Esterase: Small / RBC: 3 /HPF / WBC 7 /HPF   Sq Epi: x / Non Sq Epi: 2 /HPF / Bacteria: Many          RADIOLOGY & ADDITIONAL TESTS:  Results Reviewed:   Imaging Personally Reviewed:  Electrocardiogram Personally Reviewed:    COORDINATION OF CARE:  Care Discussed with Consultants/Other Providers [Y/N]: Case discussed during interdisciplinary rounds with social work and case management  Prior or Outpatient Records Reviewed [Y/N]:

## 2022-09-14 NOTE — PROGRESS NOTE ADULT - PROBLEM SELECTOR PLAN 2
-U/A suggestive of UTI  will follow up urine cxs  JANELLE improving and symptoms subsiding since antibiotics were intiated

## 2022-09-14 NOTE — PROGRESS NOTE ADULT - PROBLEM SELECTOR PLAN 9
As above for UTI  patient w/UTI, on antibiotics, improvement in JANELLE noted since starting antibiotics

## 2022-09-14 NOTE — PROGRESS NOTE ADULT - PROBLEM SELECTOR PLAN 3
Keep K>4 , Mag >2   echo was technically difficult, but mostly unremarkable   Start metoprolol 25 mg po BID with holding parameters, adjust as tolerated

## 2022-09-15 NOTE — PROGRESS NOTE ADULT - PROBLEM SELECTOR PLAN 6
Had some JANELLE, now improving  -Monitor UOP.   -Dose meds renally if necessary.w.  -Further follow up w/ neph as OP.

## 2022-09-15 NOTE — PROVIDER CONTACT NOTE (OTHER) - RECOMMENDATIONS
54 y.o. female with stage IV very severe COPD by gold classification, nicotine dependence chronic hoarseness, anxiety, chronic pain syndrome laryngeal papilloma, KATHERINE, chronic respiratory failure with hypoxia and hypercapnia, pulmonary hypertension presents with a complaint of shortness of breath.  Acutely worse than baseline, duration 2-3 days, associated with increased cough, has been taking DuoNebs and prednisone without much improvement.  On home oxygen at baseline.  Denies fever, chills, chest pain, palpitations, dizziness, syncope, nausea, vomiting, diarrhea, abdominal pain, or dysuria.  In the ED, she was found to be wheezing.  Stable on her usual home oxygen dose.  Labs show leukocytosis.  Otherwise unremarkable for any acute abnormality.  Placed in observation.  Was on COPD pathway,wheezing did not improved,switched prednisone to IV Solumedrol with improvement,did well with PT,OT and DME and out patient PT,OT art DC time is arranged,patient was discharged home with po prednisone and follow up with PCP as out patient,.    
Come assess patient ASAP.
None
Notify ortho
O2 via mask at increased O2 rate, possible EKG.

## 2022-09-15 NOTE — PROGRESS NOTE ADULT - PROBLEM SELECTOR PLAN 11
-On metformin at home.   -Check A1c- 6  -FS TIDACHS  -Start ISS TIDACHS.         #DVT ppx- Low improve score, encourage ambulation.

## 2022-09-15 NOTE — PROGRESS NOTE ADULT - PROBLEM SELECTOR PLAN 4
-K improved and now worsened again. Etiology unclear - could be med related, intake related, vs CKD related. Allscripts reviewed.   Seen by Dr Castro (nephr) in 2019 for Hyperkalemia. Was on Lokelma at the time. Med list reviewed - not on it any more. Pt also no longer on ACEi, as she was before. Recent visits w/ pulmonologist and PCP also reviewed as well.  -Of note - pt acknowledges making poor dietary choices. Due to the fact that she's on oxygen, she has not been able to cooked. She has been eating a lot of toast, cheese, and cold cuts, which may contribute to high K issue as well. Will request nutrition eval.   -Monitoring on telemetry for arrhthymias  - no sig events noted thus far.  -Low K diet ordered.  -Continue lasix as that will help w/ K.

## 2022-09-15 NOTE — PROGRESS NOTE ADULT - PROBLEM SELECTOR PLAN 2
-U/A suggestive of UTI  cxs w/E. coli- f/u sensitivities   JANELLE improving and symptoms subsiding since antibiotics were initiated

## 2022-09-15 NOTE — PROGRESS NOTE ADULT - SUBJECTIVE AND OBJECTIVE BOX
Ferdinand Zabala MD  Academic Hospitalist  Pager [phone]  Email: [email or web address]  Available on [employer] Teams        PROGRESS NOTE:     Patient is a 87y old  Female who presents with a chief complaint of Hyperkalemia (14 Sep 2022 14:15)      SUBJECTIVE / OVERNIGHT EVENTS:  Patient seen and examined this morning. Overall urinary symptoms improved, also noted to be comfortable off of supplemental O2.  ADDITIONAL REVIEW OF SYSTEMS:  No f/c/n/v    MEDICATIONS  (STANDING):  budesonide 160 MICROgram(s)/formoterol 4.5 MICROgram(s) Inhaler 2 Puff(s) Inhalation two times a day  busPIRone 10 milliGRAM(s) Oral two times a day  cefTRIAXone   IVPB 1000 milliGRAM(s) IV Intermittent every 24 hours  dextrose 5%. 1000 milliLiter(s) (100 mL/Hr) IV Continuous <Continuous>  dextrose 5%. 1000 milliLiter(s) (50 mL/Hr) IV Continuous <Continuous>  dextrose 50% Injectable 25 Gram(s) IV Push once  dextrose 50% Injectable 12.5 Gram(s) IV Push once  dextrose 50% Injectable 25 Gram(s) IV Push once  ferrous    sulfate 325 milliGRAM(s) Oral daily  furosemide    Tablet 20 milliGRAM(s) Oral daily  glucagon  Injectable 1 milliGRAM(s) IntraMuscular once  insulin lispro (ADMELOG) corrective regimen sliding scale   SubCutaneous three times a day before meals  insulin lispro (ADMELOG) corrective regimen sliding scale   SubCutaneous at bedtime  levothyroxine 112 MICROGram(s) Oral daily  metoprolol tartrate 25 milliGRAM(s) Oral two times a day  montelukast 10 milliGRAM(s) Oral at bedtime  Nephro-radha 1 Tablet(s) Oral daily  tiotropium 18 MICROgram(s) Capsule 1 Capsule(s) Inhalation daily    MEDICATIONS  (PRN):  acetaminophen     Tablet .. 650 milliGRAM(s) Oral every 6 hours PRN Temp greater or equal to 38C (100.4F), Mild Pain (1 - 3)  ALBUTerol    90 MICROgram(s) HFA Inhaler 2 Puff(s) Inhalation every 6 hours PRN Shortness of Breath and/or Wheezing  aluminum hydroxide/magnesium hydroxide/simethicone Suspension 30 milliLiter(s) Oral every 4 hours PRN Dyspepsia  dextrose Oral Gel 15 Gram(s) Oral once PRN Blood Glucose LESS THAN 70 milliGRAM(s)/deciliter  melatonin 3 milliGRAM(s) Oral at bedtime PRN Insomnia  ondansetron Injectable 4 milliGRAM(s) IV Push every 8 hours PRN Nausea and/or Vomiting      CAPILLARY BLOOD GLUCOSE      POCT Blood Glucose.: 133 mg/dL (15 Sep 2022 11:32)  POCT Blood Glucose.: 155 mg/dL (15 Sep 2022 08:08)  POCT Blood Glucose.: 246 mg/dL (14 Sep 2022 21:35)  POCT Blood Glucose.: 143 mg/dL (14 Sep 2022 17:01)    I&O's Summary      PHYSICAL EXAM:  Vital Signs Last 24 Hrs  T(C): 36.6 (15 Sep 2022 12:), Max: 36.9 (14 Sep 2022 22:03)  T(F): 97.8 (15 Sep 2022 12:), Max: 98.5 (14 Sep 2022 22:03)  HR: 65 (15 Sep 2022 12:) (56 - 65)  BP: 102/54 (15 Sep 2022 12:) (102/54 - 116/55)  BP(mean): --  RR: 17 (15 Sep 2022 12:) (16 - 18)  SpO2: 98% (15 Sep 2022 12:) (98% - 99%)    Parameters below as of 15 Sep 2022 12:26  Patient On (Oxygen Delivery Method): nasal cannula  O2 Flow (L/min): 3      CONSTITUTIONAL: NAD, well-developed, off of supplemental O2 via NC, comfortable, speaking in full sentences. Very talkative.   RESPIRATORY: Normal respiratory effort; expiratory wheezing bilaterally at the bases  CARDIOVASCULAR: Regular rate and rhythm, normal S1 and S2, no murmur/rub/gallop; No lower extremity edema; Peripheral pulses are 2+ bilaterally  ABDOMEN: Nontender to palpation, normoactive bowel sounds, no rebound/guarding;   MUSCLOSKELETAL: no clubbing or cyanosis of digits; no joint swelling or tenderness to palpation  PSYCH: A+O to person, place and month.     LABS:                        9.4    7.74  )-----------( 247      ( 15 Sep 2022 08:08 )             30.2     09-15    139  |  103  |  94<H>  ----------------------------<  155<H>  5.6<H>   |  25  |  1.88<H>    Ca    8.5      15 Sep 2022 08:08  Phos  5.0     09-15  Mg     2.60     09-15            Urinalysis Basic - ( 13 Sep 2022 14:50 )    Color: Light Yellow / Appearance: Clear / S.016 / pH: x  Gluc: x / Ketone: Negative  / Bili: Negative / Urobili: <2 mg/dL   Blood: x / Protein: Trace / Nitrite: Positive   Leuk Esterase: Small / RBC: 3 /HPF / WBC 7 /HPF   Sq Epi: x / Non Sq Epi: 2 /HPF / Bacteria: Many        Culture - Urine (collected 13 Sep 2022 16:32)  Source: Clean Catch Clean Catch (Midstream)  Preliminary Report (14 Sep 2022 16:04):    >100,000 CFU/ml Escherichia coli        RADIOLOGY & ADDITIONAL TESTS:  Results Reviewed:   Imaging Personally Reviewed:  Electrocardiogram Personally Reviewed:    COORDINATION OF CARE:  Care Discussed with Consultants/Other Providers [Y/N]:  Prior or Outpatient Records Reviewed [Y/N]:

## 2022-09-15 NOTE — PROGRESS NOTE ADULT - PROBLEM SELECTOR PLAN 5
-No active bleeding noted. Could be 2' AOCD.  -Macrocytic.  -B12/folate normal   -Supplement as needed  -Cont home iron.  -Further workup pending course.

## 2022-09-15 NOTE — PROGRESS NOTE ADULT - PROBLEM SELECTOR PLAN 1
Patient w/COPD on home O2, though states that up till now she has rarely had to use it. on 9/12 stated breathing was worse than baseline. On symbicort, proventil, singulair and spiriva, on 9/15 reports some improvement.   Patient states that she rarely requires her home O2, however now feels like her O2 needs have gotten worse  Discussed with pulmonary fellow, pulm consulted appreciated, patient currently optimized on meds, she still appears closer to her current baseline.    Will need outpatient follow up.

## 2022-09-16 NOTE — DISCHARGE NOTE NURSING/CASE MANAGEMENT/SOCIAL WORK - NSDCCRNAME_GEN_ALL_CORE_FT
[facility] address: [phone] [place] [address] room # [address]A, 2pm  via w/c and 3L N/C provided by [facility] thru the tunnel

## 2022-09-16 NOTE — DISCHARGE NOTE NURSING/CASE MANAGEMENT/SOCIAL WORK - NSDCFUADDAPPT_GEN_ALL_CORE_FT
Follow up with your PCP in 1-2 weeks.    Pulmonary appointment scheduled for 9/28 at 10:30 am with Dr Brooke.

## 2022-09-16 NOTE — DISCHARGE NOTE NURSING/CASE MANAGEMENT/SOCIAL WORK - PATIENT PORTAL LINK FT
You can access the FollowMyHealth Patient Portal offered by [facility] by registering at the following website: [email or web address]. By joining [facility]’s FollowMyHealth portal, you will also be able to view your health information using other applications (apps) compatible with our system.

## 2022-09-16 NOTE — DISCHARGE NOTE NURSING/CASE MANAGEMENT/SOCIAL WORK - NSDCVIVACCINE_GEN_ALL_CORE_FT
Influenza, high dose seasonal; 24-Sep-2019 12:44; Shellie Hannah (Student, Nursing); [employer]; [id number] (Exp. Date: 24-Apr-2020); IntraMuscular; Deltoid Right.; 0.5 milliLiter(s); VIS (VIS Published: 15-Aug-2019, VIS Presented: 24-Sep-2019);

## 2022-09-16 NOTE — PROGRESS NOTE ADULT - PROBLEM SELECTOR PLAN 2
-U/A suggestive of UTI  cxs w/E. coli- f/u sensitivities   JANELLE improving   Will need outpatient follow up for urinary incontinence

## 2022-09-16 NOTE — PROGRESS NOTE ADULT - PROBLEM SELECTOR PLAN 7
-Euvolemic. Hx of this as per chart review.   -Cont lasix. F/u as OP  Considering NSVT,  Echo at this time mostly unremarkable, however it was also noted to be a poor study

## 2022-09-16 NOTE — PROGRESS NOTE ADULT - PROBLEM SELECTOR PLAN 4
-K improved   Etiology unclear - could be med related, intake related, vs CKD related. Allscripts reviewed.   Seen by Dr Castro (nephr) in 2019 for Hyperkalemia. Was on Lokelma at the time. Med list reviewed - not on it any more. Pt also no longer on ACEi, as she was before. Recent visits w/ pulmonologist and PCP also reviewed as well.  -Of note - pt acknowledges making poor dietary choices. Due to the fact that she's on oxygen, she has not been able to cooked. She has been eating a lot of toast, cheese, and cold cuts, which may contribute to high K issue as well. Will request nutrition eval.   -Low K diet ordered.  -Continue lasix as that will help w/ K.

## 2022-09-16 NOTE — DISCHARGE NOTE NURSING/CASE MANAGEMENT/SOCIAL WORK - NSDCPEFALRISK_GEN_ALL_CORE
For information on Fall & Injury Prevention, visit: [email or web address] OR  [email or web address] OR  https://www.cdc.gov/steadi/patient.html

## 2022-09-16 NOTE — PROGRESS NOTE ADULT - SUBJECTIVE AND OBJECTIVE BOX
Ferdinand Zabala MD  Academic Hospitalist  Pager [phone]  Email: [email or web address]  Available on [employer] Teams        PROGRESS NOTE:     Patient is a 87y old  Female who presents with a chief complaint of Hyperkalemia (15 Sep 2022 13:45)      SUBJECTIVE / OVERNIGHT EVENTS:  Patient seen and examined this morning. States that her breathing has improved, however she is still complaining of urinary incontinence.  ADDITIONAL REVIEW OF SYSTEMS:  No f/c/n/v    MEDICATIONS  (STANDING):  budesonide 160 MICROgram(s)/formoterol 4.5 MICROgram(s) Inhaler 2 Puff(s) Inhalation two times a day  busPIRone 10 milliGRAM(s) Oral two times a day  carbamide peroxide Otic Solution 5 Drop(s) Right Ear two times a day  dextrose 5%. 1000 milliLiter(s) (100 mL/Hr) IV Continuous <Continuous>  dextrose 5%. 1000 milliLiter(s) (50 mL/Hr) IV Continuous <Continuous>  dextrose 50% Injectable 25 Gram(s) IV Push once  dextrose 50% Injectable 12.5 Gram(s) IV Push once  dextrose 50% Injectable 25 Gram(s) IV Push once  ferrous    sulfate 325 milliGRAM(s) Oral daily  furosemide    Tablet 20 milliGRAM(s) Oral daily  glucagon  Injectable 1 milliGRAM(s) IntraMuscular once  insulin lispro (ADMELOG) corrective regimen sliding scale   SubCutaneous three times a day before meals  insulin lispro (ADMELOG) corrective regimen sliding scale   SubCutaneous at bedtime  levothyroxine 112 MICROGram(s) Oral daily  metoprolol tartrate 25 milliGRAM(s) Oral two times a day  montelukast 10 milliGRAM(s) Oral at bedtime  Nephro-radha 1 Tablet(s) Oral daily  tiotropium 18 MICROgram(s) Capsule 1 Capsule(s) Inhalation daily    MEDICATIONS  (PRN):  acetaminophen     Tablet .. 650 milliGRAM(s) Oral every 6 hours PRN Temp greater or equal to 38C (100.4F), Mild Pain (1 - 3)  ALBUTerol    90 MICROgram(s) HFA Inhaler 2 Puff(s) Inhalation every 6 hours PRN Shortness of Breath and/or Wheezing  aluminum hydroxide/magnesium hydroxide/simethicone Suspension 30 milliLiter(s) Oral every 4 hours PRN Dyspepsia  dextrose Oral Gel 15 Gram(s) Oral once PRN Blood Glucose LESS THAN 70 milliGRAM(s)/deciliter  melatonin 3 milliGRAM(s) Oral at bedtime PRN Insomnia  ondansetron Injectable 4 milliGRAM(s) IV Push every 8 hours PRN Nausea and/or Vomiting      CAPILLARY BLOOD GLUCOSE      POCT Blood Glucose.: 188 mg/dL (16 Sep 2022 11:42)  POCT Blood Glucose.: 143 mg/dL (16 Sep 2022 07:55)  POCT Blood Glucose.: 173 mg/dL (15 Sep 2022 21:29)  POCT Blood Glucose.: 119 mg/dL (15 Sep 2022 17:00)    I&O's Summary      PHYSICAL EXAM:  Vital Signs Last 24 Hrs  T(C): 36.8 (16 Sep 2022 12:20), Max: 36.8 (16 Sep 2022 12:20)  T(F): 98.3 (16 Sep 2022 12:20), Max: 98.3 (16 Sep 2022 12:20)  HR: 68 (16 Sep 2022 12:20) (60 - 68)  BP: 151/60 (16 Sep 2022 12:20) (114/58 - 151/60)  BP(mean): --  RR: 17 (16 Sep 2022 12:20) (16 - 18)  SpO2: 98% (16 Sep 2022 12:20) (88% - 98%)    Parameters below as of 16 Sep 2022 12:20  Patient On (Oxygen Delivery Method): nasal cannula  O2 Flow (L/min): 3      CONSTITUTIONAL: NAD, well-developed, off of supplemental O2 via NC, comfortable, speaking in full sentences. Very talkative.   RESPIRATORY: Normal respiratory effort; expiratory wheezing bilaterally at the bases  CARDIOVASCULAR: Regular rate and rhythm, normal S1 and S2, no murmur/rub/gallop; Peripheral pulses are 2+ bilaterally  ABDOMEN: Nontender to palpation, normoactive bowel sounds, no rebound/guarding;   MUSCLOSKELETAL: no clubbing or cyanosis of digits; no joint swelling or tenderness to palpation  PSYCH: A+O to person, place and month.       LABS:                        9.4    7.76  )-----------( 255      ( 16 Sep 2022 05:30 )             30.1     09-16    139  |  103  |  86<H>  ----------------------------<  146<H>  4.9   |  27  |  1.64<H>    Ca    8.7      16 Sep 2022 05:30  Phos  4.4     09-16  Mg     2.40     09-16                Culture - Urine (collected 13 Sep 2022 16:32)  Source: Clean Catch Clean Catch (Midstream)  Final Report (15 Sep 2022 16:34):    >100,000 CFU/ml Escherichia coli  Organism: Escherichia coli (15 Sep 2022 16:34)  Organism: Escherichia coli (15 Sep 2022 16:34)        RADIOLOGY & ADDITIONAL TESTS:  Results Reviewed:   Imaging Personally Reviewed:  Electrocardiogram Personally Reviewed:    COORDINATION OF CARE:  Care Discussed with Consultants/Other Providers [Y/N]: Case discussed during interdisciplinary rounds with social work and case management  Prior or Outpatient Records Reviewed [Y/N]:

## 2022-09-16 NOTE — PROVIDER CONTACT NOTE (OTHER) - ASSESSMENT
Pt A&O x4, pt asymptomatic, denies chest pain or SOB, V/S as documented in flow sheet. No s/s of acute distress noted at this time.

## 2022-09-17 NOTE — PROGRESS NOTE ADULT - PROBLEM SELECTOR PLAN 9
As above for UTI  Cr 1.6  patient w/UTI, on antibiotics, improvement in JANELLE noted since starting antibiotics

## 2022-09-17 NOTE — PROGRESS NOTE ADULT - PROBLEM SELECTOR PLAN 11
-On metformin at home.   -Check A1c- 6  -FS TIDACHS  -C/w ISS TIDACHS.   -FS in appropriate range    #DVT ppx- Low improve score, encourage ambulation. -Stable. Cont LT4.  TSH normal

## 2022-09-17 NOTE — PROGRESS NOTE ADULT - SUBJECTIVE AND OBJECTIVE BOX
Contact Information:  Lana Bassett II, MD, MPH  PGY-3, Internal Medicine  Pager: [phone] ([facility]) /// [phone] ([facility])    DEBORAH RIVERA, [id number]    Patient is a 87y old  Female who presents with a chief complaint of Hyperkalemia (17 Sep 2022 14:18)      OVERNIGHT EVENTS/INTERVAL/SUBJECTIVE: No overnight events. Patient evaluated at bedside, inquiring about a hat that she wanted to purchase from [facility]. Also says that she wants to go to [facility]. Otherwise, she denies fever, lightheadedness, dizziness, SOB, CP, ABD pain, numbness, tingling.    CONSTITUTIONAL: No weakness. No fatigue. No fever.  HEAD: No head trauma.   EYES: No vision changes.  ENT: No hearing changes or tinnitus. No ear pain. No changes in smell. No nasal congestion or discharge. No sore throat. No voice hoarseness.   NECK: No neck pain or stiffness. No lumps.  RESPIRATORY: No cough. No SOB. No wheezing. No hemoptysis.   CARDIOVASCULAR: No chest pain. No palpitations.   GASTROINTESTINAL: No dysphagia. No ABD pain. No distension. No constipation. No diarrhea. No pain with defecation. No hematemesis. No hematochezia or melena.  BACK: No back pain.  GENITOURINARY: No dysuria. No frequency or urgency. No hesitancy. No incontinence. No urinary retention. No suprapubic pain. No hematuria.  EXTREMITY: No swelling.  MUSCULOSKELETAL: No joint pain or swelling. No fractures. No stiffness.    SKIN: No rashes. No itching. No skin, hair, or nail changes.  NEUROLOGICAL: No weakness or paralysis. No lightheadedness or dizziness. No HA. No numbness or tingling.   PSYCHIATRIC: No depression.       OBJECTIVE:  Vital Signs Last 24 Hrs  T(C): 36.7 (17 Sep 2022 17:15), Max: 36.8 (17 Sep 2022 13:26)  T(F): 98 (17 Sep 2022 17:15), Max: 98.3 (17 Sep 2022 13:26)  HR: 68 (17 Sep 2022 17:15) (57 - 68)  BP: 147/64 (17 Sep 2022 17:15) (130/61 - 147/64)  BP(mean): --  RR: 18 (17 Sep 2022 17:15) (16 - 18)  SpO2: 100% (17 Sep 2022 17:15) (100% - 100%)    Parameters below as of 17 Sep 2022 17:15  Patient On (Oxygen Delivery Method): nasal cannula  O2 Flow (L/min): 3    I&O's Summary      MEDICATIONS  (STANDING):  budesonide 160 MICROgram(s)/formoterol 4.5 MICROgram(s) Inhaler 2 Puff(s) Inhalation two times a day  busPIRone 10 milliGRAM(s) Oral two times a day  carbamide peroxide Otic Solution 5 Drop(s) Right Ear two times a day  dextrose 5%. 1000 milliLiter(s) (100 mL/Hr) IV Continuous <Continuous>  dextrose 5%. 1000 milliLiter(s) (50 mL/Hr) IV Continuous <Continuous>  dextrose 50% Injectable 25 Gram(s) IV Push once  dextrose 50% Injectable 12.5 Gram(s) IV Push once  dextrose 50% Injectable 25 Gram(s) IV Push once  ferrous    sulfate 325 milliGRAM(s) Oral daily  furosemide    Tablet 20 milliGRAM(s) Oral daily  glucagon  Injectable 1 milliGRAM(s) IntraMuscular once  insulin lispro (ADMELOG) corrective regimen sliding scale   SubCutaneous three times a day before meals  insulin lispro (ADMELOG) corrective regimen sliding scale   SubCutaneous at bedtime  levothyroxine 112 MICROGram(s) Oral daily  lidocaine   4% Patch 1 Patch Transdermal daily  metoprolol tartrate 25 milliGRAM(s) Oral two times a day  montelukast 10 milliGRAM(s) Oral at bedtime  Nephro-radha 1 Tablet(s) Oral daily  tiotropium 18 MICROgram(s) Capsule 1 Capsule(s) Inhalation daily    MEDICATIONS  (PRN):  acetaminophen     Tablet .. 650 milliGRAM(s) Oral every 6 hours PRN Temp greater or equal to 38C (100.4F), Mild Pain (1 - 3)  ALBUTerol    90 MICROgram(s) HFA Inhaler 2 Puff(s) Inhalation every 6 hours PRN Shortness of Breath and/or Wheezing  aluminum hydroxide/magnesium hydroxide/simethicone Suspension 30 milliLiter(s) Oral every 4 hours PRN Dyspepsia  dextrose Oral Gel 15 Gram(s) Oral once PRN Blood Glucose LESS THAN 70 milliGRAM(s)/deciliter  melatonin 3 milliGRAM(s) Oral at bedtime PRN Insomnia  ondansetron Injectable 4 milliGRAM(s) IV Push every 8 hours PRN Nausea and/or Vomiting    Allergies    No Known Allergies    Intolerances        CONSTITUTIONAL: No acute distress. Awake and alert.  RESPIRATORY: CTAB with some mild expiratory wheezes. No rales, or rhonchi. No accessory muscle use. No apparent respiratory distress.  CARDIOVASCULAR: +S1/S2. No audible S3/S4. Regular rate and rhythm. No murmurs, rubs, or gallops.   GASTROINTESTINAL: Soft, nontender, nondistended. +BS. No rebound or guarding.   BACK: No spinal or paraspinal tenderness. No CVA tenderness.  EXTREMITY: No LE swelling or edema. EXTs warm to touch.  MUSCULOSKELETAL: Spontaneous movement in all extremities.  DERMATOLOGICAL: No abnormal rashes or lesions.  NEUROLOGICAL: No focal deficits. A&Ox3 (oriented to person, place, and time).                            10.9   8.42  )-----------( 263      ( 17 Sep 2022 05:11 )             35.6       09-17    139  |  104  |  75<H>  ----------------------------<  132<H>  5.0   |  25  |  1.60<H>    Ca    9.0      17 Sep 2022 05:11  Phos  4.4     09-17  Mg     2.50     09-17      CAPILLARY BLOOD GLUCOSE      POCT Blood Glucose.: 144 mg/dL (17 Sep 2022 16:54)  POCT Blood Glucose.: 215 mg/dL (17 Sep 2022 11:50)  POCT Blood Glucose.: 134 mg/dL (17 Sep 2022 07:38)  POCT Blood Glucose.: 172 mg/dL (16 Sep 2022 21:52)                  RADIOLOGY AND ADDITIONAL TESTS:    CONSULTANT NOTES REVIEWED:    CARE DISCUSSED WITH THE FOLLOWING CONSULTANTS/PROVIDERS:   Contact Information:  Lana Bassett II, MD, MPH  PGY-3, Internal Medicine  Pager: [phone] ([facility]) /// [phone] ([facility])    DEBORAH RIVERA, [id number]    Patient is a 87y old  Female who presents with a chief complaint of Hyperkalemia (17 Sep 2022 14:18)      OVERNIGHT EVENTS/INTERVAL/SUBJECTIVE: No overnight events. Patient evaluated at bedside, states that she had pain in her neck which was made better with a pain patch. She was also inquiring about a hat that she wanted to purchase from [employer]. Also says that she wants to go to [facility]. Otherwise, she denies fever, lightheadedness, dizziness, SOB, CP, ABD pain, numbness, tingling.    CONSTITUTIONAL: No weakness. No fatigue. No fever.  HEAD: No head trauma.   EYES: No vision changes.  ENT: No hearing changes or tinnitus. No ear pain. No changes in smell. No nasal congestion or discharge. No sore throat. No voice hoarseness.   NECK: No neck pain or stiffness. No lumps.  RESPIRATORY: No cough. No SOB. No wheezing. No hemoptysis.   CARDIOVASCULAR: No chest pain. No palpitations.   GASTROINTESTINAL: No dysphagia. No ABD pain. No distension. No constipation. No diarrhea. No pain with defecation. No hematemesis. No hematochezia or melena.  BACK: No back pain.  GENITOURINARY: No dysuria. No frequency or urgency. No hesitancy. No incontinence. No urinary retention. No suprapubic pain. No hematuria.  EXTREMITY: No swelling.  MUSCULOSKELETAL: No joint pain or swelling. No fractures. No stiffness.    SKIN: No rashes. No itching. No skin, hair, or nail changes.  NEUROLOGICAL: No weakness or paralysis. No lightheadedness or dizziness. No HA. No numbness or tingling.   PSYCHIATRIC: No depression.       OBJECTIVE:  Vital Signs Last 24 Hrs  T(C): 36.7 (17 Sep 2022 17:15), Max: 36.8 (17 Sep 2022 13:26)  T(F): 98 (17 Sep 2022 17:15), Max: 98.3 (17 Sep 2022 13:26)  HR: 68 (17 Sep 2022 17:15) (57 - 68)  BP: 147/64 (17 Sep 2022 17:15) (130/61 - 147/64)  BP(mean): --  RR: 18 (17 Sep 2022 17:15) (16 - 18)  SpO2: 100% (17 Sep 2022 17:15) (100% - 100%)    Parameters below as of 17 Sep 2022 17:15  Patient On (Oxygen Delivery Method): nasal cannula  O2 Flow (L/min): 3    I&O's Summary      MEDICATIONS  (STANDING):  budesonide 160 MICROgram(s)/formoterol 4.5 MICROgram(s) Inhaler 2 Puff(s) Inhalation two times a day  busPIRone 10 milliGRAM(s) Oral two times a day  carbamide peroxide Otic Solution 5 Drop(s) Right Ear two times a day  dextrose 5%. 1000 milliLiter(s) (100 mL/Hr) IV Continuous <Continuous>  dextrose 5%. 1000 milliLiter(s) (50 mL/Hr) IV Continuous <Continuous>  dextrose 50% Injectable 25 Gram(s) IV Push once  dextrose 50% Injectable 12.5 Gram(s) IV Push once  dextrose 50% Injectable 25 Gram(s) IV Push once  ferrous    sulfate 325 milliGRAM(s) Oral daily  furosemide    Tablet 20 milliGRAM(s) Oral daily  glucagon  Injectable 1 milliGRAM(s) IntraMuscular once  insulin lispro (ADMELOG) corrective regimen sliding scale   SubCutaneous three times a day before meals  insulin lispro (ADMELOG) corrective regimen sliding scale   SubCutaneous at bedtime  levothyroxine 112 MICROGram(s) Oral daily  lidocaine   4% Patch 1 Patch Transdermal daily  metoprolol tartrate 25 milliGRAM(s) Oral two times a day  montelukast 10 milliGRAM(s) Oral at bedtime  Nephro-radha 1 Tablet(s) Oral daily  tiotropium 18 MICROgram(s) Capsule 1 Capsule(s) Inhalation daily    MEDICATIONS  (PRN):  acetaminophen     Tablet .. 650 milliGRAM(s) Oral every 6 hours PRN Temp greater or equal to 38C (100.4F), Mild Pain (1 - 3)  ALBUTerol    90 MICROgram(s) HFA Inhaler 2 Puff(s) Inhalation every 6 hours PRN Shortness of Breath and/or Wheezing  aluminum hydroxide/magnesium hydroxide/simethicone Suspension 30 milliLiter(s) Oral every 4 hours PRN Dyspepsia  dextrose Oral Gel 15 Gram(s) Oral once PRN Blood Glucose LESS THAN 70 milliGRAM(s)/deciliter  melatonin 3 milliGRAM(s) Oral at bedtime PRN Insomnia  ondansetron Injectable 4 milliGRAM(s) IV Push every 8 hours PRN Nausea and/or Vomiting    Allergies    No Known Allergies    Intolerances        CONSTITUTIONAL: No acute distress. Awake and alert.  RESPIRATORY: CTAB with some mild expiratory wheezes. No rales, or rhonchi. No accessory muscle use. No apparent respiratory distress.  CARDIOVASCULAR: +S1/S2. No audible S3/S4. Regular rate and rhythm. No murmurs, rubs, or gallops.   GASTROINTESTINAL: Soft, nontender, nondistended. +BS. No rebound or guarding.   BACK: No spinal or paraspinal tenderness. No CVA tenderness.  EXTREMITY: No LE swelling or edema. EXTs warm to touch.  MUSCULOSKELETAL: Spontaneous movement in all extremities.  DERMATOLOGICAL: No abnormal rashes or lesions.  NEUROLOGICAL: No focal deficits. A&Ox3 (oriented to person, place, and time).                            10.9   8.42  )-----------( 263      ( 17 Sep 2022 05:11 )             35.6       09-17    139  |  104  |  75<H>  ----------------------------<  132<H>  5.0   |  25  |  1.60<H>    Ca    9.0      17 Sep 2022 05:11  Phos  4.4     09-17  Mg     2.50     09-17      CAPILLARY BLOOD GLUCOSE      POCT Blood Glucose.: 144 mg/dL (17 Sep 2022 16:54)  POCT Blood Glucose.: 215 mg/dL (17 Sep 2022 11:50)  POCT Blood Glucose.: 134 mg/dL (17 Sep 2022 07:38)  POCT Blood Glucose.: 172 mg/dL (16 Sep 2022 21:52)                  RADIOLOGY AND ADDITIONAL TESTS:    CONSULTANT NOTES REVIEWED:    CARE DISCUSSED WITH THE FOLLOWING CONSULTANTS/PROVIDERS:

## 2022-09-17 NOTE — PROGRESS NOTE ADULT - PROBLEM SELECTOR PLAN 1
Patient w/COPD on home O2, though states that up till now she has rarely had to use it. on 9/12 stated breathing was worse than baseline. On symbicort, proventil, singulair and spiriva, on 9/15 reports some improvement.   Patient states that she rarely requires her home O2, however now feels like her O2 needs have gotten worse  Discussed with pulmonary fellow, pulm consulted appreciated, patient currently optimized on meds, she still appears closer to her current baseline. No apparent struggles with breathing  Will need outpatient follow up.

## 2022-09-17 NOTE — PROGRESS NOTE ADULT - PROBLEM SELECTOR PLAN 2
U/A suggestive of UTI s/p CTX  JANELLE improving   Will need outpatient follow up for urinary incontinence

## 2022-09-17 NOTE — CONSULT NOTE ADULT - ASSESSMENT
Patient seen and evaluated   - toenails debrided x10 using sterile nippers  - all offending ingrowing nail boarders excised   - patient educated on proper diabetic foot care and importance of regular examinations   - recommend z flow boots at all times while in bed  - follow up as outpatient for routine care  - thank you for the consult
87F with PMH of HTN, DM, COPD w/ chronic hypoxic/hypercarbic respiratory failure on home o2 3L, chronic diastolic HF, hypothyroidism, CKD3, hx of hyperkalemia who was sent to the hospital for evaluation of severe hyperkalemia. Pulmonary consulted for acute on chronic hypoxic respiratory failure    #Hypoxic Respiratory Failure    #Recommendations:  - Continue with lasix po as done outpatient  - Continue with symbicort and albuterol  - Wean oxygen as tolerated, patient may need to go home on 3L and can follow up outpatient.     Pulmonary to sign off, thank you for the consult    Prior to discharge:  Please email: [email or web address] to setup an appointment prior to discharge. Include the patient's name, , MRN and contact information in the email.      Pulmonary/Sleep Clinic  [address] [place] [address]  [phone]

## 2022-09-17 NOTE — CONSULT NOTE ADULT - SUBJECTIVE AND OBJECTIVE BOX
HPI:    87F with PMH of HTN, DM, COPD w/ chronic hypoxic/hypercarbic respiratory failure on home o2 3L, chronic diastolic HF, hypothyroidism, CKD3, hx of hyperkalemia who was sent to the hospital for evaluation of severe hyperkalemia. Allscripts reviewed - initial lab drawn on 8/23 - K 6.9 [not hemolyzed]. Repeat on 9/8 showed persistent hyperkalemia 7.4  [did not remark on hemolyzed or not]. Pt states she had been feeling fine. Last PCP visit was 2 wks ago for routine follow up. Since then, she has denied any associated SOB/CP/palpitations/weakness/urinary changes. She denied any recent illnesses, sick contacts, f/c. She reports adherence w/ her usual meds (which she manages on her own). However, she is not able to provide name/dosage of meds. She had issues with high potassium back in 2019, requiring follow up with Nephrology Dr Castro.     ED Course: 142/47, 74, 25, 97F, 100% (3L NC)  Received Ca gluconate, D50, insulin, lokelma (1030AM)      Pt seen and examined at bedside. States her breathing feels well. No complaints currently     PAST MEDICAL & SURGICAL HISTORY:  DM (diabetes mellitus)      Hypothyroid      HTN (hypertension)      COPD (chronic obstructive pulmonary disease)      Chronic kidney disease (CKD)      Shingles      S/P cholecystectomy      H/O cataract  bilateral 2013      Leg fracture  right ORIF with hardware 2010      S/P hip replacement, left  2019          FAMILY HISTORY:  FHx: rheumatoid arthritis (Mother)  Mother and Brother    FH: type 2 diabetes mellitus (Father)  Father    Family history of hypertension (Father)  Father        SOCIAL HISTORY:  Smoking: __ packs x ___ years  EtOH Use:  Marital Status:  Occupation:  Exposures:  Recent Travel:    Allergies    No Known Allergies    Intolerances        HOME MEDICATIONS:    REVIEW OF SYSTEMS:  Constitutional: No fevers or chills. No weight loss. No fatigue or generalised malaise.  Eyes: No itching or discharge from the eyes  ENT: No ear pain. No ear discharge. No nasal congestion. No post nasal drip. No epistaxis. No throat pain. No sore throat. No difficulty swallowing.   CV: No chest pain. No palpitations. No lightheadedness or dizziness.       [ ] All other systems negative  [ ] Unable to assess ROS because ________    OBJECTIVE:  ICU Vital Signs Last 24 Hrs  T(C): 36.4 (12 Sep 2022 11:09), Max: 36.7 (12 Sep 2022 05:00)  T(F): 97.6 (12 Sep 2022 11:09), Max: 98.1 (12 Sep 2022 05:00)  HR: 65 (12 Sep 2022 11:09) (63 - 71)  BP: 115/54 (12 Sep 2022 11:09) (112/56 - 115/54)  BP(mean): --  ABP: --  ABP(mean): --  RR: 18 (12 Sep 2022 11:09) (18 - 18)  SpO2: 99% (12 Sep 2022 11:09) (97% - 99%)    O2 Parameters below as of 12 Sep 2022 11:09  Patient On (Oxygen Delivery Method): nasal cannula  O2 Flow (L/min): 3            CAPILLARY BLOOD GLUCOSE      POCT Blood Glucose.: 136 mg/dL (12 Sep 2022 17:01)      PHYSICAL EXAM:  General: Awake, alert, oriented X 3.   HEENT: Atraumatic, normocephalic.                  No tonsillar or pharyngeal exudates.  Lymph Nodes: No palpable lymphadenopathy  Neck: No JVD. No carotid bruit.   Respiratory: Normal chest expansion                         Normal percussion                         Normal and equal air entry                         No wheeze, rhonchi or rales.  Cardiovascular: S1 S2 normal. No murmurs, rubs or gallops.   Abdomen: Soft, non-tender, non-distended. No organomegaly.      HOSPITAL MEDICATIONS:  MEDICATIONS  (STANDING):  budesonide 160 MICROgram(s)/formoterol 4.5 MICROgram(s) Inhaler 2 Puff(s) Inhalation two times a day  busPIRone 10 milliGRAM(s) Oral two times a day  dextrose 5%. 1000 milliLiter(s) (100 mL/Hr) IV Continuous <Continuous>  dextrose 5%. 1000 milliLiter(s) (50 mL/Hr) IV Continuous <Continuous>  dextrose 50% Injectable 25 Gram(s) IV Push once  dextrose 50% Injectable 12.5 Gram(s) IV Push once  dextrose 50% Injectable 25 Gram(s) IV Push once  ferrous    sulfate 325 milliGRAM(s) Oral daily  furosemide    Tablet 20 milliGRAM(s) Oral daily  glucagon  Injectable 1 milliGRAM(s) IntraMuscular once  insulin lispro (ADMELOG) corrective regimen sliding scale   SubCutaneous three times a day before meals  insulin lispro (ADMELOG) corrective regimen sliding scale   SubCutaneous at bedtime  levothyroxine 112 MICROGram(s) Oral daily  metoprolol tartrate 25 milliGRAM(s) Oral two times a day  montelukast 10 milliGRAM(s) Oral at bedtime  Nephro-radha 1 Tablet(s) Oral daily  tiotropium 18 MICROgram(s) Capsule 1 Capsule(s) Inhalation daily    MEDICATIONS  (PRN):  acetaminophen     Tablet .. 650 milliGRAM(s) Oral every 6 hours PRN Temp greater or equal to 38C (100.4F), Mild Pain (1 - 3)  ALBUTerol    90 MICROgram(s) HFA Inhaler 2 Puff(s) Inhalation every 6 hours PRN Shortness of Breath and/or Wheezing  aluminum hydroxide/magnesium hydroxide/simethicone Suspension 30 milliLiter(s) Oral every 4 hours PRN Dyspepsia  dextrose Oral Gel 15 Gram(s) Oral once PRN Blood Glucose LESS THAN 70 milliGRAM(s)/deciliter  melatonin 3 milliGRAM(s) Oral at bedtime PRN Insomnia  ondansetron Injectable 4 milliGRAM(s) IV Push every 8 hours PRN Nausea and/or Vomiting      LABS:                        9.6    6.06  )-----------( 215      ( 12 Sep 2022 05:15 )             30.4     09-12    137  |  100  |  65<H>  ----------------------------<  124<H>  5.1   |  28  |  1.74<H>    Ca    8.5      12 Sep 2022 05:15  Phos  5.0     09-12  Mg     2.30     09-12                MICROBIOLOGY:     RADIOLOGY:  [ ] Reviewed and interpreted by me    Point of Care Ultrasound Findings;    PFT:    EKG:
Patient is a 87y old  Female who presents with a chief complaint of Hyperkalemia (16 Sep 2022 14:02)      HPI:  87F with PMH of HTN, DM, COPD w/ chronic hypoxic/hypercarbic respiratory failure on home o2 3L, chronic diastolic HF, hypothyroidism, CKD3, hx of hyperkalemia who was sent to the hospital for evaluation of severe hyperkalemia. Allscripts reviewed - initial lab drawn on 8/23 - K 6.9 [not hemolyzed]. Repeat on 9/8 showed persistent hyperkalemia 7.4  [did not remark on hemolyzed or not]. Pt states she had been feeling fine. Last PCP visit was 2 wks ago for routine follow up. Since then, she has denied any associated SOB/CP/palpitations/weakness/urinary changes. She denied any recent illnesses, sick contacts, f/c. She reports adherence w/ her usual meds (which she manages on her own). However, she is not able to provide name/dosage of meds. She had issues with high potassium back in 2019, requiring follow up with Nephrology Dr Castro.     Podiatry consulted for painful elongated toenails.      PAST MEDICAL & SURGICAL HISTORY:  DM (diabetes mellitus)      Hypothyroid      HTN (hypertension)      COPD (chronic obstructive pulmonary disease)      Chronic kidney disease (CKD)      Shingles      S/P cholecystectomy      H/O cataract  bilateral 2013      Leg fracture  right ORIF with hardware 2010      S/P hip replacement, left  2019          MEDICATIONS  (STANDING):  budesonide 160 MICROgram(s)/formoterol 4.5 MICROgram(s) Inhaler 2 Puff(s) Inhalation two times a day  busPIRone 10 milliGRAM(s) Oral two times a day  carbamide peroxide Otic Solution 5 Drop(s) Right Ear two times a day  dextrose 5%. 1000 milliLiter(s) (100 mL/Hr) IV Continuous <Continuous>  dextrose 5%. 1000 milliLiter(s) (50 mL/Hr) IV Continuous <Continuous>  dextrose 50% Injectable 25 Gram(s) IV Push once  dextrose 50% Injectable 12.5 Gram(s) IV Push once  dextrose 50% Injectable 25 Gram(s) IV Push once  ferrous    sulfate 325 milliGRAM(s) Oral daily  furosemide    Tablet 20 milliGRAM(s) Oral daily  glucagon  Injectable 1 milliGRAM(s) IntraMuscular once  insulin lispro (ADMELOG) corrective regimen sliding scale   SubCutaneous three times a day before meals  insulin lispro (ADMELOG) corrective regimen sliding scale   SubCutaneous at bedtime  levothyroxine 112 MICROGram(s) Oral daily  lidocaine   4% Patch 1 Patch Transdermal daily  metoprolol tartrate 25 milliGRAM(s) Oral two times a day  montelukast 10 milliGRAM(s) Oral at bedtime  Nephro-radha 1 Tablet(s) Oral daily  tiotropium 18 MICROgram(s) Capsule 1 Capsule(s) Inhalation daily    MEDICATIONS  (PRN):  acetaminophen     Tablet .. 650 milliGRAM(s) Oral every 6 hours PRN Temp greater or equal to 38C (100.4F), Mild Pain (1 - 3)  ALBUTerol    90 MICROgram(s) HFA Inhaler 2 Puff(s) Inhalation every 6 hours PRN Shortness of Breath and/or Wheezing  aluminum hydroxide/magnesium hydroxide/simethicone Suspension 30 milliLiter(s) Oral every 4 hours PRN Dyspepsia  dextrose Oral Gel 15 Gram(s) Oral once PRN Blood Glucose LESS THAN 70 milliGRAM(s)/deciliter  melatonin 3 milliGRAM(s) Oral at bedtime PRN Insomnia  ondansetron Injectable 4 milliGRAM(s) IV Push every 8 hours PRN Nausea and/or Vomiting      Allergies    No Known Allergies    Intolerances        VITALS:    Vital Signs Last 24 Hrs  T(C): 36.2 (17 Sep 2022 05:30), Max: 36.9 (16 Sep 2022 18:00)  T(F): 97.2 (17 Sep 2022 05:30), Max: 98.5 (16 Sep 2022 18:00)  HR: 58 (17 Sep 2022 05:30) (57 - 72)  BP: 130/61 (17 Sep 2022 05:30) (130/61 - 155/88)  BP(mean): --  RR: 18 (17 Sep 2022 05:30) (16 - 18)  SpO2: 100% (17 Sep 2022 05:30) (97% - 100%)    Parameters below as of 17 Sep 2022 05:30  Patient On (Oxygen Delivery Method): nasal cannula  O2 Flow (L/min): 3      LABS:                          10.9   8.42  )-----------( 263      ( 17 Sep 2022 05:11 )             35.6       09-17    139  |  104  |  75<H>  ----------------------------<  132<H>  5.0   |  25  |  1.60<H>    Ca    9.0      17 Sep 2022 05:11  Phos  4.4     09-17  Mg     2.50     09-17        CAPILLARY BLOOD GLUCOSE      POCT Blood Glucose.: 215 mg/dL (17 Sep 2022 11:50)  POCT Blood Glucose.: 134 mg/dL (17 Sep 2022 07:38)  POCT Blood Glucose.: 172 mg/dL (16 Sep 2022 21:52)  POCT Blood Glucose.: 224 mg/dL (16 Sep 2022 17:06)          LOWER EXTREMITY PHYSICAL EXAM:    Vasular: DP palp, PT non palp B/L, CFT <3 seconds B/L, Temperature gradient WNL, B/L.   Neuro: Epicritic sensation decreased to the level of foot, B/L.  Musculoskeletal/Ortho: pain with palp of left 2nd toe  Skin: toenails thickened elongated dystrophic with subungual debris x10, left 2nd toenail ingrown with no signs of infection

## 2022-09-17 NOTE — PROGRESS NOTE ADULT - PROBLEM SELECTOR PLAN 3
Keep K>4 , Mag >2   echo was technically difficult, but mostly unremarkable   C/w metoprolol 25 mg po BID with holding parameters, adjust as tolerated

## 2022-09-18 NOTE — PROGRESS NOTE ADULT - SUBJECTIVE AND OBJECTIVE BOX
Contact Information:  Lana Bassett II, MD, MPH  PGY-3, Internal Medicine  Pager: [phone] ([facility]) /// [phone] ([facility])    DEBORAH RIVERA, MRN-[id number]    Patient is a 87y old  Female who presents with a chief complaint of Hyperkalemia (17 Sep 2022 14:18)      OVERNIGHT EVENTS/INTERVAL/SUBJECTIVE: No overnight events. Patient evaluated at bedside, no acute complaints, desires to go to [place] but assured that  has to provide input. She denies SOB, CP, cough, ABD pain, numbness, tingling, N/V, wheezing.    CONSTITUTIONAL: No weakness. No fatigue. No fever.  HEAD: No head trauma.   EYES: No vision changes.  ENT: No hearing changes or tinnitus. No ear pain. No changes in smell. No nasal congestion or discharge. No sore throat. No voice hoarseness.   NECK: No neck pain or stiffness. No lumps.  RESPIRATORY: No cough. No SOB. No wheezing. No hemoptysis.   CARDIOVASCULAR: No chest pain. No palpitations.   GASTROINTESTINAL: No dysphagia. No ABD pain. No distension. No constipation. No diarrhea. No pain with defecation. No hematemesis. No hematochezia or melena.  BACK: No back pain.  GENITOURINARY: No dysuria. No frequency or urgency. No hesitancy. No incontinence. No urinary retention. No suprapubic pain. No hematuria.  EXTREMITY: No swelling.  MUSCULOSKELETAL: No joint pain or swelling. No fractures. No stiffness.    SKIN: No rashes. No itching. No skin, hair, or nail changes.  NEUROLOGICAL: No weakness or paralysis. No lightheadedness or dizziness. No HA. No numbness or tingling.   PSYCHIATRIC: No depression.       OBJECTIVE:  Vital Signs Last 24 Hrs  T(C): 36.7 (18 Sep 2022 12:07), Max: 37 (17 Sep 2022 20:13)  T(F): 98.1 (18 Sep 2022 12:07), Max: 98.6 (17 Sep 2022 20:13)  HR: 76 (18 Sep 2022 17:38) (56 - 76)  BP: 147/85 (18 Sep 2022 17:38) (134/55 - 147/85)  BP(mean): --  RR: 18 (18 Sep 2022 12:07) (18 - 18)  SpO2: 100% (18 Sep 2022 12:07) (98% - 100%)    Parameters below as of 18 Sep 2022 12:07  Patient On (Oxygen Delivery Method): nasal cannula  O2 Flow (L/min): 3    I&O's Summary    17 Sep 2022 07:01  -  18 Sep 2022 07:00  --------------------------------------------------------  IN: 280 mL / OUT: 1000 mL / NET: -720 mL        MEDICATIONS  (STANDING):  budesonide 160 MICROgram(s)/formoterol 4.5 MICROgram(s) Inhaler 2 Puff(s) Inhalation two times a day  busPIRone 10 milliGRAM(s) Oral two times a day  carbamide peroxide Otic Solution 5 Drop(s) Right Ear two times a day  dextrose 5%. 1000 milliLiter(s) (100 mL/Hr) IV Continuous <Continuous>  dextrose 5%. 1000 milliLiter(s) (50 mL/Hr) IV Continuous <Continuous>  dextrose 50% Injectable 25 Gram(s) IV Push once  dextrose 50% Injectable 12.5 Gram(s) IV Push once  dextrose 50% Injectable 25 Gram(s) IV Push once  ferrous    sulfate 325 milliGRAM(s) Oral daily  furosemide    Tablet 20 milliGRAM(s) Oral daily  glucagon  Injectable 1 milliGRAM(s) IntraMuscular once  insulin lispro (ADMELOG) corrective regimen sliding scale   SubCutaneous three times a day before meals  insulin lispro (ADMELOG) corrective regimen sliding scale   SubCutaneous at bedtime  levothyroxine 112 MICROGram(s) Oral daily  lidocaine   4% Patch 1 Patch Transdermal daily  metoprolol tartrate 25 milliGRAM(s) Oral two times a day  montelukast 10 milliGRAM(s) Oral at bedtime  Nephro-radha 1 Tablet(s) Oral daily  tiotropium 18 MICROgram(s) Capsule 1 Capsule(s) Inhalation daily    MEDICATIONS  (PRN):  acetaminophen     Tablet .. 650 milliGRAM(s) Oral every 6 hours PRN Temp greater or equal to 38C (100.4F), Mild Pain (1 - 3)  ALBUTerol    90 MICROgram(s) HFA Inhaler 2 Puff(s) Inhalation every 6 hours PRN Shortness of Breath and/or Wheezing  aluminum hydroxide/magnesium hydroxide/simethicone Suspension 30 milliLiter(s) Oral every 4 hours PRN Dyspepsia  dextrose Oral Gel 15 Gram(s) Oral once PRN Blood Glucose LESS THAN 70 milliGRAM(s)/deciliter  melatonin 3 milliGRAM(s) Oral at bedtime PRN Insomnia  ondansetron Injectable 4 milliGRAM(s) IV Push every 8 hours PRN Nausea and/or Vomiting    Allergies    No Known Allergies    Intolerances        CONSTITUTIONAL: No acute distress. Awake and alert.  RESPIRATORY: CTAB. No wheezes, rales, or rhonchi. No accessory muscle use. No apparent respiratory distress. NC with 3L in place.  CARDIOVASCULAR: +S1/S2. No audible S3/S4. Regular rate and rhythm. No murmurs, rubs, or gallops.    GASTROINTESTINAL: Soft, nontender, nondistended. +BS. No rebound or guarding.   EXTREMITY: No LE swelling or edema. EXTs warm to touch.  MUSCULOSKELETAL: Spontaneous movement in all extremities.  DERMATOLOGICAL: No abnormal rashes or lesions.  NEUROLOGICAL: No focal deficits. A&Ox3 (oriented to person, place, and time).                            10.0   8.69  )-----------( 269      ( 18 Sep 2022 06:23 )             32.2       09-18    139  |  103  |  72<H>  ----------------------------<  144<H>  5.1   |  27  |  1.56<H>    Ca    8.9      18 Sep 2022 06:23  Phos  4.6     09-18  Mg     2.40     09-18      CAPILLARY BLOOD GLUCOSE      POCT Blood Glucose.: 145 mg/dL (18 Sep 2022 17:12)  POCT Blood Glucose.: 176 mg/dL (18 Sep 2022 12:22)  POCT Blood Glucose.: 143 mg/dL (18 Sep 2022 07:35)  POCT Blood Glucose.: 199 mg/dL (17 Sep 2022 21:07)                  RADIOLOGY AND ADDITIONAL TESTS:    CONSULTANT NOTES REVIEWED:    CARE DISCUSSED WITH THE FOLLOWING CONSULTANTS/PROVIDERS:

## 2022-09-18 NOTE — PROGRESS NOTE ADULT - ASSESSMENT
87F with PMH of HTN, DM, COPD w/ chronic hypoxic/hypercarbic respiratory failure on home o2 3L, chronic diastolic HF, hypothyroidism, CKD3, hx of hyperkalemia who was sent to the hospital for evaluation of severe hyperkalemia. Follow up labs here show improved K to 5.7, but hemolyzed. Cr seems stable from baseline. Of note, she does have a new anemia compared to 2021 labs, but relatively stable compared to OP labs earlier this year. Hyperkalemia is resolved and respiratory failure is stable, pending rehab placement.

## 2022-09-19 NOTE — PROGRESS NOTE ADULT - ASSESSMENT
87F with PMH of HTN, DM, COPD w/ chronic hypoxic/hypercarbic respiratory failure on home o2 3L, chronic diastolic HF, hypothyroidism, CKD3, hx of hyperkalemia who was sent to the hospital for evaluation of severe hyperkalemia. Follow up labs here show improved K to 5.7, but hemolyzed. Cr seems stable from baseline. Of note, she does have a new anemia compared to 2021 labs, but relatively stable compared to OP labs earlier this year. Hyperkalemia is resolved and respiratory failure is stable, pending rehab placement.    K 5.4, creat 2.0

## 2022-09-19 NOTE — PROGRESS NOTE ADULT - PROBLEM SELECTOR PLAN 6
Had some JANELLE, now improving  -Monitor UOP.   -Dose meds renally if necessary.w.  -Further follow up w/ neph as OP. Had some JANELLE, now improving  -Monitor UOP.   -Dose meds renally if necessary.w.  -Further follow up w/ neph as OP.  Continue lasix as that will help w/ K.----> change to Q 48 hrs- next dose 9/21 due to inc creat  baseline creat about about 1.4

## 2022-09-19 NOTE — PROGRESS NOTE ADULT - PROBLEM SELECTOR PLAN 4
-K improved   Etiology unclear - could be med related, intake related, vs CKD related. Allscripts reviewed.   Seen by Dr Castro (nephr) in 2019 for Hyperkalemia. Was on Lokelma at the time. Med list reviewed - not on it any more. Pt also no longer on ACEi, as she was before. Recent visits w/ pulmonologist and PCP also reviewed as well.  -Of note - pt acknowledges making poor dietary choices. Due to the fact that she's on oxygen, she has not been able to cooked. She has been eating a lot of toast, cheese, and cold cuts, which may contribute to high K issue as well. Will request nutrition eval.   -Low K diet ordered.  -Continue lasix as that will help w/ K. -K improved   Etiology unclear - could be med related, intake related, vs CKD related. Allscripts reviewed.   Seen by Dr Castro (nephr) in 2019 for Hyperkalemia. Was on Lokelma at the time. Med list reviewed - not on it any more. Pt also no longer on ACEi, as she was before. Recent visits w/ pulmonologist and PCP also reviewed as well.  -Of note - pt acknowledges making poor dietary choices. Due to the fact that she's on oxygen, she has not been able to cooked. She has been eating a lot of toast, cheese, and cold cuts, which may contribute to high K issue as well. Will request nutrition eval.   -Low K diet ordered.  -Continue lasix as that will help w/ K.----> change to Q 48 hrs- next dose 9/21 due to inc creat  baseline creat about about 1.4 .

## 2022-09-19 NOTE — PROGRESS NOTE ADULT - SUBJECTIVE AND OBJECTIVE BOX
Internal Medicine Accept note:  Patient is a 87y old  Female who presents with a chief complaint of Hyperkalemia (19 Sep 2022 10:10)      SUBJECTIVE / OVERNIGHT EVENTS:  resting in chair- [place]  K has improved on lasix BUT creat 2.09    MEDICATIONS  (STANDING):  budesonide 160 MICROgram(s)/formoterol 4.5 MICROgram(s) Inhaler 2 Puff(s) Inhalation two times a day  busPIRone 10 milliGRAM(s) Oral two times a day  dextrose 5%. 1000 milliLiter(s) (100 mL/Hr) IV Continuous <Continuous>  dextrose 5%. 1000 milliLiter(s) (50 mL/Hr) IV Continuous <Continuous>  dextrose 50% Injectable 25 Gram(s) IV Push once  dextrose 50% Injectable 12.5 Gram(s) IV Push once  dextrose 50% Injectable 25 Gram(s) IV Push once  ferrous    sulfate 325 milliGRAM(s) Oral daily  glucagon  Injectable 1 milliGRAM(s) IntraMuscular once  insulin lispro (ADMELOG) corrective regimen sliding scale   SubCutaneous three times a day before meals  insulin lispro (ADMELOG) corrective regimen sliding scale   SubCutaneous at bedtime  levothyroxine 112 MICROGram(s) Oral daily  lidocaine   4% Patch 1 Patch Transdermal daily  metoprolol tartrate 25 milliGRAM(s) Oral two times a day  montelukast 10 milliGRAM(s) Oral at bedtime  Nephro-radha 1 Tablet(s) Oral daily  tiotropium 18 MICROgram(s) Capsule 1 Capsule(s) Inhalation daily    MEDICATIONS  (PRN):  acetaminophen     Tablet .. 650 milliGRAM(s) Oral every 6 hours PRN Temp greater or equal to 38C (100.4F), Mild Pain (1 - 3)  ALBUTerol    90 MICROgram(s) HFA Inhaler 2 Puff(s) Inhalation every 6 hours PRN Shortness of Breath and/or Wheezing  aluminum hydroxide/magnesium hydroxide/simethicone Suspension 30 milliLiter(s) Oral every 4 hours PRN Dyspepsia  dextrose Oral Gel 15 Gram(s) Oral once PRN Blood Glucose LESS THAN 70 milliGRAM(s)/deciliter  melatonin 3 milliGRAM(s) Oral at bedtime PRN Insomnia  ondansetron Injectable 4 milliGRAM(s) IV Push every 8 hours PRN Nausea and/or Vomiting        CAPILLARY BLOOD GLUCOSE  POCT Blood Glucose.: 242 mg/dL (19 Sep 2022 11:36)  POCT Blood Glucose.: 135 mg/dL (19 Sep 2022 07:41)  POCT Blood Glucose.: 134 mg/dL (18 Sep 2022 21:11)  POCT Blood Glucose.: 145 mg/dL (18 Sep 2022 17:12)    Vital Signs Last 24 Hrs  T(C): 36.6 (19 Sep 2022 12:38), Max: 36.7 (18 Sep 2022 21:40)  T(F): 97.9 (19 Sep 2022 12:38), Max: 98.1 (18 Sep 2022 21:40)  HR: 60 (19 Sep 2022 12:38) (54 - 76)  BP: 128/57 (19 Sep 2022 12:38) (121/55 - 147/85)  BP(mean): --  RR: 18 (19 Sep 2022 12:38) (18 - 18)  SpO2: 96%  nasal cannulaO2 Flow (L/min): 3    PHYSICAL EXAM:  GENERAL: NAD, well-developed, obese NC oxygen, [place]  HEAD:  Atraumatic, Normocephalic  EYES: EOMI, PERRLA, conjunctiva and sclera clear  NECK: Supple, No JVD  CHEST/LUNG: Clear to auscultation bilaterally; No wheeze  HEART: Regular rate and rhythm; No murmurs, rubs, or gallops  ABDOMEN: Soft, Nontender, Nondistended; Bowel sounds present  EXTREMITIES:  2+ Peripheral Pulses, No clubbing, cyanosis, or edema  PSYCH: Alert    LABS:                        9.7    8.30  )-----------( 267      ( 19 Sep 2022 05:24 )             30.2     09-19    137  |  101  |  79<H>  ----------------------------<  134<H>  5.4<H>   |  24  |  2.09<H>    Ca    8.9      19 Sep 2022 05:24  Phos  5.0     09-19  Mg     2.40     09-19          Care Discussed with Consultants/Other Providers:    PA/ VALENTINA  dec lasix to QOD next dose Wed 9/ 21

## 2022-09-20 NOTE — PROGRESS NOTE ADULT - PROBLEM SELECTOR PLAN 12
-On metformin at home.   -Check A1c- 6  -FS TIDACHS  -C/w ISS TIDACHS.   -FS in appropriate range    #DVT ppx- Low improve score, encourage ambulation.

## 2022-09-20 NOTE — PROGRESS NOTE ADULT - PROBLEM SELECTOR PLAN 11
-Stable. Cont LT4.  TSH normal -On metformin at home.   -Check A1c- 6  -FS TIDACHS  -C/w ISS TIDACHS.   -FS in appropriate range    #DVT ppx- Low improve score, encourage ambulation.

## 2022-09-20 NOTE — PROGRESS NOTE ADULT - PROBLEM SELECTOR PLAN 4
-K improved   Etiology unclear - could be med related, intake related, vs CKD related. Allscripts reviewed.   Seen by Dr Castro (nephr) in 2019 for Hyperkalemia. Was on Lokelma at the time. Med list reviewed - not on it any more. Pt also no longer on ACEi, as she was before. Recent visits w/ pulmonologist and PCP also reviewed as well.  -Of note - pt acknowledges making poor dietary choices. Due to the fact that she's on oxygen, she has not been able to cooked. She has been eating a lot of toast, cheese, and cold cuts, which may contribute to high K issue as well. Will request nutrition eval.   -Low K diet ordered.  -Continue lasix as that will help w/ K.----> change to Q 48 hrs- next dose 9/21 due to inc creat  baseline creat about about 1.4 . -No active bleeding noted. Could be 2' AOCD.  -Macrocytic.  -B12/folate normal   -Supplement as needed  -Cont home iron.  -Further workup pending course.

## 2022-09-20 NOTE — PROGRESS NOTE ADULT - ASSESSMENT
87F with PMH of HTN, DM, COPD w/ chronic hypoxic/hypercarbic respiratory failure on home o2 3L, chronic diastolic HF, hypothyroidism, CKD3, hx of hyperkalemia who was sent to the hospital for evaluation of severe hyperkalemia. Follow up labs here show improved K to 5.7, but hemolyzed. Cr seems stable from baseline. Of note, she does have a new anemia compared to 2021 labs, but relatively stable compared to OP labs earlier this year. Hyperkalemia is resolved and respiratory failure is stable, pending rehab placement.    9/19 K 5.4, creat 2.0  9/20 K 5.7 , not hem- Lokelma, low K diet 87F with PMH of HTN, DM, COPD w/ chronic hypoxic/hypercarbic respiratory failure on home o2 3L, chronic diastolic HF, hypothyroidism, CKD3, hx of hyperkalemia who was sent to the hospital for evaluation of severe hyperkalemia. Follow up labs here show improved K to 5.7, but hemolyzed. Cr seems stable from baseline. Of note, she does have a new anemia compared to 2021 labs, but relatively stable compared to OP labs earlier this year. Hyperkalemia is resolved and respiratory failure is stable, pending rehab placement.    9/19 K 5.4, creat 2.0  9/20 K 5.7 , not hemolyzed - Lokelma, low K diet- stat bmp after  need Covid swab again today

## 2022-09-20 NOTE — PROGRESS NOTE ADULT - PROBLEM SELECTOR PLAN 5
-No active bleeding noted. Could be 2' AOCD.  -Macrocytic.  -B12/folate normal   -Supplement as needed  -Cont home iron.  -Further workup pending course. Had some JANELLE, now improving  -Monitor UOP.   -Dose meds renally if necessary.w.  -Further follow up w/ neph as OP.  Continue lasix as that will help w/ K.----> change to Q 48 hrs- next dose 9/21 due to inc creat  baseline creat about  1.4

## 2022-09-20 NOTE — PROGRESS NOTE ADULT - PROBLEM SELECTOR PLAN 3
Keep K>4 , Mag >2   echo was technically difficult, but mostly unremarkable   C/w metoprolol 25 mg po BID with holding parameters, adjust as tolerated -K improved   Etiology unclear - could be med related, intake related, vs CKD related. Allscripts reviewed.   Seen by Dr Castro (nephr) in 2019 for Hyperkalemia. Was on Lokelma at the time. Med list reviewed - not on it any more. Pt also no longer on ACEi, as she was before. Recent visits w/ pulmonologist and PCP also reviewed as well.  -Of note - pt acknowledges making poor dietary choices. Due to the fact that she's on oxygen, she has not been able to cooked. She has been eating a lot of toast, cheese, and cold cuts, which may contribute to high K issue as well. Will request nutrition eval.   -Low K diet ordered.  -Continue lasix as that will help w/ K.----> change to Q 48 hrs- next dose 9/21 due to inc creat  baseline creat about about 1.4 .  9/20 K 5.7--> low K diet and Lokelma today--> f/u BMP after BM

## 2022-09-20 NOTE — PROGRESS NOTE ADULT - SUBJECTIVE AND OBJECTIVE BOX
Patient is a 87y old  Female who presents with a chief complaint of Hyperkalemia (20 Sep 2022 08:56)      SUBJECTIVE / OVERNIGHT EVENTS:  patient sitting in bed  notes we can call her brother NANCY HAYS  Explained K 5.7--> Lokelma and Low K Diet    MEDICATIONS  (STANDING):  budesonide 160 MICROgram(s)/formoterol 4.5 MICROgram(s) Inhaler 2 Puff(s) Inhalation two times a day  busPIRone 10 milliGRAM(s) Oral two times a day  dextrose 5%. 1000 milliLiter(s) (50 mL/Hr) IV Continuous <Continuous>  dextrose 5%. 1000 milliLiter(s) (100 mL/Hr) IV Continuous <Continuous>  dextrose 50% Injectable 25 Gram(s) IV Push once  dextrose 50% Injectable 25 Gram(s) IV Push once  dextrose 50% Injectable 12.5 Gram(s) IV Push once  ferrous    sulfate 325 milliGRAM(s) Oral daily  glucagon  Injectable 1 milliGRAM(s) IntraMuscular once  insulin lispro (ADMELOG) corrective regimen sliding scale   SubCutaneous three times a day before meals  insulin lispro (ADMELOG) corrective regimen sliding scale   SubCutaneous at bedtime  levothyroxine 112 MICROGram(s) Oral daily  lidocaine   4% Patch 1 Patch Transdermal daily  metoprolol tartrate 25 milliGRAM(s) Oral two times a day  montelukast 10 milliGRAM(s) Oral at bedtime  Nephro-radha 1 Tablet(s) Oral daily  tiotropium 18 MICROgram(s) Capsule 1 Capsule(s) Inhalation daily    MEDICATIONS  (PRN):  acetaminophen     Tablet .. 650 milliGRAM(s) Oral every 6 hours PRN Temp greater or equal to 38C (100.4F), Mild Pain (1 - 3)  ALBUTerol    90 MICROgram(s) HFA Inhaler 2 Puff(s) Inhalation every 6 hours PRN Shortness of Breath and/or Wheezing  aluminum hydroxide/magnesium hydroxide/simethicone Suspension 30 milliLiter(s) Oral every 4 hours PRN Dyspepsia  dextrose Oral Gel 15 Gram(s) Oral once PRN Blood Glucose LESS THAN 70 milliGRAM(s)/deciliter  melatonin 3 milliGRAM(s) Oral at bedtime PRN Insomnia  ondansetron Injectable 4 milliGRAM(s) IV Push every 8 hours PRN Nausea and/or Vomiting        CAPILLARY BLOOD GLUCOSE  POCT Blood Glucose.: 100 mg/dL (20 Sep 2022 11:35)  POCT Blood Glucose.: 176 mg/dL (20 Sep 2022 07:29)  POCT Blood Glucose.: 222 mg/dL (19 Sep 2022 21:17)  POCT Blood Glucose.: 119 mg/dL (19 Sep 2022 16:39)      Vital Signs Last 24 Hrs  T(C): 36.9 (20 Sep 2022 05:20), Max: 37 (19 Sep 2022 21:00)  T(F): 98.4 (20 Sep 2022 05:20), Max: 98.6 (19 Sep 2022 21:00)  HR: 55 (20 Sep 2022 05:20) (55 - 63)  BP: 128/50 (20 Sep 2022 05:20) (112/52 - 146/57)  BP(mean): --  RR: 18 (20 Sep 2022 05:20) (18 - 18)  SpO2: 100% Patient On (Oxygen Delivery Method): nasal cannula O2 Flow (L/min): 3    PHYSICAL EXAM:  GENERAL: NAD, well-developed  HEAD:  Atraumatic, Normocephalic  EYES: EOMI, PERRLA, conjunctiva and sclera clear  NECK: Supple, No JVD  CHEST/LUNG: Clear to auscultation bilaterally; No wheeze  HEART: Regular rate and rhythm; No murmurs, rubs, or gallops  ABDOMEN: Soft, Nontender, Nondistended; Bowel sounds present  EXTREMITIES:  2+ Peripheral Pulses, No clubbing, cyanosis, or edema  PSYCH: AAOx3  NEUROLOGY: non-focal  SKIN: No rashes or lesions    LABS:                        9.6    7.82  )-----------( 284      ( 20 Sep 2022 05:00 )             30.7     09-20    139  |  104  |  78<H>  ----------------------------<  214<H>  5.7<H>   |  26  |  1.81<H>    Ca    8.8      20 Sep 2022 05:00  Phos  5.0     09-20  Mg     2.50     09-20      RADIOLOGY & ADDITIONAL TESTS:    Imaging Personally Reviewed:    Consultant(s) Notes Reviewed:      Care Discussed with Consultants/Other Providers:  Patient/ pa/cm  Explained K 5.7--> Lokelma and Low K Diet  BMP today after BM  Plan for Rehab once K improved

## 2022-09-20 NOTE — PROGRESS NOTE ADULT - NSPROGADDITIONALINFOA_GEN_ALL_CORE
9/20/22 09-20    138  |  103  |  78<H>  ----------------------------<  151<H>  6.1<H>   |  27  |  1.89<H>    Ca    8.4      20 Sep 2022 13:30  Phos  5.0     09-20  Mg     2.50     09-20    Repeat K 6.1 after BM  EKG  Lokema AGAIN  Lasix 20 mg iv x1   f/u BMP again after BM  D/w PA 9/20/22 09-20    138  |  103  |  78<H>  ----------------------------<  151<H>  6.1<H>   |  27  |  1.89<H>    Ca    8.4      20 Sep 2022 13:30  Phos  5.0     09-20  Mg     2.50     09-20    Repeat K 6.1 after BM  EKG--> no ch in t waves  Lokelma AGAIN  Lasix 20 mg iv x1  with NS 40cc/hr x 10 hrs  f/u BMP again after BM  D/w PA

## 2022-09-20 NOTE — PROGRESS NOTE ADULT - PROBLEM SELECTOR PLAN 1
Patient w/COPD on home O2, though states that up till now she has rarely had to use it. on 9/12 stated breathing was worse than baseline. On symbicort, proventil, singulair and spiriva, on 9/15 reports some improvement.   Patient states that she rarely requires her home O2, however now feels like her O2 needs have gotten worse  Discussed with pulmonary fellow, pulm consulted appreciated, patient currently optimized on meds, she still appears closer to her current baseline. No apparent struggles with breathing  Will need outpatient follow up. RESOLVED ACUTE SOB  Patient w/COPD on home O2, though states that up till now she has rarely had to use it. on 9/12 stated breathing was worse than baseline. On symbicort, proventil, singulair and spiriva, on 9/15 reports some improvement.   Patient states that she rarely requires her home O2, however now feels like her O2 needs have gotten worse  Discussed with pulmonary fellow, pulm consulted appreciated, patient currently optimized on meds, she still appears closer to her current baseline. No apparent struggles with breathing  Will need outpatient follow up.

## 2022-09-20 NOTE — PROGRESS NOTE ADULT - PROBLEM SELECTOR PLAN 9
As above for UTI  Cr 1.6  patient w/UTI, on antibiotics, improvement in JANELLE noted since starting antibiotics -Debrided by podiatry  -Will continue to monitor

## 2022-09-20 NOTE — PROGRESS NOTE ADULT - PROBLEM SELECTOR PLAN 6
Had some JANELLE, now improving  -Monitor UOP.   -Dose meds renally if necessary.w.  -Further follow up w/ neph as OP.  Continue lasix as that will help w/ K.----> change to Q 48 hrs- next dose 9/21 due to inc creat  baseline creat about about 1.4 -Euvolemic. Hx of this as per chart review.   -Cont lasix. F/u as OP  Considering NSVT,  Echo at this time mostly unremarkable, however it was also noted to be a poor study

## 2022-09-20 NOTE — PROGRESS NOTE ADULT - PROBLEM SELECTOR PLAN 2
U/A suggestive of UTI s/p CTX  JANELLE improving   Will need outpatient follow up for urinary incontinence Keep K>4 , Mag >2   echo was technically difficult, but mostly unremarkable   C/w metoprolol 25 mg po BID with holding parameters, adjust as tolerated

## 2022-09-20 NOTE — PROGRESS NOTE ADULT - PROBLEM SELECTOR PLAN 8
-Stable/acceptable. Monitor for now. patient w/UTI, on antibiotics, improvement in JANELLE noted since completing antibiotics  Creat 1.8 today  lasix now q48 hrs- next dose 9/21/22

## 2022-09-20 NOTE — PROGRESS NOTE ADULT - PROBLEM SELECTOR PLAN 7
-Euvolemic. Hx of this as per chart review.   -Cont lasix. F/u as OP  Considering NSVT,  Echo at this time mostly unremarkable, however it was also noted to be a poor study -Stable/acceptable. Monitor for now.

## 2022-09-21 NOTE — PROGRESS NOTE ADULT - SUBJECTIVE AND OBJECTIVE BOX
Patient is a 87y old  Female who presents with a chief complaint of Hyperkalemia (20 Sep 2022 08:56)      SUBJECTIVE / OVERNIGHT EVENTS:  comfortable, awaiting REHAB    MEDICATIONS  (STANDING):  budesonide 160 MICROgram(s)/formoterol 4.5 MICROgram(s) Inhaler 2 Puff(s) Inhalation two times a day  busPIRone 10 milliGRAM(s) Oral two times a day  dextrose 5%. 1000 milliLiter(s) (100 mL/Hr) IV Continuous <Continuous>  dextrose 5%. 1000 milliLiter(s) (50 mL/Hr) IV Continuous <Continuous>  dextrose 50% Injectable 25 Gram(s) IV Push once  dextrose 50% Injectable 12.5 Gram(s) IV Push once  dextrose 50% Injectable 25 Gram(s) IV Push once  ferrous    sulfate 325 milliGRAM(s) Oral daily  glucagon  Injectable 1 milliGRAM(s) IntraMuscular once  insulin lispro (ADMELOG) corrective regimen sliding scale   SubCutaneous three times a day before meals  insulin lispro (ADMELOG) corrective regimen sliding scale   SubCutaneous at bedtime  levothyroxine 112 MICROGram(s) Oral daily  lidocaine   4% Patch 1 Patch Transdermal daily  metoprolol tartrate 25 milliGRAM(s) Oral two times a day  montelukast 10 milliGRAM(s) Oral at bedtime  Nephro-radha 1 Tablet(s) Oral daily  sodium chloride 0.9%. 1000 milliLiter(s) (40 mL/Hr) IV Continuous <Continuous>  tiotropium 18 MICROgram(s) Capsule 1 Capsule(s) Inhalation daily    MEDICATIONS  (PRN):  acetaminophen     Tablet .. 650 milliGRAM(s) Oral every 6 hours PRN Temp greater or equal to 38C (100.4F), Mild Pain (1 - 3)  ALBUTerol    90 MICROgram(s) HFA Inhaler 2 Puff(s) Inhalation every 6 hours PRN Shortness of Breath and/or Wheezing  aluminum hydroxide/magnesium hydroxide/simethicone Suspension 30 milliLiter(s) Oral every 4 hours PRN Dyspepsia  dextrose Oral Gel 15 Gram(s) Oral once PRN Blood Glucose LESS THAN 70 milliGRAM(s)/deciliter  melatonin 3 milliGRAM(s) Oral at bedtime PRN Insomnia  ondansetron Injectable 4 milliGRAM(s) IV Push every 8 hours PRN Nausea and/or Vomiting        CAPILLARY BLOOD GLUCOSE  POCT Blood Glucose.: 140 mg/dL (21 Sep 2022 07:40)  POCT Blood Glucose.: 125 mg/dL (20 Sep 2022 22:05)  POCT Blood Glucose.: 126 mg/dL (20 Sep 2022 17:02)  POCT Blood Glucose.: 100 mg/dL (20 Sep 2022 11:35)    Vital Signs Last 24 Hrs  T(C): 36.3 (21 Sep 2022 06:00), Max: 36.6 (20 Sep 2022 17:00)  T(F): 97.4 (21 Sep 2022 06:00), Max: 97.9 (20 Sep 2022 17:00)  HR: 60 (21 Sep 2022 06:00) (60 - 71)  BP: 114/52 (21 Sep 2022 06:00) (114/52 - 145/66)  BP(mean): --  RR: 17 (21 Sep 2022 06:00) (17 - 18)  SpO2: 100% (21 Sep 2022 06:00) (96% - 100%)    Parameters below as of 21 Sep 2022 06:00  Patient On (Oxygen Delivery Method): nasal cannula  O2 Flow (L/min): 3      PHYSICAL EXAM:  GENERAL: NAD, well-developed, [place]  HEAD:  Atraumatic, Normocephalic  EYES: EOMI, PERRLA, conjunctiva and sclera clear  NECK: Supple, No JVD  CHEST/LUNG: Clear to auscultation bilaterally; No wheeze  HEART: Regular rate and rhythm; No murmurs, rubs, or gallops  ABDOMEN: Soft, Nontender, Nondistended; Bowel sounds present  EXTREMITIES:  2+ Peripheral Pulses, No clubbing, cyanosis, or edema  PSYCH: Alert  NEUROLOGY: non-focal      LABS:                        9.2    8.37  )-----------( 260      ( 21 Sep 2022 03:09 )             28.5     09-21    138  |  100  |  77<H>  ----------------------------<  145<H>  5.0   |  28  |  1.83<H>    Ca    8.9      21 Sep 2022 03:09  Phos  4.9     09-21  Mg     2.30     09-21                RADIOLOGY & ADDITIONAL TESTS:    Imaging Personally Reviewed:    Consultant(s) Notes Reviewed:      Care Discussed with Consultants/Other Providers:

## 2022-09-21 NOTE — PROGRESS NOTE ADULT - ASSESSMENT
87F with PMH of HTN, DM, COPD w/ chronic hypoxic/hypercarbic respiratory failure on home o2 3L, chronic diastolic HF, hypothyroidism, CKD3, hx of hyperkalemia who was sent to the hospital for evaluation of severe hyperkalemia. Follow up labs here show improved K to 5.7, but hemolyzed. Cr seems stable from baseline. Of note, she does have a new anemia compared to 2021 labs, but relatively stable compared to OP labs earlier this year. Hyperkalemia is resolved and respiratory failure is stable, pending rehab placement.    9/19 K 5.4, creat 2.0  9/20 K 5.7 , not hemolyzed - Lokelma, low K diet- stat bmp after  need Covid swab again today  9/21 K 5.0 - optimized for REHAB

## 2022-09-21 NOTE — PROGRESS NOTE ADULT - PROVIDER SPECIALTY LIST ADULT
Internal Medicine
Internal Medicine
Hospitalist

## 2022-09-21 NOTE — PROGRESS NOTE ADULT - PROBLEM SELECTOR PLAN 3
-K improved   Etiology unclear - could be med related, intake related, vs CKD related. [employer] reviewed.   Seen by Dr Castro (nephr) in 2019 for Hyperkalemia. Was on Lokelma at the time. Med list reviewed - not on it any more. Pt also no longer on ACEi, as she was before. Recent visits w/ pulmonologist and PCP also reviewed as well.  -Of note - pt acknowledges making poor dietary choices. Due to the fact that she's on oxygen, she has not been able to cooked. She has been eating a lot of toast, cheese, and cold cuts, which may contribute to high K issue as well. Will request nutrition eval.   -Low K diet ordered.  -Continue lasix as that will help w/ K.----> change to Q 48 hrs- next dose 9/21 due to inc creat  baseline creat about about 1.4 .  9/20 K 5.7--> low K diet and Lokelma today--> f/u BMP after BM  9/21 K 5.0

## 2022-09-21 NOTE — PROGRESS NOTE ADULT - PROBLEM SELECTOR PROBLEM 1
Acute on chronic respiratory failure with hypoxia
NSVT (nonsustained ventricular tachycardia)
Acute on chronic respiratory failure with hypoxia

## 2022-09-21 NOTE — PROGRESS NOTE ADULT - PROBLEM SELECTOR PLAN 5
Had some JANELLE, now improving  -Monitor UOP.   -Dose meds renally if necessary.w.  -Further follow up w/ neph as OP.  Continue lasix as that will help w/ K.----> change to Q 48 hrs- next dose 9/21 due to inc creat  baseline creat about  1.4

## 2022-09-21 NOTE — PROGRESS NOTE ADULT - PROBLEM SELECTOR PLAN 11
-On metformin at home.   -Check A1c- 6  -FS TIDACHS  -C/w ISS TIDACHS.   -FS in appropriate range    #DVT ppx- Low improve score, encourage ambulation.  Patient optimized for REHAB with low K diet and with lasix Q 48 hrs  9/20 covid- neg  35 min to coord d/c

## 2022-09-21 NOTE — PROGRESS NOTE ADULT - REASON FOR ADMISSION
Hyperkalemia

## 2022-09-21 NOTE — PROGRESS NOTE ADULT - PROBLEM SELECTOR PLAN 8
patient w/UTI, on antibiotics, improvement in JANELLE noted since completing antibiotics  Creat 1.8 today  lasix now q48 hrs- next dose 9/21/22

## 2022-09-21 NOTE — PROGRESS NOTE ADULT - PROBLEM SELECTOR PLAN 1
RESOLVED ACUTE SOB  Patient w/COPD on home O2, though states that up till now she has rarely had to use it. on 9/12 stated breathing was worse than baseline. On symbicort, proventil, singulair and spiriva, on 9/15 reports some improvement.   Patient states that she rarely requires her home O2, however now feels like her O2 needs have gotten worse  Discussed with pulmonary fellow, pulm consulted appreciated, patient currently optimized on meds, she still appears closer to her current baseline. No apparent struggles with breathing  Will need outpatient follow up.

## 2022-09-28 PROBLEM — J96.11 CHRONIC RESPIRATORY FAILURE WITH HYPOXIA AND HYPERCAPNIA: Status: ACTIVE | Noted: 2022-01-01

## 2022-09-28 NOTE — ASSESSMENT
[FreeTextEntry1] : she is to continue all her current medications. We discussed oxygen usage. At rest, in the wheelchair off oxygen her oxygen saturation was actually 94%. I encouraged her not to drive and to use supplemental oxygen at home and if she is exerting herself. She lives quite far in for followup visits, I suggested occasionally using Telehealth.\par She complained bitterly about the food at [facility] and she wanted to know if she could go dancing without oxygen

## 2022-09-28 NOTE — END OF VISIT
[FreeTextEntry3] : I spent a total of 30 minutes of this encounter including face-to-face time, review of her hospitalization records including imaging and documentation

## 2022-09-28 NOTE — PHYSICAL EXAM
[No Acute Distress] : no acute distress [Normal Appearance] : normal appearance [Normal Rate/Rhythm] : normal rate/rhythm [Normal S1, S2] : normal s1, s2 [No Resp Distress] : no resp distress [Clear to Auscultation Bilaterally] : clear to auscultation bilaterally [No Clubbing] : no clubbing [No Edema] : no edema [No Focal Deficits] : no focal deficits [TextBox_2] : seated in wheelchair on supplemental oxygen

## 2022-10-04 NOTE — PHYSICAL THERAPY INITIAL EVALUATION ADULT - LEVEL OF INDEPENDENCE: SUPINE/SIT, REHAB EVAL
Problem: MOBILITY - ADULT  Goal: Maintain or return to baseline ADL function  Description: INTERVENTIONS:  -  Assess patient's ability to carry out ADLs; assess patient's baseline for ADL function and identify physical deficits which impact ability to perform ADLs (bathing, care of mouth/teeth, toileting, grooming, dressing, etc )  - Assess/evaluate cause of self-care deficits   - Assess range of motion  - Assess patient's mobility; develop plan if impaired  - Assess patient's need for assistive devices and provide as appropriate  - Encourage maximum independence but intervene and supervise when necessary  - Involve family in performance of ADLs  - Assess for home care needs following discharge   - Consider OT consult to assist with ADL evaluation and planning for discharge  - Provide patient education as appropriate  Outcome: Progressing     Problem: Prexisting or High Potential for Compromised Skin Integrity  Goal: Skin integrity is maintained or improved  Description: INTERVENTIONS:  - Identify patients at risk for skin breakdown  - Assess and monitor skin integrity  - Assess and monitor nutrition and hydration status  - Monitor labs   - Assess for incontinence   - Turn and reposition patient  - Assist with mobility/ambulation  - Relieve pressure over bony prominences  - Avoid friction and shearing  - Provide appropriate hygiene as needed including keeping skin clean and dry  - Evaluate need for skin moisturizer/barrier cream  - Collaborate with interdisciplinary team   - Patient/family teaching  - Consider wound care consult   Outcome: Progressing     Problem: Potential for Falls  Goal: Patient will remain free of falls  Description: INTERVENTIONS:  - Educate patient/family on patient safety including physical limitations  - Instruct patient to call for assistance with activity   - Consult OT/PT to assist with strengthening/mobility   - Keep Call bell within reach  - Keep bed low and locked with side rails adjusted as appropriate  - Keep care items and personal belongings within reach  - Initiate and maintain comfort rounds  - Make Fall Risk Sign visible to staff  - Apply yellow socks and bracelet for high fall risk patients  - Consider moving patient to room near nurses station  Outcome: Progressing    Problem: PAIN - ADULT  Goal: Verbalizes/displays adequate comfort level or baseline comfort level  Description: Interventions:  - Encourage patient to monitor pain and request assistance  - Assess pain using appropriate pain scale  - Administer analgesics based on type and severity of pain and evaluate response  - Implement non-pharmacological measures as appropriate and evaluate response  - Consider cultural and social influences on pain and pain management  - Notify physician/advanced practitioner if interventions unsuccessful or patient reports new pain  Outcome: Progressing     Problem: SAFETY ADULT  Goal: Maintain or return to baseline ADL function  Description: INTERVENTIONS:  -  Assess patient's ability to carry out ADLs; assess patient's baseline for ADL function and identify physical deficits which impact ability to perform ADLs (bathing, care of mouth/teeth, toileting, grooming, dressing, etc )  - Assess/evaluate cause of self-care deficits   - Assess range of motion  - Assess patient's mobility; develop plan if impaired  - Assess patient's need for assistive devices and provide as appropriate  - Encourage maximum independence but intervene and supervise when necessary  - Involve family in performance of ADLs  - Assess for home care needs following discharge   - Consider OT consult to assist with ADL evaluation and planning for discharge  - Provide patient education as appropriate  Outcome: Progressing    Problem: DISCHARGE PLANNING  Goal: Discharge to home or other facility with appropriate resources  Description: INTERVENTIONS:  - Identify barriers to discharge w/patient and caregiver  - Arrange for needed discharge resources and transportation as appropriate  - Identify discharge learning needs (meds, wound care, etc )  - Arrange for interpretive services to assist at discharge as needed  - Refer to Case Management Department for coordinating discharge planning if the patient needs post-hospital services based on physician/advanced practitioner order or complex needs related to functional status, cognitive ability, or social support system  Outcome: Progressing     Problem: Knowledge Deficit  Goal: Patient/family/caregiver demonstrates understanding of disease process, treatment plan, medications, and discharge instructions  Description: Complete learning assessment and assess knowledge base  Interventions:  - Provide teaching at level of understanding  - Provide teaching via preferred learning methods  Outcome: Progressing     Problem: Nutrition/Hydration-ADULT  Goal: Nutrient/Hydration intake appropriate for improving, restoring or maintaining nutritional needs  Description: Monitor and assess patient's nutrition/hydration status for malnutrition  Collaborate with interdisciplinary team and initiate plan and interventions as ordered  Monitor patient's weight and dietary intake as ordered or per policy  Utilize nutrition screening tool and intervene as necessary  Determine patient's food preferences and provide high-protein, high-caloric foods as appropriate       INTERVENTIONS:  - Monitor oral intake, urinary output, labs, and treatment plans  - Assess nutrition and hydration status and recommend course of action  - Evaluate amount of meals eaten  - Assist patient with eating if necessary   - Allow adequate time for meals  - Recommend/ encourage appropriate diets, oral nutritional supplements, and vitamin/mineral supplements  - Provide specific nutrition/hydration education as appropriate  - Include patient/family/caregiver in decisions related to nutrition  Outcome: Progressing     Problem: RESPIRATORY - ADULT  Goal: Achieves optimal ventilation and oxygenation  Description: INTERVENTIONS:  - Assess for changes in respiratory status  - Assess for changes in mentation and behavior  - Position to facilitate oxygenation and minimize respiratory effort  - Oxygen administered by appropriate delivery if ordered  - Initiate smoking cessation education as indicated  - Encourage broncho-pulmonary hygiene including cough, deep breathe, Incentive Spirometry  - Assess the need for suctioning and aspirate as needed  - Assess and instruct to report SOB or any respiratory difficulty  - Respiratory Therapy support as indicated  Outcome: Progressing     Problem: COPING  Goal: Pt/Family able to verbalize concerns and demonstrate effective coping strategies  Description: INTERVENTIONS:  - Assist patient/family to identify coping skills, available support systems and cultural and spiritual values  - Provide emotional support, including active listening and acknowledgement of concerns of patient and caregivers  - Reduce environmental stimuli, as able  - Provide patient education  - Assess for spiritual pain/suffering and initiate spiritual care, including notification of Pastoral Care or kathy based community as needed  - Assess effectiveness of coping strategies  Outcome: Progressing  Goal: Will report anxiety at manageable levels  Description: INTERVENTIONS:  - Administer medication as ordered  - Teach and encourage coping skills  - Provide emotional support  - Assess patient/family for anxiety and ability to cope  Outcome: Progressing     Problem: BEHAVIOR  Goal: Pt/Family maintain appropriate behavior and adhere to behavioral management agreement, if implemented  Description: INTERVENTIONS:  - Assess the family dynamic   - Encourage verbalization of thoughts and concerns in a socially appropriate manner  - Assess patient/family's coping skills and non-compliant behavior (including use of illegal substances)    - Utilize positive, consistent limit setting strategies supporting safety of patient, staff and others  - Initiate consult with Case Management, Spiritual Care or other ancillary services as appropriate  - If a patient's/visitor's behavior jeopardizes the safety of the patient, staff, or others, refer to organization procedure     - Notify Security of behavior or suspected illegal substances which indicate the need for search of the patient and/or belongings  - Encourage participation in the decision making process about a behavioral management agreement; implement if patient meets criteria  Outcome: Progressing maximum assist (25% patients effort)

## 2022-12-18 NOTE — ED PROVIDER NOTE - CLINICAL SUMMARY MEDICAL DECISION MAKING FREE TEXT BOX
88 year old F with PMH of HTN, DM, COPD w/ chronic hypoxic/hypercarbic respiratory failure on home o2 3L, chronic diastolic HF, hypothyroidism, CKD3, hx of hyperkalemia referred to ED by pulmonologist for hyperkalemia on OP labs. VSS, afebrile, non toxic appearing. OP potassium 6.9. EKG without signs of hyperkalemia. Patient is asymptomatic. Repeat K here 6.2. Given insulin and dextrose, lokelma. TBA for management of hyperk.

## 2022-12-18 NOTE — ED PROVIDER NOTE - PROGRESS NOTE DETAILS
Thong: Labs reviewed potassium improving spoke with hospitalist who would like 20 mg of Lasix for the patient will place admission through on telemetry.

## 2022-12-18 NOTE — H&P ADULT - PROBLEM SELECTOR PLAN 1
tele monitor   Serial EKGs  -s/p IV Lasix 20mg, IV Humulin R 5 units, IV Dextrose and PO Lokelma.  -will recheck BMP @ 1900

## 2022-12-18 NOTE — H&P ADULT - PROBLEM SELECTOR PLAN 2
Continuous SpO2 monitoring  CXR: clear lungs  COVID/Flu/RSV: neg  -DuoNebs Q6h  -continue Symbicort BID (pt poorly compliant)  -monitor SpO2  -PT eval

## 2022-12-18 NOTE — H&P ADULT - NSHPLABSRESULTS_GEN_ALL_CORE
EKG: EKG: Sinus Bradycardia @ 54bpm TWI I, AVL, V4-V6, QTC 432ms  K+: 6.2-->5.7-->6.1  CXR: clear lungs                        10.4   7.76  )-----------( 259      ( 18 Dec 2022 02:51 )             32.9     12-18    139  |  103  |  33<H>  ----------------------------<  207<H>  6.1<H>   |  26  |  1.55<H>    Ca    8.8      18 Dec 2022 13:17  Phos  3.2     12-18  Mg     1.60     12-18    TPro  7.3  /  Alb  4.2  /  TBili  0.2  /  DBili  x   /  AST  25  /  ALT  12  /  AlkPhos  83  12-18          LIVER FUNCTIONS - ( 18 Dec 2022 02:51 )  Alb: 4.2 g/dL / Pro: 7.3 g/dL / ALK PHOS: 83 U/L / ALT: 12 U/L / AST: 25 U/L / GGT: x           CAPILLARY BLOOD GLUCOSE      POCT Blood Glucose.: 125 mg/dL (18 Dec 2022 14:50)  POCT Blood Glucose.: 143 mg/dL (18 Dec 2022 04:37)  POCT Blood Glucose.: 121 mg/dL (18 Dec 2022 03:52)  POCT Blood Glucose.: 132 mg/dL (18 Dec 2022 00:54)

## 2022-12-18 NOTE — ED ADULT TRIAGE NOTE - CHIEF COMPLAINT QUOTE
Pt states her pulmonologist called her today and advised her to come to the hospital because her potassium level is 6.9. Denies palpitations/chest pain/dizziness. Hx of COPD, O2 dependent, CKD, HTN, DM

## 2022-12-18 NOTE — H&P ADULT - NSHPSOCIALHISTORY_GEN_ALL_CORE
Pt , lives alone. Has HHA 4hr/day. Denies current smoking, drinking or drugs. Former cigarrette smoker 2ppd x20yrs, quit over 20yrs ago. Ambulates with walker.

## 2022-12-18 NOTE — H&P ADULT - NS ATTEND AMEND GEN_ALL_CORE FT
Patient is an 89yo F with PMH of HTN, DM, COPD with chronic hypoxic and hypercarbic respiratory failure on home O2 at 3L NC, hypothyroidism, HFpEF, CKD3, and chronic hyperkalemia who presented with hyperkalemia on outpatient labs. She was sent in by her pulmonologist, who had seen her for a recently increased oxygen requirement.     Vs reviewed: -150s, 99% on 3L NC. Labs reviewed: Hgb 10.4 (prev 9.2), K 6.2, Cr 1.65 (prev 1.83), VBG pH 7.32. Imaging personally reviewed: CXR clear lungs    #Hyperkalemia  -	Chronic issue, but acutely K 6.9  -	Continue to trend K  -	Given cocktail of insulin, D50, and lokelma – continue to give as warranted  -	ECG with sinus bradycardia  -	Has been addressed on previous admissions and seen by nephrology as outpatient  -	Etiology remains unclear, likely mixed – CKD, dietary  -	Low K diet  -	Supplement Mg to keep >2  -	Appreciate nephrology input  #Acute on chronic respiratory failure, mixed, with hypercarbia and hypoxia, in setting of COPD with exacerbation  -	Currently at baseline O2 requirements with baseline 3L NC  -	c/w home ICS, montelukast; offer duonebs ATC  -	monitor for need for steroids  #HFpEF  -	recent TTE 9/13/22 with LVEF 65%  -	c/w home furosemide, BB  -	currently euvolemic  #CKD3  -	renally dose meds  #Hypothyroidism  -	c/w home LT4  #DM  -	f/s qachs, goal 140-180; start ISS  #Macrocytic anemia  -	Hgb 10.4, increased from previous 9.2  -	On previous admission, thought to be from chronic inflammation and CKD

## 2022-12-18 NOTE — ED PROVIDER NOTE - PHYSICAL EXAMINATION
GENERAL: Awake, alert, NAD  HEENT: NC/AT, moist mucous membranes, PERRL, EOMI  LUNGS: CTAB, no wheezes or crackles, NC in place on 4L.   CARDIAC: RRR, no m/r/g  ABDOMEN: Soft, normal BS, non tender, non distended, no rebound, no guarding  BACK: No midline spinal tenderness, no CVA tenderness  EXT: No edema, no calf tenderness, 2+ DP pulses bilaterally, no deformities.  NEURO: A&Ox3. Moving all extremities.  SKIN: Warm and dry. No rash.  PSYCH: Normal affect.

## 2022-12-18 NOTE — H&P ADULT - ASSESSMENT
88 year old female PMHx HTN, DM2, COPD (O2 dependent 2-3L,) Chronic diastolic HF, hypothyroidism, CKD3, and hx of hyperkalemia sent by pulmonologist for hyperkalemia, admitted to tele for Hyperkalemia and COPD exacerbation.

## 2022-12-18 NOTE — H&P ADULT - HISTORY OF PRESENT ILLNESS
88 year old female PMHx HTN, DM2, COPD (O2 dependent 2-3L,) Chronic diastolic HF, hypothyroidism, CKD3, and hx of hyperkalemia sent by pulmonologist for hyperkalemia. Patient has known history of hyperkalemia with multiple admissions.  At baseline pt has LOGAN after walking 1/4 block NO LOGAN w/ ADLs. For past 1 week pt reports LOGAN w/ ADLs and wheezing. She tried using Ventolin 2 times/day without relief. Pt poorly compliant with Symbicort and only uses it PRN. Patient states she has had increased oxygen requirement recently, normally on 3 L down to 4 L nasal cannula at home. Went to see her pulmonologist who did blood work on her and told to go to ED for "abnormal labs." Pt denies fever, chills, chest pain, sob, palpitations, diaphoresis, sore throat, headache weight gain/loss, nausea vomiting, abdominal pain or sick contacts.

## 2022-12-18 NOTE — ED ADULT NURSE REASSESSMENT NOTE - NS ED NURSE REASSESS COMMENT FT1
Received pt at change of shift, PT is resting in stretcher, easily arousable to verbal stimuli, A+Ox4. no apparent distress noted. pt arrives for hyper K. on home O2, 3L NC. Respirations even and unlabored, normal work of breathing, no accessory muscle use, speaking in full clear uninterrupted sentences.  20G to RAC, no redness or swelling noted. will continue to monitor.

## 2022-12-18 NOTE — ED ADULT NURSE NOTE - NSIMPLEMENTINTERV_GEN_ALL_ED
Implemented All Fall with Harm Risk Interventions:  [place] to call system. Call bell, personal items and telephone within reach. Instruct patient to call for assistance. Room bathroom lighting operational. Non-slip footwear when patient is off stretcher. Physically safe environment: no spills, clutter or unnecessary equipment. Stretcher in lowest position, wheels locked, appropriate side rails in place. Provide visual cue, wrist band, yellow gown, etc. Monitor gait and stability. Monitor for mental status changes and reorient to person, place, and time. Review medications for side effects contributing to fall risk. Reinforce activity limits and safety measures with patient and family. Provide visual clues: red socks.

## 2022-12-18 NOTE — ED PROVIDER NOTE - ATTENDING CONTRIBUTION TO CARE
I performed a face-to-face evaluation of the patient and performed a history and physical examination along with the resident or ACP, and/or medical student above.  I agree with the history and physical examination as documented by the resident or ACP, and/or medical student above.  -Dr. Palm I performed a face-to-face evaluation of the patient and performed a history and physical examination along with the resident or ACP, and/or medical student above.  I agree with the history and physical examination as documented by the resident or ACP, and/or medical student above.  Palm:   GENERAL: Awake, alert, NAD  HEENT: NC/AT, moist mucous membranes, PERRL, EOMI  LUNGS: CTAB, no wheezes or crackles, occasional cough  CARDIAC: RRR, no m/r/g  ABDOMEN: Soft, normal BS, non tender, non distended, no rebound, no guarding  BACK: No midline spinal tenderness, no CVA tenderness  EXT: No edema, no calf tenderness, 2+ DP pulses bilaterally, no deformities.  NEURO: A&Ox3. Moving all extremities.  SKIN: Warm and dry. No rash.

## 2022-12-18 NOTE — ED ADULT NURSE NOTE - OBJECTIVE STATEMENT
Pt. is an 89 yo F who was sent in by PCP for elevated K+. Pt. A&Ox4 and amb w/ walker. Pt states her pulmonologist sent her to ED for elevated potassium. Pt. denies palpitations/CP. Resp. equal and labored on arrival. Pt. baseline on home O2 3L NC. VSS. NAD. #20g PIV established to RFA. Labs sent. Comfort measures provided, safety measures implemented, call bell in reach. BREAK RN: Pt. is an 89 yo F who was sent in by PCP for elevated K+. Pt. A&Ox4 and amb w/ walker. Pt states her pulmonologist sent her to ED for elevated potassium. Pt. denies palpitations/CP. Resp. equal and labored on arrival. Pt. baseline on home O2 3L NC. VSS. NAD. #20g PIV established to RFA. Labs sent. Comfort measures provided, safety measures implemented, call bell in reach.

## 2022-12-18 NOTE — ED PROVIDER NOTE - OBJECTIVE STATEMENT
88 year old F with PMH of HTN, DM, COPD w/ chronic hypoxic/hypercarbic respiratory failure on home o2 3L, chronic diastolic HF, hypothyroidism, CKD3, hx of hyperkalemia referred to ED by pulmonologist for hyperkalemia on OP labs. 88 year old F with PMH of HTN, DM, COPD w/ chronic hypoxic/hypercarbic respiratory failure on home o2 3L, chronic diastolic HF, hypothyroidism, CKD3, hx of hyperkalemia referred to ED by pulmonologist for hyperkalemia on OP labs. Patient has known history of hyperkalemia with multiple admissions.  Patient states she has had increased oxygen requirement recently, normally on 3 L down to 4 L nasal cannula at home.  Patient denies any current symptoms, including chest pain, shortness of breath, palpitations, lightheadedness.  EKG sinus bradycardia with no evidence of hyperkalemia.

## 2022-12-19 NOTE — CONSULT NOTE ADULT - SUBJECTIVE AND OBJECTIVE BOX
[facility] DIVISION OF KIDNEY DISEASES AND HYPERTENSION -- [phone]  -- INITIAL CONSULT NOTE  --------------------------------------------------------------------------------  HPI:  Patient is an 88 year old female with PMH of CKD, CHF, DM, HTN, and Hypothryoidism who presented to the hospital for abnormal outpatient labs. The nephrology team was consulted for hyperkalemia. Of note the patient has had multiple admission in the past for hyperkalemia. She was evaluated by nephrology as outpatient and advised to be on a daily dose of Lokelma. On admission pt's serum potassium level was 6.2 which was managed medically, however still remains elevated at 5.4 today. Pt. was seen and examined in the Ed today. She reported she was not feeling well a few days ago and called her pulmonologist who did some blood work. The following day she received a phone call and was advised to come to the hospital. She admitted that she has not been taking any lokelma as outpatient and ran out of her furosemide about 2 weeks ago. She denied any chest pain but admitted to shortness of breath with worsens with exertion. She denied any nausea, vomiting, abdominal pain, or diarrhea.     PAST HISTORY  --------------------------------------------------------------------------------  PAST MEDICAL & SURGICAL HISTORY:  DM (diabetes mellitus)      Hypothyroid      HTN (hypertension)      COPD (chronic obstructive pulmonary disease)      Chronic kidney disease (CKD)      Shingles      S/P cholecystectomy      H/O cataract  bilateral 2013      Leg fracture  right ORIF with hardware 2010      S/P hip replacement, left  2019      FAMILY HISTORY:  FHx: rheumatoid arthritis (Mother)  Mother and Brother    FH: type 2 diabetes mellitus (Father)  Father    Family history of hypertension (Father)  Father      PAST SOCIAL HISTORY:    ALLERGIES & MEDICATIONS  --------------------------------------------------------------------------------  Allergies    No Known Allergies    Intolerances    Standing Inpatient Medications  albuterol/ipratropium for Nebulization 3 milliLiter(s) Nebulizer every 6 hours  budesonide 160 MICROgram(s)/formoterol 4.5 MICROgram(s) Inhaler 2 Puff(s) Inhalation two times a day  busPIRone 10 milliGRAM(s) Oral two times a day  dextrose 5%. 1000 milliLiter(s) IV Continuous <Continuous>  dextrose 5%. 1000 milliLiter(s) IV Continuous <Continuous>  dextrose 50% Injectable 25 Gram(s) IV Push once  dextrose 50% Injectable 12.5 Gram(s) IV Push once  dextrose 50% Injectable 25 Gram(s) IV Push once  ferrous    sulfate 325 milliGRAM(s) Oral daily  furosemide    Tablet 20 milliGRAM(s) Oral <User Schedule>  glucagon  Injectable 1 milliGRAM(s) IntraMuscular once  heparin   Injectable 5000 Unit(s) SubCutaneous every 8 hours  insulin lispro (ADMELOG) corrective regimen sliding scale   SubCutaneous three times a day before meals  insulin lispro (ADMELOG) corrective regimen sliding scale   SubCutaneous at bedtime  levothyroxine 112 MICROGram(s) Oral daily  metoprolol tartrate 25 milliGRAM(s) Oral two times a day  montelukast 10 milliGRAM(s) Oral at bedtime    PRN Inpatient Medications  dextrose Oral Gel 15 Gram(s) Oral once PRN    REVIEW OF SYSTEMS  All other systems were reviewed and are negative, except as noted.    VITALS/PHYSICAL EXAM  --------------------------------------------------------------------------------  T(C): 36.8 (12-19-22 @ 09:24), Max: 36.8 (12-19-22 @ 09:24)  HR: 55 (12-19-22 @ 10:55) (49 - 79)  BP: 116/57 (12-19-22 @ 09:24) (116/57 - 155/74)  RR: 20 (12-19-22 @ 09:24) (18 - 20)  SpO2: 97% (12-19-22 @ 09:24) (97% - 100%)  Wt(kg): --  Height (cm): 162.6 (12-18-22 @ 15:32)  Weight (kg): 68 (12-18-22 @ 15:32)  BMI (kg/m2): 25.7 (12-18-22 @ 15:32)  BSA (m2): 1.73 (12-18-22 @ 15:32)      Physical Exam:  	Gen: NAD, elderly  	HEENT: MMM  	Pulm: decreased air movement  	CV: S1S2  	Abd: Soft, +BS   	Ext: trace LE edema B/L  	Neuro: Awake  	Skin: dry    LABS/STUDIES  --------------------------------------------------------------------------------              10.0   8.13  >-----------<  246      [12-19-22 @ 05:27]              32.0     139  |  102  |  38  ----------------------------<  129      [12-19-22 @ 05:27]  5.4   |  27  |  1.61        Ca     8.8     [12-19-22 @ 05:27]      Mg     1.60     [12-18-22 @ 13:17]      Phos  3.2     [12-18-22 @ 13:17]    TPro  7.3  /  Alb  4.2  /  TBili  0.2  /  DBili  x   /  AST  25  /  ALT  12  /  AlkPhos  83  [12-18-22 @ 02:51]          Creatinine Trend:  SCr 1.61 [12-19 @ 05:27]  SCr 1.69 [12-18 @ 23:30]  SCr 1.55 [12-18 @ 13:17]  SCr 1.60 [12-18 @ 06:30]  SCr 1.65 [12-18 @ 02:51]    Urinalysis - [09-13-22 @ 14:50]      Color Light Yellow / Appearance Clear / SG 1.016 / pH 6.0      Gluc Negative / Ketone Negative  / Bili Negative / Urobili <2 mg/dL       Blood Negative / Protein Trace / Leuk Est Small / Nitrite Positive      RBC 3 / WBC 7 / Hyaline 0 / Gran  / Sq Epi  / Non Sq Epi 2 / Bacteria Many      Iron 85, TIBC 536, %sat 16      [09-10-22 @ 03:40]  Ferritin 82      [09-10-22 @ 03:40]  HbA1c 6.9      [08-11-19 @ 05:40]  TSH 1.24      [12-19-22 @ 05:27]       [facility] DIVISION OF KIDNEY DISEASES AND HYPERTENSION -- [phone]  -- INITIAL CONSULT NOTE  --------------------------------------------------------------------------------  HPI:  Patient is an 88 year old female with PMH of CKD, CHF, DM, HTN, and Hypothryoidism who presented to the hospital for abnormal outpatient labs. The nephrology team was consulted for hyperkalemia. Of note the patient has had multiple admission in the past for hyperkalemia. She was evaluated by nephrology as outpatient and advised to be on a daily dose of Lokelma. On admission pt's serum potassium level was 6.2 which was managed medically, however still remains elevated at 5.4 today. Pt. was seen and examined in the Ed today. She reported she was not feeling well a few days ago and called her pulmonologist who did some blood work. The following day she received a phone call and was advised to come to the hospital. She admitted that she has not been taking any lokelma as outpatient and ran out of her furosemide about 2 weeks ago. She denied any chest pain but admitted to shortness of breath with worsens with exertion. She denied any nausea, vomiting, abdominal pain, or diarrhea.     PAST HISTORY  --------------------------------------------------------------------------------  PAST MEDICAL & SURGICAL HISTORY:  DM (diabetes mellitus)      Hypothyroid      HTN (hypertension)      COPD (chronic obstructive pulmonary disease)      Chronic kidney disease (CKD)      Shingles      S/P cholecystectomy      H/O cataract  bilateral 2013      Leg fracture  right ORIF with hardware 2010      S/P hip replacement, left  2019      FAMILY HISTORY:  FHx: rheumatoid arthritis (Mother)  Mother and Brother    FH: type 2 diabetes mellitus (Father)  Father    Family history of hypertension (Father)  Father      PAST SOCIAL HISTORY: lives alone    ALLERGIES & MEDICATIONS  --------------------------------------------------------------------------------  Allergies    No Known Allergies    Intolerances    Standing Inpatient Medications  albuterol/ipratropium for Nebulization 3 milliLiter(s) Nebulizer every 6 hours  budesonide 160 MICROgram(s)/formoterol 4.5 MICROgram(s) Inhaler 2 Puff(s) Inhalation two times a day  busPIRone 10 milliGRAM(s) Oral two times a day  dextrose 5%. 1000 milliLiter(s) IV Continuous <Continuous>  dextrose 5%. 1000 milliLiter(s) IV Continuous <Continuous>  dextrose 50% Injectable 25 Gram(s) IV Push once  dextrose 50% Injectable 12.5 Gram(s) IV Push once  dextrose 50% Injectable 25 Gram(s) IV Push once  ferrous    sulfate 325 milliGRAM(s) Oral daily  furosemide    Tablet 20 milliGRAM(s) Oral <User Schedule>  glucagon  Injectable 1 milliGRAM(s) IntraMuscular once  heparin   Injectable 5000 Unit(s) SubCutaneous every 8 hours  insulin lispro (ADMELOG) corrective regimen sliding scale   SubCutaneous three times a day before meals  insulin lispro (ADMELOG) corrective regimen sliding scale   SubCutaneous at bedtime  levothyroxine 112 MICROGram(s) Oral daily  metoprolol tartrate 25 milliGRAM(s) Oral two times a day  montelukast 10 milliGRAM(s) Oral at bedtime    PRN Inpatient Medications  dextrose Oral Gel 15 Gram(s) Oral once PRN    REVIEW OF SYSTEMS  All other systems were reviewed and are negative, except as noted.    VITALS/PHYSICAL EXAM  --------------------------------------------------------------------------------  T(C): 36.8 (12-19-22 @ 09:24), Max: 36.8 (12-19-22 @ 09:24)  HR: 55 (12-19-22 @ 10:55) (49 - 79)  BP: 116/57 (12-19-22 @ 09:24) (116/57 - 155/74)  RR: 20 (12-19-22 @ 09:24) (18 - 20)  SpO2: 97% (12-19-22 @ 09:24) (97% - 100%)  Wt(kg): --  Height (cm): 162.6 (12-18-22 @ 15:32)  Weight (kg): 68 (12-18-22 @ 15:32)  BMI (kg/m2): 25.7 (12-18-22 @ 15:32)  BSA (m2): 1.73 (12-18-22 @ 15:32)      Physical Exam:  	Gen: NAD, elderly  	HEENT: MMM  	Pulm: decreased air movement  	CV: S1S2  	Abd: Soft, +BS   	Ext: trace LE edema B/L  	Neuro: Awake  	Skin: dry    LABS/STUDIES  --------------------------------------------------------------------------------              10.0   8.13  >-----------<  246      [12-19-22 @ 05:27]              32.0     139  |  102  |  38  ----------------------------<  129      [12-19-22 @ 05:27]  5.4   |  27  |  1.61        Ca     8.8     [12-19-22 @ 05:27]      Mg     1.60     [12-18-22 @ 13:17]      Phos  3.2     [12-18-22 @ 13:17]    TPro  7.3  /  Alb  4.2  /  TBili  0.2  /  DBili  x   /  AST  25  /  ALT  12  /  AlkPhos  83  [12-18-22 @ 02:51]          Creatinine Trend:  SCr 1.61 [12-19 @ 05:27]  SCr 1.69 [12-18 @ 23:30]  SCr 1.55 [12-18 @ 13:17]  SCr 1.60 [12-18 @ 06:30]  SCr 1.65 [12-18 @ 02:51]    Urinalysis - [09-13-22 @ 14:50]      Color Light Yellow / Appearance Clear / SG 1.016 / pH 6.0      Gluc Negative / Ketone Negative  / Bili Negative / Urobili <2 mg/dL       Blood Negative / Protein Trace / Leuk Est Small / Nitrite Positive      RBC 3 / WBC 7 / Hyaline 0 / Gran  / Sq Epi  / Non Sq Epi 2 / Bacteria Many      Iron 85, TIBC 536, %sat 16      [09-10-22 @ 03:40]  Ferritin 82      [09-10-22 @ 03:40]  HbA1c 6.9      [08-11-19 @ 05:40]  TSH 1.24      [12-19-22 @ 05:27]

## 2022-12-19 NOTE — ED ADULT NURSE REASSESSMENT NOTE - NS ED NURSE REASSESS COMMENT FT1
Break RN: Patient resting comfortably, offers no complaints. Respirations even and unlabored. Safety measures maintained.

## 2022-12-19 NOTE — PROGRESS NOTE ADULT - PROBLEM SELECTOR PLAN 1
tele monitor; unclear etiology: multi-factorial? i/s/o CKD and nutrition possibly dietary. Patient endorses drinking orange juice every day   - No known new medications   - Has a prior hx, no etiology identified  Serial EKGs  -s/p IV Lasix 20mg, IV Humulin R 5 units, IV Dextrose and PO Lokelma.  Now improved  [ ] f/u with renal Re; benefit of lokelma, if so, dosing, and follow up  - On discharge, patient should get repeat BMP within 1-2 weeks

## 2022-12-19 NOTE — CONSULT NOTE ADULT - PROBLEM SELECTOR RECOMMENDATION 9
Pt. with CKD stage 3 darell in setting of longstanding DM and HTN. [facility]  ranging between 1.6-1.8. Through current hospital course renal function has remained near baseline, SCr stable at 1.61 today (12/19/22). Please check UA, urine electrolytes, spot urine TP/CR. Labs reviewed. Pt. clinically stable. Monitor labs and urine output. Dose medications as per eGFR.

## 2022-12-19 NOTE — CONSULT NOTE ADULT - PROBLEM SELECTOR RECOMMENDATION 2
Pt. with chronic hyperkalemia, on review of [facility] pt noted to have multiple episodes of hyperkalemia. In past was advised to be on Lokelma daily, however reportedly not taking. Recommend starting on Lokelma 10g daily. Also pt reportedly was not taking her furosemide, recommend to start lasix 20mg daily. Monitor serum potassium.     If any questions, please feel free to contact me     Petar Bhat  Nephrology Fellow  [facility] Pager: [phone]

## 2022-12-19 NOTE — PROGRESS NOTE ADULT - SUBJECTIVE AND OBJECTIVE BOX
[facility]  Division of Hospital Medicine  Trini Aguilera MD  Pager: [phone]      Patient is a 88y old  Female who presents with a chief complaint of hyperkalemia (18 Dec 2022 15:32)      SUBJECTIVE / OVERNIGHT EVENTS:  ADDITIONAL REVIEW OF SYSTEMS:    MEDICATIONS  (STANDING):  albuterol/ipratropium for Nebulization 3 milliLiter(s) Nebulizer every 6 hours  budesonide 160 MICROgram(s)/formoterol 4.5 MICROgram(s) Inhaler 2 Puff(s) Inhalation two times a day  busPIRone 10 milliGRAM(s) Oral two times a day  dextrose 5%. 1000 milliLiter(s) (100 mL/Hr) IV Continuous <Continuous>  dextrose 5%. 1000 milliLiter(s) (50 mL/Hr) IV Continuous <Continuous>  dextrose 50% Injectable 25 Gram(s) IV Push once  dextrose 50% Injectable 12.5 Gram(s) IV Push once  dextrose 50% Injectable 25 Gram(s) IV Push once  ferrous    sulfate 325 milliGRAM(s) Oral daily  furosemide    Tablet 20 milliGRAM(s) Oral <User Schedule>  glucagon  Injectable 1 milliGRAM(s) IntraMuscular once  heparin   Injectable 5000 Unit(s) SubCutaneous every 8 hours  insulin lispro (ADMELOG) corrective regimen sliding scale   SubCutaneous three times a day before meals  insulin lispro (ADMELOG) corrective regimen sliding scale   SubCutaneous at bedtime  levothyroxine 112 MICROGram(s) Oral daily  metoprolol tartrate 25 milliGRAM(s) Oral two times a day  montelukast 10 milliGRAM(s) Oral at bedtime  sodium zirconium cyclosilicate 10 Gram(s) Oral once    MEDICATIONS  (PRN):  dextrose Oral Gel 15 Gram(s) Oral once PRN Blood Glucose LESS THAN 70 milliGRAM(s)/deciliter      CAPILLARY BLOOD GLUCOSE      POCT Blood Glucose.: 144 mg/dL (19 Dec 2022 08:59)  POCT Blood Glucose.: 104 mg/dL (18 Dec 2022 23:51)  POCT Blood Glucose.: 83 mg/dL (18 Dec 2022 16:20)  POCT Blood Glucose.: 125 mg/dL (18 Dec 2022 14:50)    I&O's Summary      PHYSICAL EXAM:  Vital Signs Last 24 Hrs  T(C): 36.8 (19 Dec 2022 09:24), Max: 36.8 (18 Dec 2022 12:11)  T(F): 98.3 (19 Dec 2022 09:24), Max: 98.3 (19 Dec 2022 09:24)  HR: 49 (19 Dec 2022 09:24) (49 - 79)  BP: 116/57 (19 Dec 2022 09:24) (116/57 - 155/74)  BP(mean): --  RR: 20 (19 Dec 2022 09:24) (15 - 20)  SpO2: 97% (19 Dec 2022 09:24) (97% - 100%)    Parameters below as of 19 Dec 2022 09:24  Patient On (Oxygen Delivery Method): nasal cannula  O2 Flow (L/min): 3    CONSTITUTIONAL: NAD, well-developed, well-groomed  EYES: PERRLA; conjunctiva and sclera clear  ENMT: Moist oral mucosa, no pharyngeal injection or exudates; normal dentition  NECK: Supple, no palpable masses; no thyromegaly  RESPIRATORY: Normal respiratory effort; lungs are clear to auscultation bilaterally  CARDIOVASCULAR: Regular rate and rhythm, normal S1 and S2, no murmur/rub/gallop; No lower extremity edema; Peripheral pulses are 2+ bilaterally  ABDOMEN: Nontender to palpation, normoactive bowel sounds, no rebound/guarding; No hepatosplenomegaly  MUSCULOSKELETAL:  Normal gait; no clubbing or cyanosis of digits; no joint swelling or tenderness to palpation  PSYCH: A+O to person, place, and time; affect appropriate  NEUROLOGY: CN 2-12 are intact and symmetric; no gross sensory deficits   SKIN: No rashes; no palpable lesions    LABS:                        10.0   8.13  )-----------( 246      ( 19 Dec 2022 05:27 )             32.0     12-19    139  |  102  |  38<H>  ----------------------------<  129<H>  5.4<H>   |  27  |  1.61<H>    Ca    8.8      19 Dec 2022 05:27  Phos  3.2     12-18  Mg     1.60     12-18    TPro  7.3  /  Alb  4.2  /  TBili  0.2  /  DBili  x   /  AST  25  /  ALT  12  /  AlkPhos  83  12-18                RADIOLOGY & ADDITIONAL TESTS:  Results Reviewed:   Imaging Personally Reviewed:  Electrocardiogram Personally Reviewed:    COORDINATION OF CARE:  Care Discussed with Consultants/Other Providers [Y/N]:  Prior or Outpatient Records Reviewed [Y/N]:   [facility]  Division of Hospital Medicine  Trini Aguilera MD  Pager: [phone]      Patient is a 88y old  Female who presents with a chief complaint of hyperkalemia (18 Dec 2022 15:32)      SUBJECTIVE / OVERNIGHT EVENTS: Patient examined at bedside. Reports that she is on 3L NC at home. Reports that she does not do much cooking. She is no longer on lokelma. Denies having a kidney doctor.   ADDITIONAL REVIEW OF SYSTEMS:    MEDICATIONS  (STANDING):  albuterol/ipratropium for Nebulization 3 milliLiter(s) Nebulizer every 6 hours  budesonide 160 MICROgram(s)/formoterol 4.5 MICROgram(s) Inhaler 2 Puff(s) Inhalation two times a day  busPIRone 10 milliGRAM(s) Oral two times a day  dextrose 5%. 1000 milliLiter(s) (100 mL/Hr) IV Continuous <Continuous>  dextrose 5%. 1000 milliLiter(s) (50 mL/Hr) IV Continuous <Continuous>  dextrose 50% Injectable 25 Gram(s) IV Push once  dextrose 50% Injectable 12.5 Gram(s) IV Push once  dextrose 50% Injectable 25 Gram(s) IV Push once  ferrous    sulfate 325 milliGRAM(s) Oral daily  furosemide    Tablet 20 milliGRAM(s) Oral <User Schedule>  glucagon  Injectable 1 milliGRAM(s) IntraMuscular once  heparin   Injectable 5000 Unit(s) SubCutaneous every 8 hours  insulin lispro (ADMELOG) corrective regimen sliding scale   SubCutaneous three times a day before meals  insulin lispro (ADMELOG) corrective regimen sliding scale   SubCutaneous at bedtime  levothyroxine 112 MICROGram(s) Oral daily  metoprolol tartrate 25 milliGRAM(s) Oral two times a day  montelukast 10 milliGRAM(s) Oral at bedtime  sodium zirconium cyclosilicate 10 Gram(s) Oral once    MEDICATIONS  (PRN):  dextrose Oral Gel 15 Gram(s) Oral once PRN Blood Glucose LESS THAN 70 milliGRAM(s)/deciliter      CAPILLARY BLOOD GLUCOSE      POCT Blood Glucose.: 144 mg/dL (19 Dec 2022 08:59)  POCT Blood Glucose.: 104 mg/dL (18 Dec 2022 23:51)  POCT Blood Glucose.: 83 mg/dL (18 Dec 2022 16:20)  POCT Blood Glucose.: 125 mg/dL (18 Dec 2022 14:50)    I&O's Summary      PHYSICAL EXAM:  Vital Signs Last 24 Hrs  T(C): 36.8 (19 Dec 2022 09:24), Max: 36.8 (18 Dec 2022 12:11)  T(F): 98.3 (19 Dec 2022 09:24), Max: 98.3 (19 Dec 2022 09:24)  HR: 49 (19 Dec 2022 09:24) (49 - 79)  BP: 116/57 (19 Dec 2022 09:24) (116/57 - 155/74)  BP(mean): --  RR: 20 (19 Dec 2022 09:24) (15 - 20)  SpO2: 97% (19 Dec 2022 09:24) (97% - 100%)    Parameters below as of 19 Dec 2022 09:24  Patient On (Oxygen Delivery Method): nasal cannula  O2 Flow (L/min): 3    CONSTITUTIONAL: Elderly woman in NAD, well-developed, well-groomed  RESPIRATORY: Normal respiratory effort on NC,  lungs are clear to auscultation bilaterally  CARDIOVASCULAR: Regular rate and rhythm, normal S1 and S2, no murmur/rub/gallop; No lower extremity edema; Peripheral pulses are 2+ bilaterally  ABDOMEN: Nontender to palpation, normoactive bowel sounds, no rebound/guarding; No hepatosplenomegaly  PSYCH: A+O to person, place, and time; affect appropriate  NEUROLOGY: ; no gross sensory deficits   SKIN: No rashes; no palpable lesions    LABS:                        10.0   8.13  )-----------( 246      ( 19 Dec 2022 05:27 )             32.0     12-19    139  |  102  |  38<H>  ----------------------------<  129<H>  5.4<H>   |  27  |  1.61<H>    Ca    8.8      19 Dec 2022 05:27  Phos  3.2     12-18  Mg     1.60     12-18    TPro  7.3  /  Alb  4.2  /  TBili  0.2  /  DBili  x   /  AST  25  /  ALT  12  /  AlkPhos  83  12-18                RADIOLOGY & ADDITIONAL TESTS:  Results Reviewed:   Imaging Personally Reviewed:  Electrocardiogram Personally Reviewed:    COORDINATION OF CARE:  Care Discussed with Consultants/Other Providers [Y/N]:  Prior or Outpatient Records Reviewed [Y/N]:

## 2022-12-19 NOTE — CONSULT NOTE ADULT - ATTENDING COMMENTS
seen and evaluated.  potassium improving.  recommend furosemide as above start on daily lokelma (patient has plenty of supply at home she says) and repeat potassium in AM.

## 2022-12-19 NOTE — PATIENT PROFILE ADULT - FALL HARM RISK - HARM RISK INTERVENTIONS

## 2022-12-20 NOTE — PROGRESS NOTE ADULT - SUBJECTIVE AND OBJECTIVE BOX
[facility]  Joanie Trevino M.D  Pager [phone]  Available via MS Teams    SUBJECTIVE / OVERNIGHT EVENTS: No acute events overnight. Patient denies any CP, SOB, fevers or chills.     ADDITIONAL REVIEW OF SYSTEMS:    MEDICATIONS  (STANDING):  albuterol/ipratropium for Nebulization 3 milliLiter(s) Nebulizer every 6 hours  budesonide 160 MICROgram(s)/formoterol 4.5 MICROgram(s) Inhaler 2 Puff(s) Inhalation two times a day  busPIRone 10 milliGRAM(s) Oral two times a day  dextrose 5%. 1000 milliLiter(s) (100 mL/Hr) IV Continuous <Continuous>  dextrose 5%. 1000 milliLiter(s) (50 mL/Hr) IV Continuous <Continuous>  dextrose 50% Injectable 25 Gram(s) IV Push once  dextrose 50% Injectable 12.5 Gram(s) IV Push once  dextrose 50% Injectable 25 Gram(s) IV Push once  ferrous    sulfate 325 milliGRAM(s) Oral daily  furosemide    Tablet 20 milliGRAM(s) Oral daily  glucagon  Injectable 1 milliGRAM(s) IntraMuscular once  heparin   Injectable 5000 Unit(s) SubCutaneous every 8 hours  insulin lispro (ADMELOG) corrective regimen sliding scale   SubCutaneous three times a day before meals  insulin lispro (ADMELOG) corrective regimen sliding scale   SubCutaneous at bedtime  levothyroxine 112 MICROGram(s) Oral daily  metoprolol tartrate 25 milliGRAM(s) Oral two times a day  montelukast 10 milliGRAM(s) Oral at bedtime  sodium zirconium cyclosilicate 10 Gram(s) Oral daily    MEDICATIONS  (PRN):  dextrose Oral Gel 15 Gram(s) Oral once PRN Blood Glucose LESS THAN 70 milliGRAM(s)/deciliter      I&O's Summary    19 Dec 2022 07:01  -  20 Dec 2022 07:00  --------------------------------------------------------  IN: 0 mL / OUT: 400 mL / NET: -400 mL        PHYSICAL EXAM:  Vital Signs Last 24 Hrs  T(C): 36.6 (20 Dec 2022 09:00), Max: 36.7 (19 Dec 2022 18:06)  T(F): 97.8 (20 Dec 2022 09:00), Max: 98.1 (19 Dec 2022 22:40)  HR: 60 (20 Dec 2022 09:00) (56 - 67)  BP: 133/54 (20 Dec 2022 09:00) (118/83 - 144/58)  BP(mean): --  RR: 18 (20 Dec 2022 09:00) (18 - 18)  SpO2: 95% (20 Dec 2022 09:00) (95% - 100%)    Parameters below as of 20 Dec 2022 09:00  Patient On (Oxygen Delivery Method): nasal cannula  O2 Flow (L/min): 3    CONSTITUTIONAL: NAD, well-developed, well-groomed  RESPIRATORY: Normal respiratory effort; lungs are clear to auscultation bilaterally  CARDIOVASCULAR: Regular rate and rhythm, normal S1 and S2, no murmur/rub/gallop; No lower extremity edema; Peripheral pulses are 2+ bilaterally  ABDOMEN: Nontender to palpation, normoactive bowel sounds, no rebound/guarding; No hepatosplenomegaly  MUSCULOSKELETAL:  Normal gait; no clubbing or cyanosis of digits; no joint swelling or tenderness to palpation  PSYCH: A+O to person, place, and time; affect appropriate  NEUROLOGY: CN 2-12 are intact and symmetric; no gross sensory deficits   SKIN: No rashes; no palpable lesions    LABS:                        11.5   5.97  )-----------( 237      ( 20 Dec 2022 06:40 )             36.5     12-20    141  |  103  |  39<H>  ----------------------------<  127<H>  4.9   |  29  |  1.67<H>    Ca    9.0      20 Dec 2022 06:40  Phos  3.8     12-20  Mg     1.90     12-20            Urinalysis Basic - ( 20 Dec 2022 00:45 )    Color: Light Yellow / Appearance: Slightly Turbid / S.013 / pH: x  Gluc: x / Ketone: Negative  / Bili: Negative / Urobili: <2 mg/dL   Blood: x / Protein: 30 mg/dL / Nitrite: Negative   Leuk Esterase: Large / RBC: 2 /HPF / WBC 70 /HPF   Sq Epi: x / Non Sq Epi: 1 /HPF / Bacteria: Many        COVID-19 PCR: NotDetec (20 Sep 2022 13:40)  COVID-19 PCR: NotDetec (18 Sep 2022 06:21)  COVID-19 PCR: NotDetec (17 Sep 2022 07:55)  COVID-19 PCR: NotDetec (10 Sep 2022 03:40)

## 2022-12-20 NOTE — PROGRESS NOTE ADULT - PROBLEM SELECTOR PLAN 1
tele monitor; unclear etiology: multi-factorial? i/s/o CKD and nutrition possibly dietary. Patient endorses drinking orange juice every day   - Seen by nephrology and patient with chronic hyperkalemia as outpatient, patient was prescribed lokelma and lasix but has not been taking those medications at home   - will continue with standing lasix 20mg and lokelma   - On discharge, patient should get repeat BMP within 1-2 weeks

## 2022-12-21 NOTE — DISCHARGE NOTE PROVIDER - HOSPITAL COURSE
88 year old female PMHx HTN, DM2, COPD (O2 dependent 2-3L,) Chronic diastolic HF, hypothyroidism, CKD3, and hx of hyperkalemia sent by pulmonologist for hyperkalemia, admitted to tele for Hyperkalemia, now resolved Found to have right metatarsal fracture, seen by pods has right foot brace in place. Patient now wanting rehab despite being bale to ambulate appropriately with PT. Pending PT reevaluation.     Right knee pain.   - know with right knee pain, however able to walk without issue with PT for last 2 days  - No fracture on R knee Xray, chronic arthritic changes.   - pain control with tylenol PRN.    Foot fracture, right.   - patient with right foot pain, Xray showing subacute medially impacted 5th metatarsal neck fracture.  - podiatry consulted, placed Sierra compression dressing with surgical shoe on right foot  - will needed f/u with Dr. Amber Chase in 1 week of discharge  - pending PT reevaluation as patient now requesting rehab.    Hyperkalemia.   - Now resolved    - Seen by nephrology and patient with chronic hyperkalemia as outpatient, patient was prescribed lokelma and lasix but has not been taking those medications at home   - Now resolved, neprho signed off; patient to continue with lokelma and lasix at home   - will continue with standing lasix 20mg and lokelma   - On discharge, patient should get repeat BMP within 1-2 weeks.    COPD (chronic obstructive pulmonary disease).   - Continuous SpO2 monitoring, on home O2 3-4 L   - Flu/RSV: neg  - DuoNebs Q6h  - continue Symbicort BID (pt poorly compliant)  - monitor SpO2.    HTN (hypertension).  - Monitor BP daily  - DASH diet  - Continue metoprolol, furosemide.    Hypothyroidism.   - continue synthroid  - TSH: WNL.    DM2 (diabetes mellitus, type 2).   - Daily glucose monitoring  - insulin sliding scale  - DASH 1800 ADA diet  - serum HgA1C: 5.9.    Stage 3 chronic kidney disease.   - Monitored electrolytes  - Trended BUN/Cr  - avoided nephrotoxic meds    Chronic diastolic congestive heart failure.   - euvolemic on exam  - c/w furosemide   - decreased metoprolol to 12.5 mg BID from 25 mg BID given bradycardia   - 2g sodium diet      88 year old female PMHx HTN, DM2, COPD (O2 dependent 2-3L,) Chronic diastolic HF, hypothyroidism, CKD3, and hx of hyperkalemia sent by pulmonologist for hyperkalemia, admitted to tele for Hyperkalemia, now resolved Found to have right metatarsal fracture, seen by pods has right foot brace in place. Patient now wanting rehab despite being bale to ambulate appropriately with PT. Pending PT reevaluation.     Right knee pain.   - know with right knee pain, however able to walk without issue with PT for last 2 days  - No fracture on R knee Xray, chronic arthritic changes.   - pain control with tylenol PRN.    Foot fracture, right.   - patient with right foot pain, Xray showing subacute medially impacted 5th metatarsal neck fracture.  - podiatry consulted, placed Sierra compression dressing with surgical shoe on right foot  - will needed f/u with Dr. Amber Chase in 1 week of discharge  - pending PT reevaluation as patient now requesting rehab.    Hyperkalemia.   - Now resolved    - Seen by nephrology and patient with chronic hyperkalemia as outpatient, patient was prescribed lokelma and lasix but has not been taking those medications at home   - Now resolved, neprho signed off; patient to continue with lokelma and lasix at home   - will continue with standing lasix 20mg and lokelma   - On discharge, patient should get repeat BMP within 1-2 weeks.    COPD (chronic obstructive pulmonary disease).   - Continuous SpO2 monitoring, on home O2 3-4 L   - Flu/RSV: neg  - DuoNebs Q6h  - continue Symbicort BID (pt poorly compliant)  - monitor SpO2.    HTN (hypertension).  - Monitor BP daily  - DASH diet  - Continue metoprolol, furosemide.    Hypothyroidism.   - continue synthroid  - TSH: WNL.    DM2 (diabetes mellitus, type 2).   - Daily glucose monitoring  - insulin sliding scale  - DASH 1800 ADA diet  - serum HgA1C: 5.9.    Stage 3 chronic kidney disease.   - Monitored electrolytes  - Trended BUN/Cr  - avoided nephrotoxic meds    Chronic diastolic congestive heart failure.   - euvolemic on exam  - c/w furosemide   - decreased metoprolol to 12.5 mg BID from 25 mg BID given bradycardia   - 2g sodium diet    On 12/26/22, case was discussed with , patient is medically cleared and optimized for discharge today. All medications were reviewed with attending, and sent to mutually agreed upon pharmacy.     88 year old female PMHx HTN, DM2, COPD (O2 dependent 2-3L,) Chronic diastolic HF, hypothyroidism, CKD3, and hx of hyperkalemia sent by pulmonologist for hyperkalemia, admitted to tele for Hyperkalemia, now resolved Found to have right metatarsal fracture, seen by pods has right foot brace in place. Patient now wanting rehab despite being bale to ambulate appropriately with PT. Pending PT reevaluation.     Right knee pain.   - know with right knee pain, however able to walk without issue with PT for last 2 days  - No fracture on R knee Xray, chronic arthritic changes.   - pain control with tylenol PRN.    Foot fracture, right.   - patient with right foot pain, Xray showing subacute medially impacted 5th metatarsal neck fracture.  - podiatry consulted, placed Sierra compression dressing with surgical shoe on right foot  - will needed f/u with Dr. Amber Chase in 1 week of discharge  - pending PT reevaluation as patient now requesting rehab.    Hyperkalemia.   - Now resolved    - Seen by nephrology and patient with chronic hyperkalemia as outpatient, patient was prescribed lokelma and lasix but has not been taking those medications at home   - Now resolved, neprho signed off; patient to continue with lokelma and lasix at home   - will continue with standing lasix 20mg and lokelma   - On discharge, patient should get repeat BMP within 1-2 weeks.    COPD (chronic obstructive pulmonary disease).   - Continuous SpO2 monitoring, on home O2 3-4 L   - Flu/RSV: neg  - DuoNebs Q6h  - continue Symbicort BID (pt poorly compliant)  - monitor SpO2.    HTN (hypertension).  - Monitor BP daily  - DASH diet  - Continue metoprolol, furosemide.    Hypothyroidism.   - continue synthroid  - TSH: WNL.    DM2 (diabetes mellitus, type 2).   - Daily glucose monitoring  - insulin sliding scale  - DASH 1800 ADA diet  - serum HgA1C: 5.9.    Stage 3 chronic kidney disease.   - Monitored electrolytes  - Trended BUN/Cr  - avoided nephrotoxic meds    Chronic diastolic congestive heart failure.   - euvolemic on exam  - c/w furosemide   - decreased metoprolol to 12.5 mg BID from 25 mg BID given bradycardia   - 2g sodium diet    On 12/27/22, case was discussed with , patient is medically cleared and optimized for discharge today. All medications were reviewed with attending, and sent to mutually agreed upon pharmacy.     88 year old female PMHx HTN, DM2, COPD (O2 dependent 2-3L,) Chronic diastolic HF, hypothyroidism, CKD3, and hx of hyperkalemia sent by pulmonologist for hyperkalemia, admitted to tele for Hyperkalemia, now resolved Found to have right metatarsal fracture, seen by pods has right foot brace in place.     Right knee pain.   - know with right knee pain, however able to walk without issue with PT  - No fracture on R knee Xray, chronic arthritic changes.   - pain control with tylenol PRN.    Foot fracture, right.   - patient with right foot pain, Xray showing subacute medially impacted 5th metatarsal neck fracture.  - podiatry consulted, placed Sierra compression dressing with surgical shoe on right foot  - will need f/u with Dr. Amber Chase in 1 week of discharge  - outpatient PT     Hyperkalemia.   - Now resolved    - Seen by nephrology and patient with chronic hyperkalemia as outpatient, patient was prescribed lokelma and lasix but has not been taking those medications at home   - will continue with standing lasix 20mg and lokelma   - Nephrology signed off   - On discharge, patient should get repeat BMP within 1-2 weeks.    COPD (chronic obstructive pulmonary disease).   - Chronic hypoxic respiratory failure d/t COPD, on home O2 3-4 L   - Flu/RSV: neg  - DuoNebs Q6h  - continue Symbicort BID (pt poorly compliant)  - monitor SpO2.    HTN (hypertension).  - Monitor BP daily  - DASH diet  - Continue metoprolol, furosemide.    Hypothyroidism.   - continue synthroid  - TSH: WNL.    DM2 (diabetes mellitus, type 2).   - Daily glucose monitoring  - insulin sliding scale  - DASH 1800 ADA diet  - serum HgA1C: 5.9.    Stage 3 chronic kidney disease.   - Monitored electrolytes  - Trended BUN/Cr  - avoided nephrotoxic meds    Chronic diastolic congestive heart failure.   - euvolemic on exam  - c/w furosemide   - decreased metoprolol to 12.5 mg BID from 25 mg BID given bradycardia   - 2g sodium diet    On 12/27/22, case was discussed with , patient is medically cleared and optimized for discharge today. All medications were reviewed with attending, and sent to mutually agreed upon pharmacy.

## 2022-12-21 NOTE — CONSULT NOTE ADULT - SUBJECTIVE AND OBJECTIVE BOX
Patient is a 88y old  Female who presents with a chief complaint of hyperkalemia (21 Dec 2022 14:51)      HPI:    88 year old female PMHx HTN, DM2, COPD (O2 dependent 2-3L,) Chronic diastolic HF, hypothyroidism, CKD3, and hx of hyperkalemia sent by pulmonologist for hyperkalemia. Patient has known history of hyperkalemia with multiple admissions.  At baseline pt has LOGAN after walking 1/4 block NO LOGAN w/ ADLs. For past 1 week pt reports LOGAN w/ ADLs and wheezing. She tried using Ventolin 2 times/day without relief. Pt poorly compliant with Symbicort and only uses it PRN. Patient states she has had increased oxygen requirement recently, normally on 3 L down to 4 L nasal cannula at home. Went to see her pulmonologist who did blood work on her and told to go to ED for "abnormal labs." Pt denies fever, chills, chest pain, sob, palpitations, diaphoresis, sore throat, headache weight gain/loss, nausea vomiting, abdominal pain or sick contacts. (18 Dec 2022 15:32)      PAST MEDICAL & SURGICAL HISTORY:  DM (diabetes mellitus)      Hypothyroid      HTN (hypertension)      COPD (chronic obstructive pulmonary disease)      Chronic kidney disease (CKD)      Shingles      S/P cholecystectomy      H/O cataract  bilateral 2013      Leg fracture  right ORIF with hardware 2010      S/P hip replacement, left  2019          MEDICATIONS  (STANDING):  albuterol/ipratropium for Nebulization 3 milliLiter(s) Nebulizer every 6 hours  budesonide 160 MICROgram(s)/formoterol 4.5 MICROgram(s) Inhaler 2 Puff(s) Inhalation two times a day  busPIRone 10 milliGRAM(s) Oral two times a day  dextrose 5%. 1000 milliLiter(s) (100 mL/Hr) IV Continuous <Continuous>  dextrose 5%. 1000 milliLiter(s) (50 mL/Hr) IV Continuous <Continuous>  dextrose 50% Injectable 25 Gram(s) IV Push once  dextrose 50% Injectable 12.5 Gram(s) IV Push once  dextrose 50% Injectable 25 Gram(s) IV Push once  ferrous    sulfate 325 milliGRAM(s) Oral daily  furosemide    Tablet 20 milliGRAM(s) Oral daily  glucagon  Injectable 1 milliGRAM(s) IntraMuscular once  heparin   Injectable 5000 Unit(s) SubCutaneous every 8 hours  insulin lispro (ADMELOG) corrective regimen sliding scale   SubCutaneous three times a day before meals  insulin lispro (ADMELOG) corrective regimen sliding scale   SubCutaneous at bedtime  levothyroxine 112 MICROGram(s) Oral daily  metoprolol tartrate 25 milliGRAM(s) Oral two times a day  montelukast 10 milliGRAM(s) Oral at bedtime  sodium zirconium cyclosilicate 10 Gram(s) Oral daily    MEDICATIONS  (PRN):  dextrose Oral Gel 15 Gram(s) Oral once PRN Blood Glucose LESS THAN 70 milliGRAM(s)/deciliter      Allergies    No Known Allergies    Intolerances        VITALS:    Vital Signs Last 24 Hrs  T(C): 36.6 (21 Dec 2022 11:00), Max: 36.9 (21 Dec 2022 05:00)  T(F): 97.9 (21 Dec 2022 11:00), Max: 98.4 (21 Dec 2022 05:00)  HR: 60 (21 Dec 2022 17:25) (55 - 66)  BP: 140/50 (21 Dec 2022 17:10) (119/41 - 140/50)  BP(mean): --  RR: 18 (21 Dec 2022 17:10) (18 - 18)  SpO2: 97% (21 Dec 2022 17:10) (94% - 99%)    Parameters below as of 21 Dec 2022 17:10  Patient On (Oxygen Delivery Method): nasal cannula  O2 Flow (L/min): 3      LABS:                          10.4   7.99  )-----------( 239      ( 21 Dec 2022 07:28 )             32.4       12-21    139  |  103  |  44<H>  ----------------------------<  131<H>  4.6   |  28  |  1.52<H>    Ca    8.8      21 Dec 2022 07:28  Phos  3.8     12-21  Mg     2.00     12-21        CAPILLARY BLOOD GLUCOSE      POCT Blood Glucose.: 117 mg/dL (21 Dec 2022 17:22)  POCT Blood Glucose.: 134 mg/dL (21 Dec 2022 12:27)  POCT Blood Glucose.: 140 mg/dL (21 Dec 2022 08:27)  POCT Blood Glucose.: 174 mg/dL (20 Dec 2022 22:17)          LOWER EXTREMITY PHYSICAL EXAM:    Vascular: DP/PT 2/4, B/L, CFT <3 seconds B/L, Temperature gradient warm to cool, B/L.   Neuro: Epicritic sensation intact to the level of forefoot B/L.  Musculoskeletal/Ortho: Right foot pain to palpation over the anterior ankle, no pain to palpation over later foot, limited range of motion with dorsiflexion, normal range of motion with plantarflexion/inversion/eversion.  Skin: Right foot no ecchymosis, no open wounds, no signs of infection. No signs of infection to the left foot.         RADIOLOGY & ADDITIONAL STUDIES:  < from: Xray Foot AP + Lateral, Right (12.21.22 @ 13:21) >  C: [id number] EXAM:  XR FOOT 2 VIEWS RT                          PROCEDURE DATE:  12/21/2022          INTERPRETATION:  CLINICAL INDICATION: right foot pain    EXAM:  Portable frontal and lateral right foot from 12/21/2022 at 1321. No prior   right foot radiographs available for comparison.    IMPRESSION:  Subacute appearing slightly medially impacted 5th metatarsal neck   fracture deformity. No intra-articular involvement. No dislocations or   additional fractures.    Tarsometatarsal alignment maintained without evidence for a Lisfranc   injury.    Slight hallux osseous deformity with slightly hypertrophied 1st   metatarsal dorsomedial eminence and small bunion. Preserved remaining   visualized joint spaces and no joint margin erosions.    Thick plantar and less conspicuous posterior calcaneal enthesophytes.    Generalized osteopenia otherwise no discrete suspicious lytic or blastic   lesions    --- End of Report ---            CALVIN GIMENEZ MD; Attending Radiologist  This document has been electronically signed. 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 Patient has known history of hyperkalemia with multiple admissions.  At baseline pt has LOGAN after walking 1/4 block NO LOGAN w/ ADLs. For past 1 week pt reports LOGAN w/ ADLs and wheezing. She tried using Ventolin 2 times/day without relief. Pt poorly compliant with Symbicort and only uses it PRN. Patient states she has had increased oxygen requirement recently, normally on 3 L down to 4 L nasal cannula at home. Went to see her pulmonologist who did blood work on her and told to go to ED for "abnormal labs." Pt denies fever, chills, chest pain, sob, palpitations, diaphoresis, sore throat, headache weight gain/loss, nausea vomiting, abdominal pain or sick contacts. (18 Dec 2022 15:32)      PAST MEDICAL & SURGICAL HISTORY:  DM (diabetes mellitus)      Hypothyroid      HTN (hypertension)      COPD (chronic obstructive pulmonary disease)      Chronic kidney disease (CKD)      Shingles      S/P cholecystectomy      H/O cataract  bilateral 2013      Leg fracture  right ORIF with hardware 2010      S/P hip replacement, left  2019          MEDICATIONS  (STANDING):  albuterol/ipratropium for Nebulization 3 milliLiter(s) Nebulizer every 6 hours  budesonide 160 MICROgram(s)/formoterol 4.5 MICROgram(s) Inhaler 2 Puff(s) Inhalation two times a day  busPIRone 10 milliGRAM(s) Oral two times a day  dextrose 5%. 1000 milliLiter(s) (100 mL/Hr) IV Continuous <Continuous>  dextrose 5%. 1000 milliLiter(s) (50 mL/Hr) IV Continuous <Continuous>  dextrose 50% Injectable 25 Gram(s) IV Push once  dextrose 50% Injectable 12.5 Gram(s) IV Push once  dextrose 50% Injectable 25 Gram(s) IV Push once  ferrous    sulfate 325 milliGRAM(s) Oral daily  furosemide    Tablet 20 milliGRAM(s) Oral daily  glucagon  Injectable 1 milliGRAM(s) IntraMuscular once  heparin   Injectable 5000 Unit(s) SubCutaneous every 8 hours  insulin lispro (ADMELOG) corrective regimen sliding scale   SubCutaneous three times a day before meals  insulin lispro (ADMELOG) corrective regimen sliding scale   SubCutaneous at bedtime  levothyroxine 112 MICROGram(s) Oral daily  metoprolol tartrate 25 milliGRAM(s) Oral two times a day  montelukast 10 milliGRAM(s) Oral at bedtime  sodium zirconium cyclosilicate 10 Gram(s) Oral daily    MEDICATIONS  (PRN):  dextrose Oral Gel 15 Gram(s) Oral once PRN Blood Glucose LESS THAN 70 milliGRAM(s)/deciliter      Allergies    No Known Allergies    Intolerances        VITALS:    Vital Signs Last 24 Hrs  T(C): 36.6 (21 Dec 2022 11:00), Max: 36.9 (21 Dec 2022 05:00)  T(F): 97.9 (21 Dec 2022 11:00), Max: 98.4 (21 Dec 2022 05:00)  HR: 60 (21 Dec 2022 17:25) (55 - 66)  BP: 140/50 (21 Dec 2022 17:10) (119/41 - 140/50)  BP(mean): --  RR: 18 (21 Dec 2022 17:10) (18 - 18)  SpO2: 97% (21 Dec 2022 17:10) (94% - 99%)    Parameters below as of 21 Dec 2022 17:10  Patient On (Oxygen Delivery Method): nasal cannula  O2 Flow (L/min): 3      LABS:                          10.4   7.99  )-----------( 239      ( 21 Dec 2022 07:28 )             32.4       12-21    139  |  103  |  44<H>  ----------------------------<  131<H>  4.6   |  28  |  1.52<H>    Ca    8.8      21 Dec 2022 07:28  Phos  3.8     12-21  Mg     2.00     12-21        CAPILLARY BLOOD GLUCOSE      POCT Blood Glucose.: 117 mg/dL (21 Dec 2022 17:22)  POCT Blood Glucose.: 134 mg/dL (21 Dec 2022 12:27)  POCT Blood Glucose.: 140 mg/dL (21 Dec 2022 08:27)  POCT Blood Glucose.: 174 mg/dL (20 Dec 2022 22:17)          LOWER EXTREMITY PHYSICAL EXAM:    Vascular: DP/PT 2/4, B/L, CFT <3 seconds B/L, Temperature gradient warm to cool, B/L.   Neuro: Epicritic sensation intact to the level of forefoot B/L.  Musculoskeletal/Ortho: Right foot pain to palpation over the anterior ankle, no pain to palpation over later foot, limited range of motion with dorsiflexion, normal range of motion with plantarflexion/inversion/eversion.  Skin: Right foot no ecchymosis, no open wounds, no signs of infection. No signs of infection to the left foot.         RADIOLOGY & ADDITIONAL STUDIES:  < from: Xray Foot AP + Lateral, Right (12.21.22 @ 13:21) >  C: [id number] EXAM:  XR FOOT 2 VIEWS RT                          PROCEDURE DATE:  12/21/2022          INTERPRETATION:  CLINICAL INDICATION: right foot pain    EXAM:  Portable frontal and lateral right foot from 12/21/2022 at 1321. No prior   right foot radiographs available for comparison.    IMPRESSION:  Subacute appearing slightly medially impacted 5th metatarsal neck   fracture deformity. No intra-articular involvement. No dislocations or   additional fractures.    Tarsometatarsal alignment maintained without evidence for a Lisfranc   injury.    Slight hallux osseous deformity with slightly hypertrophied 1st   metatarsal dorsomedial eminence and small bunion. Preserved remaining   visualized joint spaces and no joint margin erosions.    Thick plantar and less conspicuous posterior calcaneal enthesophytes.    Generalized osteopenia otherwise no discrete suspicious lytic or blastic   lesions    --- End of Report ---            CALVIN GIMENEZ MD; Attending Radiologist  This document has been electronically signed. 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 Patient has known history of hyperkalemia with multiple admissions.  At baseline pt has LOGAN after walking 1/4 block NO LOGAN w/ ADLs. For past 1 week pt reports LOGAN w/ ADLs and wheezing. She tried using Ventolin 2 times/day without relief. Pt poorly compliant with Symbicort and only uses it PRN. Patient states she has had increased oxygen requirement recently, normally on 3 L down to 4 L nasal cannula at home. Went to see her pulmonologist who did blood work on her and told to go to ED for "abnormal labs." Pt denies fever, chills, chest pain, sob, palpitations, diaphoresis, sore throat, headache weight gain/loss, nausea vomiting, abdominal pain or sick contacts. (18 Dec 2022 15:32)      PAST MEDICAL & SURGICAL HISTORY:  DM (diabetes mellitus)      Hypothyroid      HTN (hypertension)      COPD (chronic obstructive pulmonary disease)      Chronic kidney disease (CKD)      Shingles      S/P cholecystectomy      H/O cataract  bilateral 2013      Leg fracture  right ORIF with hardware 2010      S/P hip replacement, left  2019          MEDICATIONS  (STANDING):  albuterol/ipratropium for Nebulization 3 milliLiter(s) Nebulizer every 6 hours  budesonide 160 MICROgram(s)/formoterol 4.5 MICROgram(s) Inhaler 2 Puff(s) Inhalation two times a day  busPIRone 10 milliGRAM(s) Oral two times a day  dextrose 5%. 1000 milliLiter(s) (100 mL/Hr) IV Continuous <Continuous>  dextrose 5%. 1000 milliLiter(s) (50 mL/Hr) IV Continuous <Continuous>  dextrose 50% Injectable 25 Gram(s) IV Push once  dextrose 50% Injectable 12.5 Gram(s) IV Push once  dextrose 50% Injectable 25 Gram(s) IV Push once  ferrous    sulfate 325 milliGRAM(s) Oral daily  furosemide    Tablet 20 milliGRAM(s) Oral daily  glucagon  Injectable 1 milliGRAM(s) IntraMuscular once  heparin   Injectable 5000 Unit(s) SubCutaneous every 8 hours  insulin lispro (ADMELOG) corrective regimen sliding scale   SubCutaneous three times a day before meals  insulin lispro (ADMELOG) corrective regimen sliding scale   SubCutaneous at bedtime  levothyroxine 112 MICROGram(s) Oral daily  metoprolol tartrate 25 milliGRAM(s) Oral two times a day  montelukast 10 milliGRAM(s) Oral at bedtime  sodium zirconium cyclosilicate 10 Gram(s) Oral daily    MEDICATIONS  (PRN):  dextrose Oral Gel 15 Gram(s) Oral once PRN Blood Glucose LESS THAN 70 milliGRAM(s)/deciliter      Allergies    No Known Allergies    Intolerances        VITALS:    Vital Signs Last 24 Hrs  T(C): 36.6 (21 Dec 2022 11:00), Max: 36.9 (21 Dec 2022 05:00)  T(F): 97.9 (21 Dec 2022 11:00), Max: 98.4 (21 Dec 2022 05:00)  HR: 60 (21 Dec 2022 17:25) (55 - 66)  BP: 140/50 (21 Dec 2022 17:10) (119/41 - 140/50)  BP(mean): --  RR: 18 (21 Dec 2022 17:10) (18 - 18)  SpO2: 97% (21 Dec 2022 17:10) (94% - 99%)    Parameters below as of 21 Dec 2022 17:10  Patient On (Oxygen Delivery Method): nasal cannula  O2 Flow (L/min): 3      LABS:                          10.4   7.99  )-----------( 239      ( 21 Dec 2022 07:28 )             32.4       12-21    139  |  103  |  44<H>  ----------------------------<  131<H>  4.6   |  28  |  1.52<H>    Ca    8.8      21 Dec 2022 07:28  Phos  3.8     12-21  Mg     2.00     12-21        CAPILLARY BLOOD GLUCOSE      POCT Blood Glucose.: 117 mg/dL (21 Dec 2022 17:22)  POCT Blood Glucose.: 134 mg/dL (21 Dec 2022 12:27)  POCT Blood Glucose.: 140 mg/dL (21 Dec 2022 08:27)  POCT Blood Glucose.: 174 mg/dL (20 Dec 2022 22:17)          LOWER EXTREMITY PHYSICAL EXAM:    Vascular: DP/PT 2/4, B/L, CFT <3 seconds B/L, Temperature gradient warm to cool, B/L.   Neuro: Epicritic sensation intact to the level of forefoot B/L.  Musculoskeletal/Ortho: Right foot pain to palpation over the anterior ankle, no pain to palpation over later foot, limited range of motion with dorsiflexion, normal range of motion with plantarflexion/inversion/eversion.  Skin: Right foot no ecchymosis, no open wounds, no signs of infection. No signs of infection to the left foot.         RADIOLOGY & ADDITIONAL STUDIES:  < from: Xray Foot AP + Lateral, Right (12.21.22 @ 13:21) >  C: [id number] EXAM:  XR FOOT 2 VIEWS RT                          PROCEDURE DATE:  12/21/2022          INTERPRETATION:  CLINICAL INDICATION: right foot pain    EXAM:  Portable frontal and lateral right foot from 12/21/2022 at 1321. No prior   right foot radiographs available for comparison.    IMPRESSION:  Subacute appearing slightly medially impacted 5th metatarsal neck   fracture deformity. No intra-articular involvement. No dislocations or   additional fractures.    Tarsometatarsal alignment maintained without evidence for a Lisfranc   injury.    Slight hallux osseous deformity with slightly hypertrophied 1st   metatarsal dorsomedial eminence and small bunion. Preserved remaining   visualized joint spaces and no joint margin erosions.    Thick plantar and less conspicuous posterior calcaneal enthesophytes.    Generalized osteopenia otherwise no discrete suspicious lytic or blastic   lesions    --- End of Report ---            CALVIN GIMENEZ MD; Attending Radiologist  This document has been electronically signed. Dec 21 2022  3:04PM    < end of copied text >

## 2022-12-21 NOTE — DISCHARGE NOTE PROVIDER - NSDCMRMEDTOKEN_GEN_ALL_CORE_FT
busPIRone 10 mg oral tablet: 1 tab(s) orally 2 times a day  ferrous sulfate 325 mg (65 mg elemental iron) oral tablet: 1 tab(s) orally once a day  FUROSEMIDE 20 MG TABLET: TAKE 1 TABLET (20 MG) BY ORAL ROUTE EVERY OTHER DAY  levothyroxine 112 mcg (0.112 mg) oral tablet: 1 tab(s) orally once a day  metFORMIN 500 mg oral tablet: 1 tab(s) orally 2 times a day  metoprolol tartrate 25 mg oral tablet: 1 tab(s) orally 2 times a day  montelukast 10 mg oral tablet: 1 tab(s) orally once a day (at bedtime)  outpatient physical therapy :   Spiriva Respimat: 2.5 microgram(s) - two puffs inhaled once a day  Symbicort 160 mcg-4.5 mcg/inh inhalation aerosol: 2 puff(s) inhaled 2 times a day  Ventolin HFA 90 mcg/inh inhalation aerosol: 2 puff(s) inhaled every 6 hours, As Needed   busPIRone 10 mg oral tablet: 1 tab(s) orally 2 times a day  ferrous sulfate 325 mg (65 mg elemental iron) oral tablet: 1 tab(s) orally once a day  furosemide 20 mg oral tablet: 1 tab(s) orally once a day  levothyroxine 112 mcg (0.112 mg) oral tablet: 1 tab(s) orally once a day  Lokelma 10 g oral powder for reconstitution: 10 gram(s) orally once a day   metFORMIN 500 mg oral tablet: 1 tab(s) orally 2 times a day  metoprolol succinate 25 mg oral tablet, extended release: 0.5 tab(s) orally 2 times a day   montelukast 10 mg oral tablet: 1 tab(s) orally once a day (at bedtime)  outpatient physical therapy : 3-5 times a week for balance training; bed mobility training; gait training; strengthening; transfer training  Spiriva Respimat: 2.5 microgram(s) - two puffs inhaled once a day  Symbicort 160 mcg-4.5 mcg/inh inhalation aerosol: 2 puff(s) inhaled 2 times a day  Ventolin HFA 90 mcg/inh inhalation aerosol: 2 puff(s) inhaled every 6 hours, As Needed

## 2022-12-21 NOTE — CONSULT NOTE ADULT - ASSESSMENT
88 y.o F with right foot pain   - Pt seen and evaluated   - Neurovascular status intact B/L   - Right foot pain to palpation over the anterior ankle, no pain to palpation over later foot, limited range of motion with dorsiflexion, normal range of motion with plantarflexion/inversion/eversion, no ecchymosis, no open wounds, no signs of infection. No signs of infection to to the left foot.   - Right foot Xray: Subacute appearing slightly medially impacted 5th metatarsal neck fracture deformity. No intra-articular involvement. No dislocations or additional fractures.  - Sierra compression dressing followed by surgical shoe was given to the patient.   - Pt to remain weight bearing as tolerated in surgical shoe to the right heel   - PT consult for weight bearing to the right heel as tolerated in surgical shoe   - Pod stable for discharge, please re-consult if needed.   - Pt to follow up with Dr. Amber Chase with 1 week of discharge. Please call [phone] to make an appointment.   - Discussed with attending.   88 y.o F with right foot pain   - Pt seen and evaluated   - Neurovascular status intact B/L   - Right foot pain to palpation over the anterior ankle, no pain to palpation over later foot, limited range of motion with dorsiflexion, normal range of motion with plantarflexion/inversion/eversion, no ecchymosis, no open wounds, no signs of infection. No signs of infection to to the left foot.   - Right foot Xray: Subacute appearing slightly medially impacted 5th metatarsal neck fracture deformity. No intra-articular involvement. No dislocations or additional fractures.  - Sierra compression dressing followed by surgical shoe was given to the patient.   - Pt to remain weight bearing as tolerated in surgical shoe to the right heel   - PT consult for weight bearing to the right heel as tolerated in surgical shoe   - Pod stable for discharge, please re-consult if needed.   - Pt to follow up with Dr. Amber Chase with 1 week of discharge. Please call [phone] to make an appointment.   - Discussed with attending.   88 y.o F with right foot pain   - Pt seen and evaluated   - Neurovascular status intact B/L   - Right foot pain to palpation over the anterior ankle, no pain to palpation over later foot, limited range of motion with dorsiflexion, normal range of motion with plantarflexion/inversion/eversion, no ecchymosis, no open wounds, no signs of infection. No signs of infection to to the left foot.   - Right foot Xray: Subacute appearing slightly medially impacted 5th metatarsal neck fracture deformity. No intra-articular involvement. No dislocations or additional fractures.  - Sierra compression dressing followed by surgical shoe was given to the patient.   - Pt to remain weight bearing as tolerated in surgical shoe to the right heel   - PT consult for weight bearing to the right heel as tolerated in surgical shoe   - Pod stable for discharge, please re-consult if needed.   - Pt to follow up with Dr. Amber Chase with 1 week of discharge. Please call [phone] to make an appointment.   - Discussed with attending.   88 y.o F with right foot pain   - Pt seen and evaluated   - Neurovascular status intact B/L   - Right foot pain to palpation over the anterior ankle, no pain to palpation over later foot, limited range of motion with dorsiflexion, normal range of motion with plantarflexion/inversion/eversion, no ecchymosis, no open wounds, no signs of infection. No signs of infection to to the left foot.   - Right foot Xray: Subacute appearing slightly medially impacted 5th metatarsal neck fracture deformity. No intra-articular involvement. No dislocations or additional fractures.  - Sierra compression dressing followed by surgical shoe was given to the patient.   - Pt to remain weight bearing as tolerated in surgical shoe to the right heel   - Physical therapy consult for right foot weightbearing as tolerated with distribution of weight to right heel   - Pt to follow up with Dr. Amber Chase with 1 week of discharge. Please call [phone] to make an appointment.   - Discussed with attending.   88 y.o F with right foot pain   - Pt seen and evaluated   - Neurovascular status intact B/L   - Right foot pain to palpation over the anterior ankle, no pain to palpation over later foot, limited range of motion with dorsiflexion, normal range of motion with plantarflexion/inversion/eversion, no ecchymosis, no open wounds, no signs of infection. No signs of infection to to the left foot.   - Right foot Xray: Subacute appearing slightly medially impacted 5th metatarsal neck fracture deformity. No intra-articular involvement. No dislocations or additional fractures.  - Sierra compression dressing followed by surgical shoe was given to the patient.   - Pt to remain weight bearing as tolerated in surgical shoe to the right heel   - Physical therapy consult for right foot weightbearing as tolerated with distribution of weight to right heel   - Pt to follow up with Dr. Amber Chase with 1 week of discharge. Please call [phone] to make an appointment.   - Discussed with attending.   88 y.o F with right foot pain   - Pt seen and evaluated   - Neurovascular status intact B/L   - Right foot pain to palpation over the anterior ankle, no pain to palpation over later foot, limited range of motion with dorsiflexion, normal range of motion with plantarflexion/inversion/eversion, no ecchymosis, no open wounds, no signs of infection. No signs of infection to to the left foot.   - Right foot Xray: Subacute appearing slightly medially impacted 5th metatarsal neck fracture deformity. No intra-articular involvement. No dislocations or additional fractures.  - Sierra compression dressing followed by surgical shoe was given to the patient.   - Pt to remain weight bearing as tolerated in surgical shoe to the right heel   - Physical therapy consult for right foot weightbearing as tolerated with distribution of weight to right heel   - Pt to follow up with Dr. Amber Chase with 1 week of discharge. Please call [phone] to make an appointment.   - Discussed with attending.

## 2022-12-21 NOTE — DISCHARGE NOTE PROVIDER - PROVIDER TOKENS
FREE:[LAST:[Dr.Ammar Chase],PHONE:[phone]],FAX:[(   )    -]] FREE:[LAST:[Dr.Ammar Chase],PHONE:[phone]],FAX:[(   )    -]],FREE:[LAST:[YOur],FIRST:[PCP],PHONE:[(   )    -],FAX:[(   )    -]]

## 2022-12-21 NOTE — DISCHARGE NOTE PROVIDER - NSDCCPCAREPLAN_GEN_ALL_CORE_FT
PRINCIPAL DISCHARGE DIAGNOSIS  Diagnosis: Hyperkalemia  Assessment and Plan of Treatment: You were sent to the [facility] by your pulmonologist for high potassium levels. You were seen by the nephrology team (kidney doctors). You were given Lasix and Lokelma. You potassium levels are now normal.  Continue medications as prescribed. Follow up with your primary care doctor for further evaluation, blood work, and management. Please call to make an appointment within 1-2 weeks of discharge.      SECONDARY DISCHARGE DIAGNOSES  Diagnosis: COPD exacerbation  Assessment and Plan of Treatment: COPD (chronic obstructive pulmonary disease).   - Continuous SpO2 monitoring, on home O2 3-4 L   - Flu/RSV: neg  - DuoNebs Q6h  - continue Symbicort BID (pt poorly compliant)  - monitor SpO2.      Diagnosis: Right knee pain  Assessment and Plan of Treatment: Right knee pain.   - know with right knee pain, however able to walk without issue with PT for last 2 days  - No fracture on R knee Xray, chronic arthritic changes.   - pain control with tylenol PRN.      Diagnosis: Foot fracture, right  Assessment and Plan of Treatment: Foot fracture, right.   - patient with right foot pain, Xray showing subacute medially impacted 5th metatarsal neck fracture.  - podiatry consulted, placed Sierra compression dressing with surgical shoe on right foot  - will needed f/u with Dr. Amber Chase in 1 week of discharge  - pending PT reevaluation as patient now requesting rehab.      Diagnosis: HTN (hypertension)  Assessment and Plan of Treatment: Continue current blood pressure medication regimen as directed. Monitor for any visual changes, headaches or dizziness.  Monitor blood pressure regularly.  Follow up with your PCP for further management for high blood pressure, please call to make appointment within 1 week of discharge (hypertension).  - Monitor BP daily  - DASH diet  - Continue metoprolol, furosemide.      Diagnosis: Hypothyroidism  Assessment and Plan of Treatment: Hypothyroidism.   - continue synthroid  - TSH: WNL.      Diagnosis: DM2 (diabetes mellitus, type 2)  Assessment and Plan of Treatment: Continue your medication regimen and a consistent carbohydrate diet (Meaning eating the same amount of carbohydrates at the same time each day). Monitor blood glucose levels throughout the day before meals and at bedtime. Record blood sugars and bring to outpatient providers appointment in order to be reviewed by your doctor for management modifications. If your sugars are more than 400 or less than 70 you should contact your PCP immediately. Monitor for signs/symptoms of low blood glucose, such as, dizziness, altered mental status, or cool/clammy skin. In addition, monitor for signs/symptoms of high blood glucose, such as, feeling hot, dry, fatigued, or with increased thirst/urination. Make regular podiatry appointments in order to have feet checked for wounds and uncontrolled toe nail growth to prevent infections, as well as, appointments with an ophthalmologist to monitor your vision. (diabetes mellitus, type 2).   - Daily glucose monitoring  - insulin sliding scale  - DASH 1800 ADA diet  - serum HgA1C: 5.9.      Diagnosis: Stage 3 chronic kidney disease  Assessment and Plan of Treatment: Stage 3 chronic kidney disease.   - Monitored electrolytes  - Trended BUN/Cr  - avoided nephrotoxic meds      Diagnosis: Chronic diastolic congestive heart failure  Assessment and Plan of Treatment: Chronic diastolic congestive heart failure.   - euvolemic on exam  - c/w furosemide   - decreased metoprolol to 12.5 mg BID from 25 mg BID given bradycardia   - 2g sodium diet     PRINCIPAL DISCHARGE DIAGNOSIS  Diagnosis: Hyperkalemia  Assessment and Plan of Treatment: You were sent to the \[facility]\"" by your pulmonologist for high potassium levels. You were seen by the nephrology team (kidney doctors). You were given Lasix and Lokelma. You potassium levels are now normal.  Continue medications as prescribed. Follow up with your primary care doctor for further evaluation, blood work, and management. Please call to make an appointment within 1-2 weeks of discharge.      SECONDARY DISCHARGE DIAGNOSES  Diagnosis: COPD exacerbation  Assessment and Plan of Treatment: You have a medical history of COPD (chronic obstructive pulmonary disease). You were given oxygen. Your Flu and RSV tests were negative. You were given Duonebs and Symbicort. Your oxygen levels were continuously monitored. Please continue to use your inhalers as prescribed and follow-up with your primary care provider/pulmonologist for further care and annual pulmonary function testings.   Avoid smoking or being exposed to second-hand Informed pt of the various negative side effects of smoking including risk of COPD, Lung Ca etc  Strongly recommended that pt stops smoking and pt given various options of smoking cessasion tools such as NRT's and other pharmacotherapies, as well as, other potenital exacerbating triggers (Dust/pollen/pets).       Diagnosis: Right knee pain  Assessment and Plan of Treatment: During your stay you had complained of right knee pain. Xray of your knee shows chronic arthritic changes and no fractures. You were given pain medications. You were seen by the physical therapy team and they recommend you have physical therapy in the outpatient setting. Continue medications as prescribed. Follow up with your primary care doctor for further evaluation and management. Please call to make an appointment within 1-2 weeks of discharge.    Diagnosis: Foot fracture, right  Assessment and Plan of Treatment: During your stay you complained of right foot pain. Xray of your right foot showed a fracture. You were seen by the podiatry team (foot doctors) and they gave your a compression dressing with a surgical shoe. The podiatry team recommends you follow up with Dr.Ammar Chase within 1 week of discharge from the \[facility]\"". You had requested rehab. You were seen by the physical therapy team and they recommend you have out patient physical therapy. Continue medications as prescribed. Follow up with your primary care doctor and podiatrist for further evaluation and management. Please call to make an appointment within 1-2 weeks of discharge.    Diagnosis: HTN (hypertension)  Assessment and Plan of Treatment: You have a medical history of high blood pressure. Continue current blood pressure medication regimen as directed. Monitor for any visual changes, headaches or dizziness.  Monitor blood pressure regularly.  Follow up with your PCP for further management for high blood pressure, please call to make appointment within 1 week of discharge.    Diagnosis: Hypothyroidism  Assessment and Plan of Treatment: You have a medical history of low thyroid levels. Continue your thyroid medications as recommended and follow-up with your outpatient provider for continual thyroid function testing and further medication management.       Diagnosis: DM2 (diabetes mellitus, type 2)  Assessment and Plan of Treatment: You have a medical history of Type 2 Diabetes. You Hemaglobin A1C level is 5.9. Continue your medication regimen and a consistent carbohydrate diet (Meaning eating the same amount of carbohydrates at the same time each day). Monitor blood glucose levels throughout the day before meals and at bedtime. Record blood sugars and bring to outpatient providers appointment in order to be reviewed by your doctor for management modifications. If your sugars are more than 400 or less than 70 you should contact your PCP immediately. Monitor for signs/symptoms of low blood glucose, such as, dizziness, altered mental status, or cool/clammy skin. In addition, monitor for signs/symptoms of high blood glucose, such as, feeling hot, dry, fatigued, or with increased thirst/urination. Make regular podiatry appointments in order to have feet checked for wounds and uncontrolled toe nail growth to prevent infections, as well as, appointments with an ophthalmologist to monitor your vision. (diabetes mellitus, type 2).    Diagnosis: Stage 3 chronic kidney disease  Assessment and Plan of Treatment: You have a medical history of stage 3 chronic kidney disease. In order to prevent further disease progression, continue to follow recommendations made by your primary provider/nephrologist. Continue a diet that is low in sodium and avoid foods that are concentrated in potassium and phosphorus. Continue your medications/supplementations as directed and avoid over-the-counter drugs that are harmful to kidneys, such as, Non-Steroidal Anti-Inflammatory Drugs (NSAIDs). Follow-up as outpatient to monitor your kidney function, as well as, vitamin D, Calcium, potassium, and phosphorus levels.    Diagnosis: Chronic diastolic congestive heart failure  Assessment and Plan of Treatment: You have a medical history of chronic diastolic congestive heart failure. You were given Furosemide and your Metprolol dose was changed to 12.5mg two times a day.   Continue recommended medication regimen. Monitor for signs/symptoms of fluid overload and electrolyte abnormalities, such as, shortness of breath, cough, swelling, chest discomfort, changes in heart rate, dizziness, fainting, or changes in mental status. Follow-up with your PCP/cardiologist outpatient after you've been discharged from the hospital.

## 2022-12-21 NOTE — DISCHARGE NOTE PROVIDER - CARE PROVIDER_API CALL
Dr.Ammar Chase,   Phone: [phone]  Fax: (   )    -  Follow Up Time:    Dr.Ammar Chase,   Phone: [phone]  Fax: (   )    -  Follow Up Time:     YOur, PCP  Phone: (   )    -  Fax: (   )    -  Follow Up Time:

## 2022-12-21 NOTE — PROGRESS NOTE ADULT - SUBJECTIVE AND OBJECTIVE BOX
[facility] Division of Hospital Medicine  Joanie Goldberghuan EDWARDS  Pager [phone]  Available via MS Teams    SUBJECTIVE / OVERNIGHT EVENTS: Patient states that she has right ankle/heel pain that started this AM. She denies any fevers, chills, headaches.     ADDITIONAL REVIEW OF SYSTEMS:    MEDICATIONS  (STANDING):  albuterol/ipratropium for Nebulization 3 milliLiter(s) Nebulizer every 6 hours  budesonide 160 MICROgram(s)/formoterol 4.5 MICROgram(s) Inhaler 2 Puff(s) Inhalation two times a day  busPIRone 10 milliGRAM(s) Oral two times a day  dextrose 5%. 1000 milliLiter(s) (100 mL/Hr) IV Continuous <Continuous>  dextrose 5%. 1000 milliLiter(s) (50 mL/Hr) IV Continuous <Continuous>  dextrose 50% Injectable 25 Gram(s) IV Push once  dextrose 50% Injectable 12.5 Gram(s) IV Push once  dextrose 50% Injectable 25 Gram(s) IV Push once  ferrous    sulfate 325 milliGRAM(s) Oral daily  furosemide    Tablet 20 milliGRAM(s) Oral daily  glucagon  Injectable 1 milliGRAM(s) IntraMuscular once  heparin   Injectable 5000 Unit(s) SubCutaneous every 8 hours  insulin lispro (ADMELOG) corrective regimen sliding scale   SubCutaneous three times a day before meals  insulin lispro (ADMELOG) corrective regimen sliding scale   SubCutaneous at bedtime  levothyroxine 112 MICROGram(s) Oral daily  metoprolol tartrate 25 milliGRAM(s) Oral two times a day  montelukast 10 milliGRAM(s) Oral at bedtime  sodium zirconium cyclosilicate 10 Gram(s) Oral daily    MEDICATIONS  (PRN):  dextrose Oral Gel 15 Gram(s) Oral once PRN Blood Glucose LESS THAN 70 milliGRAM(s)/deciliter      I&O's Summary    20 Dec 2022 07:01  -  21 Dec 2022 07:00  --------------------------------------------------------  IN: 600 mL / OUT: 1900 mL / NET: -1300 mL        PHYSICAL EXAM:  Vital Signs Last 24 Hrs  T(C): 36.6 (21 Dec 2022 11:00), Max: 36.9 (21 Dec 2022 05:00)  T(F): 97.9 (21 Dec 2022 11:00), Max: 98.4 (21 Dec 2022 05:00)  HR: 55 (21 Dec 2022 11:00) (55 - 69)  BP: 119/41 (21 Dec 2022 11:00) (119/41 - 139/50)  BP(mean): --  RR: 18 (21 Dec 2022 11:00) (18 - 18)  SpO2: 98% (21 Dec 2022 11:00) (94% - 99%)    Parameters below as of 21 Dec 2022 11:00  Patient On (Oxygen Delivery Method): nasal cannula  O2 Flow (L/min): 3    CONSTITUTIONAL: NAD, well-developed, well-groomed  RESPIRATORY: Normal respiratory effort; lungs are clear to auscultation bilaterally  CARDIOVASCULAR: Regular rate and rhythm, normal S1 and S2, no murmur/rub/gallop; No lower extremity edema  ABDOMEN: Nontender to palpation, normoactive bowel sounds, no rebound/guarding; No hepatosplenomegaly  MUSCULOSKELETAL: no joint swelling or tenderness to palpation  PSYCH: A+O to person, place, and time; affect appropriate  NEUROLOGY: CN 2-12 are intact and symmetric; no gross sensory deficits   SKIN: No rashes; no palpable lesions    LABS:                        10.4   7.99  )-----------( 239      ( 21 Dec 2022 07:28 )             32.4     12-21    139  |  103  |  44<H>  ----------------------------<  131<H>  4.6   |  28  |  1.52<H>    Ca    8.8      21 Dec 2022 07:28  Phos  3.8     12-21  Mg     2.00     12-21            Urinalysis Basic - ( 20 Dec 2022 00:45 )    Color: Light Yellow / Appearance: Slightly Turbid / S.013 / pH: x  Gluc: x / Ketone: Negative  / Bili: Negative / Urobili: <2 mg/dL   Blood: x / Protein: 30 mg/dL / Nitrite: Negative   Leuk Esterase: Large / RBC: 2 /HPF / WBC 70 /HPF   Sq Epi: x / Non Sq Epi: 1 /HPF / Bacteria: Many        COVID-19 PCR: NotDetec (20 Sep 2022 13:40)  COVID-19 PCR: NotDetec (18 Sep 2022 06:21)  COVID-19 PCR: NotDetec (17 Sep 2022 07:55)  COVID-19 PCR: NotDetec (10 Sep 2022 03:40)

## 2022-12-21 NOTE — CHART NOTE - NSCHARTNOTEFT_GEN_A_CORE
Patient's potassium has improved.  Recommend continue lokelma upon discharge (she has supply at home).  She should follow up with me in the office as she has done in the past .  call with question.s

## 2022-12-21 NOTE — PROGRESS NOTE ADULT - PROBLEM SELECTOR PLAN 1
tele monitor; unclear etiology: multi-factorial? i/s/o CKD and nutrition possibly dietary.   - Seen by nephrology and patient with chronic hyperkalemia as outpatient, patient was prescribed lokelma and lasix but has not been taking those medications at home   - Now resolved, neprho signed off; patient to continue with lokelma and lasix at home   - will continue with standing lasix 20mg and lokelma   - On discharge, patient should get repeat BMP within 1-2 weeks

## 2022-12-21 NOTE — DISCHARGE NOTE PROVIDER - NSDCFUADDAPPT_GEN_ALL_CORE_FT
Podiatry Discharge Instructions:  Follow up: Please follow up with Dr. Amber Chase within 1 week of discharge from the hospital, please call [phone] for appointment and discuss that you recently were seen in the hospital.  Wound Care: Please leave your dressing clean dry intact until your follow up appointment   Weight bearing: Please weight bear as tolerated in a surgical shoe with weight distribution to the right heel   Podiatry Discharge Instructions:  Follow up: Please follow up with Dr. Amber Chase within 1 week of discharge from the hospital, please call [phone] for appointment and discuss that you recently were seen in the hospital.  Wound Care: Please leave your dressing clean dry intact until your follow up appointment   Weight bearing: Please weight bear as tolerated in a surgical shoe with weight distribution to the right heel    Follow up with your primary care doctor for further evaluation and management. Please call to make an appointment within 1-2 weeks of discharge.

## 2022-12-21 NOTE — DISCHARGE NOTE PROVIDER - NSRESEARCHGRANT_OVERRIDEREC_GEN_A_CORE
Pre procedure assessment:  Pt awake/ alert/ oriented. Voice clear and appropriate. Face symmetric. Pupils PERRLA @ 4 mm. Resp even/ non labored. Skin pink, warm, dry, cap refill < 3 sec. Pt has good pulse, motor, sensory x 4. Rt pedal pulse strong posterior tibial by doppler. Lt strong by palpation.   IV infusing without obvious complication IMPROVE-DD Application Not Available

## 2022-12-22 NOTE — PHYSICAL THERAPY INITIAL EVALUATION ADULT - GENERAL OBSERVATIONS, REHAB EVAL
Pt encountered sitting in chair in no distress, +Tele, +NC 3LO2, +Darco to right foot.
Pt encountered seated in chair in the ED, no distress, AxOx4, with +IV, +tele, and +2L of O2 through nasal cannula. Pt agreeable to participate in PT evaluation.

## 2022-12-22 NOTE — PROGRESS NOTE ADULT - PROBLEM SELECTOR PLAN 1
- patient with right foot pain, Xray showing subacute medially impacted 5th metatarsal neck fracture.  - podiatry consulted, placed Sierra compression dressing with surgical shoe on right foot  - will needed f/u with Dr. Amber Chase in 1 week of discharge  - PT to revaluate patient this AM given fracture noted on Xray, pending recs

## 2022-12-22 NOTE — PHYSICAL THERAPY INITIAL EVALUATION ADULT - ADDITIONAL COMMENTS
Pt state she lives alone in an apartment with elevator access and no steps to negotiate. Prior to admission pt ambulated independently with a rolling walker or shopping cart. Pt has a home health aide 6 days/week to assist around the house and with some ADLs. Pt uses 2-3L of home O2 and denies any recent falls.    Following evaluation, pt was left sitting in chair in no distress, all lines in tact, call arreguin in reach. JAYDE cisneros.
Pt reports that she lives alone in an apartment; no steps to negotiate; elevator inside. Prior to hospital admission pt was completely independent and used a rolling walker or shopping cart with ambulation. Pt has a home health aide 6 days/week that assist her with shopping and cleaning. Pt uses 2-3L of home O2 and denies any recent falls.    Pt left comfortable seated in chair, NAD, all lines intact, all precautions maintained, with call bell in reach, and RN aware.

## 2022-12-22 NOTE — PHYSICAL THERAPY INITIAL EVALUATION ADULT - MANUAL MUSCLE TESTING RESULTS, REHAB EVAL
Bilateral UE grossly >/= 3/5, bilateral LE grossly >/= 3+/5
cardiac precautions; bilateral UE and LE at least 3+/5/grossly assessed due to

## 2022-12-22 NOTE — PHYSICAL THERAPY INITIAL EVALUATION ADULT - DIAGNOSIS, PT EVAL
Pt admitted for Hyperkalemia and COPD exacerbation; pt presents with decreased strength, decreased balance, and decreased aerobic capacity/endurance.
Generalized weakness, impaired balance, decreased endurance.

## 2022-12-22 NOTE — PHYSICAL THERAPY INITIAL EVALUATION ADULT - PERTINENT HX OF CURRENT PROBLEM, REHAB EVAL
88 year old female sent by pulmonologist for hyperkalemia, admitted to tele for Hyperkalemia and COPD exacerbation. Patient with right foot pain, Xray showing subacute medially impacted 5th metatarsal neck fracture.
88 year old female PMHx HTN, DM2, COPD (O2 dependent 2-3L,) Chronic diastolic HF, hypothyroidism, CKD3, and hx of hyperkalemia sent by pulmonologist for hyperkalemia, admitted to tele for Hyperkalemia and COPD exacerbation.

## 2022-12-22 NOTE — PHYSICAL THERAPY INITIAL EVALUATION ADULT - PRECAUTIONS/LIMITATIONS, REHAB EVAL
+2L of O2 through nasal cannula/cardiac precautions/fall precautions/oxygen therapy device and L/min
cardiac precautions/fall precautions

## 2022-12-22 NOTE — PROGRESS NOTE ADULT - SUBJECTIVE AND OBJECTIVE BOX
[facility] Division of [facility] Medicine  MARISSAmarylu Goldberghuan EDWARDS  Pager [phone]  Available via MS Teams    SUBJECTIVE / OVERNIGHT EVENTS: Patient sitting in chair this AM.     ADDITIONAL REVIEW OF SYSTEMS:    MEDICATIONS  (STANDING):  albuterol/ipratropium for Nebulization 3 milliLiter(s) Nebulizer every 6 hours  budesonide 160 MICROgram(s)/formoterol 4.5 MICROgram(s) Inhaler 2 Puff(s) Inhalation two times a day  busPIRone 10 milliGRAM(s) Oral two times a day  dextrose 5%. 1000 milliLiter(s) (50 mL/Hr) IV Continuous <Continuous>  dextrose 5%. 1000 milliLiter(s) (100 mL/Hr) IV Continuous <Continuous>  dextrose 50% Injectable 25 Gram(s) IV Push once  dextrose 50% Injectable 12.5 Gram(s) IV Push once  dextrose 50% Injectable 25 Gram(s) IV Push once  ferrous    sulfate 325 milliGRAM(s) Oral daily  furosemide    Tablet 20 milliGRAM(s) Oral daily  glucagon  Injectable 1 milliGRAM(s) IntraMuscular once  heparin   Injectable 5000 Unit(s) SubCutaneous every 8 hours  insulin lispro (ADMELOG) corrective regimen sliding scale   SubCutaneous three times a day before meals  insulin lispro (ADMELOG) corrective regimen sliding scale   SubCutaneous at bedtime  levothyroxine 112 MICROGram(s) Oral daily  metoprolol tartrate 12.5 milliGRAM(s) Oral two times a day  montelukast 10 milliGRAM(s) Oral at bedtime  sodium zirconium cyclosilicate 10 Gram(s) Oral daily    MEDICATIONS  (PRN):  dextrose Oral Gel 15 Gram(s) Oral once PRN Blood Glucose LESS THAN 70 milliGRAM(s)/deciliter      I&O's Summary    21 Dec 2022 07:01  -  22 Dec 2022 07:00  --------------------------------------------------------  IN: 1300 mL / OUT: 0 mL / NET: 1300 mL        PHYSICAL EXAM:  Vital Signs Last 24 Hrs  T(C): 36.8 (22 Dec 2022 08:03), Max: 36.8 (22 Dec 2022 08:03)  T(F): 98.2 (22 Dec 2022 08:03), Max: 98.2 (22 Dec 2022 08:03)  HR: 45 (22 Dec 2022 08:11) (45 - 64)  BP: 112/49 (22 Dec 2022 08:03) (112/49 - 148/51)  BP(mean): --  RR: 16 (22 Dec 2022 08:03) (16 - 18)  SpO2: 98% (22 Dec 2022 08:03) (96% - 98%)    Parameters below as of 22 Dec 2022 08:03  Patient On (Oxygen Delivery Method): nasal cannula  O2 Flow (L/min): 3    CONSTITUTIONAL: NAD, elderly frail woman   RESPIRATORY: Normal respiratory effort; lungs are clear to auscultation bilaterally  CARDIOVASCULAR: Slow rate and regular rhythm, normal S1 and S2, no murmur/rub/gallop; No lower extremity edema  ABDOMEN: Nontender to palpation, normoactive bowel sounds, no rebound/guarding  MUSCULOSKELETAL: no clubbing or cyanosis of digits; no joint swelling or tenderness to palpation  PSYCH: A+O to person, place, and time; affect appropriate  NEUROLOGY: CN 2-12 are intact and symmetric; no gross sensory deficits   SKIN: No rashes; no palpable lesions    LABS:                        10.4   7.99  )-----------( 239      ( 21 Dec 2022 07:28 )             32.4     12-21    139  |  103  |  44<H>  ----------------------------<  131<H>  4.6   |  28  |  1.52<H>    Ca    8.8      21 Dec 2022 07:28  Phos  3.8     12-21  Mg     2.00     12-21                COVID-19 PCR: NotDetec (20 Sep 2022 13:40)  COVID-19 PCR: NotDetec (18 Sep 2022 06:21)  COVID-19 PCR: NotDetec (17 Sep 2022 07:55)  COVID-19 PCR: NotDetec (10 Sep 2022 03:40)      < from: Xray Foot AP + Lateral, Right (12.21.22 @ 13:21) >  IMPRESSION:  Subacute appearing slightly medially impacted 5th metatarsal neck   fracture deformity. No intra-articular involvement. No dislocations or   additional fractures.    Tarsometatarsal alignment maintained without evidence for a Lisfranc   injury.    Slight hallux osseous deformity with slightly hypertrophied 1st   metatarsal dorsomedial eminence and small bunion. Preserved remaining   visualized joint spaces and no joint margin erosions.    Thick plantar and less conspicuous posterior calcaneal enthesophytes.    Generalized osteopenia otherwise no discrete suspicious lytic or blastic   lesions    < end of copied text >

## 2022-12-22 NOTE — PHYSICAL THERAPY INITIAL EVALUATION ADULT - PATIENT PROFILE REVIEW, REHAB EVAL
No specific activity orders. Pt cleared for PT evaluation prior to session by JAYDE Law./yes
No Formal Activity Order in the Computer; Spoke with ED RN Marietta prior to PT evaluation--> Pt OK for PT consult/OOB activity./yes

## 2022-12-23 NOTE — PROGRESS NOTE ADULT - PROBLEM SELECTOR PLAN 1
- know with right knee pain, however able to walk without issue with PT for last 2 days  - currently pending right knee Xray, if normal patient is ready for discharge

## 2022-12-23 NOTE — PROGRESS NOTE ADULT - SUBJECTIVE AND OBJECTIVE BOX
[facility] Division of Hospital Medicine  Tjfroylan Belinda EDWARDS  Pager [phone]  Available via MS Teams    SUBJECTIVE / OVERNIGHT EVENTS: No acute events overnight. Patient now stating that she shas    ADDITIONAL REVIEW OF SYSTEMS:    MEDICATIONS  (STANDING):  albuterol/ipratropium for Nebulization 3 milliLiter(s) Nebulizer every 6 hours  budesonide 160 MICROgram(s)/formoterol 4.5 MICROgram(s) Inhaler 2 Puff(s) Inhalation two times a day  busPIRone 10 milliGRAM(s) Oral two times a day  dextrose 5%. 1000 milliLiter(s) (100 mL/Hr) IV Continuous <Continuous>  dextrose 5%. 1000 milliLiter(s) (50 mL/Hr) IV Continuous <Continuous>  dextrose 50% Injectable 25 Gram(s) IV Push once  dextrose 50% Injectable 12.5 Gram(s) IV Push once  dextrose 50% Injectable 25 Gram(s) IV Push once  ferrous    sulfate 325 milliGRAM(s) Oral daily  furosemide    Tablet 20 milliGRAM(s) Oral daily  glucagon  Injectable 1 milliGRAM(s) IntraMuscular once  heparin   Injectable 5000 Unit(s) SubCutaneous every 8 hours  insulin lispro (ADMELOG) corrective regimen sliding scale   SubCutaneous three times a day before meals  insulin lispro (ADMELOG) corrective regimen sliding scale   SubCutaneous at bedtime  levothyroxine 112 MICROGram(s) Oral daily  lidocaine   4% Patch 1 Patch Transdermal daily  lidocaine   4% Patch 1 Patch Transdermal daily  metoprolol tartrate 12.5 milliGRAM(s) Oral two times a day  montelukast 10 milliGRAM(s) Oral at bedtime  sodium zirconium cyclosilicate 10 Gram(s) Oral daily    MEDICATIONS  (PRN):  dextrose Oral Gel 15 Gram(s) Oral once PRN Blood Glucose LESS THAN 70 milliGRAM(s)/deciliter      I&O's Summary      PHYSICAL EXAM:  Vital Signs Last 24 Hrs  T(C): 36.6 (23 Dec 2022 09:32), Max: 36.9 (22 Dec 2022 16:14)  T(F): 97.8 (23 Dec 2022 09:32), Max: 98.4 (22 Dec 2022 16:14)  HR: 63 (23 Dec 2022 10:39) (59 - 74)  BP: 136/73 (23 Dec 2022 09:32) (120/54 - 140/50)  BP(mean): --  RR: 18 (23 Dec 2022 09:32) (16 - 18)  SpO2: 97% (23 Dec 2022 10:39) (92% - 98%)    Parameters below as of 23 Dec 2022 10:39  Patient On (Oxygen Delivery Method): nasal cannula      CONSTITUTIONAL: NAD, well-developed, well-groomed  EYES: PERRLA; conjunctiva and sclera clear  ENMT: Moist oral mucosa, no pharyngeal injection or exudates; normal dentition  NECK: Supple, no palpable masses; no thyromegaly  RESPIRATORY: Normal respiratory effort; lungs are clear to auscultation bilaterally  CARDIOVASCULAR: Regular rate and rhythm, normal S1 and S2, no murmur/rub/gallop; No lower extremity edema; Peripheral pulses are 2+ bilaterally  ABDOMEN: Nontender to palpation, normoactive bowel sounds, no rebound/guarding; No hepatosplenomegaly  MUSCULOSKELETAL:  Normal gait; no clubbing or cyanosis of digits; no joint swelling or tenderness to palpation  PSYCH: A+O to person, place, and time; affect appropriate  NEUROLOGY: CN 2-12 are intact and symmetric; no gross sensory deficits   SKIN: No rashes; no palpable lesions    LABS:                    COVID-19 PCR: NotDetec (20 Sep 2022 13:40)  COVID-19 PCR: NotDetec (18 Sep 2022 06:21)  COVID-19 PCR: NotDetec (17 Sep 2022 07:55)  COVID-19 PCR: NotDetec (10 Sep 2022 03:40)      RADIOLOGY & ADDITIONAL TESTS:  New Results Reviewed Today:   New Imaging Personally Reviewed Today:  New Electrocardiogram Personally Reviewed Today:  Prior or Outpatient Records Reviewed Today:    COMMUNICATION:  Care Discussed with Consultants/Other Providers and Details of Discussion:  Discussions with Patient/Family:  PCP Communication:     [facility] Division of Hospital Medicine  Tjfroylan Belinda EDWARDS  Pager [phone]  Available via MS Teams    SUBJECTIVE / OVERNIGHT EVENTS: No acute events overnight. Patient now stating that she has right knee pain.     ADDITIONAL REVIEW OF SYSTEMS:    MEDICATIONS  (STANDING):  albuterol/ipratropium for Nebulization 3 milliLiter(s) Nebulizer every 6 hours  budesonide 160 MICROgram(s)/formoterol 4.5 MICROgram(s) Inhaler 2 Puff(s) Inhalation two times a day  busPIRone 10 milliGRAM(s) Oral two times a day  dextrose 5%. 1000 milliLiter(s) (100 mL/Hr) IV Continuous <Continuous>  dextrose 5%. 1000 milliLiter(s) (50 mL/Hr) IV Continuous <Continuous>  dextrose 50% Injectable 25 Gram(s) IV Push once  dextrose 50% Injectable 12.5 Gram(s) IV Push once  dextrose 50% Injectable 25 Gram(s) IV Push once  ferrous    sulfate 325 milliGRAM(s) Oral daily  furosemide    Tablet 20 milliGRAM(s) Oral daily  glucagon  Injectable 1 milliGRAM(s) IntraMuscular once  heparin   Injectable 5000 Unit(s) SubCutaneous every 8 hours  insulin lispro (ADMELOG) corrective regimen sliding scale   SubCutaneous three times a day before meals  insulin lispro (ADMELOG) corrective regimen sliding scale   SubCutaneous at bedtime  levothyroxine 112 MICROGram(s) Oral daily  lidocaine   4% Patch 1 Patch Transdermal daily  lidocaine   4% Patch 1 Patch Transdermal daily  metoprolol tartrate 12.5 milliGRAM(s) Oral two times a day  montelukast 10 milliGRAM(s) Oral at bedtime  sodium zirconium cyclosilicate 10 Gram(s) Oral daily    MEDICATIONS  (PRN):  dextrose Oral Gel 15 Gram(s) Oral once PRN Blood Glucose LESS THAN 70 milliGRAM(s)/deciliter      I&O's Summary      PHYSICAL EXAM:  Vital Signs Last 24 Hrs  T(C): 36.6 (23 Dec 2022 09:32), Max: 36.9 (22 Dec 2022 16:14)  T(F): 97.8 (23 Dec 2022 09:32), Max: 98.4 (22 Dec 2022 16:14)  HR: 63 (23 Dec 2022 10:39) (59 - 74)  BP: 136/73 (23 Dec 2022 09:32) (120/54 - 140/50)  BP(mean): --  RR: 18 (23 Dec 2022 09:32) (16 - 18)  SpO2: 97% (23 Dec 2022 10:39) (92% - 98%)    Parameters below as of 23 Dec 2022 10:39  Patient On (Oxygen Delivery Method): nasal cannula      CONSTITUTIONAL: NAD, well-developed, well-groomed  RESPIRATORY: Normal respiratory effort; lungs are clear to auscultation bilaterally  CARDIOVASCULAR: Regular rate and rhythm, normal S1 and S2, no murmur/rub/gallop; No lower extremity edema; Peripheral pulses are 2+ bilaterally  ABDOMEN: Nontender to palpation, normoactive bowel sounds, no rebound/guarding; No hepatosplenomegaly  MUSCULOSKELETAL: + has right boot brace on  Normal gait; no clubbing or cyanosis of digits; no joint swelling or tenderness to palpation  PSYCH: A+O to person, place, and time; affect appropriate  NEUROLOGY: CN 2-12 are intact and symmetric; no gross sensory deficits   SKIN: No rashes; no palpable lesions    LABS:                    COVID-19 PCR: NotDetec (20 Sep 2022 13:40)  COVID-19 PCR: NotDetec (18 Sep 2022 06:21)  COVID-19 PCR: NotDetec (17 Sep 2022 07:55)  COVID-19 PCR: NotDetec (10 Sep 2022 03:40)      RADIOLOGY & ADDITIONAL TESTS:  New Results Reviewed Today:   New Imaging Personally Reviewed Today:  New Electrocardiogram Personally Reviewed Today:  Prior or Outpatient Records Reviewed Today:    COMMUNICATION:  Care Discussed with Consultants/Other Providers and Details of Discussion:  Discussions with Patient/Family:  PCP Communication:

## 2022-12-23 NOTE — CHART NOTE - NSCHARTNOTEFT_GEN_A_CORE
Patient was seen at bedside with both TYLER and CM to discuss dispo planning. Ericka was found t have tight metatarsal fracture and has been handles with podiatry. Patient initially requesting to go home, now wanting rehab as she states she cannot go home. TYLER gave patient list of several facilities both in [place] and [facility]. patient only provided 1 location [facility]. Patient was accepted to facility this morning  but now stating that she wants to go to [facility]. States that she only chose [facility] cause she wanted ot be near her aunt but now wants [facility] for her care.     Patient now stating that she has knee pain and demand right knee X-ray. X-ray has been ordered, if normal patient is medically cleared for discharge. Explained to patient at length that she must choose more facilities so that referrals can be sent and not further delay care. Patient was seen at bedside with both TYLER and CM to discuss dispo planning. Patient was found to have tight metatarsal fracture and has been handles with podiatry. Patient initially requesting to go home, now wanting rehab as she states she cannot go home. TYLER gave patient list of several facilities both in [place] and [facility]. patient only provided 1 location [facility]. Patient was accepted to facility this morning  but now stating that she wants to go to [facility]. States that she only chose [facility] cause she wanted ot be near her aunt but now wants [facility] for her care.     Patient now stating that she has knee pain and demand right knee X-ray. X-ray has been ordered, if normal patient is medically cleared for discharge. Explained to patient at length that she must choose more facilities so that referrals can be sent and not further delay care.

## 2022-12-24 NOTE — PROGRESS NOTE ADULT - PROBLEM SELECTOR PLAN 1
- know with right knee pain, however able to walk without issue with PT for last 2 days  - currently pending right knee Xray, if normal patient is ready for discharge - know with right knee pain, however able to walk without issue with PT for last 2 days  - No fracture on R knee Xray, chronic arthritic changes.   - pain control with tylenol PRN

## 2022-12-25 NOTE — PROVIDER CONTACT NOTE (OTHER) - REASON
4 beats of Vtach
rn notified by tele tech patient had 4 beats of vtach
3 beats VTACH, asymptomatic
BP
SB

## 2022-12-25 NOTE — PROVIDER CONTACT NOTE (OTHER) - SITUATION
HR went down to 48 on tele
Noted with 4 beats of Vtach, asymptomatic.
patient had 4 beats of vtach on tele
/41
Patient had 3 beats of VTACH, asymptomatic.

## 2022-12-25 NOTE — PROVIDER CONTACT NOTE (OTHER) - BACKGROUND
NSR/SB on tele
CKD, DM, HTN, COPD
admitted for hyperkalemia
Admitted for hyperkalemia
patient on tele monitoring but had 4 beats of vtach

## 2022-12-25 NOTE — PROGRESS NOTE ADULT - SUBJECTIVE AND OBJECTIVE BOX
Patient is a 88y old  Female who presents with a chief complaint of hyperkalemia (24 Dec 2022 09:30)      SUBJECTIVE / OVERNIGHT EVENTS:          MEDICATIONS  (STANDING):  albuterol/ipratropium for Nebulization 3 milliLiter(s) Nebulizer every 6 hours  budesonide 160 MICROgram(s)/formoterol 4.5 MICROgram(s) Inhaler 2 Puff(s) Inhalation two times a day  busPIRone 10 milliGRAM(s) Oral two times a day  dextrose 5%. 1000 milliLiter(s) (100 mL/Hr) IV Continuous <Continuous>  dextrose 5%. 1000 milliLiter(s) (50 mL/Hr) IV Continuous <Continuous>  dextrose 50% Injectable 25 Gram(s) IV Push once  dextrose 50% Injectable 12.5 Gram(s) IV Push once  dextrose 50% Injectable 25 Gram(s) IV Push once  ferrous    sulfate 325 milliGRAM(s) Oral daily  furosemide    Tablet 20 milliGRAM(s) Oral daily  glucagon  Injectable 1 milliGRAM(s) IntraMuscular once  heparin   Injectable 5000 Unit(s) SubCutaneous every 8 hours  insulin lispro (ADMELOG) corrective regimen sliding scale   SubCutaneous three times a day before meals  insulin lispro (ADMELOG) corrective regimen sliding scale   SubCutaneous at bedtime  levothyroxine 112 MICROGram(s) Oral daily  lidocaine   4% Patch 1 Patch Transdermal daily  lidocaine   4% Patch 1 Patch Transdermal daily  metoprolol tartrate 12.5 milliGRAM(s) Oral two times a day  montelukast 10 milliGRAM(s) Oral at bedtime  sodium zirconium cyclosilicate 10 Gram(s) Oral daily    MEDICATIONS  (PRN):  dextrose Oral Gel 15 Gram(s) Oral once PRN Blood Glucose LESS THAN 70 milliGRAM(s)/deciliter  melatonin 3 milliGRAM(s) Oral at bedtime PRN Insomnia      Vital Signs Last 24 Hrs  T(C): 36.5 (25 Dec 2022 06:38), Max: 36.9 (24 Dec 2022 18:00)  T(F): 97.7 (25 Dec 2022 06:38), Max: 98.4 (24 Dec 2022 18:00)  HR: 58 (25 Dec 2022 06:38) (58 - 73)  BP: 123/50 (25 Dec 2022 06:38) (123/50 - 153/65)  BP(mean): --  RR: 18 (25 Dec 2022 06:38) (16 - 18)  SpO2: 100% (25 Dec 2022 06:38) (95% - 100%)    Parameters below as of 25 Dec 2022 06:38  Patient On (Oxygen Delivery Method): nasal cannula  O2 Flow (L/min): 3        PHYSICAL EXAM  GENERAL: NAD, well-developed  HEAD:  Atraumatic, Normocephalic  EYES: EOMI, PERRLA, conjunctiva and sclera clear  NECK: Supple, No JVD  CHEST/LUNG: Clear to auscultation bilaterally; No wheeze  HEART: Regular rate and rhythm; No murmurs, rubs, or gallops  ABDOMEN: Soft, Nontender, Nondistended; Bowel sounds present  EXTREMITIES:  2+ Peripheral Pulses, No clubbing, cyanosis, or edema  PSYCH: AAOx3  SKIN: No rashes or lesions    CAPILLARY BLOOD GLUCOSE      POCT Blood Glucose.: 115 mg/dL (24 Dec 2022 21:31)  POCT Blood Glucose.: 99 mg/dL (24 Dec 2022 17:23)  POCT Blood Glucose.: 137 mg/dL (24 Dec 2022 12:42)  POCT Blood Glucose.: 127 mg/dL (24 Dec 2022 08:21)    I&O's Summary      LABS:                    RADIOLOGY & ADDITIONAL TESTS:     MICROBIOLOGY:    ANTIMICROBIALS:    CONSULTS: Patient is a 88y old  Female who presents with a chief complaint of hyperkalemia (24 Dec 2022 09:30)      SUBJECTIVE / OVERNIGHT EVENTS:    No acute events overnight. Pt is resting comfortably this morning. Continues to report chronic R knee pain with walking, most likely due to her chronic OA.       MEDICATIONS  (STANDING):  albuterol/ipratropium for Nebulization 3 milliLiter(s) Nebulizer every 6 hours  budesonide 160 MICROgram(s)/formoterol 4.5 MICROgram(s) Inhaler 2 Puff(s) Inhalation two times a day  busPIRone 10 milliGRAM(s) Oral two times a day  dextrose 5%. 1000 milliLiter(s) (100 mL/Hr) IV Continuous <Continuous>  dextrose 5%. 1000 milliLiter(s) (50 mL/Hr) IV Continuous <Continuous>  dextrose 50% Injectable 25 Gram(s) IV Push once  dextrose 50% Injectable 12.5 Gram(s) IV Push once  dextrose 50% Injectable 25 Gram(s) IV Push once  ferrous    sulfate 325 milliGRAM(s) Oral daily  furosemide    Tablet 20 milliGRAM(s) Oral daily  glucagon  Injectable 1 milliGRAM(s) IntraMuscular once  heparin   Injectable 5000 Unit(s) SubCutaneous every 8 hours  insulin lispro (ADMELOG) corrective regimen sliding scale   SubCutaneous three times a day before meals  insulin lispro (ADMELOG) corrective regimen sliding scale   SubCutaneous at bedtime  levothyroxine 112 MICROGram(s) Oral daily  lidocaine   4% Patch 1 Patch Transdermal daily  lidocaine   4% Patch 1 Patch Transdermal daily  metoprolol tartrate 12.5 milliGRAM(s) Oral two times a day  montelukast 10 milliGRAM(s) Oral at bedtime  sodium zirconium cyclosilicate 10 Gram(s) Oral daily    MEDICATIONS  (PRN):  dextrose Oral Gel 15 Gram(s) Oral once PRN Blood Glucose LESS THAN 70 milliGRAM(s)/deciliter  melatonin 3 milliGRAM(s) Oral at bedtime PRN Insomnia      Vital Signs Last 24 Hrs  T(C): 36.5 (25 Dec 2022 06:38), Max: 36.9 (24 Dec 2022 18:00)  T(F): 97.7 (25 Dec 2022 06:38), Max: 98.4 (24 Dec 2022 18:00)  HR: 58 (25 Dec 2022 06:38) (58 - 73)  BP: 123/50 (25 Dec 2022 06:38) (123/50 - 153/65)  BP(mean): --  RR: 18 (25 Dec 2022 06:38) (16 - 18)  SpO2: 100% (25 Dec 2022 06:38) (95% - 100%)    Parameters below as of 25 Dec 2022 06:38  Patient On (Oxygen Delivery Method): nasal cannula  O2 Flow (L/min): 3        PHYSICAL EXAM  GENERAL: NAD, well-developed  HEAD:  Atraumatic, Normocephalic  EYES: Conjunctiva and sclera clear  NECK: Supple, No JVD  CHEST/LUNG: Clear to auscultation bilaterally; No wheeze  HEART: Regular rate and rhythm; No murmurs, rubs, or gallops  ABDOMEN: Soft, Nontender, Nondistended; Bowel sounds present  EXTREMITIES:  2+ Peripheral Pulses, No clubbing, cyanosis, or edema  PSYCH: AAOx3, normal affect  SKIN: No rashes or lesions    CAPILLARY BLOOD GLUCOSE      POCT Blood Glucose.: 115 mg/dL (24 Dec 2022 21:31)  POCT Blood Glucose.: 99 mg/dL (24 Dec 2022 17:23)  POCT Blood Glucose.: 137 mg/dL (24 Dec 2022 12:42)  POCT Blood Glucose.: 127 mg/dL (24 Dec 2022 08:21)    I&O's Summary      LABS:                    RADIOLOGY & ADDITIONAL TESTS:     MICROBIOLOGY:    ANTIMICROBIALS:    CONSULTS:

## 2022-12-25 NOTE — PROGRESS NOTE ADULT - PROBLEM SELECTOR PLAN 1
- know with right knee pain, however able to walk without issue with PT for last 2 days  - No fracture on R knee Xray, chronic arthritic changes.   - pain control with tylenol PRN

## 2022-12-25 NOTE — PROVIDER CONTACT NOTE (OTHER) - ASSESSMENT
/41 and vital signs as documented   pt asymptomatic resting in chair at this time
Patient asymptomatic  V/S-97.8F, 59Hr, 136/73BP, 18RR, 98% 3L NC
HR went down to 48 on tele   pt asymptomatic resting in chair   vital signs as documented  HR 60 at this time
Remains A&O x4, asymptomatic. VS are WNL, no s/s of acute distress or significant changes noted.
patient resting comfortably in bed no complaints of pain or discomfort at current time. vitals per flowsheet and evening meds given per order

## 2022-12-25 NOTE — PROVIDER CONTACT NOTE (OTHER) - ACTION/TREATMENT ORDERED:
Provider notified, will await further orders
acp notified
NP notified   [id number]
none at this time
No new orders made

## 2022-12-26 NOTE — CHART NOTE - NSCHARTNOTEFT_GEN_A_CORE
Patient seen by PT on this admission and recommended outpatient PT patient however requesting MEGAN. Patient was seen by PT once again today and recommended outpatient PT. Patient now amenable to going home with PT. Case Management informed to establish home health care and home O2. Pending Case Management's followup.  made aware.

## 2022-12-26 NOTE — PROGRESS NOTE ADULT - SUBJECTIVE AND OBJECTIVE BOX
[facility] Division of Hospital Medicine  Patsy Dutton MD  Available via MS Teams  Pager: [phone]    SUBJECTIVE / OVERNIGHT EVENTS:  no events. Patient states she is unable to ambulate much because of weakness. States PT saw her in a "good day" but she wants to be re-evaluated. I explained we will have PT re-evaluate her but if they continue to recommend OP PT, she would be discharge home. Patient expresses understanding.     ADDITIONAL REVIEW OF SYSTEMS:    MEDICATIONS  (STANDING):  albuterol/ipratropium for Nebulization 3 milliLiter(s) Nebulizer every 6 hours  budesonide 160 MICROgram(s)/formoterol 4.5 MICROgram(s) Inhaler 2 Puff(s) Inhalation two times a day  busPIRone 10 milliGRAM(s) Oral two times a day  dextrose 5%. 1000 milliLiter(s) (100 mL/Hr) IV Continuous <Continuous>  dextrose 5%. 1000 milliLiter(s) (50 mL/Hr) IV Continuous <Continuous>  dextrose 50% Injectable 25 Gram(s) IV Push once  dextrose 50% Injectable 12.5 Gram(s) IV Push once  dextrose 50% Injectable 25 Gram(s) IV Push once  ferrous    sulfate 325 milliGRAM(s) Oral daily  furosemide    Tablet 20 milliGRAM(s) Oral daily  glucagon  Injectable 1 milliGRAM(s) IntraMuscular once  heparin   Injectable 5000 Unit(s) SubCutaneous every 8 hours  insulin lispro (ADMELOG) corrective regimen sliding scale   SubCutaneous three times a day before meals  insulin lispro (ADMELOG) corrective regimen sliding scale   SubCutaneous at bedtime  levothyroxine 112 MICROGram(s) Oral daily  lidocaine   4% Patch 1 Patch Transdermal daily  lidocaine   4% Patch 1 Patch Transdermal daily  metoprolol tartrate 12.5 milliGRAM(s) Oral two times a day  montelukast 10 milliGRAM(s) Oral at bedtime  sodium zirconium cyclosilicate 10 Gram(s) Oral daily    MEDICATIONS  (PRN):  dextrose Oral Gel 15 Gram(s) Oral once PRN Blood Glucose LESS THAN 70 milliGRAM(s)/deciliter  melatonin 3 milliGRAM(s) Oral at bedtime PRN Insomnia      I&O's Summary    25 Dec 2022 07:01  -  26 Dec 2022 07:00  --------------------------------------------------------  IN: 1112 mL / OUT: 1300 mL / NET: -188 mL        PHYSICAL EXAM:  Vital Signs Last 24 Hrs  T(C): 37 (26 Dec 2022 13:57), Max: 37 (26 Dec 2022 13:57)  T(F): 98.6 (26 Dec 2022 13:57), Max: 98.6 (26 Dec 2022 13:57)  HR: 64 (26 Dec 2022 13:57) (53 - 74)  BP: 147/53 (26 Dec 2022 13:57) (125/44 - 147/53)  BP(mean): --  RR: 18 (26 Dec 2022 13:57) (16 - 18)  SpO2: 100% (26 Dec 2022 13:57) (95% - 100%)    Parameters below as of 26 Dec 2022 13:57  Patient On (Oxygen Delivery Method): nasal cannula      CONSTITUTIONAL: NAD, well-developed, well-groomed  EYES: PERRLA; conjunctiva and sclera clear  ENMT: Moist oral mucosa, no pharyngeal injection or exudates  NECK: Supple, no palpable masses  RESPIRATORY: Normal respiratory effort; lungs are clear to auscultation bilaterally  CARDIOVASCULAR: Regular rate and rhythm, normal S1 and S2, no murmur/rub/gallop; No lower extremity edema; Peripheral pulses are 2+ bilaterally  ABDOMEN: Nontender to palpation, normoactive bowel sounds, no rebound/guarding  MUSCULOSKELETAL:  no clubbing or cyanosis of digits; no joint swelling or tenderness to palpation  PSYCH: A+O to person, place, and time; affect appropriate  NEUROLOGY: CN 2-12 are intact and symmetric; no gross sensory deficits   SKIN: No rashes; no palpable lesions    LABS:                    COVID-19 PCR: NotDetec (20 Sep 2022 13:40)  COVID-19 PCR: NotDetec (18 Sep 2022 06:21)  COVID-19 PCR: NotDetec (17 Sep 2022 07:55)  COVID-19 PCR: NotDetec (10 Sep 2022 03:40)      RADIOLOGY & ADDITIONAL TESTS:  New Results Reviewed Today:   New Imaging Personally Reviewed Today:  New Electrocardiogram Personally Reviewed Today:  Prior or Outpatient Records Reviewed Today:    COMMUNICATION:  Care Discussed with Consultants/Other Providers and Details of Discussion:  Discussions with Patient/Family:       [facility] Division of Hospital Medicine  Patsy Dutton MD  Available via MS Teams  Pager: [phone]    SUBJECTIVE / OVERNIGHT EVENTS:  no events. Patient seen at bedside, eating and sitting in chair. Ambulated a few steps with walker in front of me. She states she is unable to ambulate much, states she is alone and will have to do everything alone at home, and that her aid is not of much help, wants to go to rehab. I explained we discussed with PT and the recommendation is OP PT, I explained she has no MEGAN needs and that she will be discharge home.     ADDITIONAL REVIEW OF SYSTEMS:  no fever, chills, urinary or bowel incontinence, no foot pain    MEDICATIONS  (STANDING):  albuterol/ipratropium for Nebulization 3 milliLiter(s) Nebulizer every 6 hours  budesonide 160 MICROgram(s)/formoterol 4.5 MICROgram(s) Inhaler 2 Puff(s) Inhalation two times a day  busPIRone 10 milliGRAM(s) Oral two times a day  dextrose 5%. 1000 milliLiter(s) (100 mL/Hr) IV Continuous <Continuous>  dextrose 5%. 1000 milliLiter(s) (50 mL/Hr) IV Continuous <Continuous>  dextrose 50% Injectable 25 Gram(s) IV Push once  dextrose 50% Injectable 12.5 Gram(s) IV Push once  dextrose 50% Injectable 25 Gram(s) IV Push once  ferrous    sulfate 325 milliGRAM(s) Oral daily  furosemide    Tablet 20 milliGRAM(s) Oral daily  glucagon  Injectable 1 milliGRAM(s) IntraMuscular once  heparin   Injectable 5000 Unit(s) SubCutaneous every 8 hours  insulin lispro (ADMELOG) corrective regimen sliding scale   SubCutaneous three times a day before meals  insulin lispro (ADMELOG) corrective regimen sliding scale   SubCutaneous at bedtime  levothyroxine 112 MICROGram(s) Oral daily  lidocaine   4% Patch 1 Patch Transdermal daily  lidocaine   4% Patch 1 Patch Transdermal daily  metoprolol tartrate 12.5 milliGRAM(s) Oral two times a day  montelukast 10 milliGRAM(s) Oral at bedtime  sodium zirconium cyclosilicate 10 Gram(s) Oral daily    MEDICATIONS  (PRN):  dextrose Oral Gel 15 Gram(s) Oral once PRN Blood Glucose LESS THAN 70 milliGRAM(s)/deciliter  melatonin 3 milliGRAM(s) Oral at bedtime PRN Insomnia      I&O's Summary    25 Dec 2022 07:01  -  26 Dec 2022 07:00  --------------------------------------------------------  IN: 1112 mL / OUT: 1300 mL / NET: -188 mL        PHYSICAL EXAM:  Vital Signs Last 24 Hrs  T(C): 37 (26 Dec 2022 13:57), Max: 37 (26 Dec 2022 13:57)  T(F): 98.6 (26 Dec 2022 13:57), Max: 98.6 (26 Dec 2022 13:57)  HR: 64 (26 Dec 2022 13:57) (53 - 74)  BP: 147/53 (26 Dec 2022 13:57) (125/44 - 147/53)  BP(mean): --  RR: 18 (26 Dec 2022 13:57) (16 - 18)  SpO2: 100% (26 Dec 2022 13:57) (95% - 100%)    Parameters below as of 26 Dec 2022 13:57  Patient On (Oxygen Delivery Method): nasal cannula      CONSTITUTIONAL: NAD, well-developed, well-groomed  EYES: PERRLA; conjunctiva and sclera clear  ENMT: Moist oral mucosa, no pharyngeal injection or exudates  NECK: Supple, no palpable masses  RESPIRATORY: Normal respiratory effort; lungs are clear to auscultation bilaterally  CARDIOVASCULAR: Regular rate and rhythm, normal S1 and S2, no murmur/rub/gallop; No lower extremity edema; Peripheral pulses are 2+ bilaterally  ABDOMEN: Nontender to palpation, normoactive bowel sounds, no rebound/guarding  MUSCULOSKELETAL:  no clubbing or cyanosis of digits; no joint swelling or tenderness to palpation  PSYCH: A+O to person, place, and time; affect appropriate  NEUROLOGY: CN 2-12 are intact and symmetric; no gross sensory deficits   SKIN: No rashes; no palpable lesions    LABS:                    COVID-19 PCR: NotDetec (20 Sep 2022 13:40)  COVID-19 PCR: NotDetec (18 Sep 2022 06:21)  COVID-19 PCR: NotDetec (17 Sep 2022 07:55)  COVID-19 PCR: NotDetec (10 Sep 2022 03:40)      RADIOLOGY & ADDITIONAL TESTS:  New Results Reviewed Today:   knee x-ray reviewed by me: no fractures    COMMUNICATION:  Care Discussed with Consultants/Other Providers and Details of Discussion: Team discussed with PT- who recommended OP PT  Discussions with Patient/Family: Discussed with patient discharge home today with OP PT.

## 2022-12-27 NOTE — PROGRESS NOTE ADULT - PROBLEM SELECTOR PROBLEM 2
COPD exacerbation
Foot fracture, right
COPD exacerbation
COPD (chronic obstructive pulmonary disease)
Hyperkalemia
Foot fracture, right
Foot fracture, right

## 2022-12-27 NOTE — PROGRESS NOTE ADULT - PROBLEM SELECTOR PLAN 5
Monitor BP daily  DASH diet  Continue metoprolol, furosemide.
Daily glucose monitoring  insulin sliding scale  DASH 1800 ADA diet  serum HgA1C: 5.9
continue synthroid  -check TSH: WNL
Monitor BP daily  DASH diet  Continue metoprolol, furosemide.
Daily glucose monitoring  insulin sliding scale  DASH 1800 ADA diet  serum HgA1C: 5.9
Monitor BP daily  DASH diet  Continue metoprolol, furosemide.
Daily glucose monitoring  insulin sliding scale  DASH 1800 ADA diet  serum HgA1C: 5.9
Monitor BP daily  DASH diet  Continue metoprolol, furosemide.
Monitor BP daily  DASH diet  Continue metoprolol, furosemide.

## 2022-12-27 NOTE — PROGRESS NOTE ADULT - ASSESSMENT
88 year old female PMHx HTN, DM2, COPD (O2 dependent 2-3L,) Chronic diastolic HF, hypothyroidism, CKD3, and hx of hyperkalemia sent by pulmonologist for hyperkalemia, admitted to tele for Hyperkalemia, now resolved Found to have right metatarsal fracture, seen by pods has right foot brace in place. Patient now wanting rehab despite being bale to ambulate appropriately with PT. Pending PT reevaluation.       
  88 year old female PMHx HTN, DM2, COPD (O2 dependent 2-3L,) Chronic diastolic HF, hypothyroidism, CKD3, and hx of hyperkalemia sent by pulmonologist for hyperkalemia, admitted to tele for hyperkalemia, now resolved Found to have right metatarsal fracture, seen by pods has right foot brace in place. Patient wants MEGAN however PT recommended OP PT, patient stable for discharge.      
  88 year old female PMHx HTN, DM2, COPD (O2 dependent 2-3L,) Chronic diastolic HF, hypothyroidism, CKD3, and hx of hyperkalemia sent by pulmonologist for hyperkalemia, admitted to tele for Hyperkalemia and COPD exacerbation.      #Anemia: stable  - CTM 
  88 year old female PMHx HTN, DM2, COPD (O2 dependent 2-3L,) Chronic diastolic HF, hypothyroidism, CKD3, and hx of hyperkalemia sent by pulmonologist for hyperkalemia, admitted to tele for Hyperkalemia and COPD exacerbation.      #Anemia: stable  - CTM 
  88 year old female PMHx HTN, DM2, COPD (O2 dependent 2-3L,) Chronic diastolic HF, hypothyroidism, CKD3, and hx of hyperkalemia sent by pulmonologist for hyperkalemia, admitted to tele for Hyperkalemia, now resolved Found to have right metatarsal fracture, seen by pods has right foot brace in place. Patient now wanting rehab despite being bale to ambulate appropriately with PT. Pending PT reevaluation.       
  88 year old female PMHx HTN, DM2, COPD (O2 dependent 2-3L,) Chronic diastolic HF, hypothyroidism, CKD3, and hx of hyperkalemia sent by pulmonologist for hyperkalemia, admitted to tele for hyperkalemia, now resolved Found to have right metatarsal fracture, seen by pods has right foot brace in place. Patient wants MEGAN however PT recommended OP PT, patient stable for discharge.      
  88 year old female PMHx HTN, DM2, COPD (O2 dependent 2-3L,) Chronic diastolic HF, hypothyroidism, CKD3, and hx of hyperkalemia sent by pulmonologist for hyperkalemia, admitted to tele for Hyperkalemia and COPD exacerbation.      
  88 year old female PMHx HTN, DM2, COPD (O2 dependent 2-3L,) Chronic diastolic HF, hypothyroidism, CKD3, and hx of hyperkalemia sent by pulmonologist for hyperkalemia, admitted to tele for Hyperkalemia, now resolved Found to have right metatarsal fracture, seen by pods has right foot brace in place. Patient now wanting rehab despite being bale to ambulate appropriately with PT. Pending PT reevaluation.       
  88 year old female PMHx HTN, DM2, COPD (O2 dependent 2-3L,) Chronic diastolic HF, hypothyroidism, CKD3, and hx of hyperkalemia sent by pulmonologist for hyperkalemia, admitted to tele for Hyperkalemia

## 2022-12-27 NOTE — PROGRESS NOTE ADULT - PROBLEM SELECTOR PROBLEM 6
Hypothyroidism
Stage 3 chronic kidney disease
DM2 (diabetes mellitus, type 2)
Hypothyroidism
Stage 3 chronic kidney disease
Hypothyroidism
Stage 3 chronic kidney disease

## 2022-12-27 NOTE — PROGRESS NOTE ADULT - PROBLEM SELECTOR PLAN 4
Continuous SpO2 monitoring, on home O2 3-4 L   COVID/Flu/RSV: neg  -DuoNebs Q6h  -continue Symbicort BID (pt poorly compliant)  -monitor SpO2
continue synthroid  -check TSH: WNL
continue synthroid  -check TSH: WNL
Continuous SpO2 monitoring, on home O2 3-4 L   COVID/Flu/RSV: neg  -DuoNebs Q6h  -continue Symbicort BID (pt poorly compliant)  -monitor SpO2
Continuous SpO2 monitoring, on home O2 3-4 L   COVID/Flu/RSV: neg  -DuoNebs Q6h  -continue Symbicort BID (pt poorly compliant)  -monitor SpO2
Monitor BP daily  DASH diet  Continue metoprolol, furosemide.
continue synthroid  -check TSH: WNL
Continuous SpO2 monitoring, on home O2 3-4 L   COVID/Flu/RSV: neg  -DuoNebs Q6h  -continue Symbicort BID (pt poorly compliant)  -monitor SpO2
Continuous SpO2 monitoring, on home O2 3-4 L   COVID/Flu/RSV: neg  -DuoNebs Q6h  -continue Symbicort BID (pt poorly compliant)  -monitor SpO2

## 2022-12-27 NOTE — PROGRESS NOTE ADULT - PROBLEM SELECTOR PROBLEM 1
Right knee pain
Foot fracture, right
Right knee pain
Hyperkalemia
Right knee pain

## 2022-12-27 NOTE — PROGRESS NOTE ADULT - PROBLEM SELECTOR PLAN 2
- Now resolved    - Seen by nephrology and patient with chronic hyperkalemia as outpatient, patient was prescribed lokelma and lasix but has not been taking those medications at home   - Now resolved, erico signed off; patient to continue with lokelma and lasix at home   - will continue with standing lasix 20mg and lokelma   - On discharge, patient should get repeat BMP within 1-2 weeks
Continuous SpO2 monitoring  CXR: clear lungs  COVID/Flu/RSV: neg  -DuoNebs Q6h  -continue Symbicort BID (pt poorly compliant)  -monitor SpO2  -PT eval recommending outpatient PT
- patient with right foot pain, Xray showing subacute medially impacted 5th metatarsal neck fracture.  - podiatry consulted, placed Sierra compression dressing with surgical shoe on right foot  - will needed f/u with Dr. Amber Chase in 1 week of discharge  - PT said- OP PT
Continuous SpO2 monitoring, on home O2 3-4 L   COVID/Flu/RSV: neg  -DuoNebs Q6h  -continue Symbicort BID (pt poorly compliant)  -monitor SpO2  -PT eval recommending outpatient PT
Continuous SpO2 monitoring  CXR: clear lungs  COVID/Flu/RSV: neg  -DuoNebs Q6h  -continue Symbicort BID (pt poorly compliant)  -monitor SpO2  -PT eval pending
- patient with right foot pain, Xray showing subacute medially impacted 5th metatarsal neck fracture.  - podiatry consulted, placed Sierra compression dressing with surgical shoe on right foot  - will needed f/u with Dr. Amber Chase in 1 week of discharge  - pending PT reevaluation as patient now requesting rehab
- patient with right foot pain, Xray showing subacute medially impacted 5th metatarsal neck fracture.  - podiatry consulted, placed Sierra compression dressing with surgical shoe on right foot  - will needed f/u with Dr. Amber Chase in 1 week of discharge  - pending PT reevaluation as patient now requesting rehab
- patient with right foot pain, Xray showing subacute medially impacted 5th metatarsal neck fracture.  - podiatry consulted, placed Sierra compression dressing with surgical shoe on right foot  - will needed f/u with Dr. Amber Chase in 1 week of discharge  - PT said- OP PT
- patient with right foot pain, Xray showing subacute medially impacted 5th metatarsal neck fracture.  - podiatry consulted, placed Sierra compression dressing with surgical shoe on right foot  - will needed f/u with Dr. Amber Chase in 1 week of discharge  - pending PT reevaluation as patient now requesting rehab

## 2022-12-27 NOTE — PROGRESS NOTE ADULT - PROBLEM SELECTOR PROBLEM 9
Chronic diastolic congestive heart failure
Need for prophylactic measure
Chronic diastolic congestive heart failure

## 2022-12-27 NOTE — PROGRESS NOTE ADULT - PROBLEM SELECTOR PLAN 6
Daily glucose monitoring  insulin sliding scale  DASH 1800 ADA diet  serum HgA1C: 5.9
continue synthroid   TSH: WNL
Monitor electrolytes  Trend BUN/Cr  avoid nephrotoxic meds  -monitor electrolytes
continue synthroid   TSH: WNL
Monitor electrolytes  Trend BUN/Cr  avoid nephrotoxic meds  -monitor electrolytes
continue synthroid   TSH: WNL
Monitor electrolytes  Trend BUN/Cr  avoid nephrotoxic meds  -monitor electrolytes
continue synthroid   TSH: WNL
continue synthroid   TSH: WNL

## 2022-12-27 NOTE — PROGRESS NOTE ADULT - PROBLEM SELECTOR PLAN 1
- with right knee pain, however able to walk without issue with PT for last 2 days  - No fracture on R knee Xray, chronic arthritic changes.   - pain control with tylenol PRN

## 2022-12-27 NOTE — PROGRESS NOTE ADULT - PROVIDER SPECIALTY LIST ADULT
Hospitalist
Internal Medicine
Hospitalist
Internal Medicine

## 2022-12-27 NOTE — PROGRESS NOTE ADULT - PROBLEM SELECTOR PLAN 8
pt euvolemic on exam  -c/w furosemide   - will decrease metoprolol to 12.5 mg BID from 25 mg BID given bradycardia   -2g sodium diet
Heparin 5000 units SC Q8h
Monitor electrolytes  Trend BUN/Cr  avoid nephrotoxic meds  -monitor electrolytes
Monitor electrolytes  Trend BUN/Cr  avoid nephrotoxic meds  -monitor electrolytes
Heparin 5000 units SC Q8h
Monitor electrolytes  Trend BUN/Cr  avoid nephrotoxic meds  -monitor electrolytes
Monitor electrolytes  Trend BUN/Cr  avoid nephrotoxic meds  -monitor electrolytes
Heparin 5000 units SC Q8h
Monitor electrolytes  Trend BUN/Cr  avoid nephrotoxic meds  -monitor electrolytes

## 2022-12-27 NOTE — PROGRESS NOTE ADULT - SUBJECTIVE AND OBJECTIVE BOX
PROGRESS NOTE:     Patient is a 88y old  Female who presents with a chief complaint of hyperkalemia (26 Dec 2022 14:19)      SUBJECTIVE / OVERNIGHT EVENTS: Pain controlled.     ADDITIONAL REVIEW OF SYSTEMS:    MEDICATIONS  (STANDING):  albuterol/ipratropium for Nebulization 3 milliLiter(s) Nebulizer every 6 hours  budesonide 160 MICROgram(s)/formoterol 4.5 MICROgram(s) Inhaler 2 Puff(s) Inhalation two times a day  busPIRone 10 milliGRAM(s) Oral two times a day  dextrose 5%. 1000 milliLiter(s) (50 mL/Hr) IV Continuous <Continuous>  dextrose 5%. 1000 milliLiter(s) (100 mL/Hr) IV Continuous <Continuous>  dextrose 50% Injectable 25 Gram(s) IV Push once  dextrose 50% Injectable 12.5 Gram(s) IV Push once  dextrose 50% Injectable 25 Gram(s) IV Push once  ferrous    sulfate 325 milliGRAM(s) Oral daily  furosemide    Tablet 20 milliGRAM(s) Oral daily  glucagon  Injectable 1 milliGRAM(s) IntraMuscular once  heparin   Injectable 5000 Unit(s) SubCutaneous every 8 hours  insulin lispro (ADMELOG) corrective regimen sliding scale   SubCutaneous three times a day before meals  insulin lispro (ADMELOG) corrective regimen sliding scale   SubCutaneous at bedtime  levothyroxine 112 MICROGram(s) Oral daily  lidocaine   4% Patch 1 Patch Transdermal daily  lidocaine   4% Patch 1 Patch Transdermal daily  metoprolol tartrate 12.5 milliGRAM(s) Oral two times a day  montelukast 10 milliGRAM(s) Oral at bedtime  sodium zirconium cyclosilicate 10 Gram(s) Oral daily    MEDICATIONS  (PRN):  dextrose Oral Gel 15 Gram(s) Oral once PRN Blood Glucose LESS THAN 70 milliGRAM(s)/deciliter  melatonin 3 milliGRAM(s) Oral at bedtime PRN Insomnia      CAPILLARY BLOOD GLUCOSE      POCT Blood Glucose.: 188 mg/dL (27 Dec 2022 11:28)  POCT Blood Glucose.: 139 mg/dL (27 Dec 2022 09:14)  POCT Blood Glucose.: 123 mg/dL (26 Dec 2022 21:55)  POCT Blood Glucose.: 101 mg/dL (26 Dec 2022 16:57)  POCT Blood Glucose.: 157 mg/dL (26 Dec 2022 12:32)    I&O's Summary    26 Dec 2022 07:01  -  27 Dec 2022 07:00  --------------------------------------------------------  IN: 788 mL / OUT: 900 mL / NET: -112 mL        PHYSICAL EXAM:  Vital Signs Last 24 Hrs  T(C): 36.8 (27 Dec 2022 05:39), Max: 37 (26 Dec 2022 13:57)  T(F): 98.2 (27 Dec 2022 05:39), Max: 98.6 (26 Dec 2022 13:57)  HR: 72 (27 Dec 2022 09:32) (63 - 75)  BP: 142/55 (27 Dec 2022 05:39) (130/56 - 147/53)  BP(mean): --  RR: 18 (27 Dec 2022 05:39) (16 - 18)  SpO2: 93% (27 Dec 2022 05:39) (92% - 100%)    Parameters below as of 27 Dec 2022 09:32  Patient On (Oxygen Delivery Method): nasal cannula        CONSTITUTIONAL: NAD, well-developed, well-groomed  EYES: PERRLA; conjunctiva and sclera clear  ENMT: Moist oral mucosa, no pharyngeal injection or exudates  NECK: Supple, no palpable masses  RESPIRATORY: Normal respiratory effort; lungs are clear to auscultation bilaterally  CARDIOVASCULAR: Regular rate and rhythm, normal S1 and S2, no murmur/rub/gallop; No lower extremity edema; Peripheral pulses are 2+ bilaterally  ABDOMEN: Nontender to palpation, normoactive bowel sounds, no rebound/guarding  MUSCULOSKELETAL:  no clubbing or cyanosis of digits; no joint swelling or tenderness to palpation  PSYCH: A+O to person, place, and time; affect appropriate  NEUROLOGY: CN 2-12 are intact and symmetric; no gross sensory deficits   SKIN: No rashes; no palpable lesions    LABS:                      RADIOLOGY & ADDITIONAL TESTS:  Results Reviewed:   Imaging Personally Reviewed:  Electrocardiogram Personally Reviewed:    COORDINATION OF CARE:  Care Discussed with Consultants/Other Providers [Y/N]:  Prior or Outpatient Records Reviewed [Y/N]:

## 2022-12-27 NOTE — DISCHARGE NOTE NURSING/CASE MANAGEMENT/SOCIAL WORK - NSDCFUADDAPPT_GEN_ALL_CORE_FT
Podiatry Discharge Instructions:  Follow up: Please follow up with Dr. Amber Chase within 1 week of discharge from the hospital, please call [phone] for appointment and discuss that you recently were seen in the hospital.  Wound Care: Please leave your dressing clean dry intact until your follow up appointment   Weight bearing: Please weight bear as tolerated in a surgical shoe with weight distribution to the right heel    Follow up with your primary care doctor for further evaluation and management. Please call to make an appointment within 1-2 weeks of discharge.

## 2022-12-27 NOTE — PROGRESS NOTE ADULT - PROBLEM SELECTOR PLAN 3
- Now resolved    - Seen by nephrology and patient with chronic hyperkalemia as outpatient, patient was prescribed lokelma and lasix but has not been taking those medications at home   - Now resolved, erico signed off; patient to continue with lokelma and lasix at home   - will continue with standing lasix 20mg and lokelma   - On discharge, patient should get repeat BMP within 1-2 weeks
- Now resolved    - Seen by nephrology and patient with chronic hyperkalemia as outpatient, patient was prescribed lokelma and lasix but has not been taking those medications at home   - Now resolved, erico signed off; patient to continue with lokelma and lasix at home   - will continue with standing lasix 20mg and lokelma   - On discharge, patient should get repeat BMP within 1-2 weeks
Monitor BP daily  DASH diet  Continue metoprolol, furosemide.
- Now resolved    - Seen by nephrology and patient with chronic hyperkalemia as outpatient, patient was prescribed lokelma and lasix but has not been taking those medications at home   - Now resolved, erico signed off; patient to continue with lokelma and lasix at home   - will continue with standing lasix 20mg and lokelma   - On discharge, patient should get repeat BMP within 1-2 weeks
Continuous SpO2 monitoring, on home O2 3-4 L   COVID/Flu/RSV: neg  -DuoNebs Q6h  -continue Symbicort BID (pt poorly compliant)  -monitor SpO2  -PT to revaluate in setting of right foot fracture
- Now resolved    - Seen by nephrology and patient with chronic hyperkalemia as outpatient, patient was prescribed lokelma and lasix but has not been taking those medications at home   - Now resolved, erico signed off; patient to continue with lokelma and lasix at home   - will continue with standing lasix 20mg and lokelma   - On discharge, patient should get repeat BMP within 1-2 weeks
Monitor BP daily  DASH diet  Continue metoprolol, furosemide.
Monitor BP daily  DASH diet  Continue metoprolol, furosemide.
- Now resolved    - Seen by nephrology and patient with chronic hyperkalemia as outpatient, patient was prescribed lokelma and lasix but has not been taking those medications at home   - Now resolved, erico signed off; patient to continue with lokelma and lasix at home   - will continue with standing lasix 20mg and lokelma   - On discharge, patient should get repeat BMP within 1-2 weeks

## 2022-12-27 NOTE — PROGRESS NOTE ADULT - PROBLEM SELECTOR PROBLEM 3
Hyperkalemia
HTN (hypertension)
COPD (chronic obstructive pulmonary disease)
HTN (hypertension)
HTN (hypertension)
Hyperkalemia

## 2022-12-27 NOTE — DISCHARGE NOTE NURSING/CASE MANAGEMENT/SOCIAL WORK - NSDCPEFALRISK_GEN_ALL_CORE
For information on Fall & Injury Prevention, visit: [email or web address] OR  [email or web address] OR  https://www.cdc.gov/steadi/patient.html

## 2022-12-27 NOTE — PROGRESS NOTE ADULT - PROBLEM SELECTOR PROBLEM 8
Need for prophylactic measure
Stage 3 chronic kidney disease
Stage 3 chronic kidney disease
Chronic diastolic congestive heart failure
Stage 3 chronic kidney disease
Stage 3 chronic kidney disease
Need for prophylactic measure
Need for prophylactic measure
Stage 3 chronic kidney disease

## 2022-12-27 NOTE — PROGRESS NOTE ADULT - PROBLEM SELECTOR PLAN 7
Daily glucose monitoring  insulin sliding scale  DASH 1800 ADA diet  serum HgA1C: 5.9
pt euvolemic on exam  -c/w furosemide and metoprolol.  -2g sodium diet
pt euvolemic on exam  -c/w furosemide and metoprolol.  -2g sodium diet
Monitor electrolytes  Trend BUN/Cr  avoid nephrotoxic meds  -monitor electrolytes
pt euvolemic on exam  -c/w furosemide and metoprolol.  -2g sodium diet

## 2022-12-27 NOTE — PROGRESS NOTE ADULT - REASON FOR ADMISSION
hyperkalemia

## 2022-12-27 NOTE — DISCHARGE NOTE NURSING/CASE MANAGEMENT/SOCIAL WORK - PATIENT PORTAL LINK FT
You can access the FollowMyHealth Patient Portal offered by [facility] by registering at the following website: [email or web address]. By joining [employer]’s FollowMyHealth portal, you will also be able to view your health information using other applications (apps) compatible with our system.

## 2022-12-27 NOTE — PROGRESS NOTE ADULT - PROBLEM SELECTOR PLAN 10
Heparin 5000 units SC Q8h  Dispo: patient stable for discharge home, Discharge home today I spent 35 minutes in the discharge.
Heparin 5000 units SC Q8h  Dispo: patient stable for discharge home, Discharge home today I spent 35 minutes in the discharge.
Heparin 5000 units SC Q8h  Dispo: pending Xray if negative stable for discharge

## 2022-12-27 NOTE — DISCHARGE NOTE NURSING/CASE MANAGEMENT/SOCIAL WORK - NSDCVIVACCINE_GEN_ALL_CORE_FT
Influenza, high dose seasonal; 24-Sep-2019 12:44; Shellie Hannah (Student, Nursing); [employer]; [id number] (Exp. Date: 24-Apr-2020); IntraMuscular; Deltoid Right.; 0.5 milliLiter(s); VIS (VIS Published: 15-Aug-2019, VIS Presented: 24-Sep-2019);

## 2022-12-27 NOTE — PROGRESS NOTE ADULT - PROBLEM SELECTOR PROBLEM 4
Hypothyroidism
HTN (hypertension)
COPD (chronic obstructive pulmonary disease)
Hypothyroidism
COPD (chronic obstructive pulmonary disease)
Hypothyroidism
COPD (chronic obstructive pulmonary disease)

## 2022-12-27 NOTE — PROGRESS NOTE ADULT - PROBLEM SELECTOR PROBLEM 7
Chronic diastolic congestive heart failure
DM2 (diabetes mellitus, type 2)
Stage 3 chronic kidney disease
DM2 (diabetes mellitus, type 2)
DM2 (diabetes mellitus, type 2)
Chronic diastolic congestive heart failure
DM2 (diabetes mellitus, type 2)
Chronic diastolic congestive heart failure
DM2 (diabetes mellitus, type 2)

## 2022-12-27 NOTE — PROGRESS NOTE ADULT - PROBLEM SELECTOR PROBLEM 5
DM2 (diabetes mellitus, type 2)
Hypothyroidism
HTN (hypertension)

## 2022-12-27 NOTE — PROGRESS NOTE ADULT - PROBLEM SELECTOR PLAN 9
pt euvolemic on exam  -c/w furosemide   - metoprolol decreased to 12.5 mg BID from 25 mg BID given bradycardia   -2g sodium diet
pt euvolemic on exam  -c/w furosemide   - will decrease metoprolol to 12.5 mg BID from 25 mg BID given bradycardia   -2g sodium diet
Heparin 5000 units SC Q8h
pt euvolemic on exam  -c/w furosemide   - will decrease metoprolol to 12.5 mg BID from 25 mg BID given bradycardia   -2g sodium diet
pt euvolemic on exam  -c/w furosemide   - metoprolol decreased to 12.5 mg BID from 25 mg BID given bradycardia   -2g sodium diet
pt euvolemic on exam  -c/w furosemide   - will decrease metoprolol to 12.5 mg BID from 25 mg BID given bradycardia   -2g sodium diet

## 2023-01-01 ENCOUNTER — INPATIENT (INPATIENT)
Facility: HOSPITAL | Age: 88
LOS: 10 days | Discharge: NURSING, REHAB OR LONG-TERM CARE | End: 2023-04-20
Attending: STUDENT IN AN ORGANIZED HEALTH CARE EDUCATION/TRAINING PROGRAM | Admitting: STUDENT IN AN ORGANIZED HEALTH CARE EDUCATION/TRAINING PROGRAM
Payer: MEDICARE

## 2023-01-01 ENCOUNTER — NON-APPOINTMENT (OUTPATIENT)
Age: 88
End: 2023-01-01

## 2023-01-01 ENCOUNTER — LABORATORY RESULT (OUTPATIENT)
Age: 88
End: 2023-01-01

## 2023-01-01 ENCOUNTER — TRANSCRIPTION ENCOUNTER (OUTPATIENT)
Age: 88
End: 2023-01-01

## 2023-01-01 ENCOUNTER — INPATIENT (INPATIENT)
Facility: HOSPITAL | Age: 88
LOS: 12 days | End: 2023-08-07
Attending: STUDENT IN AN ORGANIZED HEALTH CARE EDUCATION/TRAINING PROGRAM | Admitting: STUDENT IN AN ORGANIZED HEALTH CARE EDUCATION/TRAINING PROGRAM
Payer: MEDICARE

## 2023-01-01 ENCOUNTER — RX RENEWAL (OUTPATIENT)
Age: 88
End: 2023-01-01

## 2023-01-01 ENCOUNTER — APPOINTMENT (OUTPATIENT)
Dept: FAMILY MEDICINE | Facility: CLINIC | Age: 88
End: 2023-01-01

## 2023-01-01 ENCOUNTER — APPOINTMENT (OUTPATIENT)
Dept: FAMILY MEDICINE | Facility: CLINIC | Age: 88
End: 2023-01-01
Payer: MEDICARE

## 2023-01-01 ENCOUNTER — INPATIENT (INPATIENT)
Facility: HOSPITAL | Age: 88
LOS: 3 days | Discharge: HOME HEALTH | End: 2023-01-31
Attending: HOSPITALIST | Admitting: HOSPITALIST
Payer: MEDICARE

## 2023-01-01 VITALS
OXYGEN SATURATION: 99 % | DIASTOLIC BLOOD PRESSURE: 83 MMHG | RESPIRATION RATE: 15 BRPM | HEART RATE: 79 BPM | TEMPERATURE: 98 F | SYSTOLIC BLOOD PRESSURE: 158 MMHG

## 2023-01-01 VITALS
TEMPERATURE: 98 F | RESPIRATION RATE: 18 BRPM | SYSTOLIC BLOOD PRESSURE: 139 MMHG | DIASTOLIC BLOOD PRESSURE: 66 MMHG | OXYGEN SATURATION: 97 % | HEART RATE: 66 BPM

## 2023-01-01 VITALS
RESPIRATION RATE: 18 BRPM | SYSTOLIC BLOOD PRESSURE: 127 MMHG | TEMPERATURE: 98 F | DIASTOLIC BLOOD PRESSURE: 66 MMHG | OXYGEN SATURATION: 95 % | HEART RATE: 58 BPM

## 2023-01-01 VITALS
DIASTOLIC BLOOD PRESSURE: 51 MMHG | OXYGEN SATURATION: 100 % | TEMPERATURE: 98 F | SYSTOLIC BLOOD PRESSURE: 135 MMHG | HEART RATE: 56 BPM | RESPIRATION RATE: 18 BRPM

## 2023-01-01 VITALS
DIASTOLIC BLOOD PRESSURE: 55 MMHG | TEMPERATURE: 98 F | HEIGHT: 64 IN | HEART RATE: 83 BPM | SYSTOLIC BLOOD PRESSURE: 129 MMHG | OXYGEN SATURATION: 96 % | RESPIRATION RATE: 20 BRPM

## 2023-01-01 VITALS
RESPIRATION RATE: 18 BRPM | HEART RATE: 75 BPM | OXYGEN SATURATION: 99 % | DIASTOLIC BLOOD PRESSURE: 74 MMHG | SYSTOLIC BLOOD PRESSURE: 123 MMHG | TEMPERATURE: 98 F

## 2023-01-01 DIAGNOSIS — Z71.89 OTHER SPECIFIED COUNSELING: ICD-10-CM

## 2023-01-01 DIAGNOSIS — N18.30 CHRONIC KIDNEY DISEASE, STAGE 3 UNSPECIFIED: ICD-10-CM

## 2023-01-01 DIAGNOSIS — I50.32 CHRONIC DIASTOLIC (CONGESTIVE) HEART FAILURE: ICD-10-CM

## 2023-01-01 DIAGNOSIS — S82.90XA UNSPECIFIED FRACTURE OF UNSPECIFIED LOWER LEG, INITIAL ENCOUNTER FOR CLOSED FRACTURE: Chronic | ICD-10-CM

## 2023-01-01 DIAGNOSIS — I50.33 ACUTE ON CHRONIC DIASTOLIC (CONGESTIVE) HEART FAILURE: ICD-10-CM

## 2023-01-01 DIAGNOSIS — G92.8 OTHER TOXIC ENCEPHALOPATHY: ICD-10-CM

## 2023-01-01 DIAGNOSIS — R06.02 SHORTNESS OF BREATH: ICD-10-CM

## 2023-01-01 DIAGNOSIS — R53.81 OTHER MALAISE: ICD-10-CM

## 2023-01-01 DIAGNOSIS — R53.1 WEAKNESS: ICD-10-CM

## 2023-01-01 DIAGNOSIS — E11.9 TYPE 2 DIABETES MELLITUS WITHOUT COMPLICATIONS: ICD-10-CM

## 2023-01-01 DIAGNOSIS — Z90.49 ACQUIRED ABSENCE OF OTHER SPECIFIED PARTS OF DIGESTIVE TRACT: Chronic | ICD-10-CM

## 2023-01-01 DIAGNOSIS — E03.9 HYPOTHYROIDISM, UNSPECIFIED: ICD-10-CM

## 2023-01-01 DIAGNOSIS — E11.9 TYPE 2 DIABETES MELLITUS W/OUT COMPLICATIONS: ICD-10-CM

## 2023-01-01 DIAGNOSIS — Z86.69 PERSONAL HISTORY OF OTHER DISEASES OF THE NERVOUS SYSTEM AND SENSE ORGANS: Chronic | ICD-10-CM

## 2023-01-01 DIAGNOSIS — S92.901A UNSPECIFIED FRACTURE OF RIGHT FOOT, INITIAL ENCOUNTER FOR CLOSED FRACTURE: ICD-10-CM

## 2023-01-01 DIAGNOSIS — J44.9 CHRONIC OBSTRUCTIVE PULMONARY DISEASE, UNSPECIFIED: ICD-10-CM

## 2023-01-01 DIAGNOSIS — L03.90 CELLULITIS, UNSPECIFIED: ICD-10-CM

## 2023-01-01 DIAGNOSIS — E87.5 HYPERKALEMIA: ICD-10-CM

## 2023-01-01 DIAGNOSIS — J96.02 ACUTE RESPIRATORY FAILURE WITH HYPERCAPNIA: ICD-10-CM

## 2023-01-01 DIAGNOSIS — R26.81 UNSTEADINESS ON FEET: ICD-10-CM

## 2023-01-01 DIAGNOSIS — Z01.818 ENCOUNTER FOR OTHER PREPROCEDURAL EXAMINATION: ICD-10-CM

## 2023-01-01 DIAGNOSIS — M79.671 PAIN IN RIGHT FOOT: ICD-10-CM

## 2023-01-01 DIAGNOSIS — Z29.9 ENCOUNTER FOR PROPHYLACTIC MEASURES, UNSPECIFIED: ICD-10-CM

## 2023-01-01 DIAGNOSIS — N18.32 CHRONIC KIDNEY DISEASE, STAGE 3B: ICD-10-CM

## 2023-01-01 DIAGNOSIS — D72.829 ELEVATED WHITE BLOOD CELL COUNT, UNSPECIFIED: ICD-10-CM

## 2023-01-01 DIAGNOSIS — Z96.642 PRESENCE OF LEFT ARTIFICIAL HIP JOINT: Chronic | ICD-10-CM

## 2023-01-01 DIAGNOSIS — I10 ESSENTIAL (PRIMARY) HYPERTENSION: ICD-10-CM

## 2023-01-01 DIAGNOSIS — G93.40 ENCEPHALOPATHY, UNSPECIFIED: ICD-10-CM

## 2023-01-01 DIAGNOSIS — N18.9 CHRONIC KIDNEY DISEASE, UNSPECIFIED: ICD-10-CM

## 2023-01-01 DIAGNOSIS — S72.009A FRACTURE OF UNSPECIFIED PART OF NECK OF UNSPECIFIED FEMUR, INITIAL ENCOUNTER FOR CLOSED FRACTURE: ICD-10-CM

## 2023-01-01 DIAGNOSIS — J43.9 EMPHYSEMA, UNSPECIFIED: ICD-10-CM

## 2023-01-01 DIAGNOSIS — Z51.5 ENCOUNTER FOR PALLIATIVE CARE: ICD-10-CM

## 2023-01-01 DIAGNOSIS — S72.002A FRACTURE OF UNSPECIFIED PART OF NECK OF LEFT FEMUR, INITIAL ENCOUNTER FOR CLOSED FRACTURE: ICD-10-CM

## 2023-01-01 DIAGNOSIS — E87.8 OTHER DISORDERS OF ELECTROLYTE AND FLUID BALANCE, NOT ELSEWHERE CLASSIFIED: ICD-10-CM
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
## 2023-01-01 LAB
24R-OH-CALCIDIOL SERPL-MCNC: 28.4 NG/ML — LOW (ref 30–80)
A1C WITH ESTIMATED AVERAGE GLUCOSE RESULT: 5.9 % — HIGH (ref 4–5.6)
A1C WITH ESTIMATED AVERAGE GLUCOSE RESULT: 6.6 % — HIGH (ref 4–5.6)
ALBUMIN SERPL ELPH-MCNC: 2.8 G/DL — LOW (ref 3.3–5)
ALBUMIN SERPL ELPH-MCNC: 3.2 G/DL — LOW (ref 3.3–5)
ALBUMIN SERPL ELPH-MCNC: 3.3 G/DL — SIGNIFICANT CHANGE UP (ref 3.3–5)
ALBUMIN SERPL ELPH-MCNC: 3.5 G/DL — SIGNIFICANT CHANGE UP (ref 3.3–5)
ALBUMIN SERPL ELPH-MCNC: 3.5 G/DL — SIGNIFICANT CHANGE UP (ref 3.3–5)
ALBUMIN SERPL ELPH-MCNC: 3.6 G/DL — SIGNIFICANT CHANGE UP (ref 3.3–5)
ALBUMIN SERPL ELPH-MCNC: 3.6 G/DL — SIGNIFICANT CHANGE UP (ref 3.3–5)
ALBUMIN SERPL ELPH-MCNC: 3.7 G/DL — SIGNIFICANT CHANGE UP (ref 3.3–5)
ALP SERPL-CCNC: 55 U/L — SIGNIFICANT CHANGE UP (ref 40–120)
ALP SERPL-CCNC: 71 U/L — SIGNIFICANT CHANGE UP (ref 40–120)
ALP SERPL-CCNC: 75 U/L — SIGNIFICANT CHANGE UP (ref 40–120)
ALP SERPL-CCNC: 77 U/L — SIGNIFICANT CHANGE UP (ref 40–120)
ALP SERPL-CCNC: 81 U/L — SIGNIFICANT CHANGE UP (ref 40–120)
ALP SERPL-CCNC: 83 U/L — SIGNIFICANT CHANGE UP (ref 40–120)
ALP SERPL-CCNC: 84 U/L — SIGNIFICANT CHANGE UP (ref 40–120)
ALP SERPL-CCNC: 95 U/L — SIGNIFICANT CHANGE UP (ref 40–120)
ALT FLD-CCNC: 10 U/L — SIGNIFICANT CHANGE UP (ref 4–33)
ALT FLD-CCNC: 11 U/L — SIGNIFICANT CHANGE UP (ref 4–33)
ALT FLD-CCNC: 5 U/L — SIGNIFICANT CHANGE UP (ref 4–33)
ALT FLD-CCNC: 6 U/L — SIGNIFICANT CHANGE UP (ref 4–33)
ALT FLD-CCNC: 7 U/L — SIGNIFICANT CHANGE UP (ref 4–33)
ALT FLD-CCNC: 8 U/L — SIGNIFICANT CHANGE UP (ref 4–33)
ALT FLD-CCNC: 8 U/L — SIGNIFICANT CHANGE UP (ref 4–33)
ALT FLD-CCNC: 9 U/L — SIGNIFICANT CHANGE UP (ref 4–33)
ANION GAP SERPL CALC-SCNC: 10 MMOL/L — SIGNIFICANT CHANGE UP (ref 7–14)
ANION GAP SERPL CALC-SCNC: 11 MMOL/L — SIGNIFICANT CHANGE UP (ref 7–14)
ANION GAP SERPL CALC-SCNC: 12 MMOL/L — SIGNIFICANT CHANGE UP (ref 7–14)
ANION GAP SERPL CALC-SCNC: 13 MMOL/L — SIGNIFICANT CHANGE UP (ref 7–14)
ANION GAP SERPL CALC-SCNC: 13 MMOL/L — SIGNIFICANT CHANGE UP (ref 7–14)
ANION GAP SERPL CALC-SCNC: 18 MMOL/L — HIGH (ref 7–14)
ANION GAP SERPL CALC-SCNC: 5 MMOL/L — LOW (ref 7–14)
ANION GAP SERPL CALC-SCNC: 6 MMOL/L — LOW (ref 7–14)
ANION GAP SERPL CALC-SCNC: 7 MMOL/L — SIGNIFICANT CHANGE UP (ref 7–14)
ANION GAP SERPL CALC-SCNC: 8 MMOL/L — SIGNIFICANT CHANGE UP (ref 7–14)
ANION GAP SERPL CALC-SCNC: 9 MMOL/L — SIGNIFICANT CHANGE UP (ref 7–14)
APPEARANCE UR: ABNORMAL
APPEARANCE UR: CLEAR — SIGNIFICANT CHANGE UP
APPEARANCE UR: CLEAR — SIGNIFICANT CHANGE UP
APTT BLD: 23.5 SEC — LOW (ref 24.5–35.6)
APTT BLD: 24.6 SEC — SIGNIFICANT CHANGE UP (ref 24.5–35.6)
APTT BLD: 27.9 SEC — SIGNIFICANT CHANGE UP (ref 27–36.3)
APTT BLD: 28.1 SEC — SIGNIFICANT CHANGE UP (ref 24.5–35.6)
APTT BLD: 29.2 SEC — SIGNIFICANT CHANGE UP (ref 24.5–35.6)
AST SERPL-CCNC: 14 U/L — SIGNIFICANT CHANGE UP (ref 4–32)
AST SERPL-CCNC: 15 U/L — SIGNIFICANT CHANGE UP (ref 4–32)
AST SERPL-CCNC: 15 U/L — SIGNIFICANT CHANGE UP (ref 4–32)
AST SERPL-CCNC: 16 U/L — SIGNIFICANT CHANGE UP (ref 4–32)
AST SERPL-CCNC: 17 U/L — SIGNIFICANT CHANGE UP (ref 4–32)
AST SERPL-CCNC: 18 U/L — SIGNIFICANT CHANGE UP (ref 4–32)
AST SERPL-CCNC: 22 U/L — SIGNIFICANT CHANGE UP (ref 4–32)
AST SERPL-CCNC: 23 U/L — SIGNIFICANT CHANGE UP (ref 4–32)
B PERT DNA SPEC QL NAA+PROBE: SIGNIFICANT CHANGE UP
B PERT+PARAPERT DNA PNL SPEC NAA+PROBE: SIGNIFICANT CHANGE UP
BACTERIA # UR AUTO: ABNORMAL /HPF
BACTERIA # UR AUTO: NEGATIVE /HPF — SIGNIFICANT CHANGE UP
BACTERIA # UR AUTO: NEGATIVE — SIGNIFICANT CHANGE UP
BASE EXCESS BLDV CALC-SCNC: 1.6 MMOL/L — SIGNIFICANT CHANGE UP (ref -2–3)
BASE EXCESS BLDV CALC-SCNC: 10.6 MMOL/L — HIGH (ref -2–3)
BASE EXCESS BLDV CALC-SCNC: 11.4 MMOL/L — HIGH (ref -2–3)
BASE EXCESS BLDV CALC-SCNC: 12 MMOL/L — HIGH (ref -2–3)
BASE EXCESS BLDV CALC-SCNC: 12.8 MMOL/L — HIGH (ref -2–3)
BASE EXCESS BLDV CALC-SCNC: 12.9 MMOL/L — HIGH (ref -2–3)
BASE EXCESS BLDV CALC-SCNC: 15.1 MMOL/L — HIGH (ref -2–3)
BASE EXCESS BLDV CALC-SCNC: 15.1 MMOL/L — HIGH (ref -2–3)
BASE EXCESS BLDV CALC-SCNC: 3 MMOL/L — SIGNIFICANT CHANGE UP (ref -2–3)
BASE EXCESS BLDV CALC-SCNC: 5.7 MMOL/L — HIGH (ref -2–3)
BASE EXCESS BLDV CALC-SCNC: 6.3 MMOL/L — HIGH (ref -2–3)
BASE EXCESS BLDV CALC-SCNC: 6.5 MMOL/L — HIGH (ref -2–3)
BASE EXCESS BLDV CALC-SCNC: 6.8 MMOL/L — HIGH (ref -2–3)
BASE EXCESS BLDV CALC-SCNC: 8.5 MMOL/L — HIGH (ref -2–3)
BASE EXCESS BLDV CALC-SCNC: 9.1 MMOL/L — HIGH (ref -2–3)
BASE EXCESS BLDV CALC-SCNC: 9.7 MMOL/L — HIGH (ref -2–3)
BASE EXCESS BLDV CALC-SCNC: 9.8 MMOL/L — HIGH (ref -2–3)
BASOPHILS # BLD AUTO: 0.01 K/UL — SIGNIFICANT CHANGE UP (ref 0–0.2)
BASOPHILS # BLD AUTO: 0.02 K/UL — SIGNIFICANT CHANGE UP (ref 0–0.2)
BASOPHILS # BLD AUTO: 0.03 K/UL — SIGNIFICANT CHANGE UP (ref 0–0.2)
BASOPHILS # BLD AUTO: 0.04 K/UL — SIGNIFICANT CHANGE UP (ref 0–0.2)
BASOPHILS NFR BLD AUTO: 0.2 % — SIGNIFICANT CHANGE UP (ref 0–2)
BASOPHILS NFR BLD AUTO: 0.3 % — SIGNIFICANT CHANGE UP (ref 0–2)
BASOPHILS NFR BLD AUTO: 0.5 % — SIGNIFICANT CHANGE UP (ref 0–2)
BILIRUB DIRECT SERPL-MCNC: <0.2 MG/DL — SIGNIFICANT CHANGE UP (ref 0–0.3)
BILIRUB DIRECT SERPL-MCNC: <0.2 MG/DL — SIGNIFICANT CHANGE UP (ref 0–0.3)
BILIRUB INDIRECT FLD-MCNC: >0 MG/DL — SIGNIFICANT CHANGE UP (ref 0–1)
BILIRUB INDIRECT FLD-MCNC: >0.1 MG/DL — SIGNIFICANT CHANGE UP (ref 0–1)
BILIRUB SERPL-MCNC: 0.2 MG/DL — SIGNIFICANT CHANGE UP (ref 0.2–1.2)
BILIRUB SERPL-MCNC: 0.2 MG/DL — SIGNIFICANT CHANGE UP (ref 0.2–1.2)
BILIRUB SERPL-MCNC: 0.3 MG/DL — SIGNIFICANT CHANGE UP (ref 0.2–1.2)
BILIRUB SERPL-MCNC: 0.4 MG/DL — SIGNIFICANT CHANGE UP (ref 0.2–1.2)
BILIRUB SERPL-MCNC: 0.4 MG/DL — SIGNIFICANT CHANGE UP (ref 0.2–1.2)
BILIRUB SERPL-MCNC: <0.2 MG/DL — SIGNIFICANT CHANGE UP (ref 0.2–1.2)
BILIRUB UR-MCNC: NEGATIVE — SIGNIFICANT CHANGE UP
BLD GP AB SCN SERPL QL: NEGATIVE — SIGNIFICANT CHANGE UP
BLOOD GAS ARTERIAL COMPREHENSIVE RESULT: SIGNIFICANT CHANGE UP
BLOOD GAS ARTERIAL COMPREHENSIVE RESULT: SIGNIFICANT CHANGE UP
BLOOD GAS VENOUS COMPREHENSIVE RESULT: SIGNIFICANT CHANGE UP
BORDETELLA PARAPERTUSSIS (RAPRVP): SIGNIFICANT CHANGE UP
BUN SERPL-MCNC: 28 MG/DL — HIGH (ref 7–23)
BUN SERPL-MCNC: 28 MG/DL — HIGH (ref 7–23)
BUN SERPL-MCNC: 30 MG/DL — HIGH (ref 7–23)
BUN SERPL-MCNC: 34 MG/DL — HIGH (ref 7–23)
BUN SERPL-MCNC: 35 MG/DL — HIGH (ref 7–23)
BUN SERPL-MCNC: 36 MG/DL — HIGH (ref 7–23)
BUN SERPL-MCNC: 37 MG/DL — HIGH (ref 7–23)
BUN SERPL-MCNC: 38 MG/DL — HIGH (ref 7–23)
BUN SERPL-MCNC: 38 MG/DL — HIGH (ref 7–23)
BUN SERPL-MCNC: 39 MG/DL — HIGH (ref 7–23)
BUN SERPL-MCNC: 40 MG/DL — HIGH (ref 7–23)
BUN SERPL-MCNC: 41 MG/DL — HIGH (ref 7–23)
BUN SERPL-MCNC: 42 MG/DL — HIGH (ref 7–23)
BUN SERPL-MCNC: 43 MG/DL — HIGH (ref 7–23)
BUN SERPL-MCNC: 45 MG/DL — HIGH (ref 7–23)
BUN SERPL-MCNC: 45 MG/DL — HIGH (ref 7–23)
BUN SERPL-MCNC: 47 MG/DL — HIGH (ref 7–23)
BUN SERPL-MCNC: 50 MG/DL — HIGH (ref 7–23)
BUN SERPL-MCNC: 54 MG/DL — HIGH (ref 7–23)
BUN SERPL-MCNC: 55 MG/DL — HIGH (ref 7–23)
BUN SERPL-MCNC: 55 MG/DL — HIGH (ref 7–23)
BUN SERPL-MCNC: 56 MG/DL — HIGH (ref 7–23)
BUN SERPL-MCNC: 57 MG/DL — HIGH (ref 7–23)
BUN SERPL-MCNC: 58 MG/DL — HIGH (ref 7–23)
BUN SERPL-MCNC: 59 MG/DL — HIGH (ref 7–23)
BUN SERPL-MCNC: 60 MG/DL — HIGH (ref 7–23)
BUN SERPL-MCNC: 61 MG/DL — HIGH (ref 7–23)
BUN SERPL-MCNC: 61 MG/DL — HIGH (ref 7–23)
BUN SERPL-MCNC: 62 MG/DL — HIGH (ref 7–23)
BUN SERPL-MCNC: 63 MG/DL — HIGH (ref 7–23)
BUN SERPL-MCNC: 65 MG/DL — HIGH (ref 7–23)
BUN SERPL-MCNC: 69 MG/DL — HIGH (ref 7–23)
BUN SERPL-MCNC: 69 MG/DL — HIGH (ref 7–23)
BUN SERPL-MCNC: 79 MG/DL — HIGH (ref 7–23)
BUN SERPL-MCNC: 80 MG/DL — HIGH (ref 7–23)
C PNEUM DNA SPEC QL NAA+PROBE: SIGNIFICANT CHANGE UP
CA-I SERPL-SCNC: 1.15 MMOL/L — SIGNIFICANT CHANGE UP (ref 1.15–1.33)
CA-I SERPL-SCNC: 1.19 MMOL/L — SIGNIFICANT CHANGE UP (ref 1.15–1.33)
CA-I SERPL-SCNC: 1.19 MMOL/L — SIGNIFICANT CHANGE UP (ref 1.15–1.33)
CA-I SERPL-SCNC: 1.2 MMOL/L — SIGNIFICANT CHANGE UP (ref 1.15–1.33)
CA-I SERPL-SCNC: 1.21 MMOL/L — SIGNIFICANT CHANGE UP (ref 1.15–1.33)
CA-I SERPL-SCNC: 1.21 MMOL/L — SIGNIFICANT CHANGE UP (ref 1.15–1.33)
CA-I SERPL-SCNC: 1.22 MMOL/L — SIGNIFICANT CHANGE UP (ref 1.15–1.33)
CA-I SERPL-SCNC: 1.24 MMOL/L — SIGNIFICANT CHANGE UP (ref 1.15–1.33)
CA-I SERPL-SCNC: 1.24 MMOL/L — SIGNIFICANT CHANGE UP (ref 1.15–1.33)
CA-I SERPL-SCNC: 1.25 MMOL/L — SIGNIFICANT CHANGE UP (ref 1.15–1.33)
CA-I SERPL-SCNC: 1.25 MMOL/L — SIGNIFICANT CHANGE UP (ref 1.15–1.33)
CALCIUM SERPL-MCNC: 8.2 MG/DL — LOW (ref 8.4–10.5)
CALCIUM SERPL-MCNC: 8.3 MG/DL — LOW (ref 8.4–10.5)
CALCIUM SERPL-MCNC: 8.5 MG/DL — SIGNIFICANT CHANGE UP (ref 8.4–10.5)
CALCIUM SERPL-MCNC: 8.5 MG/DL — SIGNIFICANT CHANGE UP (ref 8.4–10.5)
CALCIUM SERPL-MCNC: 8.6 MG/DL — SIGNIFICANT CHANGE UP (ref 8.4–10.5)
CALCIUM SERPL-MCNC: 8.6 MG/DL — SIGNIFICANT CHANGE UP (ref 8.4–10.5)
CALCIUM SERPL-MCNC: 8.7 MG/DL — SIGNIFICANT CHANGE UP (ref 8.4–10.5)
CALCIUM SERPL-MCNC: 8.8 MG/DL — SIGNIFICANT CHANGE UP (ref 8.4–10.5)
CALCIUM SERPL-MCNC: 8.9 MG/DL — SIGNIFICANT CHANGE UP (ref 8.4–10.5)
CALCIUM SERPL-MCNC: 9 MG/DL — SIGNIFICANT CHANGE UP (ref 8.4–10.5)
CALCIUM SERPL-MCNC: 9.1 MG/DL — SIGNIFICANT CHANGE UP (ref 8.4–10.5)
CALCIUM SERPL-MCNC: 9.1 MG/DL — SIGNIFICANT CHANGE UP (ref 8.4–10.5)
CALCIUM SERPL-MCNC: 9.2 MG/DL — SIGNIFICANT CHANGE UP (ref 8.4–10.5)
CALCIUM SERPL-MCNC: 9.2 MG/DL — SIGNIFICANT CHANGE UP (ref 8.4–10.5)
CALCIUM SERPL-MCNC: 9.3 MG/DL — SIGNIFICANT CHANGE UP (ref 8.4–10.5)
CALCIUM SERPL-MCNC: 9.4 MG/DL — SIGNIFICANT CHANGE UP (ref 8.4–10.5)
CALCIUM SERPL-MCNC: 9.5 MG/DL — SIGNIFICANT CHANGE UP (ref 8.4–10.5)
CALCIUM SERPL-MCNC: 9.6 MG/DL — SIGNIFICANT CHANGE UP (ref 8.4–10.5)
CALCIUM SERPL-MCNC: 9.6 MG/DL — SIGNIFICANT CHANGE UP (ref 8.4–10.5)
CALCIUM SERPL-MCNC: 9.7 MG/DL — SIGNIFICANT CHANGE UP (ref 8.4–10.5)
CALCIUM SERPL-MCNC: 9.7 MG/DL — SIGNIFICANT CHANGE UP (ref 8.4–10.5)
CAST: 1 /LPF — SIGNIFICANT CHANGE UP (ref 0–4)
CAST: 5 /LPF — HIGH (ref 0–4)
CHLORIDE BLDV-SCNC: 100 MMOL/L — SIGNIFICANT CHANGE UP (ref 96–108)
CHLORIDE BLDV-SCNC: 101 MMOL/L — SIGNIFICANT CHANGE UP (ref 96–108)
CHLORIDE BLDV-SCNC: 102 MMOL/L — SIGNIFICANT CHANGE UP (ref 96–108)
CHLORIDE BLDV-SCNC: 102 MMOL/L — SIGNIFICANT CHANGE UP (ref 96–108)
CHLORIDE BLDV-SCNC: 103 MMOL/L — SIGNIFICANT CHANGE UP (ref 96–108)
CHLORIDE BLDV-SCNC: 104 MMOL/L — SIGNIFICANT CHANGE UP (ref 96–108)
CHLORIDE BLDV-SCNC: 97 MMOL/L — SIGNIFICANT CHANGE UP (ref 96–108)
CHLORIDE BLDV-SCNC: 98 MMOL/L — SIGNIFICANT CHANGE UP (ref 96–108)
CHLORIDE BLDV-SCNC: 99 MMOL/L — SIGNIFICANT CHANGE UP (ref 96–108)
CHLORIDE SERPL-SCNC: 100 MMOL/L — SIGNIFICANT CHANGE UP (ref 98–107)
CHLORIDE SERPL-SCNC: 101 MMOL/L — SIGNIFICANT CHANGE UP (ref 98–107)
CHLORIDE SERPL-SCNC: 102 MMOL/L — SIGNIFICANT CHANGE UP (ref 98–107)
CHLORIDE SERPL-SCNC: 103 MMOL/L — SIGNIFICANT CHANGE UP (ref 98–107)
CHLORIDE SERPL-SCNC: 103 MMOL/L — SIGNIFICANT CHANGE UP (ref 98–107)
CHLORIDE SERPL-SCNC: 104 MMOL/L — SIGNIFICANT CHANGE UP (ref 98–107)
CHLORIDE SERPL-SCNC: 105 MMOL/L — SIGNIFICANT CHANGE UP (ref 98–107)
CHLORIDE SERPL-SCNC: 94 MMOL/L — LOW (ref 98–107)
CHLORIDE SERPL-SCNC: 95 MMOL/L — LOW (ref 98–107)
CHLORIDE SERPL-SCNC: 95 MMOL/L — LOW (ref 98–107)
CHLORIDE SERPL-SCNC: 96 MMOL/L — LOW (ref 98–107)
CHLORIDE SERPL-SCNC: 97 MMOL/L — LOW (ref 98–107)
CHLORIDE SERPL-SCNC: 98 MMOL/L — SIGNIFICANT CHANGE UP (ref 98–107)
CHLORIDE SERPL-SCNC: 99 MMOL/L — SIGNIFICANT CHANGE UP (ref 98–107)
CHOLEST SERPL-MCNC: 149 MG/DL — SIGNIFICANT CHANGE UP
CO2 BLDV-SCNC: 32.3 MMOL/L — HIGH (ref 22–26)
CO2 BLDV-SCNC: 32.8 MMOL/L — HIGH (ref 22–26)
CO2 BLDV-SCNC: 37 MMOL/L — HIGH (ref 22–26)
CO2 BLDV-SCNC: 37.4 MMOL/L — HIGH (ref 22–26)
CO2 BLDV-SCNC: 37.4 MMOL/L — HIGH (ref 22–26)
CO2 BLDV-SCNC: 38.6 MMOL/L — HIGH (ref 22–26)
CO2 BLDV-SCNC: 39.2 MMOL/L — HIGH (ref 22–26)
CO2 BLDV-SCNC: 40.5 MMOL/L — HIGH (ref 22–26)
CO2 BLDV-SCNC: 41.5 MMOL/L — HIGH (ref 22–26)
CO2 BLDV-SCNC: 41.5 MMOL/L — HIGH (ref 22–26)
CO2 BLDV-SCNC: 41.8 MMOL/L — HIGH (ref 22–26)
CO2 BLDV-SCNC: 42.5 MMOL/L — HIGH (ref 22–26)
CO2 BLDV-SCNC: 43.1 MMOL/L — HIGH (ref 22–26)
CO2 BLDV-SCNC: 44.6 MMOL/L — HIGH (ref 22–26)
CO2 BLDV-SCNC: 45.4 MMOL/L — HIGH (ref 22–26)
CO2 BLDV-SCNC: 45.6 MMOL/L — HIGH (ref 22–26)
CO2 BLDV-SCNC: 45.7 MMOL/L — HIGH (ref 22–26)
CO2 SERPL-SCNC: 22 MMOL/L — SIGNIFICANT CHANGE UP (ref 22–31)
CO2 SERPL-SCNC: 23 MMOL/L — SIGNIFICANT CHANGE UP (ref 22–31)
CO2 SERPL-SCNC: 25 MMOL/L — SIGNIFICANT CHANGE UP (ref 22–31)
CO2 SERPL-SCNC: 26 MMOL/L — SIGNIFICANT CHANGE UP (ref 22–31)
CO2 SERPL-SCNC: 27 MMOL/L — SIGNIFICANT CHANGE UP (ref 22–31)
CO2 SERPL-SCNC: 28 MMOL/L — SIGNIFICANT CHANGE UP (ref 22–31)
CO2 SERPL-SCNC: 28 MMOL/L — SIGNIFICANT CHANGE UP (ref 22–31)
CO2 SERPL-SCNC: 29 MMOL/L — SIGNIFICANT CHANGE UP (ref 22–31)
CO2 SERPL-SCNC: 30 MMOL/L — SIGNIFICANT CHANGE UP (ref 22–31)
CO2 SERPL-SCNC: 31 MMOL/L — SIGNIFICANT CHANGE UP (ref 22–31)
CO2 SERPL-SCNC: 32 MMOL/L — HIGH (ref 22–31)
CO2 SERPL-SCNC: 33 MMOL/L — HIGH (ref 22–31)
CO2 SERPL-SCNC: 34 MMOL/L — HIGH (ref 22–31)
CO2 SERPL-SCNC: 36 MMOL/L — HIGH (ref 22–31)
CO2 SERPL-SCNC: 37 MMOL/L — HIGH (ref 22–31)
CO2 SERPL-SCNC: 38 MMOL/L — HIGH (ref 22–31)
CO2 SERPL-SCNC: 38 MMOL/L — HIGH (ref 22–31)
COLOR SPEC: YELLOW — SIGNIFICANT CHANGE UP
CREAT ?TM UR-MCNC: 72 MG/DL — SIGNIFICANT CHANGE UP
CREAT SERPL-MCNC: 1.22 MG/DL — SIGNIFICANT CHANGE UP (ref 0.5–1.3)
CREAT SERPL-MCNC: 1.27 MG/DL — SIGNIFICANT CHANGE UP (ref 0.5–1.3)
CREAT SERPL-MCNC: 1.29 MG/DL — SIGNIFICANT CHANGE UP (ref 0.5–1.3)
CREAT SERPL-MCNC: 1.35 MG/DL — HIGH (ref 0.5–1.3)
CREAT SERPL-MCNC: 1.37 MG/DL — HIGH (ref 0.5–1.3)
CREAT SERPL-MCNC: 1.38 MG/DL — HIGH (ref 0.5–1.3)
CREAT SERPL-MCNC: 1.39 MG/DL — HIGH (ref 0.5–1.3)
CREAT SERPL-MCNC: 1.42 MG/DL — HIGH (ref 0.5–1.3)
CREAT SERPL-MCNC: 1.45 MG/DL — HIGH (ref 0.5–1.3)
CREAT SERPL-MCNC: 1.47 MG/DL — HIGH (ref 0.5–1.3)
CREAT SERPL-MCNC: 1.5 MG/DL — HIGH (ref 0.5–1.3)
CREAT SERPL-MCNC: 1.5 MG/DL — HIGH (ref 0.5–1.3)
CREAT SERPL-MCNC: 1.51 MG/DL — HIGH (ref 0.5–1.3)
CREAT SERPL-MCNC: 1.54 MG/DL — HIGH (ref 0.5–1.3)
CREAT SERPL-MCNC: 1.56 MG/DL — HIGH (ref 0.5–1.3)
CREAT SERPL-MCNC: 1.56 MG/DL — HIGH (ref 0.5–1.3)
CREAT SERPL-MCNC: 1.58 MG/DL — HIGH (ref 0.5–1.3)
CREAT SERPL-MCNC: 1.59 MG/DL — HIGH (ref 0.5–1.3)
CREAT SERPL-MCNC: 1.6 MG/DL — HIGH (ref 0.5–1.3)
CREAT SERPL-MCNC: 1.62 MG/DL — HIGH (ref 0.5–1.3)
CREAT SERPL-MCNC: 1.65 MG/DL — HIGH (ref 0.5–1.3)
CREAT SERPL-MCNC: 1.68 MG/DL — HIGH (ref 0.5–1.3)
CREAT SERPL-MCNC: 1.72 MG/DL — HIGH (ref 0.5–1.3)
CREAT SERPL-MCNC: 1.73 MG/DL — HIGH (ref 0.5–1.3)
CREAT SERPL-MCNC: 1.8 MG/DL — HIGH (ref 0.5–1.3)
CREAT SERPL-MCNC: 1.81 MG/DL — HIGH (ref 0.5–1.3)
CREAT SERPL-MCNC: 1.82 MG/DL — HIGH (ref 0.5–1.3)
CREAT SERPL-MCNC: 1.89 MG/DL — HIGH (ref 0.5–1.3)
CREAT SERPL-MCNC: 1.93 MG/DL — HIGH (ref 0.5–1.3)
CREAT SERPL-MCNC: 1.93 MG/DL — HIGH (ref 0.5–1.3)
CREAT SERPL-MCNC: 1.98 MG/DL — HIGH (ref 0.5–1.3)
CREAT SERPL-MCNC: 2.06 MG/DL — HIGH (ref 0.5–1.3)
CREAT SERPL-MCNC: 2.19 MG/DL — HIGH (ref 0.5–1.3)
CREAT SERPL-MCNC: 2.39 MG/DL — HIGH (ref 0.5–1.3)
CREAT SERPL-MCNC: 2.51 MG/DL — HIGH (ref 0.5–1.3)
CREAT SERPL-MCNC: 2.54 MG/DL — HIGH (ref 0.5–1.3)
CULTURE RESULTS: NO GROWTH — SIGNIFICANT CHANGE UP
CULTURE RESULTS: SIGNIFICANT CHANGE UP
DIFF PNL FLD: NEGATIVE — SIGNIFICANT CHANGE UP
EGFR: 18 ML/MIN/1.73M2 — LOW
EGFR: 18 ML/MIN/1.73M2 — LOW
EGFR: 19 ML/MIN/1.73M2 — LOW
EGFR: 21 ML/MIN/1.73M2 — LOW
EGFR: 23 ML/MIN/1.73M2 — LOW
EGFR: 24 ML/MIN/1.73M2 — LOW
EGFR: 25 ML/MIN/1.73M2 — LOW
EGFR: 26 ML/MIN/1.73M2 — LOW
EGFR: 27 ML/MIN/1.73M2 — LOW
EGFR: 28 ML/MIN/1.73M2 — LOW
EGFR: 28 ML/MIN/1.73M2 — LOW
EGFR: 29 ML/MIN/1.73M2 — LOW
EGFR: 30 ML/MIN/1.73M2 — LOW
EGFR: 30 ML/MIN/1.73M2 — LOW
EGFR: 31 ML/MIN/1.73M2 — LOW
EGFR: 32 ML/MIN/1.73M2 — LOW
EGFR: 33 ML/MIN/1.73M2 — LOW
EGFR: 34 ML/MIN/1.73M2 — LOW
EGFR: 35 ML/MIN/1.73M2 — LOW
EGFR: 36 ML/MIN/1.73M2 — LOW
EGFR: 36 ML/MIN/1.73M2 — LOW
EGFR: 37 ML/MIN/1.73M2 — LOW
EGFR: 38 ML/MIN/1.73M2 — LOW
EGFR: 40 ML/MIN/1.73M2 — LOW
EGFR: 41 ML/MIN/1.73M2 — LOW
EGFR: 43 ML/MIN/1.73M2 — LOW
EOSINOPHIL # BLD AUTO: 0 K/UL — SIGNIFICANT CHANGE UP (ref 0–0.5)
EOSINOPHIL # BLD AUTO: 0.04 K/UL — SIGNIFICANT CHANGE UP (ref 0–0.5)
EOSINOPHIL # BLD AUTO: 0.08 K/UL — SIGNIFICANT CHANGE UP (ref 0–0.5)
EOSINOPHIL # BLD AUTO: 0.17 K/UL — SIGNIFICANT CHANGE UP (ref 0–0.5)
EOSINOPHIL # BLD AUTO: 0.19 K/UL — SIGNIFICANT CHANGE UP (ref 0–0.5)
EOSINOPHIL # BLD AUTO: 0.33 K/UL — SIGNIFICANT CHANGE UP (ref 0–0.5)
EOSINOPHIL # BLD AUTO: 0.38 K/UL — SIGNIFICANT CHANGE UP (ref 0–0.5)
EOSINOPHIL NFR BLD AUTO: 0 % — SIGNIFICANT CHANGE UP (ref 0–6)
EOSINOPHIL NFR BLD AUTO: 0.4 % — SIGNIFICANT CHANGE UP (ref 0–6)
EOSINOPHIL NFR BLD AUTO: 0.5 % — SIGNIFICANT CHANGE UP (ref 0–6)
EOSINOPHIL NFR BLD AUTO: 1.8 % — SIGNIFICANT CHANGE UP (ref 0–6)
EOSINOPHIL NFR BLD AUTO: 2.5 % — SIGNIFICANT CHANGE UP (ref 0–6)
EOSINOPHIL NFR BLD AUTO: 4.5 % — SIGNIFICANT CHANGE UP (ref 0–6)
EOSINOPHIL NFR BLD AUTO: 6.3 % — HIGH (ref 0–6)
ESTIMATED AVERAGE GLUCOSE: 123 — SIGNIFICANT CHANGE UP
ESTIMATED AVERAGE GLUCOSE: 143 — SIGNIFICANT CHANGE UP
FERRITIN SERPL-MCNC: 61 NG/ML — SIGNIFICANT CHANGE UP (ref 15–150)
FLUAV AG NPH QL: SIGNIFICANT CHANGE UP
FLUAV AG NPH QL: SIGNIFICANT CHANGE UP
FLUAV SUBTYP SPEC NAA+PROBE: SIGNIFICANT CHANGE UP
FLUBV AG NPH QL: SIGNIFICANT CHANGE UP
FLUBV AG NPH QL: SIGNIFICANT CHANGE UP
FLUBV RNA SPEC QL NAA+PROBE: SIGNIFICANT CHANGE UP
FOLATE SERPL-MCNC: 12.6 NG/ML — SIGNIFICANT CHANGE UP (ref 3.1–17.5)
GAS PNL BLDA: SIGNIFICANT CHANGE UP
GAS PNL BLDA: SIGNIFICANT CHANGE UP
GAS PNL BLDV: 135 MMOL/L — LOW (ref 136–145)
GAS PNL BLDV: 135 MMOL/L — LOW (ref 136–145)
GAS PNL BLDV: 136 MMOL/L — SIGNIFICANT CHANGE UP (ref 136–145)
GAS PNL BLDV: 137 MMOL/L — SIGNIFICANT CHANGE UP (ref 136–145)
GAS PNL BLDV: 138 MMOL/L — SIGNIFICANT CHANGE UP (ref 136–145)
GAS PNL BLDV: 139 MMOL/L — SIGNIFICANT CHANGE UP (ref 136–145)
GAS PNL BLDV: SIGNIFICANT CHANGE UP
GLUCOSE BLDC GLUCOMTR-MCNC: 102 MG/DL — HIGH (ref 70–99)
GLUCOSE BLDC GLUCOMTR-MCNC: 108 MG/DL — HIGH (ref 70–99)
GLUCOSE BLDC GLUCOMTR-MCNC: 109 MG/DL — HIGH (ref 70–99)
GLUCOSE BLDC GLUCOMTR-MCNC: 109 MG/DL — HIGH (ref 70–99)
GLUCOSE BLDC GLUCOMTR-MCNC: 115 MG/DL — HIGH (ref 70–99)
GLUCOSE BLDC GLUCOMTR-MCNC: 115 MG/DL — HIGH (ref 70–99)
GLUCOSE BLDC GLUCOMTR-MCNC: 117 MG/DL — HIGH (ref 70–99)
GLUCOSE BLDC GLUCOMTR-MCNC: 118 MG/DL — HIGH (ref 70–99)
GLUCOSE BLDC GLUCOMTR-MCNC: 119 MG/DL — HIGH (ref 70–99)
GLUCOSE BLDC GLUCOMTR-MCNC: 119 MG/DL — HIGH (ref 70–99)
GLUCOSE BLDC GLUCOMTR-MCNC: 120 MG/DL — HIGH (ref 70–99)
GLUCOSE BLDC GLUCOMTR-MCNC: 122 MG/DL — HIGH (ref 70–99)
GLUCOSE BLDC GLUCOMTR-MCNC: 123 MG/DL — HIGH (ref 70–99)
GLUCOSE BLDC GLUCOMTR-MCNC: 123 MG/DL — HIGH (ref 70–99)
GLUCOSE BLDC GLUCOMTR-MCNC: 126 MG/DL — HIGH (ref 70–99)
GLUCOSE BLDC GLUCOMTR-MCNC: 127 MG/DL — HIGH (ref 70–99)
GLUCOSE BLDC GLUCOMTR-MCNC: 128 MG/DL — HIGH (ref 70–99)
GLUCOSE BLDC GLUCOMTR-MCNC: 130 MG/DL — HIGH (ref 70–99)
GLUCOSE BLDC GLUCOMTR-MCNC: 131 MG/DL — HIGH (ref 70–99)
GLUCOSE BLDC GLUCOMTR-MCNC: 131 MG/DL — HIGH (ref 70–99)
GLUCOSE BLDC GLUCOMTR-MCNC: 132 MG/DL — HIGH (ref 70–99)
GLUCOSE BLDC GLUCOMTR-MCNC: 132 MG/DL — HIGH (ref 70–99)
GLUCOSE BLDC GLUCOMTR-MCNC: 134 MG/DL — HIGH (ref 70–99)
GLUCOSE BLDC GLUCOMTR-MCNC: 135 MG/DL — HIGH (ref 70–99)
GLUCOSE BLDC GLUCOMTR-MCNC: 137 MG/DL — HIGH (ref 70–99)
GLUCOSE BLDC GLUCOMTR-MCNC: 137 MG/DL — HIGH (ref 70–99)
GLUCOSE BLDC GLUCOMTR-MCNC: 140 MG/DL — HIGH (ref 70–99)
GLUCOSE BLDC GLUCOMTR-MCNC: 141 MG/DL — HIGH (ref 70–99)
GLUCOSE BLDC GLUCOMTR-MCNC: 142 MG/DL — HIGH (ref 70–99)
GLUCOSE BLDC GLUCOMTR-MCNC: 143 MG/DL — HIGH (ref 70–99)
GLUCOSE BLDC GLUCOMTR-MCNC: 148 MG/DL — HIGH (ref 70–99)
GLUCOSE BLDC GLUCOMTR-MCNC: 149 MG/DL — HIGH (ref 70–99)
GLUCOSE BLDC GLUCOMTR-MCNC: 150 MG/DL — HIGH (ref 70–99)
GLUCOSE BLDC GLUCOMTR-MCNC: 151 MG/DL — HIGH (ref 70–99)
GLUCOSE BLDC GLUCOMTR-MCNC: 151 MG/DL — HIGH (ref 70–99)
GLUCOSE BLDC GLUCOMTR-MCNC: 153 MG/DL — HIGH (ref 70–99)
GLUCOSE BLDC GLUCOMTR-MCNC: 153 MG/DL — HIGH (ref 70–99)
GLUCOSE BLDC GLUCOMTR-MCNC: 156 MG/DL — HIGH (ref 70–99)
GLUCOSE BLDC GLUCOMTR-MCNC: 157 MG/DL — HIGH (ref 70–99)
GLUCOSE BLDC GLUCOMTR-MCNC: 159 MG/DL — HIGH (ref 70–99)
GLUCOSE BLDC GLUCOMTR-MCNC: 160 MG/DL — HIGH (ref 70–99)
GLUCOSE BLDC GLUCOMTR-MCNC: 163 MG/DL — HIGH (ref 70–99)
GLUCOSE BLDC GLUCOMTR-MCNC: 163 MG/DL — HIGH (ref 70–99)
GLUCOSE BLDC GLUCOMTR-MCNC: 164 MG/DL — HIGH (ref 70–99)
GLUCOSE BLDC GLUCOMTR-MCNC: 174 MG/DL — HIGH (ref 70–99)
GLUCOSE BLDC GLUCOMTR-MCNC: 175 MG/DL — HIGH (ref 70–99)
GLUCOSE BLDC GLUCOMTR-MCNC: 177 MG/DL — HIGH (ref 70–99)
GLUCOSE BLDC GLUCOMTR-MCNC: 177 MG/DL — HIGH (ref 70–99)
GLUCOSE BLDC GLUCOMTR-MCNC: 179 MG/DL — HIGH (ref 70–99)
GLUCOSE BLDC GLUCOMTR-MCNC: 180 MG/DL — HIGH (ref 70–99)
GLUCOSE BLDC GLUCOMTR-MCNC: 181 MG/DL — HIGH (ref 70–99)
GLUCOSE BLDC GLUCOMTR-MCNC: 189 MG/DL — HIGH (ref 70–99)
GLUCOSE BLDC GLUCOMTR-MCNC: 199 MG/DL — HIGH (ref 70–99)
GLUCOSE BLDC GLUCOMTR-MCNC: 205 MG/DL — HIGH (ref 70–99)
GLUCOSE BLDC GLUCOMTR-MCNC: 207 MG/DL — HIGH (ref 70–99)
GLUCOSE BLDC GLUCOMTR-MCNC: 225 MG/DL — HIGH (ref 70–99)
GLUCOSE BLDC GLUCOMTR-MCNC: 228 MG/DL — HIGH (ref 70–99)
GLUCOSE BLDC GLUCOMTR-MCNC: 230 MG/DL — HIGH (ref 70–99)
GLUCOSE BLDC GLUCOMTR-MCNC: 230 MG/DL — HIGH (ref 70–99)
GLUCOSE BLDC GLUCOMTR-MCNC: 236 MG/DL — HIGH (ref 70–99)
GLUCOSE BLDC GLUCOMTR-MCNC: 243 MG/DL — HIGH (ref 70–99)
GLUCOSE BLDC GLUCOMTR-MCNC: 246 MG/DL — HIGH (ref 70–99)
GLUCOSE BLDC GLUCOMTR-MCNC: 283 MG/DL — HIGH (ref 70–99)
GLUCOSE BLDC GLUCOMTR-MCNC: 289 MG/DL — HIGH (ref 70–99)
GLUCOSE BLDC GLUCOMTR-MCNC: 92 MG/DL — SIGNIFICANT CHANGE UP (ref 70–99)
GLUCOSE BLDC GLUCOMTR-MCNC: 94 MG/DL — SIGNIFICANT CHANGE UP (ref 70–99)
GLUCOSE BLDC GLUCOMTR-MCNC: 99 MG/DL — SIGNIFICANT CHANGE UP (ref 70–99)
GLUCOSE BLDV-MCNC: 107 MG/DL — HIGH (ref 70–99)
GLUCOSE BLDV-MCNC: 107 MG/DL — HIGH (ref 70–99)
GLUCOSE BLDV-MCNC: 127 MG/DL — HIGH (ref 70–99)
GLUCOSE BLDV-MCNC: 129 MG/DL — HIGH (ref 70–99)
GLUCOSE BLDV-MCNC: 129 MG/DL — HIGH (ref 70–99)
GLUCOSE BLDV-MCNC: 132 MG/DL — HIGH (ref 70–99)
GLUCOSE BLDV-MCNC: 132 MG/DL — HIGH (ref 70–99)
GLUCOSE BLDV-MCNC: 137 MG/DL — HIGH (ref 70–99)
GLUCOSE BLDV-MCNC: 141 MG/DL — HIGH (ref 70–99)
GLUCOSE BLDV-MCNC: 146 MG/DL — HIGH (ref 70–99)
GLUCOSE BLDV-MCNC: 158 MG/DL — HIGH (ref 70–99)
GLUCOSE BLDV-MCNC: 164 MG/DL — HIGH (ref 70–99)
GLUCOSE BLDV-MCNC: 175 MG/DL — HIGH (ref 70–99)
GLUCOSE BLDV-MCNC: 98 MG/DL — SIGNIFICANT CHANGE UP (ref 70–99)
GLUCOSE SERPL-MCNC: 104 MG/DL — HIGH (ref 70–99)
GLUCOSE SERPL-MCNC: 104 MG/DL — HIGH (ref 70–99)
GLUCOSE SERPL-MCNC: 118 MG/DL — HIGH (ref 70–99)
GLUCOSE SERPL-MCNC: 119 MG/DL — HIGH (ref 70–99)
GLUCOSE SERPL-MCNC: 119 MG/DL — HIGH (ref 70–99)
GLUCOSE SERPL-MCNC: 121 MG/DL — HIGH (ref 70–99)
GLUCOSE SERPL-MCNC: 121 MG/DL — HIGH (ref 70–99)
GLUCOSE SERPL-MCNC: 126 MG/DL — HIGH (ref 70–99)
GLUCOSE SERPL-MCNC: 126 MG/DL — HIGH (ref 70–99)
GLUCOSE SERPL-MCNC: 128 MG/DL — HIGH (ref 70–99)
GLUCOSE SERPL-MCNC: 128 MG/DL — HIGH (ref 70–99)
GLUCOSE SERPL-MCNC: 129 MG/DL — HIGH (ref 70–99)
GLUCOSE SERPL-MCNC: 131 MG/DL — HIGH (ref 70–99)
GLUCOSE SERPL-MCNC: 132 MG/DL — HIGH (ref 70–99)
GLUCOSE SERPL-MCNC: 134 MG/DL — HIGH (ref 70–99)
GLUCOSE SERPL-MCNC: 136 MG/DL — HIGH (ref 70–99)
GLUCOSE SERPL-MCNC: 136 MG/DL — HIGH (ref 70–99)
GLUCOSE SERPL-MCNC: 137 MG/DL — HIGH (ref 70–99)
GLUCOSE SERPL-MCNC: 137 MG/DL — HIGH (ref 70–99)
GLUCOSE SERPL-MCNC: 138 MG/DL — HIGH (ref 70–99)
GLUCOSE SERPL-MCNC: 140 MG/DL — HIGH (ref 70–99)
GLUCOSE SERPL-MCNC: 140 MG/DL — HIGH (ref 70–99)
GLUCOSE SERPL-MCNC: 141 MG/DL — HIGH (ref 70–99)
GLUCOSE SERPL-MCNC: 142 MG/DL — HIGH (ref 70–99)
GLUCOSE SERPL-MCNC: 144 MG/DL — HIGH (ref 70–99)
GLUCOSE SERPL-MCNC: 147 MG/DL — HIGH (ref 70–99)
GLUCOSE SERPL-MCNC: 148 MG/DL — HIGH (ref 70–99)
GLUCOSE SERPL-MCNC: 149 MG/DL — HIGH (ref 70–99)
GLUCOSE SERPL-MCNC: 152 MG/DL — HIGH (ref 70–99)
GLUCOSE SERPL-MCNC: 156 MG/DL — HIGH (ref 70–99)
GLUCOSE SERPL-MCNC: 162 MG/DL — HIGH (ref 70–99)
GLUCOSE SERPL-MCNC: 164 MG/DL — HIGH (ref 70–99)
GLUCOSE SERPL-MCNC: 165 MG/DL — HIGH (ref 70–99)
GLUCOSE SERPL-MCNC: 165 MG/DL — HIGH (ref 70–99)
GLUCOSE SERPL-MCNC: 174 MG/DL — HIGH (ref 70–99)
GLUCOSE SERPL-MCNC: 223 MG/DL — HIGH (ref 70–99)
GLUCOSE SERPL-MCNC: 228 MG/DL — HIGH (ref 70–99)
GLUCOSE SERPL-MCNC: 84 MG/DL — SIGNIFICANT CHANGE UP (ref 70–99)
GLUCOSE SERPL-MCNC: 97 MG/DL — SIGNIFICANT CHANGE UP (ref 70–99)
GLUCOSE UR QL: NEGATIVE MG/DL — SIGNIFICANT CHANGE UP
GLUCOSE UR QL: NEGATIVE MG/DL — SIGNIFICANT CHANGE UP
GLUCOSE UR QL: NEGATIVE — SIGNIFICANT CHANGE UP
HADV DNA SPEC QL NAA+PROBE: SIGNIFICANT CHANGE UP
HCO3 BLDV-SCNC: 30 MMOL/L — HIGH (ref 22–29)
HCO3 BLDV-SCNC: 31 MMOL/L — HIGH (ref 22–29)
HCO3 BLDV-SCNC: 35 MMOL/L — HIGH (ref 22–29)
HCO3 BLDV-SCNC: 36 MMOL/L — HIGH (ref 22–29)
HCO3 BLDV-SCNC: 37 MMOL/L — HIGH (ref 22–29)
HCO3 BLDV-SCNC: 38 MMOL/L — HIGH (ref 22–29)
HCO3 BLDV-SCNC: 39 MMOL/L — HIGH (ref 22–29)
HCO3 BLDV-SCNC: 39 MMOL/L — HIGH (ref 22–29)
HCO3 BLDV-SCNC: 40 MMOL/L — HIGH (ref 22–29)
HCO3 BLDV-SCNC: 42 MMOL/L — HIGH (ref 22–29)
HCO3 BLDV-SCNC: 43 MMOL/L — HIGH (ref 22–29)
HCOV 229E RNA SPEC QL NAA+PROBE: SIGNIFICANT CHANGE UP
HCOV HKU1 RNA SPEC QL NAA+PROBE: SIGNIFICANT CHANGE UP
HCOV NL63 RNA SPEC QL NAA+PROBE: SIGNIFICANT CHANGE UP
HCOV OC43 RNA SPEC QL NAA+PROBE: SIGNIFICANT CHANGE UP
HCT VFR BLD CALC: 22.5 % — LOW (ref 34.5–45)
HCT VFR BLD CALC: 23.9 % — LOW (ref 34.5–45)
HCT VFR BLD CALC: 26.2 % — LOW (ref 34.5–45)
HCT VFR BLD CALC: 26.3 % — LOW (ref 34.5–45)
HCT VFR BLD CALC: 26.6 % — LOW (ref 34.5–45)
HCT VFR BLD CALC: 27.7 % — LOW (ref 34.5–45)
HCT VFR BLD CALC: 27.9 % — LOW (ref 34.5–45)
HCT VFR BLD CALC: 29.2 % — LOW (ref 34.5–45)
HCT VFR BLD CALC: 29.4 % — LOW (ref 34.5–45)
HCT VFR BLD CALC: 29.6 % — LOW (ref 34.5–45)
HCT VFR BLD CALC: 30.1 % — LOW (ref 34.5–45)
HCT VFR BLD CALC: 31.1 % — LOW (ref 34.5–45)
HCT VFR BLD CALC: 32.1 % — LOW (ref 34.5–45)
HCT VFR BLD CALC: 32.6 % — LOW (ref 34.5–45)
HCT VFR BLD CALC: 32.9 % — LOW (ref 34.5–45)
HCT VFR BLD CALC: 32.9 % — LOW (ref 34.5–45)
HCT VFR BLD CALC: 33 % — LOW (ref 34.5–45)
HCT VFR BLD CALC: 33.7 % — LOW (ref 34.5–45)
HCT VFR BLD CALC: 33.7 % — LOW (ref 34.5–45)
HCT VFR BLD CALC: 33.8 % — LOW (ref 34.5–45)
HCT VFR BLD CALC: 34.3 % — LOW (ref 34.5–45)
HCT VFR BLD CALC: 34.5 % — SIGNIFICANT CHANGE UP (ref 34.5–45)
HCT VFR BLD CALC: 34.7 % — SIGNIFICANT CHANGE UP (ref 34.5–45)
HCT VFR BLD CALC: 34.8 % — SIGNIFICANT CHANGE UP (ref 34.5–45)
HCT VFR BLD CALC: 34.9 % — SIGNIFICANT CHANGE UP (ref 34.5–45)
HCT VFR BLD CALC: 36.2 % — SIGNIFICANT CHANGE UP (ref 34.5–45)
HCT VFR BLD CALC: 36.6 % — SIGNIFICANT CHANGE UP (ref 34.5–45)
HCT VFR BLDA CALC: 23 % — LOW (ref 34.5–46.5)
HCT VFR BLDA CALC: 23 % — LOW (ref 34.5–46.5)
HCT VFR BLDA CALC: 24 % — LOW (ref 34.5–46.5)
HCT VFR BLDA CALC: 25 % — LOW (ref 34.5–46.5)
HCT VFR BLDA CALC: 28 % — LOW (ref 34.5–46.5)
HCT VFR BLDA CALC: 31 % — LOW (ref 34.5–46.5)
HCT VFR BLDA CALC: 32 % — LOW (ref 34.5–46.5)
HCT VFR BLDA CALC: 33 % — LOW (ref 34.5–46.5)
HCT VFR BLDA CALC: 34 % — LOW (ref 34.5–46.5)
HCT VFR BLDA CALC: 34 % — LOW (ref 34.5–46.5)
HCT VFR BLDA CALC: 35 % — SIGNIFICANT CHANGE UP (ref 34.5–46.5)
HCT VFR BLDA CALC: 35 % — SIGNIFICANT CHANGE UP (ref 34.5–46.5)
HDLC SERPL-MCNC: 47 MG/DL — LOW
HEMOLYSIS INDEX: 8 — SIGNIFICANT CHANGE UP
HGB BLD CALC-MCNC: 10.2 G/DL — LOW (ref 11.7–16.1)
HGB BLD CALC-MCNC: 10.3 G/DL — LOW (ref 11.5–15.5)
HGB BLD CALC-MCNC: 10.3 G/DL — LOW (ref 11.7–16.1)
HGB BLD CALC-MCNC: 10.8 G/DL — LOW (ref 11.7–16.1)
HGB BLD CALC-MCNC: 10.9 G/DL — LOW (ref 11.7–16.1)
HGB BLD CALC-MCNC: 11.2 G/DL — LOW (ref 11.7–16.1)
HGB BLD CALC-MCNC: 11.3 G/DL — LOW (ref 11.7–16.1)
HGB BLD CALC-MCNC: 11.5 G/DL — LOW (ref 11.7–16.1)
HGB BLD CALC-MCNC: 11.5 G/DL — LOW (ref 11.7–16.1)
HGB BLD CALC-MCNC: 7.6 G/DL — LOW (ref 11.7–16.1)
HGB BLD CALC-MCNC: 7.7 G/DL — LOW (ref 11.7–16.1)
HGB BLD CALC-MCNC: 8 G/DL — LOW (ref 11.7–16.1)
HGB BLD CALC-MCNC: 8.2 G/DL — LOW (ref 11.7–16.1)
HGB BLD CALC-MCNC: 9.2 G/DL — LOW (ref 11.7–16.1)
HGB BLD-MCNC: 10.1 G/DL — LOW (ref 11.5–15.5)
HGB BLD-MCNC: 10.1 G/DL — LOW (ref 11.5–15.5)
HGB BLD-MCNC: 10.2 G/DL — LOW (ref 11.5–15.5)
HGB BLD-MCNC: 10.2 G/DL — LOW (ref 11.5–15.5)
HGB BLD-MCNC: 10.3 G/DL — LOW (ref 11.5–15.5)
HGB BLD-MCNC: 10.4 G/DL — LOW (ref 11.5–15.5)
HGB BLD-MCNC: 10.5 G/DL — LOW (ref 11.5–15.5)
HGB BLD-MCNC: 10.6 G/DL — LOW (ref 11.5–15.5)
HGB BLD-MCNC: 10.6 G/DL — LOW (ref 11.5–15.5)
HGB BLD-MCNC: 10.9 G/DL — LOW (ref 11.5–15.5)
HGB BLD-MCNC: 11 G/DL — LOW (ref 11.5–15.5)
HGB BLD-MCNC: 7.1 G/DL — LOW (ref 11.5–15.5)
HGB BLD-MCNC: 7.6 G/DL — LOW (ref 11.5–15.5)
HGB BLD-MCNC: 8.1 G/DL — LOW (ref 11.5–15.5)
HGB BLD-MCNC: 8.1 G/DL — LOW (ref 11.5–15.5)
HGB BLD-MCNC: 8.2 G/DL — LOW (ref 11.5–15.5)
HGB BLD-MCNC: 8.4 G/DL — LOW (ref 11.5–15.5)
HGB BLD-MCNC: 8.7 G/DL — LOW (ref 11.5–15.5)
HGB BLD-MCNC: 8.9 G/DL — LOW (ref 11.5–15.5)
HGB BLD-MCNC: 9 G/DL — LOW (ref 11.5–15.5)
HGB BLD-MCNC: 9.1 G/DL — LOW (ref 11.5–15.5)
HGB BLD-MCNC: 9.3 G/DL — LOW (ref 11.5–15.5)
HGB BLD-MCNC: 9.6 G/DL — LOW (ref 11.5–15.5)
HGB BLD-MCNC: 9.8 G/DL — LOW (ref 11.5–15.5)
HGB BLD-MCNC: 9.8 G/DL — LOW (ref 11.5–15.5)
HMPV RNA SPEC QL NAA+PROBE: SIGNIFICANT CHANGE UP
HPIV1 RNA SPEC QL NAA+PROBE: SIGNIFICANT CHANGE UP
HPIV2 RNA SPEC QL NAA+PROBE: SIGNIFICANT CHANGE UP
HPIV3 RNA SPEC QL NAA+PROBE: SIGNIFICANT CHANGE UP
HPIV4 RNA SPEC QL NAA+PROBE: SIGNIFICANT CHANGE UP
IANC: 13.55 K/UL — HIGH (ref 1.8–7.4)
IANC: 3.35 K/UL — SIGNIFICANT CHANGE UP (ref 1.8–7.4)
IANC: 4 K/UL — SIGNIFICANT CHANGE UP (ref 1.8–7.4)
IANC: 5.13 K/UL — SIGNIFICANT CHANGE UP (ref 1.8–7.4)
IANC: 6.79 K/UL — SIGNIFICANT CHANGE UP (ref 1.8–7.4)
IANC: 6.88 K/UL — SIGNIFICANT CHANGE UP (ref 1.8–7.4)
IANC: 7.4 K/UL — SIGNIFICANT CHANGE UP (ref 1.8–7.4)
IMM GRANULOCYTES NFR BLD AUTO: 0.2 % — SIGNIFICANT CHANGE UP (ref 0–0.9)
IMM GRANULOCYTES NFR BLD AUTO: 0.3 % — SIGNIFICANT CHANGE UP (ref 0–0.9)
IMM GRANULOCYTES NFR BLD AUTO: 0.3 % — SIGNIFICANT CHANGE UP (ref 0–0.9)
IMM GRANULOCYTES NFR BLD AUTO: 0.4 % — SIGNIFICANT CHANGE UP (ref 0–0.9)
IMM GRANULOCYTES NFR BLD AUTO: 0.4 % — SIGNIFICANT CHANGE UP (ref 0–0.9)
IMM GRANULOCYTES NFR BLD AUTO: 0.5 % — SIGNIFICANT CHANGE UP (ref 0–0.9)
IMM GRANULOCYTES NFR BLD AUTO: 0.9 % — SIGNIFICANT CHANGE UP (ref 0–0.9)
INR BLD: 0.9 RATIO — SIGNIFICANT CHANGE UP (ref 0.85–1.18)
INR BLD: 0.92 RATIO — SIGNIFICANT CHANGE UP (ref 0.85–1.18)
INR BLD: 0.93 RATIO — SIGNIFICANT CHANGE UP (ref 0.88–1.16)
INR BLD: 0.96 RATIO — SIGNIFICANT CHANGE UP (ref 0.85–1.18)
INR BLD: 0.97 RATIO — SIGNIFICANT CHANGE UP (ref 0.85–1.18)
INR BLD: 1 RATIO — SIGNIFICANT CHANGE UP (ref 0.85–1.18)
IRON SATN MFR SERPL: 27 % — SIGNIFICANT CHANGE UP (ref 14–50)
IRON SATN MFR SERPL: 73 UG/DL — SIGNIFICANT CHANGE UP (ref 30–160)
KETONES UR-MCNC: NEGATIVE MG/DL — SIGNIFICANT CHANGE UP
KETONES UR-MCNC: NEGATIVE MG/DL — SIGNIFICANT CHANGE UP
KETONES UR-MCNC: NEGATIVE — SIGNIFICANT CHANGE UP
LACTATE BLDV-MCNC: 0.6 MMOL/L — SIGNIFICANT CHANGE UP (ref 0.5–2)
LACTATE BLDV-MCNC: 0.8 MMOL/L — SIGNIFICANT CHANGE UP (ref 0.5–2)
LACTATE BLDV-MCNC: 0.8 MMOL/L — SIGNIFICANT CHANGE UP (ref 0.5–2)
LACTATE BLDV-MCNC: 0.9 MMOL/L — SIGNIFICANT CHANGE UP (ref 0.5–2)
LACTATE BLDV-MCNC: 1 MMOL/L — SIGNIFICANT CHANGE UP (ref 0.5–2)
LACTATE BLDV-MCNC: 1.1 MMOL/L — SIGNIFICANT CHANGE UP (ref 0.5–2)
LACTATE BLDV-MCNC: 1.1 MMOL/L — SIGNIFICANT CHANGE UP (ref 0.5–2)
LACTATE BLDV-MCNC: 1.2 MMOL/L — SIGNIFICANT CHANGE UP (ref 0.5–2)
LACTATE BLDV-MCNC: 1.2 MMOL/L — SIGNIFICANT CHANGE UP (ref 0.5–2)
LACTATE BLDV-MCNC: 2 MMOL/L — SIGNIFICANT CHANGE UP (ref 0.5–2)
LACTATE SERPL-SCNC: 0.5 MMOL/L — SIGNIFICANT CHANGE UP (ref 0.5–2)
LEUKOCYTE ESTERASE UR-ACNC: NEGATIVE — SIGNIFICANT CHANGE UP
LIPID PNL WITH DIRECT LDL SERPL: 84 MG/DL — SIGNIFICANT CHANGE UP
LYMPHOCYTES # BLD AUTO: 1.05 K/UL — SIGNIFICANT CHANGE UP (ref 1–3.3)
LYMPHOCYTES # BLD AUTO: 1.15 K/UL — SIGNIFICANT CHANGE UP (ref 1–3.3)
LYMPHOCYTES # BLD AUTO: 1.21 K/UL — SIGNIFICANT CHANGE UP (ref 1–3.3)
LYMPHOCYTES # BLD AUTO: 1.37 K/UL — SIGNIFICANT CHANGE UP (ref 1–3.3)
LYMPHOCYTES # BLD AUTO: 1.44 K/UL — SIGNIFICANT CHANGE UP (ref 1–3.3)
LYMPHOCYTES # BLD AUTO: 1.49 K/UL — SIGNIFICANT CHANGE UP (ref 1–3.3)
LYMPHOCYTES # BLD AUTO: 1.97 K/UL — SIGNIFICANT CHANGE UP (ref 1–3.3)
LYMPHOCYTES # BLD AUTO: 12 % — LOW (ref 13–44)
LYMPHOCYTES # BLD AUTO: 12.7 % — LOW (ref 13–44)
LYMPHOCYTES # BLD AUTO: 14.3 % — SIGNIFICANT CHANGE UP (ref 13–44)
LYMPHOCYTES # BLD AUTO: 18.9 % — SIGNIFICANT CHANGE UP (ref 13–44)
LYMPHOCYTES # BLD AUTO: 24.6 % — SIGNIFICANT CHANGE UP (ref 13–44)
LYMPHOCYTES # BLD AUTO: 26.9 % — SIGNIFICANT CHANGE UP (ref 13–44)
LYMPHOCYTES # BLD AUTO: 6.6 % — LOW (ref 13–44)
M PNEUMO DNA SPEC QL NAA+PROBE: SIGNIFICANT CHANGE UP
MAGNESIUM SERPL-MCNC: 1.7 MG/DL — SIGNIFICANT CHANGE UP (ref 1.6–2.6)
MAGNESIUM SERPL-MCNC: 1.8 MG/DL — SIGNIFICANT CHANGE UP (ref 1.6–2.6)
MAGNESIUM SERPL-MCNC: 1.9 MG/DL — SIGNIFICANT CHANGE UP (ref 1.6–2.6)
MAGNESIUM SERPL-MCNC: 2 MG/DL — SIGNIFICANT CHANGE UP (ref 1.6–2.6)
MAGNESIUM SERPL-MCNC: 2.1 MG/DL — SIGNIFICANT CHANGE UP (ref 1.6–2.6)
MAGNESIUM SERPL-MCNC: 2.1 MG/DL — SIGNIFICANT CHANGE UP (ref 1.6–2.6)
MAGNESIUM SERPL-MCNC: 2.2 MG/DL — SIGNIFICANT CHANGE UP (ref 1.6–2.6)
MAGNESIUM SERPL-MCNC: 2.3 MG/DL — SIGNIFICANT CHANGE UP (ref 1.6–2.6)
MAGNESIUM SERPL-MCNC: 2.4 MG/DL — SIGNIFICANT CHANGE UP (ref 1.6–2.6)
MAGNESIUM SERPL-MCNC: 2.5 MG/DL — SIGNIFICANT CHANGE UP (ref 1.6–2.6)
MAGNESIUM SERPL-MCNC: 2.6 MG/DL — SIGNIFICANT CHANGE UP (ref 1.6–2.6)
MCHC RBC-ENTMCNC: 29.6 GM/DL — LOW (ref 32–36)
MCHC RBC-ENTMCNC: 30 GM/DL — LOW (ref 32–36)
MCHC RBC-ENTMCNC: 30 GM/DL — LOW (ref 32–36)
MCHC RBC-ENTMCNC: 30 PG — SIGNIFICANT CHANGE UP (ref 27–34)
MCHC RBC-ENTMCNC: 30.1 GM/DL — LOW (ref 32–36)
MCHC RBC-ENTMCNC: 30.1 PG — SIGNIFICANT CHANGE UP (ref 27–34)
MCHC RBC-ENTMCNC: 30.2 GM/DL — LOW (ref 32–36)
MCHC RBC-ENTMCNC: 30.2 PG — SIGNIFICANT CHANGE UP (ref 27–34)
MCHC RBC-ENTMCNC: 30.3 PG — SIGNIFICANT CHANGE UP (ref 27–34)
MCHC RBC-ENTMCNC: 30.4 GM/DL — LOW (ref 32–36)
MCHC RBC-ENTMCNC: 30.4 GM/DL — LOW (ref 32–36)
MCHC RBC-ENTMCNC: 30.4 PG — SIGNIFICANT CHANGE UP (ref 27–34)
MCHC RBC-ENTMCNC: 30.4 PG — SIGNIFICANT CHANGE UP (ref 27–34)
MCHC RBC-ENTMCNC: 30.5 GM/DL — LOW (ref 32–36)
MCHC RBC-ENTMCNC: 30.5 PG — SIGNIFICANT CHANGE UP (ref 27–34)
MCHC RBC-ENTMCNC: 30.6 GM/DL — LOW (ref 32–36)
MCHC RBC-ENTMCNC: 30.6 PG — SIGNIFICANT CHANGE UP (ref 27–34)
MCHC RBC-ENTMCNC: 30.7 PG — SIGNIFICANT CHANGE UP (ref 27–34)
MCHC RBC-ENTMCNC: 30.8 GM/DL — LOW (ref 32–36)
MCHC RBC-ENTMCNC: 30.8 PG — SIGNIFICANT CHANGE UP (ref 27–34)
MCHC RBC-ENTMCNC: 30.8 PG — SIGNIFICANT CHANGE UP (ref 27–34)
MCHC RBC-ENTMCNC: 30.9 GM/DL — LOW (ref 32–36)
MCHC RBC-ENTMCNC: 30.9 GM/DL — LOW (ref 32–36)
MCHC RBC-ENTMCNC: 30.9 PG — SIGNIFICANT CHANGE UP (ref 27–34)
MCHC RBC-ENTMCNC: 30.9 PG — SIGNIFICANT CHANGE UP (ref 27–34)
MCHC RBC-ENTMCNC: 31 GM/DL — LOW (ref 32–36)
MCHC RBC-ENTMCNC: 31 GM/DL — LOW (ref 32–36)
MCHC RBC-ENTMCNC: 31 PG — SIGNIFICANT CHANGE UP (ref 27–34)
MCHC RBC-ENTMCNC: 31 PG — SIGNIFICANT CHANGE UP (ref 27–34)
MCHC RBC-ENTMCNC: 31.1 PG — SIGNIFICANT CHANGE UP (ref 27–34)
MCHC RBC-ENTMCNC: 31.2 GM/DL — LOW (ref 32–36)
MCHC RBC-ENTMCNC: 31.2 PG — SIGNIFICANT CHANGE UP (ref 27–34)
MCHC RBC-ENTMCNC: 31.3 PG — SIGNIFICANT CHANGE UP (ref 27–34)
MCHC RBC-ENTMCNC: 31.4 PG — SIGNIFICANT CHANGE UP (ref 27–34)
MCHC RBC-ENTMCNC: 31.4 PG — SIGNIFICANT CHANGE UP (ref 27–34)
MCHC RBC-ENTMCNC: 31.5 PG — SIGNIFICANT CHANGE UP (ref 27–34)
MCHC RBC-ENTMCNC: 31.5 PG — SIGNIFICANT CHANGE UP (ref 27–34)
MCHC RBC-ENTMCNC: 31.6 GM/DL — LOW (ref 32–36)
MCHC RBC-ENTMCNC: 31.6 PG — SIGNIFICANT CHANGE UP (ref 27–34)
MCHC RBC-ENTMCNC: 31.7 PG — SIGNIFICANT CHANGE UP (ref 27–34)
MCHC RBC-ENTMCNC: 31.8 GM/DL — LOW (ref 32–36)
MCHC RBC-ENTMCNC: 31.8 GM/DL — LOW (ref 32–36)
MCHC RBC-ENTMCNC: 31.8 PG — SIGNIFICANT CHANGE UP (ref 27–34)
MCHC RBC-ENTMCNC: 32.1 GM/DL — SIGNIFICANT CHANGE UP (ref 32–36)
MCV RBC AUTO: 100 FL — SIGNIFICANT CHANGE UP (ref 80–100)
MCV RBC AUTO: 100.3 FL — HIGH (ref 80–100)
MCV RBC AUTO: 100.6 FL — HIGH (ref 80–100)
MCV RBC AUTO: 100.9 FL — HIGH (ref 80–100)
MCV RBC AUTO: 101.1 FL — HIGH (ref 80–100)
MCV RBC AUTO: 101.6 FL — HIGH (ref 80–100)
MCV RBC AUTO: 101.9 FL — HIGH (ref 80–100)
MCV RBC AUTO: 102.4 FL — HIGH (ref 80–100)
MCV RBC AUTO: 102.6 FL — HIGH (ref 80–100)
MCV RBC AUTO: 103.1 FL — HIGH (ref 80–100)
MCV RBC AUTO: 103.5 FL — HIGH (ref 80–100)
MCV RBC AUTO: 104.3 FL — HIGH (ref 80–100)
MCV RBC AUTO: 98.6 FL — SIGNIFICANT CHANGE UP (ref 80–100)
MCV RBC AUTO: 98.6 FL — SIGNIFICANT CHANGE UP (ref 80–100)
MCV RBC AUTO: 98.9 FL — SIGNIFICANT CHANGE UP (ref 80–100)
MCV RBC AUTO: 99.1 FL — SIGNIFICANT CHANGE UP (ref 80–100)
MCV RBC AUTO: 99.2 FL — SIGNIFICANT CHANGE UP (ref 80–100)
MCV RBC AUTO: 99.7 FL — SIGNIFICANT CHANGE UP (ref 80–100)
MCV RBC AUTO: 99.7 FL — SIGNIFICANT CHANGE UP (ref 80–100)
MONOCYTES # BLD AUTO: 0.8 K/UL — SIGNIFICANT CHANGE UP (ref 0–0.9)
MONOCYTES # BLD AUTO: 0.8 K/UL — SIGNIFICANT CHANGE UP (ref 0–0.9)
MONOCYTES # BLD AUTO: 0.97 K/UL — HIGH (ref 0–0.9)
MONOCYTES # BLD AUTO: 1 K/UL — HIGH (ref 0–0.9)
MONOCYTES # BLD AUTO: 1.2 K/UL — HIGH (ref 0–0.9)
MONOCYTES # BLD AUTO: 1.21 K/UL — HIGH (ref 0–0.9)
MONOCYTES # BLD AUTO: 1.24 K/UL — HIGH (ref 0–0.9)
MONOCYTES NFR BLD AUTO: 10.4 % — SIGNIFICANT CHANGE UP (ref 2–14)
MONOCYTES NFR BLD AUTO: 10.5 % — SIGNIFICANT CHANGE UP (ref 2–14)
MONOCYTES NFR BLD AUTO: 12.6 % — SIGNIFICANT CHANGE UP (ref 2–14)
MONOCYTES NFR BLD AUTO: 13 % — SIGNIFICANT CHANGE UP (ref 2–14)
MONOCYTES NFR BLD AUTO: 13.2 % — SIGNIFICANT CHANGE UP (ref 2–14)
MONOCYTES NFR BLD AUTO: 13.2 % — SIGNIFICANT CHANGE UP (ref 2–14)
MONOCYTES NFR BLD AUTO: 7.6 % — SIGNIFICANT CHANGE UP (ref 2–14)
NEUTROPHILS # BLD AUTO: 13.55 K/UL — HIGH (ref 1.8–7.4)
NEUTROPHILS # BLD AUTO: 3.35 K/UL — SIGNIFICANT CHANGE UP (ref 1.8–7.4)
NEUTROPHILS # BLD AUTO: 4 K/UL — SIGNIFICANT CHANGE UP (ref 1.8–7.4)
NEUTROPHILS # BLD AUTO: 5.13 K/UL — SIGNIFICANT CHANGE UP (ref 1.8–7.4)
NEUTROPHILS # BLD AUTO: 6.79 K/UL — SIGNIFICANT CHANGE UP (ref 1.8–7.4)
NEUTROPHILS # BLD AUTO: 6.88 K/UL — SIGNIFICANT CHANGE UP (ref 1.8–7.4)
NEUTROPHILS # BLD AUTO: 7.4 K/UL — SIGNIFICANT CHANGE UP (ref 1.8–7.4)
NEUTROPHILS NFR BLD AUTO: 54.6 % — SIGNIFICANT CHANGE UP (ref 43–77)
NEUTROPHILS NFR BLD AUTO: 55.4 % — SIGNIFICANT CHANGE UP (ref 43–77)
NEUTROPHILS NFR BLD AUTO: 67.3 % — SIGNIFICANT CHANGE UP (ref 43–77)
NEUTROPHILS NFR BLD AUTO: 71.4 % — SIGNIFICANT CHANGE UP (ref 43–77)
NEUTROPHILS NFR BLD AUTO: 72.1 % — SIGNIFICANT CHANGE UP (ref 43–77)
NEUTROPHILS NFR BLD AUTO: 77.2 % — HIGH (ref 43–77)
NEUTROPHILS NFR BLD AUTO: 84.7 % — HIGH (ref 43–77)
NITRITE UR-MCNC: NEGATIVE — SIGNIFICANT CHANGE UP
NON HDL CHOLESTEROL: 102 MG/DL — SIGNIFICANT CHANGE UP
NRBC # BLD: 0 /100 WBCS — SIGNIFICANT CHANGE UP (ref 0–0)
NRBC # FLD: 0 K/UL — SIGNIFICANT CHANGE UP (ref 0–0)
NT-PROBNP SERPL-SCNC: 1301 PG/ML — HIGH
NT-PROBNP SERPL-SCNC: 4760 PG/ML — HIGH
OSMOLALITY UR: 454 MOSM/KG — SIGNIFICANT CHANGE UP (ref 50–1200)
PCO2 BLDV: 61 MMHG — HIGH (ref 39–52)
PCO2 BLDV: 64 MMHG — HIGH (ref 39–52)
PCO2 BLDV: 66 MMHG — HIGH (ref 39–52)
PCO2 BLDV: 68 MMHG — HIGH (ref 39–52)
PCO2 BLDV: 73 MMHG — HIGH (ref 39–52)
PCO2 BLDV: 73 MMHG — HIGH (ref 39–52)
PCO2 BLDV: 75 MMHG — HIGH (ref 39–52)
PCO2 BLDV: 77 MMHG — HIGH (ref 39–52)
PCO2 BLDV: 78 MMHG — HIGH (ref 39–52)
PCO2 BLDV: 78 MMHG — HIGH (ref 39–52)
PCO2 BLDV: 80 MMHG — HIGH (ref 39–52)
PCO2 BLDV: 83 MMHG — HIGH (ref 39–52)
PCO2 BLDV: 84 MMHG — HIGH (ref 39–42)
PCO2 BLDV: 88 MMHG — HIGH (ref 39–52)
PCO2 BLDV: 91 MMHG — HIGH (ref 39–52)
PCO2 BLDV: 93 MMHG — HIGH (ref 39–52)
PCO2 BLDV: 97 MMHG — HIGH (ref 39–52)
PH BLDV: 7.21 — LOW (ref 7.32–7.43)
PH BLDV: 7.24 — LOW (ref 7.32–7.43)
PH BLDV: 7.26 — LOW (ref 7.32–7.43)
PH BLDV: 7.27 — LOW (ref 7.32–7.43)
PH BLDV: 7.28 — LOW (ref 7.32–7.43)
PH BLDV: 7.28 — LOW (ref 7.32–7.43)
PH BLDV: 7.29 — LOW (ref 7.32–7.43)
PH BLDV: 7.35 — SIGNIFICANT CHANGE UP (ref 7.32–7.43)
PH BLDV: 7.36 — SIGNIFICANT CHANGE UP (ref 7.32–7.43)
PH BLDV: 7.37 — SIGNIFICANT CHANGE UP (ref 7.32–7.43)
PH BLDV: 7.38 — SIGNIFICANT CHANGE UP (ref 7.32–7.43)
PH BLDV: 7.42 — SIGNIFICANT CHANGE UP (ref 7.32–7.43)
PH UR: 5.5 — SIGNIFICANT CHANGE UP (ref 5–8)
PH UR: 7.5 — SIGNIFICANT CHANGE UP (ref 5–8)
PH UR: 8.5 (ref 5–8)
PHOSPHATE SERPL-MCNC: 2.5 MG/DL — SIGNIFICANT CHANGE UP (ref 2.5–4.5)
PHOSPHATE SERPL-MCNC: 3 MG/DL — SIGNIFICANT CHANGE UP (ref 2.5–4.5)
PHOSPHATE SERPL-MCNC: 3.1 MG/DL — SIGNIFICANT CHANGE UP (ref 2.5–4.5)
PHOSPHATE SERPL-MCNC: 3.2 MG/DL — SIGNIFICANT CHANGE UP (ref 2.5–4.5)
PHOSPHATE SERPL-MCNC: 3.2 MG/DL — SIGNIFICANT CHANGE UP (ref 2.5–4.5)
PHOSPHATE SERPL-MCNC: 3.4 MG/DL — SIGNIFICANT CHANGE UP (ref 2.5–4.5)
PHOSPHATE SERPL-MCNC: 3.4 MG/DL — SIGNIFICANT CHANGE UP (ref 2.5–4.5)
PHOSPHATE SERPL-MCNC: 3.5 MG/DL — SIGNIFICANT CHANGE UP (ref 2.5–4.5)
PHOSPHATE SERPL-MCNC: 3.5 MG/DL — SIGNIFICANT CHANGE UP (ref 2.5–4.5)
PHOSPHATE SERPL-MCNC: 3.6 MG/DL — SIGNIFICANT CHANGE UP (ref 2.5–4.5)
PHOSPHATE SERPL-MCNC: 3.6 MG/DL — SIGNIFICANT CHANGE UP (ref 2.5–4.5)
PHOSPHATE SERPL-MCNC: 3.7 MG/DL — SIGNIFICANT CHANGE UP (ref 2.5–4.5)
PHOSPHATE SERPL-MCNC: 3.8 MG/DL — SIGNIFICANT CHANGE UP (ref 2.5–4.5)
PHOSPHATE SERPL-MCNC: 3.9 MG/DL — SIGNIFICANT CHANGE UP (ref 2.5–4.5)
PHOSPHATE SERPL-MCNC: 4 MG/DL — SIGNIFICANT CHANGE UP (ref 2.5–4.5)
PHOSPHATE SERPL-MCNC: 4.1 MG/DL — SIGNIFICANT CHANGE UP (ref 2.5–4.5)
PHOSPHATE SERPL-MCNC: 4.2 MG/DL — SIGNIFICANT CHANGE UP (ref 2.5–4.5)
PHOSPHATE SERPL-MCNC: 4.9 MG/DL — HIGH (ref 2.5–4.5)
PHOSPHATE SERPL-MCNC: 5 MG/DL — HIGH (ref 2.5–4.5)
PHOSPHATE SERPL-MCNC: 5.4 MG/DL — HIGH (ref 2.5–4.5)
PLATELET # BLD AUTO: 102 K/UL — LOW (ref 150–400)
PLATELET # BLD AUTO: 176 K/UL — SIGNIFICANT CHANGE UP (ref 150–400)
PLATELET # BLD AUTO: 176 K/UL — SIGNIFICANT CHANGE UP (ref 150–400)
PLATELET # BLD AUTO: 182 K/UL — SIGNIFICANT CHANGE UP (ref 150–400)
PLATELET # BLD AUTO: 185 K/UL — SIGNIFICANT CHANGE UP (ref 150–400)
PLATELET # BLD AUTO: 193 K/UL — SIGNIFICANT CHANGE UP (ref 150–400)
PLATELET # BLD AUTO: 198 K/UL — SIGNIFICANT CHANGE UP (ref 150–400)
PLATELET # BLD AUTO: 213 K/UL — SIGNIFICANT CHANGE UP (ref 150–400)
PLATELET # BLD AUTO: 223 K/UL — SIGNIFICANT CHANGE UP (ref 150–400)
PLATELET # BLD AUTO: 226 K/UL — SIGNIFICANT CHANGE UP (ref 150–400)
PLATELET # BLD AUTO: 227 K/UL — SIGNIFICANT CHANGE UP (ref 150–400)
PLATELET # BLD AUTO: 229 K/UL — SIGNIFICANT CHANGE UP (ref 150–400)
PLATELET # BLD AUTO: 242 K/UL — SIGNIFICANT CHANGE UP (ref 150–400)
PLATELET # BLD AUTO: 243 K/UL — SIGNIFICANT CHANGE UP (ref 150–400)
PLATELET # BLD AUTO: 248 K/UL — SIGNIFICANT CHANGE UP (ref 150–400)
PLATELET # BLD AUTO: 249 K/UL — SIGNIFICANT CHANGE UP (ref 150–400)
PLATELET # BLD AUTO: 250 K/UL — SIGNIFICANT CHANGE UP (ref 150–400)
PLATELET # BLD AUTO: 279 K/UL — SIGNIFICANT CHANGE UP (ref 150–400)
PLATELET # BLD AUTO: 281 K/UL — SIGNIFICANT CHANGE UP (ref 150–400)
PLATELET # BLD AUTO: 282 K/UL — SIGNIFICANT CHANGE UP (ref 150–400)
PLATELET # BLD AUTO: 284 K/UL — SIGNIFICANT CHANGE UP (ref 150–400)
PLATELET # BLD AUTO: 285 K/UL — SIGNIFICANT CHANGE UP (ref 150–400)
PLATELET # BLD AUTO: 293 K/UL — SIGNIFICANT CHANGE UP (ref 150–400)
PLATELET # BLD AUTO: 295 K/UL — SIGNIFICANT CHANGE UP (ref 150–400)
PLATELET # BLD AUTO: 295 K/UL — SIGNIFICANT CHANGE UP (ref 150–400)
PLATELET # BLD AUTO: 303 K/UL — SIGNIFICANT CHANGE UP (ref 150–400)
PLATELET # BLD AUTO: 307 K/UL — SIGNIFICANT CHANGE UP (ref 150–400)
PO2 BLDV: 103 MMHG — HIGH (ref 25–45)
PO2 BLDV: 108 MMHG — HIGH (ref 25–45)
PO2 BLDV: 158 MMHG — HIGH (ref 25–45)
PO2 BLDV: 170 MMHG — HIGH (ref 25–45)
PO2 BLDV: 22 MMHG — LOW (ref 25–45)
PO2 BLDV: 26 MMHG — SIGNIFICANT CHANGE UP (ref 25–45)
PO2 BLDV: 30 MMHG — SIGNIFICANT CHANGE UP (ref 25–45)
PO2 BLDV: 31 MMHG — SIGNIFICANT CHANGE UP (ref 25–45)
PO2 BLDV: 37 MMHG — SIGNIFICANT CHANGE UP (ref 25–45)
PO2 BLDV: 39 MMHG — SIGNIFICANT CHANGE UP (ref 25–45)
PO2 BLDV: 41 MMHG — SIGNIFICANT CHANGE UP (ref 25–45)
PO2 BLDV: 42 MMHG — SIGNIFICANT CHANGE UP
PO2 BLDV: 47 MMHG — HIGH (ref 25–45)
PO2 BLDV: 49 MMHG — HIGH (ref 25–45)
PO2 BLDV: 54 MMHG — HIGH (ref 25–45)
PO2 BLDV: 70 MMHG — HIGH (ref 25–45)
PO2 BLDV: 81 MMHG — HIGH (ref 25–45)
POTASSIUM BLDV-SCNC: 4.6 MMOL/L — SIGNIFICANT CHANGE UP (ref 3.5–5.1)
POTASSIUM BLDV-SCNC: 4.7 MMOL/L — SIGNIFICANT CHANGE UP (ref 3.5–5.1)
POTASSIUM BLDV-SCNC: 4.8 MMOL/L — SIGNIFICANT CHANGE UP (ref 3.5–5.1)
POTASSIUM BLDV-SCNC: 4.8 MMOL/L — SIGNIFICANT CHANGE UP (ref 3.5–5.1)
POTASSIUM BLDV-SCNC: 5.2 MMOL/L — HIGH (ref 3.5–5.1)
POTASSIUM BLDV-SCNC: 5.3 MMOL/L — HIGH (ref 3.5–5.1)
POTASSIUM BLDV-SCNC: 5.3 MMOL/L — HIGH (ref 3.5–5.1)
POTASSIUM BLDV-SCNC: 5.5 MMOL/L — HIGH (ref 3.5–5.1)
POTASSIUM BLDV-SCNC: 5.5 MMOL/L — HIGH (ref 3.5–5.1)
POTASSIUM BLDV-SCNC: 5.7 MMOL/L — HIGH (ref 3.5–5.1)
POTASSIUM BLDV-SCNC: 5.8 MMOL/L — HIGH (ref 3.5–5.1)
POTASSIUM BLDV-SCNC: 7.4 MMOL/L — CRITICAL HIGH (ref 3.5–5.1)
POTASSIUM SERPL-MCNC: 4.4 MMOL/L — SIGNIFICANT CHANGE UP (ref 3.5–5.3)
POTASSIUM SERPL-MCNC: 4.4 MMOL/L — SIGNIFICANT CHANGE UP (ref 3.5–5.3)
POTASSIUM SERPL-MCNC: 4.6 MMOL/L — SIGNIFICANT CHANGE UP (ref 3.5–5.3)
POTASSIUM SERPL-MCNC: 4.6 MMOL/L — SIGNIFICANT CHANGE UP (ref 3.5–5.3)
POTASSIUM SERPL-MCNC: 4.7 MMOL/L — SIGNIFICANT CHANGE UP (ref 3.5–5.3)
POTASSIUM SERPL-MCNC: 4.7 MMOL/L — SIGNIFICANT CHANGE UP (ref 3.5–5.3)
POTASSIUM SERPL-MCNC: 4.8 MMOL/L — SIGNIFICANT CHANGE UP (ref 3.5–5.3)
POTASSIUM SERPL-MCNC: 4.9 MMOL/L — SIGNIFICANT CHANGE UP (ref 3.5–5.3)
POTASSIUM SERPL-MCNC: 5 MMOL/L — SIGNIFICANT CHANGE UP (ref 3.5–5.3)
POTASSIUM SERPL-MCNC: 5 MMOL/L — SIGNIFICANT CHANGE UP (ref 3.5–5.3)
POTASSIUM SERPL-MCNC: 5.1 MMOL/L — SIGNIFICANT CHANGE UP (ref 3.5–5.3)
POTASSIUM SERPL-MCNC: 5.2 MMOL/L — SIGNIFICANT CHANGE UP (ref 3.5–5.3)
POTASSIUM SERPL-MCNC: 5.3 MMOL/L — SIGNIFICANT CHANGE UP (ref 3.5–5.3)
POTASSIUM SERPL-MCNC: 5.4 MMOL/L — HIGH (ref 3.5–5.3)
POTASSIUM SERPL-MCNC: 5.5 MMOL/L — HIGH (ref 3.5–5.3)
POTASSIUM SERPL-MCNC: 5.6 MMOL/L — HIGH (ref 3.5–5.3)
POTASSIUM SERPL-MCNC: 5.7 MMOL/L — HIGH (ref 3.5–5.3)
POTASSIUM SERPL-MCNC: 5.8 MMOL/L — HIGH (ref 3.5–5.3)
POTASSIUM SERPL-MCNC: 6 MMOL/L — HIGH (ref 3.5–5.3)
POTASSIUM SERPL-MCNC: 6.1 MMOL/L — HIGH (ref 3.5–5.3)
POTASSIUM SERPL-MCNC: 6.1 MMOL/L — HIGH (ref 3.5–5.3)
POTASSIUM SERPL-MCNC: 6.5 MMOL/L — CRITICAL HIGH (ref 3.5–5.3)
POTASSIUM SERPL-MCNC: 6.5 MMOL/L — CRITICAL HIGH (ref 3.5–5.3)
POTASSIUM SERPL-MCNC: 6.6 MMOL/L — CRITICAL HIGH (ref 3.5–5.3)
POTASSIUM SERPL-MCNC: 6.7 MMOL/L — CRITICAL HIGH (ref 3.5–5.3)
POTASSIUM SERPL-MCNC: 7 MMOL/L — CRITICAL HIGH (ref 3.5–5.3)
POTASSIUM SERPL-MCNC: 7 MMOL/L — CRITICAL HIGH (ref 3.5–5.3)
POTASSIUM SERPL-MCNC: SIGNIFICANT CHANGE UP MMOL/L (ref 3.5–5.3)
POTASSIUM SERPL-MCNC: SIGNIFICANT CHANGE UP MMOL/L (ref 3.5–5.3)
POTASSIUM SERPL-SCNC: 4.4 MMOL/L — SIGNIFICANT CHANGE UP (ref 3.5–5.3)
POTASSIUM SERPL-SCNC: 4.4 MMOL/L — SIGNIFICANT CHANGE UP (ref 3.5–5.3)
POTASSIUM SERPL-SCNC: 4.6 MMOL/L — SIGNIFICANT CHANGE UP (ref 3.5–5.3)
POTASSIUM SERPL-SCNC: 4.6 MMOL/L — SIGNIFICANT CHANGE UP (ref 3.5–5.3)
POTASSIUM SERPL-SCNC: 4.7 MMOL/L — SIGNIFICANT CHANGE UP (ref 3.5–5.3)
POTASSIUM SERPL-SCNC: 4.7 MMOL/L — SIGNIFICANT CHANGE UP (ref 3.5–5.3)
POTASSIUM SERPL-SCNC: 4.8 MMOL/L — SIGNIFICANT CHANGE UP (ref 3.5–5.3)
POTASSIUM SERPL-SCNC: 4.9 MMOL/L — SIGNIFICANT CHANGE UP (ref 3.5–5.3)
POTASSIUM SERPL-SCNC: 5 MMOL/L — SIGNIFICANT CHANGE UP (ref 3.5–5.3)
POTASSIUM SERPL-SCNC: 5 MMOL/L — SIGNIFICANT CHANGE UP (ref 3.5–5.3)
POTASSIUM SERPL-SCNC: 5.1 MMOL/L — SIGNIFICANT CHANGE UP (ref 3.5–5.3)
POTASSIUM SERPL-SCNC: 5.2 MMOL/L — SIGNIFICANT CHANGE UP (ref 3.5–5.3)
POTASSIUM SERPL-SCNC: 5.3 MMOL/L — SIGNIFICANT CHANGE UP (ref 3.5–5.3)
POTASSIUM SERPL-SCNC: 5.4 MMOL/L — HIGH (ref 3.5–5.3)
POTASSIUM SERPL-SCNC: 5.5 MMOL/L — HIGH (ref 3.5–5.3)
POTASSIUM SERPL-SCNC: 5.6 MMOL/L — HIGH (ref 3.5–5.3)
POTASSIUM SERPL-SCNC: 5.7 MMOL/L — HIGH (ref 3.5–5.3)
POTASSIUM SERPL-SCNC: 5.8 MMOL/L — HIGH (ref 3.5–5.3)
POTASSIUM SERPL-SCNC: 6 MMOL/L — HIGH (ref 3.5–5.3)
POTASSIUM SERPL-SCNC: 6.1 MMOL/L — HIGH (ref 3.5–5.3)
POTASSIUM SERPL-SCNC: 6.1 MMOL/L — HIGH (ref 3.5–5.3)
POTASSIUM SERPL-SCNC: 6.5 MMOL/L — CRITICAL HIGH (ref 3.5–5.3)
POTASSIUM SERPL-SCNC: 6.5 MMOL/L — CRITICAL HIGH (ref 3.5–5.3)
POTASSIUM SERPL-SCNC: 6.6 MMOL/L — CRITICAL HIGH (ref 3.5–5.3)
POTASSIUM SERPL-SCNC: 6.7 MMOL/L — CRITICAL HIGH (ref 3.5–5.3)
POTASSIUM SERPL-SCNC: 7 MMOL/L — CRITICAL HIGH (ref 3.5–5.3)
POTASSIUM SERPL-SCNC: 7 MMOL/L — CRITICAL HIGH (ref 3.5–5.3)
POTASSIUM SERPL-SCNC: SIGNIFICANT CHANGE UP MMOL/L (ref 3.5–5.3)
POTASSIUM SERPL-SCNC: SIGNIFICANT CHANGE UP MMOL/L (ref 3.5–5.3)
POTASSIUM UR-SCNC: 32.2 MMOL/L — SIGNIFICANT CHANGE UP
PROCALCITONIN SERPL-MCNC: 0.18 NG/ML — HIGH (ref 0.02–0.1)
PROT ?TM UR-MCNC: 51 MG/DL — SIGNIFICANT CHANGE UP
PROT SERPL-MCNC: 6.1 G/DL — SIGNIFICANT CHANGE UP (ref 6–8.3)
PROT SERPL-MCNC: 6.3 G/DL — SIGNIFICANT CHANGE UP (ref 6–8.3)
PROT SERPL-MCNC: 6.4 G/DL — SIGNIFICANT CHANGE UP (ref 6–8.3)
PROT SERPL-MCNC: 6.5 G/DL — SIGNIFICANT CHANGE UP (ref 6–8.3)
PROT SERPL-MCNC: 6.6 G/DL — SIGNIFICANT CHANGE UP (ref 6–8.3)
PROT SERPL-MCNC: 6.8 G/DL — SIGNIFICANT CHANGE UP (ref 6–8.3)
PROT SERPL-MCNC: 7 G/DL — SIGNIFICANT CHANGE UP (ref 6–8.3)
PROT SERPL-MCNC: 7.2 G/DL — SIGNIFICANT CHANGE UP (ref 6–8.3)
PROT UR-MCNC: 30 MG/DL
PROT UR-MCNC: 300 MG/DL
PROT UR-MCNC: ABNORMAL
PROT/CREAT UR-RTO: 0.7 RATIO — HIGH (ref 0–0.2)
PROTHROM AB SERPL-ACNC: 10.1 SEC — SIGNIFICANT CHANGE UP (ref 9.5–13)
PROTHROM AB SERPL-ACNC: 10.4 SEC — SIGNIFICANT CHANGE UP (ref 9.5–13)
PROTHROM AB SERPL-ACNC: 10.8 SEC — SIGNIFICANT CHANGE UP (ref 10.5–13.4)
PROTHROM AB SERPL-ACNC: 10.9 SEC — SIGNIFICANT CHANGE UP (ref 9.5–13)
PROTHROM AB SERPL-ACNC: 11 SEC — SIGNIFICANT CHANGE UP (ref 9.5–13)
PROTHROM AB SERPL-ACNC: 11.2 SEC — SIGNIFICANT CHANGE UP (ref 9.5–13)
PTH-INTACT FLD-MCNC: 130 PG/ML — HIGH (ref 15–65)
RAPID RVP RESULT: SIGNIFICANT CHANGE UP
RBC # BLD: 2.23 M/UL — LOW (ref 3.8–5.2)
RBC # BLD: 2.41 M/UL — LOW (ref 3.8–5.2)
RBC # BLD: 2.58 M/UL — LOW (ref 3.8–5.2)
RBC # BLD: 2.66 M/UL — LOW (ref 3.8–5.2)
RBC # BLD: 2.66 M/UL — LOW (ref 3.8–5.2)
RBC # BLD: 2.72 M/UL — LOW (ref 3.8–5.2)
RBC # BLD: 2.81 M/UL — LOW (ref 3.8–5.2)
RBC # BLD: 2.86 M/UL — LOW (ref 3.8–5.2)
RBC # BLD: 2.87 M/UL — LOW (ref 3.8–5.2)
RBC # BLD: 2.92 M/UL — LOW (ref 3.8–5.2)
RBC # BLD: 2.96 M/UL — LOW (ref 3.8–5.2)
RBC # BLD: 3.1 M/UL — LOW (ref 3.8–5.2)
RBC # BLD: 3.18 M/UL — LOW (ref 3.8–5.2)
RBC # BLD: 3.23 M/UL — LOW (ref 3.8–5.2)
RBC # BLD: 3.23 M/UL — LOW (ref 3.8–5.2)
RBC # BLD: 3.25 M/UL — LOW (ref 3.8–5.2)
RBC # BLD: 3.29 M/UL — LOW (ref 3.8–5.2)
RBC # BLD: 3.31 M/UL — LOW (ref 3.8–5.2)
RBC # BLD: 3.36 M/UL — LOW (ref 3.8–5.2)
RBC # BLD: 3.37 M/UL — LOW (ref 3.8–5.2)
RBC # BLD: 3.45 M/UL — LOW (ref 3.8–5.2)
RBC # BLD: 3.45 M/UL — LOW (ref 3.8–5.2)
RBC # BLD: 3.46 M/UL — LOW (ref 3.8–5.2)
RBC # BLD: 3.46 M/UL — LOW (ref 3.8–5.2)
RBC # BLD: 3.47 M/UL — LOW (ref 3.8–5.2)
RBC # BLD: 3.58 M/UL — LOW (ref 3.8–5.2)
RBC # BLD: 3.67 M/UL — LOW (ref 3.8–5.2)
RBC # FLD: 11.9 % — SIGNIFICANT CHANGE UP (ref 10.3–14.5)
RBC # FLD: 11.9 % — SIGNIFICANT CHANGE UP (ref 10.3–14.5)
RBC # FLD: 12 % — SIGNIFICANT CHANGE UP (ref 10.3–14.5)
RBC # FLD: 12.1 % — SIGNIFICANT CHANGE UP (ref 10.3–14.5)
RBC # FLD: 12.2 % — SIGNIFICANT CHANGE UP (ref 10.3–14.5)
RBC # FLD: 12.3 % — SIGNIFICANT CHANGE UP (ref 10.3–14.5)
RBC CASTS # UR COMP ASSIST: 0 /HPF — SIGNIFICANT CHANGE UP (ref 0–4)
RBC CASTS # UR COMP ASSIST: 2 /HPF — SIGNIFICANT CHANGE UP (ref 0–4)
RBC CASTS # UR COMP ASSIST: SIGNIFICANT CHANGE UP /HPF (ref 0–4)
REVIEW: SIGNIFICANT CHANGE UP
REVIEW: SIGNIFICANT CHANGE UP
RH IG SCN BLD-IMP: POSITIVE — SIGNIFICANT CHANGE UP
RSV RNA NPH QL NAA+NON-PROBE: SIGNIFICANT CHANGE UP
RSV RNA NPH QL NAA+NON-PROBE: SIGNIFICANT CHANGE UP
RSV RNA SPEC QL NAA+PROBE: SIGNIFICANT CHANGE UP
RV+EV RNA SPEC QL NAA+PROBE: SIGNIFICANT CHANGE UP
SAO2 % BLDV: 35.8 % — LOW (ref 67–88)
SAO2 % BLDV: 45.1 % — LOW (ref 67–88)
SAO2 % BLDV: 48.6 % — LOW (ref 67–88)
SAO2 % BLDV: 60.2 % — LOW (ref 67–88)
SAO2 % BLDV: 61 % — LOW (ref 67–88)
SAO2 % BLDV: 64.1 % — LOW (ref 67–88)
SAO2 % BLDV: 65 % — SIGNIFICANT CHANGE UP
SAO2 % BLDV: 65.8 % — LOW (ref 67–88)
SAO2 % BLDV: 78.7 % — SIGNIFICANT CHANGE UP (ref 67–88)
SAO2 % BLDV: 84.3 % — SIGNIFICANT CHANGE UP (ref 67–88)
SAO2 % BLDV: 88.4 % — HIGH (ref 67–88)
SAO2 % BLDV: 96.3 % — HIGH (ref 67–88)
SAO2 % BLDV: 96.8 % — HIGH (ref 67–88)
SAO2 % BLDV: 98.9 % — HIGH (ref 67–88)
SAO2 % BLDV: 98.9 % — HIGH (ref 67–88)
SAO2 % BLDV: 99.2 % — HIGH (ref 67–88)
SAO2 % BLDV: 99.8 % — HIGH (ref 67–88)
SARS-COV-2 RNA SPEC QL NAA+PROBE: SIGNIFICANT CHANGE UP
SODIUM SERPL-SCNC: 134 MMOL/L — LOW (ref 135–145)
SODIUM SERPL-SCNC: 135 MMOL/L — SIGNIFICANT CHANGE UP (ref 135–145)
SODIUM SERPL-SCNC: 135 MMOL/L — SIGNIFICANT CHANGE UP (ref 135–145)
SODIUM SERPL-SCNC: 137 MMOL/L — SIGNIFICANT CHANGE UP (ref 135–145)
SODIUM SERPL-SCNC: 138 MMOL/L — SIGNIFICANT CHANGE UP (ref 135–145)
SODIUM SERPL-SCNC: 139 MMOL/L — SIGNIFICANT CHANGE UP (ref 135–145)
SODIUM SERPL-SCNC: 140 MMOL/L — SIGNIFICANT CHANGE UP (ref 135–145)
SODIUM SERPL-SCNC: 141 MMOL/L — SIGNIFICANT CHANGE UP (ref 135–145)
SODIUM SERPL-SCNC: 142 MMOL/L — SIGNIFICANT CHANGE UP (ref 135–145)
SODIUM SERPL-SCNC: 142 MMOL/L — SIGNIFICANT CHANGE UP (ref 135–145)
SODIUM SERPL-SCNC: 144 MMOL/L — SIGNIFICANT CHANGE UP (ref 135–145)
SODIUM UR-SCNC: 96 MMOL/L — SIGNIFICANT CHANGE UP
SP GR SPEC: 1.02 — SIGNIFICANT CHANGE UP (ref 1.01–1.05)
SP GR SPEC: 1.02 — SIGNIFICANT CHANGE UP (ref 1–1.03)
SP GR SPEC: 1.02 — SIGNIFICANT CHANGE UP (ref 1–1.03)
SPECIMEN SOURCE: SIGNIFICANT CHANGE UP
SQUAMOUS # UR AUTO: 0 /HPF — SIGNIFICANT CHANGE UP (ref 0–5)
SQUAMOUS # UR AUTO: 1 /HPF — SIGNIFICANT CHANGE UP (ref 0–5)
TIBC SERPL-MCNC: 267 UG/DL — SIGNIFICANT CHANGE UP (ref 220–430)
TRI-PHOS CRY UR QL COMP ASSIST: PRESENT
TRIGL SERPL-MCNC: 89 MG/DL — SIGNIFICANT CHANGE UP
TROPONIN T, HIGH SENSITIVITY RESULT: 31 NG/L — SIGNIFICANT CHANGE UP
TROPONIN T, HIGH SENSITIVITY RESULT: 36 NG/L — SIGNIFICANT CHANGE UP
TROPONIN T, HIGH SENSITIVITY RESULT: 36 NG/L — SIGNIFICANT CHANGE UP
TROPONIN T, HIGH SENSITIVITY RESULT: 37 NG/L — SIGNIFICANT CHANGE UP
TSH SERPL-MCNC: 1.99 UIU/ML — SIGNIFICANT CHANGE UP (ref 0.27–4.2)
UIBC SERPL-MCNC: 194 UG/DL — SIGNIFICANT CHANGE UP (ref 110–370)
UROBILINOGEN FLD QL: 0.2 MG/DL — SIGNIFICANT CHANGE UP (ref 0.2–1)
UROBILINOGEN FLD QL: 0.2 MG/DL — SIGNIFICANT CHANGE UP (ref 0.2–1)
UROBILINOGEN FLD QL: SIGNIFICANT CHANGE UP
UUN UR-MCNC: 474 MG/DL — SIGNIFICANT CHANGE UP
VIT B12 SERPL-MCNC: 601 PG/ML — SIGNIFICANT CHANGE UP (ref 200–900)
WBC # BLD: 13.76 K/UL — HIGH (ref 3.8–10.5)
WBC # BLD: 15.99 K/UL — HIGH (ref 3.8–10.5)
WBC # BLD: 5.87 K/UL — SIGNIFICANT CHANGE UP (ref 3.8–10.5)
WBC # BLD: 6.05 K/UL — SIGNIFICANT CHANGE UP (ref 3.8–10.5)
WBC # BLD: 6.17 K/UL — SIGNIFICANT CHANGE UP (ref 3.8–10.5)
WBC # BLD: 6.38 K/UL — SIGNIFICANT CHANGE UP (ref 3.8–10.5)
WBC # BLD: 6.41 K/UL — SIGNIFICANT CHANGE UP (ref 3.8–10.5)
WBC # BLD: 6.48 K/UL — SIGNIFICANT CHANGE UP (ref 3.8–10.5)
WBC # BLD: 6.49 K/UL — SIGNIFICANT CHANGE UP (ref 3.8–10.5)
WBC # BLD: 6.75 K/UL — SIGNIFICANT CHANGE UP (ref 3.8–10.5)
WBC # BLD: 6.8 K/UL — SIGNIFICANT CHANGE UP (ref 3.8–10.5)
WBC # BLD: 6.87 K/UL — SIGNIFICANT CHANGE UP (ref 3.8–10.5)
WBC # BLD: 6.9 K/UL — SIGNIFICANT CHANGE UP (ref 3.8–10.5)
WBC # BLD: 7.05 K/UL — SIGNIFICANT CHANGE UP (ref 3.8–10.5)
WBC # BLD: 7.12 K/UL — SIGNIFICANT CHANGE UP (ref 3.8–10.5)
WBC # BLD: 7.33 K/UL — SIGNIFICANT CHANGE UP (ref 3.8–10.5)
WBC # BLD: 7.54 K/UL — SIGNIFICANT CHANGE UP (ref 3.8–10.5)
WBC # BLD: 7.62 K/UL — SIGNIFICANT CHANGE UP (ref 3.8–10.5)
WBC # BLD: 8 K/UL — SIGNIFICANT CHANGE UP (ref 3.8–10.5)
WBC # BLD: 8.05 K/UL — SIGNIFICANT CHANGE UP (ref 3.8–10.5)
WBC # BLD: 8.33 K/UL — SIGNIFICANT CHANGE UP (ref 3.8–10.5)
WBC # BLD: 8.75 K/UL — SIGNIFICANT CHANGE UP (ref 3.8–10.5)
WBC # BLD: 9.22 K/UL — SIGNIFICANT CHANGE UP (ref 3.8–10.5)
WBC # BLD: 9.52 K/UL — SIGNIFICANT CHANGE UP (ref 3.8–10.5)
WBC # BLD: 9.55 K/UL — SIGNIFICANT CHANGE UP (ref 3.8–10.5)
WBC # BLD: 9.59 K/UL — SIGNIFICANT CHANGE UP (ref 3.8–10.5)
WBC # BLD: 9.69 K/UL — SIGNIFICANT CHANGE UP (ref 3.8–10.5)
WBC # FLD AUTO: 13.76 K/UL — HIGH (ref 3.8–10.5)
WBC # FLD AUTO: 15.99 K/UL — HIGH (ref 3.8–10.5)
WBC # FLD AUTO: 5.87 K/UL — SIGNIFICANT CHANGE UP (ref 3.8–10.5)
WBC # FLD AUTO: 6.05 K/UL — SIGNIFICANT CHANGE UP (ref 3.8–10.5)
WBC # FLD AUTO: 6.17 K/UL — SIGNIFICANT CHANGE UP (ref 3.8–10.5)
WBC # FLD AUTO: 6.38 K/UL — SIGNIFICANT CHANGE UP (ref 3.8–10.5)
WBC # FLD AUTO: 6.41 K/UL — SIGNIFICANT CHANGE UP (ref 3.8–10.5)
WBC # FLD AUTO: 6.48 K/UL — SIGNIFICANT CHANGE UP (ref 3.8–10.5)
WBC # FLD AUTO: 6.49 K/UL — SIGNIFICANT CHANGE UP (ref 3.8–10.5)
WBC # FLD AUTO: 6.75 K/UL — SIGNIFICANT CHANGE UP (ref 3.8–10.5)
WBC # FLD AUTO: 6.8 K/UL — SIGNIFICANT CHANGE UP (ref 3.8–10.5)
WBC # FLD AUTO: 6.87 K/UL — SIGNIFICANT CHANGE UP (ref 3.8–10.5)
WBC # FLD AUTO: 6.9 K/UL — SIGNIFICANT CHANGE UP (ref 3.8–10.5)
WBC # FLD AUTO: 7.05 K/UL — SIGNIFICANT CHANGE UP (ref 3.8–10.5)
WBC # FLD AUTO: 7.12 K/UL — SIGNIFICANT CHANGE UP (ref 3.8–10.5)
WBC # FLD AUTO: 7.33 K/UL — SIGNIFICANT CHANGE UP (ref 3.8–10.5)
WBC # FLD AUTO: 7.54 K/UL — SIGNIFICANT CHANGE UP (ref 3.8–10.5)
WBC # FLD AUTO: 7.62 K/UL — SIGNIFICANT CHANGE UP (ref 3.8–10.5)
WBC # FLD AUTO: 8 K/UL — SIGNIFICANT CHANGE UP (ref 3.8–10.5)
WBC # FLD AUTO: 8.05 K/UL — SIGNIFICANT CHANGE UP (ref 3.8–10.5)
WBC # FLD AUTO: 8.33 K/UL — SIGNIFICANT CHANGE UP (ref 3.8–10.5)
WBC # FLD AUTO: 8.75 K/UL — SIGNIFICANT CHANGE UP (ref 3.8–10.5)
WBC # FLD AUTO: 9.22 K/UL — SIGNIFICANT CHANGE UP (ref 3.8–10.5)
WBC # FLD AUTO: 9.52 K/UL — SIGNIFICANT CHANGE UP (ref 3.8–10.5)
WBC # FLD AUTO: 9.55 K/UL — SIGNIFICANT CHANGE UP (ref 3.8–10.5)
WBC # FLD AUTO: 9.59 K/UL — SIGNIFICANT CHANGE UP (ref 3.8–10.5)
WBC # FLD AUTO: 9.69 K/UL — SIGNIFICANT CHANGE UP (ref 3.8–10.5)
WBC UR QL: 1 /HPF — SIGNIFICANT CHANGE UP (ref 0–5)
WBC UR QL: 4 /HPF — SIGNIFICANT CHANGE UP (ref 0–5)
WBC UR QL: SIGNIFICANT CHANGE UP /HPF (ref 0–5)

## 2023-01-01 PROCEDURE — 70450 CT HEAD/BRAIN W/O DYE: CPT | Mod: 26

## 2023-01-01 PROCEDURE — 72125 CT NECK SPINE W/O DYE: CPT | Mod: 26,MA

## 2023-01-01 PROCEDURE — 99239 HOSP IP/OBS DSCHRG MGMT >30: CPT

## 2023-01-01 PROCEDURE — 99233 SBSQ HOSP IP/OBS HIGH 50: CPT

## 2023-01-01 PROCEDURE — 99497 ADVNCD CARE PLAN 30 MIN: CPT | Mod: 25

## 2023-01-01 PROCEDURE — 99233 SBSQ HOSP IP/OBS HIGH 50: CPT | Mod: GC

## 2023-01-01 PROCEDURE — 99285 EMERGENCY DEPT VISIT HI MDM: CPT

## 2023-01-01 PROCEDURE — 93306 TTE W/DOPPLER COMPLETE: CPT | Mod: 26

## 2023-01-01 PROCEDURE — 99232 SBSQ HOSP IP/OBS MODERATE 35: CPT

## 2023-01-01 PROCEDURE — 99223 1ST HOSP IP/OBS HIGH 75: CPT | Mod: FS

## 2023-01-01 PROCEDURE — 99223 1ST HOSP IP/OBS HIGH 75: CPT

## 2023-01-01 PROCEDURE — 93970 EXTREMITY STUDY: CPT | Mod: 26

## 2023-01-01 PROCEDURE — 93010 ELECTROCARDIOGRAM REPORT: CPT

## 2023-01-01 PROCEDURE — 99442: CPT | Mod: 95

## 2023-01-01 PROCEDURE — 99497 ADVNCD CARE PLAN 30 MIN: CPT

## 2023-01-01 PROCEDURE — 74018 RADEX ABDOMEN 1 VIEW: CPT | Mod: 26

## 2023-01-01 PROCEDURE — 70450 CT HEAD/BRAIN W/O DYE: CPT | Mod: 26,MA

## 2023-01-01 PROCEDURE — 71045 X-RAY EXAM CHEST 1 VIEW: CPT | Mod: 26

## 2023-01-01 PROCEDURE — 99222 1ST HOSP IP/OBS MODERATE 55: CPT | Mod: GC

## 2023-01-01 PROCEDURE — 71250 CT THORAX DX C-: CPT | Mod: 26

## 2023-01-01 PROCEDURE — 99285 EMERGENCY DEPT VISIT HI MDM: CPT | Mod: GC

## 2023-01-01 PROCEDURE — 73552 X-RAY EXAM OF FEMUR 2/>: CPT | Mod: 26,LT

## 2023-01-01 PROCEDURE — 73562 X-RAY EXAM OF KNEE 3: CPT | Mod: 26,LT

## 2023-01-01 PROCEDURE — 73700 CT LOWER EXTREMITY W/O DYE: CPT | Mod: 26,LT

## 2023-01-01 PROCEDURE — 99223 1ST HOSP IP/OBS HIGH 75: CPT | Mod: GC

## 2023-01-01 PROCEDURE — 99231 SBSQ HOSP IP/OBS SF/LOW 25: CPT

## 2023-01-01 PROCEDURE — 73502 X-RAY EXAM HIP UNI 2-3 VIEWS: CPT | Mod: 26,LT

## 2023-01-01 PROCEDURE — 99285 EMERGENCY DEPT VISIT HI MDM: CPT | Mod: FS

## 2023-01-01 RX ORDER — METOPROLOL TARTRATE 50 MG
12.5 TABLET ORAL DAILY
Refills: 0 | Status: DISCONTINUED | OUTPATIENT
Start: 2023-01-01 | End: 2023-01-01

## 2023-01-01 RX ORDER — INSULIN LISPRO 100/ML
VIAL (ML) SUBCUTANEOUS AT BEDTIME
Refills: 0 | Status: DISCONTINUED | OUTPATIENT
Start: 2023-01-01 | End: 2023-01-01

## 2023-01-01 RX ORDER — DEXTROSE 50 % IN WATER 50 %
50 SYRINGE (ML) INTRAVENOUS ONCE
Refills: 0 | Status: DISCONTINUED | OUTPATIENT
Start: 2023-01-01 | End: 2023-01-01

## 2023-01-01 RX ORDER — MONTELUKAST 4 MG/1
10 TABLET, CHEWABLE ORAL DAILY
Refills: 0 | Status: DISCONTINUED | OUTPATIENT
Start: 2023-01-01 | End: 2023-01-01

## 2023-01-01 RX ORDER — IPRATROPIUM/ALBUTEROL SULFATE 18-103MCG
3 AEROSOL WITH ADAPTER (GRAM) INHALATION EVERY 6 HOURS
Refills: 0 | Status: DISCONTINUED | OUTPATIENT
Start: 2023-01-01 | End: 2023-01-01

## 2023-01-01 RX ORDER — MORPHINE SULFATE 50 MG/1
4 CAPSULE, EXTENDED RELEASE ORAL ONCE
Refills: 0 | Status: DISCONTINUED | OUTPATIENT
Start: 2023-01-01 | End: 2023-01-01

## 2023-01-01 RX ORDER — INSULIN LISPRO 100/ML
VIAL (ML) SUBCUTANEOUS
Refills: 0 | Status: DISCONTINUED | OUTPATIENT
Start: 2023-01-01 | End: 2023-01-01

## 2023-01-01 RX ORDER — SODIUM ZIRCONIUM CYCLOSILICATE 10 G/10G
5 POWDER, FOR SUSPENSION ORAL ONCE
Refills: 0 | Status: COMPLETED | OUTPATIENT
Start: 2023-01-01 | End: 2023-01-01

## 2023-01-01 RX ORDER — DEXTROSE 50 % IN WATER 50 %
50 SYRINGE (ML) INTRAVENOUS ONCE
Refills: 0 | Status: COMPLETED | OUTPATIENT
Start: 2023-01-01 | End: 2023-01-01

## 2023-01-01 RX ORDER — CALCIUM GLUCONATE 100 MG/ML
1000 VIAL (ML) INTRAVENOUS ONCE
Refills: 0 | Status: COMPLETED | OUTPATIENT
Start: 2023-01-01 | End: 2023-01-01

## 2023-01-01 RX ORDER — INSULIN HUMAN 100 [IU]/ML
5 INJECTION, SOLUTION SUBCUTANEOUS ONCE
Refills: 0 | Status: COMPLETED | OUTPATIENT
Start: 2023-01-01 | End: 2023-01-01

## 2023-01-01 RX ORDER — CEPHALEXIN 500 MG
500 CAPSULE ORAL ONCE
Refills: 0 | Status: COMPLETED | OUTPATIENT
Start: 2023-01-01 | End: 2023-01-01

## 2023-01-01 RX ORDER — ALBUTEROL 90 UG/1
2 AEROSOL, METERED ORAL ONCE
Refills: 0 | Status: COMPLETED | OUTPATIENT
Start: 2023-01-01 | End: 2023-01-01

## 2023-01-01 RX ORDER — SODIUM ZIRCONIUM CYCLOSILICATE 10 G/10G
10 POWDER, FOR SUSPENSION ORAL ONCE
Refills: 0 | Status: COMPLETED | OUTPATIENT
Start: 2023-01-01 | End: 2023-01-01

## 2023-01-01 RX ORDER — SODIUM ZIRCONIUM CYCLOSILICATE 10 G/10G
10 POWDER, FOR SUSPENSION ORAL
Qty: 300 | Refills: 0
Start: 2023-01-01 | End: 2023-01-01

## 2023-01-01 RX ORDER — MONTELUKAST 4 MG/1
10 TABLET, CHEWABLE ORAL AT BEDTIME
Refills: 0 | Status: DISCONTINUED | OUTPATIENT
Start: 2023-01-01 | End: 2023-01-01

## 2023-01-01 RX ORDER — CEPHALEXIN 500 MG
1 CAPSULE ORAL
Qty: 20 | Refills: 0
Start: 2023-01-01 | End: 2023-01-01

## 2023-01-01 RX ORDER — SODIUM ZIRCONIUM CYCLOSILICATE 10 G/10G
10 POWDER, FOR SUSPENSION ORAL ONCE
Refills: 0 | Status: DISCONTINUED | OUTPATIENT
Start: 2023-01-01 | End: 2023-01-01

## 2023-01-01 RX ORDER — LANOLIN ALCOHOL/MO/W.PET/CERES
3 CREAM (GRAM) TOPICAL AT BEDTIME
Refills: 0 | Status: DISCONTINUED | OUTPATIENT
Start: 2023-01-01 | End: 2023-01-01

## 2023-01-01 RX ORDER — BUDESONIDE AND FORMOTEROL FUMARATE DIHYDRATE 160; 4.5 UG/1; UG/1
2 AEROSOL RESPIRATORY (INHALATION) ONCE
Refills: 0 | Status: COMPLETED | OUTPATIENT
Start: 2023-01-01 | End: 2023-01-01

## 2023-01-01 RX ORDER — SODIUM CHLORIDE 9 MG/ML
1000 INJECTION, SOLUTION INTRAVENOUS
Refills: 0 | Status: DISCONTINUED | OUTPATIENT
Start: 2023-01-01 | End: 2023-01-01

## 2023-01-01 RX ORDER — ROBINUL 0.2 MG/ML
0.4 INJECTION INTRAMUSCULAR; INTRAVENOUS
Refills: 0 | Status: DISCONTINUED | OUTPATIENT
Start: 2023-01-01 | End: 2023-01-01

## 2023-01-01 RX ORDER — CEFTRIAXONE 500 MG/1
1000 INJECTION, POWDER, FOR SOLUTION INTRAMUSCULAR; INTRAVENOUS EVERY 24 HOURS
Refills: 0 | Status: DISCONTINUED | OUTPATIENT
Start: 2023-01-01 | End: 2023-01-01

## 2023-01-01 RX ORDER — ALBUTEROL 90 UG/1
2 AEROSOL, METERED ORAL
Refills: 0 | DISCHARGE

## 2023-01-01 RX ORDER — HALOPERIDOL DECANOATE 100 MG/ML
0.5 INJECTION INTRAMUSCULAR ONCE
Refills: 0 | Status: DISCONTINUED | OUTPATIENT
Start: 2023-01-01 | End: 2023-01-01

## 2023-01-01 RX ORDER — METOPROLOL SUCCINATE 25 MG/1
25 TABLET, EXTENDED RELEASE ORAL
Qty: 30 | Refills: 2 | Status: ACTIVE | COMMUNITY
Start: 2022-01-01 | End: 1900-01-01

## 2023-01-01 RX ORDER — ONDANSETRON 8 MG/1
4 TABLET, FILM COATED ORAL EVERY 8 HOURS
Refills: 0 | Status: DISCONTINUED | OUTPATIENT
Start: 2023-01-01 | End: 2023-01-01

## 2023-01-01 RX ORDER — UMECLIDINIUM 62.5 UG/1
62.5 AEROSOL, POWDER ORAL
Qty: 30 | Refills: 5 | Status: ACTIVE | COMMUNITY
Start: 2023-01-01 | End: 1900-01-01

## 2023-01-01 RX ORDER — HEPARIN SODIUM 5000 [USP'U]/ML
5000 INJECTION INTRAVENOUS; SUBCUTANEOUS EVERY 8 HOURS
Refills: 0 | Status: DISCONTINUED | OUTPATIENT
Start: 2023-01-01 | End: 2023-01-01

## 2023-01-01 RX ORDER — METOPROLOL TARTRATE 50 MG
25 TABLET ORAL DAILY
Refills: 0 | Status: DISCONTINUED | OUTPATIENT
Start: 2023-01-01 | End: 2023-01-01

## 2023-01-01 RX ORDER — ACETAMINOPHEN 500 MG
1000 TABLET ORAL ONCE
Refills: 0 | Status: COMPLETED | OUTPATIENT
Start: 2023-01-01 | End: 2023-01-01

## 2023-01-01 RX ORDER — BUDESONIDE AND FORMOTEROL FUMARATE DIHYDRATE 160; 4.5 UG/1; UG/1
2 AEROSOL RESPIRATORY (INHALATION)
Refills: 0 | Status: DISCONTINUED | OUTPATIENT
Start: 2023-01-01 | End: 2023-01-01

## 2023-01-01 RX ORDER — METOPROLOL TARTRATE 50 MG
1 TABLET ORAL
Qty: 0 | Refills: 0 | DISCHARGE
Start: 2023-01-01

## 2023-01-01 RX ORDER — LEVOTHYROXINE SODIUM 125 MCG
112 TABLET ORAL DAILY
Refills: 0 | Status: DISCONTINUED | OUTPATIENT
Start: 2023-01-01 | End: 2023-01-01

## 2023-01-01 RX ORDER — FUROSEMIDE 40 MG
20 TABLET ORAL DAILY
Refills: 0 | Status: DISCONTINUED | OUTPATIENT
Start: 2023-01-01 | End: 2023-01-01

## 2023-01-01 RX ORDER — CEFAZOLIN SODIUM 1 G
1000 VIAL (EA) INJECTION ONCE
Refills: 0 | Status: COMPLETED | OUTPATIENT
Start: 2023-01-01 | End: 2023-01-01

## 2023-01-01 RX ORDER — SODIUM ZIRCONIUM CYCLOSILICATE 10 G/10G
10 POWDER, FOR SUSPENSION ORAL DAILY
Refills: 0 | Status: DISCONTINUED | OUTPATIENT
Start: 2023-01-01 | End: 2023-01-01

## 2023-01-01 RX ORDER — GLUCAGON INJECTION, SOLUTION 0.5 MG/.1ML
1 INJECTION, SOLUTION SUBCUTANEOUS ONCE
Refills: 0 | Status: DISCONTINUED | OUTPATIENT
Start: 2023-01-01 | End: 2023-01-01

## 2023-01-01 RX ORDER — BUDESONIDE AND FORMOTEROL FUMARATE DIHYDRATE 160; 4.5 UG/1; UG/1
2 AEROSOL RESPIRATORY (INHALATION)
Qty: 0 | Refills: 0 | DISCHARGE

## 2023-01-01 RX ORDER — FUROSEMIDE 40 MG
40 TABLET ORAL
Refills: 0 | Status: DISCONTINUED | OUTPATIENT
Start: 2023-01-01 | End: 2023-01-01

## 2023-01-01 RX ORDER — DEXTROSE 50 % IN WATER 50 %
25 SYRINGE (ML) INTRAVENOUS ONCE
Refills: 0 | Status: COMPLETED | OUTPATIENT
Start: 2023-01-01 | End: 2023-01-01

## 2023-01-01 RX ORDER — FUROSEMIDE 40 MG
20 TABLET ORAL ONCE
Refills: 0 | Status: COMPLETED | OUTPATIENT
Start: 2023-01-01 | End: 2023-01-01

## 2023-01-01 RX ORDER — METOPROLOL TARTRATE 50 MG
1 TABLET ORAL
Qty: 0 | Refills: 0 | DISCHARGE

## 2023-01-01 RX ORDER — DEXTROSE 50 % IN WATER 50 %
25 SYRINGE (ML) INTRAVENOUS ONCE
Refills: 0 | Status: DISCONTINUED | OUTPATIENT
Start: 2023-01-01 | End: 2023-01-01

## 2023-01-01 RX ORDER — FUROSEMIDE 40 MG
1 TABLET ORAL
Qty: 0 | Refills: 0 | DISCHARGE
Start: 2023-01-01

## 2023-01-01 RX ORDER — POVIDONE-IODINE 5 %
1 AEROSOL (ML) TOPICAL ONCE
Refills: 0 | Status: COMPLETED | OUTPATIENT
Start: 2023-01-01 | End: 2023-01-01

## 2023-01-01 RX ORDER — DEXTROSE 50 % IN WATER 50 %
12.5 SYRINGE (ML) INTRAVENOUS ONCE
Refills: 0 | Status: DISCONTINUED | OUTPATIENT
Start: 2023-01-01 | End: 2023-01-01

## 2023-01-01 RX ORDER — MONTELUKAST 10 MG/1
10 TABLET, FILM COATED ORAL DAILY
Qty: 30 | Refills: 2 | Status: ACTIVE | COMMUNITY
Start: 2021-06-14 | End: 1900-01-01

## 2023-01-01 RX ORDER — ALBUTEROL 90 UG/1
2 AEROSOL, METERED ORAL
Qty: 0 | Refills: 0 | DISCHARGE

## 2023-01-01 RX ORDER — SODIUM ZIRCONIUM CYCLOSILICATE 10 G/10G
10 POWDER, FOR SUSPENSION ORAL
Qty: 300 | Refills: 0
Start: 2023-01-01

## 2023-01-01 RX ORDER — FERROUS SULFATE 325(65) MG
325 TABLET ORAL DAILY
Refills: 0 | Status: DISCONTINUED | OUTPATIENT
Start: 2023-01-01 | End: 2023-01-01

## 2023-01-01 RX ORDER — FUROSEMIDE 40 MG
40 TABLET ORAL DAILY
Refills: 0 | Status: DISCONTINUED | OUTPATIENT
Start: 2023-01-01 | End: 2023-01-01

## 2023-01-01 RX ORDER — HALOPERIDOL DECANOATE 100 MG/ML
0.5 INJECTION INTRAMUSCULAR ONCE
Refills: 0 | Status: COMPLETED | OUTPATIENT
Start: 2023-01-01 | End: 2023-01-01

## 2023-01-01 RX ORDER — CALCIUM GLUCONATE 100 MG/ML
500 VIAL (ML) INTRAVENOUS ONCE
Refills: 0 | Status: DISCONTINUED | OUTPATIENT
Start: 2023-01-01 | End: 2023-01-01

## 2023-01-01 RX ORDER — CALCIUM GLUCONATE 100 MG/ML
2 VIAL (ML) INTRAVENOUS ONCE
Refills: 0 | Status: COMPLETED | OUTPATIENT
Start: 2023-01-01 | End: 2023-01-01

## 2023-01-01 RX ORDER — DEXTROSE 50 % IN WATER 50 %
15 SYRINGE (ML) INTRAVENOUS ONCE
Refills: 0 | Status: DISCONTINUED | OUTPATIENT
Start: 2023-01-01 | End: 2023-01-01

## 2023-01-01 RX ORDER — LEVOTHYROXINE SODIUM 0.11 MG/1
112 TABLET ORAL
Qty: 90 | Refills: 0 | Status: ACTIVE | COMMUNITY
Start: 2018-12-21 | End: 1900-01-01

## 2023-01-01 RX ORDER — CEFAZOLIN SODIUM 1 G
1000 VIAL (EA) INJECTION EVERY 8 HOURS
Refills: 0 | Status: DISCONTINUED | OUTPATIENT
Start: 2023-01-01 | End: 2023-01-01

## 2023-01-01 RX ORDER — ALBUTEROL 90 UG/1
10 AEROSOL, METERED ORAL ONCE
Refills: 0 | Status: COMPLETED | OUTPATIENT
Start: 2023-01-01 | End: 2023-01-01

## 2023-01-01 RX ORDER — ACETAMINOPHEN 500 MG
650 TABLET ORAL EVERY 6 HOURS
Refills: 0 | Status: DISCONTINUED | OUTPATIENT
Start: 2023-01-01 | End: 2023-01-01

## 2023-01-01 RX ORDER — ACETAMINOPHEN 500 MG
650 TABLET ORAL EVERY 8 HOURS
Refills: 0 | Status: COMPLETED | OUTPATIENT
Start: 2023-01-01 | End: 2023-01-01

## 2023-01-01 RX ORDER — ALBUTEROL 90 UG/1
2 AEROSOL, METERED ORAL EVERY 6 HOURS
Refills: 0 | Status: DISCONTINUED | OUTPATIENT
Start: 2023-01-01 | End: 2023-01-01

## 2023-01-01 RX ORDER — FUROSEMIDE 20 MG/1
20 TABLET ORAL
Qty: 90 | Refills: 0 | Status: ACTIVE | COMMUNITY
Start: 2021-04-27 | End: 1900-01-01

## 2023-01-01 RX ORDER — METFORMIN HYDROCHLORIDE 850 MG/1
1 TABLET ORAL
Refills: 0 | DISCHARGE

## 2023-01-01 RX ORDER — METOPROLOL TARTRATE 50 MG
0 TABLET ORAL
Refills: 0 | DISCHARGE

## 2023-01-01 RX ORDER — TIOTROPIUM BROMIDE 18 UG/1
2.5 CAPSULE ORAL; RESPIRATORY (INHALATION)
Qty: 0 | Refills: 0 | DISCHARGE

## 2023-01-01 RX ORDER — FERROUS SULFATE 325(65) MG
1 TABLET ORAL
Qty: 0 | Refills: 0 | DISCHARGE

## 2023-01-01 RX ORDER — FUROSEMIDE 40 MG
40 TABLET ORAL ONCE
Refills: 0 | Status: COMPLETED | OUTPATIENT
Start: 2023-01-01 | End: 2023-01-01

## 2023-01-01 RX ORDER — PIPERACILLIN AND TAZOBACTAM 4; .5 G/20ML; G/20ML
3.38 INJECTION, POWDER, LYOPHILIZED, FOR SOLUTION INTRAVENOUS EVERY 12 HOURS
Refills: 0 | Status: DISCONTINUED | OUTPATIENT
Start: 2023-01-01 | End: 2023-01-01

## 2023-01-01 RX ORDER — HYDROMORPHONE HYDROCHLORIDE 2 MG/ML
2 INJECTION INTRAMUSCULAR; INTRAVENOUS; SUBCUTANEOUS ONCE
Refills: 0 | Status: DISCONTINUED | OUTPATIENT
Start: 2023-01-01 | End: 2023-01-01

## 2023-01-01 RX ORDER — UMECLIDINIUM 62.5 UG/1
1 AEROSOL, POWDER ORAL
Refills: 0 | DISCHARGE

## 2023-01-01 RX ORDER — INSULIN HUMAN 100 [IU]/ML
5 INJECTION, SOLUTION SUBCUTANEOUS ONCE
Refills: 0 | Status: DISCONTINUED | OUTPATIENT
Start: 2023-01-01 | End: 2023-01-01

## 2023-01-01 RX ORDER — CEFAZOLIN SODIUM 1 G
VIAL (EA) INJECTION
Refills: 0 | Status: DISCONTINUED | OUTPATIENT
Start: 2023-01-01 | End: 2023-01-01

## 2023-01-01 RX ORDER — OXYCODONE HYDROCHLORIDE 5 MG/1
5 TABLET ORAL EVERY 6 HOURS
Refills: 0 | Status: DISCONTINUED | OUTPATIENT
Start: 2023-01-01 | End: 2023-01-01

## 2023-01-01 RX ORDER — PIPERACILLIN AND TAZOBACTAM 4; .5 G/20ML; G/20ML
3.38 INJECTION, POWDER, LYOPHILIZED, FOR SOLUTION INTRAVENOUS EVERY 8 HOURS
Refills: 0 | Status: DISCONTINUED | OUTPATIENT
Start: 2023-01-01 | End: 2023-01-01

## 2023-01-01 RX ORDER — CHLORHEXIDINE GLUCONATE 213 G/1000ML
1 SOLUTION TOPICAL ONCE
Refills: 0 | Status: DISCONTINUED | OUTPATIENT
Start: 2023-01-01 | End: 2023-01-01

## 2023-01-01 RX ORDER — HEPARIN SODIUM 5000 [USP'U]/ML
5000 INJECTION INTRAVENOUS; SUBCUTANEOUS EVERY 12 HOURS
Refills: 0 | Status: DISCONTINUED | OUTPATIENT
Start: 2023-01-01 | End: 2023-01-01

## 2023-01-01 RX ORDER — LEVOTHYROXINE SODIUM 125 MCG
84 TABLET ORAL ONCE
Refills: 0 | Status: COMPLETED | OUTPATIENT
Start: 2023-01-01 | End: 2023-01-01

## 2023-01-01 RX ORDER — INSULIN HUMAN 100 [IU]/ML
10 INJECTION, SOLUTION SUBCUTANEOUS ONCE
Refills: 0 | Status: COMPLETED | OUTPATIENT
Start: 2023-01-01 | End: 2023-01-01

## 2023-01-01 RX ORDER — FUROSEMIDE 40 MG
40 TABLET ORAL ONCE
Refills: 0 | Status: DISCONTINUED | OUTPATIENT
Start: 2023-01-01 | End: 2023-01-01

## 2023-01-01 RX ORDER — INFLUENZA VIRUS VACCINE 15; 15; 15; 15 UG/.5ML; UG/.5ML; UG/.5ML; UG/.5ML
0.7 SUSPENSION INTRAMUSCULAR ONCE
Refills: 0 | Status: DISCONTINUED | OUTPATIENT
Start: 2023-01-01 | End: 2023-01-01

## 2023-01-01 RX ORDER — ALBUTEROL 90 UG/1
2.5 AEROSOL, METERED ORAL ONCE
Refills: 0 | Status: COMPLETED | OUTPATIENT
Start: 2023-01-01 | End: 2023-01-01

## 2023-01-01 RX ORDER — CALCIUM CHLORIDE
500 POWDER (GRAM) MISCELLANEOUS ONCE
Refills: 0 | Status: DISCONTINUED | OUTPATIENT
Start: 2023-01-01 | End: 2023-01-01

## 2023-01-01 RX ORDER — BUSPIRONE HYDROCHLORIDE 10 MG/1
10 TABLET ORAL TWICE DAILY
Qty: 60 | Refills: 3 | Status: ACTIVE | COMMUNITY
Start: 2022-08-04 | End: 1900-01-01

## 2023-01-01 RX ORDER — LEVOTHYROXINE SODIUM 125 MCG
1 TABLET ORAL
Qty: 0 | Refills: 0 | DISCHARGE
Start: 2023-01-01

## 2023-01-01 RX ORDER — TIOTROPIUM BROMIDE 18 UG/1
2 CAPSULE ORAL; RESPIRATORY (INHALATION) DAILY
Refills: 0 | Status: DISCONTINUED | OUTPATIENT
Start: 2023-01-01 | End: 2023-01-01

## 2023-01-01 RX ORDER — METFORMIN HYDROCHLORIDE 850 MG/1
1 TABLET ORAL
Qty: 0 | Refills: 0 | DISCHARGE

## 2023-01-01 RX ADMIN — HALOPERIDOL DECANOATE 0.5 MILLIGRAM(S): 100 INJECTION INTRAMUSCULAR at 00:59

## 2023-01-01 RX ADMIN — Medication 10 MILLIGRAM(S): at 06:51

## 2023-01-01 RX ADMIN — BUDESONIDE AND FORMOTEROL FUMARATE DIHYDRATE 2 PUFF(S): 160; 4.5 AEROSOL RESPIRATORY (INHALATION) at 22:16

## 2023-01-01 RX ADMIN — Medication 25 MILLIGRAM(S): at 05:55

## 2023-01-01 RX ADMIN — HYDROMORPHONE HYDROCHLORIDE 2 MILLIGRAM(S): 2 INJECTION INTRAMUSCULAR; INTRAVENOUS; SUBCUTANEOUS at 10:55

## 2023-01-01 RX ADMIN — Medication 3 MILLILITER(S): at 15:35

## 2023-01-01 RX ADMIN — Medication 112 MICROGRAM(S): at 05:06

## 2023-01-01 RX ADMIN — SODIUM ZIRCONIUM CYCLOSILICATE 10 GRAM(S): 10 POWDER, FOR SUSPENSION ORAL at 12:25

## 2023-01-01 RX ADMIN — Medication 112 MICROGRAM(S): at 06:24

## 2023-01-01 RX ADMIN — Medication 1 APPLICATION(S): at 18:31

## 2023-01-01 RX ADMIN — Medication 2: at 12:06

## 2023-01-01 RX ADMIN — SODIUM ZIRCONIUM CYCLOSILICATE 10 GRAM(S): 10 POWDER, FOR SUSPENSION ORAL at 21:57

## 2023-01-01 RX ADMIN — HEPARIN SODIUM 5000 UNIT(S): 5000 INJECTION INTRAVENOUS; SUBCUTANEOUS at 06:06

## 2023-01-01 RX ADMIN — Medication 3 MILLILITER(S): at 10:12

## 2023-01-01 RX ADMIN — CEFTRIAXONE 100 MILLIGRAM(S): 500 INJECTION, POWDER, FOR SOLUTION INTRAMUSCULAR; INTRAVENOUS at 13:25

## 2023-01-01 RX ADMIN — Medication 3 MILLILITER(S): at 15:55

## 2023-01-01 RX ADMIN — Medication 3 MILLIGRAM(S): at 23:26

## 2023-01-01 RX ADMIN — SODIUM ZIRCONIUM CYCLOSILICATE 5 GRAM(S): 10 POWDER, FOR SUSPENSION ORAL at 23:28

## 2023-01-01 RX ADMIN — Medication 20 MILLIGRAM(S): at 05:55

## 2023-01-01 RX ADMIN — Medication 650 MILLIGRAM(S): at 23:53

## 2023-01-01 RX ADMIN — Medication 25 MILLIGRAM(S): at 06:10

## 2023-01-01 RX ADMIN — Medication 3 MILLIGRAM(S): at 01:06

## 2023-01-01 RX ADMIN — HEPARIN SODIUM 5000 UNIT(S): 5000 INJECTION INTRAVENOUS; SUBCUTANEOUS at 18:02

## 2023-01-01 RX ADMIN — INSULIN HUMAN 5 UNIT(S): 100 INJECTION, SOLUTION SUBCUTANEOUS at 12:04

## 2023-01-01 RX ADMIN — Medication 10 MILLIGRAM(S): at 05:09

## 2023-01-01 RX ADMIN — Medication 10 MILLIGRAM(S): at 17:08

## 2023-01-01 RX ADMIN — BUDESONIDE AND FORMOTEROL FUMARATE DIHYDRATE 2 PUFF(S): 160; 4.5 AEROSOL RESPIRATORY (INHALATION) at 22:46

## 2023-01-01 RX ADMIN — HEPARIN SODIUM 5000 UNIT(S): 5000 INJECTION INTRAVENOUS; SUBCUTANEOUS at 05:13

## 2023-01-01 RX ADMIN — BUDESONIDE AND FORMOTEROL FUMARATE DIHYDRATE 2 PUFF(S): 160; 4.5 AEROSOL RESPIRATORY (INHALATION) at 09:56

## 2023-01-01 RX ADMIN — BUDESONIDE AND FORMOTEROL FUMARATE DIHYDRATE 2 PUFF(S): 160; 4.5 AEROSOL RESPIRATORY (INHALATION) at 09:13

## 2023-01-01 RX ADMIN — OXYCODONE HYDROCHLORIDE 5 MILLIGRAM(S): 5 TABLET ORAL at 21:23

## 2023-01-01 RX ADMIN — Medication 25 MILLIGRAM(S): at 06:07

## 2023-01-01 RX ADMIN — Medication 3 MILLILITER(S): at 03:42

## 2023-01-01 RX ADMIN — Medication 10 MILLIGRAM(S): at 05:07

## 2023-01-01 RX ADMIN — BUDESONIDE AND FORMOTEROL FUMARATE DIHYDRATE 2 PUFF(S): 160; 4.5 AEROSOL RESPIRATORY (INHALATION) at 23:24

## 2023-01-01 RX ADMIN — BUDESONIDE AND FORMOTEROL FUMARATE DIHYDRATE 2 PUFF(S): 160; 4.5 AEROSOL RESPIRATORY (INHALATION) at 22:02

## 2023-01-01 RX ADMIN — ALBUTEROL 2 PUFF(S): 90 AEROSOL, METERED ORAL at 03:16

## 2023-01-01 RX ADMIN — Medication 25 MILLIGRAM(S): at 07:27

## 2023-01-01 RX ADMIN — SODIUM ZIRCONIUM CYCLOSILICATE 10 GRAM(S): 10 POWDER, FOR SUSPENSION ORAL at 15:06

## 2023-01-01 RX ADMIN — BUDESONIDE AND FORMOTEROL FUMARATE DIHYDRATE 2 PUFF(S): 160; 4.5 AEROSOL RESPIRATORY (INHALATION) at 21:01

## 2023-01-01 RX ADMIN — SODIUM ZIRCONIUM CYCLOSILICATE 5 GRAM(S): 10 POWDER, FOR SUSPENSION ORAL at 21:19

## 2023-01-01 RX ADMIN — BUDESONIDE AND FORMOTEROL FUMARATE DIHYDRATE 2 PUFF(S): 160; 4.5 AEROSOL RESPIRATORY (INHALATION) at 21:05

## 2023-01-01 RX ADMIN — Medication 25 MILLIGRAM(S): at 07:00

## 2023-01-01 RX ADMIN — Medication 3 MILLILITER(S): at 09:02

## 2023-01-01 RX ADMIN — Medication 112 MICROGRAM(S): at 05:07

## 2023-01-01 RX ADMIN — Medication 650 MILLIGRAM(S): at 00:38

## 2023-01-01 RX ADMIN — Medication 3 MILLILITER(S): at 22:41

## 2023-01-01 RX ADMIN — Medication 2: at 12:09

## 2023-01-01 RX ADMIN — Medication 100 MILLIGRAM(S): at 13:07

## 2023-01-01 RX ADMIN — BUDESONIDE AND FORMOTEROL FUMARATE DIHYDRATE 2 PUFF(S): 160; 4.5 AEROSOL RESPIRATORY (INHALATION) at 09:09

## 2023-01-01 RX ADMIN — HALOPERIDOL DECANOATE 0.5 MILLIGRAM(S): 100 INJECTION INTRAMUSCULAR at 09:03

## 2023-01-01 RX ADMIN — BUDESONIDE AND FORMOTEROL FUMARATE DIHYDRATE 2 PUFF(S): 160; 4.5 AEROSOL RESPIRATORY (INHALATION) at 21:55

## 2023-01-01 RX ADMIN — Medication 1: at 08:55

## 2023-01-01 RX ADMIN — Medication 100 MILLIGRAM(S): at 13:03

## 2023-01-01 RX ADMIN — Medication 650 MILLIGRAM(S): at 17:08

## 2023-01-01 RX ADMIN — HEPARIN SODIUM 5000 UNIT(S): 5000 INJECTION INTRAVENOUS; SUBCUTANEOUS at 14:47

## 2023-01-01 RX ADMIN — HEPARIN SODIUM 5000 UNIT(S): 5000 INJECTION INTRAVENOUS; SUBCUTANEOUS at 19:13

## 2023-01-01 RX ADMIN — BUDESONIDE AND FORMOTEROL FUMARATE DIHYDRATE 2 PUFF(S): 160; 4.5 AEROSOL RESPIRATORY (INHALATION) at 23:25

## 2023-01-01 RX ADMIN — Medication 20 MILLIGRAM(S): at 05:56

## 2023-01-01 RX ADMIN — Medication 3 MILLILITER(S): at 03:51

## 2023-01-01 RX ADMIN — BUDESONIDE AND FORMOTEROL FUMARATE DIHYDRATE 2 PUFF(S): 160; 4.5 AEROSOL RESPIRATORY (INHALATION) at 03:16

## 2023-01-01 RX ADMIN — TIOTROPIUM BROMIDE 2 PUFF(S): 18 CAPSULE ORAL; RESPIRATORY (INHALATION) at 10:15

## 2023-01-01 RX ADMIN — Medication 50 MILLILITER(S): at 22:01

## 2023-01-01 RX ADMIN — Medication 3 MILLILITER(S): at 10:28

## 2023-01-01 RX ADMIN — Medication 1: at 17:33

## 2023-01-01 RX ADMIN — Medication 10 MILLIGRAM(S): at 07:00

## 2023-01-01 RX ADMIN — Medication 20 MILLIGRAM(S): at 05:09

## 2023-01-01 RX ADMIN — Medication 25 MILLIGRAM(S): at 05:06

## 2023-01-01 RX ADMIN — Medication 650 MILLIGRAM(S): at 23:28

## 2023-01-01 RX ADMIN — PIPERACILLIN AND TAZOBACTAM 25 GRAM(S): 4; .5 INJECTION, POWDER, LYOPHILIZED, FOR SOLUTION INTRAVENOUS at 01:16

## 2023-01-01 RX ADMIN — Medication 100 GRAM(S): at 01:59

## 2023-01-01 RX ADMIN — Medication 50 MILLILITER(S): at 00:38

## 2023-01-01 RX ADMIN — Medication 1: at 09:06

## 2023-01-01 RX ADMIN — MONTELUKAST 10 MILLIGRAM(S): 4 TABLET, CHEWABLE ORAL at 23:25

## 2023-01-01 RX ADMIN — Medication 650 MILLIGRAM(S): at 08:00

## 2023-01-01 RX ADMIN — SODIUM ZIRCONIUM CYCLOSILICATE 10 GRAM(S): 10 POWDER, FOR SUSPENSION ORAL at 09:53

## 2023-01-01 RX ADMIN — Medication 3 MILLILITER(S): at 11:32

## 2023-01-01 RX ADMIN — Medication 10 MILLIGRAM(S): at 17:07

## 2023-01-01 RX ADMIN — Medication 40 MILLIGRAM(S): at 06:00

## 2023-01-01 RX ADMIN — Medication 650 MILLIGRAM(S): at 07:36

## 2023-01-01 RX ADMIN — Medication 1: at 21:48

## 2023-01-01 RX ADMIN — TIOTROPIUM BROMIDE 2 PUFF(S): 18 CAPSULE ORAL; RESPIRATORY (INHALATION) at 18:45

## 2023-01-01 RX ADMIN — Medication 650 MILLIGRAM(S): at 22:10

## 2023-01-01 RX ADMIN — SODIUM ZIRCONIUM CYCLOSILICATE 10 GRAM(S): 10 POWDER, FOR SUSPENSION ORAL at 00:39

## 2023-01-01 RX ADMIN — Medication 400 MILLIGRAM(S): at 17:10

## 2023-01-01 RX ADMIN — Medication 40 MILLIGRAM(S): at 20:08

## 2023-01-01 RX ADMIN — Medication 25 MILLIGRAM(S): at 05:09

## 2023-01-01 RX ADMIN — Medication 10 MILLIGRAM(S): at 06:17

## 2023-01-01 RX ADMIN — Medication 112 MICROGRAM(S): at 05:12

## 2023-01-01 RX ADMIN — Medication 3 MILLILITER(S): at 15:59

## 2023-01-01 RX ADMIN — SODIUM ZIRCONIUM CYCLOSILICATE 10 GRAM(S): 10 POWDER, FOR SUSPENSION ORAL at 13:25

## 2023-01-01 RX ADMIN — Medication 10 MILLIGRAM(S): at 17:29

## 2023-01-01 RX ADMIN — Medication 325 MILLIGRAM(S): at 13:46

## 2023-01-01 RX ADMIN — Medication 10 MILLIGRAM(S): at 06:06

## 2023-01-01 RX ADMIN — MONTELUKAST 10 MILLIGRAM(S): 4 TABLET, CHEWABLE ORAL at 21:18

## 2023-01-01 RX ADMIN — Medication 10 MILLIGRAM(S): at 17:12

## 2023-01-01 RX ADMIN — HEPARIN SODIUM 5000 UNIT(S): 5000 INJECTION INTRAVENOUS; SUBCUTANEOUS at 06:53

## 2023-01-01 RX ADMIN — HALOPERIDOL DECANOATE 0.5 MILLIGRAM(S): 100 INJECTION INTRAMUSCULAR at 00:50

## 2023-01-01 RX ADMIN — Medication 40 MILLIGRAM(S): at 07:36

## 2023-01-01 RX ADMIN — BUDESONIDE AND FORMOTEROL FUMARATE DIHYDRATE 2 PUFF(S): 160; 4.5 AEROSOL RESPIRATORY (INHALATION) at 10:13

## 2023-01-01 RX ADMIN — MONTELUKAST 10 MILLIGRAM(S): 4 TABLET, CHEWABLE ORAL at 13:46

## 2023-01-01 RX ADMIN — Medication 25 MILLIGRAM(S): at 05:56

## 2023-01-01 RX ADMIN — BUDESONIDE AND FORMOTEROL FUMARATE DIHYDRATE 2 PUFF(S): 160; 4.5 AEROSOL RESPIRATORY (INHALATION) at 09:49

## 2023-01-01 RX ADMIN — Medication 112 MICROGRAM(S): at 06:11

## 2023-01-01 RX ADMIN — Medication 650 MILLIGRAM(S): at 09:55

## 2023-01-01 RX ADMIN — Medication 112 MICROGRAM(S): at 07:35

## 2023-01-01 RX ADMIN — Medication 650 MILLIGRAM(S): at 23:10

## 2023-01-01 RX ADMIN — Medication 50 MILLILITER(S): at 21:20

## 2023-01-01 RX ADMIN — Medication 112 MICROGRAM(S): at 06:03

## 2023-01-01 RX ADMIN — SODIUM ZIRCONIUM CYCLOSILICATE 10 GRAM(S): 10 POWDER, FOR SUSPENSION ORAL at 14:03

## 2023-01-01 RX ADMIN — BUDESONIDE AND FORMOTEROL FUMARATE DIHYDRATE 2 PUFF(S): 160; 4.5 AEROSOL RESPIRATORY (INHALATION) at 10:44

## 2023-01-01 RX ADMIN — Medication 112 MICROGRAM(S): at 06:15

## 2023-01-01 RX ADMIN — MONTELUKAST 10 MILLIGRAM(S): 4 TABLET, CHEWABLE ORAL at 12:15

## 2023-01-01 RX ADMIN — Medication 650 MILLIGRAM(S): at 21:56

## 2023-01-01 RX ADMIN — Medication 1: at 17:38

## 2023-01-01 RX ADMIN — BUDESONIDE AND FORMOTEROL FUMARATE DIHYDRATE 2 PUFF(S): 160; 4.5 AEROSOL RESPIRATORY (INHALATION) at 22:34

## 2023-01-01 RX ADMIN — Medication 10 MILLIGRAM(S): at 18:26

## 2023-01-01 RX ADMIN — HEPARIN SODIUM 5000 UNIT(S): 5000 INJECTION INTRAVENOUS; SUBCUTANEOUS at 21:01

## 2023-01-01 RX ADMIN — Medication 650 MILLIGRAM(S): at 07:59

## 2023-01-01 RX ADMIN — MONTELUKAST 10 MILLIGRAM(S): 4 TABLET, CHEWABLE ORAL at 23:53

## 2023-01-01 RX ADMIN — Medication 650 MILLIGRAM(S): at 06:15

## 2023-01-01 RX ADMIN — HEPARIN SODIUM 5000 UNIT(S): 5000 INJECTION INTRAVENOUS; SUBCUTANEOUS at 13:45

## 2023-01-01 RX ADMIN — BUDESONIDE AND FORMOTEROL FUMARATE DIHYDRATE 2 PUFF(S): 160; 4.5 AEROSOL RESPIRATORY (INHALATION) at 10:15

## 2023-01-01 RX ADMIN — Medication 9999 GRAM(S): at 23:13

## 2023-01-01 RX ADMIN — Medication 0.5 MILLIGRAM(S): at 13:38

## 2023-01-01 RX ADMIN — Medication 112 MICROGRAM(S): at 05:09

## 2023-01-01 RX ADMIN — Medication 650 MILLIGRAM(S): at 13:15

## 2023-01-01 RX ADMIN — Medication 10 MILLIGRAM(S): at 18:15

## 2023-01-01 RX ADMIN — PIPERACILLIN AND TAZOBACTAM 25 GRAM(S): 4; .5 INJECTION, POWDER, LYOPHILIZED, FOR SOLUTION INTRAVENOUS at 01:01

## 2023-01-01 RX ADMIN — Medication 100 MILLIGRAM(S): at 05:10

## 2023-01-01 RX ADMIN — Medication 40 MILLIGRAM(S): at 05:54

## 2023-01-01 RX ADMIN — Medication 1000 MILLIGRAM(S): at 17:25

## 2023-01-01 RX ADMIN — BUDESONIDE AND FORMOTEROL FUMARATE DIHYDRATE 2 PUFF(S): 160; 4.5 AEROSOL RESPIRATORY (INHALATION) at 07:43

## 2023-01-01 RX ADMIN — INSULIN HUMAN 5 UNIT(S): 100 INJECTION, SOLUTION SUBCUTANEOUS at 20:08

## 2023-01-01 RX ADMIN — Medication 100 MILLIGRAM(S): at 21:19

## 2023-01-01 RX ADMIN — BUDESONIDE AND FORMOTEROL FUMARATE DIHYDRATE 2 PUFF(S): 160; 4.5 AEROSOL RESPIRATORY (INHALATION) at 08:42

## 2023-01-01 RX ADMIN — Medication 3 MILLILITER(S): at 15:05

## 2023-01-01 RX ADMIN — TIOTROPIUM BROMIDE 2 PUFF(S): 18 CAPSULE ORAL; RESPIRATORY (INHALATION) at 10:43

## 2023-01-01 RX ADMIN — Medication 650 MILLIGRAM(S): at 05:38

## 2023-01-01 RX ADMIN — Medication 650 MILLIGRAM(S): at 06:10

## 2023-01-01 RX ADMIN — ALBUTEROL 2 PUFF(S): 90 AEROSOL, METERED ORAL at 21:18

## 2023-01-01 RX ADMIN — Medication 112 MICROGRAM(S): at 05:17

## 2023-01-01 RX ADMIN — Medication 650 MILLIGRAM(S): at 23:12

## 2023-01-01 RX ADMIN — Medication 500 MILLIGRAM(S): at 03:16

## 2023-01-01 RX ADMIN — HEPARIN SODIUM 5000 UNIT(S): 5000 INJECTION INTRAVENOUS; SUBCUTANEOUS at 22:34

## 2023-01-01 RX ADMIN — Medication 10 MILLIGRAM(S): at 06:10

## 2023-01-01 RX ADMIN — Medication 0.5 MILLIGRAM(S): at 13:06

## 2023-01-01 RX ADMIN — Medication 100 MILLIGRAM(S): at 14:14

## 2023-01-01 RX ADMIN — Medication 40 MILLIGRAM(S): at 06:24

## 2023-01-01 RX ADMIN — Medication 3 MILLILITER(S): at 04:06

## 2023-01-01 RX ADMIN — Medication 84 MICROGRAM(S): at 07:15

## 2023-01-01 RX ADMIN — Medication 650 MILLIGRAM(S): at 06:59

## 2023-01-01 RX ADMIN — Medication 50 MILLILITER(S): at 13:30

## 2023-01-01 RX ADMIN — SODIUM ZIRCONIUM CYCLOSILICATE 10 GRAM(S): 10 POWDER, FOR SUSPENSION ORAL at 18:28

## 2023-01-01 RX ADMIN — Medication 650 MILLIGRAM(S): at 13:29

## 2023-01-01 RX ADMIN — HEPARIN SODIUM 5000 UNIT(S): 5000 INJECTION INTRAVENOUS; SUBCUTANEOUS at 06:24

## 2023-01-01 RX ADMIN — Medication 650 MILLIGRAM(S): at 00:25

## 2023-01-01 RX ADMIN — Medication 10 MILLIGRAM(S): at 05:36

## 2023-01-01 RX ADMIN — HEPARIN SODIUM 5000 UNIT(S): 5000 INJECTION INTRAVENOUS; SUBCUTANEOUS at 21:44

## 2023-01-01 RX ADMIN — SODIUM ZIRCONIUM CYCLOSILICATE 10 GRAM(S): 10 POWDER, FOR SUSPENSION ORAL at 09:58

## 2023-01-01 RX ADMIN — Medication 112 MICROGRAM(S): at 06:07

## 2023-01-01 RX ADMIN — Medication 40 MILLIGRAM(S): at 05:06

## 2023-01-01 RX ADMIN — Medication 325 MILLIGRAM(S): at 11:57

## 2023-01-01 RX ADMIN — Medication 12.5 MILLIGRAM(S): at 18:03

## 2023-01-01 RX ADMIN — MORPHINE SULFATE 4 MILLIGRAM(S): 50 CAPSULE, EXTENDED RELEASE ORAL at 20:36

## 2023-01-01 RX ADMIN — Medication 650 MILLIGRAM(S): at 07:16

## 2023-01-01 RX ADMIN — Medication 325 MILLIGRAM(S): at 13:25

## 2023-01-01 RX ADMIN — Medication 3 MILLIGRAM(S): at 23:13

## 2023-01-01 RX ADMIN — BUDESONIDE AND FORMOTEROL FUMARATE DIHYDRATE 2 PUFF(S): 160; 4.5 AEROSOL RESPIRATORY (INHALATION) at 21:10

## 2023-01-01 RX ADMIN — BUDESONIDE AND FORMOTEROL FUMARATE DIHYDRATE 2 PUFF(S): 160; 4.5 AEROSOL RESPIRATORY (INHALATION) at 08:43

## 2023-01-01 RX ADMIN — SODIUM ZIRCONIUM CYCLOSILICATE 10 GRAM(S): 10 POWDER, FOR SUSPENSION ORAL at 02:13

## 2023-01-01 RX ADMIN — Medication 112 MICROGRAM(S): at 06:20

## 2023-01-01 RX ADMIN — Medication 10 MILLIGRAM(S): at 22:05

## 2023-01-01 RX ADMIN — Medication 20 MILLIGRAM(S): at 03:16

## 2023-01-01 RX ADMIN — Medication 3 MILLIGRAM(S): at 23:24

## 2023-01-01 RX ADMIN — HEPARIN SODIUM 5000 UNIT(S): 5000 INJECTION INTRAVENOUS; SUBCUTANEOUS at 06:04

## 2023-01-01 RX ADMIN — Medication 12.5 MILLIGRAM(S): at 06:03

## 2023-01-01 RX ADMIN — INSULIN HUMAN 5 UNIT(S): 100 INJECTION, SOLUTION SUBCUTANEOUS at 00:38

## 2023-01-01 RX ADMIN — Medication 10 MILLIGRAM(S): at 06:24

## 2023-01-01 RX ADMIN — Medication 650 MILLIGRAM(S): at 23:25

## 2023-01-01 RX ADMIN — BUDESONIDE AND FORMOTEROL FUMARATE DIHYDRATE 2 PUFF(S): 160; 4.5 AEROSOL RESPIRATORY (INHALATION) at 08:53

## 2023-01-01 RX ADMIN — Medication 400 MILLIGRAM(S): at 17:39

## 2023-01-01 RX ADMIN — Medication 2: at 18:08

## 2023-01-01 RX ADMIN — HEPARIN SODIUM 5000 UNIT(S): 5000 INJECTION INTRAVENOUS; SUBCUTANEOUS at 05:18

## 2023-01-01 RX ADMIN — Medication 40 MILLIGRAM(S): at 13:33

## 2023-01-01 RX ADMIN — MONTELUKAST 10 MILLIGRAM(S): 4 TABLET, CHEWABLE ORAL at 21:13

## 2023-01-01 RX ADMIN — Medication 10 MILLIGRAM(S): at 06:20

## 2023-01-01 RX ADMIN — Medication 100 MILLIGRAM(S): at 21:37

## 2023-01-01 RX ADMIN — MONTELUKAST 10 MILLIGRAM(S): 4 TABLET, CHEWABLE ORAL at 14:21

## 2023-01-01 RX ADMIN — Medication 10 MILLIGRAM(S): at 17:56

## 2023-01-01 RX ADMIN — BUDESONIDE AND FORMOTEROL FUMARATE DIHYDRATE 2 PUFF(S): 160; 4.5 AEROSOL RESPIRATORY (INHALATION) at 09:12

## 2023-01-01 RX ADMIN — PIPERACILLIN AND TAZOBACTAM 25 GRAM(S): 4; .5 INJECTION, POWDER, LYOPHILIZED, FOR SOLUTION INTRAVENOUS at 10:15

## 2023-01-01 RX ADMIN — Medication 1: at 18:08

## 2023-01-01 RX ADMIN — MONTELUKAST 10 MILLIGRAM(S): 4 TABLET, CHEWABLE ORAL at 11:57

## 2023-01-01 RX ADMIN — Medication 3 MILLILITER(S): at 10:06

## 2023-01-01 RX ADMIN — BUDESONIDE AND FORMOTEROL FUMARATE DIHYDRATE 2 PUFF(S): 160; 4.5 AEROSOL RESPIRATORY (INHALATION) at 09:25

## 2023-01-01 RX ADMIN — Medication 1: at 19:13

## 2023-01-01 RX ADMIN — Medication 650 MILLIGRAM(S): at 12:12

## 2023-01-01 RX ADMIN — Medication 10 MILLIGRAM(S): at 07:06

## 2023-01-01 RX ADMIN — MORPHINE SULFATE 4 MILLIGRAM(S): 50 CAPSULE, EXTENDED RELEASE ORAL at 11:43

## 2023-01-01 RX ADMIN — Medication 325 MILLIGRAM(S): at 11:24

## 2023-01-01 RX ADMIN — Medication 3 MILLILITER(S): at 19:58

## 2023-01-01 RX ADMIN — BUDESONIDE AND FORMOTEROL FUMARATE DIHYDRATE 2 PUFF(S): 160; 4.5 AEROSOL RESPIRATORY (INHALATION) at 10:39

## 2023-01-01 RX ADMIN — PIPERACILLIN AND TAZOBACTAM 25 GRAM(S): 4; .5 INJECTION, POWDER, LYOPHILIZED, FOR SOLUTION INTRAVENOUS at 17:10

## 2023-01-01 RX ADMIN — Medication 400 MILLIGRAM(S): at 22:54

## 2023-01-01 RX ADMIN — OXYCODONE HYDROCHLORIDE 5 MILLIGRAM(S): 5 TABLET ORAL at 20:23

## 2023-01-01 RX ADMIN — HEPARIN SODIUM 5000 UNIT(S): 5000 INJECTION INTRAVENOUS; SUBCUTANEOUS at 22:01

## 2023-01-01 RX ADMIN — BUDESONIDE AND FORMOTEROL FUMARATE DIHYDRATE 2 PUFF(S): 160; 4.5 AEROSOL RESPIRATORY (INHALATION) at 08:38

## 2023-01-01 RX ADMIN — Medication 325 MILLIGRAM(S): at 12:14

## 2023-01-01 RX ADMIN — TIOTROPIUM BROMIDE 2 PUFF(S): 18 CAPSULE ORAL; RESPIRATORY (INHALATION) at 09:08

## 2023-01-01 RX ADMIN — Medication 112 MICROGRAM(S): at 07:26

## 2023-01-01 RX ADMIN — Medication 3 MILLILITER(S): at 21:09

## 2023-01-01 RX ADMIN — Medication 25 MILLIGRAM(S): at 06:24

## 2023-01-01 RX ADMIN — MONTELUKAST 10 MILLIGRAM(S): 4 TABLET, CHEWABLE ORAL at 12:31

## 2023-01-01 RX ADMIN — Medication 25 MILLIGRAM(S): at 05:07

## 2023-01-01 RX ADMIN — HEPARIN SODIUM 5000 UNIT(S): 5000 INJECTION INTRAVENOUS; SUBCUTANEOUS at 22:04

## 2023-01-01 RX ADMIN — BUDESONIDE AND FORMOTEROL FUMARATE DIHYDRATE 2 PUFF(S): 160; 4.5 AEROSOL RESPIRATORY (INHALATION) at 22:09

## 2023-01-01 RX ADMIN — Medication 10 MILLIGRAM(S): at 07:32

## 2023-01-01 RX ADMIN — HEPARIN SODIUM 5000 UNIT(S): 5000 INJECTION INTRAVENOUS; SUBCUTANEOUS at 06:13

## 2023-01-01 RX ADMIN — Medication 25 MILLIGRAM(S): at 07:01

## 2023-01-01 RX ADMIN — Medication 650 MILLIGRAM(S): at 15:42

## 2023-01-01 RX ADMIN — Medication 10 MILLIGRAM(S): at 06:25

## 2023-01-01 RX ADMIN — MONTELUKAST 10 MILLIGRAM(S): 4 TABLET, CHEWABLE ORAL at 23:29

## 2023-01-01 RX ADMIN — Medication 12.5 MILLIGRAM(S): at 05:12

## 2023-01-01 RX ADMIN — Medication 10 MILLIGRAM(S): at 18:14

## 2023-01-01 RX ADMIN — MONTELUKAST 10 MILLIGRAM(S): 4 TABLET, CHEWABLE ORAL at 11:26

## 2023-01-01 RX ADMIN — ALBUTEROL 2 PUFF(S): 90 AEROSOL, METERED ORAL at 09:08

## 2023-01-01 RX ADMIN — BUDESONIDE AND FORMOTEROL FUMARATE DIHYDRATE 2 PUFF(S): 160; 4.5 AEROSOL RESPIRATORY (INHALATION) at 21:37

## 2023-01-01 RX ADMIN — Medication 40 MILLIGRAM(S): at 05:08

## 2023-01-01 RX ADMIN — Medication 25 MILLIGRAM(S): at 12:42

## 2023-01-01 RX ADMIN — INSULIN HUMAN 5 UNIT(S): 100 INJECTION, SOLUTION SUBCUTANEOUS at 09:54

## 2023-01-01 RX ADMIN — SODIUM ZIRCONIUM CYCLOSILICATE 5 GRAM(S): 10 POWDER, FOR SUSPENSION ORAL at 06:05

## 2023-01-01 RX ADMIN — BUDESONIDE AND FORMOTEROL FUMARATE DIHYDRATE 2 PUFF(S): 160; 4.5 AEROSOL RESPIRATORY (INHALATION) at 21:21

## 2023-01-01 RX ADMIN — Medication 40 MILLIGRAM(S): at 09:01

## 2023-01-01 RX ADMIN — Medication 650 MILLIGRAM(S): at 16:07

## 2023-01-01 RX ADMIN — Medication 25 MILLIGRAM(S): at 07:35

## 2023-01-01 RX ADMIN — BUDESONIDE AND FORMOTEROL FUMARATE DIHYDRATE 2 PUFF(S): 160; 4.5 AEROSOL RESPIRATORY (INHALATION) at 22:13

## 2023-01-01 RX ADMIN — Medication 3 MILLILITER(S): at 16:10

## 2023-01-01 RX ADMIN — MONTELUKAST 10 MILLIGRAM(S): 4 TABLET, CHEWABLE ORAL at 21:56

## 2023-01-01 RX ADMIN — Medication 20 MILLIGRAM(S): at 06:24

## 2023-01-01 RX ADMIN — Medication 650 MILLIGRAM(S): at 13:45

## 2023-01-01 RX ADMIN — Medication 100 MILLIGRAM(S): at 05:14

## 2023-01-01 RX ADMIN — HEPARIN SODIUM 5000 UNIT(S): 5000 INJECTION INTRAVENOUS; SUBCUTANEOUS at 05:09

## 2023-01-01 RX ADMIN — Medication 3 MILLIGRAM(S): at 21:56

## 2023-01-01 RX ADMIN — Medication 3 MILLILITER(S): at 04:18

## 2023-01-01 RX ADMIN — MONTELUKAST 10 MILLIGRAM(S): 4 TABLET, CHEWABLE ORAL at 11:59

## 2023-01-01 RX ADMIN — HEPARIN SODIUM 5000 UNIT(S): 5000 INJECTION INTRAVENOUS; SUBCUTANEOUS at 21:27

## 2023-01-01 RX ADMIN — Medication 3 MILLILITER(S): at 04:21

## 2023-01-01 RX ADMIN — BUDESONIDE AND FORMOTEROL FUMARATE DIHYDRATE 2 PUFF(S): 160; 4.5 AEROSOL RESPIRATORY (INHALATION) at 09:06

## 2023-01-01 RX ADMIN — ALBUTEROL 10 MILLIGRAM(S): 90 AEROSOL, METERED ORAL at 20:12

## 2023-01-01 RX ADMIN — Medication 112 MICROGRAM(S): at 05:56

## 2023-01-01 RX ADMIN — MONTELUKAST 10 MILLIGRAM(S): 4 TABLET, CHEWABLE ORAL at 22:04

## 2023-01-01 RX ADMIN — MONTELUKAST 10 MILLIGRAM(S): 4 TABLET, CHEWABLE ORAL at 21:20

## 2023-01-01 RX ADMIN — PIPERACILLIN AND TAZOBACTAM 25 GRAM(S): 4; .5 INJECTION, POWDER, LYOPHILIZED, FOR SOLUTION INTRAVENOUS at 17:08

## 2023-01-01 RX ADMIN — Medication 0.5 MILLIGRAM(S): at 16:29

## 2023-01-01 RX ADMIN — Medication 10 MILLIGRAM(S): at 05:55

## 2023-01-01 RX ADMIN — Medication 25 MILLIGRAM(S): at 06:20

## 2023-01-01 RX ADMIN — Medication 650 MILLIGRAM(S): at 06:16

## 2023-01-01 RX ADMIN — Medication 1: at 12:11

## 2023-01-01 RX ADMIN — SODIUM ZIRCONIUM CYCLOSILICATE 10 GRAM(S): 10 POWDER, FOR SUSPENSION ORAL at 09:19

## 2023-01-01 RX ADMIN — MONTELUKAST 10 MILLIGRAM(S): 4 TABLET, CHEWABLE ORAL at 13:37

## 2023-01-01 RX ADMIN — Medication 112 MICROGRAM(S): at 07:00

## 2023-01-01 RX ADMIN — Medication 325 MILLIGRAM(S): at 13:36

## 2023-01-01 RX ADMIN — MONTELUKAST 10 MILLIGRAM(S): 4 TABLET, CHEWABLE ORAL at 21:40

## 2023-01-01 RX ADMIN — HEPARIN SODIUM 5000 UNIT(S): 5000 INJECTION INTRAVENOUS; SUBCUTANEOUS at 17:33

## 2023-01-01 RX ADMIN — Medication 1: at 13:06

## 2023-01-01 RX ADMIN — SODIUM ZIRCONIUM CYCLOSILICATE 10 GRAM(S): 10 POWDER, FOR SUSPENSION ORAL at 10:16

## 2023-01-01 RX ADMIN — ALBUTEROL 2.5 MILLIGRAM(S): 90 AEROSOL, METERED ORAL at 13:25

## 2023-01-01 RX ADMIN — Medication 325 MILLIGRAM(S): at 11:56

## 2023-01-01 RX ADMIN — Medication 3 MILLIGRAM(S): at 22:34

## 2023-01-01 RX ADMIN — Medication 10 MILLIGRAM(S): at 17:01

## 2023-01-01 RX ADMIN — Medication 325 MILLIGRAM(S): at 12:06

## 2023-01-01 RX ADMIN — BUDESONIDE AND FORMOTEROL FUMARATE DIHYDRATE 2 PUFF(S): 160; 4.5 AEROSOL RESPIRATORY (INHALATION) at 08:22

## 2023-01-01 RX ADMIN — Medication 25 MILLIGRAM(S): at 06:15

## 2023-01-01 RX ADMIN — MONTELUKAST 10 MILLIGRAM(S): 4 TABLET, CHEWABLE ORAL at 21:10

## 2023-01-01 RX ADMIN — Medication 650 MILLIGRAM(S): at 22:12

## 2023-01-01 RX ADMIN — Medication 3 MILLILITER(S): at 22:21

## 2023-01-01 RX ADMIN — Medication 10 MILLIGRAM(S): at 17:11

## 2023-01-01 RX ADMIN — SODIUM ZIRCONIUM CYCLOSILICATE 5 GRAM(S): 10 POWDER, FOR SUSPENSION ORAL at 05:07

## 2023-01-01 RX ADMIN — Medication 10 MILLIGRAM(S): at 17:32

## 2023-01-01 RX ADMIN — BUDESONIDE AND FORMOTEROL FUMARATE DIHYDRATE 2 PUFF(S): 160; 4.5 AEROSOL RESPIRATORY (INHALATION) at 22:04

## 2023-01-01 RX ADMIN — Medication 3 MILLILITER(S): at 23:38

## 2023-01-01 RX ADMIN — Medication 20 MILLIGRAM(S): at 06:06

## 2023-01-01 RX ADMIN — Medication 20 MILLIGRAM(S): at 07:27

## 2023-01-01 RX ADMIN — Medication 10 MILLIGRAM(S): at 18:44

## 2023-01-01 RX ADMIN — TIOTROPIUM BROMIDE 2 PUFF(S): 18 CAPSULE ORAL; RESPIRATORY (INHALATION) at 13:15

## 2023-01-01 RX ADMIN — Medication 3 MILLILITER(S): at 07:34

## 2023-01-01 RX ADMIN — Medication 3 MILLILITER(S): at 22:30

## 2023-01-01 RX ADMIN — HEPARIN SODIUM 5000 UNIT(S): 5000 INJECTION INTRAVENOUS; SUBCUTANEOUS at 14:20

## 2023-01-01 RX ADMIN — HEPARIN SODIUM 5000 UNIT(S): 5000 INJECTION INTRAVENOUS; SUBCUTANEOUS at 22:15

## 2023-01-01 RX ADMIN — Medication 650 MILLIGRAM(S): at 14:22

## 2023-01-01 RX ADMIN — Medication 10 MILLIGRAM(S): at 18:12

## 2023-01-01 RX ADMIN — HEPARIN SODIUM 5000 UNIT(S): 5000 INJECTION INTRAVENOUS; SUBCUTANEOUS at 13:36

## 2023-01-01 RX ADMIN — PIPERACILLIN AND TAZOBACTAM 25 GRAM(S): 4; .5 INJECTION, POWDER, LYOPHILIZED, FOR SOLUTION INTRAVENOUS at 11:03

## 2023-01-01 RX ADMIN — PIPERACILLIN AND TAZOBACTAM 25 GRAM(S): 4; .5 INJECTION, POWDER, LYOPHILIZED, FOR SOLUTION INTRAVENOUS at 09:56

## 2023-01-01 RX ADMIN — Medication 650 MILLIGRAM(S): at 07:00

## 2023-01-01 RX ADMIN — BUDESONIDE AND FORMOTEROL FUMARATE DIHYDRATE 2 PUFF(S): 160; 4.5 AEROSOL RESPIRATORY (INHALATION) at 23:52

## 2023-01-01 RX ADMIN — Medication 1: at 09:11

## 2023-01-01 RX ADMIN — Medication 0.5 MILLIGRAM(S): at 16:23

## 2023-01-01 RX ADMIN — Medication 50 MILLILITER(S): at 12:01

## 2023-01-01 RX ADMIN — BUDESONIDE AND FORMOTEROL FUMARATE DIHYDRATE 2 PUFF(S): 160; 4.5 AEROSOL RESPIRATORY (INHALATION) at 10:16

## 2023-01-01 RX ADMIN — BUDESONIDE AND FORMOTEROL FUMARATE DIHYDRATE 2 PUFF(S): 160; 4.5 AEROSOL RESPIRATORY (INHALATION) at 21:43

## 2023-01-01 RX ADMIN — BUDESONIDE AND FORMOTEROL FUMARATE DIHYDRATE 2 PUFF(S): 160; 4.5 AEROSOL RESPIRATORY (INHALATION) at 21:18

## 2023-01-01 RX ADMIN — Medication 2: at 12:25

## 2023-01-01 RX ADMIN — PIPERACILLIN AND TAZOBACTAM 25 GRAM(S): 4; .5 INJECTION, POWDER, LYOPHILIZED, FOR SOLUTION INTRAVENOUS at 02:16

## 2023-01-01 RX ADMIN — HEPARIN SODIUM 5000 UNIT(S): 5000 INJECTION INTRAVENOUS; SUBCUTANEOUS at 14:32

## 2023-01-01 RX ADMIN — Medication 30 MILLILITER(S): at 17:13

## 2023-01-01 RX ADMIN — Medication 325 MILLIGRAM(S): at 14:21

## 2023-01-01 RX ADMIN — Medication 25 MILLILITER(S): at 09:55

## 2023-01-01 RX ADMIN — SODIUM ZIRCONIUM CYCLOSILICATE 10 GRAM(S): 10 POWDER, FOR SUSPENSION ORAL at 13:19

## 2023-01-01 RX ADMIN — MONTELUKAST 10 MILLIGRAM(S): 4 TABLET, CHEWABLE ORAL at 22:12

## 2023-01-01 RX ADMIN — Medication 50 MILLILITER(S): at 20:07

## 2023-01-01 RX ADMIN — Medication 3 MILLILITER(S): at 09:49

## 2023-01-01 RX ADMIN — BUDESONIDE AND FORMOTEROL FUMARATE DIHYDRATE 2 PUFF(S): 160; 4.5 AEROSOL RESPIRATORY (INHALATION) at 23:56

## 2023-01-01 RX ADMIN — BUDESONIDE AND FORMOTEROL FUMARATE DIHYDRATE 2 PUFF(S): 160; 4.5 AEROSOL RESPIRATORY (INHALATION) at 22:10

## 2023-01-01 RX ADMIN — INSULIN HUMAN 5 UNIT(S): 100 INJECTION, SOLUTION SUBCUTANEOUS at 13:30

## 2023-01-01 RX ADMIN — Medication 650 MILLIGRAM(S): at 07:10

## 2023-01-01 RX ADMIN — Medication 325 MILLIGRAM(S): at 13:09

## 2023-01-01 RX ADMIN — BUDESONIDE AND FORMOTEROL FUMARATE DIHYDRATE 2 PUFF(S): 160; 4.5 AEROSOL RESPIRATORY (INHALATION) at 09:43

## 2023-01-01 RX ADMIN — Medication 650 MILLIGRAM(S): at 21:13

## 2023-01-01 RX ADMIN — Medication 20 MILLIGRAM(S): at 06:03

## 2023-01-01 RX ADMIN — Medication 25 MILLIGRAM(S): at 06:39

## 2023-01-01 RX ADMIN — INSULIN HUMAN 5 UNIT(S): 100 INJECTION, SOLUTION SUBCUTANEOUS at 21:58

## 2023-01-01 RX ADMIN — INSULIN HUMAN 10 UNIT(S): 100 INJECTION, SOLUTION SUBCUTANEOUS at 21:20

## 2023-01-01 RX ADMIN — MORPHINE SULFATE 4 MILLIGRAM(S): 50 CAPSULE, EXTENDED RELEASE ORAL at 18:00

## 2023-01-01 RX ADMIN — Medication 325 MILLIGRAM(S): at 12:31

## 2023-01-01 RX ADMIN — HEPARIN SODIUM 5000 UNIT(S): 5000 INJECTION INTRAVENOUS; SUBCUTANEOUS at 12:02

## 2023-01-01 RX ADMIN — Medication 40 MILLIGRAM(S): at 06:02

## 2023-01-01 RX ADMIN — Medication 325 MILLIGRAM(S): at 12:43

## 2023-01-01 RX ADMIN — BUDESONIDE AND FORMOTEROL FUMARATE DIHYDRATE 2 PUFF(S): 160; 4.5 AEROSOL RESPIRATORY (INHALATION) at 09:54

## 2023-01-01 RX ADMIN — BUDESONIDE AND FORMOTEROL FUMARATE DIHYDRATE 2 PUFF(S): 160; 4.5 AEROSOL RESPIRATORY (INHALATION) at 21:13

## 2023-01-01 RX ADMIN — BUDESONIDE AND FORMOTEROL FUMARATE DIHYDRATE 2 PUFF(S): 160; 4.5 AEROSOL RESPIRATORY (INHALATION) at 23:29

## 2023-01-01 RX ADMIN — Medication 12.5 MILLIGRAM(S): at 05:18

## 2023-01-01 RX ADMIN — Medication 650 MILLIGRAM(S): at 17:00

## 2023-01-01 RX ADMIN — Medication 325 MILLIGRAM(S): at 12:25

## 2023-01-01 RX ADMIN — Medication 20 MILLIGRAM(S): at 06:25

## 2023-01-01 RX ADMIN — Medication 112 MICROGRAM(S): at 05:21

## 2023-01-01 RX ADMIN — Medication 20 MILLIGRAM(S): at 14:11

## 2023-01-01 RX ADMIN — Medication 112 MICROGRAM(S): at 05:36

## 2023-01-01 RX ADMIN — Medication 1000 MILLIGRAM(S): at 23:54

## 2023-01-01 RX ADMIN — Medication 650 MILLIGRAM(S): at 00:30

## 2023-01-01 RX ADMIN — BUDESONIDE AND FORMOTEROL FUMARATE DIHYDRATE 2 PUFF(S): 160; 4.5 AEROSOL RESPIRATORY (INHALATION) at 21:40

## 2023-01-01 RX ADMIN — Medication 10 MILLIGRAM(S): at 06:15

## 2023-01-01 RX ADMIN — ALBUTEROL 2 PUFF(S): 90 AEROSOL, METERED ORAL at 10:15

## 2023-01-01 RX ADMIN — Medication 10 MILLIGRAM(S): at 05:17

## 2023-01-01 RX ADMIN — Medication 3 MILLILITER(S): at 15:27

## 2023-01-01 RX ADMIN — BUDESONIDE AND FORMOTEROL FUMARATE DIHYDRATE 2 PUFF(S): 160; 4.5 AEROSOL RESPIRATORY (INHALATION) at 08:40

## 2023-01-01 RX ADMIN — TIOTROPIUM BROMIDE 2 PUFF(S): 18 CAPSULE ORAL; RESPIRATORY (INHALATION) at 12:09

## 2023-01-01 RX ADMIN — Medication 200 GRAM(S): at 20:09

## 2023-01-01 RX ADMIN — Medication 10 MILLIGRAM(S): at 05:12

## 2023-01-01 RX ADMIN — MONTELUKAST 10 MILLIGRAM(S): 4 TABLET, CHEWABLE ORAL at 23:26

## 2023-01-01 RX ADMIN — Medication 650 MILLIGRAM(S): at 05:06

## 2023-01-01 RX ADMIN — Medication 10 MILLIGRAM(S): at 17:15

## 2023-01-01 RX ADMIN — HEPARIN SODIUM 5000 UNIT(S): 5000 INJECTION INTRAVENOUS; SUBCUTANEOUS at 22:10

## 2023-01-24 PROBLEM — S92.901A FOOT FRACTURE, RIGHT: Status: ACTIVE | Noted: 2023-01-01

## 2023-01-24 PROBLEM — M79.671 RIGHT FOOT PAIN: Status: ACTIVE | Noted: 2023-01-01

## 2023-01-26 PROBLEM — E87.5 HYPERKALEMIA: Status: ACTIVE | Noted: 2019-08-26

## 2023-01-27 NOTE — ED ADULT TRIAGE NOTE - CHIEF COMPLAINT QUOTE
Patient brought to ER by EMS from home after MD called and told the patient  her Potassium was elevated. When EMS arrived she became aggressive, then anxious, she kept talking about what she had to do before she left. Pt is on home O2 at 6 liters. Pt has 22 gauge hand/wrist with NS infusing

## 2023-01-27 NOTE — ED PROVIDER NOTE - PROGRESS NOTE DETAILS
Delores Batista- pt stable, reports she would like her alb/budesonide, will give. updated pt about admission, spoke to hospitalist.

## 2023-01-27 NOTE — ED ADULT NURSE NOTE - OBJECTIVE STATEMENT
89 y/o F presents to 20 A&Ox2 c/o FLOJAMES RN: 87 y/o F presents to 20 A&Ox2 (deficit in place and situation) c/o hyperkalemia. pt poor historian, RN unable to obtain a story from pt. as per EMS, pt sent by MD for hyperkalemia, 6.8. pt remains on continuos monitor, NSR noted. arrives with 22G to L hand and 3L NC. respirations even and unlabored. abd soft, non distended. L lower leg noted to be red, inflamed, dry, warm, with scab noted. +, equal B/L lower extremity pulses. R eye noted to be yellow and ecchymosis d/t previous fall. pt c/o diarrhea x a few days. 20G to R wrist, labs drawn and sent. wrapped with keflex. brief and linens changed at this time. safety maintained, side rails up. awaiting US and results.

## 2023-01-27 NOTE — ED PROVIDER NOTE - CLINICAL SUMMARY MEDICAL DECISION MAKING FREE TEXT BOX
88 year old female PMHx HTN, DM2, COPD (O2 dependent 2-3L,) Chronic diastolic HF, hypothyroidism, CKD3, and hx of hyperkalemia presenting with hyperkalemia. Patient currently oriented to person and time, not to place.  Patient reports right greater than left leg/foot pain.  Denies chest pain, shortness of breath, abdominal pain, nausea, vomiting. Per chart review, pt's K was 6.8 today. Exam shows LLE erythema and swelling. Hypoxia on RA however improves on home 3L. c/f cellulitis vs. dvt. Will get labs, DVT study, reassess.

## 2023-01-27 NOTE — ED PROVIDER NOTE - OBJECTIVE STATEMENT
88 year old female PMHx HTN, DM2, COPD (O2 dependent 2-3L,) Chronic diastolic HF, hypothyroidism, CKD3, and hx of hyperkalemia presenting with hyperkalemia. Patient currently oriented to person and time, not to place.  Patient reports right greater than left leg/foot pain.  Denies chest pain, shortness of breath, abdominal pain, nausea, vomiting.  Per chart review, pt's K was 6.8 today, pt notified and declined EMS services but agreeable to coming to ED. Dr. Nuha Cárdenas notified pt.

## 2023-01-27 NOTE — ED ADULT NURSE NOTE - NSIMPLEMENTINTERV_GEN_ALL_ED
Implemented All Fall with Harm Risk Interventions:  [place] to call system. Call bell, personal items and telephone within reach. Instruct patient to call for assistance. Room bathroom lighting operational. Non-slip footwear when patient is off stretcher. Physically safe environment: no spills, clutter or unnecessary equipment. Stretcher in lowest position, wheels locked, appropriate side rails in place. Provide visual cue, wrist band, yellow gown, etc. Monitor gait and stability. Monitor for mental status changes and reorient to person, place, and time. Review medications for side effects contributing to fall risk. Reinforce activity limits and safety measures with patient and family. Provide visual clues: red socks.

## 2023-01-27 NOTE — ED PROVIDER NOTE - ATTENDING CONTRIBUTION TO CARE
87 yo female with PMH HTN, DM2, COPD (O2 dependent 2-3L,) Chronic diastolic HF, hypothyroidism, CKD3, and hx of hyperkalemia sent to ED for evaluation of abnormal elevated K. Asymptomatic. PE: RRR, CTA BL A/P Labs, imaging, medicate, reassess

## 2023-01-27 NOTE — ED PROVIDER NOTE - PHYSICAL EXAMINATION
General appearance: NAD, afebrile    Eyes: anicteric sclerae, LOLA, EOMI   HENT: Atraumatic; oropharynx clear, MMM and no ulcerations, no pharyngeal erythema or exudate   Neck: Trachea midline; Full range of motion, supple   Pulm: hypoxia to 88 on RA, improve to 99 on 3L. CTA bl, normal respiratory effort and no intercostal retractions, normal work of breathing   CV: RRR, No murmurs, rubs, or gallops. 2+ peripheral pulses.   Abdomen: Soft, non-tender, non-distended; no guarding or rebound   Extremities: L>R lower leg swelling with erythema to shin, mild pitting LLE edema   Skin: as above, otherwise Dry, normal temperature, turgor and texture; no rash, ulcers or subcutaneous nodules   Psych: AO to person and year, not to place

## 2023-01-28 NOTE — H&P ADULT - ASSESSMENT
88 year old female PMHx HTN, DM2, COPD (O2 dependent 2-3L,) Chronic diastolic HF, hypothyroidism, CKD3, and hx of hyperkalemia sent by pcp for hyperkalemia. Pt also reports falling at home 1 week ago

## 2023-01-28 NOTE — PATIENT PROFILE ADULT - CAREGIVER ADDRESS
[id number] [facility]  [id number] [place] [id number]

## 2023-01-28 NOTE — H&P ADULT - PROBLEM SELECTOR PLAN 6
- Cr at baseline  - Complicated by hyperkalemia   - Continue to monitor   - Avoid nephrotoxic agents  - - Cr at baseline  - Complicated by hyperkalemia   - Continue to monitor   - Avoid nephrotoxic agents

## 2023-01-28 NOTE — H&P ADULT - PROBLEM SELECTOR PLAN 2
- Pt fell one week ago, reports head strike but no LOC. Denies any chest pain, palpitations, lightheadedness or weakness.   - Ambulated with a walker  - Possible vasovagal event as pt reports the fall occurred after using the bathroom  - No acute changes on CT head  - Fall precautions  - PT consult   - On tele but low suspicion for cardiac cause. Trop 32, will trend

## 2023-01-28 NOTE — H&P ADULT - PROBLEM SELECTOR PLAN 1
- In setting of CKD, managed medically. Pt on Lokelma at home but unclear if she is Compliant  - Peaked T waves noted on EKG  - In ED pt given Calcium gluconate, Lasix, Insulin and Lokelma  - Repeat BMP and EKG  - Continue Lokelma  - Monitor on tele for now - In setting of CKD, managed medically. Pt on Lokelma at home but unclear if she is Compliant  - Peaked T waves noted on EKG  - In ED pt given Calcium gluconate, Lasix, Insulin and Lokelma  - Repeat BMP 5.5->6.0. Will redose Lokelma 10mg, repeat BMP this evening. If not improving, will call nephrology.   - Continue Lokelma  - Monitor on tele for now - In setting of CKD, managed medically. Pt on Lokelma at home but unclear if she is Compliant  - Peaked T waves noted on EKG  - In ED pt given Calcium gluconate, Lasix, Insulin and Lokelma  - Repeat BMP 5.5->6.0. Will redose Lokelma 10mg, repeat BMP this evening. If not improving, will call nephrology- disussed with ACP  - Continue Lokelma  - Monitor on tele for now

## 2023-01-28 NOTE — H&P ADULT - HISTORY OF PRESENT ILLNESS
88 year old female PMHx HTN, DM2, COPD (O2 dependent 2-3L,) Chronic diastolic HF, hypothyroidism, CKD3, and hx of hyperkalemia sent by pcp for hyperkalemia. Pt has had multiple admissions for hyperkalemia in past. She reports being compliant with her Lokelma. She denied any chest pain or palpitations. Pt also mentioned falling at home about 1 week ago and endorses head strike but no LOC. She was using the bathroom at the time of the fall and is unsure how she fell. She denies any chest pain, palpitations, HA, blurry vision or lightheadedness at the time but does not recall details of the event. She lives alone and ambulates with a walker at baseline and denies any difficulty ambulating after her fall.       In ED pt was given Calcium gluconate 2g IV, Lasix 20mg IV,  Insulin 5u IV, Lokelma 10g, Keflex 500mg PO, Symbicort, Albuterol inh   VS: 120/42  76  97.5  20  97% on 4L NC

## 2023-01-28 NOTE — H&P ADULT - NSHPREVIEWOFSYSTEMS_GEN_ALL_CORE
REVIEW OF SYSTEMS:    CONSTITUTIONAL: No weakness, fevers or chills, no weight loss  EYES/ENT: No visual changes;  No dysphagia or odynophagia, no tinnitus  NECK: No pain or stiffness  RESPIRATORY: No cough, wheezing, hemoptysis; No shortness of breath  CARDIOVASCULAR: No chest pain or palpitations; No lower extremity edema  GASTROINTESTINAL: No abdominal or epigastric pain. No nausea, vomiting, or hematemesis; No diarrhea or constipation. No melena or hematochezia.  MUSCULOSKELETAL: + L leg pain and swelling + erythema  GENITOURINARY: No dysuria, frequency or hematuria, no suprapubic pain  NEUROLOGICAL: No numbness or weakness, no headache, no syncope, no gait abnormalities   SKIN: No itching, burning, rashes, or lesions   All other review of systems is negative unless indicated above.

## 2023-01-28 NOTE — ED ADULT NURSE REASSESSMENT NOTE - NS ED NURSE REASSESS COMMENT FT1
Pt is tachypneic, and using accessory muscles. Pt chest pain. Chest pain felt like "pressure" on the left side of chest. Pt denies radiation of pain. Md was made aware, EKG being done.  Md was made aware and is at bedside evaluating pt.
Pt left main ED for ultrasound.
pt A&Ox3 offering c/o SOB. medicated as per MD orders. remains on 4 L NC at this time, 100%. remains on continuos monitor, NSR noted. respirations even and slightly labored. safety maintained, side rails up. comfort measures provided. call bell within reach. awaiting bed assignment.

## 2023-01-28 NOTE — H&P ADULT - NSHPPHYSICALEXAM_GEN_ALL_CORE
T(C): 36.4 (01-28-23 @ 07:30), Max: 36.6 (01-28-23 @ 03:25)  HR: 76 (01-28-23 @ 07:30) (72 - 83)  BP: 120/42 (01-28-23 @ 07:30) (101/75 - 145/67)  RR: 20 (01-28-23 @ 07:30) (16 - 27)  SpO2: 97% (01-28-23 @ 07:30) (96% - 100%)    GENERAL: No acute distress, well-developed  HEAD:  Atraumatic, Normocephalic  ENT: EOMI, PERRLA, conjunctiva and sclera clear, Neck supple, No JVD, moist mucosa, no pharyngeal erythema, no tonsillar enlargement or exudate  CHEST/LUNG: Clear to auscultation bilaterally; No wheeze, equal breath sounds bilaterally   HEART: Regular rate and rhythm; No murmurs, rubs, or gallops  ABDOMEN: Soft, Nontender, Nondistended; Bowel sounds present, no organomegaly  EXTREMITIES: + Left leg swelling and erythema, non tender to palpation 2+ Peripheral Pulses, No clubbing, cyanosis, or edema  PSYCH: AAOx3, normal affect, normal behavior   NEUROLOGY: non-focal, cranial nerves intact  SKIN: + Erythema L leg, no rashes

## 2023-01-28 NOTE — PATIENT PROFILE ADULT - FALL HARM RISK - HARM RISK INTERVENTIONS
Assistance with ambulation/Assistance OOB with selected safe patient handling equipment/Communicate Risk of Fall with Harm to all staff/Discuss with provider need for PT consult/Monitor for mental status changes/Monitor gait and stability/Provide patient with walking aids - walker, cane, crutches/Reinforce activity limits and safety measures with patient and family/Reorient to person, place and time as needed/Tailored Fall Risk Interventions/Use of alarms - bed, chair and/or voice tab/Visual Cue: Yellow wristband and red socks/Bed in lowest position, wheels locked, appropriate side rails in place/Call bell, personal items and telephone in reach/Instruct patient to call for assistance before getting out of bed or chair/Non-slip footwear when patient is out of bed/[place] to call system/Physically safe environment - no spills, clutter or unnecessary equipment/Purposeful Proactive Rounding/Room/bathroom lighting operational, light cord in reach

## 2023-01-28 NOTE — H&P ADULT - PROBLEM SELECTOR PLAN 5
- Pt appears euvolemic    - Continue home meds - Pt appears euvolemic    - Continue Metoprolol, Lasix. Pt not on ACE/ARB?

## 2023-01-28 NOTE — H&P ADULT - NSHPLABSRESULTS_GEN_ALL_CORE
.  LABS:                         10.1   7.62  )-----------( 242      ( 27 Jan 2023 21:41 )             33.7     01-28    141  |  104  |  41<H>  ----------------------------<  84  5.5<H>   |  30  |  1.62<H>    Ca    9.6      28 Jan 2023 03:15    TPro  6.4  /  Alb  3.5  /  TBili  <0.2  /  DBili  x   /  AST  22  /  ALT  7   /  AlkPhos  83  01-27    PT/INR - ( 27 Jan 2023 21:41 )   PT: 10.8 sec;   INR: 0.93 ratio         PTT - ( 27 Jan 2023 21:41 )  PTT:27.9 sec    CARDIAC MARKERS ( 27 Jan 2023 21:41 )  x     / x     / 80 U/L / x     / x            EKG reviewed personally:  Normal sinus rhythm 81bpm, Peaked T waves, L axis deviation        RADIOLOGY, EKG & ADDITIONAL TESTS: Reviewed.

## 2023-01-29 NOTE — PHYSICAL THERAPY INITIAL EVALUATION ADULT - GENERAL OBSERVATIONS, REHAB EVAL
Patient received in semifowler position in bed in NAD +4L O2 via NC. Patient denies chest pain, SOB, headache, and dizziness.

## 2023-01-29 NOTE — CONSULT NOTE ADULT - ATTENDING COMMENTS
Patient with history of CKD, CHF, DM, HTN and recurrent hyperkalemia.  Readmitted with hyperkalemia.  Noncompliant with both diet and medications (Lokelma).    1. Hyperkalemia - reviewed dietary potassium moderation.  Would strongly recommend dietary counseling while in the hospital as she continues to eat high potassium foods and takes high potassium nutritional drinks.  Continue with Lokelma.  She should also receive home care or assistance by family as it pertains to both diet and medication compliance.  2. CKD - creatinine as noted above, consistent with past readings. Patient with history of CKD, CHF, DM, HTN and recurrent hyperkalemia.  Readmitted with hyperkalemia.  Noncompliant with both diet and medications (Lokelma).    1. Hyperkalemia - reviewed dietary potassium moderation.  Would strongly recommend dietary counseling while in the hospital as she continues to eat high potassium foods and takes high potassium nutritional drinks.  Continue with Lokelma.  She should also receive home care or assistance by family as it pertains to both diet and medication compliance.  2. CKD 3 - creatinine as noted above, consistent with past readings.

## 2023-01-29 NOTE — CONSULT NOTE ADULT - SUBJECTIVE AND OBJECTIVE BOX
[facility] DIVISION OF KIDNEY DISEASES AND HYPERTENSION -- [phone]  -- INITIAL CONSULT NOTE  --------------------------------------------------------------------------------  HPI: 88 year old female with PMH of CKD, CHF, DM, HTN, and Hypothyroidism who presented to the hospital for abnormal outpatient labs. Nephrology consult requested for hyperkalemia/CKD    Pt seen and examined today. Pt with hx of CKD due to DM and HTN. SCr was 1.5 on 12/27/22. Of note the patient has had multiple admission in the past for hyperkalemia. Pt was seen by house nephrology team in previous admission in 12/2022 for similar presentation and was lasix. Pt gives hx of consuming high K rich foods (potatoes and tomatoes). Pt says she ran out of [employer] past few days. Scr on 1/27 elevated/stable at 1.7.     Pt reports feeling okay. Denies fever, chills, nausea, vomiting, CP, SOB, diarrhea, dysuria. SCr elevated/stable at 1.5 today. Serum K elevated at 5.5 today.    PAST HISTORY  --------------------------------------------------------------------------------  PAST MEDICAL & SURGICAL HISTORY:  DM (diabetes mellitus)  Hypothyroi  HTN (hypertension  COPD (chronic obstructive pulmonary disease)  Chronic kidney disease (CKD)  Shingles  S/P cholecystectomy  H/O cataract  bilateral 20  Leg fracture  right ORIF with hardware 2010  S/P hip replacement, left  2019    FAMILY HISTORY:  FHx: rheumatoid arthritis (Mother)  Mother and Brother    FH: type 2 diabetes mellitus (Father)  Father    Family history of hypertension (Father)  Father      PAST SOCIAL HISTORY:    ALLERGIES & MEDICATIONS  --------------------------------------------------------------------------------  Allergies    No Known Allergies    Intolerances      Standing Inpatient Medications  albuterol    0.083% 10 milliGRAM(s) Nebulizer once  budesonide 160 MICROgram(s)/formoterol 4.5 MICROgram(s) Inhaler 2 Puff(s) Inhalation two times a day  busPIRone 10 milliGRAM(s) Oral two times a day  ceFAZolin   IVPB      ceFAZolin   IVPB 1000 milliGRAM(s) IV Intermittent once  ceFAZolin   IVPB 1000 milliGRAM(s) IV Intermittent every 8 hours  dextrose 5%. 1000 milliLiter(s) IV Continuous <Continuous>  dextrose 5%. 1000 milliLiter(s) IV Continuous <Continuous>  dextrose 50% Injectable 25 Gram(s) IV Push once  dextrose 50% Injectable 12.5 Gram(s) IV Push once  dextrose 50% Injectable 25 Gram(s) IV Push once  ferrous    sulfate 325 milliGRAM(s) Oral daily  furosemide    Tablet 20 milliGRAM(s) Oral daily  glucagon  Injectable 1 milliGRAM(s) IntraMuscular once  heparin   Injectable 5000 Unit(s) SubCutaneous every 12 hours  influenza  Vaccine (HIGH DOSE) 0.7 milliLiter(s) IntraMuscular once  insulin lispro (ADMELOG) corrective regimen sliding scale   SubCutaneous three times a day before meals  insulin lispro (ADMELOG) corrective regimen sliding scale   SubCutaneous at bedtime  levothyroxine 112 MICROGram(s) Oral daily  metoprolol succinate ER 12.5 milliGRAM(s) Oral daily  montelukast 10 milliGRAM(s) Oral at bedtime  sodium zirconium cyclosilicate 10 Gram(s) Oral daily  sodium zirconium cyclosilicate 10 Gram(s) Oral once  tiotropium 2.5 MICROgram(s) Inhaler 2 Puff(s) Inhalation daily    PRN Inpatient Medications  acetaminophen     Tablet .. 650 milliGRAM(s) Oral every 6 hours PRN  albuterol    90 MICROgram(s) HFA Inhaler 2 Puff(s) Inhalation every 6 hours PRN  aluminum hydroxide/magnesium hydroxide/simethicone Suspension 30 milliLiter(s) Oral every 4 hours PRN  dextrose Oral Gel 15 Gram(s) Oral once PRN  melatonin 3 milliGRAM(s) Oral at bedtime PRN  ondansetron Injectable 4 milliGRAM(s) IV Push every 8 hours PRN      REVIEW OF SYSTEMS  --------------------------------------------------------------------------------  Gen: No fevers/chills,  Head/Eyes/Ears: No HA  Respiratory: No dyspnea, cough  CV: No chest pain  GI: No abdominal pain, diarrhea  : No dysuria, hematuria  MSK: LLE mild edema  Skin: Erythematous skin changes LLE    VITALS/PHYSICAL EXAM  --------------------------------------------------------------------------------  T(C): 36.9 (01-29-23 @ 06:00), Max: 36.9 (01-29-23 @ 06:00)  HR: 68 (01-29-23 @ 07:50) (68 - 76)  BP: 155/66 (01-29-23 @ 06:00) (141/56 - 155/66)  RR: 18 (01-29-23 @ 06:00) (18 - 18)  SpO2: 97% (01-29-23 @ 06:00) (96% - 98%)  Wt(kg): --  Height (cm): 167.6 (01-28-23 @ 19:50)  Weight (kg): 74.843 (01-28-23 @ 19:50)  BMI (kg/m2): 26.6 (01-28-23 @ 19:50)  BSA (m2): 1.84 (01-28-23 @ 19:50)      01-28-23 @ 07:01  -  01-29-23 @ 07:00  --------------------------------------------------------  IN: 240 mL / OUT: 800 mL / NET: -560 mL    01-29-23 @ 07:01  -  01-29-23 @ 14:05  --------------------------------------------------------  IN: 360 mL / OUT: 0 mL / NET: 360 mL      Physical Exam:  	Gen: elderly female, resting, NAD  	HEENT: Anicteric  	Pulm: CTA B/L  	CV: S1S2+  	Abd: Soft, +BS          	Ext: NO LE edema B/L  	Neuro: Awake       	Skin: Warm and dry, Erythematous skin changes LLE    LABS/STUDIES  --------------------------------------------------------------------------------              9.8    6.48  >-----------<  243      [01-29-23 @ 06:40]              32.1     139  |  98  |  34  ----------------------------<  165      [01-29-23 @ 12:05]  5.5   |  33  |  1.50        Ca     9.3     [01-29-23 @ 12:05]      Mg     1.80     [01-29-23 @ 06:40]      Phos  3.5     [01-29-23 @ 06:40]    Creatinine Trend:  SCr 1.50 [01-29 @ 12:05]  SCr 1.51 [01-29 @ 06:40]  SCr 1.60 [01-28 @ 23:56]  SCr 1.56 [01-28 @ 19:18]  SCr 1.56 [01-28 @ 11:39]    Urinalysis - [12-20-22 @ 00:45]      Color Light Yellow / Appearance Slightly Turbid / SG 1.013 / pH 6.5      Gluc Negative / Ketone Negative  / Bili Negative / Urobili <2 mg/dL       Blood Negative / Protein 30 mg/dL / Leuk Est Large / Nitrite Negative      RBC 2 / WBC 70 / Hyaline 0 / Gran  / Sq Epi  / Non Sq Epi 1 / Bacteria Many [facility] DIVISION OF KIDNEY DISEASES AND HYPERTENSION -- [phone]  -- INITIAL CONSULT NOTE  --------------------------------------------------------------------------------  HPI: 88 year old female with PMH of CKD, CHF, DM, HTN, and Hypothyroidism who presented to the hospital for abnormal outpatient labs. Nephrology consult requested for hyperkalemia/CKD    Pt seen and examined today. Pt with hx of CKD due to DM and HTN. SCr was 1.5 on 12/27/22. Of note the patient has had multiple admission in the past for hyperkalemia. Pt was seen by house nephrology team in previous admission in 12/2022 for similar presentation and was lasix. Pt gives hx of consuming high K rich foods (potatoes and tomatoes). Pt says she ran out of [employer] past few days. Scr on 1/27 elevated/stable at 1.7.     Pt reports feeling okay. Denies fever, chills, nausea, vomiting, CP, SOB, diarrhea, dysuria. SCr elevated/stable at 1.5 today. Serum K elevated at 5.5 today.    PAST HISTORY  --------------------------------------------------------------------------------  PAST MEDICAL & SURGICAL HISTORY:  DM (diabetes mellitus)  Hypothyroi  HTN (hypertension  COPD (chronic obstructive pulmonary disease)  Chronic kidney disease (CKD)  Shingles  S/P cholecystectomy  H/O cataract  bilateral 20  Leg fracture  right ORIF with hardware 2010  S/P hip replacement, left  2019    FAMILY HISTORY:  FHx: rheumatoid arthritis (Mother)  Mother and Brother    FH: type 2 diabetes mellitus (Father)  Father    Family history of hypertension (Father)  Father      PAST SOCIAL HISTORY: no substance use.  Reportedly lives alone.    ALLERGIES & MEDICATIONS  --------------------------------------------------------------------------------  Allergies    No Known Allergies    Intolerances      Standing Inpatient Medications  albuterol    0.083% 10 milliGRAM(s) Nebulizer once  budesonide 160 MICROgram(s)/formoterol 4.5 MICROgram(s) Inhaler 2 Puff(s) Inhalation two times a day  busPIRone 10 milliGRAM(s) Oral two times a day  ceFAZolin   IVPB      ceFAZolin   IVPB 1000 milliGRAM(s) IV Intermittent once  ceFAZolin   IVPB 1000 milliGRAM(s) IV Intermittent every 8 hours  dextrose 5%. 1000 milliLiter(s) IV Continuous <Continuous>  dextrose 5%. 1000 milliLiter(s) IV Continuous <Continuous>  dextrose 50% Injectable 25 Gram(s) IV Push once  dextrose 50% Injectable 12.5 Gram(s) IV Push once  dextrose 50% Injectable 25 Gram(s) IV Push once  ferrous    sulfate 325 milliGRAM(s) Oral daily  furosemide    Tablet 20 milliGRAM(s) Oral daily  glucagon  Injectable 1 milliGRAM(s) IntraMuscular once  heparin   Injectable 5000 Unit(s) SubCutaneous every 12 hours  influenza  Vaccine (HIGH DOSE) 0.7 milliLiter(s) IntraMuscular once  insulin lispro (ADMELOG) corrective regimen sliding scale   SubCutaneous three times a day before meals  insulin lispro (ADMELOG) corrective regimen sliding scale   SubCutaneous at bedtime  levothyroxine 112 MICROGram(s) Oral daily  metoprolol succinate ER 12.5 milliGRAM(s) Oral daily  montelukast 10 milliGRAM(s) Oral at bedtime  sodium zirconium cyclosilicate 10 Gram(s) Oral daily  sodium zirconium cyclosilicate 10 Gram(s) Oral once  tiotropium 2.5 MICROgram(s) Inhaler 2 Puff(s) Inhalation daily    PRN Inpatient Medications  acetaminophen     Tablet .. 650 milliGRAM(s) Oral every 6 hours PRN  albuterol    90 MICROgram(s) HFA Inhaler 2 Puff(s) Inhalation every 6 hours PRN  aluminum hydroxide/magnesium hydroxide/simethicone Suspension 30 milliLiter(s) Oral every 4 hours PRN  dextrose Oral Gel 15 Gram(s) Oral once PRN  melatonin 3 milliGRAM(s) Oral at bedtime PRN  ondansetron Injectable 4 milliGRAM(s) IV Push every 8 hours PRN      REVIEW OF SYSTEMS  --------------------------------------------------------------------------------  Gen: No fevers/chills,  Head/Eyes/Ears: No HA  Respiratory: No dyspnea, cough  CV: No chest pain  GI: No abdominal pain, diarrhea  : No dysuria, hematuria  MSK: LLE mild edema  Skin: Erythematous skin changes LLE    VITALS/PHYSICAL EXAM  --------------------------------------------------------------------------------  T(C): 36.9 (01-29-23 @ 06:00), Max: 36.9 (01-29-23 @ 06:00)  HR: 68 (01-29-23 @ 07:50) (68 - 76)  BP: 155/66 (01-29-23 @ 06:00) (141/56 - 155/66)  RR: 18 (01-29-23 @ 06:00) (18 - 18)  SpO2: 97% (01-29-23 @ 06:00) (96% - 98%)  Wt(kg): --  Height (cm): 167.6 (01-28-23 @ 19:50)  Weight (kg): 74.843 (01-28-23 @ 19:50)  BMI (kg/m2): 26.6 (01-28-23 @ 19:50)  BSA (m2): 1.84 (01-28-23 @ 19:50)      01-28-23 @ 07:01  -  01-29-23 @ 07:00  --------------------------------------------------------  IN: 240 mL / OUT: 800 mL / NET: -560 mL    01-29-23 @ 07:01  -  01-29-23 @ 14:05  --------------------------------------------------------  IN: 360 mL / OUT: 0 mL / NET: 360 mL      Physical Exam:  	Gen: elderly female, resting, NAD  	HEENT: Anicteric  	Pulm: CTA B/L  	CV: S1S2+  	Abd: Soft, +BS          	Ext: NO LE edema B/L  	Neuro: Awake       	Skin: Warm and dry, Erythematous skin changes LLE    LABS/STUDIES  --------------------------------------------------------------------------------              9.8    6.48  >-----------<  243      [01-29-23 @ 06:40]              32.1     139  |  98  |  34  ----------------------------<  165      [01-29-23 @ 12:05]  5.5   |  33  |  1.50        Ca     9.3     [01-29-23 @ 12:05]      Mg     1.80     [01-29-23 @ 06:40]      Phos  3.5     [01-29-23 @ 06:40]    Creatinine Trend:  SCr 1.50 [01-29 @ 12:05]  SCr 1.51 [01-29 @ 06:40]  SCr 1.60 [01-28 @ 23:56]  SCr 1.56 [01-28 @ 19:18]  SCr 1.56 [01-28 @ 11:39]    Urinalysis - [12-20-22 @ 00:45]      Color Light Yellow / Appearance Slightly Turbid / SG 1.013 / pH 6.5      Gluc Negative / Ketone Negative  / Bili Negative / Urobili <2 mg/dL       Blood Negative / Protein 30 mg/dL / Leuk Est Large / Nitrite Negative      RBC 2 / WBC 70 / Hyaline 0 / Gran  / Sq Epi  / Non Sq Epi 1 / Bacteria Many

## 2023-01-29 NOTE — PHYSICAL THERAPY INITIAL EVALUATION ADULT - ADDITIONAL COMMENTS
patient reports she ambulates with walker. she reports multiple falls this past year. household ambulator

## 2023-01-29 NOTE — CONSULT NOTE ADULT - PROBLEM SELECTOR RECOMMENDATION 9
Pt. with hyperkalemia in setting of CKD and high K rich foods, On review of [facility] pt noted to have multiple episodes of hyperkalemia. In past was advised to be on Lokelma daily, however reportedly not taking past few days. Serum K elevated at 7 (mildly hemolyzed) on presentation. Pt received medical management. Serum K mildly elevated at 5.5. Continue lokelma 10 gm daily. Low K diet. SCo2 elevated at 33. Consider holding lasix for now. Monitor serum potassium.

## 2023-01-29 NOTE — PHYSICAL THERAPY INITIAL EVALUATION ADULT - PRECAUTIONS/LIMITATIONS, REHAB EVAL
cardiac precautions/fall precautions
Offered and provided
left nare abrasion and had b/l nare bleed that resolved

## 2023-01-29 NOTE — CONSULT NOTE ADULT - PROBLEM SELECTOR RECOMMENDATION 2
Pt. with CKD stage 3 darell in setting of longstanding DM and HTN. On review of [facility] Scr was 1.5 on 12/27. SCr on presentation elevated stable at 1.7. SCr elevated/stable at 1.5. Labs reviewed. Pt. clinically stable. Monitor labs and urine output. Dose medications as per eGFR.

## 2023-01-29 NOTE — PHYSICAL THERAPY INITIAL EVALUATION ADULT - PERTINENT HX OF CURRENT PROBLEM, REHAB EVAL
patient is a 88 year old female admitted for hyperkalemia. Also with fall last week. patient reports she "slipped in the bathroom." she denies LOC

## 2023-01-29 NOTE — PROVIDER CONTACT NOTE (OTHER) - SITUATION
Pt agitated and upset. She was getting upset regarding disposition of her belongings.
pt in 407A potassium is 6.5.
add on lab

## 2023-01-29 NOTE — PHYSICAL THERAPY INITIAL EVALUATION ADULT - GAIT DEVIATIONS NOTED, PT EVAL
decreased lucille/increased time in double stance/decreased velocity of limb motion/decreased step length/decreased swing-to-stance ratio/decreased weight-shifting ability

## 2023-01-30 NOTE — DISCHARGE NOTE PROVIDER - CARE PROVIDER_API CALL
Mane Cheek)  Family Medicine  [address] [facility], [address]st [place] [address]  Phone: [phone]  Fax: [phone]  Follow Up Time:     Jan Brooke)  Medicine  Pulmonary Medicine  [address] [place] [address], [facility] [address]  [place] [address]  Phone: [phone]  Fax: [phone]  Follow Up Time:

## 2023-01-30 NOTE — DISCHARGE NOTE PROVIDER - HOSPITAL COURSE
88 year old female PMHx HTN, DM2, COPD (O2 dependent 2-3L,) Chronic diastolic HF, hypothyroidism, CKD3, and hx of hyperkalemia sent by pcp for hyperkalemia. Pt also reports falling at home 1 week ago. Patient was seen by nephrology during hospital stay. Patient was started on lokelma with improvement in potassium. She was instructed to follow low potassium diet and to take loklema daily. She was also noted to have cellulitis of LLE for which she was started on  cefazolin and was discharged on keflex to finish course. 88 year old female PMHx HTN, DM2, COPD (O2 dependent 2-3L,) Chronic diastolic HF, hypothyroidism, CKD3, and hx of hyperkalemia sent by pcp for hyperkalemia. Pt also reports falling at home 1 week ago. Patient was seen by nephrology during hospital stay. Patient was started on lokelma with improvement in potassium. She was instructed to follow low potassium diet and to take loklema daily. She was also noted to have cellulitis of LLE for which she was started on  cefazolin and was discharged on keflex to finish 7 day course. Patient remained stable from respiratory standpoint on her home 2-3 L NC.

## 2023-01-30 NOTE — DISCHARGE NOTE PROVIDER - CARE PROVIDERS DIRECT ADDRESSES
[email or web address]

## 2023-01-30 NOTE — DISCHARGE NOTE PROVIDER - NSDCCPCAREPLAN_GEN_ALL_CORE_FT
PRINCIPAL DISCHARGE DIAGNOSIS  Diagnosis: Hyperkalemia  Assessment and Plan of Treatment: Please continue to adhere to low potassium diet. Take loklema daily. f/u with PCP in 2 weeks.      SECONDARY DISCHARGE DIAGNOSES  Diagnosis: Cellulitis  Assessment and Plan of Treatment: Please finish course of antibiotics prescribed. Please follow up with PCP in 2 weeks to ensure complete resolution of infection.

## 2023-01-30 NOTE — DISCHARGE NOTE PROVIDER - NSDCMRMEDTOKEN_GEN_ALL_CORE_FT
busPIRone 10 mg oral tablet: 1 tab(s) orally 2 times a day  ferrous sulfate 325 mg (65 mg elemental iron) oral tablet: 1 tab(s) orally once a day  furosemide 20 mg oral tablet: 1 tab(s) orally once a day  levothyroxine 112 mcg (0.112 mg) oral tablet: 1 tab(s) orally once a day  Lokelma 10 g oral powder for reconstitution: 10 gram(s) orally once a day   metFORMIN 500 mg oral tablet: 1 tab(s) orally 2 times a day  metoprolol succinate 25 mg oral tablet, extended release: 1 tab(s) orally once a day  montelukast 10 mg oral tablet: 1 tab(s) orally once a day (at bedtime)  Spiriva Respimat: 2.5 microgram(s) - two puffs inhaled once a day  Symbicort 160 mcg-4.5 mcg/inh inhalation aerosol: 2 puff(s) inhaled 2 times a day  Ventolin HFA 90 mcg/inh inhalation aerosol: 2 puff(s) inhaled every 6 hours, As Needed   busPIRone 10 mg oral tablet: 1 tab(s) orally 2 times a day  cephalexin 500 mg oral capsule: 1 cap(s) orally 4 times a day (after meals and at bedtime)   ferrous sulfate 325 mg (65 mg elemental iron) oral tablet: 1 tab(s) orally once a day  furosemide 20 mg oral tablet: 1 tab(s) orally once a day  levothyroxine 112 mcg (0.112 mg) oral tablet: 1 tab(s) orally once a day  Lokelma 10 g oral powder for reconstitution: 10 gram(s) orally once a day   metFORMIN 500 mg oral tablet: 1 tab(s) orally 2 times a day  metoprolol succinate 25 mg oral tablet, extended release: 1 tab(s) orally once a day  montelukast 10 mg oral tablet: 1 tab(s) orally once a day (at bedtime)  Spiriva Respimat: 2.5 microgram(s) - two puffs inhaled once a day  Symbicort 160 mcg-4.5 mcg/inh inhalation aerosol: 2 puff(s) inhaled 2 times a day  Ventolin HFA 90 mcg/inh inhalation aerosol: 2 puff(s) inhaled every 6 hours, As Needed   busPIRone 10 mg oral tablet: 1 tab(s) orally 2 times a day  cephalexin 500 mg oral capsule: 1 cap(s) orally 4 times a day (after meals and at bedtime). MEDS TO BED  ferrous sulfate 325 mg (65 mg elemental iron) oral tablet: 1 tab(s) orally once a day  furosemide 20 mg oral tablet: 1 tab(s) orally once a day  levothyroxine 112 mcg (0.112 mg) oral tablet: 1 tab(s) orally once a day  Lokelma 10 g oral powder for reconstitution: 10 gram(s) orally once a day/MEDS TO BEDS  metFORMIN 500 mg oral tablet: 1 tab(s) orally 2 times a day  metoprolol succinate 25 mg oral tablet, extended release: 1 tab(s) orally once a day  montelukast 10 mg oral tablet: 1 tab(s) orally once a day (at bedtime)  Spiriva Respimat: 2.5 microgram(s) - two puffs inhaled once a day  Symbicort 160 mcg-4.5 mcg/inh inhalation aerosol: 2 puff(s) inhaled 2 times a day  Ventolin HFA 90 mcg/inh inhalation aerosol: 2 puff(s) inhaled every 6 hours, As Needed   busPIRone 10 mg oral tablet: 1 tab(s) orally 2 times a day  cephalexin 500 mg oral capsule: 1 cap(s) orally 4 times a day (after meals and at bedtime). MEDS TO BED  ferrous sulfate 325 mg (65 mg elemental iron) oral tablet: 1 tab(s) orally once a day  furosemide 20 mg oral tablet: 1 tab(s) orally once a day  levothyroxine 112 mcg (0.112 mg) oral tablet: 1 tab(s) orally once a day  Lokelma 5 g oral powder for reconstitution: 10 gram(s) orally once a day   metFORMIN 500 mg oral tablet: 1 tab(s) orally 2 times a day  metoprolol succinate 25 mg oral tablet, extended release: 1 tab(s) orally once a day  montelukast 10 mg oral tablet: 1 tab(s) orally once a day (at bedtime)  Spiriva Respimat: 2.5 microgram(s) - two puffs inhaled once a day  Symbicort 160 mcg-4.5 mcg/inh inhalation aerosol: 2 puff(s) inhaled 2 times a day  Ventolin HFA 90 mcg/inh inhalation aerosol: 2 puff(s) inhaled every 6 hours, As Needed

## 2023-01-31 NOTE — DIETITIAN INITIAL EVALUATION ADULT - PERTINENT MEDS FT
MEDICATIONS  (STANDING):  budesonide 160 MICROgram(s)/formoterol 4.5 MICROgram(s) Inhaler 2 Puff(s) Inhalation two times a day  busPIRone 10 milliGRAM(s) Oral two times a day  ceFAZolin   IVPB      ceFAZolin   IVPB 1000 milliGRAM(s) IV Intermittent every 8 hours  dextrose 5%. 1000 milliLiter(s) (50 mL/Hr) IV Continuous <Continuous>  dextrose 5%. 1000 milliLiter(s) (100 mL/Hr) IV Continuous <Continuous>  dextrose 50% Injectable 25 Gram(s) IV Push once  dextrose 50% Injectable 12.5 Gram(s) IV Push once  dextrose 50% Injectable 25 Gram(s) IV Push once  ferrous    sulfate 325 milliGRAM(s) Oral daily  glucagon  Injectable 1 milliGRAM(s) IntraMuscular once  heparin   Injectable 5000 Unit(s) SubCutaneous every 12 hours  influenza  Vaccine (HIGH DOSE) 0.7 milliLiter(s) IntraMuscular once  insulin lispro (ADMELOG) corrective regimen sliding scale   SubCutaneous three times a day before meals  insulin lispro (ADMELOG) corrective regimen sliding scale   SubCutaneous at bedtime  levothyroxine 112 MICROGram(s) Oral daily  metoprolol succinate ER 12.5 milliGRAM(s) Oral daily  montelukast 10 milliGRAM(s) Oral at bedtime  sodium zirconium cyclosilicate 10 Gram(s) Oral daily  tiotropium 2.5 MICROgram(s) Inhaler 2 Puff(s) Inhalation daily    MEDICATIONS  (PRN):  acetaminophen     Tablet .. 650 milliGRAM(s) Oral every 6 hours PRN Temp greater or equal to 38C (100.4F), Mild Pain (1 - 3)  albuterol    90 MICROgram(s) HFA Inhaler 2 Puff(s) Inhalation every 6 hours PRN Shortness of Breath and/or Wheezing  aluminum hydroxide/magnesium hydroxide/simethicone Suspension 30 milliLiter(s) Oral every 4 hours PRN Dyspepsia  dextrose Oral Gel 15 Gram(s) Oral once PRN Blood Glucose LESS THAN 70 milliGRAM(s)/deciliter  melatonin 3 milliGRAM(s) Oral at bedtime PRN Insomnia  ondansetron Injectable 4 milliGRAM(s) IV Push every 8 hours PRN Nausea and/or Vomiting

## 2023-01-31 NOTE — PHARMACOTHERAPY INTERVENTION NOTE - COMMENTS
Discharge medications reviewed with the patient. Outpatient medication schedule was discussed in detail including: medication name, indication, dose, administration times, treatment duration, side effects and special instructions. Patient questions and concerns were answered and addressed. Patient seemed a little confused but stated she understood. Informed patient she could call her local pharmacist if she had any questions. Also provided with educational handout.     Brandy Almaguer, PharmD, Clinical Pharmacy Specialist, [phone]

## 2023-01-31 NOTE — DISCHARGE NOTE NURSING/CASE MANAGEMENT/SOCIAL WORK - ADDITIONAL RESOURCE TYPE OF SERVICE
Trip#442A; pickup Tuesday 4:45pm. AMBULANCE DRIVERS PLEASE CALL  PAPI @ [phone], if cannot reach her, call SECURITY @ [phone]

## 2023-01-31 NOTE — PROGRESS NOTE ADULT - PROBLEM SELECTOR PLAN 2
- Pt fell one week ago, reports head strike but no LOC. Denies any chest pain, palpitations, lightheadedness or weakness.   - Ambulated with a walker  - Possible vasovagal event as pt reports the fall occurred after using the bathroom  - No acute changes on CT head  - Fall precautions  - PT consult   - On tele but low suspicion for cardiac cause. Trop 32->31, will trend

## 2023-01-31 NOTE — PROGRESS NOTE ADULT - PROBLEM SELECTOR PLAN 4
- On 3L NC at home   - No wheezing on exam  - Continue home Symbicort, nebs prn

## 2023-01-31 NOTE — PROGRESS NOTE ADULT - PROBLEM SELECTOR PLAN 3
- Pt has L leg swelling and redness   - Duplex neg for DVT   - abx changed to ancef
- Pt has L leg swelling and redness   - Duplex neg for DVT   - abx changed to ancef  -Transition to PO keflex on discharge to finish 7 day course
- Pt has L leg swelling and redness   - Duplex neg for DVT   - abx changed to ancef  -Transition to PO keflex on discharge

## 2023-01-31 NOTE — DISCHARGE NOTE NURSING/CASE MANAGEMENT/SOCIAL WORK - NSDCVIVACCINE_GEN_ALL_CORE_FT
Influenza, high dose seasonal; 24-Sep-2019 12:44; Shellie Hannah (Student, Nursing); [employer]; [id number] (Exp. Date: 24-Apr-2020); IntraMuscular; Deltoid Right.; 0.5 milliLiter(s); VIS (VIS Published: 15-Aug-2019, VIS Presented: 24-Sep-2019);

## 2023-01-31 NOTE — PROGRESS NOTE ADULT - PROBLEM SELECTOR PLAN 5
- Pt appears euvolemic    - Continue Metoprolol,   - per renal hold lasix  -Pt not on ACE/ARB?
- Pt appears euvolemic    - Continue Metoprolol,   - per renal hold lasix  -Pt not on ACE/ARB?
- Pt appears euvolemic    - Continue Metoprolol,   -Pt not on ACE/ARB due to hyperkalemia

## 2023-01-31 NOTE — PROGRESS NOTE ADULT - PROBLEM SELECTOR PLAN 6
- Cr at baseline  - Complicated by hyperkalemia   - Continue to monitor   - Avoid nephrotoxic agents

## 2023-01-31 NOTE — DISCHARGE NOTE NURSING/CASE MANAGEMENT/SOCIAL WORK - NSDCPEFALRISK_GEN_ALL_CORE
For information on Fall & Injury Prevention, visit: [email or web address] OR  [email or web address] OR  https://www.cdc.gov/steadi/patient.html

## 2023-01-31 NOTE — DISCHARGE NOTE NURSING/CASE MANAGEMENT/SOCIAL WORK - PATIENT PORTAL LINK FT
You can access the FollowMyHealth Patient Portal offered by [facility] by registering at the following website: [email or web address]. By joining [employer]’s FollowMyHealth portal, you will also be able to view your health information using other applications (apps) compatible with our system.

## 2023-01-31 NOTE — DIETITIAN INITIAL EVALUATION ADULT - NS FNS DIET ORDER
Diet, Regular:   Consistent Carbohydrate {No Snacks} (CSTCHO)  DASH/TLC {Sodium & Cholesterol Restricted} (DASH)  No Concentrated Potassium  No Concentrated Phosphorus (01-29-23 @ 12:45)

## 2023-01-31 NOTE — DIETITIAN INITIAL EVALUATION ADULT - OTHER INFO
88 year old female PMH HTN, DM2, COPD (O2 dependent 2-3L,) Chronic diastolic HF, hypothyroidism, CKD3, and hx of hyperkalemia sent by pcp for hyperkalemia. Pt also reports falling at home 1 week ago.    Pt seen and reports 75% intake of meals with No GI distress (nausea/vomiting/diarrhea/constipation.) at present. Pt noted with fluid related wt fluctuation. UBW- 150# (12/18/22), Current wt- 165# (1/28/23). Pt with skin ecchymosis, +1 edema Rt foot and leg, +2 edema Lt foot and arm per nursing flow sheet. Labs reviewed, A1c- 5.9% well controlled.

## 2023-01-31 NOTE — PROGRESS NOTE ADULT - PROBLEM SELECTOR PLAN 1
- In setting of CKD, managed medically. Pt was not taking loklema at home  - Peaked T waves noted on EKG  - In ED pt given Calcium gluconate, Lasix, Insulin and Lokelma  - K elevated, lokelma given, nephrology consult appreciated,  -Needs dietary education  - Monitor on tele for now  -K not elevated today, appears stable when on loklema and low potassium diet. Stressed importance of low potassium diet.
- In setting of CKD, managed medically. Pt was not taking loklema at home  - Peaked T waves noted on EKG  - In ED pt given Calcium gluconate, Lasix, Insulin and Lokelma  - K elevated, lokelma given, nephrology consult appreciated,  -Needs dietary education  - Monitor on tele for now  -K not elevated today
- In setting of CKD, managed medically. Pt on Lokelma at home but unclear if she is Compliant  - Peaked T waves noted on EKG  - In ED pt given Calcium gluconate, Lasix, Insulin and Lokelma  - K elevated, lokelma given, nephrology consult appreciated,  -Needs dietary education  - Monitor on tele for now

## 2023-01-31 NOTE — PROGRESS NOTE ADULT - SUBJECTIVE AND OBJECTIVE BOX
Patient is a 88y old  Female who presents with a chief complaint of Hyperkalemia (29 Jan 2023 14:04)      SUBJECTIVE / OVERNIGHT EVENTS: Pt seen and examined at 11:15am, no overnight events, pt denies any sob, chest pain or any other complaints, admits to eating potassium rich foods. No other new issues reported.    MEDICATIONS  (STANDING):  budesonide 160 MICROgram(s)/formoterol 4.5 MICROgram(s) Inhaler 2 Puff(s) Inhalation two times a day  busPIRone 10 milliGRAM(s) Oral two times a day  ceFAZolin   IVPB      ceFAZolin   IVPB 1000 milliGRAM(s) IV Intermittent every 8 hours  dextrose 5%. 1000 milliLiter(s) (50 mL/Hr) IV Continuous <Continuous>  dextrose 5%. 1000 milliLiter(s) (100 mL/Hr) IV Continuous <Continuous>  dextrose 50% Injectable 25 Gram(s) IV Push once  dextrose 50% Injectable 12.5 Gram(s) IV Push once  dextrose 50% Injectable 25 Gram(s) IV Push once  ferrous    sulfate 325 milliGRAM(s) Oral daily  glucagon  Injectable 1 milliGRAM(s) IntraMuscular once  heparin   Injectable 5000 Unit(s) SubCutaneous every 12 hours  influenza  Vaccine (HIGH DOSE) 0.7 milliLiter(s) IntraMuscular once  insulin lispro (ADMELOG) corrective regimen sliding scale   SubCutaneous at bedtime  insulin lispro (ADMELOG) corrective regimen sliding scale   SubCutaneous three times a day before meals  levothyroxine 112 MICROGram(s) Oral daily  metoprolol succinate ER 12.5 milliGRAM(s) Oral daily  montelukast 10 milliGRAM(s) Oral at bedtime  sodium zirconium cyclosilicate 10 Gram(s) Oral once  sodium zirconium cyclosilicate 10 Gram(s) Oral daily  tiotropium 2.5 MICROgram(s) Inhaler 2 Puff(s) Inhalation daily    MEDICATIONS  (PRN):  acetaminophen     Tablet .. 650 milliGRAM(s) Oral every 6 hours PRN Temp greater or equal to 38C (100.4F), Mild Pain (1 - 3)  albuterol    90 MICROgram(s) HFA Inhaler 2 Puff(s) Inhalation every 6 hours PRN Shortness of Breath and/or Wheezing  aluminum hydroxide/magnesium hydroxide/simethicone Suspension 30 milliLiter(s) Oral every 4 hours PRN Dyspepsia  dextrose Oral Gel 15 Gram(s) Oral once PRN Blood Glucose LESS THAN 70 milliGRAM(s)/deciliter  melatonin 3 milliGRAM(s) Oral at bedtime PRN Insomnia  ondansetron Injectable 4 milliGRAM(s) IV Push every 8 hours PRN Nausea and/or Vomiting      Vital Signs Last 24 Hrs  T(C): 36.9 (29 Jan 2023 06:00), Max: 36.9 (29 Jan 2023 06:00)  T(F): 98.5 (29 Jan 2023 06:00), Max: 98.5 (29 Jan 2023 06:00)  HR: 68 (29 Jan 2023 07:50) (68 - 76)  BP: 155/66 (29 Jan 2023 06:00) (141/56 - 155/66)  BP(mean): --  RR: 18 (29 Jan 2023 06:00) (18 - 18)  SpO2: 97% (29 Jan 2023 06:00) (96% - 98%)    Parameters below as of 29 Jan 2023 06:00  Patient On (Oxygen Delivery Method): nasal cannula  O2 Flow (L/min): 3    CAPILLARY BLOOD GLUCOSE      POCT Blood Glucose.: 180 mg/dL (29 Jan 2023 15:30)  POCT Blood Glucose.: 122 mg/dL (29 Jan 2023 13:33)  POCT Blood Glucose.: 151 mg/dL (29 Jan 2023 12:29)  POCT Blood Glucose.: 180 mg/dL (29 Jan 2023 11:26)  POCT Blood Glucose.: 225 mg/dL (29 Jan 2023 10:25)  POCT Blood Glucose.: 164 mg/dL (29 Jan 2023 09:53)  POCT Blood Glucose.: 137 mg/dL (29 Jan 2023 09:14)  POCT Blood Glucose.: 283 mg/dL (28 Jan 2023 21:42)  POCT Blood Glucose.: 122 mg/dL (28 Jan 2023 17:13)    I&O's Summary    28 Jan 2023 07:01  -  29 Jan 2023 07:00  --------------------------------------------------------  IN: 240 mL / OUT: 800 mL / NET: -560 mL    29 Jan 2023 07:01  -  29 Jan 2023 16:20  --------------------------------------------------------  IN: 360 mL / OUT: 0 mL / NET: 360 mL        PHYSICAL EXAM:  GENERAL: NAD  CHEST/LUNG: Clear to auscultation bilaterally; No wheeze  HEART: Regular rate and rhythm  ABDOMEN: Soft, Nontender, Nondistended  EXTREMITIES: Left LE with edema, erythema+ RLE no edema  PSYCH: Calm   NEUROLOGY: AAOx3  SKIN: No rashes or lesions    LABS:                        9.8    6.48  )-----------( 243      ( 29 Jan 2023 06:40 )             32.1     01-29    139  |  98  |  34<H>  ----------------------------<  165<H>  5.5<H>   |  33<H>  |  1.50<H>    Ca    9.3      29 Jan 2023 12:05  Phos  3.5     01-29  Mg     1.80     01-29    TPro  6.5  /  Alb  3.6  /  TBili  <0.2  /  DBili  x   /  AST  16  /  ALT  5   /  AlkPhos  75  01-28    PT/INR - ( 27 Jan 2023 21:41 )   PT: 10.8 sec;   INR: 0.93 ratio         PTT - ( 27 Jan 2023 21:41 )  PTT:27.9 sec  CARDIAC MARKERS ( 27 Jan 2023 21:41 )  x     / x     / 80 U/L / x     / x              RADIOLOGY & ADDITIONAL TESTS:    Imaging Personally Reviewed:    Consultant(s) Notes Reviewed:      Care Discussed with Consultants/Other Providers:  
Dr. Joi Garcia  Pager [phone]  [facility] Medicine    Patient is a 88y old  Female who presents with a chief complaint of Hyperkalemia (29 Jan 2023 14:04)      SUBJECTIVE / OVERNIGHT EVENTS: Pt seen and examined. Feels better. No SOB or chest pain. States that she lives alone. Reports left leg is now less swollen and red.     MEDICATIONS  (STANDING):  budesonide 160 MICROgram(s)/formoterol 4.5 MICROgram(s) Inhaler 2 Puff(s) Inhalation two times a day  busPIRone 10 milliGRAM(s) Oral two times a day  ceFAZolin   IVPB      ceFAZolin   IVPB 1000 milliGRAM(s) IV Intermittent every 8 hours  dextrose 5%. 1000 milliLiter(s) (50 mL/Hr) IV Continuous <Continuous>  dextrose 5%. 1000 milliLiter(s) (100 mL/Hr) IV Continuous <Continuous>  dextrose 50% Injectable 25 Gram(s) IV Push once  dextrose 50% Injectable 12.5 Gram(s) IV Push once  dextrose 50% Injectable 25 Gram(s) IV Push once  ferrous    sulfate 325 milliGRAM(s) Oral daily  glucagon  Injectable 1 milliGRAM(s) IntraMuscular once  heparin   Injectable 5000 Unit(s) SubCutaneous every 12 hours  influenza  Vaccine (HIGH DOSE) 0.7 milliLiter(s) IntraMuscular once  insulin lispro (ADMELOG) corrective regimen sliding scale   SubCutaneous at bedtime  insulin lispro (ADMELOG) corrective regimen sliding scale   SubCutaneous three times a day before meals  levothyroxine 112 MICROGram(s) Oral daily  metoprolol succinate ER 12.5 milliGRAM(s) Oral daily  montelukast 10 milliGRAM(s) Oral at bedtime  sodium zirconium cyclosilicate 10 Gram(s) Oral once  sodium zirconium cyclosilicate 10 Gram(s) Oral daily  tiotropium 2.5 MICROgram(s) Inhaler 2 Puff(s) Inhalation daily    MEDICATIONS  (PRN):  acetaminophen     Tablet .. 650 milliGRAM(s) Oral every 6 hours PRN Temp greater or equal to 38C (100.4F), Mild Pain (1 - 3)  albuterol    90 MICROgram(s) HFA Inhaler 2 Puff(s) Inhalation every 6 hours PRN Shortness of Breath and/or Wheezing  aluminum hydroxide/magnesium hydroxide/simethicone Suspension 30 milliLiter(s) Oral every 4 hours PRN Dyspepsia  dextrose Oral Gel 15 Gram(s) Oral once PRN Blood Glucose LESS THAN 70 milliGRAM(s)/deciliter  melatonin 3 milliGRAM(s) Oral at bedtime PRN Insomnia  ondansetron Injectable 4 milliGRAM(s) IV Push every 8 hours PRN Nausea and/or Vomiting      Vital Signs Last 24 Hrs  T(C): 36.7 (30 Jan 2023 05:15), Max: 37.1 (29 Jan 2023 17:30)  T(F): 98 (30 Jan 2023 05:15), Max: 98.7 (29 Jan 2023 17:30)  HR: 66 (30 Jan 2023 05:15) (64 - 67)  BP: 139/58 (30 Jan 2023 05:15) (128/60 - 139/58)  BP(mean): --  RR: 18 (30 Jan 2023 05:15) (18 - 18)  SpO2: 98% (30 Jan 2023 05:15) (95% - 98%)    Parameters below as of 30 Jan 2023 05:15  Patient On (Oxygen Delivery Method): nasal cannula  O2 Flow (L/min): 4      CAPILLARY BLOOD GLUCOSE  POCT Blood Glucose.: 123 mg/dL (30 Jan 2023 09:17)  POCT Blood Glucose.: 126 mg/dL (29 Jan 2023 21:35)  POCT Blood Glucose.: 160 mg/dL (29 Jan 2023 17:26)  POCT Blood Glucose.: 180 mg/dL (29 Jan 2023 15:30)  POCT Blood Glucose.: 122 mg/dL (29 Jan 2023 13:33)  POCT Blood Glucose.: 151 mg/dL (29 Jan 2023 12:29)      I&O's Summary    29 Jan 2023 07:01  -  30 Jan 2023 07:00  --------------------------------------------------------  IN: 760 mL / OUT: 900 mL / NET: -140 mL      PHYSICAL EXAM:  GENERAL: NAD  CHEST/LUNG: Clear to auscultation bilaterally; No wheeze  HEART: Regular rate and rhythm  ABDOMEN: Soft, Nontender, Nondistended  EXTREMITIES: Left LE with edema, erythema+ RLE no edema  PSYCH: Calm   NEUROLOGY: AAOx3  SKIN: No rashes or lesions    LABS:                                   10.2   6.17  )-----------( 223      ( 30 Jan 2023 05:30 )             32.9   01-30    138  |  99  |  43<H>  ----------------------------<  121<H>  5.3   |  29  |  1.59<H>    Ca    9.5      30 Jan 2023 05:30  Phos  4.0     01-30  Mg     1.90     01-30    TPro  6.5  /  Alb  3.6  /  TBili  <0.2  /  DBili  x   /  AST  16  /  ALT  5   /  AlkPhos  75  01-28      RADIOLOGY & ADDITIONAL TESTS:    Imaging Personally Reviewed:    Consultant(s) Notes Reviewed:  renal    Care Discussed with Consultants/Other Providers:  
Dr. Joi Garcia  Pager [phone]  [facility] Medicine    Patient is a 88y old  Female who presents with a chief complaint of Hyperkalemia (29 Jan 2023 14:04)    SUBJECTIVE / OVERNIGHT EVENTS: Pt seen and examined. Feels better. No SOB or chest pain. Walked with PT. Understands that she needs to follow low potassium diet and take lokelma daily.     MEDICATIONS  (STANDING):  budesonide 160 MICROgram(s)/formoterol 4.5 MICROgram(s) Inhaler 2 Puff(s) Inhalation two times a day  busPIRone 10 milliGRAM(s) Oral two times a day  ceFAZolin   IVPB      ceFAZolin   IVPB 1000 milliGRAM(s) IV Intermittent every 8 hours  dextrose 5%. 1000 milliLiter(s) (50 mL/Hr) IV Continuous <Continuous>  dextrose 5%. 1000 milliLiter(s) (100 mL/Hr) IV Continuous <Continuous>  dextrose 50% Injectable 25 Gram(s) IV Push once  dextrose 50% Injectable 12.5 Gram(s) IV Push once  dextrose 50% Injectable 25 Gram(s) IV Push once  ferrous    sulfate 325 milliGRAM(s) Oral daily  glucagon  Injectable 1 milliGRAM(s) IntraMuscular once  heparin   Injectable 5000 Unit(s) SubCutaneous every 12 hours  influenza  Vaccine (HIGH DOSE) 0.7 milliLiter(s) IntraMuscular once  insulin lispro (ADMELOG) corrective regimen sliding scale   SubCutaneous at bedtime  insulin lispro (ADMELOG) corrective regimen sliding scale   SubCutaneous three times a day before meals  levothyroxine 112 MICROGram(s) Oral daily  metoprolol succinate ER 12.5 milliGRAM(s) Oral daily  montelukast 10 milliGRAM(s) Oral at bedtime  sodium zirconium cyclosilicate 10 Gram(s) Oral once  sodium zirconium cyclosilicate 10 Gram(s) Oral daily  tiotropium 2.5 MICROgram(s) Inhaler 2 Puff(s) Inhalation daily    MEDICATIONS  (PRN):  acetaminophen     Tablet .. 650 milliGRAM(s) Oral every 6 hours PRN Temp greater or equal to 38C (100.4F), Mild Pain (1 - 3)  albuterol    90 MICROgram(s) HFA Inhaler 2 Puff(s) Inhalation every 6 hours PRN Shortness of Breath and/or Wheezing  aluminum hydroxide/magnesium hydroxide/simethicone Suspension 30 milliLiter(s) Oral every 4 hours PRN Dyspepsia  dextrose Oral Gel 15 Gram(s) Oral once PRN Blood Glucose LESS THAN 70 milliGRAM(s)/deciliter  melatonin 3 milliGRAM(s) Oral at bedtime PRN Insomnia  ondansetron Injectable 4 milliGRAM(s) IV Push every 8 hours PRN Nausea and/or Vomiting      Vital Signs Last 24 Hrs  T(C): 36.6 (31 Jan 2023 05:10), Max: 37.1 (30 Jan 2023 20:06)  T(F): 97.8 (31 Jan 2023 05:10), Max: 98.8 (30 Jan 2023 20:06)  HR: 67 (31 Jan 2023 05:10) (66 - 71)  BP: 142/67 (31 Jan 2023 05:10) (141/51 - 159/65)  BP(mean): --  RR: 18 (31 Jan 2023 05:10) (17 - 18)  SpO2: 99% (31 Jan 2023 05:10) (96% - 99%)    Parameters below as of 31 Jan 2023 05:10  Patient On (Oxygen Delivery Method): nasal cannula  O2 Flow (L/min): 2      CAPILLARY BLOOD GLUCOSE  POCT Blood Glucose.: 207 mg/dL (31 Jan 2023 12:17)  POCT Blood Glucose.: 118 mg/dL (31 Jan 2023 08:47)  POCT Blood Glucose.: 119 mg/dL (30 Jan 2023 22:15)  POCT Blood Glucose.: 102 mg/dL (30 Jan 2023 17:42)    I&O's Summary    30 Jan 2023 07:01  -  31 Jan 2023 07:00  --------------------------------------------------------  IN: 0 mL / OUT: 600 mL / NET: -600 mL    31 Jan 2023 07:01  -  31 Jan 2023 12:44  --------------------------------------------------------  IN: 360 mL / OUT: 0 mL / NET: 360 mL      PHYSICAL EXAM:  GENERAL: NAD  CHEST/LUNG: Clear to auscultation bilaterally; No wheeze  HEART: Regular rate and rhythm  ABDOMEN: Soft, Nontender, Nondistended  EXTREMITIES: Left LE with edema, erythema+ RLE no edema  PSYCH: Calm   NEUROLOGY: AAOx3  SKIN: No rashes or lesions    LABS:                                   10.2   6.80  )-----------( 227      ( 31 Jan 2023 06:00 )             32.9   01-31    140  |  100  |  45<H>  ----------------------------<  228<H>  4.8   |  31  |  1.42<H>    Ca    9.4      31 Jan 2023 10:14  Phos  3.1     01-31  Mg     1.90     01-31        RADIOLOGY & ADDITIONAL TESTS:    Imaging Personally Reviewed:    Consultant(s) Notes Reviewed:  renal    Care Discussed with Consultants/Other Providers:

## 2023-01-31 NOTE — DIETITIAN INITIAL EVALUATION ADULT - PERTINENT LABORATORY DATA
01-31    138  |  100  |  47<H>  ----------------------------<  119<H>  TNP   |  27  |  1.39<H>    Ca    9.3      31 Jan 2023 06:00  Phos  3.5     01-31  Mg     2.00     01-31    POCT Blood Glucose.: 118 mg/dL (01-31-23 @ 08:47)  A1C with Estimated Average Glucose Result: 5.9 % (12-19-22 @ 05:27)  A1C with Estimated Average Glucose Result: 6.0 % (09-14-22 @ 06:40)

## 2023-03-28 PROBLEM — R26.81 GAIT INSTABILITY: Status: ACTIVE | Noted: 2023-01-01

## 2023-03-28 PROBLEM — N18.30 STAGE 3 CHRONIC KIDNEY DISEASE: Status: ACTIVE | Noted: 2019-08-26

## 2023-04-09 NOTE — ED ADULT TRIAGE NOTE - CHIEF COMPLAINT QUOTE
alert oriented  family called for wellness check was found sitting in chair appeared weak  lives alone appears weak and SOB in triage states she does not feel like herself O2 dependant at home  PMHx COPD DM2 HTN

## 2023-04-10 NOTE — ED ADULT NURSE REASSESSMENT NOTE - NS ED NURSE REASSESS COMMENT FT1
Received report from Sebastian Velasco. Patient received in room 9. patient on 4L NC. Respirations even and unlabored. 20g IV placed Left hand. Labs drawn and sent. Comfort and safety maintained.
Pt lying in bed comfortably. A&O2. Respirations even and unlabored. Patient on 4L NC. No signs of acute distress noted. VS as documented. Comfort and safety maintainted.
Pt was received from handoff. Pt is AxO 2 and appears to be resting in bed. Respirations are even and unlabored. Pt is sating at 99 on 4L nasal cannula. Pt does not appear to be in acute distress at this time. Will continue to monitor.

## 2023-04-10 NOTE — ED ADULT NURSE NOTE - OBJECTIVE STATEMENT
Break RN note- Patient arrives to the ED via EMS after family called for a wellness check per triage note. Per triage note patient found sitting in chair appearing weak. Patient reports she lives at home. Patient A&Ox2 ( oriented to self and place). Patient opening eyes intermittently to answer questions, breathing even and nonlabored on 4L nasal cannula. Cardiac monitor in place- sinus rhythm. Patient denies know why she is here and has no complaints. Belly soft, nondistended, and nontender. Buttocks with blanchable redness. Small ecchymosis area noted to left flank. Venous stasis noted to bilateral lower extremities with some small open scabbed over areas. Pedal pulses palpable bilaterally. Patient reports she walks around with a cane and a walker. Dr. France and Dr. Albright at bedside. Safety maintained. Patient stable upon exiting the room.

## 2023-04-10 NOTE — H&P ADULT - PROBLEM SELECTOR PLAN 1
Family concerned about patient's inability to care for self. States would benefit from assisted living facility Family concerned about patient's inability to care for self. States would benefit from assisted living facility  - Social work consult  - PT consult  - Nutrition Family concerned about patient's inability to care for self. States would benefit from assisted living facility.  Pt fell  around one week ago, reports head strike but no LOC. Denies any chest pain, palpitations, lightheadedness or weakness. Stakes mechnical  in nature   - Ambulated with a walker  - Social work consult  - PT consult  - Nutrition  - On tele but low suspicion for cardiac cause. Trop 36->36

## 2023-04-10 NOTE — H&P ADULT - PROBLEM SELECTOR PLAN 2
On 3L NC at home   - No wheezing on exam  - Continue home Symbicort, nebs prn. - Unclear etiology of SOB. Patient with stable  ABG. CXR small pleural effusion. BNP 5000  - Continue home oxygen and inhalers.

## 2023-04-10 NOTE — ED PROVIDER NOTE - PHYSICAL EXAMINATION
General: NAD  HEENT: NCAT.   Cardiac: RRR, 2+ radial pulses  Chest: CTA  Abdomen: soft, non-distended, no ttp, no rebound or guarding  Extremities: no peripheral edema, calf tenderness, or leg size discrepancies. Venous stasis bilaterally in lower extremities.   Skin: no rashes  Neuro: AAOx3, motor and sensory grossly intact.   Psych: mood and affect appropriate

## 2023-04-10 NOTE — H&P ADULT - HISTORY OF PRESENT ILLNESS
88 year old female with pmh of HTN, DM2, COPD (O2 dependent 2-3L,) Chronic diastolic HF, hypothyroidism, CKD3, and hx of hyperkalemia BIBEMS after family called for wellness check. Vitals wnl. Afebrile. Unremarkable physical exam. Will work up for sob and weakness with labs, ekg, cxr. Will attempt reaching family for collateral. Dispo pending reassessment and labspt's son Zachariah [phone]: he was the one who called EMS; he states he called b/c he was not able to get in touch with his mother over the phone. He states pt was fine after she was discharged from the hospital early Feb. However, she has progressively been getting more weak over the past few weeks. Now she is having a hard time doing basic ADL's including bathroom, showering, cleaning, cooking, eating, getting around the house. Pt has a HHA for 4 hrs/day, 6days/week. He believes pt should be in an assisted living facility at this point 88 year old female with pmh of HTN, DM2, COPD (O2 dependent 2-3L,) Chronic diastolic HF, hypothyroidism, CKD3, and hx of hyperkalemia presents after family called EMS for wellness check. Patient is AOx  - [facility], April 10, 2023, and states that she is in the hospital because she is not feeling well.  Patient reports that brother Zachariah or NOEMI  called EMS because he was concerned.  [phone]: he was the one who called EMS; he states he called b/c he was not able to get in touch with his mother over the phone. Of note, patient was  discharged from the hospital early February with HHA after a fall and hyperkalemia. However, she has progressively been getting more weak over the past few weeks. Now she is having a hard time doing basic ADL's including bathroom, showering, cleaning, cooking, eating, getting around the house. Patient reports that she has not had eaten in the past two days because she has been unable to cook for herself. Additionally, she reports a fall but cannot recall the exact day.   Pt has a HHA for 4 hrs/day, 6days/week. Her brother believes pt should be in an assisted living facility at this point. However, patient reports that she likes her independence.   In ED, Patient not hypoxic on home oxygen setting. Work up notable for stable  ABG. CT head negative. CXR small pleural effusion. BNP 5000. Potassium hemolyzed Patient  admitted for FTT 88 year old female with pmh of HTN, DM2, COPD (O2 dependent 2-3L,) Chronic diastolic HF, hypothyroidism, CKD3, and hx of hyperkalemia presents after family called EMS for wellness check. Patient is AOx  - [facility], April 10, 2023, and states that she is in the hospital because she is not feeling well.  Patient reports that brother Zachariah or NOEMI  called EMS because he was concerned.  [phone]: he was the one who called EMS; he states he called b/c he was not able to get in touch with his mother over the phone. Of note, patient was  discharged from the hospital early February with HHA after a fall and hyperkalemia. Since leaving the hospital she has progressively been getting more weak over the past few weeks. Now she is having a hard time doing basic ADL's including bathroom, showering, cleaning, cooking, eating, getting around the house. Patient reports that she has not had eaten in the past two days because she has been unable to cook for herself. Additionally, she reports a fall but cannot recall the exact day. Denies hitting her head and ambulates with walker at baseline. States feeling SOB but denies chest pain. Cannot really elaborate on if it is lele at rest. Has difficulty getting inhaler. So cannot state if it provides relief. Denies wheezing. Pt has a HHA for 4 hrs/day, 6days/week. Her brother believes pt should be in an assisted living facility at this point. However, patient reports that she likes her independence.   In ED, Patient not hypoxic on home oxygen setting. Work up notable for stable  ABG. CT head negative. CXR small pleural effusion. BNP 5000. Potassium hemolyzed Patient  admitted for FTT

## 2023-04-10 NOTE — H&P ADULT - PROBLEM/PLAN-2
[facility]    The note from the fellow/resident was reviewed. I have personally interviewed and examined the patient. There were no additional findings with regards to the history or physical exam.    I agree with the assessment and plan of  Dr. Darci Paz       Swallow trigger was timely to mildly latent and laryngeal lift on palpation during swallowing trials was moderately decreased but felt to be grossly functional with some of the above modified food textures. Post prandial pharyngeal congestion and latent weak moist coughing were demonstrated with nectar thick liquids despite cues to employ compensatory swallowing maneuvers. NO behavioral aspiration signs demonstrated with honey thick liquids and pureed foods. DISPLAY PLAN FREE TEXT

## 2023-04-10 NOTE — H&P ADULT - NSHPSOCIALHISTORY_GEN_ALL_CORE
· Social History (marital status, living situation, occupation, and sexual history)	No tobacco, alcohol or illicit drug use  Lives alone   Ambulates with a walker

## 2023-04-10 NOTE — ED PROVIDER NOTE - CLINICAL SUMMARY MEDICAL DECISION MAKING FREE TEXT BOX
This is a 88 year old female with pmh of HTN, DM2, COPD (O2 dependent 2-3L,) Chronic diastolic HF, hypothyroidism, CKD3, and hx of hyperkalemia BIBEMS after family called for wellness check. Vitals wnl. Afebrile. Unremarkable physical exam. Will work up for sob and weakness with labs, ekg, cxr. Will attempt reaching family for collateral. Dispo pending reassessment and labs

## 2023-04-10 NOTE — ED PROVIDER NOTE - CARE PLAN
1 Principal Discharge DX:	Generalized weakness  Secondary Diagnosis:	FTT (failure to thrive) in adult

## 2023-04-10 NOTE — H&P ADULT - PROBLEM SELECTOR PLAN 5
Cr at baseline  - Complicated by hyperkalemia   - Continue to monitor   - Avoid nephrotoxic agents. - Continue metoprolol

## 2023-04-10 NOTE — PATIENT PROFILE ADULT - ARE SIGNIFICANT INDICATORS COMPLETE.
Yes Fluconazole Pregnancy And Lactation Text: This medication is Pregnancy Category C and it isn't know if it is safe during pregnancy. It is also excreted in breast milk.

## 2023-04-10 NOTE — ED PROVIDER NOTE - PROGRESS NOTE DETAILS
Fermin Albright, PGY1 - call attempted to family for collateral information. Tried x3 for Marie Plascencia [phone]. Was not able to leave voice mail after call ended as it does not direct to voice mail. Left voice mail for Zachariah Welsh [phone]. Left voice mail for call back. Fermin Albright, PGY1 - call attempted again at Julian Plascencia. No answer. Fermin Albright, PGY1 - call attempted. no answer. no option to leave voice mail. Ebenezer BIRMINGHAM (PGY-3)  received s/o from night team pending family contact. I called pt's emergency contact Marie but no answer at this time Ebenezer BIRMINGHAM (PGY-3)  I was able to get in touch with pt's son Zachariah [phone]: he was the one who called EMS; he states he called b/c he was not able to get in touch with his mother over the phone. He states pt was fine after she was discharged from the hospital early Feb. However, she has progressively been getting more weak over the past few weeks. Now she is having a hard time doing basic ADL's including bathroom, showering, cleaning, cooking, eating, getting around the house. Pt has a HHA for 4 hrs/day, 6days/week. He believes pt should be in an assisted living facility at this point. All quests answered. informed him that pt will be admitted. I reassessed pt who is resting comfortably and in NAD

## 2023-04-10 NOTE — H&P ADULT - NSHPPHYSICALEXAM_GEN_ALL_CORE
GENERAL: NAD  HEENT: EOMI, MMM, no oropharyngeal lesions or erythema appreciated  Pulm: normal work of breathing, CTABL  CV: RRR, S1&S2+, no m/r/g appreciated  ABDOMEN: soft, nt, nd, no hepatosplenomegaly  MSK: nl ROM  EXTREMITIES:  no appreciable edema in b/l LE  Neuro: A&Ox3, no focal deficits  SKIN: warm and dry, no visible rash GENERAL: Elderly woman in NAD  HEENT: EOMI, MMM, no oropharyngeal lesions or erythema appreciated  Pulm:  Patient 2-3L on NC; normal work of breathing, CTABL  CV: RRR, S1&S2+, no m/r/g appreciated  ABDOMEN: soft, nt, nd, no hepatosplenomegaly  MSK: nl ROM  EXTREMITIES:  Trace edema in b/l LE; erythema on shins   Neuro: A&Ox3, no focal deficits  SKIN: warm and dry, no visible rash

## 2023-04-10 NOTE — ED ADULT NURSE NOTE - NSIMPLEMENTINTERV_GEN_ALL_ED
Implemented All Fall with Harm Risk Interventions:  [place] to call system. Call bell, personal items and telephone within reach. Instruct patient to call for assistance. Room bathroom lighting operational. Non-slip footwear when patient is off stretcher. Physically safe environment: no spills, clutter or unnecessary equipment. Stretcher in lowest position, wheels locked, appropriate side rails in place. Provide visual cue, wrist band, yellow gown, etc. Monitor gait and stability. Monitor for mental status changes and reorient to person, place, and time. Review medications for side effects contributing to fall risk. Reinforce activity limits and safety measures with patient and family. Provide visual clues: red socks.

## 2023-04-10 NOTE — H&P ADULT - PROBLEM SELECTOR PLAN 6
Pt appears euvolemic    - Continue Metoprolol,   - per renal hold lasix  -Pt not on ACE/ARB? Cr at baseline  - Complicated by hyperkalemia   - Continue to monitor   - Avoid nephrotoxic agents.

## 2023-04-10 NOTE — H&P ADULT - PROBLEM SELECTOR PLAN 7
Pt appears euvolemic    - Continue Metoprolol,   - per renal hold lasix  -Pt not on ACE/ARB? Pt appears euvolemic    - Continue Metoprolol,

## 2023-04-10 NOTE — PATIENT PROFILE ADULT - FALL HARM RISK - HARM RISK INTERVENTIONS

## 2023-04-10 NOTE — H&P ADULT - NSHPLABSRESULTS_GEN_ALL_CORE
LABS:  CAPILLARY BLOOD GLUCOSE      POCT Blood Glucose.: 118 mg/dL (10 Apr 2023 07:17)  POCT Blood Glucose.: 122 mg/dL (2023 23:39)                            11.0   7.33  )-----------( 279      ( 10 Apr 2023 04:13 )             36.6     04-10    139  |  100  |  28<H>  ----------------------------<  128<H>  5.4<H>   |  26  |  1.22    Ca    9.0      10 Apr 2023 04:13    TPro  7.2  /  Alb  3.6  /  TBili  0.4  /  DBili  x   /  AST  23  /  ALT  8   /  AlkPhos  95  04-10          Urinalysis Basic - ( 10 Apr 2023 04:10 )    Color: Yellow / Appearance: Clear / S.019 / pH: x  Gluc: x / Ketone: Negative  / Bili: Negative / Urobili: <2 mg/dL   Blood: x / Protein: 300 mg/dL / Nitrite: Negative   Leuk Esterase: Negative / RBC: 0-2 /HPF / WBC 0-2 /HPF   Sq Epi: x / Non Sq Epi: x / Bacteria: Negative          RADIOLOGY & ADDITIONAL TESTS:    Telemetry Personally Reviewed:    ECG Personally Reviewed:    Imaging Personally Reviewed:    Imaging Reviewed:     Consultant(s) Notes Reviewed:      Care Discussed with Consultants/Other Providers:

## 2023-04-10 NOTE — PHARMACOTHERAPY INTERVENTION NOTE - COMMENTS
Medication history is incomplete. Outpatient medication review updated based on medication list from outpatient pharmacy. Medicine provider notified. If there are any questions, please call spectra [phone].

## 2023-04-10 NOTE — ED PROVIDER NOTE - ATTENDING CONTRIBUTION TO CARE
88-year-old female with a history of hypertension, diabetes, COPD (on home O2 2 to 3 L via nasal cannula), diastolic heart failure, hypothyroidism, CKD stage III BIBEMS for generalized weakness.  Per EMS report, 911 activated by family who called for a wellness check.  Patient herself is alert and oriented x2 (self and place) and offers no complaints on history.  She states she does not know why she is here.  Attempted to contact family for collateral with no response.  Patient states she lives at home by herself.  Ambulatory with a walker at baseline and patient states she is independent of ADLs.    Elderly female, lying comfortably in stretcher, eyes closed, answering questions, nontoxic.  AF/VSS.  NCAT no midline spinal tenderness (+)kyphosis.  Lungs cta bl.  Cards nl S1/S2, RRR, no MRG.  Abd soft ntnd.  No pedal edema or calf tenderness.     Patient brought in for ?weakness.  The possible FTT given age and patient baseline mentation, as she reportedly lives alone.  Unclear if this is more of a safety issue.  Will screen for underlying metabolic disturbance, infection, fluid overload/heart failure exacerbation given history.  No wheezing or hypoxia on exam to suggest COPD exacerbation.  No reported fall or injury, and exam is atraumatic.  Unclear if patient is at her baseline mentation, while she is answering questions appropriately she is not often cooperative and answers no to many questions.  Will obtain labs, x-ray, EKG, CT head, continue to attempt to obtain collateral, reassess.

## 2023-04-10 NOTE — ED PROVIDER NOTE - NS ED ROS FT
GENERAL: No fever, no chills  	EYES: No change in vision  	HEENT: No trouble swallowing or speaking  	CARDIAC: No chest pain  	PULMONARY: No cough, + SOB  	GI: No abdominal pain, no nausea, no vomiting, no diarrhea, no constipation  	: No changes in urination  	SKIN: No rashes  	NEURO: No headache, no numbness  	MSK: No visible bony deformity   Otherwise as HPI or negative.

## 2023-04-10 NOTE — H&P ADULT - ASSESSMENT
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     Hyperkalemia.   ·  Plan: -   Problem/Plan - 2:  ·  Problem: Fall in home.   ·  Plan: - Pt fell one week ago, reports head strike but no LOC. Denies any chest pain, palpitations, lightheadedness or weakness.   - Ambulated with a walker  - Possible vasovagal event as pt reports the fall occurred after using the bathroom  - No acute changes on CT head  - Fall precautions  - PT consult   - On tele but low suspicion for cardiac cause. Trop 32->31, will trend.         Problem/Plan - 5:  ·  Problem: Chronic diastolic congestive heart failure.   ·  Plan: -    Problem/Plan - 6:  ·  Problem: Stage 3 chronic kidney disease.   ·  Plan: -      88 year old female with pmh of HTN, DM2, COPD (O2 dependent 2-3L,) Chronic diastolic HF, hypothyroidism, CKD3, and hx of hyperkalemia brought in after family concerned about patient inability to care for herself.     #Medication reconciliation  - Does not recall home medications  - Emailed med rec pharmacist

## 2023-04-10 NOTE — H&P ADULT - PROBLEM SELECTOR PLAN 8
Patient with history of hyperkalemia   0  - On admission, K is 5.4 but hemolyzed. In setting of CKD, managed medically. Pt was not taking loklema at home  - Peaked T waves noted on EKG  - In ED pt given Calcium gluconate, Lasix, Insulin and Lokelma  - K elevated, lokelma given, nephrology consult appreciated,  -Needs dietary education  - Monitor on tele for now  -K not elevated today. A1c in AM   Insulin sliding scale

## 2023-04-10 NOTE — ED PROVIDER NOTE - OBJECTIVE STATEMENT
This is a 88 year old female with pmh of HTN, DM2, COPD (O2 dependent 2-3L,) Chronic diastolic HF, hypothyroidism, CKD3, and hx of hyperkalemia BIBEMS after family called for wellness check. Was found sitting in chair and appeared weak. According to triage note, she lives alone, appeared week and was short of breath. Patient denies any symptoms such as fever/chills, chest pain, abdominal pain, urinary sxs.

## 2023-04-10 NOTE — H&P ADULT - PROBLEM SELECTOR PLAN 9
Patient with history of hyperkalemia   0  - On admission, K is 5.4 but hemolyzed. In setting of CKD, managed medically. Pt was not taking loklema at home  - Peaked T waves noted on EKG  - In ED pt given Calcium gluconate, Lasix, Insulin and Lokelma  - K elevated, lokelma given, nephrology consult appreciated,  -Needs dietary education  - Monitor on tele for now  -K not elevated today. Patient with history of hyperkalemia     - On admission, K is 5.4 not hemolyzed. In setting of CKD, managed medically.   - In ED pt ordered for lokelma   - K elevated, lokelma given,   -Needs dietary education  - Monitor on tele for now Patient with history of hyperkalemia     - On admission, K is 5.4 mildly hemolyzed and repeat  not hemolyzed. In setting of CKD, managed medically.   - In ED pt ordered for lokelma   - K elevated, lokelma given,   -Needs dietary education  - Monitor on tele for now

## 2023-04-11 NOTE — PHYSICAL THERAPY INITIAL EVALUATION ADULT - ADDITIONAL COMMENTS
Pt lives in a house alone on the first floor; no steps to negotiate. Has home health aide 4hrs/day x 6 days/wk. Has 2-3L O2 at home.

## 2023-04-11 NOTE — PROGRESS NOTE ADULT - PROBLEM SELECTOR PLAN 1
Family concerned about patient's inability to care for self. States would benefit from assisted living facility.  Pt fell  around one week ago, reports head strike but no LOC. Denies any chest pain, palpitations, lightheadedness or weakness. Stakes mechnical  in nature   - Ambulated with a walker  - Social work consult  - PT consult  - Nutrition  - On tele but low suspicion for cardiac cause. Trop 36->36 s/p fall approx one week ago, reports head strike but no LOC. as per pt it was mechanical fall   at baseline she Ambulates with a walker  CT head: There is no CT evidence of acute intracranial hemorrhage, mass effect or midline shift.  Gray matter-white matter differentiation is grossly preserved. Moderate generalized cerebral volume loss and moderate periventricular   white matter hypoattenuation compatible chronic microvascular ischemic disease are stable from 01/28/2023.  trops 36     - PT recommends [facility]   - Social work consult  - Nutrition consulted and follow up recs

## 2023-04-11 NOTE — PROGRESS NOTE ADULT - SUBJECTIVE AND OBJECTIVE BOX
Patient is a 88y old  Female who presents with a chief complaint of Weakness (10 Apr 2023 10:37)      SUBJECTIVE / OVERNIGHT EVENTS:    No events overnight. This AM, patient without n/v/d/cp/sob.      MEDICATIONS  (STANDING):  budesonide 160 MICROgram(s)/formoterol 4.5 MICROgram(s) Inhaler 2 Puff(s) Inhalation two times a day  dextrose 5%. 1000 milliLiter(s) (100 mL/Hr) IV Continuous <Continuous>  dextrose 5%. 1000 milliLiter(s) (50 mL/Hr) IV Continuous <Continuous>  dextrose 50% Injectable 25 Gram(s) IV Push once  dextrose 50% Injectable 12.5 Gram(s) IV Push once  dextrose 50% Injectable 25 Gram(s) IV Push once  ferrous    sulfate 325 milliGRAM(s) Oral daily  furosemide    Tablet 20 milliGRAM(s) Oral daily  glucagon  Injectable 1 milliGRAM(s) IntraMuscular once  insulin lispro (ADMELOG) corrective regimen sliding scale   SubCutaneous three times a day before meals  insulin lispro (ADMELOG) corrective regimen sliding scale   SubCutaneous at bedtime  levothyroxine 112 MICROGram(s) Oral daily  metoprolol succinate ER 25 milliGRAM(s) Oral daily    MEDICATIONS  (PRN):  acetaminophen     Tablet .. 650 milliGRAM(s) Oral every 6 hours PRN Temp greater or equal to 38C (100.4F), Mild Pain (1 - 3)  albuterol    90 MICROgram(s) HFA Inhaler 2 Puff(s) Inhalation every 6 hours PRN Shortness of Breath and/or Wheezing  aluminum hydroxide/magnesium hydroxide/simethicone Suspension 30 milliLiter(s) Oral every 4 hours PRN Dyspepsia  dextrose Oral Gel 15 Gram(s) Oral once PRN Blood Glucose LESS THAN 70 milliGRAM(s)/deciliter  melatonin 3 milliGRAM(s) Oral at bedtime PRN Insomnia  ondansetron Injectable 4 milliGRAM(s) IV Push every 8 hours PRN Nausea and/or Vomiting      PHYSICAL EXAM:  T(C): 36.2 (23 @ 10:35), Max: 36.8 (04-10-23 @ 19:25)  HR: 63 (23 @ 10:35) (63 - 65)  BP: 111/50 (23 @ 10:35) (111/50 - 146/82)  RR: 18 (23 @ 10:35) (18 - 18)  SpO2: 97% (23 @ 10:35) (97% - 98%)  I&O's Summary    GENERAL: NAD, well-developed  HEAD:  Atraumatic, Normocephalic, MMM  CHEST/LUNG: No use of accessory muscles, CTAB, breathing non-labored  COR: RR, no mrcg  ABD: Soft, ND/NT, +BS  PSYCH: AAOx3  NEUROLOGY: CN II-XII grossly intact, moving all extremities  SKIN: No rashes or lesions  EXT: wwp, no cce    LABS:  CAPILLARY BLOOD GLUCOSE      POCT Blood Glucose.: 228 mg/dL (2023 11:32)  POCT Blood Glucose.: 230 mg/dL (2023 11:30)  POCT Blood Glucose.: 120 mg/dL (2023 07:16)  POCT Blood Glucose.: 92 mg/dL (10 Apr 2023 22:46)  POCT Blood Glucose.: 180 mg/dL (10 Apr 2023 17:10)                          10.6   7.12  )-----------( 249      ( 2023 06:43 )             34.8     04-11    141  |  100  |  36<H>  ----------------------------<  138<H>  4.4   |  31  |  1.37<H>    Ca    8.8      2023 06:43  Phos  3.7     -  Mg     1.90     -11    TPro  7.2  /  Alb  3.6  /  TBili  0.4  /  DBili  x   /  AST  23  /  ALT  8   /  AlkPhos  95  04-10          Urinalysis Basic - ( 10 Apr 2023 04:10 )    Color: Yellow / Appearance: Clear / S.019 / pH: x  Gluc: x / Ketone: Negative  / Bili: Negative / Urobili: <2 mg/dL   Blood: x / Protein: 300 mg/dL / Nitrite: Negative   Leuk Esterase: Negative / RBC: 0-2 /HPF / WBC 0-2 /HPF   Sq Epi: x / Non Sq Epi: x / Bacteria: Negative        Culture - Urine (collected 10 Apr 2023 02:06)  Source: Catheterized Catheterized  Final Report (2023 07:29):    No growth        RADIOLOGY & ADDITIONAL TESTS:    Telemetry Personally Reviewed -     Imaging Personally Reviewed -     Imaging Reviewed -     Consultant(s) Notes Reviewed -       Care Discussed with Consultants/Other Providers -  Patient is a 88y old  Female who presents with a chief complaint of Weakness (10 Apr 2023 10:37)      SUBJECTIVE / OVERNIGHT EVENTS:    No events overnight. This AM, patient without n/v/d/cp/sob.  Patient states she feels weak and does not feel like herself.     MEDICATIONS  (STANDING):  budesonide 160 MICROgram(s)/formoterol 4.5 MICROgram(s) Inhaler 2 Puff(s) Inhalation two times a day  dextrose 5%. 1000 milliLiter(s) (100 mL/Hr) IV Continuous <Continuous>  dextrose 5%. 1000 milliLiter(s) (50 mL/Hr) IV Continuous <Continuous>  dextrose 50% Injectable 25 Gram(s) IV Push once  dextrose 50% Injectable 12.5 Gram(s) IV Push once  dextrose 50% Injectable 25 Gram(s) IV Push once  ferrous    sulfate 325 milliGRAM(s) Oral daily  furosemide    Tablet 20 milliGRAM(s) Oral daily  glucagon  Injectable 1 milliGRAM(s) IntraMuscular once  insulin lispro (ADMELOG) corrective regimen sliding scale   SubCutaneous three times a day before meals  insulin lispro (ADMELOG) corrective regimen sliding scale   SubCutaneous at bedtime  levothyroxine 112 MICROGram(s) Oral daily  metoprolol succinate ER 25 milliGRAM(s) Oral daily    MEDICATIONS  (PRN):  acetaminophen     Tablet .. 650 milliGRAM(s) Oral every 6 hours PRN Temp greater or equal to 38C (100.4F), Mild Pain (1 - 3)  albuterol    90 MICROgram(s) HFA Inhaler 2 Puff(s) Inhalation every 6 hours PRN Shortness of Breath and/or Wheezing  aluminum hydroxide/magnesium hydroxide/simethicone Suspension 30 milliLiter(s) Oral every 4 hours PRN Dyspepsia  dextrose Oral Gel 15 Gram(s) Oral once PRN Blood Glucose LESS THAN 70 milliGRAM(s)/deciliter  melatonin 3 milliGRAM(s) Oral at bedtime PRN Insomnia  ondansetron Injectable 4 milliGRAM(s) IV Push every 8 hours PRN Nausea and/or Vomiting      PHYSICAL EXAM:  T(C): 36.2 (23 @ 10:35), Max: 36.8 (04-10-23 @ 19:25)  HR: 63 (23 @ 10:35) (63 - 65)  BP: 111/50 (23 @ 10:35) (111/50 - 146/82)  RR: 18 (23 @ 10:35) (18 - 18)  SpO2: 97% (23 @ 10:35) (97% - 98%)  I&O's Summary    GENERAL: NAD, elderly female resting in bed   HEAD:  Atraumatic, Normocephalic, MMM  CHEST/LUNG: No use of accessory muscles, CTAB, breathing non-labored  COR: RR, no mrcg  ABD: Soft, ND/NT, +BS  PSYCH: AAOx3  NEUROLOGY: CN II-XII grossly intact, moving all extremities  SKIN: No rashes or lesions  EXT: no LE edema noted b/L     LABS:  CAPILLARY BLOOD GLUCOSE      POCT Blood Glucose.: 228 mg/dL (2023 11:32)  POCT Blood Glucose.: 230 mg/dL (2023 11:30)  POCT Blood Glucose.: 120 mg/dL (2023 07:16)  POCT Blood Glucose.: 92 mg/dL (10 Apr 2023 22:46)  POCT Blood Glucose.: 180 mg/dL (10 Apr 2023 17:10)                          10.6   7.12  )-----------( 249      ( 2023 06:43 )             34.8         141  |  100  |  36<H>  ----------------------------<  138<H>  4.4   |  31  |  1.37<H>    Ca    8.8      2023 06:43  Phos  3.7       Mg     1.90         TPro  7.2  /  Alb  3.6  /  TBili  0.4  /  DBili  x   /  AST  23  /  ALT  8   /  AlkPhos  95  04-10          Urinalysis Basic - ( 10 Apr 2023 04:10 )    Color: Yellow / Appearance: Clear / S.019 / pH: x  Gluc: x / Ketone: Negative  / Bili: Negative / Urobili: <2 mg/dL   Blood: x / Protein: 300 mg/dL / Nitrite: Negative   Leuk Esterase: Negative / RBC: 0-2 /HPF / WBC 0-2 /HPF   Sq Epi: x / Non Sq Epi: x / Bacteria: Negative        Culture - Urine (collected 10 Apr 2023 02:06)  Source: Catheterized Catheterized  Final Report (2023 07:29):    No growth        RADIOLOGY & ADDITIONAL TESTS:    Telemetry Personally Reviewed - NSR     Imaging Personally Reviewed -     Imaging Reviewed -     Consultant(s) Notes Reviewed -       Care Discussed with Consultants/Other Providers -

## 2023-04-11 NOTE — PROGRESS NOTE ADULT - NSPROGADDITIONALINFOA_GEN_ALL_CORE
CC:  Tara GALICIA Toñitocorinne is here today for   Chief Complaint   Patient presents with   • Dizziness    Onset In November.   Medications: medications verified, no change  Refills needed today?  YES  Tobacco history: verified    PMD - Claudio Berry MD    Patient would like communication of their results via:    Cell Phone:     Telephone Information:   Mobile [phone]     Okay to leave a message containing results? Yes    Health Maintenance Due   Topic Date Due   • Breast Cancer Screening  06/29/2017     Patient is up to date, no discussion needed.         DVT ppx: Heparin   Dispo: [facility] once medically optimized

## 2023-04-11 NOTE — PROGRESS NOTE ADULT - PROBLEM SELECTOR PLAN 6
Cr at baseline  - Complicated by hyperkalemia   - Continue to monitor   - Avoid nephrotoxic agents. Cr at baseline and had hyperkalemia on admission  Cr 1.37    - Monitor BUN/Cr   - Avoid nephrotoxic agents

## 2023-04-11 NOTE — DIETITIAN INITIAL EVALUATION ADULT - ORAL INTAKE PTA/DIET HISTORY
Patient reports usual body weight 150lbs. Patient reports cooking and preparing meals by herself at home, has a home aide. Reports having difficulty obtaining groceries, would figure out how to order groceries online. Denies any financial difficulties in obtaining food PTA. Patient consumes a regular diet, denies any weight change or appetite change PTA, would consume Boost nutritional supplement occasionally. Patient has no known food allergies.  Patient reports usual body weight 150lbs. Patient reports cooking and preparing meals by herself at home, has a home aide. Reports having difficulty obtaining groceries, would figure out how to order groceries online. Denies any financial difficulties in obtaining food PTA. Patient consumes a regular diet, denies any weight change or appetite change, would consume Boost nutritional supplement occasionally prior to admission. Patient has no known food allergies.

## 2023-04-11 NOTE — PROGRESS NOTE ADULT - PROBLEM SELECTOR PLAN 3
On 3L NC at home   - No wheezing on exam  - Continue home Symbicort, nebs prn. Pt on On 3L NC at home and reports feeling sob on admission  Unclear etiology of SOB. CXR small pleural effusion. BNP 5000    - Continue O2 supplementation   - monitor spO2   - Continue home Symbicort, and Duoneb prn sob

## 2023-04-11 NOTE — DIETITIAN INITIAL EVALUATION ADULT - PERTINENT MEDS FT
MEDICATIONS  (STANDING):  budesonide 160 MICROgram(s)/formoterol 4.5 MICROgram(s) Inhaler 2 Puff(s) Inhalation two times a day  dextrose 5%. 1000 milliLiter(s) (100 mL/Hr) IV Continuous <Continuous>  dextrose 5%. 1000 milliLiter(s) (50 mL/Hr) IV Continuous <Continuous>  dextrose 50% Injectable 25 Gram(s) IV Push once  dextrose 50% Injectable 12.5 Gram(s) IV Push once  dextrose 50% Injectable 25 Gram(s) IV Push once  ferrous    sulfate 325 milliGRAM(s) Oral daily  furosemide    Tablet 20 milliGRAM(s) Oral daily  glucagon  Injectable 1 milliGRAM(s) IntraMuscular once  insulin lispro (ADMELOG) corrective regimen sliding scale   SubCutaneous three times a day before meals  insulin lispro (ADMELOG) corrective regimen sliding scale   SubCutaneous at bedtime  levothyroxine 112 MICROGram(s) Oral daily  metoprolol succinate ER 25 milliGRAM(s) Oral daily    MEDICATIONS  (PRN):  acetaminophen     Tablet .. 650 milliGRAM(s) Oral every 6 hours PRN Temp greater or equal to 38C (100.4F), Mild Pain (1 - 3)  albuterol    90 MICROgram(s) HFA Inhaler 2 Puff(s) Inhalation every 6 hours PRN Shortness of Breath and/or Wheezing  aluminum hydroxide/magnesium hydroxide/simethicone Suspension 30 milliLiter(s) Oral every 4 hours PRN Dyspepsia  dextrose Oral Gel 15 Gram(s) Oral once PRN Blood Glucose LESS THAN 70 milliGRAM(s)/deciliter  melatonin 3 milliGRAM(s) Oral at bedtime PRN Insomnia  ondansetron Injectable 4 milliGRAM(s) IV Push every 8 hours PRN Nausea and/or Vomiting

## 2023-04-11 NOTE — DIETITIAN INITIAL EVALUATION ADULT - ADD RECOMMEND
1. Recommend change diet to low sodium, no concentrated potassium. Diet consistency defer to team.   2. Recommend adding Nepro 8oz 1x/day (420kcal, 19gm protein) for nutrient support.   3. Social work consult to follow-up on food insecurity/food procurement.   4. Monitor weight, labs, po intake and tolerance, bowel movement, skin integrity.   5. Encourage PO intake and honor food preferences as able.

## 2023-04-11 NOTE — DIETITIAN INITIAL EVALUATION ADULT - FACTORS AFF FOOD INTAKE
difficulty with food procurement/preparation difficulty chewing/difficulty with food procurement/preparation

## 2023-04-11 NOTE — PROGRESS NOTE ADULT - PROBLEM SELECTOR PLAN 2
- Unclear etiology of SOB. Patient with stable  ABG. CXR small pleural effusion. BNP 5000  - Continue home oxygen and inhalers. admitted with shortness of breath   currently on home O2   CXR small pleural effusion. BNP 5000    - ordered TTE and will follow up  - ordered LE doppler   - continue O2 and duonebs prn   - Monitor spO2 admitted with shortness of breath   currently on home O2   CXR small pleural effusion. BNP 5000  D-dimer 390 and 880 is cutoff adjusted for age -> VTE unlikely   wells score 0-> low risk for PE    - ordered TTE and will follow up  - continue O2 and duonebs prn   - Monitor spO2  - continue to monitor

## 2023-04-11 NOTE — DIETITIAN INITIAL EVALUATION ADULT - OTHER INFO
88 year old female with pmh of HTN, DM2, COPD (O2 dependent 2-3L,) Chronic diastolic HF, hypothyroidism, CKD3, and hx of hyperkalemia brought in after family concerned about patient inability to care for herself, per chart.   Patient reports consuming ~51-75% of meals. Patient denies any difficulty swallowing/ chewing during visit, reports she has dentures with her, mentioned it is ill-fitted. As per swallow assessment dated 4/11/2023, patient refused bedside swallow assessment. Patient denies any nausea, vomiting, diarrhea, constipation during visit. Reports last bowel movement 4/11/2023.  As per H&P note dated 4/10/2023, patient was not able to cook for herself and did not eat for the past 2 days. Per [facility] HIE: weight history: 68kg(12/18/2022). Consistent with current weight: 68.7kg/151.4lbs (4/11/2023, per RN flow sheet). Noted patient is on furosemide, might cause weight change. Will continue to monitor weight trend. Noted XwR6u-8.9% (4/10/2023), indicates BG in good control. Noted elevated potassium-5.8(4/10), on Lokelma. RD provided nutrition education on potassium content of food, sodium free flavoring tips. Patient is receptive to information provided.  88 year old female with pmh of HTN, DM2, COPD (O2 dependent 2-3L,) Chronic diastolic HF, hypothyroidism, CKD3, and hx of hyperkalemia brought in after family concerned about patient inability to care for herself, per chart.   Patient reports consuming ~51-75% of meals. Patient denies any difficulty swallowing during visit, reports she has dentures with her, mentioned dentures are ill-fitted. As per swallow assessment dated 4/11/2023, patient refused bedside swallow assessment. Defer diet consistency to team. Patient denies any nausea, vomiting, diarrhea, constipation during visit. Reports last bowel movement 4/11/2023. As per H&P note dated 4/10/2023, patient was not able to cook for herself and did not eat for the past 2 days. Patient is at risk for malnutrition. Per [facility] HIE: weight history: 68kg(12/18/2022). Consistent with current weight: 68.7kg/151.4lbs (4/11/2023, per RN flow sheet). Noted patient is on furosemide, might cause weight change. Will continue to monitor weight trend. Noted SgN7l-3.9% (4/10/2023), indicates BG in good control. Noted elevated potassium-5.8(4/10), on Lokelma. RD provided nutrition education on potassium content of food, sodium free flavoring tips during visit. Patient is receptive to information provided.

## 2023-04-11 NOTE — PROGRESS NOTE ADULT - PROBLEM SELECTOR PLAN 7
Pt appears euvolemic    - Continue Metoprolol, Pt appears euvolemic     - Continue Metoprolol  - Follow up TTE

## 2023-04-11 NOTE — PROGRESS NOTE ADULT - ASSESSMENT
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   Admitted for further evaluation.

## 2023-04-11 NOTE — DIETITIAN INITIAL EVALUATION ADULT - NS FNS DIET ORDER
Diet, DASH/TLC:   Sodium & Cholesterol Restricted  Consistent Carbohydrate {No Snacks} (CSTCHO) (04-11-23 @ 16:40) [Active]

## 2023-04-11 NOTE — PHYSICAL THERAPY INITIAL EVALUATION ADULT - IMPAIRMENTS FOUND, PT EVAL
aerobic capacity/endurance/decreased midline orientation/ergonomics and body mechanics/gait, locomotion, and balance/gross motor/muscle strength/poor safety awareness/posture

## 2023-04-11 NOTE — DIETITIAN INITIAL EVALUATION ADULT - PERTINENT LABORATORY DATA
04-11    141  |  100  |  36<H>  ----------------------------<  138<H>  4.4   |  31  |  1.37<H>    Ca    8.8      11 Apr 2023 06:43  Phos  3.7     04-11  Mg     1.90     04-11    TPro  7.2  /  Alb  3.6  /  TBili  0.4  /  DBili  x   /  AST  23  /  ALT  8   /  AlkPhos  95  04-10  POCT Blood Glucose.: 228 mg/dL (04-11-23 @ 11:32)  A1C with Estimated Average Glucose Result: 5.9 % (04-10-23 @ 11:59)  A1C with Estimated Average Glucose Result: 5.9 % (12-19-22 @ 05:27)  A1C with Estimated Average Glucose Result: 6.0 % (09-14-22 @ 06:40)

## 2023-04-11 NOTE — PHYSICAL THERAPY INITIAL EVALUATION ADULT - PATIENT PROFILE REVIEW, REHAB EVAL
PT initial evaluation received and chart review completed. Pt agreeable to participate in PT evaluation. Pt cleared by JAYDE Mo./yes

## 2023-04-11 NOTE — PHYSICAL THERAPY INITIAL EVALUATION ADULT - GAIT DEVIATIONS NOTED, PT EVAL
forward flexed posture/decreased lucille/decreased velocity of limb motion/decreased step length/decreased stride length/decreased weight-shifting ability

## 2023-04-11 NOTE — PROGRESS NOTE ADULT - PROBLEM SELECTOR PLAN 9
Patient with history of hyperkalemia     - On admission, K is 5.4 mildly hemolyzed and repeat  not hemolyzed. In setting of CKD, managed medically.   - In ED pt ordered for lokelma   - K elevated, lokelma given,   -Needs dietary education  - Monitor on tele for now history of hyperkalemia. On admission, K is 5.4 mildly hemolyzed and repeat not hemolyzed  In setting of CKD, managed medically. s/p  lokelma   K 4.4 today     - monitor BMP daily  - Needs dietary education  - Monitor on tele for now

## 2023-04-11 NOTE — PHYSICAL THERAPY INITIAL EVALUATION ADULT - PERTINENT HX OF CURRENT PROBLEM, REHAB EVAL
Pt is an 88 year old female presenting after family called EMS for wellness check. Patient reports that brother called EMS because he was concerned because he was not able to get in touch with his mother over the phone. Of note, patient was discharged from the hospital early February after a fall and hyperkalemia. Since leaving the hospital, she has progressively been getting more weak and having a hard time doing basic ADL's. Pt reports she has also had a fall recently. CT head negative for acute findings. CXR small pleural effusion.

## 2023-04-11 NOTE — PHYSICAL THERAPY INITIAL EVALUATION ADULT - GENERAL OBSERVATIONS, REHAB EVAL
Upon entry, pt semi-supine in bed in NAD; + nasal cannula, + telemetry. HR 69 bpm SpO2 94% on 3L O2 via nasal cannula. Pt left seated on EOB with all tubes/lines intact, bed alarm on, call bell in reach and in NAD.

## 2023-04-12 NOTE — PROGRESS NOTE ADULT - PROBLEM SELECTOR PLAN 7
Pt appears euvolemic     - Continue Metoprolol  - Follow up TTE Pt appears euvolemic   TTE: calcified mitral leaflets with normal diastolic opening. Normal left ventricular internal dimensions and wall  thicknesses. Hyperdynamic left ventricle. Normal right ventricular size and function.    - Continue Metoprolol N/A

## 2023-04-12 NOTE — PROGRESS NOTE ADULT - PROBLEM SELECTOR PLAN 2
admitted with shortness of breath   currently on home O2   CXR small pleural effusion. BNP 5000  D-dimer 390 and 880 is cutoff adjusted for age -> VTE unlikely   wells score 0-> low risk for PE    - ordered TTE and will follow up  - continue O2 and duonebs prn   - Monitor spO2  - continue to monitor admitted with shortness of breath   currently on home O2   CXR small pleural effusion. BNP 5000  D-dimer 390 and 880 is cutoff adjusted for age -> VTE unlikely   wells score 0-> low risk for PE  TTE: calcified mitral leaflets with normal diastolic opening. Normal left ventricular internal dimensions and wall  thicknesses. Hyperdynamic left ventricle. Normal right ventricular size and function.      - continue O2 and duonebs prn   - Monitor spO2  - continue to monitor

## 2023-04-12 NOTE — PROGRESS NOTE ADULT - PROBLEM SELECTOR PLAN 6
Cr at baseline and had hyperkalemia on admission  Cr 1.37    - Monitor BUN/Cr   - Avoid nephrotoxic agents Cr at baseline and had hyperkalemia on admission  Cr 1.37-> 1.45    - Monitor BUN/Cr   - Avoid nephrotoxic agents

## 2023-04-12 NOTE — PROGRESS NOTE ADULT - PROBLEM SELECTOR PLAN 9
history of hyperkalemia. On admission, K is 5.4 mildly hemolyzed and repeat not hemolyzed  In setting of CKD, managed medically. s/p  lokelma   K 4.4 today     - monitor BMP daily  - Needs dietary education  - Monitor on tele for now history of hyperkalemia. On admission, K is 5.4 mildly hemolyzed and repeat not hemolyzed  In setting of CKD, managed medically. s/p lokelma   K 4.4 today     - monitor BMP daily  - Needs dietary education  - Monitor on tele for now

## 2023-04-12 NOTE — PROGRESS NOTE ADULT - ASSESSMENT
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   Admitted for further evaluation.

## 2023-04-12 NOTE — PROGRESS NOTE ADULT - PROBLEM SELECTOR PLAN 3
Pt on On 3L NC at home and reports feeling sob on admission  Unclear etiology of SOB. CXR small pleural effusion. BNP 5000    - Continue O2 supplementation   - monitor spO2   - Continue home Symbicort, and Duoneb prn sob

## 2023-04-12 NOTE — PROGRESS NOTE ADULT - SUBJECTIVE AND OBJECTIVE BOX
Patient is a 88y old  Female who presents with a chief complaint of Weakness (11 Apr 2023 14:26)      SUBJECTIVE / OVERNIGHT EVENTS:    No events overnight. This AM, patient without n/v/d/cp/sob.      MEDICATIONS  (STANDING):  budesonide 160 MICROgram(s)/formoterol 4.5 MICROgram(s) Inhaler 2 Puff(s) Inhalation two times a day  busPIRone 10 milliGRAM(s) Oral two times a day  dextrose 5%. 1000 milliLiter(s) (100 mL/Hr) IV Continuous <Continuous>  dextrose 5%. 1000 milliLiter(s) (50 mL/Hr) IV Continuous <Continuous>  dextrose 50% Injectable 25 Gram(s) IV Push once  dextrose 50% Injectable 12.5 Gram(s) IV Push once  dextrose 50% Injectable 25 Gram(s) IV Push once  ferrous    sulfate 325 milliGRAM(s) Oral daily  furosemide    Tablet 20 milliGRAM(s) Oral daily  glucagon  Injectable 1 milliGRAM(s) IntraMuscular once  heparin   Injectable 5000 Unit(s) SubCutaneous every 8 hours  insulin lispro (ADMELOG) corrective regimen sliding scale   SubCutaneous three times a day before meals  insulin lispro (ADMELOG) corrective regimen sliding scale   SubCutaneous at bedtime  levothyroxine 112 MICROGram(s) Oral daily  metoprolol succinate ER 25 milliGRAM(s) Oral daily  montelukast 10 milliGRAM(s) Oral daily    MEDICATIONS  (PRN):  acetaminophen     Tablet .. 650 milliGRAM(s) Oral every 6 hours PRN Temp greater or equal to 38C (100.4F), Mild Pain (1 - 3)  albuterol    90 MICROgram(s) HFA Inhaler 2 Puff(s) Inhalation every 6 hours PRN Shortness of Breath and/or Wheezing  aluminum hydroxide/magnesium hydroxide/simethicone Suspension 30 milliLiter(s) Oral every 4 hours PRN Dyspepsia  dextrose Oral Gel 15 Gram(s) Oral once PRN Blood Glucose LESS THAN 70 milliGRAM(s)/deciliter  melatonin 3 milliGRAM(s) Oral at bedtime PRN Insomnia  ondansetron Injectable 4 milliGRAM(s) IV Push every 8 hours PRN Nausea and/or Vomiting      PHYSICAL EXAM:  T(C): 36.8 (04-12-23 @ 10:50), Max: 37 (04-11-23 @ 22:10)  HR: 64 (04-12-23 @ 10:50) (61 - 67)  BP: 134/54 (04-12-23 @ 10:50) (130/56 - 143/61)  RR: 18 (04-12-23 @ 10:50) (17 - 18)  SpO2: 97% (04-12-23 @ 10:50) (95% - 100%)  I&O's Summary    12 Apr 2023 07:01  -  12 Apr 2023 16:14  --------------------------------------------------------  IN: 0 mL / OUT: 650 mL / NET: -650 mL      GENERAL: NAD, well-developed  HEAD:  Atraumatic, Normocephalic, MMM  CHEST/LUNG: No use of accessory muscles, CTAB, breathing non-labored  COR: RR, no mrcg  ABD: Soft, ND/NT, +BS  PSYCH: AAOx3  NEUROLOGY: CN II-XII grossly intact, moving all extremities  SKIN: No rashes or lesions  EXT: wwp, no cce    LABS:  CAPILLARY BLOOD GLUCOSE      POCT Blood Glucose.: 189 mg/dL (12 Apr 2023 11:19)  POCT Blood Glucose.: 149 mg/dL (12 Apr 2023 07:50)  POCT Blood Glucose.: 181 mg/dL (11 Apr 2023 22:12)  POCT Blood Glucose.: 94 mg/dL (11 Apr 2023 16:49)                          10.5   7.05  )-----------( 285      ( 12 Apr 2023 05:50 )             34.9     04-12    140  |  100  |  30<H>  ----------------------------<  137<H>  4.4   |  31  |  1.45<H>    Ca    9.0      12 Apr 2023 05:50  Phos  3.1     04-12  Mg     1.80     04-12                Culture - Urine (collected 10 Apr 2023 02:06)  Source: Catheterized Catheterized  Final Report (11 Apr 2023 07:29):    No growth        RADIOLOGY & ADDITIONAL TESTS:    Telemetry Personally Reviewed -     Imaging Personally Reviewed -     Imaging Reviewed -     Consultant(s) Notes Reviewed -       Care Discussed with Consultants/Other Providers -  Patient is a 88y old  Female who presents with a chief complaint of Weakness (11 Apr 2023 14:26)      SUBJECTIVE / OVERNIGHT EVENTS:    No events overnight. This AM, patient without n/v/d/cp/sob.  Patient states she feels better and  agrees she needs to go to rehab.     MEDICATIONS  (STANDING):  budesonide 160 MICROgram(s)/formoterol 4.5 MICROgram(s) Inhaler 2 Puff(s) Inhalation two times a day  busPIRone 10 milliGRAM(s) Oral two times a day  dextrose 5%. 1000 milliLiter(s) (100 mL/Hr) IV Continuous <Continuous>  dextrose 5%. 1000 milliLiter(s) (50 mL/Hr) IV Continuous <Continuous>  dextrose 50% Injectable 25 Gram(s) IV Push once  dextrose 50% Injectable 12.5 Gram(s) IV Push once  dextrose 50% Injectable 25 Gram(s) IV Push once  ferrous    sulfate 325 milliGRAM(s) Oral daily  furosemide    Tablet 20 milliGRAM(s) Oral daily  glucagon  Injectable 1 milliGRAM(s) IntraMuscular once  heparin   Injectable 5000 Unit(s) SubCutaneous every 8 hours  insulin lispro (ADMELOG) corrective regimen sliding scale   SubCutaneous three times a day before meals  insulin lispro (ADMELOG) corrective regimen sliding scale   SubCutaneous at bedtime  levothyroxine 112 MICROGram(s) Oral daily  metoprolol succinate ER 25 milliGRAM(s) Oral daily  montelukast 10 milliGRAM(s) Oral daily    MEDICATIONS  (PRN):  acetaminophen     Tablet .. 650 milliGRAM(s) Oral every 6 hours PRN Temp greater or equal to 38C (100.4F), Mild Pain (1 - 3)  albuterol    90 MICROgram(s) HFA Inhaler 2 Puff(s) Inhalation every 6 hours PRN Shortness of Breath and/or Wheezing  aluminum hydroxide/magnesium hydroxide/simethicone Suspension 30 milliLiter(s) Oral every 4 hours PRN Dyspepsia  dextrose Oral Gel 15 Gram(s) Oral once PRN Blood Glucose LESS THAN 70 milliGRAM(s)/deciliter  melatonin 3 milliGRAM(s) Oral at bedtime PRN Insomnia  ondansetron Injectable 4 milliGRAM(s) IV Push every 8 hours PRN Nausea and/or Vomiting      PHYSICAL EXAM:  T(C): 36.8 (04-12-23 @ 10:50), Max: 37 (04-11-23 @ 22:10)  HR: 64 (04-12-23 @ 10:50) (61 - 67)  BP: 134/54 (04-12-23 @ 10:50) (130/56 - 143/61)  RR: 18 (04-12-23 @ 10:50) (17 - 18)  SpO2: 97% (04-12-23 @ 10:50) (95% - 100%)  I&O's Summary    12 Apr 2023 07:01  -  12 Apr 2023 16:14  --------------------------------------------------------  IN: 0 mL / OUT: 650 mL / NET: -650 mL      GENERAL: NAD, elderly female  HEAD:  Atraumatic, Normocephalic, MMM  CHEST/LUNG: No use of accessory muscles, CTAB, breathing non-labored  COR: RR, no mrcg  ABD: Soft, ND/NT, +BS  PSYCH: AAOx3  NEUROLOGY: CN II-XII grossly intact, moving all extremities  SKIN: No rashes or lesions  EXT: no LE edema noted B/L     LABS:  CAPILLARY BLOOD GLUCOSE      POCT Blood Glucose.: 189 mg/dL (12 Apr 2023 11:19)  POCT Blood Glucose.: 149 mg/dL (12 Apr 2023 07:50)  POCT Blood Glucose.: 181 mg/dL (11 Apr 2023 22:12)  POCT Blood Glucose.: 94 mg/dL (11 Apr 2023 16:49)                          10.5   7.05  )-----------( 285      ( 12 Apr 2023 05:50 )             34.9     04-12    140  |  100  |  30<H>  ----------------------------<  137<H>  4.4   |  31  |  1.45<H>    Ca    9.0      12 Apr 2023 05:50  Phos  3.1     04-12  Mg     1.80     04-12                Culture - Urine (collected 10 Apr 2023 02:06)  Source: Catheterized Catheterized  Final Report (11 Apr 2023 07:29):    No growth        RADIOLOGY & ADDITIONAL TESTS:    Telemetry Personally Reviewed - NSR    Imaging Personally Reviewed -     Imaging Reviewed -     Consultant(s) Notes Reviewed -       Care Discussed with Consultants/Other Providers -

## 2023-04-12 NOTE — PROGRESS NOTE ADULT - PROBLEM SELECTOR PLAN 1
s/p fall approx one week ago, reports head strike but no LOC. as per pt it was mechanical fall   at baseline she Ambulates with a walker  CT head: There is no CT evidence of acute intracranial hemorrhage, mass effect or midline shift.  Gray matter-white matter differentiation is grossly preserved. Moderate generalized cerebral volume loss and moderate periventricular   white matter hypoattenuation compatible chronic microvascular ischemic disease are stable from 01/28/2023.  trops 36     - PT recommends MEGAN   - Social work consult  - Nutrition consulted and follow up recs

## 2023-04-13 NOTE — PROGRESS NOTE ADULT - PROBLEM SELECTOR PLAN 5
Sunshine with [facility] notified and expressed understanding.  Orders placed in Epic.   - Continue metoprolol  - DASH diet   - Monitor BP

## 2023-04-13 NOTE — PROGRESS NOTE ADULT - PROBLEM SELECTOR PLAN 6
Cr at baseline and had hyperkalemia on admission  Cr 1.37-> 1.45    - Monitor BUN/Cr   - Avoid nephrotoxic agents

## 2023-04-13 NOTE — PROGRESS NOTE ADULT - PROBLEM SELECTOR PLAN 4
Problem: Safety  Goal: Will remain free from injury  Outcome: PROGRESSING AS EXPECTED  Educated patient on fall precautions, bed low and locked, call light within reach, fall precautions in place.     Problem: Knowledge Deficit  Goal: Knowledge of disease process/condition, treatment plan, diagnostic tests, and medications will improve  Outcome: PROGRESSING AS EXPECTED  Educated patient on disease process and POC as it related to disease process. Made patient aware of POC and corresponding medications.       - Continue Synthroid

## 2023-04-13 NOTE — PROGRESS NOTE ADULT - PROBLEM SELECTOR PLAN 9
history of hyperkalemia. On admission, K is 5.4 mildly hemolyzed and repeat not hemolyzed  In setting of CKD, managed medically. s/p lokelma   K 4.4 today     - monitor BMP daily  - Needs dietary education  - Monitor on tele for now history of hyperkalemia. On admission, K is 5.4 mildly hemolyzed and repeat not hemolyzed  In setting of CKD, managed medically. s/p lokelma   K 4.4 today     - monitor BMP daily  - dietary education  - Monitor on tele for now

## 2023-04-13 NOTE — PROGRESS NOTE ADULT - SUBJECTIVE AND OBJECTIVE BOX
Patient is a 88y old  Female who presents with a chief complaint of Weakness (12 Apr 2023 16:14)      SUBJECTIVE / OVERNIGHT EVENTS:    No events overnight. This AM, patient without n/v/d/cp/sob.      MEDICATIONS  (STANDING):  budesonide 160 MICROgram(s)/formoterol 4.5 MICROgram(s) Inhaler 2 Puff(s) Inhalation two times a day  busPIRone 10 milliGRAM(s) Oral two times a day  dextrose 5%. 1000 milliLiter(s) (100 mL/Hr) IV Continuous <Continuous>  dextrose 5%. 1000 milliLiter(s) (50 mL/Hr) IV Continuous <Continuous>  dextrose 50% Injectable 25 Gram(s) IV Push once  dextrose 50% Injectable 12.5 Gram(s) IV Push once  dextrose 50% Injectable 25 Gram(s) IV Push once  ferrous    sulfate 325 milliGRAM(s) Oral daily  furosemide    Tablet 20 milliGRAM(s) Oral daily  glucagon  Injectable 1 milliGRAM(s) IntraMuscular once  heparin   Injectable 5000 Unit(s) SubCutaneous every 8 hours  insulin lispro (ADMELOG) corrective regimen sliding scale   SubCutaneous three times a day before meals  insulin lispro (ADMELOG) corrective regimen sliding scale   SubCutaneous at bedtime  levothyroxine 112 MICROGram(s) Oral daily  metoprolol succinate ER 25 milliGRAM(s) Oral daily  montelukast 10 milliGRAM(s) Oral daily    MEDICATIONS  (PRN):  acetaminophen     Tablet .. 650 milliGRAM(s) Oral every 6 hours PRN Temp greater or equal to 38C (100.4F), Mild Pain (1 - 3)  albuterol    90 MICROgram(s) HFA Inhaler 2 Puff(s) Inhalation every 6 hours PRN Shortness of Breath and/or Wheezing  aluminum hydroxide/magnesium hydroxide/simethicone Suspension 30 milliLiter(s) Oral every 4 hours PRN Dyspepsia  dextrose Oral Gel 15 Gram(s) Oral once PRN Blood Glucose LESS THAN 70 milliGRAM(s)/deciliter  melatonin 3 milliGRAM(s) Oral at bedtime PRN Insomnia  ondansetron Injectable 4 milliGRAM(s) IV Push every 8 hours PRN Nausea and/or Vomiting      PHYSICAL EXAM:  T(C): 37.6 (04-13-23 @ 10:50), Max: 37.6 (04-13-23 @ 10:50)  HR: 52 (04-13-23 @ 10:50) (52 - 65)  BP: 128/53 (04-13-23 @ 10:50) (128/53 - 149/60)  RR: 18 (04-13-23 @ 10:50) (18 - 19)  SpO2: 97% (04-13-23 @ 10:50) (97% - 99%)  I&O's Summary    12 Apr 2023 07:01  -  13 Apr 2023 07:00  --------------------------------------------------------  IN: 350 mL / OUT: 1250 mL / NET: -900 mL    13 Apr 2023 07:01  -  13 Apr 2023 14:42  --------------------------------------------------------  IN: 0 mL / OUT: 300 mL / NET: -300 mL      GENERAL: NAD, well-developed  HEAD:  Atraumatic, Normocephalic, MMM  CHEST/LUNG: No use of accessory muscles, CTAB, breathing non-labored  COR: RR, no mrcg  ABD: Soft, ND/NT, +BS  PSYCH: AAOx3  NEUROLOGY: CN II-XII grossly intact, moving all extremities  SKIN: No rashes or lesions  EXT: wwp, no cce    LABS:  CAPILLARY BLOOD GLUCOSE      POCT Blood Glucose.: 199 mg/dL (13 Apr 2023 11:25)  POCT Blood Glucose.: 236 mg/dL (13 Apr 2023 11:24)  POCT Blood Glucose.: 142 mg/dL (13 Apr 2023 07:14)  POCT Blood Glucose.: 134 mg/dL (12 Apr 2023 22:30)  POCT Blood Glucose.: 140 mg/dL (12 Apr 2023 16:27)                          10.1   6.05  )-----------( 250      ( 13 Apr 2023 06:03 )             33.7     04-13    140  |  99  |  36<H>  ----------------------------<  142<H>  4.7   |  32<H>  |  1.37<H>    Ca    9.0      13 Apr 2023 06:03  Phos  3.0     04-13  Mg     1.90     04-13    TPro  6.6  /  Alb  3.3  /  TBili  0.2  /  DBili  x   /  AST  15  /  ALT  6   /  AlkPhos  81  04-13                RADIOLOGY & ADDITIONAL TESTS:    Telemetry Personally Reviewed -     Imaging Personally Reviewed -     Imaging Reviewed -     Consultant(s) Notes Reviewed -       Care Discussed with Consultants/Other Providers -  Patient is a 88y old  Female who presents with a chief complaint of Weakness (12 Apr 2023 16:14)      SUBJECTIVE / OVERNIGHT EVENTS:    No events overnight. This AM, patient without n/v/d/cp/sob.  pt reports feeling weak and states she wants to go home     MEDICATIONS  (STANDING):  budesonide 160 MICROgram(s)/formoterol 4.5 MICROgram(s) Inhaler 2 Puff(s) Inhalation two times a day  busPIRone 10 milliGRAM(s) Oral two times a day  dextrose 5%. 1000 milliLiter(s) (100 mL/Hr) IV Continuous <Continuous>  dextrose 5%. 1000 milliLiter(s) (50 mL/Hr) IV Continuous <Continuous>  dextrose 50% Injectable 25 Gram(s) IV Push once  dextrose 50% Injectable 12.5 Gram(s) IV Push once  dextrose 50% Injectable 25 Gram(s) IV Push once  ferrous    sulfate 325 milliGRAM(s) Oral daily  furosemide    Tablet 20 milliGRAM(s) Oral daily  glucagon  Injectable 1 milliGRAM(s) IntraMuscular once  heparin   Injectable 5000 Unit(s) SubCutaneous every 8 hours  insulin lispro (ADMELOG) corrective regimen sliding scale   SubCutaneous three times a day before meals  insulin lispro (ADMELOG) corrective regimen sliding scale   SubCutaneous at bedtime  levothyroxine 112 MICROGram(s) Oral daily  metoprolol succinate ER 25 milliGRAM(s) Oral daily  montelukast 10 milliGRAM(s) Oral daily    MEDICATIONS  (PRN):  acetaminophen     Tablet .. 650 milliGRAM(s) Oral every 6 hours PRN Temp greater or equal to 38C (100.4F), Mild Pain (1 - 3)  albuterol    90 MICROgram(s) HFA Inhaler 2 Puff(s) Inhalation every 6 hours PRN Shortness of Breath and/or Wheezing  aluminum hydroxide/magnesium hydroxide/simethicone Suspension 30 milliLiter(s) Oral every 4 hours PRN Dyspepsia  dextrose Oral Gel 15 Gram(s) Oral once PRN Blood Glucose LESS THAN 70 milliGRAM(s)/deciliter  melatonin 3 milliGRAM(s) Oral at bedtime PRN Insomnia  ondansetron Injectable 4 milliGRAM(s) IV Push every 8 hours PRN Nausea and/or Vomiting      PHYSICAL EXAM:  T(C): 37.6 (04-13-23 @ 10:50), Max: 37.6 (04-13-23 @ 10:50)  HR: 52 (04-13-23 @ 10:50) (52 - 65)  BP: 128/53 (04-13-23 @ 10:50) (128/53 - 149/60)  RR: 18 (04-13-23 @ 10:50) (18 - 19)  SpO2: 97% (04-13-23 @ 10:50) (97% - 99%)  I&O's Summary    12 Apr 2023 07:01  -  13 Apr 2023 07:00  --------------------------------------------------------  IN: 350 mL / OUT: 1250 mL / NET: -900 mL    13 Apr 2023 07:01  -  13 Apr 2023 14:42  --------------------------------------------------------  IN: 0 mL / OUT: 300 mL / NET: -300 mL      GENERAL: NAD, elderly female  HEAD:  Atraumatic, Normocephalic, MMM  CHEST/LUNG: No use of accessory muscles, CTAB, breathing non-labored  COR: RR, no mrcg  ABD: Soft, ND/NT, +BS  PSYCH: AAOx3  NEUROLOGY: CN II-XII grossly intact, moving all extremities  SKIN: No rashes or lesions  EXT: no LE edema noted B/L       LABS:  CAPILLARY BLOOD GLUCOSE      POCT Blood Glucose.: 199 mg/dL (13 Apr 2023 11:25)  POCT Blood Glucose.: 236 mg/dL (13 Apr 2023 11:24)  POCT Blood Glucose.: 142 mg/dL (13 Apr 2023 07:14)  POCT Blood Glucose.: 134 mg/dL (12 Apr 2023 22:30)  POCT Blood Glucose.: 140 mg/dL (12 Apr 2023 16:27)                          10.1   6.05  )-----------( 250      ( 13 Apr 2023 06:03 )             33.7     04-13    140  |  99  |  36<H>  ----------------------------<  142<H>  4.7   |  32<H>  |  1.37<H>    Ca    9.0      13 Apr 2023 06:03  Phos  3.0     04-13  Mg     1.90     04-13    TPro  6.6  /  Alb  3.3  /  TBili  0.2  /  DBili  x   /  AST  15  /  ALT  6   /  AlkPhos  81  04-13                RADIOLOGY & ADDITIONAL TESTS:      Imaging Personally Reviewed -     Imaging Reviewed -     Consultant(s) Notes Reviewed -       Care Discussed with Consultants/Other Providers -

## 2023-04-13 NOTE — PROGRESS NOTE ADULT - PROBLEM SELECTOR PLAN 2
admitted with shortness of breath   currently on home O2   CXR small pleural effusion. BNP 5000  D-dimer 390 and 880 is cutoff adjusted for age -> VTE unlikely   wells score 0-> low risk for PE  TTE: calcified mitral leaflets with normal diastolic opening. Normal left ventricular internal dimensions and wall  thicknesses. Hyperdynamic left ventricle. Normal right ventricular size and function.      - continue O2 and duonebs prn   - Monitor spO2  - continue to monitor

## 2023-04-13 NOTE — PROGRESS NOTE ADULT - PROBLEM SELECTOR PLAN 7
Pt appears euvolemic   TTE: calcified mitral leaflets with normal diastolic opening. Normal left ventricular internal dimensions and wall  thicknesses. Hyperdynamic left ventricle. Normal right ventricular size and function.    - Continue Metoprolol

## 2023-04-13 NOTE — PROGRESS NOTE ADULT - PROBLEM SELECTOR PLAN 3
Pt on On 3L NC at home and reports feeling sob on admission  Unclear etiology of SOB. CXR small pleural effusion. BNP 5000    - Continue O2 supplementation   - monitor spO2   - Continue home Symbicort, and Duoneb prn sob Pt on On 3L NC at home and reports feeling sob on admission  Unclear etiology of SOB. CXR small pleural effusion. BNP 5000  VBG showing hypercarbia     - will trial on BIPAP today and follow up VBG in am   - Continue O2 supplementation   - monitor spO2   - Continue home Symbicort, and Duoneb prn sob

## 2023-04-14 NOTE — PROGRESS NOTE ADULT - PROBLEM SELECTOR PLAN 7
Pt appears euvolemic   TTE: calcified mitral leaflets with normal diastolic opening. Normal left ventricular internal dimensions and wall  thicknesses. Hyperdynamic left ventricle. Normal right ventricular size and function.    - Continue Metoprolol Pt appears euvolemic   TTE: calcified mitral leaflets with normal diastolic opening. Normal left ventricular internal dimensions and wall  thicknesses. Hyperdynamic left ventricle. Normal right ventricular size and function.    - Continue Metoprolol  - Daily weight, Is/Os

## 2023-04-14 NOTE — PROGRESS NOTE ADULT - PROBLEM SELECTOR PLAN 3
Pt on On 3L NC at home and reports feeling sob on admission  Unclear etiology of SOB. CXR small pleural effusion. BNP 5000  VBG showing hypercarbia     - will trial on BIPAP today and follow up VBG in am   - Continue O2 supplementation   - monitor spO2   - Continue home Symbicort, and Duoneb prn sob Pt on On 3L NC at home and reports feeling sob on admission  Unclear etiology of SOB. CXR small pleural effusion. BNP 5000  VBG showing elevated pCO2     - will trial on BiPAP today and follow up VBG in am   - Continue O2 supplementation   - monitor spO2   - Continue home Symbicort, and Duoneb prn sob

## 2023-04-14 NOTE — PROGRESS NOTE ADULT - PROBLEM SELECTOR PLAN 9
history of hyperkalemia. On admission, K is 5.4 mildly hemolyzed and repeat not hemolyzed  In setting of CKD, managed medically. s/p lokelma   K 4.4 today     - monitor BMP daily  - dietary education  - Monitor on tele for now history of hyperkalemia. On admission, K is 5.4 mildly hemolyzed and repeat not hemolyzed  In setting of CKD, managed medically. s/p rosalva   K 5.1 today     - monitor BMP daily  - dietary education  - Monitor on tele for now

## 2023-04-14 NOTE — PROGRESS NOTE ADULT - PROBLEM SELECTOR PLAN 6
Cr at baseline and had hyperkalemia on admission  Cr 1.37-> 1.45    - Monitor BUN/Cr   - Avoid nephrotoxic agents Cr at baseline and had hyperkalemia on admission  Cr 1.37-> 1.45->1.37    - Monitor BUN/Cr   - Avoid nephrotoxic agents

## 2023-04-14 NOTE — PROGRESS NOTE ADULT - SUBJECTIVE AND OBJECTIVE BOX
Patient is a 88y old  Female who presents with a chief complaint of Weakness (13 Apr 2023 14:42)      SUBJECTIVE / OVERNIGHT EVENTS:    No events overnight. This AM, patient without n/v/d/cp/sob.      MEDICATIONS  (STANDING):  budesonide 160 MICROgram(s)/formoterol 4.5 MICROgram(s) Inhaler 2 Puff(s) Inhalation two times a day  busPIRone 10 milliGRAM(s) Oral two times a day  dextrose 5%. 1000 milliLiter(s) (50 mL/Hr) IV Continuous <Continuous>  dextrose 5%. 1000 milliLiter(s) (100 mL/Hr) IV Continuous <Continuous>  dextrose 50% Injectable 25 Gram(s) IV Push once  dextrose 50% Injectable 12.5 Gram(s) IV Push once  dextrose 50% Injectable 25 Gram(s) IV Push once  ferrous    sulfate 325 milliGRAM(s) Oral daily  furosemide    Tablet 20 milliGRAM(s) Oral daily  glucagon  Injectable 1 milliGRAM(s) IntraMuscular once  heparin   Injectable 5000 Unit(s) SubCutaneous every 8 hours  insulin lispro (ADMELOG) corrective regimen sliding scale   SubCutaneous three times a day before meals  insulin lispro (ADMELOG) corrective regimen sliding scale   SubCutaneous at bedtime  levothyroxine 112 MICROGram(s) Oral daily  metoprolol succinate ER 25 milliGRAM(s) Oral daily  montelukast 10 milliGRAM(s) Oral daily    MEDICATIONS  (PRN):  acetaminophen     Tablet .. 650 milliGRAM(s) Oral every 6 hours PRN Temp greater or equal to 38C (100.4F), Mild Pain (1 - 3)  albuterol    90 MICROgram(s) HFA Inhaler 2 Puff(s) Inhalation every 6 hours PRN Shortness of Breath and/or Wheezing  aluminum hydroxide/magnesium hydroxide/simethicone Suspension 30 milliLiter(s) Oral every 4 hours PRN Dyspepsia  dextrose Oral Gel 15 Gram(s) Oral once PRN Blood Glucose LESS THAN 70 milliGRAM(s)/deciliter  melatonin 3 milliGRAM(s) Oral at bedtime PRN Insomnia  ondansetron Injectable 4 milliGRAM(s) IV Push every 8 hours PRN Nausea and/or Vomiting      PHYSICAL EXAM:  T(C): 36.8 (04-14-23 @ 09:55), Max: 37.3 (04-13-23 @ 21:55)  HR: 64 (04-14-23 @ 11:20) (61 - 73)  BP: 154/59 (04-14-23 @ 09:55) (128/59 - 154/59)  RR: 18 (04-14-23 @ 09:55) (18 - 18)  SpO2: 95% (04-14-23 @ 11:20) (94% - 100%)  I&O's Summary    13 Apr 2023 07:01  -  14 Apr 2023 07:00  --------------------------------------------------------  IN: 0 mL / OUT: 300 mL / NET: -300 mL      GENERAL: NAD, well-developed  HEAD:  Atraumatic, Normocephalic, MMM  CHEST/LUNG: No use of accessory muscles, CTAB, breathing non-labored  COR: RR, no mrcg  ABD: Soft, ND/NT, +BS  PSYCH: AAOx3  NEUROLOGY: CN II-XII grossly intact, moving all extremities  SKIN: No rashes or lesions  EXT: wwp, no cce    LABS:  CAPILLARY BLOOD GLUCOSE      POCT Blood Glucose.: 143 mg/dL (14 Apr 2023 11:54)  POCT Blood Glucose.: 131 mg/dL (14 Apr 2023 07:20)  POCT Blood Glucose.: 148 mg/dL (13 Apr 2023 22:33)  POCT Blood Glucose.: 92 mg/dL (13 Apr 2023 16:42)                          9.8    6.75  )-----------( 284      ( 14 Apr 2023 07:02 )             32.6     04-14    138  |  97<L>  |  41<H>  ----------------------------<  144<H>  5.1   |  34<H>  |  1.37<H>    Ca    9.3      14 Apr 2023 07:02  Phos  3.0     04-13  Mg     1.90     04-13    TPro  6.6  /  Alb  3.3  /  TBili  0.2  /  DBili  x   /  AST  15  /  ALT  6   /  AlkPhos  81  04-13                RADIOLOGY & ADDITIONAL TESTS:    Telemetry Personally Reviewed -     Imaging Personally Reviewed -     Imaging Reviewed -     Consultant(s) Notes Reviewed -       Care Discussed with Consultants/Other Providers -  Patient is a 88y old  Female who presents with a chief complaint of Weakness (13 Apr 2023 14:42)      SUBJECTIVE / OVERNIGHT EVENTS:    No events overnight. This AM, patient without n/v/d/cp/sob. Patient reports feeling well and states she does not like the bipap mask but will give it a try later.     MEDICATIONS  (STANDING):  budesonide 160 MICROgram(s)/formoterol 4.5 MICROgram(s) Inhaler 2 Puff(s) Inhalation two times a day  busPIRone 10 milliGRAM(s) Oral two times a day  dextrose 5%. 1000 milliLiter(s) (50 mL/Hr) IV Continuous <Continuous>  dextrose 5%. 1000 milliLiter(s) (100 mL/Hr) IV Continuous <Continuous>  dextrose 50% Injectable 25 Gram(s) IV Push once  dextrose 50% Injectable 12.5 Gram(s) IV Push once  dextrose 50% Injectable 25 Gram(s) IV Push once  ferrous    sulfate 325 milliGRAM(s) Oral daily  furosemide    Tablet 20 milliGRAM(s) Oral daily  glucagon  Injectable 1 milliGRAM(s) IntraMuscular once  heparin   Injectable 5000 Unit(s) SubCutaneous every 8 hours  insulin lispro (ADMELOG) corrective regimen sliding scale   SubCutaneous three times a day before meals  insulin lispro (ADMELOG) corrective regimen sliding scale   SubCutaneous at bedtime  levothyroxine 112 MICROGram(s) Oral daily  metoprolol succinate ER 25 milliGRAM(s) Oral daily  montelukast 10 milliGRAM(s) Oral daily    MEDICATIONS  (PRN):  acetaminophen     Tablet .. 650 milliGRAM(s) Oral every 6 hours PRN Temp greater or equal to 38C (100.4F), Mild Pain (1 - 3)  albuterol    90 MICROgram(s) HFA Inhaler 2 Puff(s) Inhalation every 6 hours PRN Shortness of Breath and/or Wheezing  aluminum hydroxide/magnesium hydroxide/simethicone Suspension 30 milliLiter(s) Oral every 4 hours PRN Dyspepsia  dextrose Oral Gel 15 Gram(s) Oral once PRN Blood Glucose LESS THAN 70 milliGRAM(s)/deciliter  melatonin 3 milliGRAM(s) Oral at bedtime PRN Insomnia  ondansetron Injectable 4 milliGRAM(s) IV Push every 8 hours PRN Nausea and/or Vomiting      PHYSICAL EXAM:  T(C): 36.8 (04-14-23 @ 09:55), Max: 37.3 (04-13-23 @ 21:55)  HR: 64 (04-14-23 @ 11:20) (61 - 73)  BP: 154/59 (04-14-23 @ 09:55) (128/59 - 154/59)  RR: 18 (04-14-23 @ 09:55) (18 - 18)  SpO2: 95% (04-14-23 @ 11:20) (94% - 100%)  I&O's Summary    13 Apr 2023 07:01  -  14 Apr 2023 07:00  --------------------------------------------------------  IN: 0 mL / OUT: 300 mL / NET: -300 mL      GENERAL: NAD, elderly female  HEAD:  Atraumatic, Normocephalic, MMM  CHEST/LUNG: No use of accessory muscles, CTAB, breathing non-labored  COR: RR, no mrcg  ABD: Soft, ND/NT, +BS  PSYCH: AAOx3  NEUROLOGY: CN II-XII grossly intact, moving all extremities  SKIN: No rashes or lesions  EXT: no LE edema noted B/L       LABS:  CAPILLARY BLOOD GLUCOSE      POCT Blood Glucose.: 143 mg/dL (14 Apr 2023 11:54)  POCT Blood Glucose.: 131 mg/dL (14 Apr 2023 07:20)  POCT Blood Glucose.: 148 mg/dL (13 Apr 2023 22:33)  POCT Blood Glucose.: 92 mg/dL (13 Apr 2023 16:42)                          9.8    6.75  )-----------( 284      ( 14 Apr 2023 07:02 )             32.6     04-14    138  |  97<L>  |  41<H>  ----------------------------<  144<H>  5.1   |  34<H>  |  1.37<H>    Ca    9.3      14 Apr 2023 07:02  Phos  3.0     04-13  Mg     1.90     04-13    TPro  6.6  /  Alb  3.3  /  TBili  0.2  /  DBili  x   /  AST  15  /  ALT  6   /  AlkPhos  81  04-13                RADIOLOGY & ADDITIONAL TESTS:    Telemetry Personally Reviewed -     Imaging Personally Reviewed -     Imaging Reviewed -     Consultant(s) Notes Reviewed -       Care Discussed with Consultants/Other Providers -

## 2023-04-15 NOTE — DISCHARGE NOTE PROVIDER - NSDCCPCAREPLAN_GEN_ALL_CORE_FT
PRINCIPAL DISCHARGE DIAGNOSIS  Diagnosis: Generalized weakness  Assessment and Plan of Treatment: You were admitted for Weakness.   You had CT head which showed There is no CT evidence of acute intracranial hemorrhage, mass effect or midline shift.  Gray matter-white matter differentiation is grossly preserved. Moderate generalized cerebral volume loss and moderate periventricular white matter hypoattenuation compatible chronic microvascular ischemic disease are stable from 01/28/2023.  You were evaluated by physical therapy and recommended MEGAN.      SECONDARY DISCHARGE DIAGNOSES  Diagnosis: COPD with hypoxia  Assessment and Plan of Treatment: You had exertional shortness of breath that has improved.   CXR small pleural effusion.   You are currently on on On 3L NC at home and now at your baseline.   You blood gas showing elevated CO2 levels and will need sleep study after discharge.   Continue Oxygen supplementation.    Continue home Symbicort, and Duoneb as needed for shortness of breath. Please follow up with pulmonologist within 1-2 weeks of discharge.    Diagnosis: Stage 3b chronic kidney disease  Assessment and Plan of Treatment: Your kidney function has been stable. Please follow up with your PCP and nephrologist for repeat labs within one week of discharge.    Diagnosis: Chronic diastolic heart failure  Assessment and Plan of Treatment: You have history of  Chronic diastolic heart failure.   you had echocardiogram which showed calcified mitral leaflets with normal diastolic opening. Normal left ventricular internal dimensions and wall thicknesses. Hyperdynamic left ventricle. Normal right ventricular size and function. Continue Metoprolol.   Please follow up with your cardiologist and PCP within one week of discharge.    Diagnosis: Hypertension  Assessment and Plan of Treatment: Continue metoprolol, continue DASH diet   Monitor BP and follow up with your PCP for BP check and medication adjustment. Follow low salt diet.       Diagnosis: Diabetes  Assessment and Plan of Treatment: Continue Insulin regimen and follow up with your PCP.   Please see your PCP  to have your A1c checked every 3 months. You will need to return at least once each year to have your feet checked. You will need an eye exam once a year to check for retinopathy. You will also need urine tests every year to check for kidney problems. You may need tests to monitor for heart disease such as an EKG, stress test, blood pressure monitoring, and blood tests. Write down your questions so you remember to ask them during your visits.  You will need to check your blood sugar level at least 3 times each day if you are on insulin. If you check your blood sugar level before a meal , it should be between 80 and 130 mg/dL. If you check your blood sugar level 1 to 2 hours after a meal , it should be less than 180 mg/dL.  Your blood sugar level is too low if it goes below 70 mg/dL. If the level is too low, eat or drink 15 grams of fast-acting carbohydrates, such as 1/2 cup fruit juice or 4 oz. regular soda. Check your blood sugar level 15 minutes later. If the level is still low (less than 100 mg/dL), drink another serving.   Do not skip meals. Your blood sugar level may drop too low if you have taken diabetes medicine and do not eat.  Please seek medical attention immediately if:  You have severe abdominal pain, or the pain spreads to your back. You may also be vomiting.  You have trouble staying awake or focusing.  You are shaking or sweating.  You have blurred or double vision.  Your breath has a fruity, sweet smell.  Your breathing is deep and labored, or rapid and shallow.  Your heartbeat is fast and weak.    Diagnosis: Hypothyroid  Assessment and Plan of Treatment: you have history of Hypothyroidism and Continue Synthroid. Please follow up with your PCP for further monitoring.     PRINCIPAL DISCHARGE DIAGNOSIS  Diagnosis: Generalized weakness  Assessment and Plan of Treatment: You were admitted for Weakness.   You had CT head which showed There is no CT evidence of acute intracranial hemorrhage, mass effect or midline shift.  Gray matter-white matter differentiation is grossly preserved. Moderate generalized cerebral volume loss and moderate periventricular white matter hypoattenuation compatible chronic microvascular ischemic disease are stable from 01/28/2023.  You were evaluated by physical therapy and recommended MEGAN.      SECONDARY DISCHARGE DIAGNOSES  Diagnosis: COPD with hypoxia  Assessment and Plan of Treatment: You had exertional shortness of breath that has improved.   CXR small pleural effusion.   You are currently on on On 3L NC at home and now at your baseline.   You blood gas showing elevated CO2 levels and will need sleep study after discharge.   Continue Oxygen supplementation.    Continue home Symbicort, and Duoneb as needed for shortness of breath. Please follow up with pulmonologist within 1-2 weeks of discharge.    Diagnosis: Hypothyroid  Assessment and Plan of Treatment: you have history of Hypothyroidism and Continue Synthroid. Please follow up with your PCP for further monitoring.    Diagnosis: Stage 3b chronic kidney disease  Assessment and Plan of Treatment: Your kidney function has been stable. Please follow up with your PCP and nephrologist for repeat labs within one week of discharge.    Diagnosis: Chronic diastolic heart failure  Assessment and Plan of Treatment: You have history of  Chronic diastolic heart failure.   you had echocardiogram which showed calcified mitral leaflets with normal diastolic opening. Normal left ventricular internal dimensions and wall thicknesses. Hyperdynamic left ventricle. Normal right ventricular size and function. Continue Metoprolol.   Please follow up with your cardiologist and PCP within one week of discharge.    Diagnosis: Hypertension  Assessment and Plan of Treatment: Continue metoprolol, continue DASH diet   Monitor BP and follow up with your PCP for BP check and medication adjustment. Follow low salt diet.       Diagnosis: Diabetes  Assessment and Plan of Treatment: Continue Insulin regimen and follow up with your PCP.   Please see your PCP  to have your A1c checked every 3 months. You will need to return at least once each year to have your feet checked. You will need an eye exam once a year to check for retinopathy. You will also need urine tests every year to check for kidney problems. You may need tests to monitor for heart disease such as an EKG, stress test, blood pressure monitoring, and blood tests. Write down your questions so you remember to ask them during your visits.  You will need to check your blood sugar level at least 3 times each day if you are on insulin. If you check your blood sugar level before a meal , it should be between 80 and 130 mg/dL. If you check your blood sugar level 1 to 2 hours after a meal , it should be less than 180 mg/dL.  Your blood sugar level is too low if it goes below 70 mg/dL. If the level is too low, eat or drink 15 grams of fast-acting carbohydrates, such as 1/2 cup fruit juice or 4 oz. regular soda. Check your blood sugar level 15 minutes later. If the level is still low (less than 100 mg/dL), drink another serving.   Do not skip meals. Your blood sugar level may drop too low if you have taken diabetes medicine and do not eat.  Please seek medical attention immediately if:  You have severe abdominal pain, or the pain spreads to your back. You may also be vomiting.  You have trouble staying awake or focusing.  You are shaking or sweating.  You have blurred or double vision.  Your breath has a fruity, sweet smell.  Your breathing is deep and labored, or rapid and shallow.  Your heartbeat is fast and weak.     PRINCIPAL DISCHARGE DIAGNOSIS  Diagnosis: Generalized weakness  Assessment and Plan of Treatment: You came to the hospital because you were feeling SOB and weak. We think your COPD was not well controlled and you need nightly Bipap. However, you do not like bipap so we have discontinued it and are focusing on keeping you comfortable. Continue your COPD medications.  Allen: EMS called when family could not reach patient. History of multiple falls at home, non-adherence to Bipap therapy. While hospitalized, noted to be SOB and wheezing, hypercarbic. Recommended to adhere to nightly Bipap but patient refused. Became lethargic on 4/18 and found to be hypercarbic, mental status rapidly improved with Bipap therapy. Patient continued to decline, so family elected to pursue comfort measures with hospice.

## 2023-04-15 NOTE — PROGRESS NOTE ADULT - PROBLEM SELECTOR PLAN 9
history of hyperkalemia. On admission, K is 5.4 mildly hemolyzed and repeat not hemolyzed  In setting of CKD, managed medically. s/p rosalva   K 5.1 today     - monitor BMP daily  - dietary education  - Monitor on tele for now

## 2023-04-15 NOTE — PROGRESS NOTE ADULT - SUBJECTIVE AND OBJECTIVE BOX
Patient is a 88y old  Female who presents with a chief complaint of Weakness (13 Apr 2023 14:42)    SUBJECTIVE / OVERNIGHT EVENTS:    Seen and examined. Does not want to wear bipap. Explained risk including death but patient still refusing.     MEDICATIONS  (STANDING):  budesonide 160 MICROgram(s)/formoterol 4.5 MICROgram(s) Inhaler 2 Puff(s) Inhalation two times a day  busPIRone 10 milliGRAM(s) Oral two times a day  dextrose 5%. 1000 milliLiter(s) (50 mL/Hr) IV Continuous <Continuous>  dextrose 5%. 1000 milliLiter(s) (100 mL/Hr) IV Continuous <Continuous>  dextrose 50% Injectable 25 Gram(s) IV Push once  dextrose 50% Injectable 12.5 Gram(s) IV Push once  dextrose 50% Injectable 25 Gram(s) IV Push once  ferrous    sulfate 325 milliGRAM(s) Oral daily  furosemide    Tablet 20 milliGRAM(s) Oral daily  glucagon  Injectable 1 milliGRAM(s) IntraMuscular once  heparin   Injectable 5000 Unit(s) SubCutaneous every 8 hours  insulin lispro (ADMELOG) corrective regimen sliding scale   SubCutaneous three times a day before meals  insulin lispro (ADMELOG) corrective regimen sliding scale   SubCutaneous at bedtime  levothyroxine 112 MICROGram(s) Oral daily  metoprolol succinate ER 25 milliGRAM(s) Oral daily  montelukast 10 milliGRAM(s) Oral daily    MEDICATIONS  (PRN):  acetaminophen     Tablet .. 650 milliGRAM(s) Oral every 6 hours PRN Temp greater or equal to 38C (100.4F), Mild Pain (1 - 3)  albuterol    90 MICROgram(s) HFA Inhaler 2 Puff(s) Inhalation every 6 hours PRN Shortness of Breath and/or Wheezing  aluminum hydroxide/magnesium hydroxide/simethicone Suspension 30 milliLiter(s) Oral every 4 hours PRN Dyspepsia  dextrose Oral Gel 15 Gram(s) Oral once PRN Blood Glucose LESS THAN 70 milliGRAM(s)/deciliter  melatonin 3 milliGRAM(s) Oral at bedtime PRN Insomnia  ondansetron Injectable 4 milliGRAM(s) IV Push every 8 hours PRN Nausea and/or Vomiting      PHYSICAL EXAM:  Vital Signs Last 24 Hrs  T(C): 36.7 (15 Apr 2023 11:01), Max: 37 (14 Apr 2023 21:05)  T(F): 98.1 (15 Apr 2023 11:01), Max: 98.6 (14 Apr 2023 21:05)  HR: 96 (15 Apr 2023 11:35) (63 - 97)  BP: 149/52 (15 Apr 2023 11:01) (111/99 - 149/52)  BP(mean): --  RR: 18 (15 Apr 2023 11:01) (18 - 18)  SpO2: 97% (15 Apr 2023 11:35) (95% - 99%)    Parameters below as of 15 Apr 2023 11:01  Patient On (Oxygen Delivery Method): nasal cannula  O2 Flow (L/min): 4      GENERAL: NAD, elderly female  HEAD:  Atraumatic, Normocephalic, MMM  CHEST/LUNG: No use of accessory muscles, CTAB, breathing non-labored  COR: RR, no mrcg  ABD: Soft, ND/NT, +BS  PSYCH: AAOx3  NEUROLOGY: CN II-XII grossly intact, moving all extremities  SKIN: No rashes or lesions  EXT: no LE edema noted B/L       LABS:  CAPILLARY BLOOD GLUCOSE  POCT Blood Glucose.: 126 mg/dL (15 Apr 2023 11:37)  POCT Blood Glucose.: 132 mg/dL (15 Apr 2023 07:09)  POCT Blood Glucose.: 156 mg/dL (14 Apr 2023 21:44)  POCT Blood Glucose.: 123 mg/dL (14 Apr 2023 17:09)                            10.5   6.49  )-----------( 293      ( 15 Apr 2023 05:15 )             34.3   04-15    139  |  96<L>  |  38<H>  ----------------------------<  132<H>  5.3   |  34<H>  |  1.38<H>    Ca    9.4      15 Apr 2023 05:15  Phos  3.8     04-15  Mg     2.00     04-15                RADIOLOGY & ADDITIONAL TESTS:    Telemetry Personally Reviewed -     Imaging Personally Reviewed -     Imaging Reviewed -     Consultant(s) Notes Reviewed -       Care Discussed with Consultants/Other Providers -

## 2023-04-15 NOTE — DISCHARGE NOTE PROVIDER - PROVIDER TOKENS
PROVIDER:[TOKEN:[[id number]],FOLLOWUP:[1 week],ESTABLISHEDPATIENT:[T]],PROVIDER:[TOKEN:[[id number]],FOLLOWUP:[1 week]]

## 2023-04-15 NOTE — PROGRESS NOTE ADULT - PROBLEM SELECTOR PLAN 6
Cr at baseline and had hyperkalemia on admission  Cr 1.37-> 1.45->1.37    - Monitor BUN/Cr   - Avoid nephrotoxic agents

## 2023-04-15 NOTE — DISCHARGE NOTE PROVIDER - CARE PROVIDERS DIRECT ADDRESSES
[email or web address]

## 2023-04-15 NOTE — DISCHARGE NOTE PROVIDER - NSDCFUADDAPPT_GEN_ALL_CORE_FT
Please make an appointment for Cardiologist by calling the number provided.   Please Make an appointment to see endocrinologist and pulmonologist by calling the number provider to schedule your follow up visit.   Please follow up with your PCP within one week of discharge.

## 2023-04-15 NOTE — DISCHARGE NOTE PROVIDER - NSDCFUSCHEDAPPT_GEN_ALL_CORE_FT
Mane Cheek  [facility]  [facility] [address]   Scheduled Appointment: 04/20/2023

## 2023-04-15 NOTE — DISCHARGE NOTE PROVIDER - CARE PROVIDER_API CALL
Mane Cheek (MD)  Family Medicine  [address] [facility], [address]st [place] [address]  Phone: [phone]  Fax: [phone]  Established Patient  Follow Up Time: 1 week    Petar Fierro)  Cardiovascular Disease; Internal Medicine  [address] [place], [place] [address]  Phone: [phone]  Fax: [phone]  Follow Up Time: 1 week

## 2023-04-15 NOTE — PROGRESS NOTE ADULT - PROBLEM SELECTOR PLAN 3
Pt on On 3L NC at home and reports feeling sob on admission  Unclear etiology of SOB. CXR small pleural effusion. BNP 5000  VBG showing elevated pCO2. Hypercapnic resp failure given resp acidosis    - Refusing bipap. Encouraged use. If does not use, will need to do GOC  - Continue O2 supplementation   - monitor spO2   - Continue home Symbicort, and Duoneb prn sob

## 2023-04-15 NOTE — DISCHARGE NOTE PROVIDER - NSFOLLOWUPCLINICS_GEN_ALL_ED_FT
[facility] Endocrinology  Endocrinology  [address] [place] [address]  Phone: [phone]  Fax:     [facility] Pulmonolgy and Sleep Medicine  Pulmonology  [address] [place] [address]  Phone: [phone]  Fax:

## 2023-04-15 NOTE — DISCHARGE NOTE PROVIDER - NSDCCPTREATMENT_GEN_ALL_CORE_FT
PRINCIPAL PROCEDURE  Procedure: Transthoracic echocardiography (TTE)  Findings and Treatment: OBSERVATIONS:  Mitral Valve: Mitral annular calcification and calcified  mitral leaflets with normal diastolic opening.  Aortic Root: Normal aortic root.  Aortic Valve: Aortic valve not well visualized.  Left Atrium: Normal left atrium.  LA volume index = 24  cc/m2.  Left Ventricle: Hyperdynamic left ventricle. Normal left  ventricular internal dimensions and wall thicknesses.  Reversal of the E-A  waves of the mitral inflow pattern is  consistent with diastolic LV dysfunction.  Right Heart: Normal right atrium. Normal right ventricular  size and function. Normal tricuspid valve. Normal pulmonic  valve.  Pericardium/PleuraNormal pericardium with no pericardial  effusion.  ------------------------------------------------------------------------  CONCLUSIONS:  1. Mitral annular calcification and calcified mitral  leaflets with normal diastolic opening.  2. Normal left ventricular internal dimensions and wall  thicknesses.  3. Hyperdynamic left ventricle.  4. Normal right ventricular size and function.

## 2023-04-15 NOTE — PROGRESS NOTE ADULT - PROBLEM SELECTOR PLAN 7
Pt appears euvolemic   TTE: calcified mitral leaflets with normal diastolic opening. Normal left ventricular internal dimensions and wall  thicknesses. Hyperdynamic left ventricle. Normal right ventricular size and function.    - Continue Metoprolol  - Daily weight, Is/Os

## 2023-04-15 NOTE — DISCHARGE NOTE PROVIDER - NSDCMRMEDTOKEN_GEN_ALL_CORE_FT
Albuterol (Eqv-ProAir HFA) 90 mcg/inh inhalation aerosol: 2 puff(s) inhaled every 4-6 hours as needed  busPIRone 10 mg oral tablet: 1 tab(s) orally 2 times a day  furosemide 20 mg oral tablet: 1 tab(s) orally once a day  Incruse Ellipta 62.5 mcg/inh inhalation powder: 1 puff(s) inhaled once a day  levothyroxine 112 mcg (0.112 mg) oral tablet: 1 tab(s) orally once a day  metFORMIN 500 mg oral tablet: 1 tab(s) orally once a day  metoprolol succinate 25 mg oral tablet, extended release: 0.5 tablet (12.5mg) orally twice daily  montelukast 10 mg oral tablet: 1 tab(s) orally once a day (at bedtime)  Symbicort 160 mcg-4.5 mcg/inh inhalation aerosol: 2 puff(s) inhaled 2 times a day   Albuterol (Eqv-ProAir HFA) 90 mcg/inh inhalation aerosol: 2 puff(s) inhaled every 4-6 hours as needed  busPIRone 10 mg oral tablet: 1 tab(s) orally 2 times a day  furosemide 20 mg oral tablet: 1 tab(s) orally once a day  Incruse Ellipta 62.5 mcg/inh inhalation powder: 1 puff(s) inhaled once a day  levothyroxine 112 mcg (0.112 mg) oral tablet: 1 tab(s) orally once a day  metFORMIN 500 mg oral tablet: 1 tab(s) orally once a day  metoprolol succinate 25 mg oral tablet, extended release: 1 tab(s) orally once a day  montelukast 10 mg oral tablet: 1 tab(s) orally once a day (at bedtime)  Symbicort 160 mcg-4.5 mcg/inh inhalation aerosol: 2 puff(s) inhaled 2 times a day   Albuterol (Eqv-ProAir HFA) 90 mcg/inh inhalation aerosol: 2 puff(s) inhaled every 4-6 hours as needed  busPIRone 10 mg oral tablet: 1 tab(s) orally 2 times a day  furosemide 20 mg oral tablet: 1 tab(s) orally once a day  Incruse Ellipta 62.5 mcg/inh inhalation powder: 1 puff(s) inhaled once a day  levothyroxine 112 mcg (0.112 mg) oral tablet: 1 tab(s) orally once a day  metoprolol succinate 25 mg oral tablet, extended release: 1 tab(s) orally once a day  montelukast 10 mg oral tablet: 1 tab(s) orally once a day (at bedtime)  Symbicort 160 mcg-4.5 mcg/inh inhalation aerosol: 2 puff(s) inhaled 2 times a day

## 2023-04-15 NOTE — DISCHARGE NOTE PROVIDER - HOSPITAL COURSE
88 year old female with PMHx of HTN, DM2, COPD (on home O2 2-3L,) Chronic diastolic HF, hypothyroidism, CKD3, and hx of hyperkalemia brought in after family concerned about patient inability to care for herself. Admitted for further evaluation.     Pt admitted for Weakness.   s/p fall approx one week ago prior to ED arrival , reports head strike but no LOC. as per pt it was mechanical fall   at baseline she Ambulates with a walker  CT head: There is no CT evidence of acute intracranial hemorrhage, mass effect or midline shift.  Gray matter-white matter differentiation is grossly preserved. Moderate generalized cerebral volume loss and moderate periventricular white matter hypoattenuation compatible chronic microvascular ischemic disease are stable from 01/28/2023.  trops 36     PT evaluated patient and recommends MEGAN       Pt with hx of COPD and Exertional shortness of breath.   admitted with shortness of breath, currently on home O2   CXR small pleural effusion. BNP 5000, D-dimer 390 and 880 is cutoff adjusted for age -> VTE unlikely   wells score 0-> low risk for PE  TTE: calcified mitral leaflets with normal diastolic opening. Normal left ventricular internal dimensions and wall thicknesses. Hyperdynamic left ventricle. Normal right ventricular size and function.  continue O2 and duonebs prn   Monitor spO2    Pt with  COPD with hypoxia and hypercarbia  Pt on On 3L NC at home and reports feeling sob on admission. Unclear etiology of SOB. CXR small pleural effusion. BNP 5000  VBG showing elevated pCO2 and ordered nocturnal bipap, however, pt continues to refuse and states she can't breathe with the machine.   Pt AOx 3 and at baseline.   Continue O2 supplementation.  monitor spO2 and repeat VBG in one week. will need outpt sleep study.   Continue home Symbicort, and Duoneb prn sob.    pt with hx of Hypothyroidism and Continue Synthroid.    Pt with Hypertension and Continue metoprolol, continue DASH diet   Monitor BP and titrate medications as needed.     Stage 3b chronic kidney disease.   Cr at baseline and had hyperkalemia on admission  Monitor BUN/Cr and Avoid nephrotoxic agents. will need outpt follow up with nephrologist.     Pt with hx Chronic diastolic heart failure and appears euvolemic   TTE: calcified mitral leaflets with normal diastolic opening. Normal left ventricular internal dimensions and wall  thicknesses. Hyperdynamic left ventricle. Normal right ventricular size and function.    Continue Metoprolol. Monitor Daily weight, Is/Os. DASH diet.     Diabetes with hgbA1c 5.9. Continue ISS and Monitor FSG  CC diet.      Patient was seen and evaluated today. She reports feeling better and denies any acute complaints at this time.   Discussed discharge medications, plan and outpatient follow up with patient.  All questions answered and patient in agreement with discharge plan.   Patient is hemodynamically stable for discharge with outpatient follow up with PCP, Pulmonary, Nephrology and Cardiology.      88 year old female with PMHx of HTN, DM2, COPD (on home O2 2-3L,) Chronic diastolic HF, hypothyroidism, CKD3, and hx of hyperkalemia brought in after family concerned about patient inability to care for herself. Patient found to be hypercarbic, recommended to wear Bipap nightly which patient adamantly refused. Patient had an RRT on 4/18 for lethargy 2/2 hypercarbia which improved with Bipap. GOC discussion was had with family who elected to make patient comfort care, DNR/DNI and discharge to hospice.     # FTT/ Weakness   - Multiple falls at home in setting of medication and therapy non-adherence   - CT head: No ICH   - PT: MEGAN     # COPD   - Cont home O2   - D-dimer negative, VTE unlikely   - Cont home meds   - C/b hypercarbia, refusing nightly bipap so family elected to pursue comfort measure     Plan to transfer to [facility] with hospice services.

## 2023-04-16 NOTE — PROGRESS NOTE ADULT - NSPROGADDITIONALINFOA_GEN_ALL_CORE
DVT ppx: Heparin   Dispo: MEGAN once medically optimized DVT ppx: Heparin   Dispo: MEGAN once medically optimized  Full code and wants to discuss GOC with brother

## 2023-04-16 NOTE — PROGRESS NOTE ADULT - PROBLEM SELECTOR PLAN 9
history of hyperkalemia. On admission, K is 5.4 mildly hemolyzed and repeat not hemolyzed  In setting of CKD, managed medically. s/p rosalva   K 5.1 today     - monitor BMP daily  - dietary education  - Monitor on tele for now history of hyperkalemia. On admission, K is 5.4 mildly hemolyzed and repeat not hemolyzed  In setting of CKD, managed medically. s/p lokelma   K 5.6 today -> sample hemolyzed. repeat ordered     - monitor BMP daily  - dietary education  - repeat BMP ordered and follow up   - Monitor on tele for now

## 2023-04-16 NOTE — PROGRESS NOTE ADULT - PROBLEM SELECTOR PLAN 6
Cr at baseline and had hyperkalemia on admission  Cr 1.37-> 1.45->1.37    - Monitor BUN/Cr   - Avoid nephrotoxic agents Cr at baseline and had hyperkalemia on admission  Cr 1.37-> 1.45->1.37->1.50    - Monitor BUN/Cr   - Avoid nephrotoxic agents

## 2023-04-16 NOTE — PROGRESS NOTE ADULT - SUBJECTIVE AND OBJECTIVE BOX
Patient is a 88y old  Female who presents with a chief complaint of Weakness (15 Apr 2023 12:13)      SUBJECTIVE / OVERNIGHT EVENTS:    No events overnight. This AM, patient without n/v/d/cp/sob.      MEDICATIONS  (STANDING):  budesonide 160 MICROgram(s)/formoterol 4.5 MICROgram(s) Inhaler 2 Puff(s) Inhalation two times a day  busPIRone 10 milliGRAM(s) Oral two times a day  dextrose 5%. 1000 milliLiter(s) (50 mL/Hr) IV Continuous <Continuous>  dextrose 5%. 1000 milliLiter(s) (100 mL/Hr) IV Continuous <Continuous>  dextrose 50% Injectable 25 Gram(s) IV Push once  dextrose 50% Injectable 25 Gram(s) IV Push once  dextrose 50% Injectable 12.5 Gram(s) IV Push once  ferrous    sulfate 325 milliGRAM(s) Oral daily  furosemide    Tablet 20 milliGRAM(s) Oral daily  glucagon  Injectable 1 milliGRAM(s) IntraMuscular once  heparin   Injectable 5000 Unit(s) SubCutaneous every 8 hours  insulin lispro (ADMELOG) corrective regimen sliding scale   SubCutaneous three times a day before meals  insulin lispro (ADMELOG) corrective regimen sliding scale   SubCutaneous at bedtime  levothyroxine 112 MICROGram(s) Oral daily  metoprolol succinate ER 25 milliGRAM(s) Oral daily  montelukast 10 milliGRAM(s) Oral daily    MEDICATIONS  (PRN):  acetaminophen     Tablet .. 650 milliGRAM(s) Oral every 6 hours PRN Temp greater or equal to 38C (100.4F), Mild Pain (1 - 3)  albuterol    90 MICROgram(s) HFA Inhaler 2 Puff(s) Inhalation every 6 hours PRN Shortness of Breath and/or Wheezing  aluminum hydroxide/magnesium hydroxide/simethicone Suspension 30 milliLiter(s) Oral every 4 hours PRN Dyspepsia  dextrose Oral Gel 15 Gram(s) Oral once PRN Blood Glucose LESS THAN 70 milliGRAM(s)/deciliter  melatonin 3 milliGRAM(s) Oral at bedtime PRN Insomnia  ondansetron Injectable 4 milliGRAM(s) IV Push every 8 hours PRN Nausea and/or Vomiting      PHYSICAL EXAM:  T(C): 36.7 (04-16-23 @ 09:00), Max: 36.8 (04-15-23 @ 17:51)  HR: 63 (04-16-23 @ 09:00) (63 - 98)  BP: 117/43 (04-16-23 @ 09:00) (117/43 - 149/60)  RR: 20 (04-16-23 @ 09:00) (18 - 20)  SpO2: 97% (04-16-23 @ 09:00) (95% - 97%)  I&O's Summary    16 Apr 2023 07:01  -  16 Apr 2023 13:41  --------------------------------------------------------  IN: 0 mL / OUT: 700 mL / NET: -700 mL      GENERAL: NAD, well-developed  HEAD:  Atraumatic, Normocephalic, MMM  CHEST/LUNG: No use of accessory muscles, CTAB, breathing non-labored  COR: RR, no mrcg  ABD: Soft, ND/NT, +BS  PSYCH: AAOx3  NEUROLOGY: CN II-XII grossly intact, moving all extremities  SKIN: No rashes or lesions  EXT: wwp, no cce    LABS:  CAPILLARY BLOOD GLUCOSE      POCT Blood Glucose.: 127 mg/dL (16 Apr 2023 11:46)  POCT Blood Glucose.: 130 mg/dL (16 Apr 2023 07:06)  POCT Blood Glucose.: 128 mg/dL (15 Apr 2023 21:45)  POCT Blood Glucose.: 126 mg/dL (15 Apr 2023 17:20)                          10.4   6.90  )-----------( 295      ( 16 Apr 2023 05:15 )             33.8     04-16    139  |  97<L>  |  42<H>  ----------------------------<  131<H>  5.6<H>   |  33<H>  |  1.50<H>    Ca    9.7      16 Apr 2023 05:15  Phos  3.4     04-16  Mg     2.10     04-16                  RADIOLOGY & ADDITIONAL TESTS:    Telemetry Personally Reviewed -     Imaging Personally Reviewed -     Imaging Reviewed -     Consultant(s) Notes Reviewed -       Care Discussed with Consultants/Other Providers -  Patient is a 88y old  Female who presents with a chief complaint of Weakness (15 Apr 2023 12:13)      SUBJECTIVE / OVERNIGHT EVENTS:    No events overnight. This AM, patient without n/v/d/cp/sob. pt states she feels fine and wants to go home. States she does not want the bipap     MEDICATIONS  (STANDING):  budesonide 160 MICROgram(s)/formoterol 4.5 MICROgram(s) Inhaler 2 Puff(s) Inhalation two times a day  busPIRone 10 milliGRAM(s) Oral two times a day  dextrose 5%. 1000 milliLiter(s) (50 mL/Hr) IV Continuous <Continuous>  dextrose 5%. 1000 milliLiter(s) (100 mL/Hr) IV Continuous <Continuous>  dextrose 50% Injectable 25 Gram(s) IV Push once  dextrose 50% Injectable 25 Gram(s) IV Push once  dextrose 50% Injectable 12.5 Gram(s) IV Push once  ferrous    sulfate 325 milliGRAM(s) Oral daily  furosemide    Tablet 20 milliGRAM(s) Oral daily  glucagon  Injectable 1 milliGRAM(s) IntraMuscular once  heparin   Injectable 5000 Unit(s) SubCutaneous every 8 hours  insulin lispro (ADMELOG) corrective regimen sliding scale   SubCutaneous three times a day before meals  insulin lispro (ADMELOG) corrective regimen sliding scale   SubCutaneous at bedtime  levothyroxine 112 MICROGram(s) Oral daily  metoprolol succinate ER 25 milliGRAM(s) Oral daily  montelukast 10 milliGRAM(s) Oral daily    MEDICATIONS  (PRN):  acetaminophen     Tablet .. 650 milliGRAM(s) Oral every 6 hours PRN Temp greater or equal to 38C (100.4F), Mild Pain (1 - 3)  albuterol    90 MICROgram(s) HFA Inhaler 2 Puff(s) Inhalation every 6 hours PRN Shortness of Breath and/or Wheezing  aluminum hydroxide/magnesium hydroxide/simethicone Suspension 30 milliLiter(s) Oral every 4 hours PRN Dyspepsia  dextrose Oral Gel 15 Gram(s) Oral once PRN Blood Glucose LESS THAN 70 milliGRAM(s)/deciliter  melatonin 3 milliGRAM(s) Oral at bedtime PRN Insomnia  ondansetron Injectable 4 milliGRAM(s) IV Push every 8 hours PRN Nausea and/or Vomiting      PHYSICAL EXAM:  T(C): 36.7 (04-16-23 @ 09:00), Max: 36.8 (04-15-23 @ 17:51)  HR: 63 (04-16-23 @ 09:00) (63 - 98)  BP: 117/43 (04-16-23 @ 09:00) (117/43 - 149/60)  RR: 20 (04-16-23 @ 09:00) (18 - 20)  SpO2: 97% (04-16-23 @ 09:00) (95% - 97%)  I&O's Summary    16 Apr 2023 07:01  -  16 Apr 2023 13:41  --------------------------------------------------------  IN: 0 mL / OUT: 700 mL / NET: -700 mL    GENERAL: NAD, elderly female  HEAD:  Atraumatic, Normocephalic, MMM  CHEST/LUNG: No use of accessory muscles, CTA B/L, breathing non-labored  COR: RR, no mrcg  ABD: Soft, ND/NT, +BS  PSYCH: AAOx3  NEUROLOGY: CN II-XII grossly intact, moving all extremities  SKIN: No rashes or lesions  EXT: no LE edema noted B/L       LABS:  CAPILLARY BLOOD GLUCOSE      POCT Blood Glucose.: 127 mg/dL (16 Apr 2023 11:46)  POCT Blood Glucose.: 130 mg/dL (16 Apr 2023 07:06)  POCT Blood Glucose.: 128 mg/dL (15 Apr 2023 21:45)  POCT Blood Glucose.: 126 mg/dL (15 Apr 2023 17:20)                          10.4   6.90  )-----------( 295      ( 16 Apr 2023 05:15 )             33.8     04-16    139  |  97<L>  |  42<H>  ----------------------------<  131<H>  5.6<H>   |  33<H>  |  1.50<H>    Ca    9.7      16 Apr 2023 05:15  Phos  3.4     04-16  Mg     2.10     04-16                  RADIOLOGY & ADDITIONAL TESTS:    Telemetry Personally Reviewed -     Imaging Personally Reviewed -     Imaging Reviewed -     Consultant(s) Notes Reviewed -       Care Discussed with Consultants/Other Providers -

## 2023-04-16 NOTE — PROGRESS NOTE ADULT - CONVERSATION DETAILS
Discussed GOC with pt and states she wants CPR and intubation if needed. but wants to talk to her brother.   Discussed need for bipap and states she cannot breathe with it

## 2023-04-16 NOTE — PROGRESS NOTE ADULT - PROBLEM SELECTOR PLAN 3
Pt on On 3L NC at home and reports feeling sob on admission  Unclear etiology of SOB. CXR small pleural effusion. BNP 5000  VBG showing elevated pCO2. Hypercapnic resp failure given resp acidosis    - Refusing bipap. Encouraged use. If does not use, will need to do GOC  - Continue O2 supplementation   - monitor spO2   - Continue home Symbicort, and Duoneb prn sob Pt on On 3L NC at home and reports feeling sob on admission  Unclear etiology of SOB. CXR small pleural effusion. BNP 5000  VBG showing elevated pCO2. Hypercapnic resp failure given resp acidosis    - Refusing bipap. Encouraged use. states she wants to be full code and does not want bipap   - Continue O2 supplementation   - monitor spO2   - Continue home Symbicort, and Duoneb prn sob

## 2023-04-17 NOTE — SWALLOW BEDSIDE ASSESSMENT ADULT - ADDITIONAL RECOMMENDATIONS
This department to follow up as schedule permits to reassess for a safe oral diet program. Medical team further advised to reconsult as patient becomes medically optimized and willing to participate in evaluation.
2. Medical team advised to reconsult this department with any change in medical status and/or as patient becomes more accepting of PO trials.

## 2023-04-17 NOTE — SWALLOW BEDSIDE ASSESSMENT ADULT - ASR SWALLOW RECOMMEND DIAG
Objective testing is NOT indicated given refusal to participate in bedside assessment
Objective testing NOT warranted given patient refusal for PO trials

## 2023-04-17 NOTE — SWALLOW BEDSIDE ASSESSMENT ADULT - SWALLOW EVAL: RECOMMENDED DIET
Defer Oral Diet to MD's discretion given patient refusal
1. Defer diet to MD given patient behavior/refusal

## 2023-04-17 NOTE — SWALLOW BEDSIDE ASSESSMENT ADULT - SWALLOW EVAL: DIAGNOSIS
Clinician presented patient with trials of puree, regular solids and thin liquids, however patient turned head away from presentations and stated "I don't want nothing." Patient declined participation in assessment despite multiple attempts and clinician encouragement. Oral and pharyngeal phases of the swallow could not be assessed given patient refusal.
Patient with adamant refusal for swallow evaluation and noted screaming "No! Get away from me!" despite max verbal encouragement from SLP, RN and PCA. Therefore oral and pharyngeal stage could not be assessed.

## 2023-04-17 NOTE — SWALLOW BEDSIDE ASSESSMENT ADULT - SLP PATIENT PROFILE REVIEW
Triage Chief Complaint:   Chest Pain (onset 2 days ago after eating spicy chicken ) and Nausea & Vomiting (2 days ago with onset of pain)    [place] is a 25 y.o. male that presents with 2 days of intermittent epigastric and left upper quadrant. It is burning in nature, worse with eating associated with some nausea and nonbilious nonbloody emesis. Patient is this started after he ate some spicy chicken 2 days ago. He has not tried any medications for relief. Denies any chest pain, shortness of breath, diarrhea, constipation, urinary symptoms. ROS:  At least 14 systems reviewed and otherwise acutely negative except as in the [address] History reviewed. No pertinent past medical history.   Past Surgical History:   Procedure Laterality Date    WISDOM TOOTH EXTRACTION Left 03/15/2017     Family History   Problem Relation Age of Onset    No Known Problems Father     No Known Problems Sister     No Known Problems Brother     No Known Problems Paternal Grandfather     No Known Problems Sister     No Known Problems Brother      Social History     Socioeconomic History    Marital status: Single     Spouse name: Not on file    Number of children: 0    Years of education: 8    Highest education level: Not on file   Occupational History    Occupation:      Comment: student HS   Social Needs    Financial resource strain: Not on file    Food insecurity:     Worry: Not on file     Inability: Not on file   GVISP 1 needs:     Medical: Not on file     Non-medical: Not on file   Tobacco Use    Smoking status: Never Smoker    Smokeless tobacco: Never Used   Substance and Sexual Activity    Alcohol use: No    Drug use: No    Sexual activity: Yes     Partners: Female     Birth control/protection: Condom   Lifestyle    Physical activity:     Days per week: Not on file     Minutes per session: Not on file    Stress: Not on file   Relationships    Social connections:     Talks on
yes
from this visit (if applicable):  Results for orders placed or performed during the hospital encounter of 04/06/19   CBC Auto Differential   Result Value Ref Range    WBC 9.6 4.0 - 10.5 K/CU MM    RBC 5.84 4.6 - 6.2 M/CU MM    Hemoglobin 16.2 13.5 - 18.0 GM/DL    Hematocrit 51.3 42 - 52 %    MCV 87.8 78 - 100 FL    MCH 27.7 27 - 31 PG    MCHC 31.6 (L) 32.0 - 36.0 %    RDW 13.2 11.7 - 14.9 %    Platelets 968 322 - 773 K/CU MM    MPV 8.7 7.5 - 11.1 FL    Differential Type AUTOMATED DIFFERENTIAL     Segs Relative 57.2 34 - 64 %    Lymphocytes % 35.5 25 - 45 %    Monocytes % 6.3 (H) 0 - 4 %    Eosinophils % 0.6 0 - 3 %    Basophils % 0.2 0 - 1 %    Segs Absolute 5.5 K/CU MM    Lymphocytes # 3.4 K/CU MM    Monocytes # 0.6 K/CU MM    Eosinophils # 0.1 K/CU MM    Basophils # 0.0 K/CU MM    Nucleated RBC % 0.0 %    Total Nucleated RBC 0.0 K/CU MM    Total Immature Neutrophil 0.02 K/CU MM    Immature Neutrophil % 0.2 0 - 0.43 %   CMP   Result Value Ref Range    Sodium 139 135 - 145 MMOL/L    Potassium 3.6 3.5 - 5.1 MMOL/L    Chloride 102 99 - 110 mMol/L    CO2 26 21 - 32 MMOL/L    BUN 6 6 - 23 MG/DL    CREATININE 0.8 (L) 0.9 - 1.3 MG/DL    Glucose 92 70 - 99 MG/DL    Calcium 9.0 8.3 - 10.6 MG/DL    Alb 4.4 3.4 - 5.0 GM/DL    Total Protein 7.5 6.4 - 8.2 GM/DL    Total Bilirubin 0.5 0.0 - 1.0 MG/DL    ALT 35 10 - 40 U/L    AST 24 15 - 37 IU/L    Alkaline Phosphatase 54 40 - 129 IU/L    GFR Non-African American >60 >60 mL/min/1.73m2    GFR African American >60 >60 mL/min/1.73m2    Anion Gap 11 4 - 16   Lipase   Result Value Ref Range    Lipase 10 (L) 13 - 60 IU/L      Radiographs (if obtained):  [] The following radiograph was interpreted by myself in the absence of a radiologist:  [] Radiologist's Report Reviewed:    EKG (if obtained): (All EKG's are interpreted by myself in the absence of a cardiologist)  12 lead EKG as interpreted by me reveals normal sinus rhythm. LAD.  There are no ischemic ST elevations or other suspicious
ST changes;  QRS interval is narrow, QT interval is not prolonged. Final interpretation: Normal sinus rhythm. LAD      MDM:  Plan of care is discussed thoroughly with the patient and family if present. If performed, all imaging and lab work also discussed with patient. All relevant prior results and chart reviewed if available. Patient is well-appearing on exam with normal vital signs and a benign exam.  Signs and symptoms most consistent with likely gastritis or peptic ulcer disease. He is given Zofran, Pepcid, Maalox for relief of symptoms. I do not suspect other acute intra-abdominal process at this time. Plan to screen for any biliary or pancreatic etiology. Patient doing well with improved symptoms on reevaluation. His metabolic workup was unremarkable. Abdominal exam is unchanged. Plan to discharge with Pepcid, Zofran, follow up with PCP or return for any worsening pain in the next 8-12 hours. Patient agreeable with this plan of care. Clinical Impression:  1.  Abdominal pain, epigastric      (Please note that portions of this note may have been completed with a voice recognition program. Efforts were made to edit the dictations but occasionally words are mis-transcribed.)    MD Hernando Paulson MD  04/06/19 0769
yes

## 2023-04-17 NOTE — PROGRESS NOTE ADULT - SUBJECTIVE AND OBJECTIVE BOX
Merlin Mathew, MD   Hospitalist  Pager #[phone]    PROGRESS NOTE:     Patient is a 88y old  Female who presents with a chief complaint of Weakness (16 Apr 2023 13:41)      SUBJECTIVE / OVERNIGHT EVENTS: NEON   Patient sleeping, refusing AVAPs. Claims she wore it last night. Does not know why she was hospitalized. Thinks everyone is being overly cautious and wants to go home.     ADDITIONAL REVIEW OF SYSTEMS:    MEDICATIONS  (STANDING):  budesonide 160 MICROgram(s)/formoterol 4.5 MICROgram(s) Inhaler 2 Puff(s) Inhalation two times a day  busPIRone 10 milliGRAM(s) Oral two times a day  dextrose 5%. 1000 milliLiter(s) (100 mL/Hr) IV Continuous <Continuous>  dextrose 5%. 1000 milliLiter(s) (50 mL/Hr) IV Continuous <Continuous>  dextrose 50% Injectable 25 Gram(s) IV Push once  dextrose 50% Injectable 25 Gram(s) IV Push once  dextrose 50% Injectable 12.5 Gram(s) IV Push once  ferrous    sulfate 325 milliGRAM(s) Oral daily  furosemide    Tablet 20 milliGRAM(s) Oral daily  glucagon  Injectable 1 milliGRAM(s) IntraMuscular once  heparin   Injectable 5000 Unit(s) SubCutaneous every 8 hours  insulin lispro (ADMELOG) corrective regimen sliding scale   SubCutaneous three times a day before meals  insulin lispro (ADMELOG) corrective regimen sliding scale   SubCutaneous at bedtime  levothyroxine 112 MICROGram(s) Oral daily  metoprolol succinate ER 25 milliGRAM(s) Oral daily  montelukast 10 milliGRAM(s) Oral daily    MEDICATIONS  (PRN):  acetaminophen     Tablet .. 650 milliGRAM(s) Oral every 6 hours PRN Temp greater or equal to 38C (100.4F), Mild Pain (1 - 3)  albuterol    90 MICROgram(s) HFA Inhaler 2 Puff(s) Inhalation every 6 hours PRN Shortness of Breath and/or Wheezing  aluminum hydroxide/magnesium hydroxide/simethicone Suspension 30 milliLiter(s) Oral every 4 hours PRN Dyspepsia  dextrose Oral Gel 15 Gram(s) Oral once PRN Blood Glucose LESS THAN 70 milliGRAM(s)/deciliter  melatonin 3 milliGRAM(s) Oral at bedtime PRN Insomnia  ondansetron Injectable 4 milliGRAM(s) IV Push every 8 hours PRN Nausea and/or Vomiting      CAPILLARY BLOOD GLUCOSE      POCT Blood Glucose.: 115 mg/dL (17 Apr 2023 17:03)  POCT Blood Glucose.: 141 mg/dL (17 Apr 2023 11:27)  POCT Blood Glucose.: 122 mg/dL (17 Apr 2023 07:22)  POCT Blood Glucose.: 137 mg/dL (17 Apr 2023 02:37)  POCT Blood Glucose.: 117 mg/dL (16 Apr 2023 21:25)    I&O's Summary    16 Apr 2023 07:01  -  17 Apr 2023 07:00  --------------------------------------------------------  IN: 150 mL / OUT: 700 mL / NET: -550 mL    17 Apr 2023 07:01  -  17 Apr 2023 17:08  --------------------------------------------------------  IN: 100 mL / OUT: 800 mL / NET: -700 mL        PHYSICAL EXAM:  Vital Signs Last 24 Hrs  T(C): 36.7 (17 Apr 2023 07:20), Max: 36.8 (16 Apr 2023 21:50)  T(F): 98 (17 Apr 2023 07:20), Max: 98.3 (16 Apr 2023 21:50)  HR: 64 (17 Apr 2023 12:07) (57 - 72)  BP: 124/52 (17 Apr 2023 10:34) (122/43 - 143/55)  BP(mean): --  RR: 18 (17 Apr 2023 10:34) (18 - 18)  SpO2: 94% (17 Apr 2023 12:07) (94% - 100%)    Parameters below as of 17 Apr 2023 07:20  Patient On (Oxygen Delivery Method): nasal cannula  O2 Flow (L/min): 4      GENERAL: NAD, elderly female  HEAD:  Atraumatic, Normocephalic, MMM  CHEST/LUNG: No use of accessory muscles, CTA B/L, breathing non-labored  COR: RR, no mrcg  ABD: Soft, ND/NT, +BS  PSYCH: AAOx3  NEUROLOGY: CN II-XII grossly intact, moving all extremities  SKIN: No rashes or lesions  EXT: no LE edema noted B/L       LABS:                        10.5   6.38  )-----------( 295      ( 17 Apr 2023 04:25 )             34.5     04-17    140  |  95<L>  |  42<H>  ----------------------------<  141<H>  5.2   |  38<H>  |  1.35<H>    Ca    9.4      17 Apr 2023 04:25  Phos  3.4     04-17  Mg     2.30     04-17                  RADIOLOGY & ADDITIONAL TESTS:  Results Reviewed:   Imaging Personally Reviewed:  Electrocardiogram Personally Reviewed:    COORDINATION OF CARE:  Care Discussed with Consultants/Other Providers [Y/N]:  Prior or Outpatient Records Reviewed [Y/N]:

## 2023-04-17 NOTE — SWALLOW BEDSIDE ASSESSMENT ADULT - NS SPL SWALLOW CLINIC TRIAL FT
Patient with adamant refusal for swallow evaluation/PO trials and noted to scream "No! Get away from me!" despite max verbal encouragement from SLP, RN, and PCA.

## 2023-04-17 NOTE — PROGRESS NOTE ADULT - PROBLEM SELECTOR PLAN 3
Pt on On 3L NC at home and reports feeling sob on admission  Unclear etiology of SOB. CXR small pleural effusion. BNP 5000  VBG showing elevated pCO2. Hypercapnic resp failure given resp acidosis    - Refusing bipap. Encouraged use. Per last GOC, patient full code but refusing bipap   [ ] VBG ordered for today and AM   [ ] Pall care c/s for GOC   - Continue O2 supplementation   - monitor spO2   - Continue home Symbicort, and Duoneb prn sob

## 2023-04-17 NOTE — BRIEF OPERATIVE NOTE - SPECIMENS
fem head Render Note In Bullet Format When Appropriate: No Post-Care Instructions: I reviewed with the patient in detail post-care instructions. Patient is to wear sunprotection, and avoid picking at any of the treated lesions. Pt may apply Vaseline to crusted or scabbing areas. Number Of Freeze-Thaw Cycles: 2 freeze-thaw cycles Render Post-Care Instructions In Note?: yes Detail Level: Simple Consent: The patient's consent was obtained including but not limited to risks of crusting, scabbing, blistering, scarring, darker or lighter pigmentary change, recurrence, incomplete removal and infection. Duration Of Freeze Thaw-Cycle (Seconds): 7

## 2023-04-18 NOTE — PROGRESS NOTE ADULT - SUBJECTIVE AND OBJECTIVE BOX
HPI:  88 year old female with pmh of HTN, DM2, COPD (O2 dependent 2-3L,) Chronic diastolic HF, hypothyroidism, CKD3, and hx of hyperkalemia presents after family called EMS for wellness check. Patient is [address], April 10, 2023, and states that she is in the hospital because she is not feeling well.  Patient reports that brother Zachariah or NOEMI  called EMS because he was concerned.  [phone]: he was the one who called EMS; he states he called b/c he was not able to get in touch with his mother over the phone. Of note, patient was  discharged from the hospital early February with HHA after a fall and hyperkalemia. Since leaving the hospital she has progressively been getting more weak over the past few weeks. Now she is having a hard time doing basic ADL's including bathroom, showering, cleaning, cooking, eating, getting around the house. Patient reports that she has not had eaten in the past two days because she has been unable to cook for herself. Additionally, she reports a fall but cannot recall the exact day. Denies hitting her head and ambulates with walker at baseline. States feeling SOB but denies chest pain. Cannot really elaborate on if it is lele at rest. Has difficulty getting inhaler. So cannot state if it provides relief. Denies wheezing. Pt has a HHA for 4 hrs/day, 6days/week. Her brother believes pt should be in an assisted living facility at this point. However, patient reports that she likes her independence.   In ED, Patient not hypoxic on home oxygen setting. Work up notable for stable  ABG. CT head negative. CXR small pleural effusion. BNP 5000. Potassium hemolyzed Patient  admitted for FTT.     PERTINENT PM/SXH:   DM (diabetes mellitus)    Hypothyroid    HTN (hypertension)    COPD (chronic obstructive pulmonary disease)    Chronic bronchitis    Chronic kidney disease (CKD)    Shingles      S/P cholecystectomy    H/O cataract    Leg fracture    S/P hip replacement, left      FAMILY HISTORY:  FHx: rheumatoid arthritis (Mother)  Mother and Brother    FH: type 2 diabetes mellitus (Father)  Father    Family history of hypertension (Father)  Father      ------------------------------------------------------------------------------------------------------------  ITEMS NOT CHECKED ARE NOT PRESENT    SOCIAL HISTORY:   Living Situation: [X ]Home  [ ]Long term care  [ ]Rehab [ ]Other  Support:     Substance hx:  [ ]   Tobacco hx:  [ ]   Alcohol hx: [ ]   Family Hx substance abuse [ ]yes [ ]no    Cheondoism/Spirituality:  PCSSQ[Palliative Care Spiritual Screening Question]   Severity (0-10): 0  Score of 4 or > indicate consideration of Chaplaincy referral.  Chaplaincy Referral: [ ] yes [X ] refused [ ] following    Anticipatory Grief present?:  [ ] yes [X ] no  [ ] Deferred                      Other Referrals:    [ ]Hospice   [ ]Caregiver [place] Support  [ ]Child Life    [ ]Patient & Family Centered Care Referral  [ ]Holistic Therapy     ------------------------------------------------------------------------------------------------------------    PRESENT SYMPTOMS:  [ ] No     [X ] Unable to self-report      [ ] CPOT (ICU)     [ ] PAINADs     [X ] RDOS 0    [ ] Yes     Source if other than patient:  [ ]Family   [ ]Team     PAIN:   If blank, patient unable to specify   [ ]yes [ ]no  QOL impact-   Location -                    Aggravating factors -  Quality -  Radiation -  Timing-  Pain at most severe level (0-10 scale):  Pain at minimal acceptable level/Pain Goal (0-10 scale):     SYMPTOMS:   Dyspnea:                           [ ]Mild [ ]Moderate [ ]Severe  Anxiety:                             [ ]Mild [ ]Moderate [ ]Severe  Fatigue:                             [ ]Mild [ ]Moderate [ ]Severe  Nausea/Vomiting:              [ ]Mild [ ]Moderate [ ]Severe  Loss of appetite:                [ ]Mild [ ]Moderate [ ]Severe  Constipation:                     [ ]Mild [ ]Moderate [ ]Severe    Other Symptoms:  [X ]All other review of systems negative     Home Medications for symptoms if any:  I-Stop Reference No:    ------------------------------------------------------------------------------------------------------------    FUNCTIONAL STATUS:     Baseline ADL (prior to admission):  [ ] Independent  [ ] Moderate Assistance [ ] Dependent  Palliative Performance Score:     [ ]PPSV2 < or = to 30%     NUTRITIONAL STATUS:     Protein Calorie Malnutrition Present:   [ ]mild   [ ]moderate   [ ]severe   [ ]poor nutritional intake   [ ]artificial nutrition      Weight:   [ ]underweight (BMI 18.5 or less)   [ ]morbid obesity (BMI 30 or higher)   [ ]anasarca  [ ]significant weight loss     Height (cm): 157.5 (04-11-23 @ 05:30), 167.6 (01-28-23 @ 19:50), 162.6 (12-18-22 @ 15:32)  Weight (kg): 68.7 (04-11-23 @ 05:30), 74.843 (01-28-23 @ 19:50), 68 (12-18-22 @ 15:32)  BMI (kg/m2): 27.7 (04-11-23 @ 05:30), 26.6 (01-28-23 @ 19:50), 25.7 (12-18-22 @ 15:32)    ------------------------------------------------------------------------------------------------------------    PRIOR ADVANCE DIRECTIVES:    [ ] DNR/MOLST    [ ] Living Will    [ ] Health Care Proxy(s)    DECISION MAKER(s):  [ ] Patient    [ ] Surrogate(s)  [ ] HCP   [ ] Guardian             ------------------------------------------------------------------------------------------------------------  PHYSICAL EXAM:  Vital Signs Last 24 Hrs  T(C): 36.9 (18 Apr 2023 10:26), Max: 37.2 (17 Apr 2023 19:40)  T(F): 98.4 (18 Apr 2023 10:26), Max: 98.9 (17 Apr 2023 19:40)  HR: 64 (18 Apr 2023 10:26) (61 - 80)  BP: 145/65 (18 Apr 2023 10:26) (128/53 - 145/65)  BP(mean): --  RR: 18 (18 Apr 2023 10:26) (18 - 18)  SpO2: 100% (18 Apr 2023 10:26) (94% - 100%)    Parameters below as of 18 Apr 2023 06:25  Patient On (Oxygen Delivery Method): nasal cannula  O2 Flow (L/min): 6   I&O's Summary    17 Apr 2023 07:01  -  18 Apr 2023 07:00  --------------------------------------------------------  IN: 100 mL / OUT: 800 mL / NET: -700 mL    GENERAL:  [ ]Cachexia  [ ] Frail  [ ]Awake  [ ]Oriented x   [ ]Lethargic  [ ]Unarousable  [ ]Verbal  [ ]Non-Verbal    BEHAVIORAL:   [ ] Anxiety  [ ] Delirium [ ] Agitation [ ] Other    HEENT:   [ ]Normal   [ ]Dry mouth   [ ]ET Tube/Trach  [ ]Oral lesions    PULMONARY:   [ ]Clear [ ]Tachypnea  [ ]Audible excessive secretions   [ ]Rhonchi        [ ]Right [ ]Left [ ]Bilateral  [ ]Crackles        [ ]Right [ ]Left [ ]Bilateral  [ ]Wheezing     [ ]Right [ ]Left [ ]Bilateral  [ ]Diminished breath sounds [ ]right [ ]left [ ]bilateral    CARDIOVASCULAR:    [ ]Regular [ ]Irregular [ ]Tachy  [ ]Harmeet [ ]Murmur [ ]Other    GASTROINTESTINAL:  [ ]Soft  [ ]Distended   [ ]+BS  [ ]Non tender [ ]Tender  [ ]Other [ ]PEG [ ]OGT/ NGT      GENITOURINARY:  [ ]Normal [ ] Incontinent   [ ]Oliguria/Anuria   [ ]Palacio    MUSCULOSKELETAL:   [ ]Normal   [ ]Weakness  [ ]Bed/Wheelchair bound [ ]Edema    NEUROLOGIC:   [ ]No focal deficits  [ ]Cognitive impairment  [ ]Dysphagia [ ]Dysarthria [ ]Paresis [ ]Other     SKIN:   [ ]Normal  [ ]Rash  [ ]Other  [ ]Pressure ulcer(s)       Present on admission [ ]y [ ]n    ------------------------------------------------------------------------------------------------------------    LABS:                        10.6   6.87  )-----------( 307      ( 18 Apr 2023 05:02 )             34.7   04-18    141  |  94<L>  |  45<H>  ----------------------------<  121<H>  5.0   |  37<H>  |  1.47<H>    Ca    9.4      18 Apr 2023 05:02  Phos  3.1     04-18  Mg     2.20     04-18    ------------------------------------------------------------------------------------------------------------    CRITICAL CARE:  [ ]Shock Present  [ ]Septic [ ]Cardiogenic [ ]Neurologic [ ]Hypovolemic [ ] Undifferentiated/Mixed   [ ]Vasopressors [ ]Inotropes     [ ]Respiratory failure present: [ ]Acute  [ ]Chronic [ ]Hypoxic  [ ]Hypercarbic   [ ]Mechanical ventilation   [ ]Trach collar   [ ]Non-invasive ventilatory support   [ ]High flow    [ ]Non-rebreather/Venti     [ ]Other organ failure     ------------------------------------------------------------------------------------------------------------    RADIOLOGY & ADDITIONAL STUDIES:    < from: CT Head No Cont (04.10.23 @ 06:26) >  Moderate generalized cerebral volume loss and moderate periventricular   white matter hypoattenuation compatible chronic microvascular ischemic   disease are stable from 01/28/2023.    The visualized paranasal sinuses and the mastoids are grossly clear.    The patient is status post intraocular lens replacement bilaterally.    The calvarium and skull base appear unremarkable.    < end of copied text >    ------------------------------------------------------------------------------------------------------------    ALLERGIES:  Allergies    No Known Allergies    Intolerances    MEDICATIONS  (STANDING):  budesonide 160 MICROgram(s)/formoterol 4.5 MICROgram(s) Inhaler 2 Puff(s) Inhalation two times a day  busPIRone 10 milliGRAM(s) Oral two times a day  dextrose 5%. 1000 milliLiter(s) (100 mL/Hr) IV Continuous <Continuous>  dextrose 5%. 1000 milliLiter(s) (50 mL/Hr) IV Continuous <Continuous>  dextrose 50% Injectable 25 Gram(s) IV Push once  dextrose 50% Injectable 25 Gram(s) IV Push once  dextrose 50% Injectable 12.5 Gram(s) IV Push once  ferrous    sulfate 325 milliGRAM(s) Oral daily  furosemide    Tablet 20 milliGRAM(s) Oral daily  glucagon  Injectable 1 milliGRAM(s) IntraMuscular once  heparin   Injectable 5000 Unit(s) SubCutaneous every 8 hours  insulin lispro (ADMELOG) corrective regimen sliding scale   SubCutaneous three times a day before meals  insulin lispro (ADMELOG) corrective regimen sliding scale   SubCutaneous at bedtime  levothyroxine 112 MICROGram(s) Oral daily  metoprolol succinate ER 25 milliGRAM(s) Oral daily  montelukast 10 milliGRAM(s) Oral daily    MEDICATIONS  (PRN):  acetaminophen     Tablet .. 650 milliGRAM(s) Oral every 6 hours PRN Temp greater or equal to 38C (100.4F), Mild Pain (1 - 3)  albuterol    90 MICROgram(s) HFA Inhaler 2 Puff(s) Inhalation every 6 hours PRN Shortness of Breath and/or Wheezing  aluminum hydroxide/magnesium hydroxide/simethicone Suspension 30 milliLiter(s) Oral every 4 hours PRN Dyspepsia  dextrose Oral Gel 15 Gram(s) Oral once PRN Blood Glucose LESS THAN 70 milliGRAM(s)/deciliter  melatonin 3 milliGRAM(s) Oral at bedtime PRN Insomnia  ondansetron Injectable 4 milliGRAM(s) IV Push every 8 hours PRN Nausea and/or Vomiting           HPI:  Patient is a 88 year old female with pmh of HTN, DM2, COPD (O2 dependent 2-3L,) Chronic diastolic HF, hypothyroidism, CKD3, and hx of hyperkalemia presents after family called EMS for wellness check. Patient is AOx 3 - [facility], April 10, 2023, and states that she is in the hospital because she is not feeling well.  Patient reports that brother Zachariah or NOEMI  called EMS because he was concerned.  [phone]: he was the one who called EMS; he states he called b/c he was not able to get in touch with his mother over the phone. Of note, patient was  discharged from the hospital early February with HHA after a fall and hyperkalemia. Since leaving the hospital she has progressively been getting more weak over the past few weeks. Now she is having a hard time doing basic ADL's including bathroom, showering, cleaning, cooking, eating, getting around the house. Patient reports that she has not had eaten in the past two days because she has been unable to cook for herself. Additionally, she reports a fall but cannot recall the exact day. Denies hitting her head and ambulates with walker at baseline. States feeling SOB but denies chest pain. Cannot really elaborate on if it is lele at rest. Has difficulty getting inhaler. So cannot state if it provides relief. Denies wheezing. Pt has a HHA for 4 hrs/day, 6days/week. Her brother believes pt should be in an assisted living facility at this point. However, patient reports that she likes her independence.   In ED, Patient not hypoxic on home oxygen setting. Work up notable for stable  ABG. CT head negative. CXR small pleural effusion. BNP 5000. Potassium hemolyzed Patient admitted for FTT. Patient has been refusing nocturnal bipap.     Patient seen this AM, unarousable, only grimaces to sternal rub. Per primary team at bedside, patient usually awake and verbal.     PERTINENT PM/SXH:   DM (diabetes mellitus)    Hypothyroid    HTN (hypertension)    COPD (chronic obstructive pulmonary disease)    Chronic bronchitis    Chronic kidney disease (CKD)    Shingles      S/P cholecystectomy    H/O cataract    Leg fracture    S/P hip replacement, left      FAMILY HISTORY:  FHx: rheumatoid arthritis (Mother)  Mother and Brother    FH: type 2 diabetes mellitus (Father)  Father    Family history of hypertension (Father)  Father    ------------------------------------------------------------------------------------------------------------  ITEMS NOT CHECKED ARE NOT PRESENT    SOCIAL HISTORY:   Living Situation: [X ]Home  [ ]Long term care  [ ]Rehab [ ]Other  Support:     Substance hx:  [ ]   Tobacco hx:  [ ]   Alcohol hx: [ ]   Family Hx substance abuse [ ]yes [ ]no    Yarsani/Spirituality:  PCSSQ[Palliative Care Spiritual Screening Question]   Severity (0-10): 0  Score of 4 or > indicate consideration of Chaplaincy referral.  Chaplaincy Referral: [ ] yes [X ] refused [ ] following    Anticipatory Grief present?:  [ ] yes [X ] no  [ ] Deferred                      Other Referrals:    [ ]Hospice   [ ]Caregiver [place] Support  [ ]Child Life    [ ]Patient & Family Centered Care Referral  [ ]Holistic Therapy     ------------------------------------------------------------------------------------------------------------    PRESENT SYMPTOMS:  [ ] No     [X ] Unable to self-report      [ ] CPOT (ICU)     [ ] PAINADs     [X ] RDOS 0    [ ] Yes     Source if other than patient:  [ ]Family   [ ]Team     PAIN:   If blank, patient unable to specify   [ ]yes [ ]no  QOL impact-   Location -                    Aggravating factors -  Quality -  Radiation -  Timing-  Pain at most severe level (0-10 scale):  Pain at minimal acceptable level/Pain Goal (0-10 scale):     SYMPTOMS:   Dyspnea:                           [ ]Mild [ ]Moderate [ ]Severe  Anxiety:                             [ ]Mild [ ]Moderate [ ]Severe  Fatigue:                             [ ]Mild [ ]Moderate [ ]Severe  Nausea/Vomiting:              [ ]Mild [ ]Moderate [ ]Severe  Loss of appetite:                [ ]Mild [ ]Moderate [ ]Severe  Constipation:                     [ ]Mild [ ]Moderate [ ]Severe    Other Symptoms:  [X ]All other review of systems negative     Home Medications for symptoms if any:  I-Stop Reference No:    ------------------------------------------------------------------------------------------------------------    FUNCTIONAL STATUS:     Baseline ADL (prior to admission):  [ ] Independent  [ ] Moderate Assistance [ ] Dependent  Palliative Performance Score:     [ ]PPSV2 < or = to 30%     NUTRITIONAL STATUS:     Protein Calorie Malnutrition Present:   [ ]mild   [ ]moderate   [ ]severe   [ ]poor nutritional intake   [ ]artificial nutrition      Weight:   [ ]underweight (BMI 18.5 or less)   [ ]morbid obesity (BMI 30 or higher)   [ ]anasarca  [ ]significant weight loss     Height (cm): 157.5 (04-11-23 @ 05:30), 167.6 (01-28-23 @ 19:50), 162.6 (12-18-22 @ 15:32)  Weight (kg): 68.7 (04-11-23 @ 05:30), 74.843 (01-28-23 @ 19:50), 68 (12-18-22 @ 15:32)  BMI (kg/m2): 27.7 (04-11-23 @ 05:30), 26.6 (01-28-23 @ 19:50), 25.7 (12-18-22 @ 15:32)    ------------------------------------------------------------------------------------------------------------    PRIOR ADVANCE DIRECTIVES:    [ ] DNR/MOLST    [ ] Living Will    [ ] Health Care Proxy(s)    DECISION MAKER(s):  [ ] Patient    [ ] Surrogate(s)  [ ] HCP   [ ] Guardian             ------------------------------------------------------------------------------------------------------------  PHYSICAL EXAM:  Vital Signs Last 24 Hrs  T(C): 36.9 (18 Apr 2023 10:26), Max: 37.2 (17 Apr 2023 19:40)  T(F): 98.4 (18 Apr 2023 10:26), Max: 98.9 (17 Apr 2023 19:40)  HR: 64 (18 Apr 2023 10:26) (61 - 80)  BP: 145/65 (18 Apr 2023 10:26) (128/53 - 145/65)  BP(mean): --  RR: 18 (18 Apr 2023 10:26) (18 - 18)  SpO2: 100% (18 Apr 2023 10:26) (94% - 100%)    Parameters below as of 18 Apr 2023 06:25  Patient On (Oxygen Delivery Method): nasal cannula  O2 Flow (L/min): 6   I&O's Summary    17 Apr 2023 07:01  -  18 Apr 2023 07:00  --------------------------------------------------------  IN: 100 mL / OUT: 800 mL / NET: -700 mL    GENERAL:  [ ]Cachexia  [ ] Frail  [ ]Awake  [ ]Oriented x   [ ]Lethargic  [ ]Unarousable  [ ]Verbal  [ ]Non-Verbal    BEHAVIORAL:   [ ] Anxiety  [ ] Delirium [ ] Agitation [ ] Other    HEENT:   [ ]Normal   [ ]Dry mouth   [ ]ET Tube/Trach  [ ]Oral lesions    PULMONARY:   [ ]Clear [ ]Tachypnea  [ ]Audible excessive secretions   [ ]Rhonchi        [ ]Right [ ]Left [ ]Bilateral  [ ]Crackles        [ ]Right [ ]Left [ ]Bilateral  [ ]Wheezing     [ ]Right [ ]Left [ ]Bilateral  [ ]Diminished breath sounds [ ]right [ ]left [ ]bilateral    CARDIOVASCULAR:    [ ]Regular [ ]Irregular [ ]Tachy  [ ]Harmeet [ ]Murmur [ ]Other    GASTROINTESTINAL:  [ ]Soft  [ ]Distended   [ ]+BS  [ ]Non tender [ ]Tender  [ ]Other [ ]PEG [ ]OGT/ NGT      GENITOURINARY:  [ ]Normal [ ] Incontinent   [ ]Oliguria/Anuria   [ ]Palacio    MUSCULOSKELETAL:   [ ]Normal   [ ]Weakness  [ ]Bed/Wheelchair bound [ ]Edema    NEUROLOGIC:   [ ]No focal deficits  [ ]Cognitive impairment  [ ]Dysphagia [ ]Dysarthria [ ]Paresis [ ]Other     SKIN:   [ ]Normal  [ ]Rash  [ ]Other  [ ]Pressure ulcer(s)       Present on admission [ ]y [ ]n    ------------------------------------------------------------------------------------------------------------    LABS:                        10.6   6.87  )-----------( 307      ( 18 Apr 2023 05:02 )             34.7   04-18    141  |  94<L>  |  45<H>  ----------------------------<  121<H>  5.0   |  37<H>  |  1.47<H>    Ca    9.4      18 Apr 2023 05:02  Phos  3.1     04-18  Mg     2.20     04-18    ------------------------------------------------------------------------------------------------------------    CRITICAL CARE:  [ ]Shock Present  [ ]Septic [ ]Cardiogenic [ ]Neurologic [ ]Hypovolemic [ ] Undifferentiated/Mixed   [ ]Vasopressors [ ]Inotropes     [ ]Respiratory failure present: [ ]Acute  [ ]Chronic [ ]Hypoxic  [ ]Hypercarbic   [ ]Mechanical ventilation   [ ]Trach collar   [ ]Non-invasive ventilatory support   [ ]High flow    [ ]Non-rebreather/Venti     [ ]Other organ failure     ------------------------------------------------------------------------------------------------------------    RADIOLOGY & ADDITIONAL STUDIES:    < from: CT Head No Cont (04.10.23 @ 06:26) >  Moderate generalized cerebral volume loss and moderate periventricular   white matter hypoattenuation compatible chronic microvascular ischemic   disease are stable from 01/28/2023.    The visualized paranasal sinuses and the mastoids are grossly clear.    The patient is status post intraocular lens replacement bilaterally.    The calvarium and skull base appear unremarkable.    < end of copied text >    ------------------------------------------------------------------------------------------------------------    ALLERGIES:  Allergies    No Known Allergies    Intolerances    MEDICATIONS  (STANDING):  budesonide 160 MICROgram(s)/formoterol 4.5 MICROgram(s) Inhaler 2 Puff(s) Inhalation two times a day  busPIRone 10 milliGRAM(s) Oral two times a day  dextrose 5%. 1000 milliLiter(s) (100 mL/Hr) IV Continuous <Continuous>  dextrose 5%. 1000 milliLiter(s) (50 mL/Hr) IV Continuous <Continuous>  dextrose 50% Injectable 25 Gram(s) IV Push once  dextrose 50% Injectable 25 Gram(s) IV Push once  dextrose 50% Injectable 12.5 Gram(s) IV Push once  ferrous    sulfate 325 milliGRAM(s) Oral daily  furosemide    Tablet 20 milliGRAM(s) Oral daily  glucagon  Injectable 1 milliGRAM(s) IntraMuscular once  heparin   Injectable 5000 Unit(s) SubCutaneous every 8 hours  insulin lispro (ADMELOG) corrective regimen sliding scale   SubCutaneous three times a day before meals  insulin lispro (ADMELOG) corrective regimen sliding scale   SubCutaneous at bedtime  levothyroxine 112 MICROGram(s) Oral daily  metoprolol succinate ER 25 milliGRAM(s) Oral daily  montelukast 10 milliGRAM(s) Oral daily    MEDICATIONS  (PRN):  acetaminophen     Tablet .. 650 milliGRAM(s) Oral every 6 hours PRN Temp greater or equal to 38C (100.4F), Mild Pain (1 - 3)  albuterol    90 MICROgram(s) HFA Inhaler 2 Puff(s) Inhalation every 6 hours PRN Shortness of Breath and/or Wheezing  aluminum hydroxide/magnesium hydroxide/simethicone Suspension 30 milliLiter(s) Oral every 4 hours PRN Dyspepsia  dextrose Oral Gel 15 Gram(s) Oral once PRN Blood Glucose LESS THAN 70 milliGRAM(s)/deciliter  melatonin 3 milliGRAM(s) Oral at bedtime PRN Insomnia  ondansetron Injectable 4 milliGRAM(s) IV Push every 8 hours PRN Nausea and/or Vomiting           HPI:  Patient is a 88 year old female with pmh of HTN, DM2, COPD (O2 dependent 2-3L,) Chronic diastolic HF, hypothyroidism, CKD3, and hx of hyperkalemia presents after family called EMS for wellness check. Patient is AOx 3 - [facility], April 10, 2023, and states that she is in the hospital because she is not feeling well.  Patient reports that brother Zachariah or NOEMI  called EMS because he was concerned.  [phone]: he was the one who called EMS; he states he called b/c he was not able to get in touch with his mother over the phone. Of note, patient was  discharged from the hospital early February with HHA after a fall and hyperkalemia. Since leaving the hospital she has progressively been getting more weak over the past few weeks. Now she is having a hard time doing basic ADL's including bathroom, showering, cleaning, cooking, eating, getting around the house. Patient reports that she has not had eaten in the past two days because she has been unable to cook for herself. Additionally, she reports a fall but cannot recall the exact day. Denies hitting her head and ambulates with walker at baseline. States feeling SOB but denies chest pain. Cannot really elaborate on if it is lele at rest. Has difficulty getting inhaler. So cannot state if it provides relief. Denies wheezing. Pt has a HHA for 4 hrs/day, 6days/week. Her brother believes pt should be in an assisted living facility at this point. However, patient reports that she likes her independence.   In ED, Patient not hypoxic on home oxygen setting. Work up notable for stable  ABG. CT head negative. CXR small pleural effusion. BNP 5000. Potassium hemolyzed Patient admitted for FTT. Patient has been refusing nocturnal bipap.     Patient seen this AM, unarousable, only grimaces to sternal rub. Per primary team at bedside, patient usually awake and verbal. See below for GOC.     PERTINENT PM/SXH:   DM (diabetes mellitus)    Hypothyroid    HTN (hypertension)    COPD (chronic obstructive pulmonary disease)    Chronic bronchitis    Chronic kidney disease (CKD)    Shingles      S/P cholecystectomy    H/O cataract    Leg fracture    S/P hip replacement, left      FAMILY HISTORY:  FHx: rheumatoid arthritis (Mother)  Mother and Brother    FH: type 2 diabetes mellitus (Father)  Father    Family history of hypertension (Father)  Father    ------------------------------------------------------------------------------------------------------------  ITEMS NOT CHECKED ARE NOT PRESENT    SOCIAL HISTORY:   Living Situation: [X ]Home  [ ]Long term care  [ ]Rehab [ ]Other  Support: Lives alone. Brother Zachariah lives in [place] with his wife Bib     Substance hx:  [ ]   Tobacco hx:  [ ]   Alcohol hx: [ ]   Family Hx substance abuse [ ]yes [ ]no    Druze/Spirituality:  PCSSQ[Palliative Care Spiritual Screening Question]   Severity (0-10): 0  Score of 4 or > indicate consideration of Chaplaincy referral.  Chaplaincy Referral: [ ] yes [X ] refused [ ] following    Anticipatory Grief present?:  [ ] yes [X ] no  [ ] Deferred                      Other Referrals:    [ ]Hospice   [ ]Caregiver [place] Support  [ ]Child [facility]    [ ]Patient & Family Centered Care Referral  [ ]Holistic Therapy     ------------------------------------------------------------------------------------------------------------    PRESENT SYMPTOMS:  [ ] No     [X ] Unable to self-report      [ ] CPOT (ICU)     [ ] PAINADs     [X ] RDOS 0    [ ] Yes     Source if other than patient:  [ ]Family   [ ]Team     PAIN:   If blank, patient unable to specify   [ ]yes [ ]no  QOL impact-   Location -                    Aggravating factors -  Quality -  Radiation -  Timing-  Pain at most severe level (0-10 scale):  Pain at minimal acceptable level/Pain Goal (0-10 scale):     SYMPTOMS:   Dyspnea:                           [ ]Mild [ ]Moderate [ ]Severe  Anxiety:                             [ ]Mild [ ]Moderate [ ]Severe  Fatigue:                             [ ]Mild [ ]Moderate [ ]Severe  Nausea/Vomiting:              [ ]Mild [ ]Moderate [ ]Severe  Loss of appetite:                [ ]Mild [ ]Moderate [ ]Severe  Constipation:                     [ ]Mild [ ]Moderate [ ]Severe    Other Symptoms:  [X ]All other review of systems negative     Home Medications for symptoms if any:  I-Stop Reference No:    ------------------------------------------------------------------------------------------------------------    FUNCTIONAL STATUS:     Baseline ADL (prior to admission):  [ ] Independent  [ ] Moderate Assistance [X ] Dependent  Palliative Performance Score: Unclear how patient was caring for herself (lives alone, minimal aide help)     [ ]PPSV2 < or = to 30%     NUTRITIONAL STATUS:     Protein Calorie Malnutrition Present:   [ ]mild   [ ]moderate   [ ]severe   [ ]poor nutritional intake   [ ]artificial nutrition      Weight:   [ ]underweight (BMI 18.5 or less)   [ ]morbid obesity (BMI 30 or higher)   [ ]anasarca  [ ]significant weight loss     Height (cm): 157.5 (04-11-23 @ 05:30), 167.6 (01-28-23 @ 19:50), 162.6 (12-18-22 @ 15:32)  Weight (kg): 68.7 (04-11-23 @ 05:30), 74.843 (01-28-23 @ 19:50), 68 (12-18-22 @ 15:32)  BMI (kg/m2): 27.7 (04-11-23 @ 05:30), 26.6 (01-28-23 @ 19:50), 25.7 (12-18-22 @ 15:32)    ------------------------------------------------------------------------------------------------------------    PRIOR ADVANCE DIRECTIVES:    [ ] DNR/MOLST    [ ] Living Will    [ ] Health Care Proxy(s)    DECISION MAKER(s):  [ ] Patient    [X ] Surrogate(s)- brother Zachariah and his wife Bib   [ ] HCP   [ ] Guardian             ------------------------------------------------------------------------------------------------------------  PHYSICAL EXAM:  Vital Signs Last 24 Hrs  T(C): 36.9 (18 Apr 2023 10:26), Max: 37.2 (17 Apr 2023 19:40)  T(F): 98.4 (18 Apr 2023 10:26), Max: 98.9 (17 Apr 2023 19:40)  HR: 64 (18 Apr 2023 10:26) (61 - 80)  BP: 145/65 (18 Apr 2023 10:26) (128/53 - 145/65)  BP(mean): --  RR: 18 (18 Apr 2023 10:26) (18 - 18)  SpO2: 100% (18 Apr 2023 10:26) (94% - 100%)    Parameters below as of 18 Apr 2023 06:25  Patient On (Oxygen Delivery Method): nasal cannula  O2 Flow (L/min): 6   I&O's Summary    17 Apr 2023 07:01  -  18 Apr 2023 07:00  --------------------------------------------------------  IN: 100 mL / OUT: 800 mL / NET: -700 mL    GENERAL:  [ ]Cachexia  [X ] Frail  [ ]Awake  [ ]Oriented x   [ ]Lethargic  [X ]Unarousable  [ ]Verbal  [ ]Non-Verbal    BEHAVIORAL:   [ ] Anxiety  [ ] Delirium [ ] Agitation [ ] Other    HEENT:   [ ]Normal   [ ]Dry mouth   [ ]ET Tube/Trach  [ ]Oral lesions    PULMONARY:   [ ]Clear [ ]Tachypnea  [ ]Audible excessive secretions   [ ]Rhonchi        [ ]Right [ ]Left [ ]Bilateral  [ ]Crackles        [ ]Right [ ]Left [ ]Bilateral  [ ]Wheezing     [ ]Right [ ]Left [ ]Bilateral  [X ]Diminished breath sounds [ ]right [ ]left [X ]bilateral    CARDIOVASCULAR:    [X ]Regular [ ]Irregular [ ]Tachy  [ ]Harmeet [ ]Murmur [ ]Other    GASTROINTESTINAL:  [X ]Soft  [ ]Distended   [ ]+BS  [X ]Non tender [ ]Tender  [ ]Other [ ]PEG [ ]OGT/ NGT      GENITOURINARY:  [ ]Normal [X ] Incontinent   [ ]Oliguria/Anuria   [ ]Palacio    MUSCULOSKELETAL:   [ ]Normal   [ ]Weakness  [X ]Bed/Wheelchair bound [ ]Edema    NEUROLOGIC:   [X ]No focal deficits  [ ]Cognitive impairment  [ ]Dysphagia [ ]Dysarthria [ ]Paresis [ ]Other     SKIN:   [X ]Normal  [ ]Rash  [ ]Other  [ ]Pressure ulcer(s)       Present on admission [ ]y [ ]n    ------------------------------------------------------------------------------------------------------------    LABS:                        10.6   6.87  )-----------( 307      ( 18 Apr 2023 05:02 )             34.7   04-18    141  |  94<L>  |  45<H>  ----------------------------<  121<H>  5.0   |  37<H>  |  1.47<H>    Ca    9.4      18 Apr 2023 05:02  Phos  3.1     04-18  Mg     2.20     04-18    ------------------------------------------------------------------------------------------------------------    CRITICAL CARE:  [ ]Shock Present  [ ]Septic [ ]Cardiogenic [ ]Neurologic [ ]Hypovolemic [ ] Undifferentiated/Mixed   [ ]Vasopressors [ ]Inotropes     [X ]Respiratory failure present: [X ]Acute  [X ]Chronic [ ]Hypoxic  [X ]Hypercarbic   [ ]Mechanical ventilation   [ ]Trach collar   [X ]Non-invasive ventilatory support   [ ]High flow    [ ]Non-rebreather/Venti     [ ]Other organ failure     ------------------------------------------------------------------------------------------------------------    RADIOLOGY & ADDITIONAL STUDIES:    < from: CT Head No Cont (04.10.23 @ 06:26) >  Moderate generalized cerebral volume loss and moderate periventricular   white matter hypoattenuation compatible chronic microvascular ischemic   disease are stable from 01/28/2023.    The visualized paranasal sinuses and the mastoids are grossly clear.    The patient is status post intraocular lens replacement bilaterally.    The calvarium and skull base appear unremarkable.    < end of copied text >    ------------------------------------------------------------------------------------------------------------    ALLERGIES:  Allergies    No Known Allergies    Intolerances    MEDICATIONS  (STANDING):  budesonide 160 MICROgram(s)/formoterol 4.5 MICROgram(s) Inhaler 2 Puff(s) Inhalation two times a day  busPIRone 10 milliGRAM(s) Oral two times a day  dextrose 5%. 1000 milliLiter(s) (100 mL/Hr) IV Continuous <Continuous>  dextrose 5%. 1000 milliLiter(s) (50 mL/Hr) IV Continuous <Continuous>  dextrose 50% Injectable 25 Gram(s) IV Push once  dextrose 50% Injectable 25 Gram(s) IV Push once  dextrose 50% Injectable 12.5 Gram(s) IV Push once  ferrous    sulfate 325 milliGRAM(s) Oral daily  furosemide    Tablet 20 milliGRAM(s) Oral daily  glucagon  Injectable 1 milliGRAM(s) IntraMuscular once  heparin   Injectable 5000 Unit(s) SubCutaneous every 8 hours  insulin lispro (ADMELOG) corrective regimen sliding scale   SubCutaneous three times a day before meals  insulin lispro (ADMELOG) corrective regimen sliding scale   SubCutaneous at bedtime  levothyroxine 112 MICROGram(s) Oral daily  metoprolol succinate ER 25 milliGRAM(s) Oral daily  montelukast 10 milliGRAM(s) Oral daily    MEDICATIONS  (PRN):  acetaminophen     Tablet .. 650 milliGRAM(s) Oral every 6 hours PRN Temp greater or equal to 38C (100.4F), Mild Pain (1 - 3)  albuterol    90 MICROgram(s) HFA Inhaler 2 Puff(s) Inhalation every 6 hours PRN Shortness of Breath and/or Wheezing  aluminum hydroxide/magnesium hydroxide/simethicone Suspension 30 milliLiter(s) Oral every 4 hours PRN Dyspepsia  dextrose Oral Gel 15 Gram(s) Oral once PRN Blood Glucose LESS THAN 70 milliGRAM(s)/deciliter  melatonin 3 milliGRAM(s) Oral at bedtime PRN Insomnia  ondansetron Injectable 4 milliGRAM(s) IV Push every 8 hours PRN Nausea and/or Vomiting           HPI:  Patient is a 88 year old female with pmh of HTN, DM2, COPD (O2 dependent 2-3L,) Chronic diastolic HF, hypothyroidism, CKD3, and hx of hyperkalemia presents after family called EMS for wellness check. Patient is AOx  [facility] [place], April 10, 2023, and states that she is in the hospital because she is not feeling well.  Patient reports that brother Zachariah or NOEMI  called EMS because he was concerned.  [phone]: he was the one who called EMS; he states he called b/c he was not able to get in touch with his mother over the phone. Of note, patient was  discharged from the hospital early February with HHA after a fall and hyperkalemia. Since leaving the hospital she has progressively been getting more weak over the past few weeks. Now she is having a hard time doing basic ADL's including bathroom, showering, cleaning, cooking, eating, getting around the house. Patient reports that she has not had eaten in the past two days because she has been unable to cook for herself. Additionally, she reports a fall but cannot recall the exact day. Denies hitting her head and ambulates with walker at baseline. States feeling SOB but denies chest pain. Cannot really elaborate on if it is lele at rest. Has difficulty getting inhaler. So cannot state if it provides relief. Denies wheezing. Pt has a HHA for 4 hrs/day, 6days/week. Her brother believes pt should be in an assisted living facility at this point. However, patient reports that she likes her independence.   In ED, Patient not hypoxic on home oxygen setting. Work up notable for stable  ABG. CT head negative. CXR small pleural effusion. BNP 5000. Potassium hemolyzed Patient admitted for FTT. Patient has been refusing nocturnal bipap. Palliative consulted for complex medical decision making in the setting of serious illness.     Patient seen this AM, unarousable, only grimaces to sternal rub. Per primary team at bedside, patient usually awake and verbal.   VBG showed hypercarbia, CO2 in 80s. RRT called, patient placed back on bipap. See below for GOC.       See below for GOC.     PERTINENT PM/SXH:   DM (diabetes mellitus)    Hypothyroid    HTN (hypertension)    COPD (chronic obstructive pulmonary disease)    Chronic bronchitis    Chronic kidney disease (CKD)    Shingles      S/P cholecystectomy    H/O cataract    Leg fracture    S/P hip replacement, left      FAMILY HISTORY:  FHx: rheumatoid arthritis (Mother)  Mother and Brother    FH: type 2 diabetes mellitus (Father)  Father    Family history of hypertension (Father)  Father    ------------------------------------------------------------------------------------------------------------  ITEMS NOT CHECKED ARE NOT PRESENT    SOCIAL HISTORY:   Living Situation: [X ]Home  [ ]Long term care  [ ]Rehab [ ]Other  Support: Lives alone. Brother Zachariah lives in [place] with his wife Bib     Substance hx:  [ ]   Tobacco hx:  [ ]   Alcohol hx: [ ]   Family Hx substance abuse [ ]yes [ ]no    Latter day/Spirituality:  PCSSQ[Palliative Care Spiritual Screening Question]   Severity (0-10): 0  Score of 4 or > indicate consideration of Chaplaincy referral.  Chaplaincy Referral: [ ] yes [X ] refused [ ] following    Anticipatory Grief present?:  [ ] yes [X ] no  [ ] Deferred                      Other Referrals:    [ ]Hospice   [ ]Caregiver [place] Support  [ ]Child Life    [ ]Patient & Family Centered Care Referral  [ ]Holistic Therapy     ------------------------------------------------------------------------------------------------------------    PRESENT SYMPTOMS:  [ ] No     [X ] Unable to self-report      [ ] CPOT (ICU)     [ ] PAINADs     [X ] RDOS 0    [ ] Yes     Source if other than patient:  [ ]Family   [ ]Team     PAIN:   If blank, patient unable to specify   [ ]yes [ ]no  QOL impact-   Location -                    Aggravating factors -  Quality -  Radiation -  Timing-  Pain at most severe level (0-10 scale):  Pain at minimal acceptable level/Pain Goal (0-10 scale):     SYMPTOMS:   Dyspnea:                           [ ]Mild [ ]Moderate [ ]Severe  Anxiety:                             [ ]Mild [ ]Moderate [ ]Severe  Fatigue:                             [ ]Mild [ ]Moderate [ ]Severe  Nausea/Vomiting:              [ ]Mild [ ]Moderate [ ]Severe  Loss of appetite:                [ ]Mild [ ]Moderate [ ]Severe  Constipation:                     [ ]Mild [ ]Moderate [ ]Severe    Other Symptoms:  [X ]All other review of systems negative     Home Medications for symptoms if any:  I-Stop Reference No:    ------------------------------------------------------------------------------------------------------------    FUNCTIONAL STATUS:     Baseline ADL (prior to admission):  [ ] Independent  [ ] Moderate Assistance [X ] Dependent  Palliative Performance Score: 30%   Unable to ADLs   Lived alone     [ ]PPSV2 < or = to 30%     NUTRITIONAL STATUS:     Protein Calorie Malnutrition Present:   [ ]mild   [ ]moderate   [ ]severe   [ ]poor nutritional intake   [ ]artificial nutrition      Weight:   [ ]underweight (BMI 18.5 or less)   [ ]morbid obesity (BMI 30 or higher)   [ ]anasarca  [ ]significant weight loss     Height (cm): 157.5 (04-11-23 @ 05:30), 167.6 (01-28-23 @ 19:50), 162.6 (12-18-22 @ 15:32)  Weight (kg): 68.7 (04-11-23 @ 05:30), 74.843 (01-28-23 @ 19:50), 68 (12-18-22 @ 15:32)  BMI (kg/m2): 27.7 (04-11-23 @ 05:30), 26.6 (01-28-23 @ 19:50), 25.7 (12-18-22 @ 15:32)    ------------------------------------------------------------------------------------------------------------    PRIOR ADVANCE DIRECTIVES:    [ ] DNR/MOLST    [ ] Living Will    [ ] Health Care Proxy(s)    DECISION MAKER(s):  [ ] Patient    [X ] Surrogate(s)- brother Zachariah and his wife Bib   [ ] HCP   [ ] Guardian             ------------------------------------------------------------------------------------------------------------  PHYSICAL EXAM:  Vital Signs Last 24 Hrs  T(C): 36.9 (18 Apr 2023 10:26), Max: 37.2 (17 Apr 2023 19:40)  T(F): 98.4 (18 Apr 2023 10:26), Max: 98.9 (17 Apr 2023 19:40)  HR: 64 (18 Apr 2023 10:26) (61 - 80)  BP: 145/65 (18 Apr 2023 10:26) (128/53 - 145/65)  BP(mean): --  RR: 18 (18 Apr 2023 10:26) (18 - 18)  SpO2: 100% (18 Apr 2023 10:26) (94% - 100%)    Parameters below as of 18 Apr 2023 06:25  Patient On (Oxygen Delivery Method): nasal cannula  O2 Flow (L/min): 6   I&O's Summary    17 Apr 2023 07:01  -  18 Apr 2023 07:00  --------------------------------------------------------  IN: 100 mL / OUT: 800 mL / NET: -700 mL    GENERAL:  [ ]Cachexia  [X ] Frail  [ ]Awake  [ ]Oriented x   [ ]Lethargic  [X ]Unarousable  [ ]Verbal  [ ]Non-Verbal    BEHAVIORAL:   [ ] Anxiety  [ ] Delirium [ ] Agitation [ ] Other    HEENT:   [ ]Normal   [ ]Dry mouth   [ ]ET Tube/Trach  [ ]Oral lesions    PULMONARY:   [ ]Clear [ ]Tachypnea  [ ]Audible excessive secretions   [ ]Rhonchi        [ ]Right [ ]Left [ ]Bilateral  [ ]Crackles        [ ]Right [ ]Left [ ]Bilateral  [ ]Wheezing     [ ]Right [ ]Left [ ]Bilateral  [X ]Diminished breath sounds [ ]right [ ]left [X ]bilateral    CARDIOVASCULAR:    [X ]Regular [ ]Irregular [ ]Tachy  [ ]Harmeet [ ]Murmur [ ]Other    GASTROINTESTINAL:  [X ]Soft  [ ]Distended   [ ]+BS  [X ]Non tender [ ]Tender  [ ]Other [ ]PEG [ ]OGT/ NGT      GENITOURINARY:  [ ]Normal [X ] Incontinent   [ ]Oliguria/Anuria   [ ]Palacio    MUSCULOSKELETAL:   [ ]Normal   [ ]Weakness  [X ]Bed/Wheelchair bound [ ]Edema    NEUROLOGIC:   [X ]No focal deficits  [ ]Cognitive impairment  [ ]Dysphagia [ ]Dysarthria [ ]Paresis [ ]Other     SKIN:   [X ]Normal  [ ]Rash  [ ]Other  [ ]Pressure ulcer(s)       Present on admission [ ]y [ ]n    ------------------------------------------------------------------------------------------------------------    LABS:                        10.6   6.87  )-----------( 307      ( 18 Apr 2023 05:02 )             34.7   04-18    141  |  94<L>  |  45<H>  ----------------------------<  121<H>  5.0   |  37<H>  |  1.47<H>    Ca    9.4      18 Apr 2023 05:02  Phos  3.1     04-18  Mg     2.20     04-18    ------------------------------------------------------------------------------------------------------------    CRITICAL CARE:  [ ]Shock Present  [ ]Septic [ ]Cardiogenic [ ]Neurologic [ ]Hypovolemic [ ] Undifferentiated/Mixed   [ ]Vasopressors [ ]Inotropes     [X ]Respiratory failure present: [X ]Acute  [X ]Chronic [ ]Hypoxic  [X ]Hypercarbic   [ ]Mechanical ventilation   [ ]Trach collar   [X ]Non-invasive ventilatory support   [ ]High flow    [ ]Non-rebreather/Venti     [ ]Other organ failure     ------------------------------------------------------------------------------------------------------------    RADIOLOGY & ADDITIONAL STUDIES:    < from: CT Head No Cont (04.10.23 @ 06:26) >  Moderate generalized cerebral volume loss and moderate periventricular   white matter hypoattenuation compatible chronic microvascular ischemic   disease are stable from 01/28/2023.    The visualized paranasal sinuses and the mastoids are grossly clear.    The patient is status post intraocular lens replacement bilaterally.    The calvarium and skull base appear unremarkable.    < end of copied text >    ------------------------------------------------------------------------------------------------------------    ALLERGIES:  Allergies    No Known Allergies    Intolerances    MEDICATIONS  (STANDING):  budesonide 160 MICROgram(s)/formoterol 4.5 MICROgram(s) Inhaler 2 Puff(s) Inhalation two times a day  busPIRone 10 milliGRAM(s) Oral two times a day  dextrose 5%. 1000 milliLiter(s) (100 mL/Hr) IV Continuous <Continuous>  dextrose 5%. 1000 milliLiter(s) (50 mL/Hr) IV Continuous <Continuous>  dextrose 50% Injectable 25 Gram(s) IV Push once  dextrose 50% Injectable 25 Gram(s) IV Push once  dextrose 50% Injectable 12.5 Gram(s) IV Push once  ferrous    sulfate 325 milliGRAM(s) Oral daily  furosemide    Tablet 20 milliGRAM(s) Oral daily  glucagon  Injectable 1 milliGRAM(s) IntraMuscular once  heparin   Injectable 5000 Unit(s) SubCutaneous every 8 hours  insulin lispro (ADMELOG) corrective regimen sliding scale   SubCutaneous three times a day before meals  insulin lispro (ADMELOG) corrective regimen sliding scale   SubCutaneous at bedtime  levothyroxine 112 MICROGram(s) Oral daily  metoprolol succinate ER 25 milliGRAM(s) Oral daily  montelukast 10 milliGRAM(s) Oral daily    MEDICATIONS  (PRN):  acetaminophen     Tablet .. 650 milliGRAM(s) Oral every 6 hours PRN Temp greater or equal to 38C (100.4F), Mild Pain (1 - 3)  albuterol    90 MICROgram(s) HFA Inhaler 2 Puff(s) Inhalation every 6 hours PRN Shortness of Breath and/or Wheezing  aluminum hydroxide/magnesium hydroxide/simethicone Suspension 30 milliLiter(s) Oral every 4 hours PRN Dyspepsia  dextrose Oral Gel 15 Gram(s) Oral once PRN Blood Glucose LESS THAN 70 milliGRAM(s)/deciliter  melatonin 3 milliGRAM(s) Oral at bedtime PRN Insomnia  ondansetron Injectable 4 milliGRAM(s) IV Push every 8 hours PRN Nausea and/or Vomiting

## 2023-04-18 NOTE — RAPID RESPONSE TEAM SUMMARY - NSSITUATIONBACKGROUNDRRT_GEN_ALL_CORE
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     RRT called for altered mental status, at baseline patient AOx3, however became lethargic and minimally responsive. Of note, patient refusing BiPAP earlier. On arrival, patient afebrile rectally, MAP 79, HR wnl, patient already placed on BiPAP 35% FiO2 saturating 93%. FS wnl. CXR without evidence of effusions, pneumonia or other acute changes. ABG sent prior to RRT. On BiPAP, patient became arousable, returned to baseline. RRT ended.

## 2023-04-18 NOTE — PROGRESS NOTE ADULT - PROBLEM SELECTOR PLAN 1
- Worsening hypercarbic respiratory failure 2/2 COPD, HF   - Was recommended to do nocturnal bipap however patient had been refusing   - Today became unarousable due to severe hypercarbia in 80s, placed on bipap   - Family agreed to DNR/DNI

## 2023-04-18 NOTE — PROGRESS NOTE ADULT - PROBLEM SELECTOR PLAN 3
admitted with shortness of breath   currently on home O2   CXR small pleural effusion. BNP 5000  D-dimer 390 and 880 is cutoff adjusted for age -> VTE unlikely   wells score 0-> low risk for PE  TTE: calcified mitral leaflets with normal diastolic opening. Normal left ventricular internal dimensions and wall  thicknesses. Hyperdynamic left ventricle. Normal right ventricular size and function.      - continue O2 and duonebs prn   - Monitor spO2  - continue to monitor admitted with shortness of breath, currently on 4L NC   CXR small pleural effusion. BNP 5000  D-dimer 390 and 880 is cutoff adjusted for age -> VTE unlikely   wells score 0-> low risk for PE  TTE: calcified mitral leaflets with normal diastolic opening. Normal left ventricular internal dimensions and wall  thicknesses. Hyperdynamic left ventricle. Normal right ventricular size and function.    - Bipap Qhs- encourage compliance   - continue O2 and duonebs prn   - Monitor spO2  - continue to monitor

## 2023-04-18 NOTE — PROGRESS NOTE ADULT - CONVERSATION DETAILS
Spoke to patient's brother Zachariah and sister in law Bib about patient's deteriorating condition. They are aware patient has been refusing interventions such as bipap. ELDA shares patient probably has mental health issues, patient has "a lot of fears and anxiety" and is a "tough customer." Explained that if patient worsens even on bipap, intubation might be indicated. However ELDA says "no no no" and that she does not believe would want to be on a ventilator. Recommended DNR/DNI as CPR/intubation have poor outcomes in a patient with such serious illness and add more burdens at end of life. Brother and ELDA are in agreement. If patient continues to decline, they are also open to comfort care measures/hospice. Spoke to patient's brother Zachariah and sister in law Bib about patient's deteriorating condition. They are aware patient has been refusing interventions such as bipap. ELDA shares patient probably has mental health issues, patient has "a lot of fears and anxiety" and is a "tough customer." Explained that if patient worsens even on bipap, intubation might be indicated. However ELDA says "no no no" and that she does not believe patient would want to be on a ventilator. Recommended DNR/DNI as CPR/intubation have poor outcomes in a patient with such serious illness and add more burdens at end of life. Brother and ELDA are in agreement. If patient continues to decline, they are also open to comfort care measures/hospice.

## 2023-04-18 NOTE — PROGRESS NOTE ADULT - PROBLEM SELECTOR PLAN 4
Pt on On 3L NC at home and reports feeling sob on admission  Unclear etiology of SOB. CXR small pleural effusion. BNP 5000  VBG showing elevated pCO2. Hypercapnic resp failure given resp acidosis    - Refusing bipap. Encouraged use. Per last GOC, patient full code but refusing bipap   [ ] VBG ordered for today and AM   [ ] Pall care c/s for GOC   - Continue O2 supplementation   - monitor spO2   - Continue home Symbicort, and Duoneb prn sob Pt on On 3L NC at home and reports feeling sob on admission  Unclear etiology of SOB. CXR small pleural effusion. BNP 5000  VBG showing elevated pCO2. Hypercapnic resp failure given resp acidosis    - Refusing bipap. Encouraged use. Per last GOC, patient full code but refusing bipap.   - Bipap nightly   [ ] Pall care c/s for help with GOC  - Continue O2 supplementation   - monitor spO2   - Continue home Symbicort, and Duoneb prn sob

## 2023-04-18 NOTE — PROGRESS NOTE ADULT - PROBLEM SELECTOR PLAN 1
RRT called this AM for encephalopathy/lethargy 2/2 hypercarbia. AM VBG 7.4/61, ABG 7.35/80, lethargy, MS improved with Bipap   - Continue Bipap   [ ] Repeat VBG, if improved, can D/C cont Bipap   - Encourage strict adherence to nightly Bipap   - Long discussion with family- patient reporting feeling "suffocated/anxious" with Bipap, has made suicidal comments to brother about not wanting to live. Question if patient has capacity or if influenced by anxiety/depression. Will consider Psych consult once MS improves and patient off Bipap so she can be evaluated.

## 2023-04-18 NOTE — PROGRESS NOTE ADULT - NSPROGADDITIONALINFOA_GEN_ALL_CORE
Present at bedside prior to rapid, throughout rapid and multiple checks following rapid.   Over 60 minutes spent at bedside

## 2023-04-18 NOTE — SWALLOW BEDSIDE ASSESSMENT ADULT - COMMENTS
Consult received for clinical swallow evaluation. However, per discussion with ACP, swallow evaluation not indicated at this time as patient with RRT this PM and acute change in status. Reconsult this service as patient becomes medically optimized.
Progress Note- [facility]- Hospitalist Attending 4/16: "88 year old female with pmh of HTN, DM2, COPD (O2 dependent 2-3L,) Chronic diastolic HF, hypothyroidism, CKD3, and hx of hyperkalemia brought in after family concerned about patient inability to care for herself. Admitted for further evaluation."    No recent chest imaging available at this time.     Patient seen for initial swallow evaluation on 4/11 which patient refused. (see consult for details)     Patient seen awake and confused/agitated during clinical swallow evaluation this PM with RN and PCA present at bedside. Patient noted to be screaming, undressing herself, and removed nasal cannula and refused to let RN place nasal cannula back on. Patient screaming, "No! Get away from me!" SLP, RN, and PCA attempted to provide verbal encouragement and care for patient however patient continually screaming "Help!" and swatting away RN. Team informed for medical management.
As per H&P dated 4/10/23, "88 year old female with pmh of HTN, DM2, COPD (O2 dependent 2-3L,) Chronic diastolic HF, hypothyroidism, CKD3, and hx of hyperkalemia brought in after family concerned about patient inability to care for herself."    CXR 4/10/23 "IMPRESSION: Left lower lung hazy opacity likely small pleural effusion."  CT Head 4/102/23 "IMPRESSION: No CT evidence of acute intracranial pathology.  No significant interval change from 01/28/2023.  Follow-up MRI may be obtained as clinically warranted."    Patient visited at bedside for clinical swallow evaluation. Patient initially in a sleep state, able to rouse with verbal and tactile cues. Patient is able to make basic wants/needs known.

## 2023-04-18 NOTE — PROGRESS NOTE ADULT - PROBLEM SELECTOR PLAN 2
s/p fall approx one week ago, reports head strike but no LOC. as per pt it was mechanical fall   at baseline she Ambulates with a walker  CT head: There is no CT evidence of acute intracranial hemorrhage, mass effect or midline shift.  Gray matter-white matter differentiation is grossly preserved. Moderate generalized cerebral volume loss and moderate periventricular   white matter hypoattenuation compatible chronic microvascular ischemic disease are stable from 01/28/2023.  trops 36     - PT recommends MEGAN   - Social work consult  - Nutrition consulted and follow up recs s/p fall approx one week ago, reports head strike but no LOC. as per pt it was mechanical fall. At baseline she ambulates with a walker  CT head: There is no CT evidence of acute intracranial hemorrhage, mass effect or midline shift.  Gray matter-white matter differentiation is grossly preserved. Moderate generalized cerebral volume loss and moderate periventricular   white matter hypoattenuation compatible chronic microvascular ischemic disease are stable from 01/28/2023.    - PT recommends MEGAN   - Social work consult  - Nutrition consulted and follow up recs

## 2023-04-18 NOTE — PROGRESS NOTE ADULT - PROBLEM SELECTOR PLAN 5
- Was sent to hospital due to concern she was unable to care for herself   - Had recent fall, gradual decline since   - Unable to do ADLs any longer   - Frail   - Current PPS 10%

## 2023-04-18 NOTE — PROGRESS NOTE ADULT - ASSESSMENT
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

## 2023-04-18 NOTE — PROGRESS NOTE ADULT - PROBLEM SELECTOR PLAN 4
Patient Education   Here is the plan from today's visit    Use lip sunscreens when out in sun  Talk to dermatologist about dry lips if these continue     1. Atopic dermatitis, unspecified type  -Frequent aquaphor application  -Moisturize at least twice daily with cereve - use a nighttime cotton glove with a good amount of lotion each night  -Can use kenalog for breakout flares for twice daily for 2 weeks.   -see the skin doctors when you're able to figure out a definitive plan    Please call or return to clinic if your symptoms don't go away.    Follow up plan  No follow-ups on file.    Thank you for coming to [facility] today.  Lab Testing:  **If you had lab testing today and your results are reassuring or normal they will be mailed to you or sent through [employer] within 7 days.   **If the lab tests need quick action we will call you with the results.  **If you are having labs done on a different day, please call [phone] to schedule at [facility] or [phone] for other [facility] locations.   The phone number we will call with results is # [phone] (home) . If this is not the best number please call our clinic and change the number.  Medication Refills:  If you need any refills please call your pharmacy and they will contact us.   If you need to  your refill at a new pharmacy, please contact the new pharmacy directly. The new pharmacy will help you get your medications transferred faster.   Scheduling:  If you have any concerns about today's visit or wish to schedule another appointment please call our office during normal business hours [phone] (8-5:00 M-F)  If a referral was made to a [facility] and you don't get a call from central scheduling please call [phone].  If a Mammogram was ordered for you at [facility] call [phone] to schedule or change your appointment.  If you had an EKG/XRay/CT/Ultrasound/MRI ordered the number is  [phone] to schedule or change your radiology appointment.   Medical Concerns:  If you have urgent medical concerns please call [phone] at any time of the day.    Manish Wyman MD             - progressive disease

## 2023-04-18 NOTE — PROGRESS NOTE ADULT - PROBLEM SELECTOR PLAN 6
Palliative following for CMDM - Patient currently has no mental status. Decision maker therefore is her NOK brother Zachariah, who makes decisions with his wife Bib.   -See [facility] note. I spent 25 minutes addressing advanced care planning with patient and/or decision maker(s). Family agreed to DNR/DNI - Patient currently has no mental status. Decision maker therefore is her NOK brother Zachariah, who makes decisions with his wife Bib.   -See [facility] note. I spent 25 minutes addressing advanced care planning with patient and/or decision maker(s). Family agreed to DNR/DNI, MOLST completed.

## 2023-04-18 NOTE — PROGRESS NOTE ADULT - SUBJECTIVE AND OBJECTIVE BOX
Merlin Mathew, MD   Hospitalist  Pager #[phone]    PROGRESS NOTE:     Patient is a 88y old  Female who presents with a chief complaint of Weakness (18 Apr 2023 11:48)      SUBJECTIVE / OVERNIGHT EVENTS: NEON   Notified that patient was lethargic. LKN at 8:40 by RN who reported patient was awake, ate breakfast. Then around 11am, patient noted to be lethargic, not waking up to sternal rub.   RRT called, ABG collected notign hypercarbia. MS improved with Bipap.   Called patients brother and sister in law and informed them of events  See RRT note for more details     ADDITIONAL REVIEW OF SYSTEMS:    MEDICATIONS  (STANDING):  budesonide 160 MICROgram(s)/formoterol 4.5 MICROgram(s) Inhaler 2 Puff(s) Inhalation two times a day  busPIRone 10 milliGRAM(s) Oral two times a day  dextrose 5%. 1000 milliLiter(s) (50 mL/Hr) IV Continuous <Continuous>  dextrose 5%. 1000 milliLiter(s) (100 mL/Hr) IV Continuous <Continuous>  dextrose 50% Injectable 25 Gram(s) IV Push once  dextrose 50% Injectable 25 Gram(s) IV Push once  dextrose 50% Injectable 12.5 Gram(s) IV Push once  ferrous    sulfate 325 milliGRAM(s) Oral daily  furosemide    Tablet 20 milliGRAM(s) Oral daily  glucagon  Injectable 1 milliGRAM(s) IntraMuscular once  heparin   Injectable 5000 Unit(s) SubCutaneous every 8 hours  insulin lispro (ADMELOG) corrective regimen sliding scale   SubCutaneous three times a day before meals  insulin lispro (ADMELOG) corrective regimen sliding scale   SubCutaneous at bedtime  levothyroxine 112 MICROGram(s) Oral daily  metoprolol succinate ER 25 milliGRAM(s) Oral daily  montelukast 10 milliGRAM(s) Oral daily    MEDICATIONS  (PRN):  acetaminophen     Tablet .. 650 milliGRAM(s) Oral every 6 hours PRN Temp greater or equal to 38C (100.4F), Mild Pain (1 - 3)  albuterol    90 MICROgram(s) HFA Inhaler 2 Puff(s) Inhalation every 6 hours PRN Shortness of Breath and/or Wheezing  aluminum hydroxide/magnesium hydroxide/simethicone Suspension 30 milliLiter(s) Oral every 4 hours PRN Dyspepsia  dextrose Oral Gel 15 Gram(s) Oral once PRN Blood Glucose LESS THAN 70 milliGRAM(s)/deciliter  melatonin 3 milliGRAM(s) Oral at bedtime PRN Insomnia  ondansetron Injectable 4 milliGRAM(s) IV Push every 8 hours PRN Nausea and/or Vomiting      CAPILLARY BLOOD GLUCOSE      POCT Blood Glucose.: 177 mg/dL (18 Apr 2023 11:07)  POCT Blood Glucose.: 127 mg/dL (18 Apr 2023 07:28)  POCT Blood Glucose.: 115 mg/dL (17 Apr 2023 22:02)  POCT Blood Glucose.: 115 mg/dL (17 Apr 2023 17:03)    I&O's Summary    17 Apr 2023 07:01  -  18 Apr 2023 07:00  --------------------------------------------------------  IN: 100 mL / OUT: 800 mL / NET: -700 mL        PHYSICAL EXAM:  Vital Signs Last 24 Hrs  T(C): 36.9 (18 Apr 2023 10:26), Max: 37.2 (17 Apr 2023 19:40)  T(F): 98.4 (18 Apr 2023 10:26), Max: 98.9 (17 Apr 2023 19:40)  HR: 65 (18 Apr 2023 14:35) (61 - 80)  BP: 145/65 (18 Apr 2023 10:26) (128/53 - 145/65)  BP(mean): --  RR: 18 (18 Apr 2023 10:26) (18 - 18)  SpO2: 97% (18 Apr 2023 14:35) (94% - 100%)    Parameters below as of 18 Apr 2023 06:25  Patient On (Oxygen Delivery Method): nasal cannula  O2 Flow (L/min): 6    End of rapid:   GENERAL: NAD, elderly female  CHEST/LUNG: No use of accessory muscles, CTA B/L, breathing non-labored on Bipap   COR: RR, no mrcg  ABD: Soft, ND/NT, +BS  PSYCH: AAOx3  NEUROLOGY: Moving all extremities, eyes open, responding to stimuli   SKIN: No rashes or lesions  EXT: no LE edema noted B/L     LABS:                          10.6   6.87  )-----------( 307      ( 18 Apr 2023 05:02 )             34.7     04-18    141  |  94<L>  |  45<H>  ----------------------------<  121<H>  5.0   |  37<H>  |  1.47<H>    Ca    9.4      18 Apr 2023 05:02  Phos  3.1     04-18  Mg     2.20     04-18                  RADIOLOGY & ADDITIONAL TESTS:  Results Reviewed:   Imaging Personally Reviewed:  Electrocardiogram Personally Reviewed:    COORDINATION OF CARE:  Care Discussed with Consultants/Other Providers [Y/N]:  Prior or Outpatient Records Reviewed [Y/N]:

## 2023-04-18 NOTE — PROGRESS NOTE ADULT - ASSESSMENT
Patient is a 88 year old female with pmh of HTN, DM2, COPD (O2 dependent 2-3L,) Chronic diastolic HF, hypothyroidism, CKD3, and hx of hyperkalemia presents after family called EMS for wellness check. Patient is AOx  - [facility], April 10, 2023, and states that she is in the hospital because she is not feeling well.  Patient reports that brother Zachariah or NOEMI  called EMS because he was concerned.  [phone]: he was the one who called EMS; he states he called b/c he was not able to get in touch with his mother over the phone. Of note, patient was  discharged from the hospital early February with HHA after a fall and hyperkalemia. Since leaving the hospital she has progressively been getting more weak over the past few weeks. Now she is having a hard time doing basic ADL's including bathroom, showering, cleaning, cooking, eating, getting around the house. Patient reports that she has not had eaten in the past two days because she has been unable to cook for herself. Additionally, she reports a fall but cannot recall the exact day. Denies hitting her head and ambulates with walker at baseline. States feeling SOB but denies chest pain. Cannot really elaborate on if it is lele at rest. Has difficulty getting inhaler. So cannot state if it provides relief. Denies wheezing. Pt has a HHA for 4 hrs/day, 6days/week. Her brother believes pt should be in an assisted living facility at this point. However, patient reports that she likes her independence.   In ED, Patient not hypoxic on home oxygen setting. Work up notable for stable  ABG. CT head negative. CXR small pleural effusion. BNP 5000. Potassium hemolyzed Patient admitted for FTT. Patient has been refusing nocturnal bipap.  Patient is a 88 year old female with pmh of HTN, DM2, COPD (O2 dependent 2-3L,) Chronic diastolic HF, hypothyroidism, CKD3, and hx of hyperkalemia presents after family called EMS for wellness check. Patient is AOx  - [facility], April 10, 2023, and states that she is in the hospital because she is not feeling well.  Patient reports that brother Zachariah or NOEMI  called EMS because he was concerned.  [phone]: he was the one who called EMS; he states he called b/c he was not able to get in touch with his mother over the phone. Of note, patient was  discharged from the hospital early February with HHA after a fall and hyperkalemia. Since leaving the hospital she has progressively been getting more weak over the past few weeks. Now she is having a hard time doing basic ADL's including bathroom, showering, cleaning, cooking, eating, getting around the house. Patient reports that she has not had eaten in the past two days because she has been unable to cook for herself. Additionally, she reports a fall but cannot recall the exact day. Denies hitting her head and ambulates with walker at baseline. States feeling SOB but denies chest pain. Cannot really elaborate on if it is lele at rest. Has difficulty getting inhaler. So cannot state if it provides relief. Denies wheezing. Pt has a HHA for 4 hrs/day, 6days/week. Her brother believes pt should be in an assisted living facility at this point. However, patient reports that she likes her independence.   In ED, Patient not hypoxic on home oxygen setting. Work up notable for stable  ABG. CT head negative. CXR small pleural effusion. BNP 5000. Potassium hemolyzed Patient admitted for FTT. Patient has been refusing nocturnal bipap. Palliative consulted for complex medical decision making in the setting of serious illness.

## 2023-04-19 NOTE — PROVIDER CONTACT NOTE (OTHER) - BACKGROUND
Hyperkalemia, weakness, CKD, DM, HTN, COPD
COPD, CKD
Admitted for weakness. Patient had RRT called yesterday for AMS. Patient is ordered for continuos BIPAP for critical CO2 levels of 67.
Admitted for weakness.
CKD, DM, COPD
Patient admitted for weakness and inability to care for herself.

## 2023-04-19 NOTE — PROVIDER CONTACT NOTE (OTHER) - REASON
Pt refused to take morning medications
Pt refusing Bipap at night
Patient refused Bipap
Patient oxygen saturation  77% on 6L NC
Patient on continuos BIPAP, and is NPO, but had no NPO others.
Patient very agitated, combative and hitting the staff. Not keeping the BIPAP on.

## 2023-04-19 NOTE — PROGRESS NOTE ADULT - SUBJECTIVE AND OBJECTIVE BOX
Merlin Mathew, MD   Hospitalist  Pager #[phone]    PROGRESS NOTE:     Patient is a 88y old  Female who presents with a chief complaint of Weakness (19 Apr 2023 12:45)      SUBJECTIVE / OVERNIGHT EVENTS:  Bipap overnight though patient agitated   Seen by palliative care- comfort care per discussion with family   Patient lethargic, not waking up, had recieved haldol a few hours previously   ADDITIONAL REVIEW OF SYSTEMS:    MEDICATIONS  (STANDING):  budesonide 160 MICROgram(s)/formoterol 4.5 MICROgram(s) Inhaler 2 Puff(s) Inhalation two times a day  busPIRone 10 milliGRAM(s) Oral two times a day  furosemide    Tablet 20 milliGRAM(s) Oral daily  levothyroxine 112 MICROGram(s) Oral daily  metoprolol succinate ER 25 milliGRAM(s) Oral daily  montelukast 10 milliGRAM(s) Oral daily    MEDICATIONS  (PRN):  acetaminophen     Tablet .. 650 milliGRAM(s) Oral every 6 hours PRN Temp greater or equal to 38C (100.4F), Mild Pain (1 - 3)  albuterol    90 MICROgram(s) HFA Inhaler 2 Puff(s) Inhalation every 6 hours PRN Shortness of Breath and/or Wheezing  aluminum hydroxide/magnesium hydroxide/simethicone Suspension 30 milliLiter(s) Oral every 4 hours PRN Dyspepsia  melatonin 3 milliGRAM(s) Oral at bedtime PRN Insomnia  ondansetron Injectable 4 milliGRAM(s) IV Push every 8 hours PRN Nausea and/or Vomiting      CAPILLARY BLOOD GLUCOSE      POCT Blood Glucose.: 99 mg/dL (19 Apr 2023 11:25)  POCT Blood Glucose.: 132 mg/dL (19 Apr 2023 07:31)  POCT Blood Glucose.: 109 mg/dL (18 Apr 2023 21:24)  POCT Blood Glucose.: 92 mg/dL (18 Apr 2023 16:49)    I&O's Summary      PHYSICAL EXAM:  Vital Signs Last 24 Hrs  T(C): 36.4 (19 Apr 2023 06:45), Max: 37.1 (18 Apr 2023 17:59)  T(F): 97.5 (19 Apr 2023 06:45), Max: 98.8 (18 Apr 2023 17:59)  HR: 59 (19 Apr 2023 11:00) (59 - 66)  BP: 138/66 (19 Apr 2023 06:45) (120/77 - 138/66)  BP(mean): --  RR: 18 (19 Apr 2023 06:45) (18 - 18)  SpO2: 100% (19 Apr 2023 11:00) (96% - 100%)    Parameters below as of 19 Apr 2023 06:45  Patient On (Oxygen Delivery Method): BiPAP/CPAP        CONSTITUTIONAL: NAD, well-developed elderly female   RESPIRATORY: Normal respiratory effort; lungs are clear to auscultation bilaterally  CARDIOVASCULAR: Regular rate and rhythm, normal S1 and S2, no murmur/rub/gallop; No lower extremity edema; Peripheral pulses are 2+ bilaterally  ABDOMEN: Nontender to palpation, normoactive bowel sounds, no rebound/guarding; No hepatosplenomegaly  MUSCULOSKELETAL no clubbing or cyanosis of digits; no joint swelling or tenderness to palpation  PSYCH: Sleepy     LABS:                        10.3   6.41  )-----------( 248      ( 19 Apr 2023 05:58 )             33.0     04-19    142  |  97<L>  |  54<H>  ----------------------------<  140<H>  4.9   |  37<H>  |  1.45<H>    Ca    9.0      19 Apr 2023 05:58  Phos  2.5     04-19  Mg     2.30     04-19                  RADIOLOGY & ADDITIONAL TESTS:  Results Reviewed:   Imaging Personally Reviewed:  Electrocardiogram Personally Reviewed:    COORDINATION OF CARE:  Care Discussed with Consultants/Other Providers [Y/N]:  Prior or Outpatient Records Reviewed [Y/N]:

## 2023-04-19 NOTE — PROGRESS NOTE ADULT - PROBLEM SELECTOR PROBLEM 8
Diabetes
Diabetes
Chronic diastolic heart failure
Diabetes
Chronic diastolic heart failure

## 2023-04-19 NOTE — PROVIDER CONTACT NOTE (OTHER) - SITUATION
Patient very confused agitated, restless, combative and hitting the staff and not keeping the BIPAP on.
Patient oxygen saturation  77% on 6L NC
Pt is refusing to wear Bipap overnight. Stating, "I can't breathe" and refuses to wear it.
Pt refused to take her morning medication. Pt stated, "I don't want it, get out of my face." Educated pt on importance of the medication. Pt still refused to open mouth and take pills.
Pt refused to wear BIPAP overnight, even after explaining the importance of wearing it to decrease CO2 levels.
Patient was RRT yesterday morning due to elevated CO2 levels. Patient had to be on continuos BIPAP and also NPO.

## 2023-04-19 NOTE — PROGRESS NOTE ADULT - PROBLEM SELECTOR PLAN 1
- Worsening hypercarbic respiratory failure 2/2 COPD, HF   - Was recommended to do nocturnal bipap however patient had been refusing   - Today became unarousable due to severe hypercarbia in 80s, placed on bipap   - Family agreed to DNR/DNI - Worsening hypercarbic respiratory failure 2/2 COPD, HF   - Was recommended to do nocturnal bipap however patient had been refusing   - 4/19- became unarousable due to severe hypercarbia in 80s, placed on bipap   - Hypercarbia improved today however still lethargic, unresponsive   - Family agreed to DNR/DNI

## 2023-04-19 NOTE — PROGRESS NOTE ADULT - PROBLEM SELECTOR PLAN 3
admitted with shortness of breath, currently on 4L NC   CXR small pleural effusion. BNP 5000  D-dimer 390 and 880 is cutoff adjusted for age -> VTE unlikely   wells score 0-> low risk for PE  TTE: calcified mitral leaflets with normal diastolic opening. Normal left ventricular internal dimensions and wall  thicknesses. Hyperdynamic left ventricle. Normal right ventricular size and function.    - continue O2 and duonebs prn   - continue to monitor  - D/C Bipap

## 2023-04-19 NOTE — PROGRESS NOTE ADULT - PROBLEM SELECTOR PLAN 10
history of hyperkalemia. On admission, K is 5.4 mildly hemolyzed and repeat not hemolyzed  In setting of CKD, managed medically. s/p rosavla   K 5.1 today     - monitor BMP daily  - dietary education  - Monitor on tele for now
history of hyperkalemia. On admission, K is 5.4 mildly hemolyzed and repeat not hemolyzed  In setting of CKD, managed medically. s/p rosalva   K 5.1 today     - monitor BMP daily  - dietary education  - Monitor on tele for now

## 2023-04-19 NOTE — PROGRESS NOTE ADULT - ASSESSMENT
Patient is a 88 year old female with pmh of HTN, DM2, COPD (O2 dependent 2-3L,) Chronic diastolic HF, hypothyroidism, CKD3, and hx of hyperkalemia presents after family called EMS for wellness check. Patient is AOx  - [facility], April 10, 2023, and states that she is in the hospital because she is not feeling well.  Patient reports that brother Zachariah or NOEMI  called EMS because he was concerned.  [phone]: he was the one who called EMS; he states he called b/c he was not able to get in touch with his mother over the phone. Of note, patient was  discharged from the hospital early February with HHA after a fall and hyperkalemia. Since leaving the hospital she has progressively been getting more weak over the past few weeks. Now she is having a hard time doing basic ADL's including bathroom, showering, cleaning, cooking, eating, getting around the house. Patient reports that she has not had eaten in the past two days because she has been unable to cook for herself. Additionally, she reports a fall but cannot recall the exact day. Denies hitting her head and ambulates with walker at baseline. States feeling SOB but denies chest pain. Cannot really elaborate on if it is lele at rest. Has difficulty getting inhaler. So cannot state if it provides relief. Denies wheezing. Pt has a HHA for 4 hrs/day, 6days/week. Her brother believes pt should be in an assisted living facility at this point. However, patient reports that she likes her independence.   In ED, Patient not hypoxic on home oxygen setting. Work up notable for stable  ABG. CT head negative. CXR small pleural effusion. BNP 5000. Potassium hemolyzed Patient admitted for FTT. Patient has been refusing nocturnal bipap. Palliative consulted for complex medical decision making in the setting of serious illness.

## 2023-04-19 NOTE — PROGRESS NOTE ADULT - PROBLEM SELECTOR PROBLEM 9
Electrolyte abnormality
Diabetes
Diabetes
Electrolyte abnormality
Electrolyte abnormality

## 2023-04-19 NOTE — PROGRESS NOTE ADULT - CONVERSATION DETAILS
Spoke to patient's brother Zachariah and sister in law Angel this AM. Discussed patient still with poor mental status despite bipap and had taken off over the weekend, now being watched. They understand she continues to be uncooperative. ELDA says "It is what it is" and was accepting patient was not going to improve. They understand without bipap she will die at some point. ELDA stresses she wants patient to be comfortable. Discussed option of comfort care to prioritize end of life symptom management with deescalation of other interventions that would be burdensome at end of life including continued blood draws, imaging, other investigations, and no more bipap. Explained currently patient is comfortable in deep sleep, no distressing symptoms. If patient survives, plans can be made to transfer to a facility for hospice care. Brother and sister in law in agreement with his plan.

## 2023-04-19 NOTE — PROVIDER CONTACT NOTE (OTHER) - ASSESSMENT
Patient resting in bed, s/p cough spell, however patient unable to catch breath with labored breathing and flushed looked
VSS
VSS, satting 95% O2 on 4L NC
Patient AXO 0-1 agitated, confused and combative and taking off BIPAP. Patient redirected and reeducated multiple times. Patient also refused to participate in speech and swallow.
Patient is not keeping the BIPAP off and getting combative with staff. Patient reeducated multiple times.
Pt is currently on 4L NC, no s/s of distress. VSS.

## 2023-04-19 NOTE — PROVIDER CONTACT NOTE (OTHER) - DATE AND TIME:
15-Apr-2023 00:32
19-Apr-2023 02:00
19-Apr-2023 02:55
13-Apr-2023 23:31
15-Apr-2023 13:09
15-Apr-2023 08:11

## 2023-04-19 NOTE — PROGRESS NOTE ADULT - PROBLEM SELECTOR PLAN 4
Pt on On 3L NC at home and reports feeling sob on admission  Unclear etiology of SOB. CXR small pleural effusion. BNP 5000  VBG showing elevated pCO2. Hypercapnic resp failure given resp acidosis    - D/C Bipap   - Continue O2 supplementation   - monitor spO2   - Continue home Symbicort, and Duoneb prn sob

## 2023-04-19 NOTE — PROVIDER CONTACT NOTE (OTHER) - ACTION/TREATMENT ORDERED:
Juan Negron stated it naturally NPO if patient is on BIPAP. NPO status maintained.
DORI Vila notfied and aware. Safety maintained.
DORI Estevez notified and aware. No new orders at this time. Safety maintained.
DORI Estevez notified. Awaiting orders. Safety maintained.
Juan Negron came to assess the pt and recommended BIPAP back on with B/L mittens as pt was desaturating. Safety maintained. Will continue to monitor the patient.
Patient placed on 15L NRB, no signs of distress noted. Will continue to monitor and try to wean patient back to 6L NC as patient has hx of COPD.

## 2023-04-19 NOTE — PROGRESS NOTE ADULT - PROBLEM SELECTOR PLAN 1
RRT called this AM for encephalopathy/lethargy 2/2 hypercarbia. AM VBG 7.4/61, ABG 7.35/80, lethargy, MS improved with Bipap, likely 2/2 hypercarbia.   - D/C NIPPV per GOC discussions   - Comfort measures

## 2023-04-19 NOTE — PROVIDER CONTACT NOTE (OTHER) - RECOMMENDATIONS
Called Juan Negron to put the order for NPO, as patient had been NPO since 4/18 morning.
Place patient on 15L NRB.
Juan Negron notified and recommended to take BIPAP of for 1 hour. Respiratory therapist Julian also aware and placed on 6L NC.

## 2023-04-19 NOTE — PROGRESS NOTE ADULT - PROBLEM SELECTOR PLAN 6
- Patient currently has no mental status. Decision maker therefore is her NOK brother Zachariah, who makes decisions with his wife Bib.   -See [facility] note. I spent 25 minutes addressing advanced care planning with patient and/or decision maker(s). Family agreed to DNR/DNI, DESHAWN completed - Patient currently has no mental status. Decision maker therefore is her NOK brother Zachariah, who makes decisions with his wife Bib.   - 4/18- Family agreed to DNR/DNI, MOLST completed  - 4/19 - See [facility] note. I spent 20 minutes addressing advanced care planning with patient and/or decision maker(s). Family agreed to comfort care measures, no more bipap. - Patient currently has no mental status. Decision maker therefore is her NOK brother Zachariah, who makes decisions with his wife Bib.   - 4/18- Family agreed to DNR/DNI, VIVIANAST completed  - 4/19 - See [facility] note. I spent 20 minutes addressing advanced care planning with patient and/or decision maker(s). Family agreed to comfort care measures, no more bipap. New MOLST completed.

## 2023-04-19 NOTE — PROGRESS NOTE ADULT - SUBJECTIVE AND OBJECTIVE BOX
Indication of Geriatrics and Palliative Medicine Services:  [X  ] Complex Medical Decision Making   [  ] Symptom/Pain management     DNR on chart:  Yes    INTERVAL EVENTS: Patient seen this AM, on bipap, unarousable Overnight patient was agitated took off bipap, now on 1:1. See below for GOC.     -------------------------------------------------------------------------------------------------------    PRESENT SYMPTOMS:     [ ] No     [X ] Unable to self-report      [ ] CPOT (ICU)     [ ] PAINADs     [X ] RDOS 0    [ ] Yes     Source if other than patient:  [ ]Family   [ ]Team     PAIN:   If blank, patient unable to specify   [ ]yes [ ]no  QOL impact-   Location -                    Aggravating factors -  Quality -  Radiation -  Timing-  Pain at most severe level (0-10 scale):  Pain at minimal acceptable level/Pain Goal (0-10 scale):     SYMPTOMS:   Dyspnea:                           [ ]Mild [ ]Moderate [ ]Severe  Anxiety:                             [ ]Mild [ ]Moderate [ ]Severe  Fatigue:                             [ ]Mild [ ]Moderate [ ]Severe  Nausea/Vomiting:              [ ]Mild [ ]Moderate [ ]Severe  Loss of appetite:                [ ]Mild [ ]Moderate [ ]Severe  Constipation:                     [ ]Mild [ ]Moderate [ ]Severe    Other Symptoms:  [X ]All other review of systems negative     Home Medications for symptoms if any:  I-Stop Reference No:(from initial)     -------------------------------------------------------------------------------------------------------    ITEMS UNCHECKED ARE NOT PRESENT    PHYSICAL:  Vital Signs Last 24 Hrs  T(C): 36.4 (19 Apr 2023 06:45), Max: 37.1 (18 Apr 2023 17:59)  T(F): 97.5 (19 Apr 2023 06:45), Max: 98.8 (18 Apr 2023 17:59)  HR: 59 (19 Apr 2023 11:00) (59 - 66)  BP: 138/66 (19 Apr 2023 06:45) (120/77 - 138/66)  BP(mean): --  RR: 18 (19 Apr 2023 06:45) (18 - 18)  SpO2: 100% (19 Apr 2023 11:00) (96% - 100%)    Parameters below as of 19 Apr 2023 06:45  Patient On (Oxygen Delivery Method): BiPAP/CPAP     I&O's Summary    GENERAL:  [ ]Cachexia  [X ] Frail  [ ]Awake  [ ]Oriented x   [ ]Lethargic  [X ]Unarousable  [ ]Verbal  [ ]Non-Verbal    BEHAVIORAL:   [ ] Anxiety  [ ] Delirium [ ] Agitation [ ] Other    HEENT:   [ ]Normal   [ ]Dry mouth   [ ]ET Tube/Trach  [ ]Oral lesions    PULMONARY:   [ ]Clear [ ]Tachypnea  [ ]Audible excessive secretions   [ ]Rhonchi        [ ]Right [ ]Left [ ]Bilateral  [ ]Crackles        [ ]Right [ ]Left [ ]Bilateral  [ ]Wheezing     [ ]Right [ ]Left [ ]Bilateral  [X ]Diminished breath sounds [ ]right [ ]left [X ]bilateral    CARDIOVASCULAR:    [X ]Regular [ ]Irregular [ ]Tachy  [ ]Harmeet [ ]Murmur [ ]Other    GASTROINTESTINAL:  [X ]Soft  [ ]Distended   [ ]+BS  [X ]Non tender [ ]Tender  [ ]Other [ ]PEG [ ]OGT/ NGT      GENITOURINARY:  [ ]Normal [X ] Incontinent   [ ]Oliguria/Anuria   [ ]Palacio    MUSCULOSKELETAL:   [ ]Normal   [ ]Weakness  [X ]Bed/Wheelchair bound [ ]Edema    NEUROLOGIC:   [X ]No focal deficits  [ ]Cognitive impairment  [ ]Dysphagia [ ]Dysarthria [ ]Paresis [ ]Other     SKIN:   [X ]Normal  [ ]Rash  [ ]Other  [ ]Pressure ulcer(s)       Present on admission [ ]y [ ]n    -------------------------------------------------------------------------------------------------------    LABS:                        10.3   6.41  )-----------( 248      ( 19 Apr 2023 05:58 )             33.0   04-19    142  |  97<L>  |  54<H>  ----------------------------<  140<H>  4.9   |  37<H>  |  1.45<H>    Ca    9.0      19 Apr 2023 05:58  Phos  2.5     04-19  Mg     2.30     04-19    Ferritin, Serum: 61 ng/mL (04-13-23 @ 06:03)    ------------------------------------------------------------------------------------------------------    CRITICAL CARE:  [ ]Shock Present  [ ]Septic [ ]Cardiogenic [ ]Neurologic [ ]Hypovolemic [ ]Undifferentiated    [ ]Vasopressors [ ]Inotropes    [ ]Respiratory failure present [ ]Acute  [ ]Chronic [ ]Hypoxic  [ ]Hypercarbic [ ]Mixed   [ ]Mechanical Ventilation  [ ]Trach collar   [ ]Non-invasive ventilatory support   [ ]High-Flow   [ ]Oxygen mask/venti     [ ]Other organ failure   -------------------------------------------------------------------------------------------------------    RADIOLOGY & ADDITIONAL STUDIES:     < from: CT Head No Cont (04.10.23 @ 06:26) >  Moderate generalized cerebral volume loss and moderate periventricular   white matter hypoattenuation compatible chronic microvascular ischemic   disease are stable from 01/28/2023.    The visualized paranasal sinuses and the mastoids are grossly clear.    The patient is status post intraocular lens replacement bilaterally.    The calvarium and skull base appear unremarkable.    < end of copied text >    -------------------------------------------------------------------------------------------------------  MEDICATIONS:     MEDICATIONS  (STANDING):  budesonide 160 MICROgram(s)/formoterol 4.5 MICROgram(s) Inhaler 2 Puff(s) Inhalation two times a day  busPIRone 10 milliGRAM(s) Oral two times a day  ferrous    sulfate 325 milliGRAM(s) Oral daily  furosemide    Tablet 20 milliGRAM(s) Oral daily  levothyroxine 112 MICROGram(s) Oral daily  metoprolol succinate ER 25 milliGRAM(s) Oral daily  montelukast 10 milliGRAM(s) Oral daily    MEDICATIONS  (PRN):  acetaminophen     Tablet .. 650 milliGRAM(s) Oral every 6 hours PRN Temp greater or equal to 38C (100.4F), Mild Pain (1 - 3)  albuterol    90 MICROgram(s) HFA Inhaler 2 Puff(s) Inhalation every 6 hours PRN Shortness of Breath and/or Wheezing  aluminum hydroxide/magnesium hydroxide/simethicone Suspension 30 milliLiter(s) Oral every 4 hours PRN Dyspepsia  melatonin 3 milliGRAM(s) Oral at bedtime PRN Insomnia  ondansetron Injectable 4 milliGRAM(s) IV Push every 8 hours PRN Nausea and/or Vomiting         Indication of Geriatrics and Palliative Medicine Services:  [X  ] Complex Medical Decision Making   [  ] Symptom/Pain management     DNR on chart:  Yes    INTERVAL EVENTS: Patient seen this AM, on bipap, still unarousable. Overnight patient was agitated took off bipap, now on 1:1. See below for GOC.     -------------------------------------------------------------------------------------------------------    PRESENT SYMPTOMS:     [ ] No     [X ] Unable to self-report      [ ] CPOT (ICU)     [ ] PAINADs     [X ] RDOS 0    [ ] Yes     Source if other than patient:  [ ]Family   [ ]Team     PAIN:   If blank, patient unable to specify   [ ]yes [ ]no  QOL impact-   Location -                    Aggravating factors -  Quality -  Radiation -  Timing-  Pain at most severe level (0-10 scale):  Pain at minimal acceptable level/Pain Goal (0-10 scale):     SYMPTOMS:   Dyspnea:                           [ ]Mild [ ]Moderate [ ]Severe  Anxiety:                             [ ]Mild [ ]Moderate [ ]Severe  Fatigue:                             [ ]Mild [ ]Moderate [ ]Severe  Nausea/Vomiting:              [ ]Mild [ ]Moderate [ ]Severe  Loss of appetite:                [ ]Mild [ ]Moderate [ ]Severe  Constipation:                     [ ]Mild [ ]Moderate [ ]Severe    Other Symptoms:  [X ]All other review of systems negative     Home Medications for symptoms if any:  I-Stop Reference No:(from initial)     -------------------------------------------------------------------------------------------------------    ITEMS UNCHECKED ARE NOT PRESENT    PHYSICAL:  Vital Signs Last 24 Hrs  T(C): 36.4 (19 Apr 2023 06:45), Max: 37.1 (18 Apr 2023 17:59)  T(F): 97.5 (19 Apr 2023 06:45), Max: 98.8 (18 Apr 2023 17:59)  HR: 59 (19 Apr 2023 11:00) (59 - 66)  BP: 138/66 (19 Apr 2023 06:45) (120/77 - 138/66)  BP(mean): --  RR: 18 (19 Apr 2023 06:45) (18 - 18)  SpO2: 100% (19 Apr 2023 11:00) (96% - 100%)    Parameters below as of 19 Apr 2023 06:45  Patient On (Oxygen Delivery Method): BiPAP/CPAP     I&O's Summary    GENERAL:  [ ]Cachexia  [X ] Frail  [ ]Awake  [ ]Oriented x   [ ]Lethargic  [X ]Unarousable  [ ]Verbal  [ ]Non-Verbal    BEHAVIORAL:   [ ] Anxiety  [ ] Delirium [ ] Agitation [ ] Other    HEENT:   [ ]Normal   [ ]Dry mouth   [ ]ET Tube/Trach  [ ]Oral lesions    PULMONARY:   [ ]Clear [ ]Tachypnea  [ ]Audible excessive secretions   [ ]Rhonchi        [ ]Right [ ]Left [ ]Bilateral  [ ]Crackles        [ ]Right [ ]Left [ ]Bilateral  [ ]Wheezing     [ ]Right [ ]Left [ ]Bilateral  [X ]Diminished breath sounds [ ]right [ ]left [X ]bilateral    CARDIOVASCULAR:    [X ]Regular [ ]Irregular [ ]Tachy  [ ]Harmeet [ ]Murmur [ ]Other    GASTROINTESTINAL:  [X ]Soft  [ ]Distended   [ ]+BS  [X ]Non tender [ ]Tender  [ ]Other [ ]PEG [ ]OGT/ NGT      GENITOURINARY:  [ ]Normal [X ] Incontinent   [ ]Oliguria/Anuria   [ ]Palacio    MUSCULOSKELETAL:   [ ]Normal   [ ]Weakness  [X ]Bed/Wheelchair bound [ ]Edema    NEUROLOGIC:   [X ]No focal deficits  [ ]Cognitive impairment  [ ]Dysphagia [ ]Dysarthria [ ]Paresis [ ]Other     SKIN:   [X ]Normal  [ ]Rash  [ ]Other  [ ]Pressure ulcer(s)       Present on admission [ ]y [ ]n    -------------------------------------------------------------------------------------------------------    LABS:                        10.3   6.41  )-----------( 248      ( 19 Apr 2023 05:58 )             33.0   04-19    142  |  97<L>  |  54<H>  ----------------------------<  140<H>  4.9   |  37<H>  |  1.45<H>    Ca    9.0      19 Apr 2023 05:58  Phos  2.5     04-19  Mg     2.30     04-19    Ferritin, Serum: 61 ng/mL (04-13-23 @ 06:03)    ------------------------------------------------------------------------------------------------------    CRITICAL CARE:  [ ]Shock Present  [ ]Septic [ ]Cardiogenic [ ]Neurologic [ ]Hypovolemic [ ]Undifferentiated    [ ]Vasopressors [ ]Inotropes    [ ]Respiratory failure present [ ]Acute  [ ]Chronic [ ]Hypoxic  [ ]Hypercarbic [ ]Mixed   [ ]Mechanical Ventilation  [ ]Trach collar   [ ]Non-invasive ventilatory support   [ ]High-Flow   [ ]Oxygen mask/venti     [ ]Other organ failure   -------------------------------------------------------------------------------------------------------    RADIOLOGY & ADDITIONAL STUDIES:     < from: CT Head No Cont (04.10.23 @ 06:26) >  Moderate generalized cerebral volume loss and moderate periventricular   white matter hypoattenuation compatible chronic microvascular ischemic   disease are stable from 01/28/2023.    The visualized paranasal sinuses and the mastoids are grossly clear.    The patient is status post intraocular lens replacement bilaterally.    The calvarium and skull base appear unremarkable.    < end of copied text >    -------------------------------------------------------------------------------------------------------  MEDICATIONS:     MEDICATIONS  (STANDING):  budesonide 160 MICROgram(s)/formoterol 4.5 MICROgram(s) Inhaler 2 Puff(s) Inhalation two times a day  busPIRone 10 milliGRAM(s) Oral two times a day  ferrous    sulfate 325 milliGRAM(s) Oral daily  furosemide    Tablet 20 milliGRAM(s) Oral daily  levothyroxine 112 MICROGram(s) Oral daily  metoprolol succinate ER 25 milliGRAM(s) Oral daily  montelukast 10 milliGRAM(s) Oral daily    MEDICATIONS  (PRN):  acetaminophen     Tablet .. 650 milliGRAM(s) Oral every 6 hours PRN Temp greater or equal to 38C (100.4F), Mild Pain (1 - 3)  albuterol    90 MICROgram(s) HFA Inhaler 2 Puff(s) Inhalation every 6 hours PRN Shortness of Breath and/or Wheezing  aluminum hydroxide/magnesium hydroxide/simethicone Suspension 30 milliLiter(s) Oral every 4 hours PRN Dyspepsia  melatonin 3 milliGRAM(s) Oral at bedtime PRN Insomnia  ondansetron Injectable 4 milliGRAM(s) IV Push every 8 hours PRN Nausea and/or Vomiting         Indication of Geriatrics and Palliative Medicine Services:  [X  ] Complex Medical Decision Making   [  ] Symptom/Pain management     DNR on chart:  Yes    INTERVAL EVENTS: Patient seen this AM, on bipap, still unarousable. Overnight patient was agitated took off bipap, now on 1:1. See below for GOC.   Patient seen again later now off bipap, in no distress.     -------------------------------------------------------------------------------------------------------    PRESENT SYMPTOMS:     [ ] No     [X ] Unable to self-report      [ ] CPOT (ICU)     [ ] PAINADs     [X ] RDOS 0    [ ] Yes     Source if other than patient:  [ ]Family   [ ]Team     PAIN:   If blank, patient unable to specify   [ ]yes [ ]no  QOL impact-   Location -                    Aggravating factors -  Quality -  Radiation -  Timing-  Pain at most severe level (0-10 scale):  Pain at minimal acceptable level/Pain Goal (0-10 scale):     SYMPTOMS:   Dyspnea:                           [ ]Mild [ ]Moderate [ ]Severe  Anxiety:                             [ ]Mild [ ]Moderate [ ]Severe  Fatigue:                             [ ]Mild [ ]Moderate [ ]Severe  Nausea/Vomiting:              [ ]Mild [ ]Moderate [ ]Severe  Loss of appetite:                [ ]Mild [ ]Moderate [ ]Severe  Constipation:                     [ ]Mild [ ]Moderate [ ]Severe    Other Symptoms:  [X ]All other review of systems negative     Home Medications for symptoms if any:  I-Stop Reference No:(from initial)     -------------------------------------------------------------------------------------------------------    ITEMS UNCHECKED ARE NOT PRESENT    PHYSICAL:  Vital Signs Last 24 Hrs  T(C): 36.4 (19 Apr 2023 06:45), Max: 37.1 (18 Apr 2023 17:59)  T(F): 97.5 (19 Apr 2023 06:45), Max: 98.8 (18 Apr 2023 17:59)  HR: 59 (19 Apr 2023 11:00) (59 - 66)  BP: 138/66 (19 Apr 2023 06:45) (120/77 - 138/66)  BP(mean): --  RR: 18 (19 Apr 2023 06:45) (18 - 18)  SpO2: 100% (19 Apr 2023 11:00) (96% - 100%)    Parameters below as of 19 Apr 2023 06:45  Patient On (Oxygen Delivery Method): BiPAP/CPAP     I&O's Summary    GENERAL:  [ ]Cachexia  [X ] Frail  [ ]Awake  [ ]Oriented x   [ ]Lethargic  [X ]Unarousable  [ ]Verbal  [ ]Non-Verbal    BEHAVIORAL:   [ ] Anxiety  [ ] Delirium [ ] Agitation [ ] Other    HEENT:   [ ]Normal   [ ]Dry mouth   [ ]ET Tube/Trach  [ ]Oral lesions    PULMONARY:   [ ]Clear [ ]Tachypnea  [ ]Audible excessive secretions   [ ]Rhonchi        [ ]Right [ ]Left [ ]Bilateral  [ ]Crackles        [ ]Right [ ]Left [ ]Bilateral  [ ]Wheezing     [ ]Right [ ]Left [ ]Bilateral  [X ]Diminished breath sounds [ ]right [ ]left [X ]bilateral    CARDIOVASCULAR:    [X ]Regular [ ]Irregular [ ]Tachy  [ ]Harmeet [ ]Murmur [ ]Other    GASTROINTESTINAL:  [X ]Soft  [ ]Distended   [ ]+BS  [X ]Non tender [ ]Tender  [ ]Other [ ]PEG [ ]OGT/ NGT      GENITOURINARY:  [ ]Normal [X ] Incontinent   [ ]Oliguria/Anuria   [ ]Palacio    MUSCULOSKELETAL:   [ ]Normal   [ ]Weakness  [X ]Bed/Wheelchair bound [ ]Edema    NEUROLOGIC:   [X ]No focal deficits  [ ]Cognitive impairment  [ ]Dysphagia [ ]Dysarthria [ ]Paresis [ ]Other     SKIN:   [X ]Normal  [ ]Rash  [ ]Other  [ ]Pressure ulcer(s)       Present on admission [ ]y [ ]n    -------------------------------------------------------------------------------------------------------    LABS:                        10.3   6.41  )-----------( 248      ( 19 Apr 2023 05:58 )             33.0   04-19    142  |  97<L>  |  54<H>  ----------------------------<  140<H>  4.9   |  37<H>  |  1.45<H>    Ca    9.0      19 Apr 2023 05:58  Phos  2.5     04-19  Mg     2.30     04-19    Ferritin, Serum: 61 ng/mL (04-13-23 @ 06:03)    ------------------------------------------------------------------------------------------------------    CRITICAL CARE:  [ ]Shock Present  [ ]Septic [ ]Cardiogenic [ ]Neurologic [ ]Hypovolemic [ ]Undifferentiated    [ ]Vasopressors [ ]Inotropes    [ ]Respiratory failure present [ ]Acute  [ ]Chronic [ ]Hypoxic  [ ]Hypercarbic [ ]Mixed   [ ]Mechanical Ventilation  [ ]Trach collar   [ ]Non-invasive ventilatory support   [ ]High-Flow   [ ]Oxygen mask/venti     [ ]Other organ failure   -------------------------------------------------------------------------------------------------------    RADIOLOGY & ADDITIONAL STUDIES:     < from: CT Head No Cont (04.10.23 @ 06:26) >  Moderate generalized cerebral volume loss and moderate periventricular   white matter hypoattenuation compatible chronic microvascular ischemic   disease are stable from 01/28/2023.    The visualized paranasal sinuses and the mastoids are grossly clear.    The patient is status post intraocular lens replacement bilaterally.    The calvarium and skull base appear unremarkable.    < end of copied text >    -------------------------------------------------------------------------------------------------------  MEDICATIONS:     MEDICATIONS  (STANDING):  budesonide 160 MICROgram(s)/formoterol 4.5 MICROgram(s) Inhaler 2 Puff(s) Inhalation two times a day  busPIRone 10 milliGRAM(s) Oral two times a day  ferrous    sulfate 325 milliGRAM(s) Oral daily  furosemide    Tablet 20 milliGRAM(s) Oral daily  levothyroxine 112 MICROGram(s) Oral daily  metoprolol succinate ER 25 milliGRAM(s) Oral daily  montelukast 10 milliGRAM(s) Oral daily    MEDICATIONS  (PRN):  acetaminophen     Tablet .. 650 milliGRAM(s) Oral every 6 hours PRN Temp greater or equal to 38C (100.4F), Mild Pain (1 - 3)  albuterol    90 MICROgram(s) HFA Inhaler 2 Puff(s) Inhalation every 6 hours PRN Shortness of Breath and/or Wheezing  aluminum hydroxide/magnesium hydroxide/simethicone Suspension 30 milliLiter(s) Oral every 4 hours PRN Dyspepsia  melatonin 3 milliGRAM(s) Oral at bedtime PRN Insomnia  ondansetron Injectable 4 milliGRAM(s) IV Push every 8 hours PRN Nausea and/or Vomiting

## 2023-04-19 NOTE — PROGRESS NOTE ADULT - PROBLEM SELECTOR PLAN 2
s/p fall approx one week ago, reports head strike but no LOC. as per pt it was mechanical fall. At baseline she ambulates with a walker  CT head: There is no CT evidence of acute intracranial hemorrhage, mass effect or midline shift.  Gray matter-white matter differentiation is grossly preserved. Moderate generalized cerebral volume loss and moderate periventricular   white matter hypoattenuation compatible chronic microvascular ischemic disease are stable from 01/28/2023.    - Continue diet

## 2023-04-20 NOTE — PROGRESS NOTE ADULT - PROBLEM SELECTOR PLAN 1
- Worsening hypercarbic respiratory failure 2/2 COPD, HF   - Was recommended to do nocturnal bipap however patient had been refusing   - 4/19- became unarousable due to severe hypercarbia in 80s, placed on bipap   - Hypercarbia improved today however still lethargic, unresponsive   - Family agreed to DNR/DNI - Worsening hypercarbic respiratory failure 2/2 COPD, HF   - Was recommended to do nocturnal bipap however patient had been refusing   - 4/18- became unarousable due to severe hypercarbia in 80s, placed on bipap   - 4/19- Hypercarbia improved however still lethargic, unresponsive   - 4/20- Today patient back to baseline, awake and verbal   - Family agreed to DNR/DNI when patient was lethargic   - Patient has adamantly refused bipap, high risk of recurrent hypercarbia, now comfort care

## 2023-04-20 NOTE — DISCHARGE NOTE NURSING/CASE MANAGEMENT/SOCIAL WORK - PATIENT PORTAL LINK FT
You can access the FollowMyHealth Patient Portal offered by [facility] by registering at the following website: [email or web address]. By joining [facility]s FollowMyHealth portal, you will also be able to view your health information using other applications (apps) compatible with our system.

## 2023-04-20 NOTE — PROGRESS NOTE ADULT - REASON FOR ADMISSION
Weakness

## 2023-04-20 NOTE — PROGRESS NOTE ADULT - SUBJECTIVE AND OBJECTIVE BOX
Indication of Geriatrics and Palliative Medicine Services:  [X  ] Complex Medical Decision Making   [  ] Symptom/Pain management     DNR on chart:  Yes    INTERVAL EVENTS:     -------------------------------------------------------------------------------------------------------    PRESENT SYMPTOMS:     [ ] No     [X ] Unable to self-report      [ ] CPOT (ICU)     [ ] PAINADs     [X ] RDOS 0    [ ] Yes     Source if other than patient:  [ ]Family   [ ]Team     PAIN:   If blank, patient unable to specify   [ ]yes [ ]no  QOL impact-   Location -                    Aggravating factors -  Quality -  Radiation -  Timing-  Pain at most severe level (0-10 scale):  Pain at minimal acceptable level/Pain Goal (0-10 scale):     SYMPTOMS:   Dyspnea:                           [ ]Mild [ ]Moderate [ ]Severe  Anxiety:                             [ ]Mild [ ]Moderate [ ]Severe  Fatigue:                             [ ]Mild [ ]Moderate [ ]Severe  Nausea/Vomiting:              [ ]Mild [ ]Moderate [ ]Severe  Loss of appetite:                [ ]Mild [ ]Moderate [ ]Severe  Constipation:                     [ ]Mild [ ]Moderate [ ]Severe    Other Symptoms:  [X ]All other review of systems negative     Home Medications for symptoms if any:  I-Stop Reference No:(from initial)     -------------------------------------------------------------------------------------------------------    ITEMS UNCHECKED ARE NOT PRESENT    PHYSICAL:  Vital Signs Last 24 Hrs  T(C): 36.4 (19 Apr 2023 06:45), Max: 37.1 (18 Apr 2023 17:59)  T(F): 97.5 (19 Apr 2023 06:45), Max: 98.8 (18 Apr 2023 17:59)  HR: 59 (19 Apr 2023 11:00) (59 - 66)  BP: 138/66 (19 Apr 2023 06:45) (120/77 - 138/66)  BP(mean): --  RR: 18 (19 Apr 2023 06:45) (18 - 18)  SpO2: 100% (19 Apr 2023 11:00) (96% - 100%)    Parameters below as of 19 Apr 2023 06:45  Patient On (Oxygen Delivery Method): BiPAP/CPAP     I&O's Summary    GENERAL:  [ ]Cachexia  [X ] Frail  [ ]Awake  [ ]Oriented x   [ ]Lethargic  [X ]Unarousable  [ ]Verbal  [ ]Non-Verbal    BEHAVIORAL:   [ ] Anxiety  [ ] Delirium [ ] Agitation [ ] Other    HEENT:   [ ]Normal   [ ]Dry mouth   [ ]ET Tube/Trach  [ ]Oral lesions    PULMONARY:   [ ]Clear [ ]Tachypnea  [ ]Audible excessive secretions   [ ]Rhonchi        [ ]Right [ ]Left [ ]Bilateral  [ ]Crackles        [ ]Right [ ]Left [ ]Bilateral  [ ]Wheezing     [ ]Right [ ]Left [ ]Bilateral  [X ]Diminished breath sounds [ ]right [ ]left [X ]bilateral    CARDIOVASCULAR:    [X ]Regular [ ]Irregular [ ]Tachy  [ ]Harmeet [ ]Murmur [ ]Other    GASTROINTESTINAL:  [X ]Soft  [ ]Distended   [ ]+BS  [X ]Non tender [ ]Tender  [ ]Other [ ]PEG [ ]OGT/ NGT      GENITOURINARY:  [ ]Normal [X ] Incontinent   [ ]Oliguria/Anuria   [ ]Palacio    MUSCULOSKELETAL:   [ ]Normal   [ ]Weakness  [X ]Bed/Wheelchair bound [ ]Edema    NEUROLOGIC:   [X ]No focal deficits  [ ]Cognitive impairment  [ ]Dysphagia [ ]Dysarthria [ ]Paresis [ ]Other     SKIN:   [X ]Normal  [ ]Rash  [ ]Other  [ ]Pressure ulcer(s)       Present on admission [ ]y [ ]n    -------------------------------------------------------------------------------------------------------    LABS:                        10.3   6.41  )-----------( 248      ( 19 Apr 2023 05:58 )             33.0   04-19    142  |  97<L>  |  54<H>  ----------------------------<  140<H>  4.9   |  37<H>  |  1.45<H>    Ca    9.0      19 Apr 2023 05:58  Phos  2.5     04-19  Mg     2.30     04-19    Ferritin, Serum: 61 ng/mL (04-13-23 @ 06:03)    ------------------------------------------------------------------------------------------------------    CRITICAL CARE:  [ ]Shock Present  [ ]Septic [ ]Cardiogenic [ ]Neurologic [ ]Hypovolemic [ ]Undifferentiated    [ ]Vasopressors [ ]Inotropes    [ ]Respiratory failure present [ ]Acute  [ ]Chronic [ ]Hypoxic  [ ]Hypercarbic [ ]Mixed   [ ]Mechanical Ventilation  [ ]Trach collar   [ ]Non-invasive ventilatory support   [ ]High-Flow   [ ]Oxygen mask/venti     [ ]Other organ failure   -------------------------------------------------------------------------------------------------------    RADIOLOGY & ADDITIONAL STUDIES:     < from: CT Head No Cont (04.10.23 @ 06:26) >  Moderate generalized cerebral volume loss and moderate periventricular   white matter hypoattenuation compatible chronic microvascular ischemic   disease are stable from 01/28/2023.    The visualized paranasal sinuses and the mastoids are grossly clear.    The patient is status post intraocular lens replacement bilaterally.    The calvarium and skull base appear unremarkable.    < end of copied text >    -------------------------------------------------------------------------------------------------------  MEDICATIONS:     MEDICATIONS  (STANDING):  budesonide 160 MICROgram(s)/formoterol 4.5 MICROgram(s) Inhaler 2 Puff(s) Inhalation two times a day  busPIRone 10 milliGRAM(s) Oral two times a day  ferrous    sulfate 325 milliGRAM(s) Oral daily  furosemide    Tablet 20 milliGRAM(s) Oral daily  levothyroxine 112 MICROGram(s) Oral daily  metoprolol succinate ER 25 milliGRAM(s) Oral daily  montelukast 10 milliGRAM(s) Oral daily    MEDICATIONS  (PRN):  acetaminophen     Tablet .. 650 milliGRAM(s) Oral every 6 hours PRN Temp greater or equal to 38C (100.4F), Mild Pain (1 - 3)  albuterol    90 MICROgram(s) HFA Inhaler 2 Puff(s) Inhalation every 6 hours PRN Shortness of Breath and/or Wheezing  aluminum hydroxide/magnesium hydroxide/simethicone Suspension 30 milliLiter(s) Oral every 4 hours PRN Dyspepsia  melatonin 3 milliGRAM(s) Oral at bedtime PRN Insomnia  ondansetron Injectable 4 milliGRAM(s) IV Push every 8 hours PRN Nausea and/or Vomiting         Indication of Geriatrics and Palliative Medicine Services:  [X  ] Complex Medical Decision Making   [  ] Symptom/Pain management     DNR on chart:  Yes    INTERVAL EVENTS: Patient seen this AM, awake and verbal. Patient has muffled speech due to dentures but later seen talking to someone on the phone. Patient feels well, in no distress.     -------------------------------------------------------------------------------------------------------    PRESENT SYMPTOMS:     [X ] No     [ ] Unable to self-report      [ ] CPOT (ICU)     [ ] PAINADs     [ ] RDOS     [ ] Yes     Source if other than patient:  [ ]Family   [ ]Team     PAIN:   If blank, patient unable to specify   [ ]yes [ ]no  QOL impact-   Location -                    Aggravating factors -  Quality -  Radiation -  Timing-  Pain at most severe level (0-10 scale):  Pain at minimal acceptable level/Pain Goal (0-10 scale):     SYMPTOMS:   Dyspnea:                           [ ]Mild [ ]Moderate [ ]Severe  Anxiety:                             [ ]Mild [ ]Moderate [ ]Severe  Fatigue:                             [ ]Mild [ ]Moderate [ ]Severe  Nausea/Vomiting:              [ ]Mild [ ]Moderate [ ]Severe  Loss of appetite:                [ ]Mild [ ]Moderate [ ]Severe  Constipation:                     [ ]Mild [ ]Moderate [ ]Severe    Other Symptoms:  [X ]All other review of systems negative     Home Medications for symptoms if any:  I-Stop Reference No:(from initial)     -------------------------------------------------------------------------------------------------------    ITEMS UNCHECKED ARE NOT PRESENT    PHYSICAL:    GENERAL:  [ ]Cachexia  [X ] Frail  [X ]Awake  [X ]Oriented x 2   [ ]Lethargic  [ ]Unarousable  [X ]Verbal  [ ]Non-Verbal    BEHAVIORAL:   [ ] Anxiety  [ ] Delirium [ ] Agitation [ ] Other    HEENT:   [ ]Normal   [X ]Dry mouth   [ ]ET Tube/Trach  [ ]Oral lesions    PULMONARY:   [ ]Clear [ ]Tachypnea  [ ]Audible excessive secretions   [ ]Rhonchi        [ ]Right [ ]Left [ ]Bilateral  [ ]Crackles        [ ]Right [ ]Left [ ]Bilateral  [ ]Wheezing     [ ]Right [ ]Left [ ]Bilateral  [X ]Diminished breath sounds [ ]right [ ]left [X ]bilateral    CARDIOVASCULAR:    [X ]Regular [ ]Irregular [ ]Tachy  [ ]Harmeet [ ]Murmur [ ]Other    GASTROINTESTINAL:  [X ]Soft  [ ]Distended   [ ]+BS  [X ]Non tender [ ]Tender  [ ]Other [ ]PEG [ ]OGT/ NGT      GENITOURINARY:  [ ]Normal [X ] Incontinent   [ ]Oliguria/Anuria   [ ]Palacio    MUSCULOSKELETAL:   [ ]Normal   [ ]Weakness  [X ]Bed/Wheelchair bound [ ]Edema    NEUROLOGIC:   [X ]No focal deficits  [ ]Cognitive impairment  [ ]Dysphagia [ ]Dysarthria [ ]Paresis [ ]Other     SKIN:   [X ]Normal  [ ]Rash  [ ]Other  [ ]Pressure ulcer(s)       Present on admission [ ]y [ ]n    -------------------------------------------------------------------------------------------------------    LABS:                                   10.3   6.41  )-----------( 248      ( 19 Apr 2023 05:58 )             33.0     04-19    142  |  97<L>  |  54<H>  ----------------------------<  140<H>  4.9   |  37<H>  |  1.45<H>    Ca    9.0      19 Apr 2023 05:58  Phos  2.5     04-19  Mg     2.30     04-19      ------------------------------------------------------------------------------------------------------    CRITICAL CARE:  [ ]Shock Present  [ ]Septic [ ]Cardiogenic [ ]Neurologic [ ]Hypovolemic [ ]Undifferentiated    [ ]Vasopressors [ ]Inotropes    [X ]Respiratory failure present [ ]Acute  [ ]Chronic [ ]Hypoxic  [X ]Hypercarbic [ ]Mixed   [ ]Mechanical Ventilation  [ ]Trach collar   [X ]Non-invasive ventilatory support   [ ]High-Flow   [ ]Oxygen mask/venti     [ ]Other organ failure   -------------------------------------------------------------------------------------------------------    RADIOLOGY & ADDITIONAL STUDIES:     < from: CT Head No Cont (04.10.23 @ 06:26) >  Moderate generalized cerebral volume loss and moderate periventricular   white matter hypoattenuation compatible chronic microvascular ischemic   disease are stable from 01/28/2023.    The visualized paranasal sinuses and the mastoids are grossly clear.    The patient is status post intraocular lens replacement bilaterally.    The calvarium and skull base appear unremarkable.    < end of copied text >    -------------------------------------------------------------------------------------------------------  MEDICATIONS:     MEDICATIONS  (STANDING):  budesonide 160 MICROgram(s)/formoterol 4.5 MICROgram(s) Inhaler 2 Puff(s) Inhalation two times a day  busPIRone 10 milliGRAM(s) Oral two times a day  ferrous    sulfate 325 milliGRAM(s) Oral daily  furosemide    Tablet 20 milliGRAM(s) Oral daily  levothyroxine 112 MICROGram(s) Oral daily  metoprolol succinate ER 25 milliGRAM(s) Oral daily  montelukast 10 milliGRAM(s) Oral daily    MEDICATIONS  (PRN):  acetaminophen     Tablet .. 650 milliGRAM(s) Oral every 6 hours PRN Temp greater or equal to 38C (100.4F), Mild Pain (1 - 3)  albuterol    90 MICROgram(s) HFA Inhaler 2 Puff(s) Inhalation every 6 hours PRN Shortness of Breath and/or Wheezing  aluminum hydroxide/magnesium hydroxide/simethicone Suspension 30 milliLiter(s) Oral every 4 hours PRN Dyspepsia  melatonin 3 milliGRAM(s) Oral at bedtime PRN Insomnia  ondansetron Injectable 4 milliGRAM(s) IV Push every 8 hours PRN Nausea and/or Vomiting

## 2023-04-20 NOTE — PROGRESS NOTE ADULT - ASSESSMENT
Patient is a 88 year old female with pmh of HTN, DM2, COPD (O2 dependent 2-3L,) Chronic diastolic HF, hypothyroidism, CKD3, and hx of hyperkalemia presents after family called EMS for wellness check. Patient is AOx  - [facility], April 10, 2023, and states that she is in the hospital because she is not feeling well.  Patient reports that brother Zachariah or NOEMI  called EMS because he was concerned.  [phone]: he was the one who called EMS; he states he called b/c he was not able to get in touch with his mother over the phone. Of note, patient was  discharged from the hospital early February with HHA after a fall and hyperkalemia. Since leaving the hospital she has progressively been getting more weak over the past few weeks. Now she is having a hard time doing basic ADL's including bathroom, showering, cleaning, cooking, eating, getting around the house. Patient reports that she has not had eaten in the past two days because she has been unable to cook for herself. Additionally, she reports a fall but cannot recall the exact day. Denies hitting her head and ambulates with walker at baseline. States feeling SOB but denies chest pain. Cannot really elaborate on if it is lele at rest. Has difficulty getting inhaler. So cannot state if it provides relief. Denies wheezing. Pt has a HHA for 4 hrs/day, 6days/week. Her brother believes pt should be in an assisted living facility at this point. However, patient reports that she likes her independence.   In ED, Patient not hypoxic on home oxygen setting. Work up notable for stable  ABG. CT head negative. CXR small pleural effusion. BNP 5000. Potassium hemolyzed Patient admitted for FTT. Patient has been refusing nocturnal bipap. Palliative consulted for complex medical decision making in the setting of serious illness.

## 2023-04-20 NOTE — PROGRESS NOTE ADULT - PROBLEM SELECTOR PLAN 2
- Worsening hypercarbic respiratory failure - Worsening hypercarbic respiratory failure  - Patient has a terminal process with life expectancy of 6 months or less if disease follows its natural course, and therefore would be an appropriate candidate for hospice.

## 2023-04-20 NOTE — PROGRESS NOTE ADULT - PROBLEM SELECTOR PLAN 5
- Was sent to hospital due to concern she was unable to care for herself   - Had recent fall, gradual decline since   - Unable to do ADLs any longer   - Frail   - Current PPS 10% - Was sent to hospital due to concern she was unable to care for herself   - Had recent fall, gradual decline since   - Unable to do ADLs any longer   - Frail   - Current PPS 20%

## 2023-04-20 NOTE — PROGRESS NOTE ADULT - CONVERSATION DETAILS
Spoke to patient's brother Zachariah and sister in law Angel this AM. Discussed patient still with poor mental status despite bipap and had taken off over the weekend, now being watched. They understand she continues to be uncooperative. ELDA says "It is what it is" and was accepting patient was not going to improve. They understand without bipap she will die at some point. ELDA stresses she wants patient to be comfortable. Discussed option of comfort care to prioritize end of life symptom management with deescalation of other interventions that would be burdensome at end of life including continued blood draws, imaging, other investigations, and no more bipap. Explained currently patient is comfortable in deep sleep, no distressing symptoms. If patient survives, plans can be made to transfer to a facility for hospice care. Brother and sister in law in agreement with his plan. Spoke to patient's sister in law Angel who is aware patient is awake. Patient told Angel she will not be wearing bipap machine again. ELDA again understands patient has her own autonomy and will respect her wishes, understands patient may have limited time due to this. ELDA reaffirms wish to keep patient comfortable, and agreeable to transfer to a facility for eventual transition to hospice.

## 2023-04-20 NOTE — H&P ADULT - GENERAL COMMENTS
"CRS Office Visit History and Physical    Referring Md:   [facility] [place]  [address] [facility]  [address]  [place] [address]    SUBJECTIVE:     Chief Complaint: anal pain    History of Present Illness:  The patient is new patient to this practice.   Course is as follows:  Patient is a 32 y.o. male who reports hx of recurrent pilonidal cysts presents with anal pain.  Symptoms have been present since 2013 of hemorrhoids, reports only hx of bleeding. Has never experienced pain.   Currently this pain started approx 1 month ago [place] Then had a bm and "felt like knives".   Has been on stool softeners for 2 weeks now.  Hx of bm q3 days, now going daily vs bid.  Has tried anusol and proctofoam without improvement.       Last Colonoscopy: none  Family history of colorectal cancer or IBD: none.    Review of patient's allergies indicates:  No Known Allergies    No past medical history on file.  No past surgical history on file.  No family history on file.  Social History     Tobacco Use    Smoking status: Never    Smokeless tobacco: Never   Substance Use Topics    Alcohol use: Not Currently    Drug use: Never        Review of Systems:  Review of Systems   Gastrointestinal:  Positive for blood in stool and constipation.        Anal pain     OBJECTIVE:     Vital Signs (Most Recent)  Blood Pressure 117/76 (BP Location: Left arm, Patient Position: Sitting, BP Method: Small (Automatic))   Pulse 65   Height 5' 11" (1.803 m)   Weight 86.8 kg (191 lb 4.8 oz)   Body Mass Index 26.68 kg/m²     Physical Exam:  General: White male in no distress   Neuro: Alert and oriented to person, place, and time.  Moves all extremities.     HEENT: No icterus.  Trachea midline  Respiratory: Respirations are even and unlabored, no cough or audible wheezing    Skin: Warm dry and intact, No visible rashes, no jaundice    Labs reviewed today:  Lab Results   Component Value Date    HCT 45 " 10/19/2022       Imaging reviewed today:  none    Endoscopy reviewed today:  none    Anorectal Exam:    Anal Skin: - large tag noted  - pt reports pain with attempted eversion of anus.    Digital Rectal Exam:  deferred      ASSESSMENT/PLAN:     Diagnoses and all orders for this visit:    Anal pain        The patient was instructed to:  Lidocaine cream prn.  F/u with MD first available. Appt made for tomorrow.        Gay Dong, IANP-C  Colon and Rectal Surgery         Patient reports ~20lb weight loss over 6 months; Reports decreased PO intake

## 2023-04-20 NOTE — DISCHARGE NOTE NURSING/CASE MANAGEMENT/SOCIAL WORK - NSDCVIVACCINE_GEN_ALL_CORE_FT
Influenza, high dose seasonal; 24-Sep-2019 12:44; Shellie Hannah (Student, Nursing); [employer]; [id number] (Exp. Date: 24-Apr-2020); IntraMuscular; Deltoid Right.; 0.5 milliLiter(s); VIS (VIS Published: 15-Aug-2019, VIS Presented: 24-Sep-2019);

## 2023-04-20 NOTE — PROGRESS NOTE ADULT - PROBLEM SELECTOR PROBLEM 7
Chronic diastolic heart failure
Palliative care encounter
Chronic diastolic heart failure
Stage 3b chronic kidney disease
Chronic diastolic heart failure
Palliative care encounter
Palliative care encounter
Chronic diastolic heart failure
Stage 3b chronic kidney disease

## 2023-04-20 NOTE — PROGRESS NOTE ADULT - PROVIDER SPECIALTY LIST ADULT
Hospitalist
Palliative Care
Hospitalist
Hospitalist
Palliative Care
Hospitalist
Palliative Care
Hospitalist

## 2023-04-20 NOTE — DISCHARGE NOTE NURSING/CASE MANAGEMENT/SOCIAL WORK - NSDCCRNAME_GEN_ALL_CORE_FT
[facility] ([id number], [place] [address])

## 2023-04-20 NOTE — PROGRESS NOTE ADULT - PROBLEM SELECTOR PLAN 6
- Patient currently has no mental status. Decision maker therefore is her NOK brother Zachariah, who makes decisions with his wife Bib.   - 4/18- Family agreed to DNR/DNI, VIVIANAST completed  - 4/19 - See [facility] note. I spent 20 minutes addressing advanced care planning with patient and/or decision maker(s). Family agreed to comfort care measures, no more bipap. New MOLST completed. - Patient has no capacity to make complex medical decisions.   - Decision maker therefore is her NOK brother Zachariah, who makes decisions with his wife Bib.   - 4/18- Family agreed to DNR/DNI, MOLST completed  - 4/19- Family agreed to comfort care measures, no more bipap. New MOLST completed. - Patient has no capacity to make complex medical decisions.   - Decision maker therefore is her NOK brother Zachariah, who makes decisions with his wife Bib.   - 4/18- Family agreed to DNR/DNI, MOLST completed  - 4/19- Family agreed to comfort care measures, no more bipap. New MOLST completed.  - 4/20 - See [facility] note. I spent 16 minutes addressing advanced care planning with patient and/or decision maker(s). Patient now awake, refusing any further bipap. ELDA reaffirms wish to keep patient comfortable, understands next step is transfer to facility with eventual addition of hospice.

## 2023-04-20 NOTE — CHART NOTE - NSCHARTNOTEFT_GEN_A_CORE
Patient was seen bedside , was noted to be lethargic. withdrawing to painful stimuli however is non-responsive.  is bedside.   ABG was drawn.   RRT was called for acute change in mentation.     Please see RRT note for details.     Serinaer in law and son were updated by .
Pt did not tolerate Bipap overnight. Did not like the fit or pressure.
Notified by RN patient desated to 80's and then 70s while eating. Patient was upgraded from 4 liters nasal to 15 liter non-breather.   Went to see patient bedside. Patient is no apparent distress resting comfortably. A+O per baseline. Denies shortness of breath, Chest pain, ABD pain, dizziness, Headache or nausea. Patient was noted to have a history of COPD. Currently sating 100% while on 15 Liters non-breather. Oxygen was de-escalated to 6 liters. Patient saturation fluctuated between 90-92 %. Patient resting comfortably in bed asymptomatic.   Patient is non-complaint with BIPAP at night. Discussed with patient placing her on BIPAP however she refused stated she does not want to .  Patient refused chest xray as well. JAYDE Mata is bedside, witness patient noncooperation and refusal of xray.     Discussed with . Informed to order speech and swallow and de-escalate patient's diet.    discussed and updated patient's brother.     Will continue to monitor.
Patient seen and examined   Alert, oriented x 2, thought she was involved in a major accident   NAD, resting in bed on 3L NC   CV: RRR, no m/r/g   Resp: On 3L, clear anteriorly  Abdomen: Soft, non-tender, non-distended   Ext: No pitting LE edema     D/w brother and sister in law- they would like to pursue hospice services at [facility], are open to [facility]   Notified SW.   Plan for D/C today pending COVID swab   D/w patient, patient's friend, brother, sister in law, palliative care, SW. Over 40 minute spent in discharge planning
Pt seen for nutrition follow up     Medical Course: 88 year old female with pmh of HTN, DM2, COPD (O2 dependent 2-3L,) Chronic diastolic HF, hypothyroidism, CKD3, and hx of hyperkalemia brought in after family concerned about patient inability to care for herself.     Nutrition Course: Pt A&Ox2, pt sometimes confused. Participating in nutrition interview, also asking about finances from hospital stay. Pt had refused swallow assessment multiple times 4/11, 4/17, 4/18 and diet texture deferred to MD. Currently receiving minced and moist texture. Observed to have consumed 100% of lunch. Per RN flowsheets intake mostly 50-75% No reported GI distress, last BM 4/19 per RN flowsheets. Pt now comfort care, per EMR and GOC note. No nutrition related questions at this time. POCT , A1c 5.5%, will recommend Magic Cup once daily to optimize PO intake, monitor POCT.      Diet Prescription:   Pertinent Medications: MEDICATIONS  (STANDING):  budesonide 160 MICROgram(s)/formoterol 4.5 MICROgram(s) Inhaler 2 Puff(s) Inhalation two times a day  busPIRone 10 milliGRAM(s) Oral two times a day  furosemide    Tablet 20 milliGRAM(s) Oral daily  levothyroxine 112 MICROGram(s) Oral daily  metoprolol succinate ER 25 milliGRAM(s) Oral daily  montelukast 10 milliGRAM(s) Oral daily    MEDICATIONS  (PRN):  acetaminophen     Tablet .. 650 milliGRAM(s) Oral every 6 hours PRN Temp greater or equal to 38C (100.4F), Mild Pain (1 - 3)  albuterol    90 MICROgram(s) HFA Inhaler 2 Puff(s) Inhalation every 6 hours PRN Shortness of Breath and/or Wheezing  aluminum hydroxide/magnesium hydroxide/simethicone Suspension 30 milliLiter(s) Oral every 4 hours PRN Dyspepsia  melatonin 3 milliGRAM(s) Oral at bedtime PRN Insomnia  ondansetron Injectable 4 milliGRAM(s) IV Push every 8 hours PRN Nausea and/or Vomiting    Pertinent Labs: 04-19 Na142 mmol/L Glu 140 mg/dL<H> K+ 4.9 mmol/L Cr  1.45 mg/dL<H> BUN 54 mg/dL<H> 04-19 Phos 2.5 mg/dL     CAPILLARY BLOOD GLUCOSE  POCT Blood Glucose.: 119 mg/dL (19 Apr 2023 16:49)      Weight: Height (cm): 157.5 (04-11 @ 05:30), 167.6 (01-28 @ 19:50)  Weight (kg): 68.9 (04-19 @ 06:45), 74.843 (01-28 @ 19:50)  BMI (kg/m2): 27.8 (04-19 @ 06:45), 30.2 (04-11 @ 05:30), 26.6 (01-28 @ 19:50)  Weight Assessment: No new weight to assess       Physical Assessment, per flowsheets:  Edema: +2 edema left and right arm   Skin: Intact w/ no pressure injuries noted per RN flowsheets       Estimated Needs:   [X] No change since previous assessment    Previous Nutrition Diagnosis: [x ] Inadequate Energy Intake  Nutrition Diagnosis is [x ] ongoing  New Nutrition Diagnosis: [x ] not applicable     Interventions:   1) Recommend low Na diet, texture deferred to MD   2)  department to provide Magic Cup 1x/day (290 kcal, 9 gm protein) to promote optimal PO intake   3) Encourage PO intake and honor food preferences as able.   4) Please document % PO intake in RN flowsheets   5) RD available prn.    Monitor & Evaluate:  PO intake, tolerance to diet/supplement, nutrition related lab values, weight trends, BMs/GI distress, hydration status, skin integrity.    Fanny Gutierrez MS, RD| [phone] (Also available on TEAMS)
[facility] NIGHT MEDICINE COVERAGE - Late Entry    ABG obtained this evening to check pCO2 for improvement while on BiPAP, result below:    ABG - ( 18 Apr 2023 22:18 )  pH, Arterial: 7.41  pH, Blood: x     /  pCO2: 67    /  pO2: 71    / HCO3: 42    / Base Excess: 15.3  /  SaO2: 95.6      PCO2 improved from 81 to 67, RN made aware to keep pt on BiPAP overnight given prior significant hypercarbia, VBG in AM.  Pt mentating well during time ABG obtained.    Later in the evening, writer was called by RN as pt was trying to hit staff demanding for BiPAP to be removed, order given to trial pt off BiPAP for 1 hour, pt placed on 6L NC oxygen.  Upon my arrival to assess pt, she had removed her NC oxygen, and staff noted SpO2 in 70s w/ good pleth.  Pt became agitated and trying to hit staff, required unsecured mittens briefly to allow oxygen to be replaced for medical treatment.  Mittens d/haylie once behavior improved w/ oxygen, pt now back on BiPAP.  SpO2 improved to 93% during my assessment.  Lungs CTA b/l, w/o wheezes, rales, or rhonchi.  Pt stating she is at [facility].  Pt is now calm.    Recommend continuing pt on BiPAP for now, f/u AM VBG to check pCO2.  D/w overnight NAYE, Dr. Godfrey.    Plan d/w RN.  Pt stable at this time, will continue to monitor.    Juan Summers PA-C  Department of Medicine - [facility]  In-House Pager: #[phone]
serum K noted to be 6.1 not hemolyzed. Lokelma 10 mg po x1 , Insulin 5 units IV and Dextrose ordered. Will repeat BMP.

## 2023-04-20 NOTE — PROGRESS NOTE ADULT - PROBLEM SELECTOR PLAN 7
Palliative following for CMDM  High risk of complications, further morbidity and mortality Palliative following for CMDM  High risk of complications, further morbidity and mortality  Pending discharge to SNF for hospice care Palliative following for CMDM  High risk of complications, further morbidity and mortality    Pending discharge to SNF for hospice care  Palliative signing off Palliative following for CMDM  High risk of complications, further morbidity and mortality    Palliative signing off

## 2023-06-23 NOTE — ED ADULT TRIAGE NOTE - BANDS:
Medical Nutrition Therapy  Supervised Diet & Exercise Pre-Op   Metabolic and Bariatric Surgery  Visit 3 out of 3    Nutrition Assessment:     Vitals: Wt Readings from Last 3 Encounters:   06/23/23 247 lb (112 kg)   06/07/23 245 lb (111.1 kg)   05/15/23 247 lb (112 kg)       Nutrition Diagnosis:  Knowledge deficit related to healthy behaviors that support weight management after weight loss surgery as evidenced by Body mass index is 41.1 kg/m². Nutrition Intervention:  Nutrition Prescription:  Bariatric Pre-op Diet    Nutrition Counseling:  Reviewed patients current behaviors and habits and discussed barriers for change. Utilized motivational interviewing to set patient specific goals that promote bariatric behaviors. Patient displays understanding of the goals discussed. Nutrition Education:   Reviewed and available in the Bariatric Education [address] Goals: The patient has a list and explanation of every goal in their \"Bariatric Education Binder\". Changes in eating patterns to promote health are noted below on the goals number 19-22. See below for goals that patient is continuing to work on and completed. All goals were planned with and agreed on by the patient. I want to improve my health because I want to manage my PCOS. Goal C N/A Notes:   [] 1 I will read the education binder provided to me. [x] []    [] 2 I will make my pschological evaluation appoinment. [x] []    [] 3 I will bring my binder to every appointment. [x] []    [] 4 I will eliminate all tobacco/nicotine. [] [x]    [] 5 I will limit alcoholic beverages to 8-0OT per week. [] [x]    [] 6 I will limit dining out to 3 times per week or less. [] [x]    [] 7 I will eliminate sugary beverages. [] [x]    [] 8 I will eliminate carbonated beverages. [] [x]    [] 9 I will eliminate drinking with a straw. [] [x]    [] 10 I will limit caffeinated beverages to 16oz daily.  [x] []    [] 11 I will
Medical Weight Management Progress Note    Subjective     Patient being seen for medically supervised weight loss for the chronic conditions of DM Type 2, Anxiety/Depression/Bipolar, Bradycardia, PCOS, Marijuana Use. She is working on the behavior changes discussed at the initial appointment. Patient continues on diet plan. Physical activity includes walking and treadmill. Weight loss of 0 lbs since last visit. Psych eval completed and clearance received. EGD completed and H Pylori negative. No current issues. Working toward bariatric surgery:    [x] Sleeve Gastrectomy                                                           [] Jeffery-en-Y Gastric Bypass    Allergies:  No Known Allergies    Past Medical History:     Past Medical History:   Diagnosis Date    Allergy     seasonal    Anxiety     ASCUS with positive high risk HPV 07/16/2013    Asthma childhood    Bipolar disorder ([facility] [address]     Fatty liver 05/15/2023    Heart abnormality     HSIL on Pap smear 08/16/2012    PCOS (polycystic ovarian syndrome)     Postpartum depression     Type AB blood, Rh positive    .     Past Surgical History:  Past Surgical History:   Procedure Laterality Date    DILATION AND CURETTAGE  age 15    miscarriage    DILATION AND CURETTAGE OF UTERUS  10/17/2018    Dr Reagan Mg- JACQUE    ESOPHAGOGASTRODUODENOSCOPY  05/26/2023    EGD BIOPSY    [place] CONIZATION CERVIX W/WO D&C RPR ELTRD EXC N/A 10/17/2018    DILATATION AND CURETTAGE LEEP- ECC performed by Toin Guerrero MD at [address] [place] [address] Rd N/A 5/26/2023    EGD BIOPSY performed by Adam Guererro DO at [facility] OR       Family History:  Family History   Problem Relation Age of Onset    Other Father         [place]'s disease    Heart Disease Father     Diabetes Father         double amputee    Alcohol Abuse Father     Ovarian Cancer Paternal Grandmother        Social History:  Social History     Socioeconomic History    Marital status: Single     Spouse name: Not
Fall Risk;

## 2023-07-25 NOTE — ED ADULT NURSE REASSESSMENT NOTE - NS ED NURSE REASSESS COMMENT FT1
Pt consulted by surgery team, consent forms signed and placed in chart for surgery tomorrow. Pt expresses understanding of education given to her by this RN and surgical team. Denies pain or discomfort at present time. Pending repeat CT and medical clearance, will continue to monitor.

## 2023-07-25 NOTE — H&P ADULT - PROBLEM SELECTOR PLAN 2
Patient with acute on chronic hyperkalemia   -EKG showed T wave inversion on lateral leads, no peaked T wave   -repeat BMP showed K increased to 6.6. Will give dextrose/insulin, lokelma, lasix IV   -F/u with repeat BMP at 2 am

## 2023-07-25 NOTE — CONSULT NOTE ADULT - SUBJECTIVE AND OBJECTIVE BOX
HPI  88yFemale w/ history of left hip hemiarthroplasty done by Dr. Hughes on 9/20/2019 presents to ED c/o L thigh pain s/p mechanical fall from wheelchair at [facility] Unable to bear weight in the LLE since the fall, but reports difficulty ambulating over the past 3 months which is why she was at [facility]. Patient reports she does ambulate with a walker while at rehab. She has extensive past medical history including HTN, DM, COPD on chronic O2, CHF, hypothyroidism, CKD. During her initial surgery, her post operative course was complicated by difficulty extubating and persistent O2 desats. Denies headstrike or LOC. Denies numbness/tingling in the LLE. Denies any other trauma/injuries at this time.     ROS  Negative unless otherwise specified in HPI.    PAST MEDICAL & SURGICAL Hx  PAST MEDICAL & SURGICAL HISTORY:  DM (diabetes mellitus)      Hypothyroid      HTN (hypertension)      COPD (chronic obstructive pulmonary disease)      Chronic kidney disease (CKD)      Shingles      S/P cholecystectomy      H/O cataract  bilateral 2013      Leg fracture  right ORIF with hardware 2010      S/P hip replacement, left  2019          MEDICATIONS  Home Medications:  Albuterol (Eqv-ProAir HFA) 90 mcg/inh inhalation aerosol: 2 puff(s) inhaled every 4-6 hours as needed (10 Apr 2023 15:07)  busPIRone 10 mg oral tablet: 1 tab(s) orally 2 times a day (20 Apr 2023 15:29)  furosemide 20 mg oral tablet: 1 tab(s) orally once a day (20 Apr 2023 15:29)  Incruse Ellipta 62.5 mcg/inh inhalation powder: 1 puff(s) inhaled once a day (10 Apr 2023 15:07)  levothyroxine 112 mcg (0.112 mg) oral tablet: 1 tab(s) orally once a day (20 Apr 2023 15:29)  metoprolol succinate 25 mg oral tablet, extended release: 1 tab(s) orally once a day (20 Apr 2023 15:29)  montelukast 10 mg oral tablet: 1 tab(s) orally once a day (at bedtime) (10 Apr 2023 14:59)  Symbicort 160 mcg-4.5 mcg/inh inhalation aerosol: 2 puff(s) inhaled 2 times a day (10 Apr 2023 14:59)      ALLERGIES  No Known Allergies      FAMILY Hx  FAMILY HISTORY:  FHx: rheumatoid arthritis (Mother)  Mother and Brother    FH: type 2 diabetes mellitus (Father)  Father    Family history of hypertension (Father)  Father        SOCIAL Hx  Social History:      VITALS  Vital Signs Last 24 Hrs  T(C): 36.4 (25 Jul 2023 09:47), Max: 36.4 (25 Jul 2023 09:47)  T(F): 97.6 (25 Jul 2023 09:47), Max: 97.6 (25 Jul 2023 09:47)  HR: 59 (25 Jul 2023 10:58) (56 - 59)  BP: 140/67 (25 Jul 2023 10:58) (135/51 - 140/67)  BP(mean): 84 (25 Jul 2023 10:58) (84 - 84)  RR: 18 (25 Jul 2023 10:58) (18 - 18)  SpO2: 100% (25 Jul 2023 10:58) (100% - 100%)    Parameters below as of 25 Jul 2023 10:58  Patient On (Oxygen Delivery Method): nasal cannula  O2 Flow (L/min): 4      PHYSICAL EXAM  Gen: Lying in bed, NAD  Resp: Nasal cannula in place   LLE:  Skin intact, +edema L thigh  +TTP over L thigh, no TTP along remainder of extremity; compartments soft  Erythema to left lower leg likely due to venous stasis  Limited ROM at hip and knee 2/2 pain  Motor: TA/EHL/GS/FHL intact  Sensory: DP/SP/Tib/Rosa/Saph SILT  +DP pulse, WWP    Secondary survey:  No TTP along spine or other extremities, pelvis grossly stable, SILT and compartments soft throughout    LABS                        9.6    15.99 )-----------( 229      ( 25 Jul 2023 11:00 )             31.1     07-25    138  |  105  |  59<H>  ----------------------------<  152<H>  6.5<HH>   |  27  |  1.68<H>    Ca    8.8      25 Jul 2023 11:00    TPro  7.0  /  Alb  3.7  /  TBili  <0.2  /  DBili  x   /  AST  18  /  ALT  10  /  AlkPhos  84  07-25    PT/INR - ( 25 Jul 2023 11:00 )   PT: 10.1 sec;   INR: 0.90 ratio         PTT - ( 25 Jul 2023 11:00 )  PTT:23.5 sec    IMAGING  XRs: L femoral shaft fracture periprosthetic to her left hip hemiarthroplasty, no obvious signs of implant loosening.

## 2023-07-25 NOTE — ED PROVIDER NOTE - CARE PLAN
1 Principal Discharge DX:	Hip fracture   Principal Discharge DX:	Hip fracture  Secondary Diagnosis:	Hyperkalemia

## 2023-07-25 NOTE — ED PROVIDER NOTE - CLINICAL SUMMARY MEDICAL DECISION MAKING FREE TEXT BOX
88-year-old female with past medical history of hypertension, diabetes, COPD on chronic O2, CHF, hypothyroidism, CKD presents to the ER after a fall out of her wheelchair at [facility].  Patient states I broke my hip and need orthopedic.  Patient notes that she has history of fracture and hip with hardware in place.  Patient explains that she fell asleep in her wheelchair and she fell out.  Denies head strike, trauma, headache, LOC chest pain, shortness of breath, dizziness, lightheadedness, headache, abdominal pain, change in urinary habits, change in bowel habits, fevers.  concern for hip fracture.   preop labs, xray hip, pelvis femur, pain control.

## 2023-07-25 NOTE — H&P ADULT - NSHPREVIEWOFSYSTEMS_GEN_ALL_CORE
REVIEW OF SYSTEMS:    CONSTITUTIONAL: No weakness, fevers or chills  EYES/ENT: No visual changes;  No vertigo or throat pain   NECK: No pain or stiffness  RESPIRATORY: No cough, wheezing, hemoptysis; No shortness of breath  CARDIOVASCULAR: No chest pain or palpitations  GASTROINTESTINAL: No abdominal or epigastric pain. No nausea, vomiting, or hematemesis; No diarrhea or constipation. No melena or hematochezia.  GENITOURINARY: No dysuria, frequency or hematuria  NEUROLOGICAL: No numbness or weakness  MUSCULOSKELETAL: L hip pain.   SKIN: No itching, burning, rashes, or lesions   All other review of systems is negative unless indicated above.

## 2023-07-25 NOTE — H&P ADULT - PROBLEM SELECTOR PLAN 1
Patient with L hip fracture after a fall   -Xray L hip showed Acute comminuted mildly displaced periprosthetic left proximal femoral   fracture.  -Ortho recs appreciated, plan for OR tomorrow, pending preop optimization   -F/u with CT hip   -NWB on L leg, bedrest  -Pain control with tylenol

## 2023-07-25 NOTE — ED PROVIDER NOTE - OBJECTIVE STATEMENT
88-year-old female with past medical history of hypertension, diabetes, COPD on chronic O2, CHF, hypothyroidism, CKD presents to the ER after a fall out of her wheelchair at [place].  Patient states I broke my hip and need orthopedic.  Patient notes that she has history of fracture and hip with hardware in place.  Patient explains that she fell asleep in her wheelchair and she fell out.  Denies head strike, trauma, headache, chest pain, shortness of breath, dizziness, lightheadedness, headache, abdominal pain, change in urinary habits, change in bowel habits, fevers. Bipolar disorder, current episode depressed, severe, without psychotic features

## 2023-07-25 NOTE — ED PROVIDER NOTE - ATTENDING APP SHARED VISIT CONTRIBUTION OF CARE
DR. OSEI, ATTENDING MD-  I personally saw the patient with the PA and performed a substantive portion of the visit including all aspects of the medical decision making.    89 y/o female with h/o r hip rep here with r hip pain after fall out of w/c.  Denies ha neck pain numbness tingling weakness.  RLE shortened ext rotated, distally nv intact.  Eval for fx, dislocation.  Obtain cbc cmp coags t&s xr r hip femur pelvis give pain med.

## 2023-07-25 NOTE — H&P ADULT - PROBLEM SELECTOR PLAN 3
Most likely reactive due to hip fracture   -No focal sign of infection   -Monitor CBC daily for now   -Monitor off abx

## 2023-07-25 NOTE — H&P ADULT - PROBLEM SELECTOR PLAN 5
Patient with chronic hx of HF   -On exam, pt has bibasilar crackles and b/l LE edema   -Will start lasix 40 mg IV BID   -Cardiology consult in the AM for preop optimization

## 2023-07-25 NOTE — H&P ADULT - NSHPLABSRESULTS_GEN_ALL_CORE
9.6    15.99 )-----------( 229      ( 25 Jul 2023 11:00 )             31.1     Hgb Trend: 9.6<--  07-25    138  |  104  |  55<H>  ----------------------------<  162<H>  6.6<HH>   |  23  |  1.58<H>    Ca    8.5      25 Jul 2023 18:15  Phos  3.7     07-25  Mg     2.00     07-25    TPro  7.0  /  Alb  3.7  /  TBili  <0.2  /  DBili  x   /  AST  18  /  ALT  10  /  AlkPhos  84  07-25    Creatinine Trend: 1.58<--, 1.58<--, 1.68<--  PT/INR - ( 25 Jul 2023 11:00 )   PT: 10.1 sec;   INR: 0.90 ratio         PTT - ( 25 Jul 2023 11:00 )  PTT:23.5 sec      Urinalysis Basic - ( 25 Jul 2023 18:15 )    Color: x / Appearance: x / SG: x / pH: x  Gluc: 162 mg/dL / Ketone: x  / Bili: x / Urobili: x   Blood: x / Protein: x / Nitrite: x   Leuk Esterase: x / RBC: x / WBC x   Sq Epi: x / Non Sq Epi: x / Bacteria: x    Xray Hip as reviewed by the radiologist: Acute comminuted mildly displaced periprosthetic left proximal femoral   fracture.

## 2023-07-25 NOTE — H&P ADULT - PROBLEM SELECTOR PLAN 6
Patient has a complicated hx of CHF and severe COPD. Currently, she denies any chest pain or worsening SOB. Pt has poor METS at baseline. On exam, pt has b/l LE edema and bibasilar crackles. EKG shows T wave changes in V4-V5 which were present before. proBNP has decreased compared to last admission. CXR showed similar pattern of COPD/CHF as per radiology.  -Patient's RCRI is 1. However, given her comorbid condition, pt is a high risk patient undergoing moderate to high risk surgery. Pt also had perioperative complications in the past.   -Please consult cardiology and pulm in the AM for preop risk stratification.  -However, if pt needs surgery urgently, then the benefit of the surgery will outweigh the risk associated with the surgery Patient has a complicated hx of CHF and severe COPD. Currently, she denies any chest pain or worsening SOB. Pt has poor METS at baseline. On exam, pt has b/l LE edema and bibasilar crackles. EKG shows T wave changes in V4-V5 which were present before. proBNP has decreased compared to last admission. CXR showed similar pattern of COPD/CHF as per radiology.  -Patient's RCRI is 1. However, given her comorbid condition, pt is a high risk patient undergoing moderate to high risk surgery. Pt also had jayshree and post operative complications in the past.   -Please consult cardiology and pulm in the AM for preop risk stratification.  -However, if pt needs surgery urgently, then the benefit of the surgery will outweigh the risk associated with the surgery

## 2023-07-25 NOTE — H&P ADULT - HISTORY OF PRESENT ILLNESS
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 Patient is AAOx3 and was able to provide most of the information. Pt reports that she was sitting in an wheel chair and and fell asleep. When she woke up she was on the floor. She slipped out of the chair while she was sleeping. She then called for help and the staff was able to assist her. On examination she was found to have external rotation and abduction of the L lower extremity, prompting her to come to the ED.   In the ED, her vitals were notable for chronic hypoxia. Labs were notable for leukocytosis, chronic anemia, acute on chronic hyperkalemia, elevated BUN/Scr. Xray of hip/pelvis showed Acute comminuted mildly displaced periprosthetic left proximal femoral   fracture.

## 2023-07-25 NOTE — ED PROVIDER NOTE - PROGRESS NOTE DETAILS
ortho to evaluate spoke with Dr. Ap Barraza, unable to get response from ortho. Will admit patient to her service on tele for hyperkalemia

## 2023-07-25 NOTE — ED ADULT NURSE NOTE - NSFALLHARMRISKINTERV_ED_ALL_ED

## 2023-07-25 NOTE — H&P ADULT - PROBLEM SELECTOR PLAN 4
Patient with hx of COPD, on 3-4L on oxygen at home   -Not in acute exacerbation   -Will continue with albuterol, budesonide, and montelukast  -Pulm consult in the AM for preop optimization.

## 2023-07-25 NOTE — PATIENT PROFILE ADULT - HAVE YOU BEEN EATING POORLY BECAUSE OF A DECREASED APPETITE?
-- DO NOT REPLY / DO NOT REPLY ALL --  -- Message is from the [facility]--    General Patient Message      Reason for Call: Spouse states that pharmacy needs the name of the meter listed on the order. The machine is called t needs a accucheck abiba machine. Pharmacy number is  [phone].    Caller Information       Type Contact Phone    02/27/2020 05:28 PM Phone (Incoming) DOMENICA CARROLL (Emergency Contact) [phone]          Alternative phone number: None    Turnaround time given to caller:   \"This message will be sent to [state Provider's name]. The clinical team will fulfill your request as soon as they review your message when the office opens tomorrow.\"}     No (0)

## 2023-07-25 NOTE — H&P ADULT - ASSESSMENT
88 year old female with pmh of HTN, DM2, COPD (O2 dependent 2-3L,) Chronic diastolic HF, hypothyroidism, CKD3, and hx of hyperkalemia presents to the ED after a fall, found to have acute comminuted mildly displaced periprosthetic left proximal femoral fracture, course complicated by the hyperkalemia.

## 2023-07-25 NOTE — CONSULT NOTE ADULT - ASSESSMENT
ASSESSMENT & PLAN  88yFemale w/ L femoral shaft fracture periprosthetic fracture.  -Admit to medicine for pre operative optimization due to hyperkalemia, severe comorbid conditions, and previous history of post operative pulmonary complications  -NWB LLE, bedrest  -Plan for OR on 7/26/2023 for L hip revision arthroplasty, possible ORIF  -Patient will require expedited medical, pulmonary, and cardiology clearance to safely proceed with surgery   - CT L femur ordered   -f/u preop: CBC, BMP, coags, T&S x2, CXR, EKG  -NPO past midnight, IVF  -hold chemical DVT ppx for OR; SCDs OK  -pain control  -ice/cold compress

## 2023-07-25 NOTE — ED ADULT TRIAGE NOTE - CHIEF COMPLAINT QUOTE
c/o falling out of wheelchair today,  and now developed left hip pain, noted external rotation and adduction f the affected extremity, no head injury noted, pt  with hx of COPD, DM, CKD, arrive don O2 NC a t 6 LPM. breathing is even and unlabored.

## 2023-07-25 NOTE — ED PROVIDER NOTE - NS ED ATTENDING STATEMENT MOD
This was a shared visit with the KEENAN. I reviewed and verified the documentation and independently performed the documented:

## 2023-07-25 NOTE — H&P ADULT - NSHPPHYSICALEXAM_GEN_ALL_CORE
Vital Signs Last 24 Hrs  T(C): 36.6 (25 Jul 2023 16:43), Max: 36.6 (25 Jul 2023 16:43)  T(F): 97.8 (25 Jul 2023 16:43), Max: 97.8 (25 Jul 2023 16:43)  HR: 68 (25 Jul 2023 19:13) (56 - 68)  BP: 141/59 (25 Jul 2023 19:13) (118/54 - 141/59)  BP(mean): 84 (25 Jul 2023 10:58) (84 - 84)  RR: 22 (25 Jul 2023 19:13) (18 - 22)  SpO2: 100% (25 Jul 2023 19:13) (99% - 100%)    Parameters below as of 25 Jul 2023 19:13  Patient On (Oxygen Delivery Method): nasal cannula  O2 Flow (L/min): 4    GENERAL: in distress due to hip pain, breathing comfortably on NC., nontoxic appearing   HEENT:  Atraumatic, Normocephalic, Conjunctiva and sclera clear, oral mucosa moist, clear w/o any exudate   NECK: Supple, No JVD  CHEST/LUNG: decreased airflow, bibasilar crackles   HEART: Regular rate and rhythm; No murmurs, rubs, or gallops  ABDOMEN: Soft, Nontender, Nondistended; Bowel sounds present  EXTREMITIES:  2+ Peripheral Pulses, No clubbing, cyanosis, or edema  PSYCH: AAOx3, normal affect  NEUROLOGY: Limited movement on L side due to hip fracture.   SKIN: No rashes or lesions

## 2023-07-25 NOTE — ED PROVIDER NOTE - PHYSICAL EXAMINATION
Vital signs reviewed.   CONSTITUTIONAL: Well-appearing; well-nourished; in no apparent distress. Non-toxic appearing.   HEAD: Normocephalic, atraumatic. No patel signs of evidence of trauma.   EYES: PERRL, EOM intact, conjunctiva and sclera WNL.  CARD: Normal S1, S2; no murmurs, rubs, or gallops noted.  RESP: Normal chest excursion with respiration; breath sounds clear and equal bilaterally; no wheezes, rhonchi, or rales.  ABD/GI: soft, non-distended; non-tender; no palpable organomegaly, no pulsatile mass.  EXT/MS: LLE: shortened and externally rotated with TTP over hip and upper leg, no wounds, distally NVI   SKIN: Normal for age and race; warm; dry; good turgor; no apparent lesions or exudate noted.  NEURO: Awake, alert, oriented x 3, no gross deficits, CN II-XII grossly intact, no motor or sensory deficit noted.  PSYCH: Normal mood; appropriate affect.

## 2023-07-25 NOTE — ED ADULT NURSE NOTE - OBJECTIVE STATEMENT
pt states she fell asleep in wheelchair today and fell out of her wheelchair. pt denies head trama, + pain in left leg, unable to move, increased pain on palpation on left thigh. + left leg deformity noted. pt aax3, breathing well on 4LNC. pt pending piv/ lab work/ meds/ xrays. vss, will ctm

## 2023-07-25 NOTE — PATIENT PROFILE ADULT - FALL HARM RISK - HARM RISK INTERVENTIONS

## 2023-07-26 NOTE — PROGRESS NOTE ADULT - SUBJECTIVE AND OBJECTIVE BOX
ORTHOPAEDIC PROGRESS NOTE    SUBJECTIVE:  Pt seen and examined at bedside this am.  Doing well.  No acute events overnight.  Pain well controlled.  Pt continues to sate she would like surgery if it gives her the chance to ambulate.  No other complaints at this time     OBJECTIVE:  Vital Signs Last 24 Hrs  T(C): 36.3 (25 Jul 2023 23:07), Max: 37.2 (25 Jul 2023 21:38)  T(F): 97.4 (25 Jul 2023 23:07), Max: 98.9 (25 Jul 2023 21:38)  HR: 80 (25 Jul 2023 23:07) (56 - 89)  BP: 142/45 (25 Jul 2023 23:07) (118/54 - 142/45)  BP(mean): 84 (25 Jul 2023 10:58) (84 - 84)  RR: 19 (25 Jul 2023 23:07) (18 - 22)  SpO2: 95% (25 Jul 2023 23:07) (95% - 100%)    Parameters below as of 25 Jul 2023 23:07  Patient On (Oxygen Delivery Method): nasal cannula  O2 Flow (L/min): 4      Physical Exam:  General: NAD; resting comfrotably in bed  Resp: non labored, NC in place  LLE:  - Skin intact w well healed lateral surgical incision   - +TTP over L thigh, no TTP along remainder of extremity; compartments soft  - Erythema to left lower leg likely due to venous stasis  - Limited ROM at hip and knee 2/2 pain    - Motor: TA/EHL/GS/FHL intact (IP firing but limited ROM by pain)   - Sensory: DP/SP/Tib/Rosa/Saph SILT  - +DP pulse, WWP      LABS                        8.1    9.55  )-----------( 193      ( 26 Jul 2023 06:12 )             26.3       07-26    138  |  102  |  60<H>  ----------------------------<  165<H>  5.6<H>   |  25  |  1.81<H>    Ca    9.1      26 Jul 2023 02:24  Phos  4.0     07-26  Mg     2.00     07-26    TPro  7.0  /  Alb  3.7  /  TBili  <0.2  /  DBili  x   /  AST  18  /  ALT  10  /  AlkPhos  84  07-25      PT/INR - ( 26 Jul 2023 06:12 )   PT: 10.9 sec;   INR: 0.96 ratio         PTT - ( 26 Jul 2023 02:24 )  PTT:24.6 sec    I&O's Summary      acetaminophen     Tablet .. 650 milliGRAM(s) Oral every 6 hours PRN  aluminum hydroxide/magnesium hydroxide/simethicone Suspension 30 milliLiter(s) Oral every 4 hours PRN  budesonide 160 MICROgram(s)/formoterol 4.5 MICROgram(s) Inhaler 2 Puff(s) Inhalation two times a day  busPIRone 10 milliGRAM(s) Oral two times a day  chlorhexidine 2% Cloths 1 Application(s) Topical once  furosemide   Injectable 40 milliGRAM(s) IV Push two times a day  levothyroxine 112 MICROGram(s) Oral daily  melatonin 3 milliGRAM(s) Oral at bedtime PRN  metoprolol succinate ER 25 milliGRAM(s) Oral daily  montelukast 10 milliGRAM(s) Oral at bedtime  ondansetron Injectable 4 milliGRAM(s) IV Push every 8 hours PRN  povidone iodine 5% Nasal Swab 1 Application(s) Both Nostrils once  tiotropium 2.5 MICROgram(s) Inhaler 2 Puff(s) Inhalation daily

## 2023-07-26 NOTE — CONSULT NOTE ADULT - ASSESSMENT
88 year old female with pmh of HTN, DM2, COPD (O2 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found to have Acute comminuted mildly displaced periprosthetic left proximal femoral fracture. pending OR. Pulmonary consulted for pre op.   Patient is a former smoker, with severe COPD on chronic 2 to now 4 L NC ATC, has been on multiple different inhalers as an outpatient due to insurance issues however should be on ICS/LABA/LAMA. has diastolic dysfunction, without leg swelling although cannot lie flat due to breathing issues. does not have chronic cough or wheezing at this time. had a negative sleep study however has chronic hypercapnia but did not tolerate BIPAP in the past. has no problems swallowing.     #recommendations  continue LABA/LAMA/ ICS (symbicort and spiriva) with duonebs PRN  24 hours pre and post procedure should discontinue spiriva and start standing duoneb q 6 hr with standing symbicort  patient with known chronic hypercapnia although is not on chronic BIPAP, should consider post operative NIV if nessary  aspiration precautions  not on chronic steroids so unlikely to require stress dose steroids   keep euvolemic given history of diastolic dysfuntion  titrate oxygen to O2 >88%, use humidified oxygen  incentive spirometry, OOB to chair, PT/OT  aspiration precautions as necessary  DVT ppx as tolerated  airway clearance as necessary    Prior to discharge:  Please email: [email or web address] to setup an appointment prior to discharge. Include the patient's name, , MRN and contact information in the email.    with Dr. Brooke  Pulmonary/Sleep Clinic  [address] [place] [address]  [phone]     88 year old female with pmh of HTN, DM2, COPD (O2 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found to have Acute comminuted mildly displaced periprosthetic left proximal femoral fracture. pending OR. Pulmonary consulted for pre op.   Patient is a former smoker, with severe COPD on chronic 2 to now 4 L NC ATC, has been on multiple different inhalers as an outpatient due to insurance issues however should be on ICS/LABA/LAMA. has diastolic dysfunction, without leg swelling although cannot lie flat due to breathing issues. does not have chronic cough or wheezing at this time. had a negative sleep study however has chronic hypercapnia but did not tolerate BIPAP in the past. has no problems swallowing.     #recommendations  continue LABA/LAMA/ ICS (symbicort and spiriva) with duonebs PRN  24 hours pre and post procedure should discontinue spiriva and start standing duoneb q 6 hr with standing symbicort  patient with known chronic hypercapnia although is not on chronic BIPAP, should consider post operative NIV if nessary  aspiration precautions  not on chronic steroids so unlikely to require stress dose steroids   keep euvolemic given history of diastolic dysfuntion  titrate oxygen to O2 >88%, use humidified oxygen  incentive spirometry, OOB to chair, PT/OT  aspiration precautions as necessary  DVT ppx as tolerated  airway clearance as necessary  medium to high risk based off ARISCAT    Prior to discharge:  Please email: [email or web address] to setup an appointment prior to discharge. Include the patient's name, , MRN and contact information in the email.    with Dr. Brooke  Pulmonary/Sleep Clinic  [address] [place] [address]  [phone]     88 year old female with pmh of HTN, DM2, COPD (O2 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found to have Acute comminuted mildly displaced periprosthetic left proximal femoral fracture. pending OR. Pulmonary consulted for pre op.     #recommendations  continue LABA/LAMA/ ICS (symbicort and spiriva) with duonebs PRN  24 hours pre and post procedure should discontinue spiriva and start standing duoneb q 6 hr with standing symbicort  patient with known chronic hypercapnia although is not on chronic BIPAP, should consider post operative NIV if nessary  aspiration precautions  not on chronic steroids so unlikely to require stress dose steroids   keep euvolemic given history of diastolic dysfuntion  titrate oxygen to O2 >88%, use humidified oxygen  incentive spirometry, OOB to chair, PT/OT  aspiration precautions as necessary  DVT ppx as tolerated  airway clearance as necessary  medium to high risk based off ARISCAT, however optimized from pulmonary standpoint    Prior to discharge:  Please email: [email or web address] to setup an appointment prior to discharge. Include the patient's name, , MRN and contact information in the email.    with Dr. Brooke  Pulmonary/Sleep Clinic  [address] [place] [address]  [phone]

## 2023-07-26 NOTE — PROGRESS NOTE ADULT - PROBLEM SELECTOR PLAN 2
Patient with acute on chronic hyperkalemia   -EKG showed T wave inversion on lateral leads, no peaked T wave   - still hyperkalemic, c/w lokelma and Trend K

## 2023-07-26 NOTE — GOALS OF CARE CONVERSATION - ADVANCED CARE PLANNING - CONVERSATION DETAILS
Pt states she designates her nephew and brother as surrogate decision makers. She wants to be DNR/DNI and she has expressed her wishes to her brother. Will attempt to get old MOLST. DNR/DNI order to be ordered after OR

## 2023-07-26 NOTE — PROGRESS NOTE ADULT - PROBLEM SELECTOR PLAN 6
Patient has a complicated hx of CHF and severe COPD. Currently, she denies any chest pain or worsening SOB. Pt has poor METS at baseline. On exam, pt has b/l LE edema and bibasilar crackles. EKG shows T wave changes in V4-V5 which were present before. proBNP has decreased compared to last admission. CXR showed similar pattern of COPD/CHF as per radiology.  -Patient's RCRI is 1. However, given her comorbid condition, pt is a high risk patient undergoing moderate to high risk surgery. Pt also had jayshree and post operative complications in the past.   -F/u consult cardiology and pulm preop risk stratification.  -However, if pt needs surgery urgently, then the benefit of the surgery will outweigh the risk associated with the surgery

## 2023-07-26 NOTE — PROGRESS NOTE ADULT - ASSESSMENT
88yFemale w/ L femoral shaft fracture periprosthetic fracture    PLAN  -Admit to medicine  -Ortho plan for OR possibly 7/31 L revision hip arthroplasty vs ORIF s PFR   -Pt needs med/cards/pul clearance/optimization  -NWB LLE, bedrest  -FU CT L hip/femur   -Diet: reg > NPO before OR   -pain control  -ice/cold compress  Appreciate medical management

## 2023-07-26 NOTE — PROGRESS NOTE ADULT - PROBLEM SELECTOR PLAN 1
Patient with L hip fracture after a fall   -Xray and CT L hip showed Acute comminuted mildly displaced periprosthetic left proximal femoral   fracture.  -Ortho recs appreciated, plan for OR, pending preop optimization ( needs pulm and Cards clearance)  -NWB on L leg, bedrest  -Pain control with tylenol

## 2023-07-26 NOTE — CONSULT NOTE ADULT - SUBJECTIVE AND OBJECTIVE BOX
CARDIOLOGY FELLOW CONSULT NOTE    HPI:  88 year old female with pmh of HTN, DM2, COPD (O2 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  Pt reports that she was sitting in an wheel chair and and fell asleep. When she woke up she was on the floor. She slipped out of the chair while she was sleeping. She then called for help and the staff was able to assist her. On examination she was found to have external rotation and abduction of the L lower extremity, prompting her to come to the ED.   In the ED, her vitals were notable for chronic hypoxia. Labs were notable for leukocytosis, chronic anemia, acute on chronic hyperkalemia, elevated BUN/Scr. Xray of hip/pelvis showed Acute comminuted mildly displaced periprosthetic left proximal femoral fracture.     AF HR 80 /45 4L NC 95%   139/5.7 | 101/28 | 61/1.93 < 144   15/11 | 77 | 0.4 | 6.3/3.5   9.55 < 8.1  MCV 98.9 > 193   HDL 47 Tgl 89 CHolesterol 149 LD 84   A1c 6.6   ProBNP 1301    PMHx:   DM (diabetes mellitus)  Hypothyroid  HTN (hypertension)  COPD (chronic obstructive pulmonary disease)  Chronic bronchitis  Chronic kidney disease (CKD)  Shingles    PSHx:   S/P cholecystectomy  H/O cataract  Leg fracture  S/P hip replacement, left    Allergies:  No Known Allergies    Home Meds:  Albuterol (Eqv-ProAir HFA) 90 mcg/inh inhalation aerosol: 2 puff(s) inhaled every 4-6 hours as needed (2023 20:09)  busPIRone 10 mg oral tablet: 1 tab(s) orally 2 times a day (2023 20:09)  furosemide 20 mg oral tablet: 1 tab(s) orally once a day (2023 20:09)  Incruse Ellipta 62.5 mcg/inh inhalation powder: 1 puff(s) inhaled once a day (2023 20:09)  levothyroxine 112 mcg (0.112 mg) oral tablet: 1 tab(s) orally once a day (2023 20:09)  metoprolol succinate 25 mg oral tablet, extended release: 1 tab(s) orally once a day (2023 20:09)  montelukast 10 mg oral tablet: 1 tab(s) orally once a day (at bedtime) (2023 20:09)  Symbicort 160 mcg-4.5 mcg/inh inhalation aerosol: 2 puff(s) inhaled 2 times a day (2023 20:09)    Current Medications:   acetaminophen     Tablet .. 650 milliGRAM(s) Oral every 6 hours PRN  aluminum hydroxide/magnesium hydroxide/simethicone Suspension 30 milliLiter(s) Oral every 4 hours PRN  budesonide 160 MICROgram(s)/formoterol 4.5 MICROgram(s) Inhaler 2 Puff(s) Inhalation two times a day  busPIRone 10 milliGRAM(s) Oral two times a day  chlorhexidine 2% Cloths 1 Application(s) Topical once  levothyroxine 112 MICROGram(s) Oral daily  melatonin 3 milliGRAM(s) Oral at bedtime PRN  metoprolol succinate ER 25 milliGRAM(s) Oral daily  montelukast 10 milliGRAM(s) Oral at bedtime  ondansetron Injectable 4 milliGRAM(s) IV Push every 8 hours PRN  povidone iodine 5% Nasal Swab 1 Application(s) Both Nostrils once  tiotropium 2.5 MICROgram(s) Inhaler 2 Puff(s) Inhalation daily    FAMILY HISTORY:  FHx: rheumatoid arthritis (Mother)  Mother and Brother  FH: type 2 diabetes mellitus (Father)  Father  Family history of hypertension (Father)  Father    Social History:  Smoking History:  Alcohol Use:  Drug Use:    REVIEW OF SYSTEMS:  CONSTITUTIONAL: No weakness, fevers or chills  EYES/ENT: No visual changes;  No dysphagia  NECK: No pain or stiffness  RESPIRATORY: No cough, wheezing, hemoptysis; No shortness of breath  CARDIOVASCULAR: No chest pain or palpitations; No lower extremity edema  GASTROINTESTINAL: No abdominal or epigastric pain. No nausea, vomiting, or hematemesis; No diarrhea or constipation. No melena or hematochezia.  BACK: No back pain  GENITOURINARY: No dysuria, frequency or hematuria  NEUROLOGICAL: No numbness or weakness  SKIN: No itching, burning, rashes, or lesions   All other review of systems is negative unless indicated above.    Physical Exam:  T(F): 97.4 (07-25), Max: 98.9 ()  HR: 80 () (65 - 89)  BP: 142/45 (-) (118/54 - 142/45)  RR: 19 (-)  SpO2: 95% ()  GENERAL: No acute distress, well-developed  HEAD:  Atraumatic, Normocephalic  ENT: EOMI, PERRLA, conjunctiva and sclera clear, Neck supple, No JVD, moist mucosa  CHEST/LUNG: Clear to auscultation bilaterally; No wheeze, equal breath sounds bilaterally   BACK: No spinal tenderness  HEART: Regular rate and rhythm; No murmurs, rubs, or gallops  ABDOMEN: Soft, Nontender, Nondistended; Bowel sounds present  EXTREMITIES:  No clubbing, cyanosis, or edema  PSYCH: Nl behavior, nl affect  NEUROLOGY: AAOx3, non-focal, cranial nerves intact  SKIN: Normal color, No rashes or lesions    ECG: sinus rhythm, normal axis, TWI in the I, aVL< V4-V6, deep TWI    TTE 2023  DIMENSIONS:  Dimensions:     Normal Values:  LA:     2.7 cm    2.0 - 4.0 cm  Ao:     3.4 cm    2.0 - 3.8 cm  SEPTUM: 1.0 cm    0.6 - 1.2 cm  PWT:   1.0 cm    0.6 - 1.1 cm  LVIDd:  3.8 cm    3.0 - 5.6 cm  LVIDs:    ---     1.8 - 4.0 cm  Derived Variables:  LVMI: 69 g/m2  RWT: 0.52  Ejection Fraction (Modified Curry Rule): 67 %  ------------------------------------------------------------------------  OBSERVATIONS:  Mitral Valve: Mitral annular calcification and calcified  mitral leaflets with normal diastolic opening.  Aortic Root: Normal aortic root.  Aortic Valve: Aortic valve not well visualized.  Left Atrium: Normal left atrium.  LA volume index = 24  cc/m2.  Left Ventricle: Hyperdynamic left ventricle. Normal left  ventricular internal dimensions and wall thicknesses.  Reversal of the E-A  waves of the mitral inflow pattern is  consistent with diastolic LV dysfunction.  Right Heart: Normal right atrium. Normal right ventricular  size and function. Normal tricuspid valve. Normal pulmonic  valve.  Pericardium/PleuraNormal pericardium with no pericardial  effusion.  ------------------------------------------------------------------------  CONCLUSIONS:  1. Mitral annular calcification and calcified mitral  leaflets with normal diastolic opening.  2. Normal left ventricular internal dimensions and wall  thicknesses.  3. Hyperdynamic left ventricle.  4. Normal right ventricular size and function.    CT Femur  IMPRESSION:  Acute comminuted left femoral periprosthetic fracture as described.    Labs: Personally reviewed                        8.1    9.55  )-----------( 193      ( 2023 06:12 )             26.3         139  |  101  |  61<H>  ----------------------------<  144<H>  5.7<H>   |  28  |  1.93<H>    Ca    9.0      2023 06:12  Phos  4.0       Mg     2.00         TPro  6.3  /  Alb  3.5  /  TBili  0.4  /  DBili  x   /  AST  15  /  ALT  11  /  AlkPhos  77  26    PT/INR - ( 2023 06:12 )   PT: 10.9 sec;   INR: 0.96 ratio         PTT - ( 2023 02:24 )  PTT:24.6 sec    Total Cholesterol: 149  LDL: --  HDL: 47  T CARDIOLOGY FELLOW CONSULT NOTE    HPI:  88 year old female with pmh of HTN, DM2, COPD (O2 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  Pt reports that she was sitting in an wheel chair and and fell asleep. When she woke up she was on the floor. She slipped out of the chair while she was sleeping. She then called for help and the staff was able to assist her. On examination she was found to have external rotation and abduction of the L lower extremity, prompting her to come to the ED.   In the ED, her vitals were notable for chronic hypoxia. Labs were notable for leukocytosis, chronic anemia, acute on chronic hyperkalemia, elevated BUN/Scr. Xray of hip/pelvis showed Acute comminuted mildly displaced periprosthetic left proximal femoral fracture.     AF HR 80 /45 4L NC 95%   139/5.7 | 101/28 | 61/1.93 < 144   15/11 | 77 | 0.4 | 6.3/3.5   9.55 < 8.1  MCV 98.9 > 193   HDL 47 Tgl 89 CHolesterol 149 LD 84   A1c 6.6   ProBNP 1301    PMHx:   DM (diabetes mellitus)  Hypothyroid  HTN (hypertension)  COPD (chronic obstructive pulmonary disease)  Chronic bronchitis  Chronic kidney disease (CKD)  Shingles    PSHx:   S/P cholecystectomy  H/O cataract  Leg fracture  S/P hip replacement, left    Allergies:  No Known Allergies    Home Meds:  Albuterol (Eqv-ProAir HFA) 90 mcg/inh inhalation aerosol: 2 puff(s) inhaled every 4-6 hours as needed (2023 20:09)  busPIRone 10 mg oral tablet: 1 tab(s) orally 2 times a day (2023 20:09)  furosemide 20 mg oral tablet: 1 tab(s) orally once a day (2023 20:09)  Incruse Ellipta 62.5 mcg/inh inhalation powder: 1 puff(s) inhaled once a day (2023 20:09)  levothyroxine 112 mcg (0.112 mg) oral tablet: 1 tab(s) orally once a day (2023 20:09)  metoprolol succinate 25 mg oral tablet, extended release: 1 tab(s) orally once a day (2023 20:09)  montelukast 10 mg oral tablet: 1 tab(s) orally once a day (at bedtime) (2023 20:09)  Symbicort 160 mcg-4.5 mcg/inh inhalation aerosol: 2 puff(s) inhaled 2 times a day (2023 20:09)    Current Medications:   acetaminophen     Tablet .. 650 milliGRAM(s) Oral every 6 hours PRN  aluminum hydroxide/magnesium hydroxide/simethicone Suspension 30 milliLiter(s) Oral every 4 hours PRN  budesonide 160 MICROgram(s)/formoterol 4.5 MICROgram(s) Inhaler 2 Puff(s) Inhalation two times a day  busPIRone 10 milliGRAM(s) Oral two times a day  chlorhexidine 2% Cloths 1 Application(s) Topical once  levothyroxine 112 MICROGram(s) Oral daily  melatonin 3 milliGRAM(s) Oral at bedtime PRN  metoprolol succinate ER 25 milliGRAM(s) Oral daily  montelukast 10 milliGRAM(s) Oral at bedtime  ondansetron Injectable 4 milliGRAM(s) IV Push every 8 hours PRN  povidone iodine 5% Nasal Swab 1 Application(s) Both Nostrils once  tiotropium 2.5 MICROgram(s) Inhaler 2 Puff(s) Inhalation daily    FAMILY HISTORY:  FHx: rheumatoid arthritis (Mother)  Mother and Brother  FH: type 2 diabetes mellitus (Father)  Father  Family history of hypertension (Father)  Father    Social History:  Smoking History:  Alcohol Use:  Drug Use:    REVIEW OF SYSTEMS:  CONSTITUTIONAL: No weakness, fevers or chills  EYES/ENT: No visual changes;  No dysphagia  NECK: No pain or stiffness  RESPIRATORY: No cough, wheezing, hemoptysis; No shortness of breath  CARDIOVASCULAR: No chest pain or palpitations; No lower extremity edema  GASTROINTESTINAL: No abdominal or epigastric pain. No nausea, vomiting, or hematemesis; No diarrhea or constipation. No melena or hematochezia.  BACK: No back pain  GENITOURINARY: No dysuria, frequency or hematuria  NEUROLOGICAL: No numbness or weakness  SKIN: No itching, burning, rashes, or lesions   All other review of systems is negative unless indicated above.    Physical Exam:  T(F): 97.4 (07-25), Max: 98.9 ()  HR: 80 () (65 - 89)  BP: 142/45 (-) (118/54 - 142/45)  RR: 19 (-)  SpO2: 95% ()  GENERAL: No acute distress, well-developed  HEAD:  Atraumatic, Normocephalic  ENT: EOMI, PERRLA, conjunctiva and sclera clear, Neck supple, No JVD, moist mucosa  CHEST/LUNG: Clear to auscultation bilaterally; No wheeze, equal breath sounds bilaterally   BACK: No spinal tenderness  HEART: Regular rate and rhythm; No murmurs, rubs, or gallops  ABDOMEN: Soft, Nontender, Nondistended; Bowel sounds present  EXTREMITIES:  No clubbing, cyanosis, or edema  PSYCH: Nl behavior, nl affect  NEUROLOGY: AAOx3, non-focal, cranial nerves intact  SKIN: Normal color, No rashes or lesions    ECG: sinus rhythm, normal axis, TWI in the I, aVL< V4-V6, deep TWI. LVH as per Sokolov Maravilla criteria     TTE 2023  DIMENSIONS:  Dimensions:     Normal Values:  LA:     2.7 cm    2.0 - 4.0 cm  Ao:     3.4 cm    2.0 - 3.8 cm  SEPTUM: 1.0 cm    0.6 - 1.2 cm  PWT:   1.0 cm    0.6 - 1.1 cm  LVIDd:  3.8 cm    3.0 - 5.6 cm  LVIDs:    ---     1.8 - 4.0 cm  Derived Variables:  LVMI: 69 g/m2  RWT: 0.52  Ejection Fraction (Modified Curry Rule): 67 %  ------------------------------------------------------------------------  OBSERVATIONS:  Mitral Valve: Mitral annular calcification and calcified  mitral leaflets with normal diastolic opening.  Aortic Root: Normal aortic root.  Aortic Valve: Aortic valve not well visualized.  Left Atrium: Normal left atrium.  LA volume index = 24  cc/m2.  Left Ventricle: Hyperdynamic left ventricle. Normal left  ventricular internal dimensions and wall thicknesses.  Reversal of the E-A  waves of the mitral inflow pattern is  consistent with diastolic LV dysfunction.  Right Heart: Normal right atrium. Normal right ventricular  size and function. Normal tricuspid valve. Normal pulmonic  valve.  Pericardium/PleuraNormal pericardium with no pericardial  effusion.  ------------------------------------------------------------------------  CONCLUSIONS:  1. Mitral annular calcification and calcified mitral  leaflets with normal diastolic opening.  2. Normal left ventricular internal dimensions and wall  thicknesses.  3. Hyperdynamic left ventricle.  4. Normal right ventricular size and function.    CT Femur  IMPRESSION:  Acute comminuted left femoral periprosthetic fracture as described.    Labs: Personally reviewed                        8.1    9.55  )-----------( 193      ( 2023 06:12 )             26.3     07    139  |  101  |  61<H>  ----------------------------<  144<H>  5.7<H>   |  28  |  1.93<H>    Ca    9.0      2023 06:12  Phos  4.0       Mg     2.00         TPro  6.3  /  Alb  3.5  /  TBili  0.4  /  DBili  x   /  AST  15  /  ALT  11  /  AlkPhos  77      PT/INR - ( 2023 06:12 )   PT: 10.9 sec;   INR: 0.96 ratio         PTT - ( 2023 02:24 )  PTT:24.6 sec    Total Cholesterol: 149  LDL: --  HDL: 47  T CARDIOLOGY FELLOW CONSULT NOTE    HPI:  88 year old female with pmh of HTN, DM2, COPD (O2 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  Pt reports that she was sitting in an wheel chair and and fell asleep. When she woke up she was on the floor. She slipped out of the chair while she was sleeping. She then called for help and the staff was able to assist her. On examination she was found to have external rotation and abduction of the L lower extremity, prompting her to come to the ED.     In the ED, her vitals were notable for chronic hypoxia. Labs were notable for leukocytosis, chronic anemia, acute on chronic hyperkalemia, elevated BUN/Scr. Xray of hip/pelvis showed Acute comminuted mildly displaced periprosthetic left proximal femoral fracture.   Diuresed for a day with uptrending Cre 1.58 > 1.93 (Baseline 1.3-1.5)    AF HR 80 /45 4L NC 95%   139/5.7 | 101/28 | 61/1.93 < 144   15/11 | 77 | 0.4 | 6.3/3.5   9.55 < 8.1  MCV 98.9 > 193   HDL 47 Tgl 89 CHolesterol 149 LD 84   A1c 6.6   ProBNP 1301    PMHx:   DM (diabetes mellitus)  Hypothyroid  HTN (hypertension)  COPD (chronic obstructive pulmonary disease)  Chronic bronchitis  Chronic kidney disease (CKD)  Shingles    PSHx:   S/P cholecystectomy  H/O cataract  Leg fracture  S/P hip replacement, left    Allergies:  No Known Allergies    Home Meds:  Albuterol (Eqv-ProAir HFA) 90 mcg/inh inhalation aerosol: 2 puff(s) inhaled every 4-6 hours as needed (2023 20:09)  busPIRone 10 mg oral tablet: 1 tab(s) orally 2 times a day (2023 20:09)  furosemide 20 mg oral tablet: 1 tab(s) orally once a day (2023 20:09)  Incruse Ellipta 62.5 mcg/inh inhalation powder: 1 puff(s) inhaled once a day (2023 20:09)  levothyroxine 112 mcg (0.112 mg) oral tablet: 1 tab(s) orally once a day (2023 20:09)  metoprolol succinate 25 mg oral tablet, extended release: 1 tab(s) orally once a day (2023 20:09)  montelukast 10 mg oral tablet: 1 tab(s) orally once a day (at bedtime) (2023 20:09)  Symbicort 160 mcg-4.5 mcg/inh inhalation aerosol: 2 puff(s) inhaled 2 times a day (2023 20:09)    Current Medications:   acetaminophen     Tablet .. 650 milliGRAM(s) Oral every 6 hours PRN  aluminum hydroxide/magnesium hydroxide/simethicone Suspension 30 milliLiter(s) Oral every 4 hours PRN  budesonide 160 MICROgram(s)/formoterol 4.5 MICROgram(s) Inhaler 2 Puff(s) Inhalation two times a day  busPIRone 10 milliGRAM(s) Oral two times a day  chlorhexidine 2% Cloths 1 Application(s) Topical once  levothyroxine 112 MICROGram(s) Oral daily  melatonin 3 milliGRAM(s) Oral at bedtime PRN  metoprolol succinate ER 25 milliGRAM(s) Oral daily  montelukast 10 milliGRAM(s) Oral at bedtime  ondansetron Injectable 4 milliGRAM(s) IV Push every 8 hours PRN  povidone iodine 5% Nasal Swab 1 Application(s) Both Nostrils once  tiotropium 2.5 MICROgram(s) Inhaler 2 Puff(s) Inhalation daily    FAMILY HISTORY:  FHx: rheumatoid arthritis (Mother)  Mother and Brother  FH: type 2 diabetes mellitus (Father)  Father  Family history of hypertension (Father)  Father    Social History:  Smoking History:  Alcohol Use:  Drug Use:    REVIEW OF SYSTEMS:  CONSTITUTIONAL: No weakness, fevers or chills  EYES/ENT: No visual changes;  No dysphagia  NECK: No pain or stiffness  RESPIRATORY: No cough, wheezing, hemoptysis; No shortness of breath  CARDIOVASCULAR: No chest pain or palpitations; No lower extremity edema  GASTROINTESTINAL: No abdominal or epigastric pain. No nausea, vomiting, or hematemesis; No diarrhea or constipation. No melena or hematochezia.  BACK: No back pain  GENITOURINARY: No dysuria, frequency or hematuria  NEUROLOGICAL: No numbness or weakness  SKIN: No itching, burning, rashes, or lesions   All other review of systems is negative unless indicated above.    Physical Exam:  T(F): 97.4 (-), Max: 98.9 ()  HR: 80 () (65 - 89)  BP: 142/45 (-) (118/54 - 142/45)  RR: 19 (-)  SpO2: 95% ()  GENERAL: No acute distress, well-developed  HEAD:  Atraumatic, Normocephalic  ENT: EOMI, PERRLA, conjunctiva and sclera clear, Neck supple, No JVD, moist mucosa  CHEST/LUNG: Clear to auscultation bilaterally; No wheeze, equal breath sounds bilaterally   BACK: No spinal tenderness  HEART: Regular rate and rhythm; No murmurs, rubs, or gallops  ABDOMEN: Soft, Nontender, Nondistended; Bowel sounds present  EXTREMITIES:  No clubbing, cyanosis, or edema  PSYCH: Nl behavior, nl affect  NEUROLOGY: AAOx3, non-focal, cranial nerves intact  SKIN: Normal color, No rashes or lesions    ECG: sinus rhythm, normal axis, TWI in the I, aVL< V4-V6, deep TWI. LVH as per Sokolov Maravilla criteria     TTE 2023  DIMENSIONS:  Dimensions:     Normal Values:  LA:     2.7 cm    2.0 - 4.0 cm  Ao:     3.4 cm    2.0 - 3.8 cm  SEPTUM: 1.0 cm    0.6 - 1.2 cm  PWT:   1.0 cm    0.6 - 1.1 cm  LVIDd:  3.8 cm    3.0 - 5.6 cm  LVIDs:    ---     1.8 - 4.0 cm  Derived Variables:  LVMI: 69 g/m2  RWT: 0.52  Ejection Fraction (Modified Curry Rule): 67 %  ------------------------------------------------------------------------  OBSERVATIONS:  Mitral Valve: Mitral annular calcification and calcified  mitral leaflets with normal diastolic opening.  Aortic Root: Normal aortic root.  Aortic Valve: Aortic valve not well visualized.  Left Atrium: Normal left atrium.  LA volume index = 24  cc/m2.  Left Ventricle: Hyperdynamic left ventricle. Normal left  ventricular internal dimensions and wall thicknesses.  Reversal of the E-A  waves of the mitral inflow pattern is  consistent with diastolic LV dysfunction.  Right Heart: Normal right atrium. Normal right ventricular  size and function. Normal tricuspid valve. Normal pulmonic  valve.  Pericardium/PleuraNormal pericardium with no pericardial  effusion.  ------------------------------------------------------------------------  CONCLUSIONS:  1. Mitral annular calcification and calcified mitral  leaflets with normal diastolic opening.  2. Normal left ventricular internal dimensions and wall  thicknesses.  3. Hyperdynamic left ventricle.  4. Normal right ventricular size and function.    CT Femur  IMPRESSION:  Acute comminuted left femoral periprosthetic fracture as described.    Labs: Personally reviewed                        8.1    9.55  )-----------( 193      ( 2023 06:12 )             26.3     07-26    139  |  101  |  61<H>  ----------------------------<  144<H>  5.7<H>   |  28  |  1.93<H>    Ca    9.0      2023 06:12  Phos  4.0       Mg     2.00         TPro  6.3  /  Alb  3.5  /  TBili  0.4  /  DBili  x   /  AST  15  /  ALT  11  /  AlkPhos  77  07-    PT/INR - ( 2023 06:12 )   PT: 10.9 sec;   INR: 0.96 ratio         PTT - ( 2023 02:24 )  PTT:24.6 sec    Total Cholesterol: 149  LDL: --  HDL: 47  T CARDIOLOGY FELLOW CONSULT NOTE    HPI:  88 year old female with pmh of HTN, DM2, COPD (O2 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  Pt reports that she was sitting in an wheel chair and and fell asleep. When she woke up she was on the floor. She slipped out of the chair while she was sleeping. She then called for help and the staff was able to assist her. On examination she was found to have external rotation and abduction of the L lower extremity, prompting her to come to the ED.     In the ED, her vitals were notable for chronic hypoxia. Labs were notable for leukocytosis, chronic anemia, acute on chronic hyperkalemia, elevated BUN/Scr. Xray of hip/pelvis showed Acute comminuted mildly displaced periprosthetic left proximal femoral fracture.   Diuresed for a day with uptrending Cre 1.58 > 1.93 (Baseline 1.3-1.5)    AF HR 80 /45 4L NC 95%   139/5.7 | 101/28 | 61/1.93 < 144   15/11 | 77 | 0.4 | 6.3/3.5   9.55 < 8.1  MCV 98.9 > 193   HDL 47 Tgl 89 CHolesterol 149 LD 84   A1c 6.6   ProBNP 1301    PMHx:   DM (diabetes mellitus)  Hypothyroid  HTN (hypertension)  COPD (chronic obstructive pulmonary disease)  Chronic bronchitis  Chronic kidney disease (CKD)  Shingles    PSHx:   S/P cholecystectomy  H/O cataract  Leg fracture  S/P hip replacement, left    Allergies:  No Known Allergies    Home Meds:  Albuterol (Eqv-ProAir HFA) 90 mcg/inh inhalation aerosol: 2 puff(s) inhaled every 4-6 hours as needed (2023 20:09)  busPIRone 10 mg oral tablet: 1 tab(s) orally 2 times a day (2023 20:09)  furosemide 20 mg oral tablet: 1 tab(s) orally once a day (2023 20:09)  Incruse Ellipta 62.5 mcg/inh inhalation powder: 1 puff(s) inhaled once a day (2023 20:09)  levothyroxine 112 mcg (0.112 mg) oral tablet: 1 tab(s) orally once a day (2023 20:09)  metoprolol succinate 25 mg oral tablet, extended release: 1 tab(s) orally once a day (2023 20:09)  montelukast 10 mg oral tablet: 1 tab(s) orally once a day (at bedtime) (2023 20:09)  Symbicort 160 mcg-4.5 mcg/inh inhalation aerosol: 2 puff(s) inhaled 2 times a day (2023 20:09)    Current Medications:   acetaminophen     Tablet .. 650 milliGRAM(s) Oral every 6 hours PRN  aluminum hydroxide/magnesium hydroxide/simethicone Suspension 30 milliLiter(s) Oral every 4 hours PRN  budesonide 160 MICROgram(s)/formoterol 4.5 MICROgram(s) Inhaler 2 Puff(s) Inhalation two times a day  busPIRone 10 milliGRAM(s) Oral two times a day  chlorhexidine 2% Cloths 1 Application(s) Topical once  levothyroxine 112 MICROGram(s) Oral daily  melatonin 3 milliGRAM(s) Oral at bedtime PRN  metoprolol succinate ER 25 milliGRAM(s) Oral daily  montelukast 10 milliGRAM(s) Oral at bedtime  ondansetron Injectable 4 milliGRAM(s) IV Push every 8 hours PRN  povidone iodine 5% Nasal Swab 1 Application(s) Both Nostrils once  tiotropium 2.5 MICROgram(s) Inhaler 2 Puff(s) Inhalation daily    FAMILY HISTORY:  FHx: rheumatoid arthritis (Mother)  Mother and Brother  FH: type 2 diabetes mellitus (Father)  Father  Family history of hypertension (Father)  Father    Social History:  Smoking History:  Alcohol Use:  Drug Use:    REVIEW OF SYSTEMS:  CONSTITUTIONAL: No weakness, fevers or chills  EYES/ENT: No visual changes;  No dysphagia  NECK: No pain or stiffness  RESPIRATORY: No cough, wheezing, hemoptysis; No shortness of breath  CARDIOVASCULAR: No chest pain or palpitations; No lower extremity edema  GASTROINTESTINAL: No abdominal or epigastric pain. No nausea, vomiting, or hematemesis; No diarrhea or constipation. No melena or hematochezia.  BACK: No back pain  GENITOURINARY: No dysuria, frequency or hematuria  NEUROLOGICAL: No numbness or weakness  SKIN: No itching, burning, rashes, or lesions   All other review of systems is negative unless indicated above.    Physical Exam:  T(F): 97.4 (-), Max: 98.9 (-)  HR: 80 () (65 - 89)  BP: 142/45 (-) (118/54 - 142/45)  RR: 19 (-)  SpO2: 95% ()  GENERAL: No acute distress, well-developed  HEAD:  Atraumatic, Normocephalic  ENT: EOMI, PERRLA, conjunctiva and sclera clear, Neck supple, No JVD, moist mucosa  CHEST/LUNG: Clear to auscultation bilaterally; No wheeze, equal breath sounds bilaterally   BACK: No spinal tenderness  HEART: Regular rate and rhythm; No murmurs, rubs, or gallops  ABDOMEN: Soft, Nontender, Nondistended; Bowel sounds present  EXTREMITIES:  No clubbing, cyanosis, or edema  PSYCH: Nl behavior, nl affect  NEUROLOGY: AAOx3, non-focal, cranial nerves intact  SKIN: Normal color, No rashes or lesions    ECG: sinus rhythm, normal axis, TWI in the I, aVL< V4-V6, deep TWI. LVH as per Sokolov Maravilla criteria   Q waves inferiorly (AvF, III)    TTE 2023  DIMENSIONS:  Dimensions:     Normal Values:  LA:     2.7 cm    2.0 - 4.0 cm  Ao:     3.4 cm    2.0 - 3.8 cm  SEPTUM: 1.0 cm    0.6 - 1.2 cm  PWT:   1.0 cm    0.6 - 1.1 cm  LVIDd:  3.8 cm    3.0 - 5.6 cm  LVIDs:    ---     1.8 - 4.0 cm  Derived Variables:  LVMI: 69 g/m2  RWT: 0.52  Ejection Fraction (Modified Curry Rule): 67 %  ------------------------------------------------------------------------  OBSERVATIONS:  Mitral Valve: Mitral annular calcification and calcified  mitral leaflets with normal diastolic opening.  Aortic Root: Normal aortic root.  Aortic Valve: Aortic valve not well visualized.  Left Atrium: Normal left atrium.  LA volume index = 24  cc/m2.  Left Ventricle: Hyperdynamic left ventricle. Normal left  ventricular internal dimensions and wall thicknesses.  Reversal of the E-A  waves of the mitral inflow pattern is  consistent with diastolic LV dysfunction.  Right Heart: Normal right atrium. Normal right ventricular  size and function. Normal tricuspid valve. Normal pulmonic  valve.  Pericardium/PleuraNormal pericardium with no pericardial  effusion.  ------------------------------------------------------------------------  CONCLUSIONS:  1. Mitral annular calcification and calcified mitral  leaflets with normal diastolic opening.  2. Normal left ventricular internal dimensions and wall  thicknesses.  3. Hyperdynamic left ventricle.  4. Normal right ventricular size and function.    CT Femur  IMPRESSION:  Acute comminuted left femoral periprosthetic fracture as described.    Labs: Personally reviewed                        8.1    9.55  )-----------( 193      ( 2023 06:12 )             26.3     07-    139  |  101  |  61<H>  ----------------------------<  144<H>  5.7<H>   |  28  |  1.93<H>    Ca    9.0      2023 06:12  Phos  4.0       Mg     2.00         TPro  6.3  /  Alb  3.5  /  TBili  0.4  /  DBili  x   /  AST  15  /  ALT  11  /  AlkPhos  77  07-26    PT/INR - ( 2023 06:12 )   PT: 10.9 sec;   INR: 0.96 ratio         PTT - ( 2023 02:24 )  PTT:24.6 sec    Total Cholesterol: 149  LDL: --  HDL: 47  T CARDIOLOGY FELLOW CONSULT NOTE    HPI:  88 year old female with pmh of HTN, DM2, COPD (O2 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  Pt reports that she was sitting in an wheel chair and and fell asleep. When she woke up she was on the floor. She slipped out of the chair while she was sleeping. She then called for help and the staff was able to assist her. On examination she was found to have external rotation and abduction of the L lower extremity, prompting her to come to the ED.     In the ED, her vitals were notable for chronic hypoxia. Labs were notable for leukocytosis, chronic anemia, acute on chronic hyperkalemia, elevated BUN/Scr. Xray of hip/pelvis showed Acute comminuted mildly displaced periprosthetic left proximal femoral fracture.   Diuresed for a day with uptrending Cre 1.58 > 1.93 (Baseline 1.3-1.5)    ON my bedside interview, says that breathing has been a bit more labored. But no orthopnea, no RAQUEL. Denies any chest pain or palpitations, presyncope or syncope. She mentions that after her fall out of chair (that happened because she was asleep, not because she passed out), she had hit her head and thereafter had "vision loss" in her left eye. She previously was seen by ophtho at [facility] a week prior and prescribed eyedrops that had only been mildly effective in her blurry vision symptoms.     AF HR 80 /45 4L NC 95%   139/5.7 | 101/28 | 61/1.93 < 144   15/11 | 77 | 0.4 | 6.3/3.5   9.55 < 8.1  MCV 98.9 > 193   HDL 47 Tgl 89 CHolesterol 149 LD 84   A1c 6.6   ProBNP 1301    PMHx:   DM (diabetes mellitus)  Hypothyroid  HTN (hypertension)  COPD (chronic obstructive pulmonary disease)  Chronic bronchitis  Chronic kidney disease (CKD)  Shingles    PSHx:   S/P cholecystectomy  H/O cataract  Leg fracture  S/P hip replacement, left    Allergies:  No Known Allergies    Home Meds:  Albuterol (Eqv-ProAir HFA) 90 mcg/inh inhalation aerosol: 2 puff(s) inhaled every 4-6 hours as needed (2023 20:09)  busPIRone 10 mg oral tablet: 1 tab(s) orally 2 times a day (2023 20:09)  furosemide 20 mg oral tablet: 1 tab(s) orally once a day (2023 20:09)  Incruse Ellipta 62.5 mcg/inh inhalation powder: 1 puff(s) inhaled once a day (2023 20:09)  levothyroxine 112 mcg (0.112 mg) oral tablet: 1 tab(s) orally once a day (2023 20:09)  metoprolol succinate 25 mg oral tablet, extended release: 1 tab(s) orally once a day (2023 20:09)  montelukast 10 mg oral tablet: 1 tab(s) orally once a day (at bedtime) (2023 20:09)  Symbicort 160 mcg-4.5 mcg/inh inhalation aerosol: 2 puff(s) inhaled 2 times a day (2023 20:09)    Current Medications:   acetaminophen     Tablet .. 650 milliGRAM(s) Oral every 6 hours PRN  aluminum hydroxide/magnesium hydroxide/simethicone Suspension 30 milliLiter(s) Oral every 4 hours PRN  budesonide 160 MICROgram(s)/formoterol 4.5 MICROgram(s) Inhaler 2 Puff(s) Inhalation two times a day  busPIRone 10 milliGRAM(s) Oral two times a day  chlorhexidine 2% Cloths 1 Application(s) Topical once  levothyroxine 112 MICROGram(s) Oral daily  melatonin 3 milliGRAM(s) Oral at bedtime PRN  metoprolol succinate ER 25 milliGRAM(s) Oral daily  montelukast 10 milliGRAM(s) Oral at bedtime  ondansetron Injectable 4 milliGRAM(s) IV Push every 8 hours PRN  povidone iodine 5% Nasal Swab 1 Application(s) Both Nostrils once  tiotropium 2.5 MICROgram(s) Inhaler 2 Puff(s) Inhalation daily    FAMILY HISTORY:  FHx: rheumatoid arthritis (Mother)  Mother and Brother  FH: type 2 diabetes mellitus (Father)  Father  Family history of hypertension (Father)  Father    REVIEW OF SYSTEMS:  CONSTITUTIONAL: No weakness, fevers or chills  EYES/ENT: No visual changes;  No dysphagia  NECK: No pain or stiffness  RESPIRATORY: No cough, wheezing, hemoptysis; No shortness of breath  CARDIOVASCULAR: No chest pain or palpitations; No lower extremity edema  GASTROINTESTINAL: No abdominal or epigastric pain. No nausea, vomiting, or hematemesis; No diarrhea or constipation. No melena or hematochezia.  BACK: No back pain  GENITOURINARY: No dysuria, frequency or hematuria  NEUROLOGICAL: No numbness or weakness  SKIN: No itching, burning, rashes, or lesions   All other review of systems is negative unless indicated above.    Physical Exam:  T(F): 97.4 (), Max: 98.9 ()  HR: 80 () (65 - 89)  BP: 142/45 () (118/54 - 142/45)  RR: 19 ()  SpO2: 95% ()  GENERAL: NC.   HEAD:  Atraumatic, Normocephalic  ENT: EOMI, PERRLA, conjunctiva and sclera clear, Neck supple, No JVD, Dry oral mucosa  CHEST/LUNG: Clear to auscultation bilaterally; No wheeze, equal breath sounds bilaterally   BACK: No spinal tenderness  HEART: Regular, 2/6 crescendo descrescendo murmur at RUSB  ABDOMEN: Soft, Nontender, Nondistended; Bowel sounds present  EXTREMITIES: L hip external rotation   PSYCH: Nl behavior, nl affect  NEUROLOGY: AAOx3, non-focal, cranial nerves intact  SKIN: Normal color, No rashes or lesions    ECG: sinus rhythm, normal axis, TWI in the I, aVL< V4-V6, deep TWI. LVH as per Sokolov Maravilla criteria   Q waves inferiorly (AvF, III)    TTE 2023  DIMENSIONS:  Dimensions:     Normal Values:  LA:     2.7 cm    2.0 - 4.0 cm  Ao:     3.4 cm    2.0 - 3.8 cm  SEPTUM: 1.0 cm    0.6 - 1.2 cm  PWT:   1.0 cm    0.6 - 1.1 cm  LVIDd:  3.8 cm    3.0 - 5.6 cm  LVIDs:    ---     1.8 - 4.0 cm  Derived Variables:  LVMI: 69 g/m2  RWT: 0.52  Ejection Fraction (Modified Curry Rule): 67 %  ------------------------------------------------------------------------  OBSERVATIONS:  Mitral Valve: Mitral annular calcification and calcified  mitral leaflets with normal diastolic opening.  Aortic Root: Normal aortic root.  Aortic Valve: Aortic valve not well visualized.  Left Atrium: Normal left atrium.  LA volume index = 24  cc/m2.  Left Ventricle: Hyperdynamic left ventricle. Normal left  ventricular internal dimensions and wall thicknesses.  Reversal of the E-A  waves of the mitral inflow pattern is  consistent with diastolic LV dysfunction.  Right Heart: Normal right atrium. Normal right ventricular  size and function. Normal tricuspid valve. Normal pulmonic  valve.  Pericardium/PleuraNormal pericardium with no pericardial  effusion.  ------------------------------------------------------------------------  CONCLUSIONS:  1. Mitral annular calcification and calcified mitral  leaflets with normal diastolic opening.  2. Normal left ventricular internal dimensions and wall  thicknesses.  3. Hyperdynamic left ventricle.  4. Normal right ventricular size and function.    CT Femur  IMPRESSION:  Acute comminuted left femoral periprosthetic fracture as described.    Labs: Personally reviewed                        8.1    9.55  )-----------( 193      ( 2023 06:12 )             26.3         139  |  101  |  61<H>  ----------------------------<  144<H>  5.7<H>   |  28  |  1.93<H>    Ca    9.0      2023 06:12  Phos  4.0       Mg     2.00         TPro  6.3  /  Alb  3.5  /  TBili  0.4  /  DBili  x   /  AST  15  /  ALT  11  /  AlkPhos  77      PT/INR - ( 2023 06:12 )   PT: 10.9 sec;   INR: 0.96 ratio         PTT - ( 2023 02:24 )  PTT:24.6 sec    Total Cholesterol: 149  LDL: --  HDL: 47  T

## 2023-07-26 NOTE — PROGRESS NOTE ADULT - PROBLEM SELECTOR PLAN 5
-At presentation, pt had bibasilar crackles and b/l LE edema   -Initially on  lasix 40 mg IV BID, dose held due to worsening creatinine  -Cardiology consulted, f/u recs  - TTE per card verbal recs

## 2023-07-26 NOTE — PROGRESS NOTE ADULT - SUBJECTIVE AND OBJECTIVE BOX
[facility] Division of Hospital Medicine  Zak Graff MD  Pager [phone]      Patient is a 88y old  Female who presents with a chief complaint of fall (26 Jul 2023 07:12)      SUBJECTIVE / OVERNIGHT EVENTS: Pt endorses pain only when moving her left hip. No SOB or CP.    MEDICATIONS  (STANDING):  budesonide 160 MICROgram(s)/formoterol 4.5 MICROgram(s) Inhaler 2 Puff(s) Inhalation two times a day  busPIRone 10 milliGRAM(s) Oral two times a day  chlorhexidine 2% Cloths 1 Application(s) Topical once  levothyroxine 112 MICROGram(s) Oral daily  metoprolol succinate ER 25 milliGRAM(s) Oral daily  montelukast 10 milliGRAM(s) Oral at bedtime  povidone iodine 5% Nasal Swab 1 Application(s) Both Nostrils once  tiotropium 2.5 MICROgram(s) Inhaler 2 Puff(s) Inhalation daily    MEDICATIONS  (PRN):  acetaminophen     Tablet .. 650 milliGRAM(s) Oral every 6 hours PRN Temp greater or equal to 38C (100.4F), Mild Pain (1 - 3)  aluminum hydroxide/magnesium hydroxide/simethicone Suspension 30 milliLiter(s) Oral every 4 hours PRN Dyspepsia  melatonin 3 milliGRAM(s) Oral at bedtime PRN Insomnia  ondansetron Injectable 4 milliGRAM(s) IV Push every 8 hours PRN Nausea and/or Vomiting      CAPILLARY BLOOD GLUCOSE      POCT Blood Glucose.: 153 mg/dL (25 Jul 2023 20:07)    I&O's Summary      PHYSICAL EXAM:  Vital Signs Last 24 Hrs  T(C): 36.3 (25 Jul 2023 23:07), Max: 37.2 (25 Jul 2023 21:38)  T(F): 97.4 (25 Jul 2023 23:07), Max: 98.9 (25 Jul 2023 21:38)  HR: 80 (25 Jul 2023 23:07) (65 - 89)  BP: 142/45 (25 Jul 2023 23:07) (118/54 - 142/45)  BP(mean): --  RR: 19 (25 Jul 2023 23:07) (18 - 22)  SpO2: 95% (25 Jul 2023 23:07) (95% - 100%)    Parameters below as of 25 Jul 2023 23:07  Patient On (Oxygen Delivery Method): nasal cannula  O2 Flow (L/min): 4    CONSTITUTIONAL: NAD  EYES: conjunctiva and sclera clear  ENMT: mmm  NECK: Supple,  RESPIRATORY: Normal respiratory effort; lungs are clear to auscultation bilaterally ( ant ascultation)  CARDIOVASCULAR: Regular rate and rhythm, + S1 and S2  ABDOMEN: Nontender to palpation, normoactive bowel sounds, no rebound/guarding  PSYCH: A+O x 3  Musc: Decreased ROM of L hip, no LE edema    LABS:                        8.1    9.55  )-----------( 193      ( 26 Jul 2023 06:12 )             26.3     07-26    139  |  101  |  61<H>  ----------------------------<  144<H>  5.7<H>   |  28  |  1.93<H>    Ca    9.0      26 Jul 2023 06:12  Phos  4.0     07-26  Mg     2.00     07-26    TPro  6.3  /  Alb  3.5  /  TBili  0.4  /  DBili  x   /  AST  15  /  ALT  11  /  AlkPhos  77  07-26    PT/INR - ( 26 Jul 2023 06:12 )   PT: 10.9 sec;   INR: 0.96 ratio         PTT - ( 26 Jul 2023 02:24 )  PTT:24.6 sec      Urinalysis Basic - ( 26 Jul 2023 06:12 )    Color: x / Appearance: x / SG: x / pH: x  Gluc: 144 mg/dL / Ketone: x  / Bili: x / Urobili: x   Blood: x / Protein: x / Nitrite: x   Leuk Esterase: x / RBC: x / WBC x   Sq Epi: x / Non Sq Epi: x / Bacteria: x

## 2023-07-26 NOTE — CONSULT NOTE ADULT - ASSESSMENT
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 W/ L femoral fracture   Cardiology consulted for perioperative risk stratification and optimization     Cardiovascular Risk Stratification - RCRI =  ().  1. History of ischemic heart disease:   2. History of congestive heart failure:   3. History of cerebrovascular disease (stroke or transient ischemic attack):   4. History of diabetes requiring preoperative insulin use:   5. Chronic kidney disease (creatinine > 2 mg/dL):   6. Undergoing suprainguinal vascular, intraperitoneal, or intrathoracic surgery:   0 predictors = 0.4%, 1 predictor = 0.9%, 2 predictors = 6.6%, =3 predictors = >11%    - Overall this patient is as __% risk (for cardiac death, nonfatal myocardial infarction, and nonfatal cardiac arrest perioperatively for this _moderate_risk procedure). 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 W/ L femoral fracture   Cardiology consulted for perioperative risk stratification and optimization     Cardiovascular Risk Stratification - RCRI =  ().  1. History of ischemic heart disease:   2. History of congestive heart failure:   3. History of cerebrovascular disease (stroke or transient ischemic attack):   4. History of diabetes requiring preoperative insulin use:   5. Chronic kidney disease (creatinine > 2 mg/dL):   6. Undergoing suprainguinal vascular, intraperitoneal, or intrathoracic surgery:   0 predictors = 0.4%, 1 predictor = 0.9%, 2 predictors = 6.6%, =3 predictors = >11%    - Overall this patient is as __% risk (for cardiac death, nonfatal myocardial infarction, and nonfatal cardiac arrest perioperatively for this _moderate_risk procedure).    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 W/ L femoral fracture   Cardiology consulted for perioperative risk stratification and optimization   Orthopedic surgery is intended for 7/31     Plan:  - Given fall and patient's reported headstrike (and nonspecific vision changes with left eye deficits) recommend CTH  - F/u interval TTE. Prior with EF 67% and reversal of E/A waves suggesting diastolic dysfunction   - Would hold on diuretics; she has uptrending creatinine, bicarb and i suspect she is volume contracted  - management of breathing treatments as per pulm     Regarding risk optimization:   Cardiovascular Risk Stratification - RCRI =  (2).  1. History of ischemic heart disease: No  2. History of congestive heart failure: Yes  3. History of cerebrovascular disease (stroke or transient ischemic attack): No  4. History of diabetes requiring preoperative insulin use: No  5. Chronic kidney disease (creatinine > 2 mg/dL): Yes (cre uptrending)  6. Undergoing suprainguinal vascular, intraperitoneal, or intrathoracic surgery: No  0 predictors = 0.4%, 1 predictor = 0.9%, 2 predictors = 6.6%, =3 predictors = >11%    - Overall this patient is as _6.6_% risk (for cardiac death, nonfatal myocardial infarction, and nonfatal cardiac arrest perioperatively for this _moderate_risk procedure). Patient without evidence of ACS, adHF, critical flow limiting valvular lesions or unstable arrhythmias. Aside from TTE as above, I recommend no further testing. She should have her procedure as soon as possible for preservation of functional status and is optimized to proceed for this time sensitive operation     Note not finalized until signed by attending

## 2023-07-26 NOTE — CONSULT NOTE ADULT - SUBJECTIVE AND OBJECTIVE BOX
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 Patient is AAOx3 and was able to provide most of the information. Pt reports that she was sitting in an wheel chair and and fell asleep. When she woke up she was on the floor. She slipped out of the chair while she was sleeping. She then called for help and the staff was able to assist her. On examination she was found to have external rotation and abduction of the L lower extremity, prompting her to come to the ED.   In the ED, her vitals were notable for chronic hypoxia. Labs were notable for leukocytosis, chronic anemia, acute on chronic hyperkalemia, elevated BUN/Scr. Xray of hip/pelvis showed Acute comminuted mildly displaced periprosthetic left proximal femoral   fracture. (25 Jul 2023 19:53)    Patient is a former smoker, with severe COPD on chronic 2 to now 4 L NC ATC, has been on multiple different inhalers as an outpatient due to insurance issues however should be on ICS/LABA/LAMA. has diastolic dysfunction, without leg swelling although cannot lie flat due to breathing issues. does not have chronic cough or wheezing at this time. had a negative sleep study however has chronic hypercapnia but did not tolerate BIPAP in the past. has no problems swallowing.     PAST MEDICAL & SURGICAL HISTORY:  DM (diabetes mellitus)      Hypothyroid      HTN (hypertension)      COPD (chronic obstructive pulmonary disease)      Chronic kidney disease (CKD)      Shingles      S/P cholecystectomy      H/O cataract  bilateral 2013      Leg fracture  right ORIF with hardware 2010      S/P hip replacement, left  2019          FAMILY HISTORY:  FHx: rheumatoid arthritis (Mother)  Mother and Brother    FH: type 2 diabetes mellitus (Father)  Father    Family history of hypertension (Father)  Father        SOCIAL HISTORY:  former smoker    Allergies    No Known Allergies    Intolerances        HOME MEDICATIONS:  Home Medications:  Albuterol (Eqv-ProAir HFA) 90 mcg/inh inhalation aerosol: 2 puff(s) inhaled every 4-6 hours as needed (25 Jul 2023 20:09)  busPIRone 10 mg oral tablet: 1 tab(s) orally 2 times a day (25 Jul 2023 20:09)  furosemide 20 mg oral tablet: 1 tab(s) orally once a day (25 Jul 2023 20:09)  Incruse Ellipta 62.5 mcg/inh inhalation powder: 1 puff(s) inhaled once a day (25 Jul 2023 20:09)  levothyroxine 112 mcg (0.112 mg) oral tablet: 1 tab(s) orally once a day (25 Jul 2023 20:09)  metoprolol succinate 25 mg oral tablet, extended release: 1 tab(s) orally once a day (25 Jul 2023 20:09)  montelukast 10 mg oral tablet: 1 tab(s) orally once a day (at bedtime) (25 Jul 2023 20:09)  Symbicort 160 mcg-4.5 mcg/inh inhalation aerosol: 2 puff(s) inhaled 2 times a day (25 Jul 2023 20:09)      REVIEW OF SYSTEMS:  Patient denies fevers, chills, chest pain,nausea, abdominal pain, diarrhea, constipation, dysuria, leg swelling, headache, light headedness    OBJECTIVE:  ICU Vital Signs Last 24 Hrs  T(C): 36.3 (25 Jul 2023 23:07), Max: 37.2 (25 Jul 2023 21:38)  T(F): 97.4 (25 Jul 2023 23:07), Max: 98.9 (25 Jul 2023 21:38)  HR: 80 (25 Jul 2023 23:07) (65 - 89)  BP: 142/45 (25 Jul 2023 23:07) (118/54 - 142/45)  BP(mean): --  ABP: --  ABP(mean): --  RR: 19 (25 Jul 2023 23:07) (18 - 22)  SpO2: 95% (25 Jul 2023 23:07) (95% - 100%)    O2 Parameters below as of 25 Jul 2023 23:07  Patient On (Oxygen Delivery Method): nasal cannula  O2 Flow (L/min): 4            CAPILLARY BLOOD GLUCOSE      POCT Blood Glucose.: 153 mg/dL (25 Jul 2023 20:07)      PHYSICAL EXAM:  General: awake and alert, nontoxic appearing  HEENT: NC/AT, EOMI b/l, conjunctiva normal, MMM  Lymph Nodes: no cervical LAD  Neck: supple. full range of motion  Respiratory: CTA b/l, no w/r/c, appears comfortable on NC, no conversational dyspnea or accessory muscle use  Cardiovascular: S1 S2 present, RRR, no m/r/g  Abdomen: soft, NT/ND, +BS  Extremities: no c/c/e  Skin: no rashes or lesions noted  Neurological: AAOx3, no focal deficits  Psychiatry: calm, cooperative    LINES:     HOSPITAL MEDICATIONS:  Standing Meds:  acetaminophen     Tablet .. 650 milliGRAM(s) Oral every 8 hours  budesonide 160 MICROgram(s)/formoterol 4.5 MICROgram(s) Inhaler 2 Puff(s) Inhalation two times a day  busPIRone 10 milliGRAM(s) Oral two times a day  chlorhexidine 2% Cloths 1 Application(s) Topical once  levothyroxine 112 MICROGram(s) Oral daily  metoprolol succinate ER 25 milliGRAM(s) Oral daily  montelukast 10 milliGRAM(s) Oral at bedtime  povidone iodine 5% Nasal Swab 1 Application(s) Both Nostrils once  tiotropium 2.5 MICROgram(s) Inhaler 2 Puff(s) Inhalation daily      PRN Meds:  acetaminophen     Tablet .. 650 milliGRAM(s) Oral every 6 hours PRN  aluminum hydroxide/magnesium hydroxide/simethicone Suspension 30 milliLiter(s) Oral every 4 hours PRN  melatonin 3 milliGRAM(s) Oral at bedtime PRN  ondansetron Injectable 4 milliGRAM(s) IV Push every 8 hours PRN      LABS:                        8.1    9.55  )-----------( 193      ( 26 Jul 2023 06:12 )             26.3     Hgb Trend: 8.1<--, 8.7<--, 9.6<--  07-26    139  |  101  |  61<H>  ----------------------------<  144<H>  5.7<H>   |  28  |  1.93<H>    Ca    9.0      26 Jul 2023 06:12  Phos  4.0     07-26  Mg     2.00     07-26    TPro  6.3  /  Alb  3.5  /  TBili  0.4  /  DBili  x   /  AST  15  /  ALT  11  /  AlkPhos  77  07-26    Creatinine Trend: 1.93<--, 1.81<--, 1.58<--, 1.58<--, 1.68<--  PT/INR - ( 26 Jul 2023 06:12 )   PT: 10.9 sec;   INR: 0.96 ratio         PTT - ( 26 Jul 2023 02:24 )  PTT:24.6 sec  Urinalysis Basic - ( 26 Jul 2023 06:12 )    Color: x / Appearance: x / SG: x / pH: x  Gluc: 144 mg/dL / Ketone: x  / Bili: x / Urobili: x   Blood: x / Protein: x / Nitrite: x   Leuk Esterase: x / RBC: x / WBC x   Sq Epi: x / Non Sq Epi: x / Bacteria: x        Venous Blood Gas:  07-25 @ 18:15  7.27/66/37/30/65.8  VBG Lactate: --      MICROBIOLOGY:       RADIOLOGY:  [ ] Reviewed and interpreted by me    PULMONARY FUNCTION TESTS:    EKG:

## 2023-07-27 NOTE — PROGRESS NOTE ADULT - SUBJECTIVE AND OBJECTIVE BOX
CHIEF COMPLAINT: Leg pain    Interval Events: No acute events overnight. Endorsed too much anxiety for CTH earlier. Endorses stable breathing except when she gets anxious.    REVIEW OF SYSTEMS:  Constitutional: [ ] negative [ ] fevers [ ] chills [ ] weight loss [ ] weight gain  HEENT: [ ] negative [ ] dry eyes [ ] eye irritation [ ] postnasal drip [ ] nasal congestion  CV: [ ] negative  [ ] chest pain [ ] orthopnea [ ] palpitations [ ] murmur  Resp: [ ] negative [ ] cough [X] shortness of breath [ ] dyspnea [ ] wheezing [ ] sputum [ ] hemoptysis  GI: [ ] negative [ ] nausea [ ] vomiting [ ] diarrhea [ ] constipation [ ] abd pain [ ] dysphagia   : [ ] negative [ ] dysuria [ ] nocturia [ ] hematuria [ ] increased urinary frequency  Musculoskeletal: [ ] negative [ ] back pain [ ] myalgias [ ] arthralgias [X] fracture  Skin: [ ] negative [ ] rash [ ] itch  Neurological: [ ] negative [ ] headache [ ] dizziness [ ] syncope [ ] weakness [ ] numbness  Psychiatric: [ ] negative [ ] anxiety [ ] depression  Endocrine: [ ] negative [ ] diabetes [ ] thyroid problem  Hematologic/Lymphatic: [ ] negative [ ] anemia [ ] bleeding problem  Allergic/Immunologic: [ ] negative [ ] itchy eyes [ ] nasal discharge [ ] hives [ ] angioedema  [X] All other systems negative  [ ] Unable to assess ROS because ________    OBJECTIVE:  ICU Vital Signs Last 24 Hrs  T(C): 37.2 (27 Jul 2023 07:00), Max: 37.2 (27 Jul 2023 07:00)  T(F): 98.9 (27 Jul 2023 07:00), Max: 98.9 (27 Jul 2023 07:00)  HR: 79 (27 Jul 2023 07:00) (79 - 80)  BP: 132/60 (27 Jul 2023 07:00) (132/60 - 155/66)  BP(mean): --  ABP: --  ABP(mean): --  RR: 19 (27 Jul 2023 07:00) (18 - 19)  SpO2: 96% (27 Jul 2023 07:00) (95% - 96%)    O2 Parameters below as of 27 Jul 2023 07:00  Patient On (Oxygen Delivery Method): nasal cannula  O2 Flow (L/min): 4            07-26 @ 07:01  -  07-27 @ 07:00  --------------------------------------------------------  IN: 0 mL / OUT: 1000 mL / NET: -1000 mL      CAPILLARY BLOOD GLUCOSE      POCT Blood Glucose.: 172 mg/dL (27 Jul 2023 13:08)      PHYSICAL EXAM:  General: NAD, resting comfortably  HEENT: EOMI, PERRLA  Neck: Soft, suppler  Respiratory: CTAB  Cardiovascular: S1S2, RRR  Abdomen: Soft, NTND, BS+  Extremities: No edema  Skin: Warm and dry  Neurological: No gross focal deficits    HOSPITAL MEDICATIONS:  MEDICATIONS  (STANDING):  acetaminophen     Tablet .. 650 milliGRAM(s) Oral every 8 hours  budesonide 160 MICROgram(s)/formoterol 4.5 MICROgram(s) Inhaler 2 Puff(s) Inhalation two times a day  busPIRone 10 milliGRAM(s) Oral two times a day  chlorhexidine 2% Cloths 1 Application(s) Topical once  levothyroxine 112 MICROGram(s) Oral daily  metoprolol succinate ER 25 milliGRAM(s) Oral daily  montelukast 10 milliGRAM(s) Oral at bedtime  povidone iodine 5% Nasal Swab 1 Application(s) Both Nostrils once  tiotropium 2.5 MICROgram(s) Inhaler 2 Puff(s) Inhalation daily    MEDICATIONS  (PRN):  acetaminophen     Tablet .. 650 milliGRAM(s) Oral every 6 hours PRN Temp greater or equal to 38C (100.4F), Mild Pain (1 - 3)  albuterol/ipratropium for Nebulization 3 milliLiter(s) Nebulizer every 6 hours PRN Shortness of Breath and/or Wheezing  aluminum hydroxide/magnesium hydroxide/simethicone Suspension 30 milliLiter(s) Oral every 4 hours PRN Dyspepsia  melatonin 3 milliGRAM(s) Oral at bedtime PRN Insomnia  ondansetron Injectable 4 milliGRAM(s) IV Push every 8 hours PRN Nausea and/or Vomiting      LABS:                        8.9    9.69  )-----------( 185      ( 27 Jul 2023 05:34 )             27.7     Hgb Trend: 8.9<--, 8.1<--, 8.7<--, 9.6<--  07-27    144  |  101  |  58<H>  ----------------------------<  129<H>  5.0   |  25  |  1.82<H>    Ca    9.2      27 Jul 2023 05:34  Phos  3.9     07-27  Mg     2.00     07-27    TPro  6.3  /  Alb  3.5  /  TBili  0.4  /  DBili  x   /  AST  15  /  ALT  11  /  AlkPhos  77  07-26    Creatinine Trend: 1.82<--, 1.93<--, 1.93<--, 1.81<--, 1.58<--, 1.58<--  PT/INR - ( 26 Jul 2023 06:12 )   PT: 10.9 sec;   INR: 0.96 ratio         PTT - ( 26 Jul 2023 02:24 )  PTT:24.6 sec  Urinalysis Basic - ( 27 Jul 2023 05:34 )    Color: x / Appearance: x / SG: x / pH: x  Gluc: 129 mg/dL / Ketone: x  / Bili: x / Urobili: x   Blood: x / Protein: x / Nitrite: x   Leuk Esterase: x / RBC: x / WBC x   Sq Epi: x / Non Sq Epi: x / Bacteria: x        Venous Blood Gas:  07-25 @ 18:15  7.27/66/37/30/65.8  VBG Lactate: --      MICROBIOLOGY:       RADIOLOGY:  [ ] Reviewed and interpreted by me    PULMONARY FUNCTION TESTS:    EKG:

## 2023-07-27 NOTE — PROGRESS NOTE ADULT - ASSESSMENT
Patient is a 89 yo F w/ HTN, T2DM, COPD (home O2 3L), HFpEF, hypothyroidism, CKD3, who presents after fall found to have an acute comminuted mildly displaced periprosthetic L proximal femoral Pulmonary consulted for pre op evaluation and optimization.     #Chronic hypoxemic and hypercapnic respiratory failure - On 2 to 4 L nc at home as per patient. Has been on various inhaler regimens but is on triple therapy. Endorses unable to tolerate chronic NIPPV  #COPD  #Preoperative evaluation  - c/w LABA/LAMA/ICS, Spiriva/Symbicort while admitted  - c/w duonebs PRN. Would give standing duonebs q6h the day prior to and day after surgery  - c/w supplemental O2 to target SO2 > 88%, wean as tolerated  - Incentive spirometer  - Would not extubate until fully awake from anesthesia standpoint. Can utilize NIPPV if needed  - Optimized from pulmonary standpoint for surgery    Jaime Tavarez MD  Pulmonary & Critical Care.

## 2023-07-27 NOTE — PROGRESS NOTE ADULT - SUBJECTIVE AND OBJECTIVE BOX
Zak Barriga MD  Cardiology Fellow  [phone]  All Cardiology service information can be found 24/7 on [email or web address], password: [email or web address]    Patient seen and examined at bedside.  AF HR 80 /66 satting 95% on 4L NC   TTE performed yesterday with EF 65%, minimal MR, grossly normal LVSF and stage 1 DD  SEen by pulm, who are recommending standing nebs    Review Of Systems: No chest pain, shortness of breath, or palpitations            Current Meds:  acetaminophen     Tablet .. 650 milliGRAM(s) Oral every 8 hours  acetaminophen     Tablet .. 650 milliGRAM(s) Oral every 6 hours PRN  albuterol/ipratropium for Nebulization 3 milliLiter(s) Nebulizer every 6 hours PRN  aluminum hydroxide/magnesium hydroxide/simethicone Suspension 30 milliLiter(s) Oral every 4 hours PRN  budesonide 160 MICROgram(s)/formoterol 4.5 MICROgram(s) Inhaler 2 Puff(s) Inhalation two times a day  busPIRone 10 milliGRAM(s) Oral two times a day  chlorhexidine 2% Cloths 1 Application(s) Topical once  levothyroxine 112 MICROGram(s) Oral daily  melatonin 3 milliGRAM(s) Oral at bedtime PRN  metoprolol succinate ER 25 milliGRAM(s) Oral daily  montelukast 10 milliGRAM(s) Oral at bedtime  ondansetron Injectable 4 milliGRAM(s) IV Push every 8 hours PRN  povidone iodine 5% Nasal Swab 1 Application(s) Both Nostrils once  tiotropium 2.5 MICROgram(s) Inhaler 2 Puff(s) Inhalation daily    Vitals:  T(F): 98.8 (07-26), Max: 98.8 (07-26)  HR: 80 (07-26) (80 - 80)  BP: 155/66 (07-26) (155/66 - 155/66)  RR: 18 (07-26)  SpO2: 95% (07-26)  I&O's Summary    26 Jul 2023 07:01  -  27 Jul 2023 06:53  --------------------------------------------------------  IN: 0 mL / OUT: 1000 mL / NET: -1000 mL        Physical Exam:  Appearance: No acute distress; well appearing  Eyes: PERRL, EOMI, pink conjunctiva  HEENT: Normal oral mucosa  Cardiovascular: RRR, S1, S2, no murmurs, rubs, or gallops; no edema; no JVD  Respiratory: Clear to auscultation bilaterally  Gastrointestinal: soft, non-tender, non-distended with normal bowel sounds  Musculoskeletal: No clubbing; no joint deformity   Neurologic: Non-focal  Lymphatic: No lymphadenopathy  Psychiatry: AAOx3, mood & affect appropriate  Skin: No rashes, ecchymoses, or cyanosis                          8.1    9.55  )-----------( 193      ( 26 Jul 2023 06:12 )             26.3     07-26    140  |  97<L>  |  59<H>  ----------------------------<  144<H>  5.1   |  31  |  1.93<H>    Ca    9.3      26 Jul 2023 21:41  Phos  3.8     07-26  Mg     1.90     07-26    TPro  6.3  /  Alb  3.5  /  TBili  0.4  /  DBili  x   /  AST  15  /  ALT  11  /  AlkPhos  77  07-26    PT/INR - ( 26 Jul 2023 06:12 )   PT: 10.9 sec;   INR: 0.96 ratio         PTT - ( 26 Jul 2023 02:24 )  PTT:24.6 sec      ECG: sinus rhythm, normal axis, TWI in the I, aVL< V4-V6, deep TWI. LVH as per Sokolov Maravilla criteria   Q waves inferiorly (AvF, III)    TTE 4/11/2023  DIMENSIONS:  Dimensions:     Normal Values:  LA:     2.7 cm    2.0 - 4.0 cm  Ao:     3.4 cm    2.0 - 3.8 cm  SEPTUM: 1.0 cm    0.6 - 1.2 cm  PWT:   1.0 cm    0.6 - 1.1 cm  LVIDd:  3.8 cm    3.0 - 5.6 cm  LVIDs:    ---     1.8 - 4.0 cm  Derived Variables:  LVMI: 69 g/m2  RWT: 0.52  Ejection Fraction (Modified Curry Rule): 67 %  ------------------------------------------------------------------------  OBSERVATIONS:  Mitral Valve: Mitral annular calcification and calcified  mitral leaflets with normal diastolic opening.  Aortic Root: Normal aortic root.  Aortic Valve: Aortic valve not well visualized.  Left Atrium: Normal left atrium.  LA volume index = 24  cc/m2.  Left Ventricle: Hyperdynamic left ventricle. Normal left  ventricular internal dimensions and wall thicknesses.  Reversal of the E-A  waves of the mitral inflow pattern is  consistent with diastolic LV dysfunction.  Right Heart: Normal right atrium. Normal right ventricular  size and function. Normal tricuspid valve. Normal pulmonic  valve.  Pericardium/PleuraNormal pericardium with no pericardial  effusion.  ------------------------------------------------------------------------  CONCLUSIONS:  1. Mitral annular calcification and calcified mitral  leaflets with normal diastolic opening.  2. Normal left ventricular internal dimensions and wall  thicknesses.  3. Hyperdynamic left ventricle.  4. Normal right ventricular size and function.    CT Femur  IMPRESSION:  Acute comminuted left femoral periprosthetic fracture as described.   Zak Barriga MD  Cardiology Fellow  [phone]  All Cardiology service information can be found 24/7 on [email or web address], password: [email or web address]    Patient seen and examined at bedside.  AF HR 80 /66 satting 95% on 4L NC   Tele: sinus 70s triplets   TTE performed yesterday with EF 65%, minimal MR, grossly normal LVSF and stage 1 DD  Seen by pulm, who are recommending standing nebs    Review Of Systems: No chest pain, shortness of breath, or palpitations            Current Meds:  acetaminophen     Tablet .. 650 milliGRAM(s) Oral every 8 hours  acetaminophen     Tablet .. 650 milliGRAM(s) Oral every 6 hours PRN  albuterol/ipratropium for Nebulization 3 milliLiter(s) Nebulizer every 6 hours PRN  aluminum hydroxide/magnesium hydroxide/simethicone Suspension 30 milliLiter(s) Oral every 4 hours PRN  budesonide 160 MICROgram(s)/formoterol 4.5 MICROgram(s) Inhaler 2 Puff(s) Inhalation two times a day  busPIRone 10 milliGRAM(s) Oral two times a day  chlorhexidine 2% Cloths 1 Application(s) Topical once  levothyroxine 112 MICROGram(s) Oral daily  melatonin 3 milliGRAM(s) Oral at bedtime PRN  metoprolol succinate ER 25 milliGRAM(s) Oral daily  montelukast 10 milliGRAM(s) Oral at bedtime  ondansetron Injectable 4 milliGRAM(s) IV Push every 8 hours PRN  povidone iodine 5% Nasal Swab 1 Application(s) Both Nostrils once  tiotropium 2.5 MICROgram(s) Inhaler 2 Puff(s) Inhalation daily    Vitals:  T(F): 98.8 (07-26), Max: 98.8 (07-26)  HR: 80 (07-26) (80 - 80)  BP: 155/66 (07-26) (155/66 - 155/66)  RR: 18 (07-26)  SpO2: 95% (07-26)  I&O's Summary    26 Jul 2023 07:01  -  27 Jul 2023 06:53  --------------------------------------------------------  IN: 0 mL / OUT: 1000 mL / NET: -1000 mL    Physical Exam:  Appearance: No acute distress; well appearing  Eyes: PERRL, EOMI, pink conjunctiva  HEENT: Normal oral mucosa  Cardiovascular: RRR, S1, S2, no murmurs, rubs, or gallops; no edema; no JVD  Respiratory: Clear to auscultation bilaterally  Gastrointestinal: soft, non-tender, non-distended with normal bowel sounds  Musculoskeletal: No clubbing; no joint deformity   Neurologic: Non-focal  Lymphatic: No lymphadenopathy  Psychiatry: AAOx3, mood & affect appropriate  Skin: No rashes, ecchymoses, or cyanosis                          8.1    9.55  )-----------( 193      ( 26 Jul 2023 06:12 )             26.3     07-26    140  |  97<L>  |  59<H>  ----------------------------<  144<H>  5.1   |  31  |  1.93<H>    Ca    9.3      26 Jul 2023 21:41  Phos  3.8     07-26  Mg     1.90     07-26    TPro  6.3  /  Alb  3.5  /  TBili  0.4  /  DBili  x   /  AST  15  /  ALT  11  /  AlkPhos  77  07-26    PT/INR - ( 26 Jul 2023 06:12 )   PT: 10.9 sec;   INR: 0.96 ratio         PTT - ( 26 Jul 2023 02:24 )  PTT:24.6 sec      ECG: sinus rhythm, normal axis, TWI in the I, aVL< V4-V6, deep TWI. LVH as per Sokolov Maravilla criteria   Q waves inferiorly (AvF, III)    TTE 4/11/2023  DIMENSIONS:  Dimensions:     Normal Values:  LA:     2.7 cm    2.0 - 4.0 cm  Ao:     3.4 cm    2.0 - 3.8 cm  SEPTUM: 1.0 cm    0.6 - 1.2 cm  PWT:   1.0 cm    0.6 - 1.1 cm  LVIDd:  3.8 cm    3.0 - 5.6 cm  LVIDs:    ---     1.8 - 4.0 cm  Derived Variables:  LVMI: 69 g/m2  RWT: 0.52  Ejection Fraction (Modified Curry Rule): 67 %  ------------------------------------------------------------------------  OBSERVATIONS:  Mitral Valve: Mitral annular calcification and calcified  mitral leaflets with normal diastolic opening.  Aortic Root: Normal aortic root.  Aortic Valve: Aortic valve not well visualized.  Left Atrium: Normal left atrium.  LA volume index = 24  cc/m2.  Left Ventricle: Hyperdynamic left ventricle. Normal left  ventricular internal dimensions and wall thicknesses.  Reversal of the E-A  waves of the mitral inflow pattern is  consistent with diastolic LV dysfunction.  Right Heart: Normal right atrium. Normal right ventricular  size and function. Normal tricuspid valve. Normal pulmonic  valve.  Pericardium/PleuraNormal pericardium with no pericardial  effusion.  ------------------------------------------------------------------------  CONCLUSIONS:  1. Mitral annular calcification and calcified mitral  leaflets with normal diastolic opening.  2. Normal left ventricular internal dimensions and wall  thicknesses.  3. Hyperdynamic left ventricle.  4. Normal right ventricular size and function.    CT Femur  IMPRESSION:  Acute comminuted left femoral periprosthetic fracture as described.

## 2023-07-27 NOTE — PROGRESS NOTE ADULT - PROBLEM SELECTOR PLAN 6
Pt has poor METS at baseline.   -Patient's RCRI is 1. However, given her comorbid condition, pt is a high risk patient undergoing moderate to high risk surgery. Pt also had jayshree and post operative complications in the past.   -Pt is medically optimized for surgery

## 2023-07-27 NOTE — PROGRESS NOTE ADULT - ASSESSMENT
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 W/ L femoral fracture   Cardiology consulted for perioperative risk stratification and optimization   Orthopedic surgery is intended for 7/31     Plan:  - Given fall and patient's reported headstrike (and nonspecific vision changes with left eye deficits) recommend CTH  - TTE w/ EF67%, with stage I DD  - Would hold on diuretics; she has uptrending creatinine, bicarb and i suspect she is volume contracted  - Recommend addition of amlodipine 5mg for uncontrolled HTN   - management of breathing treatments as per pulm     Regarding risk optimization:   Cardiovascular Risk Stratification - RCRI =  (2).  1. History of ischemic heart disease: No  2. History of congestive heart failure: Yes  3. History of cerebrovascular disease (stroke or transient ischemic attack): No  4. History of diabetes requiring preoperative insulin use: No  5. Chronic kidney disease (creatinine > 2 mg/dL): Yes (cre uptrending)  6. Undergoing suprainguinal vascular, intraperitoneal, or intrathoracic surgery: No  0 predictors = 0.4%, 1 predictor = 0.9%, 2 predictors = 6.6%, =3 predictors = >11%    - Overall this patient is as _6.6_% risk (for cardiac death, nonfatal myocardial infarction, and nonfatal cardiac arrest perioperatively for this _moderate_risk procedure). Patient without evidence of ACS, adHF, critical flow limiting valvular lesions or unstable arrhythmias. I recommend no further testing. She should have her procedure as soon as possible for preservation of functional status and is optimized to proceed for this time sensitive operation     Note not finalized until signed by attending   88 year old female with pmh of HTN, DM2, COPD (O2 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 W/ L femoral fracture   Cardiology consulted for perioperative risk stratification and optimization   Orthopedic surgery is intended for 7/31     Plan:  - Given fall and patient's reported headstrike (and nonspecific vision changes with left eye deficits) recommend CTH  - TTE w/ EF67%, with stage I DD  - Would hold on diuretics; she has uptrending creatinine, bicarb and I suspect she is volume contracted. Can resume patient's lasix 20mg daily tomorrow   - Recommend addition of amlodipine 5mg for uncontrolled HTN   - management of breathing treatments as per pulm     Regarding risk optimization:   Cardiovascular Risk Stratification - RCRI =  (2).  1. History of ischemic heart disease: No  2. History of congestive heart failure: Yes  3. History of cerebrovascular disease (stroke or transient ischemic attack): No  4. History of diabetes requiring preoperative insulin use: No  5. Chronic kidney disease (creatinine > 2 mg/dL): Yes (cre uptrending)  6. Undergoing suprainguinal vascular, intraperitoneal, or intrathoracic surgery: No  0 predictors = 0.4%, 1 predictor = 0.9%, 2 predictors = 6.6%, =3 predictors = >11%    - Overall this patient is as _6.6_% risk (for cardiac death, nonfatal myocardial infarction, and nonfatal cardiac arrest perioperatively for this _moderate_risk procedure). Patient without evidence of ACS, adHF, critical flow limiting valvular lesions or unstable arrhythmias. I recommend no further testing. She should have her procedure as soon as possible for preservation of functional status and is optimized to proceed for this time sensitive operation     Note not finalized until signed by attending

## 2023-07-27 NOTE — PROGRESS NOTE ADULT - PROBLEM SELECTOR PLAN 3
Most likely reactive due to hip fracture   -No focal sign of infection   -Improved   -Monitor off abx

## 2023-07-27 NOTE — PROGRESS NOTE ADULT - PROBLEM SELECTOR PLAN 1
Patient with L hip fracture after a fall   -Xray and CT L hip showed Acute comminuted mildly displaced periprosthetic left proximal femoral   fracture.  -Ortho recs appreciated, plan for OR 7/31, pulm and Cards evalpt, pt is deemed optimized for surgery  -NWB on L leg, bedrest  -Pain control with tylenol  - check Vit D and PTH

## 2023-07-27 NOTE — PROGRESS NOTE ADULT - SUBJECTIVE AND OBJECTIVE BOX
[facility] Division of Hospital Medicine  Zak Graff MD  Pager [phone]      Patient is a 88y old  Female who presents with a chief complaint of fall (27 Jul 2023 09:52)      SUBJECTIVE / OVERNIGHT EVENTS: Hip pain with movement persists. No CP or SOB    MEDICATIONS  (STANDING):  acetaminophen     Tablet .. 650 milliGRAM(s) Oral every 8 hours  budesonide 160 MICROgram(s)/formoterol 4.5 MICROgram(s) Inhaler 2 Puff(s) Inhalation two times a day  busPIRone 10 milliGRAM(s) Oral two times a day  chlorhexidine 2% Cloths 1 Application(s) Topical once  levothyroxine 112 MICROGram(s) Oral daily  metoprolol succinate ER 25 milliGRAM(s) Oral daily  montelukast 10 milliGRAM(s) Oral at bedtime  povidone iodine 5% Nasal Swab 1 Application(s) Both Nostrils once  tiotropium 2.5 MICROgram(s) Inhaler 2 Puff(s) Inhalation daily    MEDICATIONS  (PRN):  acetaminophen     Tablet .. 650 milliGRAM(s) Oral every 6 hours PRN Temp greater or equal to 38C (100.4F), Mild Pain (1 - 3)  albuterol/ipratropium for Nebulization 3 milliLiter(s) Nebulizer every 6 hours PRN Shortness of Breath and/or Wheezing  aluminum hydroxide/magnesium hydroxide/simethicone Suspension 30 milliLiter(s) Oral every 4 hours PRN Dyspepsia  melatonin 3 milliGRAM(s) Oral at bedtime PRN Insomnia  ondansetron Injectable 4 milliGRAM(s) IV Push every 8 hours PRN Nausea and/or Vomiting      CAPILLARY BLOOD GLUCOSE      POCT Blood Glucose.: 172 mg/dL (27 Jul 2023 13:08)  POCT Blood Glucose.: 153 mg/dL (27 Jul 2023 08:38)    I&O's Summary    26 Jul 2023 07:01  -  27 Jul 2023 07:00  --------------------------------------------------------  IN: 0 mL / OUT: 1000 mL / NET: -1000 mL        PHYSICAL EXAM:  Vital Signs Last 24 Hrs  T(C): 37.2 (27 Jul 2023 07:00), Max: 37.2 (27 Jul 2023 07:00)  T(F): 98.9 (27 Jul 2023 07:00), Max: 98.9 (27 Jul 2023 07:00)  HR: 79 (27 Jul 2023 07:00) (79 - 80)  BP: 132/60 (27 Jul 2023 07:00) (132/60 - 155/66)  BP(mean): --  RR: 19 (27 Jul 2023 07:00) (18 - 19)  SpO2: 96% (27 Jul 2023 07:00) (95% - 96%)    Parameters below as of 27 Jul 2023 07:00  Patient On (Oxygen Delivery Method): nasal cannula  O2 Flow (L/min): 4    CONSTITUTIONAL: NAD  EYES: conjunctiva and sclera clear  ENMT: mmm  NECK: Supple,  RESPIRATORY: Normal respiratory effort; lungs are clear to auscultation bilaterally  CARDIOVASCULAR: Regular rate and rhythm, + S1 and S2  ABDOMEN: Nontender to palpation, normoactive bowel sounds, no rebound/guarding  PSYCH: A+O x 3  Musc: Limited ROM hip due to pain    LABS:                        8.9    9.69  )-----------( 185      ( 27 Jul 2023 05:34 )             27.7     07-27    144  |  101  |  58<H>  ----------------------------<  129<H>  5.0   |  25  |  1.82<H>    Ca    9.2      27 Jul 2023 05:34  Phos  3.9     07-27  Mg     2.00     07-27    TPro  6.3  /  Alb  3.5  /  TBili  0.4  /  DBili  x   /  AST  15  /  ALT  11  /  AlkPhos  77  07-26    PT/INR - ( 26 Jul 2023 06:12 )   PT: 10.9 sec;   INR: 0.96 ratio         PTT - ( 26 Jul 2023 02:24 )  PTT:24.6 sec      Urinalysis Basic - ( 27 Jul 2023 05:34 )    Color: x / Appearance: x / SG: x / pH: x  Gluc: 129 mg/dL / Ketone: x  / Bili: x / Urobili: x   Blood: x / Protein: x / Nitrite: x   Leuk Esterase: x / RBC: x / WBC x   Sq Epi: x / Non Sq Epi: x / Bacteria: x

## 2023-07-27 NOTE — PROGRESS NOTE ADULT - PROBLEM SELECTOR PLAN 2
Patient with acute on chronic hyperkalemia   -EKG showed T wave inversion on lateral leads, no peaked T wave   - Improved, Trend K

## 2023-07-27 NOTE — PROGRESS NOTE ADULT - SUBJECTIVE AND OBJECTIVE BOX
ORTHOPAEDIC PROGRESS NOTE    SUBJECTIVE:  Pt seen and examined at bedside this am.  Doing well.  No acute events overnight.  Pain well controlled.  Awaiting OR.    ICU Vital Signs Last 24 Hrs  T(C): 37.2 (27 Jul 2023 07:00), Max: 37.2 (27 Jul 2023 07:00)  T(F): 98.9 (27 Jul 2023 07:00), Max: 98.9 (27 Jul 2023 07:00)  HR: 79 (27 Jul 2023 07:00) (79 - 80)  BP: 132/60 (27 Jul 2023 07:00) (132/60 - 155/66)  BP(mean): --  ABP: --  ABP(mean): --  RR: 19 (27 Jul 2023 07:00) (18 - 19)  SpO2: 96% (27 Jul 2023 07:00) (95% - 96%)    O2 Parameters below as of 27 Jul 2023 07:00  Patient On (Oxygen Delivery Method): nasal cannula  O2 Flow (L/min): 4      Physical Exam:  General: NAD; resting comfrotably in bed  Resp: non labored, NC in place  LLE:  - Skin intact w well healed lateral surgical incision   - +TTP over L thigh, no TTP along remainder of extremity; compartments soft  - Erythema to left lower leg likely due to venous stasis  - Limited ROM at hip and knee 2/2 pain    - Motor: TA/EHL/GS/FHL intact (IP firing but limited ROM by pain)   - Sensory: DP/SP/Tib/Rosa/Saph SILT  - +DP pulse, WWP    88yFemale w/ L femoral shaft fracture periprosthetic fracture    PLAN  -Admit to medicine  -Ortho plan for OR possibly 7/31 L revision hip arthroplasty vs ORIF s PFR   -Appreciate cards / pulm clearances  -NWB LLE, bedrest  -Diet: reg > NPO before OR   -pain control  -ice/cold compress  Appreciate medical management

## 2023-07-27 NOTE — PROGRESS NOTE ADULT - PROBLEM SELECTOR PLAN 5
-At presentation, pt had bibasilar crackles and b/l LE edema   -Initially on  lasix 40 mg IV BID, dose held due to worsening creatinine, cards agree  -Cardiology following, f/u recs  - No further cardiac testing

## 2023-07-28 NOTE — PROGRESS NOTE ADULT - SUBJECTIVE AND OBJECTIVE BOX
ORTHOPAEDIC PROGRESS NOTE    SUBJECTIVE:  Pt seen and examined at bedside this am.  Doing well.  No acute events overnight.  Pain well controlled.  Awaiting OR.    ICU Vital Signs Last 24 Hrs  T(C): 36.4 (28 Jul 2023 06:11), Max: 36.8 (27 Jul 2023 12:10)  T(F): 97.6 (28 Jul 2023 06:11), Max: 98.3 (27 Jul 2023 22:10)  HR: 70 (28 Jul 2023 06:11) (68 - 77)  BP: 130/59 (28 Jul 2023 06:11) (128/60 - 145/65)  BP(mean): --  ABP: --  ABP(mean): --  RR: 18 (28 Jul 2023 06:11) (18 - 18)  SpO2: 97% (28 Jul 2023 06:11) (94% - 97%)    O2 Parameters below as of 28 Jul 2023 06:11  Patient On (Oxygen Delivery Method): nasal cannula  O2 Flow (L/min): 4      Physical Exam:  General: NAD; resting comfrotably in bed  Resp: non labored, NC in place  LLE:  - Skin intact w well healed lateral surgical incision   - +TTP over L thigh, no TTP along remainder of extremity; compartments soft  - Erythema to left lower leg likely due to venous stasis  - Limited ROM at hip and knee 2/2 pain    - Motor: TA/EHL/GS/FHL intact (IP firing but limited ROM by pain)   - Sensory: DP/SP/Tib/Rosa/Saph SILT  - +DP pulse, WWP    88yFemale w/ L femoral shaft fracture periprosthetic fracture    PLAN  -Admit to medicine  -Ortho plan for OR possibly 7/31 L revision hip arthroplasty vs ORIF vs PFR   -Appreciate cards / pulm clearances  -NWB LLE, bedrest  -Diet: reg > NPO before OR   -pain control  -ice/cold compress  Appreciate medical management

## 2023-07-28 NOTE — PROGRESS NOTE ADULT - PROBLEM SELECTOR PLAN 8
DVT ppx SCD      Chronic Blurry Vision: attempted CT head, pt couldn't tolerate due to pain now declining to do CT. Outpt Optho follow up    GOC: Pt is DNR/DNI old MOLST revised ma, will likely need new MOLST after surgery.

## 2023-07-28 NOTE — PROGRESS NOTE ADULT - SUBJECTIVE AND OBJECTIVE BOX
[facility] Division of Hospital Medicine  Zak Graff MD  Pager [phone]      Patient is a 88y old  Female who presents with a chief complaint of fall (28 Jul 2023 08:00)      SUBJECTIVE / OVERNIGHT EVENTS: Hip pain persists. No CP or SOB    MEDICATIONS  (STANDING):  acetaminophen     Tablet .. 650 milliGRAM(s) Oral every 8 hours  budesonide 160 MICROgram(s)/formoterol 4.5 MICROgram(s) Inhaler 2 Puff(s) Inhalation two times a day  busPIRone 10 milliGRAM(s) Oral two times a day  chlorhexidine 2% Cloths 1 Application(s) Topical once  levothyroxine 112 MICROGram(s) Oral daily  metoprolol succinate ER 25 milliGRAM(s) Oral daily  montelukast 10 milliGRAM(s) Oral at bedtime  povidone iodine 5% Nasal Swab 1 Application(s) Both Nostrils once  tiotropium 2.5 MICROgram(s) Inhaler 2 Puff(s) Inhalation daily    MEDICATIONS  (PRN):  acetaminophen     Tablet .. 650 milliGRAM(s) Oral every 6 hours PRN Temp greater or equal to 38C (100.4F), Mild Pain (1 - 3)  albuterol/ipratropium for Nebulization 3 milliLiter(s) Nebulizer every 6 hours PRN Shortness of Breath and/or Wheezing  aluminum hydroxide/magnesium hydroxide/simethicone Suspension 30 milliLiter(s) Oral every 4 hours PRN Dyspepsia  melatonin 3 milliGRAM(s) Oral at bedtime PRN Insomnia  ondansetron Injectable 4 milliGRAM(s) IV Push every 8 hours PRN Nausea and/or Vomiting  oxyCODONE    IR 5 milliGRAM(s) Oral every 6 hours PRN moderate to severe pain      CAPILLARY BLOOD GLUCOSE      POCT Blood Glucose.: 177 mg/dL (27 Jul 2023 18:00)  POCT Blood Glucose.: 172 mg/dL (27 Jul 2023 13:08)    I&O's Summary      PHYSICAL EXAM:  Vital Signs Last 24 Hrs  T(C): 36.4 (28 Jul 2023 06:11), Max: 36.8 (27 Jul 2023 22:10)  T(F): 97.6 (28 Jul 2023 06:11), Max: 98.3 (27 Jul 2023 22:10)  HR: 70 (28 Jul 2023 06:11) (68 - 77)  BP: 130/59 (28 Jul 2023 06:11) (130/59 - 145/65)  BP(mean): --  RR: 18 (28 Jul 2023 06:11) (18 - 18)  SpO2: 97% (28 Jul 2023 06:11) (96% - 97%)    Parameters below as of 28 Jul 2023 06:11  Patient On (Oxygen Delivery Method): nasal cannula  O2 Flow (L/min): 4    CONSTITUTIONAL: NAD  EYES: conjunctiva and sclera clear  ENMT: mmm  NECK: Supple,  RESPIRATORY: Normal respiratory effort; lungs are clear to auscultation bilaterally  CARDIOVASCULAR: Regular rate and rhythm, + S1 and S2  ABDOMEN: Nontender to palpation, normoactive bowel sounds, no rebound/guarding  PSYCH: A+O x 3  MUSC: limited ROM of L thigh    LABS:                        9.3    9.22  )-----------( 102      ( 28 Jul 2023 06:15 )             29.2     07-28    141  |  102  |  56<H>  ----------------------------<  144<H>  5.2   |  27  |  1.73<H>    Ca    8.5      28 Jul 2023 06:15  Phos  3.8     07-28  Mg     2.20     07-28            Urinalysis Basic - ( 28 Jul 2023 06:15 )    Color: x / Appearance: x / SG: x / pH: x  Gluc: 144 mg/dL / Ketone: x  / Bili: x / Urobili: x   Blood: x / Protein: x / Nitrite: x   Leuk Esterase: x / RBC: x / WBC x   Sq Epi: x / Non Sq Epi: x / Bacteria: x

## 2023-07-28 NOTE — DISCHARGE NOTE PROVIDER - HOSPITAL COURSE
88 year old female with pmh of HTN, DM2, COPD (O2 dependent 2-3L,) Chronic diastolic HF, hypothyroidism, CKD3, and hx of hyperkalemia presents to the ED after a fall, found to have acute comminuted mildly displaced periprosthetic left proximal femoral fracture, course complicated by the hyperkalemia. Hyperkalemia improved. Pulm and cardiology optimized pt for OR 7/31. Pt was intially started on diuretics for  Acute on chronic diastolic congestive heart failure dose held due to worsening creatinine per Cards  Pt endorsed Chronic Blurry Vision, attempted CT head, pt couldn't tolerate due to pain now declining to do CT. Outpt Optho follow up    GOC: Pt is DNR/DNI old MOLST revised ma, will likely need new MOLST after surgery.

## 2023-07-28 NOTE — DISCHARGE NOTE PROVIDER - NSDCMRMEDTOKEN_GEN_ALL_CORE_FT
Albuterol (Eqv-ProAir HFA) 90 mcg/inh inhalation aerosol: 2 puff(s) inhaled every 4-6 hours as needed  busPIRone 10 mg oral tablet: 1 tab(s) orally 2 times a day  furosemide 20 mg oral tablet: 1 tab(s) orally once a day  Incruse Ellipta 62.5 mcg/inh inhalation powder: 1 puff(s) inhaled once a day  levothyroxine 112 mcg (0.112 mg) oral tablet: 1 tab(s) orally once a day  metoprolol succinate 25 mg oral tablet, extended release: 1 tab(s) orally once a day  montelukast 10 mg oral tablet: 1 tab(s) orally once a day (at bedtime)  Symbicort 160 mcg-4.5 mcg/inh inhalation aerosol: 2 puff(s) inhaled 2 times a day

## 2023-07-29 NOTE — PROGRESS NOTE ADULT - ASSESSMENT
88 year old female with pmh of HTN, DM2, COPD (O2 dependent 2-3L,) Chronic diastolic HF, hypothyroidism, CKD3, and hx of hyperkalemia presents to the ED after a fall, found to have acute comminuted mildly displaced periprosthetic left proximal femoral fracture, course complicated by the hyperkalemia. Now with hypercapnic respiratory failure.

## 2023-07-29 NOTE — PROGRESS NOTE ADULT - PROBLEM SELECTOR PLAN 1
-suspect 2/2 from hypercapnia  -patient has h/o COPD and sleep apnea (was recommended cpap use previously per brother but patient non compliant)  -Placed on bipap, will assess for improvement in pc02 and mental status  -UA and cxr negative  -blood cx sent  -empirically started on zosyn  -CTH once respiratory status improves. Currently on bipap and unsafe to transfer off floor.

## 2023-07-29 NOTE — PROGRESS NOTE ADULT - PROBLEM SELECTOR PLAN 7
-At presentation, pt had bibasilar crackles and b/l LE edema   -Initially on  lasix 40 mg IV BID, dose held due to worsening creatinine  -s/p lasix 40x 1 during RRT

## 2023-07-29 NOTE — PROGRESS NOTE ADULT - SUBJECTIVE AND OBJECTIVE BOX
[facility] Division of Hospital Medicine  Dr. Joi Garcia  Pager [phone]      Patient is a 88y old  Female who presents with a chief complaint of fall (28 Jul 2023 08:00)    SUBJECTIVE / OVERNIGHT EVENTS: RRT called this morning for unresponsiveness. Patient found to have hypercapnia on blood gas. Patient per staff is AA0x3 at baseline. Patient currently unable to provide ROS.    I spoke to brother Zachariah and updates provided. Also spoke to pulmonary team who will evaluate patient. At this time patient placed on bipap and started on empiric antibiotics.     MEDICATIONS  (STANDING):  acetaminophen     Tablet .. 650 milliGRAM(s) Oral every 8 hours  budesonide 160 MICROgram(s)/formoterol 4.5 MICROgram(s) Inhaler 2 Puff(s) Inhalation two times a day  busPIRone 10 milliGRAM(s) Oral two times a day  chlorhexidine 2% Cloths 1 Application(s) Topical once  levothyroxine 112 MICROGram(s) Oral daily  metoprolol succinate ER 25 milliGRAM(s) Oral daily  montelukast 10 milliGRAM(s) Oral at bedtime  povidone iodine 5% Nasal Swab 1 Application(s) Both Nostrils once  tiotropium 2.5 MICROgram(s) Inhaler 2 Puff(s) Inhalation daily    MEDICATIONS  (PRN):  acetaminophen     Tablet .. 650 milliGRAM(s) Oral every 6 hours PRN Temp greater or equal to 38C (100.4F), Mild Pain (1 - 3)  albuterol/ipratropium for Nebulization 3 milliLiter(s) Nebulizer every 6 hours PRN Shortness of Breath and/or Wheezing  aluminum hydroxide/magnesium hydroxide/simethicone Suspension 30 milliLiter(s) Oral every 4 hours PRN Dyspepsia  melatonin 3 milliGRAM(s) Oral at bedtime PRN Insomnia  ondansetron Injectable 4 milliGRAM(s) IV Push every 8 hours PRN Nausea and/or Vomiting  oxyCODONE    IR 5 milliGRAM(s) Oral every 6 hours PRN moderate to severe pain      CAPILLARY BLOOD GLUCOSE  POCT Blood Glucose.: 179 mg/dL (29 Jul 2023 08:31)      PHYSICAL EXAM:  Vital Signs Last 24 Hrs  T(C): 36.7 (29 Jul 2023 09:01), Max: 36.7 (29 Jul 2023 09:01)  T(F): 98 (29 Jul 2023 09:01), Max: 98 (29 Jul 2023 09:01)  HR: 85 (29 Jul 2023 10:22) (67 - 87)  BP: 138/50 (29 Jul 2023 09:01) (123/55 - 155/71)  BP(mean): --  RR: 19 (29 Jul 2023 09:01) (18 - 19)  SpO2: 98% (29 Jul 2023 10:22) (95% - 98%)    Parameters below as of 29 Jul 2023 09:01  Patient On (Oxygen Delivery Method): nasal cannula  O2 Flow (L/min): 6      CONSTITUTIONAL: unresponsive  EYES: closed  ENMT: mmm  NECK: Supple,  RESPIRATORY: lungs clear  CARDIOVASCULAR: Regular rate and rhythm, + S1 and S2  ABDOMEN: Nontender to palpation, normoactive bowel sounds, no rebound/guarding  PSYCH: obtunded       LABS:                                   9.0    13.76 )-----------( 176      ( 29 Jul 2023 05:15 )             29.6   07-29    139  |  99  |  55<H>  ----------------------------<  156<H>  5.3   |  28  |  1.81<H>    Ca    8.8      29 Jul 2023 05:15  Phos  3.7     07-29  Mg     2.10     07-29          Urinalysis Basic - ( 28 Jul 2023 06:15 )    Color: x / Appearance: x / SG: x / pH: x  Gluc: 144 mg/dL / Ketone: x  / Bili: x / Urobili: x   Blood: x / Protein: x / Nitrite: x   Leuk Esterase: x / RBC: x / WBC x   Sq Epi: x / Non Sq Epi: x / Bacteria: x

## 2023-07-29 NOTE — PROGRESS NOTE ADULT - PROBLEM SELECTOR PLAN 6
Patient with hx of COPD, on 3-4L on oxygen at home   -Not in acute exacerbation   -Will continue with albuterol, budesonide, and montelukast  -Pulm consulted

## 2023-07-29 NOTE — PROGRESS NOTE ADULT - CONVERSATION DETAILS
Updates provided to brother Zachariah. Per brother patient was advised to wear cpap at night but she does not. Discussed advance directives with brother as well. Brother does not recall signing most and making patient DNR during previous hospitalization. Per brother, if there is a chance for survival and hope for recovery he would like trial of CPR and intubation. Explained that these are aggressive invasive measures and possible that patient may not return to baseline functional status after intervention. Brother feels overwhelm and wants to speak to more family members before making decision. At this time patient is FULL CODE.

## 2023-07-29 NOTE — PROGRESS NOTE ADULT - SUBJECTIVE AND OBJECTIVE BOX
ORTHOPAEDIC PROGRESS NOTE    SUBJECTIVE:  Pt seen and examined at bedside this am.  Doing well.  No acute events overnight.  Pt states pain is well controlled.  Ready for surgery next week     OBJECTIVE:  Vital Signs Last 24 Hrs  T(C): 36.6 (29 Jul 2023 06:05), Max: 36.6 (28 Jul 2023 13:00)  T(F): 97.8 (29 Jul 2023 06:05), Max: 97.9 (28 Jul 2023 18:10)  HR: 87 (29 Jul 2023 06:05) (67 - 87)  BP: 155/71 (29 Jul 2023 06:05) (123/55 - 155/71)  BP(mean): --  RR: 18 (29 Jul 2023 06:05) (18 - 18)  SpO2: 96% (29 Jul 2023 06:05) (96% - 97%)    Parameters below as of 29 Jul 2023 06:05  Patient On (Oxygen Delivery Method): nasal cannula  O2 Flow (L/min): 4      Physical Exam:  General: NAD; resting comfrotably in bed  Resp: non labored, NC in place  LLE:  - Skin intact w well healed lateral surgical incision   - +TTP over L thigh, no TTP along remainder of extremity; compartments soft  - Erythema to left lower leg likely due to venous stasis  - Limited ROM at hip and knee 2/2 pain    - Motor: TA/EHL/GS/FHL intact (IP firing but limited ROM by pain)   - Sensory: DP/SP/Tib/Rosa/Saph SILT  - +DP pulse, WWP        LABS                        9.0    13.76 )-----------( 176      ( 29 Jul 2023 05:15 )             29.6       07-28    141  |  102  |  56<H>  ----------------------------<  144<H>  5.2   |  27  |  1.73<H>    Ca    8.5      28 Jul 2023 06:15  Phos  3.8     07-28  Mg     2.20     07-28            I&O's Summary      acetaminophen     Tablet .. 650 milliGRAM(s) Oral every 8 hours  acetaminophen     Tablet .. 650 milliGRAM(s) Oral every 6 hours PRN  albuterol/ipratropium for Nebulization 3 milliLiter(s) Nebulizer every 6 hours PRN  aluminum hydroxide/magnesium hydroxide/simethicone Suspension 30 milliLiter(s) Oral every 4 hours PRN  budesonide 160 MICROgram(s)/formoterol 4.5 MICROgram(s) Inhaler 2 Puff(s) Inhalation two times a day  busPIRone 10 milliGRAM(s) Oral two times a day  chlorhexidine 2% Cloths 1 Application(s) Topical once  levothyroxine 112 MICROGram(s) Oral daily  melatonin 3 milliGRAM(s) Oral at bedtime PRN  metoprolol succinate ER 25 milliGRAM(s) Oral daily  montelukast 10 milliGRAM(s) Oral at bedtime  ondansetron Injectable 4 milliGRAM(s) IV Push every 8 hours PRN  oxyCODONE    IR 5 milliGRAM(s) Oral every 6 hours PRN  povidone iodine 5% Nasal Swab 1 Application(s) Both Nostrils once  tiotropium 2.5 MICROgram(s) Inhaler 2 Puff(s) Inhalation daily

## 2023-07-29 NOTE — PROGRESS NOTE ADULT - PROBLEM SELECTOR PLAN 2
-possibly 2/2 to infection vs hypoxia given ho sleep apnea  -placed on bipap  -started on zosyn  -pulm to evaluate patient

## 2023-07-29 NOTE — PROGRESS NOTE ADULT - PROBLEM SELECTOR PLAN 3
Patient with L hip fracture after a fall   -Xray and CT L hip showed Acute comminuted mildly displaced periprosthetic left proximal femoral   fracture.  -Ortho recs appreciated, plan for OR 7/31  -at this time, not optimized for OR  -NWB on L leg, bedrest  -Pain control with tylenol

## 2023-07-29 NOTE — PROGRESS NOTE ADULT - ASSESSMENT
Patient is a 89 yo F w/ HTN, T2DM, COPD (home O2 3L), HFpEF, dementia, hypothyroidism, CKD3, who presents after fall found to have an acute comminuted mildly displaced periprosthetic L proximal femoral Pulmonary consulted for pre op evaluation and optimization. On 7/29 had RRT for AMS found to be hypercapnic and placed on bipap with improvement in mental status    #Chronic hypoxemic and hypercapnic respiratory failure - On 2 to 4 L nc at home as per patient. Has been on various inhaler regimens but is on triple therapy. Endorses unable to tolerate chronic NIPPV  #COPD  #Preoperative evaluation  #RRT for ?hypercapnia  - possibly became more hypercapnic due to hyperoxia (?vbg vs abg with po2 of 170). please aim for spo2 88-92%  - c/w Bipap 12/5 for now, please recheck vbg  - mental status more improved now, still confused - ?delirium on baseline of dementia  - if mental status continues to improve can trial off bipap this afternoon but try to place back on for nocturnal use  - please recheck CXR with patient more optimally positioned when off bipap  - c/w LABA/LAMA/ICS, Spiriva/Symbicort while admitted  - c/w duonebs PRN. Would give standing duonebs q6h the day prior to and day after surgery  - c/w supplemental O2 to target SO2 > 88%, wean as tolerated  - Incentive spirometer  - Would not extubate until fully awake from anesthesia standpoint. Can utilize NIPPV if needed

## 2023-07-29 NOTE — PROGRESS NOTE ADULT - ASSESSMENT
88yFemale w/ L femoral shaft fracture periprosthetic fracture    PLAN  -Admit to medicine  -Ortho plan for OR possibly 7/31 L revision hip arthroplasty vs ORIF vs PFR   -Appreciate cards / pulm clearances  -NWB LLE, bedrest  -Diet: reg > NPO before OR   -pain control  -ice/cold compress  Appreciate medical management

## 2023-07-29 NOTE — PROGRESS NOTE ADULT - SUBJECTIVE AND OBJECTIVE BOX
PULMONARY PROGRESS NOTE    PATIENT INFORMATION:  NAME: DEBORAH RIVERA:  MRN: [id number]    CHIEF COMPLAINT: Patient is a 88y old  Female who presents with a chief complaint of fall (29 Jul 2023 12:14)      [x] INITIAL CONSULT, H&P, FAMILY HISTORY and PAST MEDICAL AND SURGICAL HISTORY REVIEWED    OVERNIGHT EVENTS, CHANGES TO HPI, SUBJECTIVE:   Patient had an RRT for AMS, found to be hypercapnic, placed on bipap  on exam patient awake and alert on bipap although mildly confused  ========================REVIEW OF SYSTEMS========================  [x] ROS negative except as per HPI    ========================MEDICATIONS=============================  MEDICATIONS  (STANDING):  acetaminophen     Tablet .. 650 milliGRAM(s) Oral every 8 hours  budesonide 160 MICROgram(s)/formoterol 4.5 MICROgram(s) Inhaler 2 Puff(s) Inhalation two times a day  busPIRone 10 milliGRAM(s) Oral two times a day  chlorhexidine 2% Cloths 1 Application(s) Topical once  levothyroxine 112 MICROGram(s) Oral daily  metoprolol succinate ER 25 milliGRAM(s) Oral daily  montelukast 10 milliGRAM(s) Oral at bedtime  piperacillin/tazobactam IVPB.. 3.375 Gram(s) IV Intermittent every 8 hours  povidone iodine 5% Nasal Swab 1 Application(s) Both Nostrils once  sodium zirconium cyclosilicate 10 Gram(s) Oral once  tiotropium 2.5 MICROgram(s) Inhaler 2 Puff(s) Inhalation daily      MEDICATIONS  (PRN):  acetaminophen     Tablet .. 650 milliGRAM(s) Oral every 6 hours PRN Temp greater or equal to 38C (100.4F), Mild Pain (1 - 3)  albuterol/ipratropium for Nebulization 3 milliLiter(s) Nebulizer every 6 hours PRN Shortness of Breath and/or Wheezing  aluminum hydroxide/magnesium hydroxide/simethicone Suspension 30 milliLiter(s) Oral every 4 hours PRN Dyspepsia  melatonin 3 milliGRAM(s) Oral at bedtime PRN Insomnia  ondansetron Injectable 4 milliGRAM(s) IV Push every 8 hours PRN Nausea and/or Vomiting      ========================PHYSICAL EXAM============================    VITALS: ICU Vital Signs Last 24 Hrs  T(C): 36.7 (29 Jul 2023 09:01), Max: 36.7 (29 Jul 2023 09:01)  T(F): 98 (29 Jul 2023 09:01), Max: 98 (29 Jul 2023 09:01)  HR: 85 (29 Jul 2023 10:22) (67 - 87)  BP: 138/50 (29 Jul 2023 09:01) (123/55 - 155/71)  BP(mean): --  ABP: --  ABP(mean): --  RR: 19 (29 Jul 2023 09:01) (18 - 19)  SpO2: 98% (29 Jul 2023 10:22) (95% - 98%)    O2 Parameters below as of 29 Jul 2023 09:01  Patient On (Oxygen Delivery Method): nasal cannula  O2 Flow (L/min): 6          INTAKE and OUTPUT: I&O's Summary      VENTILATOR SETTINGS:     Physical Exam  CONST:     NAD, elderly, calm on BiPAP  RESP :        Poor respiratory effort; CTA bilaterally; no W/R/R  CHEST:       No TTP, no lines/ports; symmetric chest expansion  CARDIO:     RRR, normal S1 and S2, no M/R/G  VASC:         No JVD, no peripheral edema, pulses 2+ B/L, Cap refill <2s  ABD:           Soft, NT/ND  MSK:          No clubbing of digits; no joint swelling or TTP  NEURO:     Non-focal; no gross sensory deficits; moving all extremities       ========================LABORATORY RESULTS AND IMAGING=============                        9.0    13.76 )-----------( 176      ( 29 Jul 2023 05:15 )             29.6                                                    07-29    139  |  99  |  55<H>  ----------------------------<  156<H>  5.3   |  28  |  1.81<H>    Ca    8.8      29 Jul 2023 05:15  Phos  3.7     07-29  Mg     2.10     07-29            Creatinine Trend: 1.81<--, 1.73<--, 1.82<--, 1.93<--, 1.93<--, 1.81<--    [x] RADIOLOGY REVIEWED AND INTERPRETED BY ME

## 2023-07-29 NOTE — PROGRESS NOTE ADULT - PROBLEM SELECTOR PLAN 9
DVT ppx SCD      Chronic Blurry Vision: attempted CT head, pt couldn't tolerate due to pain now declining to do CT. Outpt Optho follow up    Will attempt CTH once respiratory state stable

## 2023-07-30 NOTE — PROGRESS NOTE ADULT - SUBJECTIVE AND OBJECTIVE BOX
[facility] Division of Hospital Medicine  Dr. Joi Garcia  Pager [phone]      Patient is a 88y old  Female who presents with a chief complaint of fall (28 Jul 2023 08:00)    SUBJECTIVE / OVERNIGHT EVENTS: Patient seen and examined. Patient was alert and awake and oriented to3. She was being fed breakfast by staff. She reports that her breathing is not great. She wore avaps only 6 hrs last night but now refusing. Explained reason for needed avaps. Patient still refuses to wear avaps. When attempted to have GOC conversation, she states "i want another doctor". I explained to her that I am her medical doctor and lung doctors also recommend this machine. She then states that she wants to speak to lung doctor.     MEDICATIONS  (STANDING):  acetaminophen     Tablet .. 650 milliGRAM(s) Oral every 8 hours  budesonide 160 MICROgram(s)/formoterol 4.5 MICROgram(s) Inhaler 2 Puff(s) Inhalation two times a day  busPIRone 10 milliGRAM(s) Oral two times a day  chlorhexidine 2% Cloths 1 Application(s) Topical once  levothyroxine 112 MICROGram(s) Oral daily  metoprolol succinate ER 25 milliGRAM(s) Oral daily  montelukast 10 milliGRAM(s) Oral at bedtime  povidone iodine 5% Nasal Swab 1 Application(s) Both Nostrils once  tiotropium 2.5 MICROgram(s) Inhaler 2 Puff(s) Inhalation daily    MEDICATIONS  (PRN):  acetaminophen     Tablet .. 650 milliGRAM(s) Oral every 6 hours PRN Temp greater or equal to 38C (100.4F), Mild Pain (1 - 3)  albuterol/ipratropium for Nebulization 3 milliLiter(s) Nebulizer every 6 hours PRN Shortness of Breath and/or Wheezing  aluminum hydroxide/magnesium hydroxide/simethicone Suspension 30 milliLiter(s) Oral every 4 hours PRN Dyspepsia  melatonin 3 milliGRAM(s) Oral at bedtime PRN Insomnia  ondansetron Injectable 4 milliGRAM(s) IV Push every 8 hours PRN Nausea and/or Vomiting  oxyCODONE    IR 5 milliGRAM(s) Oral every 6 hours PRN moderate to severe pain      CAPILLARY BLOOD GLUCOSE  POCT Blood Glucose.: 179 mg/dL (29 Jul 2023 08:31)      PHYSICAL EXAM:  Vital Signs Last 24 Hrs  T(C): 36.9 (30 Jul 2023 06:30), Max: 36.9 (30 Jul 2023 06:30)  T(F): 98.5 (30 Jul 2023 06:30), Max: 98.5 (30 Jul 2023 06:30)  HR: 71 (30 Jul 2023 07:37) (66 - 81)  BP: 126/56 (30 Jul 2023 06:30) (126/56 - 143/58)  BP(mean): --  RR: 18 (30 Jul 2023 06:30) (16 - 19)  SpO2: 98% (30 Jul 2023 07:37) (94% - 99%)    Parameters below as of 30 Jul 2023 07:37  Patient On (Oxygen Delivery Method): nasal cannula          CONSTITUTIONAL: unresponsive  EYES: closed  ENMT: mmm  NECK: Supple,  RESPIRATORY: lungs clear  CARDIOVASCULAR: Regular rate and rhythm, + S1 and S2  ABDOMEN: Nontender to palpation, normoactive bowel sounds, no rebound/guarding  PSYCH: obtunded       LABS:                                   8.1    9.52  )-----------( 182      ( 30 Jul 2023 05:00 )             26.2   07-30    140  |  100  |  63<H>  ----------------------------<  136<H>  5.6<H>   |  30  |  2.19<H>    Ca    8.6      30 Jul 2023 05:00  Phos  4.0     07-30  Mg     2.20     07-30            Urinalysis Basic - ( 28 Jul 2023 06:15 )    Color: x / Appearance: x / SG: x / pH: x  Gluc: 144 mg/dL / Ketone: x  / Bili: x / Urobili: x   Blood: x / Protein: x / Nitrite: x   Leuk Esterase: x / RBC: x / WBC x   Sq Epi: x / Non Sq Epi: x / Bacteria: x

## 2023-07-30 NOTE — PROGRESS NOTE ADULT - ASSESSMENT
Unable to Assess Patient is a 87 yo F w/ HTN, T2DM, COPD (home O2 3L), HFpEF, dementia, hypothyroidism, CKD3, who presents after fall found to have an acute comminuted mildly displaced periprosthetic L proximal femoral Pulmonary consulted for pre op evaluation and optimization. On 7/29 had RRT for AMS found to be hypercapnic and placed on bipap with improvement in mental status    #Chronic hypoxemic and hypercapnic respiratory failure - On 2 to 4 L nc at home as per patient. Has been on various inhaler regimens but is on triple therapy. Endorses unable to tolerate chronic NIPPV  #COPD  #Preoperative evaluation  #RRT for ?hypercapnia  - possibly became more hypercapnic due to hyperoxia (?vbg vs abg with po2 of 170). please aim for spo2 88-92%  - c/w avaps  overnight  - mental status much improved  - c/w LABA/LAMA/ICS, Spiriva/Symbicort while admitted  - c/w duonebs PRN. Would give standing duonebs q6h the day prior to and day after surgery  - c/w supplemental O2 to target SO2 > 88%, wean as tolerated  - Incentive spirometer  - Would not extubate until fully awake from anesthesia standpoint. would utilize NIPPV after extubation as needed  - as of today much improved, if mental status and blood gas look ok tomorrow after avaps use overnight then no contraindications to proceeding with surgery

## 2023-07-30 NOTE — PROGRESS NOTE ADULT - PROBLEM SELECTOR PLAN 1
-suspect 2/2 from hypercapnia  -patient at baseline mental status today during evaluation  -patient has h/o COPD and during prior hospitalization required bipap as well. She refused during last hospitalization as well. She was made DNR and was discharged to rehab.   -UA and cxr negative  -blood cx negative  -empirically started on zosyn  -CTH and CT lungs ordered however patient is refusing.  -Patient refused to engage in GOC conversation with me. Per discussion with brother, patient is full code. Will need ongoing GOC with family given patient's refusal to comply with recommended treatment.

## 2023-07-30 NOTE — PROGRESS NOTE ADULT - SUBJECTIVE AND OBJECTIVE BOX
SUBJECTIVE:   Patient seen and examined at bedside. Pt resting comfortably  Alert and oriented x 3. States she does not think she is ready for surgery after what happened yesterday (referring to the RRT)    She states she is not in good shape and wondering if she will make it through  Reassured patient that she is being evaluated by the pulmonary and medical team who will determine when/if she is ready for OR    OBJECTIVE:  Vital Signs Last 24 Hrs  T(C): 36.6 (30 Jul 2023 00:00), Max: 36.7 (29 Jul 2023 09:01)  T(F): 97.9 (30 Jul 2023 00:00), Max: 98 (29 Jul 2023 09:01)  HR: 66 (30 Jul 2023 03:40) (66 - 87)  BP: 143/58 (30 Jul 2023 00:00) (127/50 - 143/58)  BP(mean): --  RR: 19 (30 Jul 2023 00:00) (16 - 19)  SpO2: 94% (30 Jul 2023 03:40) (94% - 99%)    Parameters below as of 30 Jul 2023 00:00  Patient On (Oxygen Delivery Method): avaps        General: NAD  Resp: Non-labored breathing, no accessory muscle use     Left Lower extremity:          Dressing: clean/dry/intact            Sensation: SILT         Motor exam: 5/5 TA/GS/EHL/FHL         warm well perfused          compartments soft        +PT pulse      LABS:                        8.1    9.52  )-----------( 182      ( 30 Jul 2023 05:00 )             26.2     07-30    140  |  100  |  63<H>  ----------------------------<  136<H>  5.6<H>   |  30  |  2.19<H>    Ca    8.6      30 Jul 2023 05:00  Phos  4.0     07-30  Mg     2.20     07-30      I&O's Summary    29 Jul 2023 07:01  -  30 Jul 2023 07:00  --------------------------------------------------------  IN: 250 mL / OUT: 700 mL / NET: -450 mL        MEDS:  MEDICATIONS  (STANDING):  acetaminophen     Tablet .. 650 milliGRAM(s) Oral every 8 hours  budesonide 160 MICROgram(s)/formoterol 4.5 MICROgram(s) Inhaler 2 Puff(s) Inhalation two times a day  busPIRone 10 milliGRAM(s) Oral two times a day  chlorhexidine 2% Cloths 1 Application(s) Topical once  levothyroxine 112 MICROGram(s) Oral daily  metoprolol succinate ER 25 milliGRAM(s) Oral daily  montelukast 10 milliGRAM(s) Oral at bedtime  piperacillin/tazobactam IVPB.. 3.375 Gram(s) IV Intermittent every 8 hours  tiotropium 2.5 MICROgram(s) Inhaler 2 Puff(s) Inhalation daily    MEDICATIONS  (PRN):  acetaminophen     Tablet .. 650 milliGRAM(s) Oral every 6 hours PRN Temp greater or equal to 38C (100.4F), Mild Pain (1 - 3)  albuterol/ipratropium for Nebulization 3 milliLiter(s) Nebulizer every 6 hours PRN Shortness of Breath and/or Wheezing  aluminum hydroxide/magnesium hydroxide/simethicone Suspension 30 milliLiter(s) Oral every 4 hours PRN Dyspepsia  melatonin 3 milliGRAM(s) Oral at bedtime PRN Insomnia  ondansetron Injectable 4 milliGRAM(s) IV Push every 8 hours PRN Nausea and/or Vomiting       SUBJECTIVE:   Patient seen and examined at bedside. Pt resting comfortably  Alert and oriented x 3. States she does not think she is ready for surgery after what happened yesterday (referring to the RRT)    She states she is not in good shape and wondering if she will make it through  Reassured patient that she is being evaluated by the pulmonary and medical team who will determine when/if she is ready for OR    OBJECTIVE:  Vital Signs Last 24 Hrs  T(C): 36.6 (30 Jul 2023 00:00), Max: 36.7 (29 Jul 2023 09:01)  T(F): 97.9 (30 Jul 2023 00:00), Max: 98 (29 Jul 2023 09:01)  HR: 66 (30 Jul 2023 03:40) (66 - 87)  BP: 143/58 (30 Jul 2023 00:00) (127/50 - 143/58)  BP(mean): --  RR: 19 (30 Jul 2023 00:00) (16 - 19)  SpO2: 94% (30 Jul 2023 03:40) (94% - 99%)    Parameters below as of 30 Jul 2023 00:00  Patient On (Oxygen Delivery Method): avaps        General: NAD  Resp: Non-labored breathing, no accessory muscle use     Left Lower extremity:          Skin intact         Full hip/knee ROM deferred 2/2 pain (pt with known fracture)           Sensation: SILT         Motor exam: + TA/GS/EHL/FHL         warm well perfused          compartments soft        +PT pulse      LABS:                        8.1    9.52  )-----------( 182      ( 30 Jul 2023 05:00 )             26.2     07-30    140  |  100  |  63<H>  ----------------------------<  136<H>  5.6<H>   |  30  |  2.19<H>    Ca    8.6      30 Jul 2023 05:00  Phos  4.0     07-30  Mg     2.20     07-30      I&O's Summary    29 Jul 2023 07:01  -  30 Jul 2023 07:00  --------------------------------------------------------  IN: 250 mL / OUT: 700 mL / NET: -450 mL        MEDS:  MEDICATIONS  (STANDING):  acetaminophen     Tablet .. 650 milliGRAM(s) Oral every 8 hours  budesonide 160 MICROgram(s)/formoterol 4.5 MICROgram(s) Inhaler 2 Puff(s) Inhalation two times a day  busPIRone 10 milliGRAM(s) Oral two times a day  chlorhexidine 2% Cloths 1 Application(s) Topical once  levothyroxine 112 MICROGram(s) Oral daily  metoprolol succinate ER 25 milliGRAM(s) Oral daily  montelukast 10 milliGRAM(s) Oral at bedtime  piperacillin/tazobactam IVPB.. 3.375 Gram(s) IV Intermittent every 8 hours  tiotropium 2.5 MICROgram(s) Inhaler 2 Puff(s) Inhalation daily    MEDICATIONS  (PRN):  acetaminophen     Tablet .. 650 milliGRAM(s) Oral every 6 hours PRN Temp greater or equal to 38C (100.4F), Mild Pain (1 - 3)  albuterol/ipratropium for Nebulization 3 milliLiter(s) Nebulizer every 6 hours PRN Shortness of Breath and/or Wheezing  aluminum hydroxide/magnesium hydroxide/simethicone Suspension 30 milliLiter(s) Oral every 4 hours PRN Dyspepsia  melatonin 3 milliGRAM(s) Oral at bedtime PRN Insomnia  ondansetron Injectable 4 milliGRAM(s) IV Push every 8 hours PRN Nausea and/or Vomiting

## 2023-07-30 NOTE — PROGRESS NOTE ADULT - PROBLEM SELECTOR PLAN 6
Patient with hx of COPD, on 3-4L on oxygen at home   -Not in acute exacerbation   -Will continue with albuterol, budesonide, and montelukast  -Pulm consult appreciated

## 2023-07-30 NOTE — PROGRESS NOTE ADULT - CONVERSATION DETAILS
Spoke to patient's family at bedside. Emma Martinez at bedside. He states that patient in past has states that she would not want CPR and intubation. He states that patient is stubborn and has refused to wear bipap in past as well. He understands that without bipap, patient's condition may get worse and she may require intubation or chest compression. At this time they do not believe that resuscitation is in line with patient's wishes. They will speak to patient and also patient's brother Zachariah today and make a decision regarding DNR by end of the day.

## 2023-07-30 NOTE — PROGRESS NOTE ADULT - PROBLEM SELECTOR PLAN 2
-possibly 2/2 to infection vs hypoxia given ho sleep apnea  -started on zosyn  -pulm eval appreciated  -patient refusing AVAPs as recommedned

## 2023-07-30 NOTE — PROGRESS NOTE ADULT - SUBJECTIVE AND OBJECTIVE BOX
PULMONARY PROGRESS NOTE    PATIENT INFORMATION:  NAME: DEBORAH RIVERA:  MRN: [id number]    CHIEF COMPLAINT: Patient is a 88y old  Female who presents with a chief complaint of fall (30 Jul 2023 11:25)      [x] INITIAL CONSULT, H&P, FAMILY HISTORY and PAST MEDICAL AND SURGICAL HISTORY REVIEWED    OVERNIGHT EVENTS, CHANGES TO HPI, SUBJECTIVE:   much more alert and responsive today, no breathing concerns    ========================REVIEW OF SYSTEMS========================  [x] ROS negative except as per HPI    ========================MEDICATIONS=============================  MEDICATIONS  (STANDING):  acetaminophen     Tablet .. 650 milliGRAM(s) Oral every 8 hours  acetaminophen   IVPB .. 1000 milliGRAM(s) IV Intermittent once  albuterol/ipratropium for Nebulization 3 milliLiter(s) Nebulizer every 6 hours  budesonide 160 MICROgram(s)/formoterol 4.5 MICROgram(s) Inhaler 2 Puff(s) Inhalation two times a day  busPIRone 10 milliGRAM(s) Oral two times a day  chlorhexidine 2% Cloths 1 Application(s) Topical once  levothyroxine 112 MICROGram(s) Oral daily  metoprolol succinate ER 25 milliGRAM(s) Oral daily  montelukast 10 milliGRAM(s) Oral at bedtime  piperacillin/tazobactam IVPB.. 3.375 Gram(s) IV Intermittent every 8 hours      MEDICATIONS  (PRN):  acetaminophen     Tablet .. 650 milliGRAM(s) Oral every 6 hours PRN Temp greater or equal to 38C (100.4F), Mild Pain (1 - 3)  aluminum hydroxide/magnesium hydroxide/simethicone Suspension 30 milliLiter(s) Oral every 4 hours PRN Dyspepsia  melatonin 3 milliGRAM(s) Oral at bedtime PRN Insomnia  ondansetron Injectable 4 milliGRAM(s) IV Push every 8 hours PRN Nausea and/or Vomiting      ========================PHYSICAL EXAM============================    VITALS: ICU Vital Signs Last 24 Hrs  T(C): 36.6 (30 Jul 2023 14:00), Max: 36.9 (30 Jul 2023 06:30)  T(F): 97.9 (30 Jul 2023 14:00), Max: 98.5 (30 Jul 2023 06:30)  HR: 69 (30 Jul 2023 14:00) (66 - 81)  BP: 122/52 (30 Jul 2023 14:00) (122/52 - 143/58)  BP(mean): --  ABP: --  ABP(mean): --  RR: 19 (30 Jul 2023 14:00) (16 - 19)  SpO2: 98% (30 Jul 2023 14:00) (94% - 99%)    O2 Parameters below as of 30 Jul 2023 14:00  Patient On (Oxygen Delivery Method): avaps            INTAKE and OUTPUT: I&O's Summary    29 Jul 2023 07:01  -  30 Jul 2023 07:00  --------------------------------------------------------  IN: 250 mL / OUT: 700 mL / NET: -450 mL        VENTILATOR SETTINGS: Mode: standby      Physical Exam  CONST:     NAD, well-appearing; well-developed; appears stated age  ENMT:       MMM, no pharyngeal injection or exudates; oropharynx not crowded   RESP :        Normal respiratory effort; CTA bilaterally; no W/R/R  CHEST:       No TTP, no lines/ports; symmetric chest expansion  CARDIO:     RRR, normal S1 and S2, no M/R/G  VASC:         No JVD, no peripheral edema, pulses 2+ B/L, Cap refill <2s  ABD:           Soft, NT/ND  MSK:          No clubbing of digits; no joint swelling or TTP  PSYCH        A&O to person, place, and time; affect appropriate  NEURO:     Non-focal; no gross sensory deficits; moving all extremities   SKIN:          No rashes; no palpable lesions       ========================LABORATORY RESULTS AND IMAGING=============                        8.1    9.52  )-----------( 182      ( 30 Jul 2023 05:00 )             26.2                                                    07-30    140  |  100  |  63<H>  ----------------------------<  136<H>  5.6<H>   |  30  |  2.19<H>    Ca    8.6      30 Jul 2023 05:00  Phos  4.0     07-30  Mg     2.20     07-30        ABG - ( 29 Jul 2023 18:10 )  pH, Arterial: 7.29  pH, Blood: x     /  pCO2: 72    /  pO2: 147   / HCO3: 35    / Base Excess: 6.8   /  SaO2: 98.9                Creatinine Trend: 2.19<--, 1.81<--, 1.73<--, 1.82<--, 1.93<--, 1.93<--    [x] RADIOLOGY REVIEWED AND INTERPRETED BY ME

## 2023-07-30 NOTE — PROGRESS NOTE ADULT - ASSESSMENT
88yFemale w/ L femoral shaft fracture periprosthetic fracture    PLAN  -Ortho plan for OR possibly 7/31 L revision hip arthroplasty vs ORIF vs PFR    However patient not currently optimized for OR  -FU Infectious work-up   Imaging and blood cultures   On Zosyn empirically  -NWB LLE, bedrest  -DVT ppx  -Diet: reg > NPO before OR   -pain control  -ice/cold compress prn  -Appreciate medical management  -Appreciate cards / pulm clearances - pending imaging    Orthopaedic Surgery  [facility] [id number]  [facility]        [id number]  [facility]  [id number]/ [phone]

## 2023-07-31 NOTE — PROGRESS NOTE ADULT - NS ATTEST RISK PROBLEM GEN_ALL_CORE FT
Chronic hypoxemic and hypercapnic respiratory failure  Chronic diastolic heart failure  COPD  Hip fracture Chronic hypoxemic and hypercapnic respiratory failure  Chronic diastolic heart failure  COPD  L femoral fracture

## 2023-07-31 NOTE — PROGRESS NOTE ADULT - PROBLEM SELECTOR PLAN 9
DVT ppx SCD      Plan discussed with family DVT ppx SCD      Plan discussed with family - Oli malik over the phone

## 2023-07-31 NOTE — PROGRESS NOTE ADULT - PROBLEM SELECTOR PLAN 3
-suspect 2/2 from hypercapnia  -patient at baseline mental status today during evaluation  -patient has h/o COPD and during prior hospitalization required bipap as well. She refused during last hospitalization as well. She was made DNR and was discharged to rehab.   -UA and cxr negative  -blood cx negative, urine cx + GNR  -empirically started on zosyn  -CTH unremarkable

## 2023-07-31 NOTE — PROGRESS NOTE ADULT - SUBJECTIVE AND OBJECTIVE BOX
CHIEF COMPLAINT:    Interval Events:    REVIEW OF SYSTEMS:  Constitutional: [ ] negative [ ] fevers [ ] chills [ ] weight loss [ ] weight gain  HEENT: [ ] negative [ ] dry eyes [ ] eye irritation [ ] postnasal drip [ ] nasal congestion  CV: [ ] negative  [ ] chest pain [ ] orthopnea [ ] palpitations [ ] murmur  Resp: [ ] negative [ ] cough [ ] shortness of breath [ ] dyspnea [ ] wheezing [ ] sputum [ ] hemoptysis  GI: [ ] negative [ ] nausea [ ] vomiting [ ] diarrhea [ ] constipation [ ] abd pain [ ] dysphagia   : [ ] negative [ ] dysuria [ ] nocturia [ ] hematuria [ ] increased urinary frequency  Musculoskeletal: [ ] negative [ ] back pain [ ] myalgias [ ] arthralgias [ ] fracture  Skin: [ ] negative [ ] rash [ ] itch  Neurological: [ ] negative [ ] headache [ ] dizziness [ ] syncope [ ] weakness [ ] numbness  Psychiatric: [ ] negative [ ] anxiety [ ] depression  Endocrine: [ ] negative [ ] diabetes [ ] thyroid problem  Hematologic/Lymphatic: [ ] negative [ ] anemia [ ] bleeding problem  Allergic/Immunologic: [ ] negative [ ] itchy eyes [ ] nasal discharge [ ] hives [ ] angioedema  [ ] All other systems negative  [ ] Unable to assess ROS because ________    OBJECTIVE:  ICU Vital Signs Last 24 Hrs  T(C): 36.7 (31 Jul 2023 05:39), Max: 36.9 (30 Jul 2023 17:10)  T(F): 98.1 (31 Jul 2023 05:39), Max: 98.4 (30 Jul 2023 17:10)  HR: 72 (31 Jul 2023 05:39) (69 - 94)  BP: 119/63 (31 Jul 2023 05:39) (119/63 - 131/51)  BP(mean): --  ABP: --  ABP(mean): --  RR: 18 (31 Jul 2023 05:39) (18 - 19)  SpO2: 98% (31 Jul 2023 05:39) (94% - 100%)    O2 Parameters below as of 31 Jul 2023 05:39  Patient On (Oxygen Delivery Method): nasal cannula  O2 Flow (L/min): 6        Mode: NIV (Noninvasive Ventilation), RR (machine): 16, TV (machine): 450, FiO2: 40, PEEP: 5, ITime: 1, P-High: 30, P-Low: 10    07-29 @ 07:01 - 07-30 @ 07:00  --------------------------------------------------------  IN: 250 mL / OUT: 700 mL / NET: -450 mL    07-30 @ 07:01 - 07-31 @ 06:53  --------------------------------------------------------  IN: 320 mL / OUT: 600 mL / NET: -280 mL      CAPILLARY BLOOD GLUCOSE      POCT Blood Glucose.: 246 mg/dL (30 Jul 2023 21:46)      PHYSICAL EXAM:  General:   HEENT:   Lymph Nodes:  Neck:   Respiratory:   Cardiovascular:   Abdomen:   Extremities:   Skin:   Neurological:  Psychiatry:    HOSPITAL MEDICATIONS:    piperacillin/tazobactam IVPB.. 3.375 Gram(s) IV Intermittent every 8 hours    metoprolol succinate ER 25 milliGRAM(s) Oral daily    levothyroxine 112 MICROGram(s) Oral daily    albuterol/ipratropium for Nebulization 3 milliLiter(s) Nebulizer every 6 hours  budesonide 160 MICROgram(s)/formoterol 4.5 MICROgram(s) Inhaler 2 Puff(s) Inhalation two times a day  montelukast 10 milliGRAM(s) Oral at bedtime    acetaminophen     Tablet .. 650 milliGRAM(s) Oral every 8 hours  acetaminophen     Tablet .. 650 milliGRAM(s) Oral every 6 hours PRN  acetaminophen   IVPB .. 1000 milliGRAM(s) IV Intermittent once  busPIRone 10 milliGRAM(s) Oral two times a day  melatonin 3 milliGRAM(s) Oral at bedtime PRN  ondansetron Injectable 4 milliGRAM(s) IV Push every 8 hours PRN    aluminum hydroxide/magnesium hydroxide/simethicone Suspension 30 milliLiter(s) Oral every 4 hours PRN            chlorhexidine 2% Cloths 1 Application(s) Topical once        LABS:                        8.1    9.52  )-----------( 182      ( 30 Jul 2023 05:00 )             26.2     Hgb Trend: 8.1<--, 9.0<--, 9.3<--, 8.9<--, 8.1<--  07-30    137  |  96<L>  |  65<H>  ----------------------------<  223<H>  4.7   |  29  |  2.51<H>    Ca    8.2<L>      30 Jul 2023 20:13  Phos  3.6     07-30  Mg     2.20     07-30      Creatinine Trend: 2.51<--, 2.19<--, 1.81<--, 1.73<--, 1.82<--, 1.93<--  PT/INR - ( 29 Jul 2023 08:54 )   PT: 11.2 sec;   INR: 1.00 ratio         PTT - ( 29 Jul 2023 08:54 )  PTT:29.2 sec  Urinalysis Basic - ( 30 Jul 2023 20:13 )    Color: x / Appearance: x / SG: x / pH: x  Gluc: 223 mg/dL / Ketone: x  / Bili: x / Urobili: x   Blood: x / Protein: x / Nitrite: x   Leuk Esterase: x / RBC: x / WBC x   Sq Epi: x / Non Sq Epi: x / Bacteria: x      Arterial Blood Gas:  07-29 @ 18:10  7.29/72/147/35/98.9/6.8  ABG lactate: --  Arterial Blood Gas:  07-29 @ 17:35  SEE NOTE/SEE NOTE/SEE NOTE/SEE NOTE/SEE NOTE/SEE NOTE  ABG lactate: --    Venous Blood Gas:  07-30 @ 05:00  7.27/80/30/37/60.2  VBG Lactate: 1.1  Venous Blood Gas:  07-29 @ 13:21  7.26/78/26/35/45.1  VBG Lactate: 0.9  Venous Blood Gas:  07-29 @ 08:45  7.26/77/170/35/98.9  VBG Lactate: 0.9      MICROBIOLOGY:     RADIOLOGY:  [ ] Reviewed and interpreted by me    PULMONARY FUNCTION TESTS:    EKG: CHIEF COMPLAINT: sob    Interval Events: Patient seen and examined at bedside. No acute events overnight. During the afternoon, patient with sudden drop in oxygen and placed back on AVAPS. Reports she feels well but does not like the mask.     REVIEW OF SYSTEMS:  Constitutional: [X ] negative [ ] fevers [ ] chills [ ] weight loss [ ] weight gain  HEENT: [ X] negative [ ] dry eyes [ ] eye irritation [ ] postnasal drip [ ] nasal congestion  CV: [X ] negative  [ ] chest pain [ ] orthopnea [ ] palpitations [ ] murmur  Resp: [X ] negative [ ] cough [ ] shortness of breath [ ] dyspnea [ ] wheezing [ ] sputum [ ] hemoptysis  GI: [X ] negative [ ] nausea [ ] vomiting [ ] diarrhea [ ] constipation [ ] abd pain [ ] dysphagia   : [ X] negative [ ] dysuria [ ] nocturia [ ] hematuria [ ] increased urinary frequency  Musculoskeletal: [X ] negative [ ] back pain [ ] myalgias [ ] arthralgias [ ] fracture  Skin: X[ ] negative [ ] rash [ ] itch  Neurological: [ ] negative [ ] headache [ ] dizziness [ ] syncope [ ] weakness [ ] numbness  Psychiatric: [ ] negative [ ] anxiety [ ] depression  Endocrine: [ ] negative [ ] diabetes [ ] thyroid problem  Hematologic/Lymphatic: [ ] negative [ ] anemia [ ] bleeding problem  Allergic/Immunologic: [ ] negative [ ] itchy eyes [ ] nasal discharge [ ] hives [ ] angioedema  [ ] All other systems negative  [ ] Unable to assess ROS because ________    OBJECTIVE:  ICU Vital Signs Last 24 Hrs  T(C): 36.7 (31 Jul 2023 05:39), Max: 36.9 (30 Jul 2023 17:10)  T(F): 98.1 (31 Jul 2023 05:39), Max: 98.4 (30 Jul 2023 17:10)  HR: 72 (31 Jul 2023 05:39) (69 - 94)  BP: 119/63 (31 Jul 2023 05:39) (119/63 - 131/51)  BP(mean): --  ABP: --  ABP(mean): --  RR: 18 (31 Jul 2023 05:39) (18 - 19)  SpO2: 98% (31 Jul 2023 05:39) (94% - 100%)    O2 Parameters below as of 31 Jul 2023 05:39  Patient On (Oxygen Delivery Method): nasal cannula  O2 Flow (L/min): 6        Mode: NIV (Noninvasive Ventilation), RR (machine): 16, TV (machine): 450, FiO2: 40, PEEP: 5, ITime: 1, P-High: 30, P-Low: 10    07-29 @ 07:01  - 07-30 @ 07:00  --------------------------------------------------------  IN: 250 mL / OUT: 700 mL / NET: -450 mL    07-30 @ 07:01  -  07-31 @ 06:53  --------------------------------------------------------  IN: 320 mL / OUT: 600 mL / NET: -280 mL      CAPILLARY BLOOD GLUCOSE      POCT Blood Glucose.: 246 mg/dL (30 Jul 2023 21:46)      PHYSICAL EXAM:  General: ill appearing, NAD, laying in bed on AVAPS  HEENT: no icterus  Neck: supple  Respiratory: clear to auscultation bilaterally  Cardiovascular: S1/S2, RRR  Abdomen: soft, nontender  Extremities: no LE edema  Skin: no rashes  Neurological: AxOX3  Psychiatry: normal mood and affect    HOSPITAL MEDICATIONS:    piperacillin/tazobactam IVPB.. 3.375 Gram(s) IV Intermittent every 8 hours    metoprolol succinate ER 25 milliGRAM(s) Oral daily    levothyroxine 112 MICROGram(s) Oral daily    albuterol/ipratropium for Nebulization 3 milliLiter(s) Nebulizer every 6 hours  budesonide 160 MICROgram(s)/formoterol 4.5 MICROgram(s) Inhaler 2 Puff(s) Inhalation two times a day  montelukast 10 milliGRAM(s) Oral at bedtime    acetaminophen     Tablet .. 650 milliGRAM(s) Oral every 8 hours  acetaminophen     Tablet .. 650 milliGRAM(s) Oral every 6 hours PRN  acetaminophen   IVPB .. 1000 milliGRAM(s) IV Intermittent once  busPIRone 10 milliGRAM(s) Oral two times a day  melatonin 3 milliGRAM(s) Oral at bedtime PRN  ondansetron Injectable 4 milliGRAM(s) IV Push every 8 hours PRN    aluminum hydroxide/magnesium hydroxide/simethicone Suspension 30 milliLiter(s) Oral every 4 hours PRN            chlorhexidine 2% Cloths 1 Application(s) Topical once        LABS:                        8.1    9.52  )-----------( 182      ( 30 Jul 2023 05:00 )             26.2     Hgb Trend: 8.1<--, 9.0<--, 9.3<--, 8.9<--, 8.1<--  07-30    137  |  96<L>  |  65<H>  ----------------------------<  223<H>  4.7   |  29  |  2.51<H>    Ca    8.2<L>      30 Jul 2023 20:13  Phos  3.6     07-30  Mg     2.20     07-30      Creatinine Trend: 2.51<--, 2.19<--, 1.81<--, 1.73<--, 1.82<--, 1.93<--  PT/INR - ( 29 Jul 2023 08:54 )   PT: 11.2 sec;   INR: 1.00 ratio         PTT - ( 29 Jul 2023 08:54 )  PTT:29.2 sec  Urinalysis Basic - ( 30 Jul 2023 20:13 )    Color: x / Appearance: x / SG: x / pH: x  Gluc: 223 mg/dL / Ketone: x  / Bili: x / Urobili: x   Blood: x / Protein: x / Nitrite: x   Leuk Esterase: x / RBC: x / WBC x   Sq Epi: x / Non Sq Epi: x / Bacteria: x      Arterial Blood Gas:  07-29 @ 18:10  7.29/72/147/35/98.9/6.8  ABG lactate: --  Arterial Blood Gas:  07-29 @ 17:35  SEE NOTE/SEE NOTE/SEE NOTE/SEE NOTE/SEE NOTE/SEE NOTE  ABG lactate: --    Venous Blood Gas:  07-30 @ 05:00  7.27/80/30/37/60.2  VBG Lactate: 1.1  Venous Blood Gas:  07-29 @ 13:21  7.26/78/26/35/45.1  VBG Lactate: 0.9  Venous Blood Gas:  07-29 @ 08:45  7.26/77/170/35/98.9  VBG Lactate: 0.9      MICROBIOLOGY:     RADIOLOGY:  [ ] Reviewed and interpreted by me    PULMONARY FUNCTION TESTS:    EKG:

## 2023-07-31 NOTE — PROGRESS NOTE ADULT - PROBLEM SELECTOR PLAN 1
-combined hypoxic and hypercapnic resp failure  -pulm edema leading to hypoxia? Start lasix 40IV daily and assess output  -avaps for hypercapnia, patient has known h/o severe COPD  -started on zosyn empirically as well   -pulm eval appreciated  -wearing avaps with encouragement

## 2023-07-31 NOTE — PROGRESS NOTE ADULT - SUBJECTIVE AND OBJECTIVE BOX
SUBJECTIVE:   Patient seen and examined at bedside. Pt resting comfortably  Alert and oriented x 3. Having baseline pain.  Worried about OR.  Wants to proceed with surgery asap.    ICU Vital Signs Last 24 Hrs  T(C): 36.7 (31 Jul 2023 05:39), Max: 36.9 (30 Jul 2023 17:10)  T(F): 98.1 (31 Jul 2023 05:39), Max: 98.4 (30 Jul 2023 17:10)  HR: 72 (31 Jul 2023 05:39) (69 - 94)  BP: 119/63 (31 Jul 2023 05:39) (119/63 - 131/51)  BP(mean): --  ABP: --  ABP(mean): --  RR: 18 (31 Jul 2023 05:39) (18 - 19)  SpO2: 98% (31 Jul 2023 05:39) (94% - 100%)    O2 Parameters below as of 31 Jul 2023 05:39  Patient On (Oxygen Delivery Method): nasal cannula  O2 Flow (L/min): 6      Physical Exam:  General: NAD; resting comfrotably in bed  Resp: non labored, CPAP in place  LLE:  - +TTP over L thigh, no TTP along remainder of extremity; compartments soft  - Limited ROM at hip and knee 2/2 pain    - Motor: TA/EHL/GS/FHL intact (IP firing but limited ROM by pain)   - Sensory: DP/SP/Tib/Rosa/Saph SILT  - +DP pulse, WWP    88yFemale w/ L femoral shaft fracture periprosthetic fracture    PLAN  -Ortho plan for OR possibly 8/2L revision hip arthroplasty vs ORIF vs PFR, however patient not currently optimized for OR due to recent RRT  -FU Infectious work-up   Imaging and blood cultures   On Zosyn empirically  -NWB LLE, bedrest  -DVT ppx  -Diet: reg > NPO before OR   -pain control  -Ice/cold compress prn  -Appreciate medical management  -Please document clearances once stable

## 2023-07-31 NOTE — PROGRESS NOTE ADULT - PROBLEM SELECTOR PLAN 6
Patient with hx of COPD, on 3-4L on oxygen at home   -Now in acute exacerbation   -Will continue with albuterol and bedtime avaps  -Pulm consult appreciated

## 2023-07-31 NOTE — PROGRESS NOTE ADULT - ASSESSMENT
88F w/ HTN, T2DM, COPD (home O2 3L), HFpEF, dementia, hypothyroidism, CKD3, who presents after fall found to have an acute comminuted mildly displaced periprosthetic L proximal femoral Pulmonary consulted for pre op evaluation and optimization. On 7/29 had RRT for AMS found to be hypercapnic and placed on bipap with improvement in mental status.    #Chronic hypoxemic and hypercapnic respiratory failure   - at home patient on 3L NC  - reports prior unable to tolerate NIV ( has had prior hospitalization with hypercapneic resp failure and refused BiPAP then as well)  - RRT for AMS found to have hypercapneic resp failure improved on NIV use    Recommendations:  - patient does not appear to have a COPD exacerbation at this time: would continue with spiriva and symbicort for now; would give perioperative nebulizer treatment  - c/w NIV--> ensure patient using AVAPS overnight (can trial nasal mask if patient unable to tolerate full face mask)  - c/w supplemental O2 to target SO2 > 88%, wean as tolerated  - Incentive spirometer  - Preop assessment: - Would not extubate until fully awake from anesthesia standpoint, can extubate to NIPPV with post op nebulizer treatment    Patient is a high risk candidate for surgical intervention. Continue with NIV use qhs and PRN as needed. No further optimization from pulmonary perspective prior to OR for femoral fracture intervention    Discussed with Dr. Nicholas.        88F w/ HTN, T2DM, COPD (home O2 3L), HFpEF, dementia, hypothyroidism, CKD3, who presents after fall found to have an acute comminuted mildly displaced periprosthetic L proximal femoral Pulmonary consulted for pre op evaluation and optimization. On 7/29 had RRT for AMS found to be hypercapnic and placed on bipap with improvement in mental status.    #Chronic hypoxemic and hypercapnic respiratory failure   - at home patient on 3L NC  - reports prior unable to tolerate NIV ( has had prior hospitalization with hypercapneic resp failure and refused BiPAP then as well)  - RRT for AMS found to have hypercapneic resp failure improved on NIV use    Recommendations:  - patient does not appear to have a COPD exacerbation at this time: would continue with spiriva and symbicort for now; would give perioperative nebulizer treatment  - c/w NIV--> ensure patient using AVAPS overnight (can trial nasal mask if patient unable to tolerate full face mask)  - c/w supplemental O2 to target SO2 > 88%, wean as tolerated  - Incentive spirometer  - Preop assessment: - Would not extubate until fully awake from anesthesia standpoint, can extubate to NIPPV with post op nebulizer treatment     88F w/ HTN, T2DM, COPD (home O2 3L), HFpEF, dementia, hypothyroidism, CKD3, who presents after fall found to have an acute comminuted mildly displaced periprosthetic L proximal femoral Pulmonary consulted for pre op evaluation and optimization. On 7/29 had RRT for AMS found to be hypercapnic and placed on bipap with improvement in mental status.    #Chronic hypoxemic and hypercapnic respiratory failure   - at home patient on 3L NC  - reports prior unable to tolerate NIV ( has had prior hospitalization with hypercapneic resp failure and refused BiPAP then as well)  - RRT for AMS found to have hypercapneic resp failure improved on NIV use    Recommendations:  - patient does not appear to have a COPD exacerbation at this time: would continue symbicort and duonebs for now  - c/w NIV--> ensure patient using AVAPS overnight (can trial nasal mask if patient unable to tolerate full face mask)  - c/w supplemental O2 to target SO2 > 88%, wean as tolerated  - Incentive spirometery     88F w/ HTN, T2DM, COPD (home O2 3L), HFpEF, dementia, hypothyroidism, CKD3, who presents after fall found to have an acute comminuted mildly displaced periprosthetic L proximal femoral Pulmonary consulted for pre op evaluation and optimization. On 7/29 had RRT for AMS found to be hypercapnic and placed on bipap with improvement in mental status.    #Chronic hypoxemic and hypercapnic respiratory failure   - at home patient on 3L NC  - reports prior unable to tolerate NIV ( has had prior hospitalization with hypercapneic resp failure and refused BiPAP then as well)  - RRT for AMS found to have hypercapneic resp failure improved on NIV use    Recommendations:  - patient does not appear to have a COPD exacerbation at this time: would continue symbicort and duonebs for now  - c/w NIV--> ensure patient using AVAPS overnight (can trial nasal mask if patient unable to tolerate full face mask)  - c/w supplemental O2 to target SO2 > 88%, wean as tolerated  - Incentive spirometery  - agree with diuresis  - please check ABG in AM

## 2023-07-31 NOTE — PROGRESS NOTE ADULT - PROBLEM SELECTOR PLAN 8
-Cr worse today- possible cardio-renal  -diurese and assess improvement   -hyperkalemia - treated today

## 2023-07-31 NOTE — PROGRESS NOTE ADULT - PROBLEM SELECTOR PLAN 2
Patient with L hip fracture after a fall   -Xray and CT L hip showed Acute comminuted mildly displaced periprosthetic left proximal femoral   fracture.  -Ortho recs appreciated  -at this time, not optimized for OR given combined hypoxic and hypercapnic resp failure  -NWB on L leg, bedrest  -Pain control with tylenol

## 2023-07-31 NOTE — PROGRESS NOTE ADULT - SUBJECTIVE AND OBJECTIVE BOX
[facility] Division of Hospital Medicine  Dr. Joi Garcia  Pager [phone]      Patient is a 88y old  Female who presents with a chief complaint of fall (28 Jul 2023 08:00)    SUBJECTIVE / OVERNIGHT EVENTS: Patient seen and examined. Patient was alert and awake and oriented this morning. Wants surgery and now amenable to wearing avaps. Noted to be hypoxic and was placed back on avaps. Denies any pain.     MEDICATIONS  (STANDING):  acetaminophen     Tablet .. 650 milliGRAM(s) Oral every 8 hours  budesonide 160 MICROgram(s)/formoterol 4.5 MICROgram(s) Inhaler 2 Puff(s) Inhalation two times a day  busPIRone 10 milliGRAM(s) Oral two times a day  chlorhexidine 2% Cloths 1 Application(s) Topical once  levothyroxine 112 MICROGram(s) Oral daily  metoprolol succinate ER 25 milliGRAM(s) Oral daily  montelukast 10 milliGRAM(s) Oral at bedtime  povidone iodine 5% Nasal Swab 1 Application(s) Both Nostrils once  tiotropium 2.5 MICROgram(s) Inhaler 2 Puff(s) Inhalation daily    MEDICATIONS  (PRN):  acetaminophen     Tablet .. 650 milliGRAM(s) Oral every 6 hours PRN Temp greater or equal to 38C (100.4F), Mild Pain (1 - 3)  albuterol/ipratropium for Nebulization 3 milliLiter(s) Nebulizer every 6 hours PRN Shortness of Breath and/or Wheezing  aluminum hydroxide/magnesium hydroxide/simethicone Suspension 30 milliLiter(s) Oral every 4 hours PRN Dyspepsia  melatonin 3 milliGRAM(s) Oral at bedtime PRN Insomnia  ondansetron Injectable 4 milliGRAM(s) IV Push every 8 hours PRN Nausea and/or Vomiting  oxyCODONE    IR 5 milliGRAM(s) Oral every 6 hours PRN moderate to severe pain    CAPILLARY BLOOD GLUCOSE  POCT Blood Glucose.: 289 mg/dL (31 Jul 2023 12:15)  POCT Blood Glucose.: 160 mg/dL (31 Jul 2023 09:01)  POCT Blood Glucose.: 246 mg/dL (30 Jul 2023 21:46)  POCT Blood Glucose.: 211 mg/dL (30 Jul 2023 18:12)  POCT Blood Glucose.: 166 mg/dL (30 Jul 2023 15:54)  POCT Blood Glucose.: 222 mg/dL (30 Jul 2023 14:10)        PHYSICAL EXAM:  Vital Signs Last 24 Hrs  T(C): 36.4 (31 Jul 2023 12:28), Max: 36.9 (30 Jul 2023 17:10)  T(F): 97.6 (31 Jul 2023 12:28), Max: 98.4 (30 Jul 2023 17:10)  HR: 73 (31 Jul 2023 13:31) (69 - 94)  BP: 127/51 (31 Jul 2023 13:31) (119/63 - 135/82)  BP(mean): --  RR: 19 (31 Jul 2023 12:28) (18 - 19)  SpO2: 99% (31 Jul 2023 12:28) (94% - 100%)    Parameters below as of 31 Jul 2023 12:28  Patient On (Oxygen Delivery Method): avaps            CONSTITUTIONAL: alert and awake  EYES: EOMI  NECK: Supple,  RESPIRATORY: basilar rales  CARDIOVASCULAR: Regular rate and rhythm, + S1 and S2  ABDOMEN: Nontender to palpation, normoactive bowel sounds, no rebound/guarding  PSYCH: anxious       LABS:                                              7.1    8.05  )-----------( 176      ( 31 Jul 2023 05:37 )             22.5   07-31    135  |  97<L>  |  69<H>  ----------------------------<  129<H>  4.6   |  26  |  2.54<H>    Ca    8.3<L>      31 Jul 2023 05:37  Phos  4.1     07-31  Mg     2.40     07-31          Urinalysis Basic - ( 28 Jul 2023 06:15 )    Color: x / Appearance: x / SG: x / pH: x  Gluc: 144 mg/dL / Ketone: x  / Bili: x / Urobili: x   Blood: x / Protein: x / Nitrite: x   Leuk Esterase: x / RBC: x / WBC x   Sq Epi: x / Non Sq Epi: x / Bacteria: x      < from: CT Chest No Cont (07.31.23 @ 09:45) >  IMPRESSION:    Interlobular septal thickening, suggestive of pulmonary edema.    Small bilateral pleural effusions.    New left upper lobe 4 mm nodule associated with the fissure. Recommend   follow-up noncontrast chest CT in one year.    Nodularity of the hepatic surface suggestive of cirrhosis.    --- End of Report ---    < end of copied text >

## 2023-08-01 NOTE — PROGRESS NOTE ADULT - PROBLEM SELECTOR PLAN 6
Patient with hx of COPD, on 3-4L on oxygen at home   -Now in acute exacerbation   -Will continue with albuterol and bedtime avaps  -Pulm consult appreciated  -hypoxia due to fluid overload? ON lasix 30 daily and diuresing well  -encourage AVAPs use

## 2023-08-01 NOTE — PROGRESS NOTE ADULT - PROBLEM SELECTOR PLAN 3
-suspect 2/2 from hypercapnia  -patient has h/o COPD and during prior hospitalization required bipap as well. She refused during last hospitalization as well. She was made DNR and was discharged to rehab.   -UA and cxr negative  -blood cx negative, urine cx + GNR - s/p 3 day course of zosyn  -CTH unremarkable  -labile mental status due to hypercapnia and non compliance with avaps

## 2023-08-01 NOTE — PROGRESS NOTE ADULT - PROBLEM SELECTOR PLAN 1
-combined hypoxic and hypercapnic resp failure  -pulm edema leading to hypoxia? Start lasix 40IV daily and assess output  -avaps for hypercapnia, patient has known h/o severe COPD  -pulm eval appreciated  -still only intermittently compliant with avaps   -plan discussed with Pulmonary team, patient is high risk for planned procedure. Given severe copd, concern that we may not be able to extubate her and given non compliance with avaps, concern for prolonged post surgical course.

## 2023-08-01 NOTE — DIETITIAN INITIAL EVALUATION ADULT - NS FNS WEIGHT CHANGE REASON
Pt reported -1wk ago. As per HIE, pt previous weight 4/11/.5lbs with 2+ edema to B/L LE. 1/31/23-165lbs. Most current weight 7/29/23-155lbs with 1+ generalized edema which is masking her weight loss. Lasix ordered./unintentional

## 2023-08-01 NOTE — DIETITIAN INITIAL EVALUATION ADULT - PERTINENT MEDS FT
MEDICATIONS  (STANDING):  acetaminophen     Tablet .. 650 milliGRAM(s) Oral every 8 hours  acetaminophen   IVPB .. 1000 milliGRAM(s) IV Intermittent once  albuterol/ipratropium for Nebulization 3 milliLiter(s) Nebulizer every 6 hours  budesonide 160 MICROgram(s)/formoterol 4.5 MICROgram(s) Inhaler 2 Puff(s) Inhalation two times a day  busPIRone 10 milliGRAM(s) Oral two times a day  chlorhexidine 2% Cloths 1 Application(s) Topical once  dextrose 5%. 1000 milliLiter(s) (50 mL/Hr) IV Continuous <Continuous>  dextrose 5%. 1000 milliLiter(s) (100 mL/Hr) IV Continuous <Continuous>  dextrose 50% Injectable 25 Gram(s) IV Push once  dextrose 50% Injectable 12.5 Gram(s) IV Push once  dextrose 50% Injectable 25 Gram(s) IV Push once  furosemide   Injectable 40 milliGRAM(s) IV Push daily  glucagon  Injectable 1 milliGRAM(s) IntraMuscular once  insulin lispro (ADMELOG) corrective regimen sliding scale   SubCutaneous three times a day before meals  insulin lispro (ADMELOG) corrective regimen sliding scale   SubCutaneous at bedtime  levothyroxine 112 MICROGram(s) Oral daily  metoprolol succinate ER 25 milliGRAM(s) Oral daily  montelukast 10 milliGRAM(s) Oral at bedtime    MEDICATIONS  (PRN):  aluminum hydroxide/magnesium hydroxide/simethicone Suspension 30 milliLiter(s) Oral every 4 hours PRN Dyspepsia  dextrose Oral Gel 15 Gram(s) Oral once PRN Blood Glucose LESS THAN 70 milliGRAM(s)/deciliter  melatonin 3 milliGRAM(s) Oral at bedtime PRN Insomnia  ondansetron Injectable 4 milliGRAM(s) IV Push every 8 hours PRN Nausea and/or Vomiting

## 2023-08-01 NOTE — PROGRESS NOTE ADULT - NS ATTEST RISK PROBLEM GEN_ALL_CORE FT
Acute on chronic hypoxemic and hypercapnic respiratory failure  Chronic diastolic heart failure  COPD  L femoral fracture.

## 2023-08-01 NOTE — PROGRESS NOTE ADULT - SUBJECTIVE AND OBJECTIVE BOX
CHIEF COMPLAINT:    Interval Events:    REVIEW OF SYSTEMS:  Constitutional: [ ] negative [ ] fevers [ ] chills [ ] weight loss [ ] weight gain  HEENT: [ ] negative [ ] dry eyes [ ] eye irritation [ ] postnasal drip [ ] nasal congestion  CV: [ ] negative  [ ] chest pain [ ] orthopnea [ ] palpitations [ ] murmur  Resp: [ ] negative [ ] cough [ ] shortness of breath [ ] dyspnea [ ] wheezing [ ] sputum [ ] hemoptysis  GI: [ ] negative [ ] nausea [ ] vomiting [ ] diarrhea [ ] constipation [ ] abd pain [ ] dysphagia   : [ ] negative [ ] dysuria [ ] nocturia [ ] hematuria [ ] increased urinary frequency  Musculoskeletal: [ ] negative [ ] back pain [ ] myalgias [ ] arthralgias [ ] fracture  Skin: [ ] negative [ ] rash [ ] itch  Neurological: [ ] negative [ ] headache [ ] dizziness [ ] syncope [ ] weakness [ ] numbness  Psychiatric: [ ] negative [ ] anxiety [ ] depression  Endocrine: [ ] negative [ ] diabetes [ ] thyroid problem  Hematologic/Lymphatic: [ ] negative [ ] anemia [ ] bleeding problem  Allergic/Immunologic: [ ] negative [ ] itchy eyes [ ] nasal discharge [ ] hives [ ] angioedema  [ ] All other systems negative  [ ] Unable to assess ROS because ________    OBJECTIVE:  ICU Vital Signs Last 24 Hrs  T(C): 36.4 (01 Aug 2023 02:00), Max: 36.7 (31 Jul 2023 17:00)  T(F): 97.6 (01 Aug 2023 02:00), Max: 98.1 (31 Jul 2023 17:00)  HR: 88 (01 Aug 2023 02:00) (70 - 99)  BP: 148/61 (01 Aug 2023 02:00) (127/51 - 148/61)  BP(mean): --  ABP: --  ABP(mean): --  RR: 18 (01 Aug 2023 02:00) (18 - 19)  SpO2: 95% (01 Aug 2023 02:00) (95% - 100%)    O2 Parameters below as of 01 Aug 2023 02:00  Patient On (Oxygen Delivery Method): BiPAP/CPAP          Mode: NIV (Noninvasive Ventilation), RR (machine): 16, TV (machine): 450, FiO2: 40, PEEP: 5, ITime: 0.9, PIP: 14    07-30 @ 07:01 - 07-31 @ 07:00  --------------------------------------------------------  IN: 320 mL / OUT: 600 mL / NET: -280 mL    07-31 @ 07:01 - 08-01 @ 06:55  --------------------------------------------------------  IN: 0 mL / OUT: 1700 mL / NET: -1700 mL      CAPILLARY BLOOD GLUCOSE      POCT Blood Glucose.: 246 mg/dL (31 Jul 2023 18:25)      PHYSICAL EXAM:  General:   HEENT:   Lymph Nodes:  Neck:   Respiratory:   Cardiovascular:   Abdomen:   Extremities:   Skin:   Neurological:  Psychiatry:    HOSPITAL MEDICATIONS:    piperacillin/tazobactam IVPB.. 3.375 Gram(s) IV Intermittent every 8 hours    furosemide   Injectable 40 milliGRAM(s) IV Push daily  metoprolol succinate ER 25 milliGRAM(s) Oral daily    levothyroxine 112 MICROGram(s) Oral daily    albuterol/ipratropium for Nebulization 3 milliLiter(s) Nebulizer every 6 hours  budesonide 160 MICROgram(s)/formoterol 4.5 MICROgram(s) Inhaler 2 Puff(s) Inhalation two times a day  montelukast 10 milliGRAM(s) Oral at bedtime    acetaminophen     Tablet .. 650 milliGRAM(s) Oral every 8 hours  acetaminophen     Tablet .. 650 milliGRAM(s) Oral every 6 hours PRN  acetaminophen   IVPB .. 1000 milliGRAM(s) IV Intermittent once  busPIRone 10 milliGRAM(s) Oral two times a day  melatonin 3 milliGRAM(s) Oral at bedtime PRN  ondansetron Injectable 4 milliGRAM(s) IV Push every 8 hours PRN    aluminum hydroxide/magnesium hydroxide/simethicone Suspension 30 milliLiter(s) Oral every 4 hours PRN            chlorhexidine 2% Cloths 1 Application(s) Topical once        LABS:                        7.1    8.05  )-----------( 176      ( 31 Jul 2023 05:37 )             22.5     Hgb Trend: 7.1<--, 8.1<--, 9.0<--, 9.3<--, 8.9<--  07-31    135  |  97<L>  |  69<H>  ----------------------------<  129<H>  4.6   |  26  |  2.54<H>    Ca    8.3<L>      31 Jul 2023 05:37  Phos  4.1     07-31  Mg     2.40     07-31    TPro  6.1  /  Alb  2.8<L>  /  TBili  0.2  /  DBili  <0.2  /  AST  14  /  ALT  8   /  AlkPhos  55  07-31    Creatinine Trend: 2.54<--, 2.51<--, 2.19<--, 1.81<--, 1.73<--, 1.82<--    Urinalysis Basic - ( 31 Jul 2023 05:37 )    Color: x / Appearance: x / SG: x / pH: x  Gluc: 129 mg/dL / Ketone: x  / Bili: x / Urobili: x   Blood: x / Protein: x / Nitrite: x   Leuk Esterase: x / RBC: x / WBC x   Sq Epi: x / Non Sq Epi: x / Bacteria: x        Venous Blood Gas:  07-31 @ 05:37  7.26/78/49/35/84.3  VBG Lactate: 0.9      MICROBIOLOGY:     RADIOLOGY:  [ ] Reviewed and interpreted by me    PULMONARY FUNCTION TESTS:    EKG: CHIEF COMPLAINT: fall    Interval Events:  Patient seen and examined at bedside. Patient refused to wear AVAPS. She states she feels well today.    REVIEW OF SYSTEMS:  Constitutional: [ X] negative [ ] fevers [ ] chills [ ] weight loss [ ] weight gain  HEENT: [ X] negative [ ] dry eyes [ ] eye irritation [ ] postnasal drip [ ] nasal congestion  CV: [X ] negative  [ ] chest pain [ ] orthopnea [ ] palpitations [ ] murmur  Resp: [ ] negative [ ] cough [ X] shortness of breath [ ] dyspnea [ ] wheezing [ ] sputum [ ] hemoptysis  GI: [X ] negative [ ] nausea [ ] vomiting [ ] diarrhea [ ] constipation [ ] abd pain [ ] dysphagia   : [X ] negative [ ] dysuria [ ] nocturia [ ] hematuria [ ] increased urinary frequency  Musculoskeletal: [X ] negative [ ] back pain [ ] myalgias [ ] arthralgias [ ] fracture  Skin: [X ] negative [ ] rash [ ] itch  Neurological: [ ] negative [ ] headache [ ] dizziness [ ] syncope [ ] weakness [ ] numbness  Psychiatric: [ ] negative [ ] anxiety [ ] depression  Endocrine: [ ] negative [ ] diabetes [ ] thyroid problem  Hematologic/Lymphatic: [ ] negative [ ] anemia [ ] bleeding problem  Allergic/Immunologic: [ ] negative [ ] itchy eyes [ ] nasal discharge [ ] hives [ ] angioedema  [ ] All other systems negative  [ ] Unable to assess ROS because ________    OBJECTIVE:  ICU Vital Signs Last 24 Hrs  T(C): 36.4 (01 Aug 2023 02:00), Max: 36.7 (31 Jul 2023 17:00)  T(F): 97.6 (01 Aug 2023 02:00), Max: 98.1 (31 Jul 2023 17:00)  HR: 88 (01 Aug 2023 02:00) (70 - 99)  BP: 148/61 (01 Aug 2023 02:00) (127/51 - 148/61)  BP(mean): --  ABP: --  ABP(mean): --  RR: 18 (01 Aug 2023 02:00) (18 - 19)  SpO2: 95% (01 Aug 2023 02:00) (95% - 100%)    O2 Parameters below as of 01 Aug 2023 02:00  Patient On (Oxygen Delivery Method): BiPAP/CPAP          Mode: NIV (Noninvasive Ventilation), RR (machine): 16, TV (machine): 450, FiO2: 40, PEEP: 5, ITime: 0.9, PIP: 14    07-30 @ 07:01  -  07-31 @ 07:00  --------------------------------------------------------  IN: 320 mL / OUT: 600 mL / NET: -280 mL    07-31 @ 07:01  -  08-01 @ 06:55  --------------------------------------------------------  IN: 0 mL / OUT: 1700 mL / NET: -1700 mL      CAPILLARY BLOOD GLUCOSE      POCT Blood Glucose.: 246 mg/dL (31 Jul 2023 18:25)      PHYSICAL EXAM:  General: ill appearing female,  leaning in bed  HEENT: no icterus  Neck: supple  Respiratory: CTA  Cardiovascular:  S1/S2, RRR,    Abdomen: soft, nontender,   Extremities: 1+ LE edema  Neurological: AxOx3  Psychiatry: normal mood and affect    HOSPITAL MEDICATIONS:    piperacillin/tazobactam IVPB.. 3.375 Gram(s) IV Intermittent every 8 hours    furosemide   Injectable 40 milliGRAM(s) IV Push daily  metoprolol succinate ER 25 milliGRAM(s) Oral daily    levothyroxine 112 MICROGram(s) Oral daily    albuterol/ipratropium for Nebulization 3 milliLiter(s) Nebulizer every 6 hours  budesonide 160 MICROgram(s)/formoterol 4.5 MICROgram(s) Inhaler 2 Puff(s) Inhalation two times a day  montelukast 10 milliGRAM(s) Oral at bedtime    acetaminophen     Tablet .. 650 milliGRAM(s) Oral every 8 hours  acetaminophen     Tablet .. 650 milliGRAM(s) Oral every 6 hours PRN  acetaminophen   IVPB .. 1000 milliGRAM(s) IV Intermittent once  busPIRone 10 milliGRAM(s) Oral two times a day  melatonin 3 milliGRAM(s) Oral at bedtime PRN  ondansetron Injectable 4 milliGRAM(s) IV Push every 8 hours PRN    aluminum hydroxide/magnesium hydroxide/simethicone Suspension 30 milliLiter(s) Oral every 4 hours PRN            chlorhexidine 2% Cloths 1 Application(s) Topical once        LABS:                        7.1    8.05  )-----------( 176      ( 31 Jul 2023 05:37 )             22.5     Hgb Trend: 7.1<--, 8.1<--, 9.0<--, 9.3<--, 8.9<--  07-31    135  |  97<L>  |  69<H>  ----------------------------<  129<H>  4.6   |  26  |  2.54<H>    Ca    8.3<L>      31 Jul 2023 05:37  Phos  4.1     07-31  Mg     2.40     07-31    TPro  6.1  /  Alb  2.8<L>  /  TBili  0.2  /  DBili  <0.2  /  AST  14  /  ALT  8   /  AlkPhos  55  07-31    Creatinine Trend: 2.54<--, 2.51<--, 2.19<--, 1.81<--, 1.73<--, 1.82<--    Urinalysis Basic - ( 31 Jul 2023 05:37 )    Color: x / Appearance: x / SG: x / pH: x  Gluc: 129 mg/dL / Ketone: x  / Bili: x / Urobili: x   Blood: x / Protein: x / Nitrite: x   Leuk Esterase: x / RBC: x / WBC x   Sq Epi: x / Non Sq Epi: x / Bacteria: x        Venous Blood Gas:  07-31 @ 05:37  7.26/78/49/35/84.3  VBG Lactate: 0.9      MICROBIOLOGY:     RADIOLOGY:  [X] Reviewed and interpreted by me    PULMONARY FUNCTION TESTS:    EKG: CHIEF COMPLAINT: fall    Interval Events:  Patient seen and examined at bedside. Patient refused to wear AVAPS. She states she feels well today and did not want to use the NIV machine overnight.    REVIEW OF SYSTEMS:  Constitutional: [ X] negative [ ] fevers [ ] chills [ ] weight loss [ ] weight gain  HEENT: [ X] negative [ ] dry eyes [ ] eye irritation [ ] postnasal drip [ ] nasal congestion  CV: [X ] negative  [ ] chest pain [ ] orthopnea [ ] palpitations [ ] murmur  Resp: [ ] negative [ ] cough [ X] shortness of breath [ ] dyspnea [ ] wheezing [ ] sputum [ ] hemoptysis  GI: [X ] negative [ ] nausea [ ] vomiting [ ] diarrhea [ ] constipation [ ] abd pain [ ] dysphagia   : [X ] negative [ ] dysuria [ ] nocturia [ ] hematuria [ ] increased urinary frequency  Musculoskeletal: [X ] negative [ ] back pain [ ] myalgias [ ] arthralgias [ ] fracture  Skin: [X ] negative [ ] rash [ ] itch  Neurological: [ ] negative [ ] headache [ ] dizziness [ ] syncope [ ] weakness [ ] numbness  Psychiatric: [ ] negative [ ] anxiety [ ] depression  Endocrine: [ ] negative [ ] diabetes [ ] thyroid problem  Hematologic/Lymphatic: [ ] negative [ ] anemia [ ] bleeding problem  Allergic/Immunologic: [ ] negative [ ] itchy eyes [ ] nasal discharge [ ] hives [ ] angioedema  [ ] All other systems negative  [ ] Unable to assess ROS because ________    OBJECTIVE:  ICU Vital Signs Last 24 Hrs  T(C): 36.4 (01 Aug 2023 02:00), Max: 36.7 (31 Jul 2023 17:00)  T(F): 97.6 (01 Aug 2023 02:00), Max: 98.1 (31 Jul 2023 17:00)  HR: 88 (01 Aug 2023 02:00) (70 - 99)  BP: 148/61 (01 Aug 2023 02:00) (127/51 - 148/61)  BP(mean): --  ABP: --  ABP(mean): --  RR: 18 (01 Aug 2023 02:00) (18 - 19)  SpO2: 95% (01 Aug 2023 02:00) (95% - 100%)    O2 Parameters below as of 01 Aug 2023 02:00  Patient On (Oxygen Delivery Method): BiPAP/CPAP          Mode: NIV (Noninvasive Ventilation), RR (machine): 16, TV (machine): 450, FiO2: 40, PEEP: 5, ITime: 0.9, PIP: 14    07-30 @ 07:01  - 07-31 @ 07:00  --------------------------------------------------------  IN: 320 mL / OUT: 600 mL / NET: -280 mL    07-31 @ 07:01 - 08-01 @ 06:55  --------------------------------------------------------  IN: 0 mL / OUT: 1700 mL / NET: -1700 mL      CAPILLARY BLOOD GLUCOSE      POCT Blood Glucose.: 246 mg/dL (31 Jul 2023 18:25)      PHYSICAL EXAM:  General: ill appearing female,  leaning in bed  HEENT: no icterus  Neck: supple  Respiratory: CTA  Cardiovascular:  S1/S2, RRR,    Abdomen: soft, nontender,   Extremities: 1+ LE edema  Neurological: AxOx3  Psychiatry: normal mood and affect    HOSPITAL MEDICATIONS:    piperacillin/tazobactam IVPB.. 3.375 Gram(s) IV Intermittent every 8 hours    furosemide   Injectable 40 milliGRAM(s) IV Push daily  metoprolol succinate ER 25 milliGRAM(s) Oral daily    levothyroxine 112 MICROGram(s) Oral daily    albuterol/ipratropium for Nebulization 3 milliLiter(s) Nebulizer every 6 hours  budesonide 160 MICROgram(s)/formoterol 4.5 MICROgram(s) Inhaler 2 Puff(s) Inhalation two times a day  montelukast 10 milliGRAM(s) Oral at bedtime    acetaminophen     Tablet .. 650 milliGRAM(s) Oral every 8 hours  acetaminophen     Tablet .. 650 milliGRAM(s) Oral every 6 hours PRN  acetaminophen   IVPB .. 1000 milliGRAM(s) IV Intermittent once  busPIRone 10 milliGRAM(s) Oral two times a day  melatonin 3 milliGRAM(s) Oral at bedtime PRN  ondansetron Injectable 4 milliGRAM(s) IV Push every 8 hours PRN    aluminum hydroxide/magnesium hydroxide/simethicone Suspension 30 milliLiter(s) Oral every 4 hours PRN            chlorhexidine 2% Cloths 1 Application(s) Topical once        LABS:                        7.1    8.05  )-----------( 176      ( 31 Jul 2023 05:37 )             22.5     Hgb Trend: 7.1<--, 8.1<--, 9.0<--, 9.3<--, 8.9<--  07-31    135  |  97<L>  |  69<H>  ----------------------------<  129<H>  4.6   |  26  |  2.54<H>    Ca    8.3<L>      31 Jul 2023 05:37  Phos  4.1     07-31  Mg     2.40     07-31    TPro  6.1  /  Alb  2.8<L>  /  TBili  0.2  /  DBili  <0.2  /  AST  14  /  ALT  8   /  AlkPhos  55  07-31    Creatinine Trend: 2.54<--, 2.51<--, 2.19<--, 1.81<--, 1.73<--, 1.82<--    Urinalysis Basic - ( 31 Jul 2023 05:37 )    Color: x / Appearance: x / SG: x / pH: x  Gluc: 129 mg/dL / Ketone: x  / Bili: x / Urobili: x   Blood: x / Protein: x / Nitrite: x   Leuk Esterase: x / RBC: x / WBC x   Sq Epi: x / Non Sq Epi: x / Bacteria: x        Venous Blood Gas:  07-31 @ 05:37  7.26/78/49/35/84.3  VBG Lactate: 0.9      MICROBIOLOGY:     RADIOLOGY:  [X] Reviewed and interpreted by me     CHIEF COMPLAINT: fall    Interval Events:  Patient seen and examined at bedside. Patient refused to wear AVAPS. She states she feels well today and did not want to use the NIV machine overnight.    REVIEW OF SYSTEMS:  Constitutional: [ X] negative [ ] fevers [ ] chills [ ] weight loss [ ] weight gain  HEENT: [ X] negative [ ] dry eyes [ ] eye irritation [ ] postnasal drip [ ] nasal congestion  CV: [X ] negative  [ ] chest pain [ ] orthopnea [ ] palpitations [ ] murmur  Resp: [ ] negative [ ] cough [ X] shortness of breath [ ] dyspnea [ ] wheezing [ ] sputum [ ] hemoptysis  GI: [X ] negative [ ] nausea [ ] vomiting [ ] diarrhea [ ] constipation [ ] abd pain [ ] dysphagia   : [X ] negative [ ] dysuria [ ] nocturia [ ] hematuria [ ] increased urinary frequency  Musculoskeletal: [X ] negative [ ] back pain [ ] myalgias [ ] arthralgias [ ] fracture  Skin: [X ] negative [ ] rash [ ] itch  Neurological: [ ] negative [ ] headache [ ] dizziness [ ] syncope [ ] weakness [ ] numbness  Psychiatric: [ ] negative [ ] anxiety [ ] depression  Endocrine: [ ] negative [ ] diabetes [ ] thyroid problem  Hematologic/Lymphatic: [ ] negative [ ] anemia [ ] bleeding problem  Allergic/Immunologic: [ ] negative [ ] itchy eyes [ ] nasal discharge [ ] hives [ ] angioedema  [ ] All other systems negative  [ ] Unable to assess ROS because ________    OBJECTIVE:  ICU Vital Signs Last 24 Hrs  T(C): 36.4 (01 Aug 2023 02:00), Max: 36.7 (31 Jul 2023 17:00)  T(F): 97.6 (01 Aug 2023 02:00), Max: 98.1 (31 Jul 2023 17:00)  HR: 88 (01 Aug 2023 02:00) (70 - 99)  BP: 148/61 (01 Aug 2023 02:00) (127/51 - 148/61)  BP(mean): --  ABP: --  ABP(mean): --  RR: 18 (01 Aug 2023 02:00) (18 - 19)  SpO2: 95% (01 Aug 2023 02:00) (95% - 100%)    O2 Parameters below as of 01 Aug 2023 02:00  Patient On (Oxygen Delivery Method): BiPAP/CPAP          Mode: NIV (Noninvasive Ventilation), RR (machine): 16, TV (machine): 450, FiO2: 40, PEEP: 5, ITime: 0.9, PIP: 14    07-30 @ 07:01  - 07-31 @ 07:00  --------------------------------------------------------  IN: 320 mL / OUT: 600 mL / NET: -280 mL    07-31 @ 07:01 - 08-01 @ 06:55  --------------------------------------------------------  IN: 0 mL / OUT: 1700 mL / NET: -1700 mL      CAPILLARY BLOOD GLUCOSE      POCT Blood Glucose.: 246 mg/dL (31 Jul 2023 18:25)      PHYSICAL EXAM:  General: ill appearing female,  leaning in bed  HEENT: no icterus  Neck: supple  Respiratory: Reduced BS. No wheezing. Pursed lip breathing.  Cardiovascular:  S1/S2, RRR,    Abdomen: soft, nontender,   Extremities: 1+ LE edema  Neurological: AxOx3  Psychiatry: normal mood and affect    HOSPITAL MEDICATIONS:    piperacillin/tazobactam IVPB.. 3.375 Gram(s) IV Intermittent every 8 hours    furosemide   Injectable 40 milliGRAM(s) IV Push daily  metoprolol succinate ER 25 milliGRAM(s) Oral daily    levothyroxine 112 MICROGram(s) Oral daily    albuterol/ipratropium for Nebulization 3 milliLiter(s) Nebulizer every 6 hours  budesonide 160 MICROgram(s)/formoterol 4.5 MICROgram(s) Inhaler 2 Puff(s) Inhalation two times a day  montelukast 10 milliGRAM(s) Oral at bedtime    acetaminophen     Tablet .. 650 milliGRAM(s) Oral every 8 hours  acetaminophen     Tablet .. 650 milliGRAM(s) Oral every 6 hours PRN  acetaminophen   IVPB .. 1000 milliGRAM(s) IV Intermittent once  busPIRone 10 milliGRAM(s) Oral two times a day  melatonin 3 milliGRAM(s) Oral at bedtime PRN  ondansetron Injectable 4 milliGRAM(s) IV Push every 8 hours PRN    aluminum hydroxide/magnesium hydroxide/simethicone Suspension 30 milliLiter(s) Oral every 4 hours PRN            chlorhexidine 2% Cloths 1 Application(s) Topical once        LABS:                        7.1    8.05  )-----------( 176      ( 31 Jul 2023 05:37 )             22.5     Hgb Trend: 7.1<--, 8.1<--, 9.0<--, 9.3<--, 8.9<--  07-31    135  |  97<L>  |  69<H>  ----------------------------<  129<H>  4.6   |  26  |  2.54<H>    Ca    8.3<L>      31 Jul 2023 05:37  Phos  4.1     07-31  Mg     2.40     07-31    TPro  6.1  /  Alb  2.8<L>  /  TBili  0.2  /  DBili  <0.2  /  AST  14  /  ALT  8   /  AlkPhos  55  07-31    Creatinine Trend: 2.54<--, 2.51<--, 2.19<--, 1.81<--, 1.73<--, 1.82<--    Urinalysis Basic - ( 31 Jul 2023 05:37 )    Color: x / Appearance: x / SG: x / pH: x  Gluc: 129 mg/dL / Ketone: x  / Bili: x / Urobili: x   Blood: x / Protein: x / Nitrite: x   Leuk Esterase: x / RBC: x / WBC x   Sq Epi: x / Non Sq Epi: x / Bacteria: x        Venous Blood Gas:  07-31 @ 05:37  7.26/78/49/35/84.3  VBG Lactate: 0.9      MICROBIOLOGY:     RADIOLOGY:  [X] Reviewed and interpreted by me

## 2023-08-01 NOTE — DIETITIAN INITIAL EVALUATION ADULT - OTHER INFO
Per chart review, 88 year old female with pmh of HTN, DM2, COPD (O2 dependent 2-3L,) Chronic diastolic HF, hypothyroidism, CKD3, and hx of hyperkalemia presents to the ED after a fall, found to have acute comminuted mildly displaced periprosthetic left proximal femoral fracture, course complicated by the hyperkalemia. Now with hypercapnic respiratory failure.     Patient seen at bedside, appears lethargic. Pt endorses poor PO intake in hospital. PO intake noted with <50% as per RN flow sheet. Pt is amenable to provision Nepro 2x daily (850 kcals, 38.2g protein). Pt noted with poor dentition, currently on minced and moist diet consistency. Denies chewing and swallowing difficulties. Denies any GI distress (nausea/vomiting/diarrhea/constipation.) Last BM yesterday as per pt. Pt noted able with labs Phos 4.9H, and K WNL; s/p Lokelma. Currently on Renal diet. Recommend consider d/c Renal diet. Add No Conc Phos diet to encourage PO intake. Pt noted with 2+ b/l legs edema on Lasix which may cause weight fluctuations. RD to remain available for further nutritional interventions as indicated.

## 2023-08-01 NOTE — PROGRESS NOTE ADULT - SUBJECTIVE AND OBJECTIVE BOX
SUBJECTIVE:   Patient seen and examined at bedside. Pt resting comfortably  Alert and oriented x 3. Having baseline pain.  Worried about OR.  Wants to proceed with surgery asap.    Has been non-compliant w/ CPAP/BiPAP machine overnight. States she feels SOB.    ICU Vital Signs Last 24 Hrs  T(C): 36.4 (01 Aug 2023 02:00), Max: 36.7 (31 Jul 2023 17:00)  T(F): 97.6 (01 Aug 2023 02:00), Max: 98.1 (31 Jul 2023 17:00)  HR: 88 (01 Aug 2023 02:00) (70 - 99)  BP: 148/61 (01 Aug 2023 02:00) (127/51 - 148/61)  BP(mean): --  ABP: --  ABP(mean): --  RR: 18 (01 Aug 2023 02:00) (18 - 19)  SpO2: 95% (01 Aug 2023 02:00) (95% - 100%)    O2 Parameters below as of 01 Aug 2023 02:00  Patient On (Oxygen Delivery Method): BiPAP/CPAP      Physical Exam:  General: NAD; resting comfortably in bed  Resp: non labored, CPAP in place  LLE:  - +TTP over L thigh, no TTP along remainder of extremity; compartments soft  - Limited ROM at hip and knee 2/2 pain    - Motor: TA/EHL/GS/FHL intact (IP firing but limited ROM by pain)   - Sensory: DP/SP/Tib/Rosa/Saph SILT  - +DP pulse, WWP    88yFemale w/ L femoral shaft fracture periprosthetic fracture    PLAN  -Ortho plan for OR possibly 8/2 L revision hip arthroplasty vs ORIF vs PFR, however patient not currently optimized for OR due to recent RRT  -FU Infectious work-up   Imaging and blood cultures   On Zosyn empirically  -NWB LLE, bedrest  -DVT ppx  -Diet: reg > NPO before OR   -pain control  -Ice/cold compress prn  -Appreciate medical management  -Please document clearances once stable

## 2023-08-01 NOTE — PROGRESS NOTE ADULT - CONVERSATION DETAILS
Discussed patient's condition with brother Zachariah and nephew Oli extensively today. Explained that patient is non compliant with AVAPs. Explained that given her severe copd and elevated carbon dioxide levels, patient is high risk for surgery and she may not be able to be extubated once she is intubated. They understand that patient is not compliant with bipap and that she had refused bipap in past also. They also are leaning against surgery given high risk. They confirm DNR/DNI status.

## 2023-08-01 NOTE — DIETITIAN INITIAL EVALUATION ADULT - PERTINENT LABORATORY DATA
08-01    139  |  97<L>  |  69<H>  ----------------------------<  148<H>  5.2   |  30  |  2.39<H>    Ca    8.7      01 Aug 2023 05:55  Phos  4.9     08-01  Mg     2.40     08-01    TPro  6.8  /  Alb  3.2<L>  /  TBili  0.3  /  DBili  <0.2  /  AST  17  /  ALT  9   /  AlkPhos  71  08-01  POCT Blood Glucose.: 150 mg/dL (08-01-23 @ 12:45)  A1C with Estimated Average Glucose Result: 6.6 % (07-26-23 @ 06:12)  A1C with Estimated Average Glucose Result: 5.9 % (04-10-23 @ 11:59)  A1C with Estimated Average Glucose Result: 5.9 % (12-19-22 @ 05:27)

## 2023-08-01 NOTE — DIETITIAN INITIAL EVALUATION ADULT - FLUID ACCUMULATION
Pt previously noted with 1+ generalized edema, edema has been worsening to 2+ b/l legs as per RN flow sheet.

## 2023-08-01 NOTE — PROGRESS NOTE ADULT - SUBJECTIVE AND OBJECTIVE BOX
[facility] Division of Hospital Medicine  Dr. Joi Garcia  Pager [phone]      Patient is a 88y old  Female who presents with a chief complaint of fall (28 Jul 2023 08:00)    SUBJECTIVE / OVERNIGHT EVENTS: Patient seen and examined. Per staff only used avaps for about 3 hrs on and off. Patient kept taking avaps off. This morning patient lethargic appearing, keeps dozing off during evaluation.     MEDICATIONS  (STANDING):  acetaminophen     Tablet .. 650 milliGRAM(s) Oral every 8 hours  budesonide 160 MICROgram(s)/formoterol 4.5 MICROgram(s) Inhaler 2 Puff(s) Inhalation two times a day  busPIRone 10 milliGRAM(s) Oral two times a day  chlorhexidine 2% Cloths 1 Application(s) Topical once  levothyroxine 112 MICROGram(s) Oral daily  metoprolol succinate ER 25 milliGRAM(s) Oral daily  montelukast 10 milliGRAM(s) Oral at bedtime  povidone iodine 5% Nasal Swab 1 Application(s) Both Nostrils once  tiotropium 2.5 MICROgram(s) Inhaler 2 Puff(s) Inhalation daily    MEDICATIONS  (PRN):  acetaminophen     Tablet .. 650 milliGRAM(s) Oral every 6 hours PRN Temp greater or equal to 38C (100.4F), Mild Pain (1 - 3)  albuterol/ipratropium for Nebulization 3 milliLiter(s) Nebulizer every 6 hours PRN Shortness of Breath and/or Wheezing  aluminum hydroxide/magnesium hydroxide/simethicone Suspension 30 milliLiter(s) Oral every 4 hours PRN Dyspepsia  melatonin 3 milliGRAM(s) Oral at bedtime PRN Insomnia  ondansetron Injectable 4 milliGRAM(s) IV Push every 8 hours PRN Nausea and/or Vomiting  oxyCODONE    IR 5 milliGRAM(s) Oral every 6 hours PRN moderate to severe pain    CAPILLARY BLOOD GLUCOSE  POCT Blood Glucose.: 246 mg/dL (31 Jul 2023 18:25)    PHYSICAL EXAM:  Vital Signs Last 24 Hrs  T(C): 36.9 (01 Aug 2023 07:30), Max: 36.9 (01 Aug 2023 07:30)  T(F): 98.4 (01 Aug 2023 07:30), Max: 98.4 (01 Aug 2023 07:30)  HR: 80 (01 Aug 2023 10:39) (73 - 99)  BP: 140/49 (01 Aug 2023 07:30) (127/51 - 148/61)  BP(mean): --  RR: 16 (01 Aug 2023 07:30) (16 - 18)  SpO2: 96% (01 Aug 2023 10:39) (95% - 100%)    Parameters below as of 01 Aug 2023 07:30  Patient On (Oxygen Delivery Method): nasal cannula  O2 Flow (L/min): 6      CONSTITUTIONAL: frail appearing, opens eyes  EYES: EOMI  NECK: Supple  RESPIRATORY: decreased BS at bases  CARDIOVASCULAR: Regular rate and rhythm, + S1 and S2  ABDOMEN: Nontender to palpation, normoactive bowel sounds, no rebound/guarding  PSYCH: anxious       LABS:                                              8.4    8.00  )-----------( 213      ( 01 Aug 2023 05:55 )             27.9   08-01    139  |  97<L>  |  69<H>  ----------------------------<  148<H>  5.2   |  30  |  2.39<H>    Ca    8.7      01 Aug 2023 05:55  Phos  4.9     08-01  Mg     2.40     08-01    TPro  6.8  /  Alb  3.2<L>  /  TBili  0.3  /  DBili  <0.2  /  AST  17  /  ALT  9   /  AlkPhos  71  08-01    Blood Gas Profile - Venous (08.01.23 @ 05:55)    pH, Venous: 7.21: Due to specimen delivery delays, please interpret with caution   pCO2, Venous: 97 mmHg   pO2, Venous: 39 mmHg   HCO3, Venous: 39 mmol/L   Base Excess, Venous: 9.1 mmol/L   Oxygen Saturation, Venous: 61.0 %   Total CO2, Venous: 41.8 mmol/L      Urinalysis Basic - ( 28 Jul 2023 06:15 )    Color: x / Appearance: x / SG: x / pH: x  Gluc: 144 mg/dL / Ketone: x  / Bili: x / Urobili: x   Blood: x / Protein: x / Nitrite: x   Leuk Esterase: x / RBC: x / WBC x   Sq Epi: x / Non Sq Epi: x / Bacteria: x      < from: CT Chest No Cont (07.31.23 @ 09:45) >  IMPRESSION:    Interlobular septal thickening, suggestive of pulmonary edema.    Small bilateral pleural effusions.    New left upper lobe 4 mm nodule associated with the fissure. Recommend   follow-up noncontrast chest CT in one year.    Nodularity of the hepatic surface suggestive of cirrhosis.    --- End of Report ---    < end of copied text >

## 2023-08-01 NOTE — DIETITIAN INITIAL EVALUATION ADULT - ORAL INTAKE PTA/DIET HISTORY
Limited diet hx obtained as pt appears to be lethargic. Pt reports of drinking Boost 2 bottles daily PTA. Endorses appetite has decreased over the past couple of months.

## 2023-08-01 NOTE — DISCHARGE NOTE PROVIDER - NSDCHHHOMEBOUND_GEN_ALL_CORE
Other, specify... Bexarotene Counseling:  I discussed with the patient the risks of bexarotene including but not limited to hair loss, dry lips/skin/eyes, liver abnormalities, hyperlipidemia, pancreatitis, depression/suicidal ideation, photosensitivity, drug rash/allergic reactions, hypothyroidism, anemia, leukopenia, infection, cataracts, and teratogenicity.  Patient understands that they will need regular blood tests to check lipid profile, liver function tests, white blood cell count, thyroid function tests and pregnancy test if applicable.

## 2023-08-01 NOTE — DIETITIAN INITIAL EVALUATION ADULT - PROBLEM SELECTOR PLAN 6
Patient has a complicated hx of CHF and severe COPD. Currently, she denies any chest pain or worsening SOB. Pt has poor METS at baseline. On exam, pt has b/l LE edema and bibasilar crackles. EKG shows T wave changes in V4-V5 which were present before. proBNP has decreased compared to last admission. CXR showed similar pattern of COPD/CHF as per radiology.  -Patient's RCRI is 1. However, given her comorbid condition, pt is a high risk patient undergoing moderate to high risk surgery. Pt also had jayshree and post operative complications in the past.   -Please consult cardiology and pulm in the AM for preop risk stratification.  -However, if pt needs surgery urgently, then the benefit of the surgery will outweigh the risk associated with the surgery

## 2023-08-01 NOTE — PROGRESS NOTE ADULT - PROBLEM SELECTOR PLAN 6
Had some JANELLE, now improving  -Monitor UOP.   -Dose meds renally if necessary.w.  -Further follow up w/ neph as OP. stated

## 2023-08-01 NOTE — PROGRESS NOTE ADULT - ASSESSMENT
88F w/ HTN, T2DM, COPD (home O2 3L), HFpEF, dementia, hypothyroidism, CKD3, who presents after fall found to have an acute comminuted mildly displaced periprosthetic L proximal femoral Pulmonary consulted for pre op evaluation and optimization. On 7/29 had RRT for AMS found to be hypercapnic and placed on bipap with improvement in mental status.    #Chronic hypoxemic and hypercapnic respiratory failure   - at home patient on 3L NC  - reports prior unable to tolerate NIV ( has had prior hospitalization with hypercapneic resp failure and refused BiPAP then as well)  - RRT for AMS over the weekend and found to have hypercapneic resp failure improved on NIV use  - advised patient to use AVAPS overnight for hypercapnea, however she intermittently refuses and only used intermittently for ~2 hours overnight    Recommendations:  - patient does not appear to have a COPD exacerbation at this time: would continue symbicort and duonebs for now  - patient with acute on chronic hypercapneic respiratory failure declining to use NIV overnight  - educated patient on risks associated with untreated hypercapneic respiratory failure as well as risk of intubation and high possibility of lack of extubation after orthopedic procedure   - patient expressed understanding of high risk of procedure and expresses she may want to take the risk if she is offered surgery as she would not want to be bed bound unable to walk     Patient is not optimized for her surgery as she is in decompensated hypercapneic respiratory failure and unwilling to use NIV. She remains high risk for any intervention.

## 2023-08-01 NOTE — PROGRESS NOTE ADULT - PROBLEM SELECTOR PLAN 9
DVT ppx SCD      Plan discussed with family - Oli malik over the phone DVT ppx SCD      Plan discussed with family - Oli nephew and Brother Zachariah over the phone

## 2023-08-01 NOTE — DIETITIAN INITIAL EVALUATION ADULT - ADD RECOMMEND
1) Recommend liberalize diet to d/c Renal diet. Recommend consider add No Conc Phos only as potassium WNL.   2) Encourage PO intake and honor food preferences within therapeutic diet restrictions as able.   3) Monitor PO intake, Labs, weights, BMs, and skin integrity.   4) Unable to provide nutrition education as pt with decreased cognitive status. RD to remain available for further nutritional interventions as indicated.   3)

## 2023-08-02 NOTE — CONSULT NOTE ADULT - PROBLEM SELECTOR RECOMMENDATION 5
Pt expressed an understanding of current situation. Understood why they are not a surgical candidate. Pt also vocalized understanding of needing to utilize BiPAP to get better. Patient did not what to be intubated under any circumstances and wishes to remain DNR/DNI but open to all other interventions. Patient also understood that discharge will likely be to a rehab center with goal of transitioning to living in a long-term care center. Patient expressed some interest in being closer to family.     Pt is DNR/DNI but willing to pursue all other interventions. Pt expressed an understanding of current situation. Understood why they are not a surgical candidate. Pt also vocalized understanding of needing to utilize BiPAP to get better. Patient did not what to be intubated under any circumstances and wishes to remain DNR/DNI but open to all other interventions. Patient also understood that discharge will likely be to a rehab center with goal of transitioning to living in a long-term care center. Patient expressed some interest in being closer to family.     Patient signed HCP form with nephew Juan as primary, and her brother Zachariah at secondary     Pt is DNR/DNI but seems willing to pursue all other interventions.

## 2023-08-02 NOTE — PROGRESS NOTE ADULT - PROBLEM SELECTOR PLAN 1
-combined hypoxic and hypercapnic resp failure  -pulm edema leading to hypoxia? Start lasix 40IV daily and assess output  -avaps for hypercapnia, patient has known h/o severe COPD  -pulm eval appreciated  -still only intermittently compliant with avaps   -plan discussed with Pulmonary team, patient is high risk for planned procedure. Given severe copd, concern that we may not be able to extubate her and given non compliance with avaps, concern for prolonged post surgical course.  -palliative evaluation requested as patient is hospice appropriate given non compliance with avaps

## 2023-08-02 NOTE — CONSULT NOTE ADULT - PROBLEM SELECTOR RECOMMENDATION 2
Left hip fracture after fall out of wheelchair. Pt not a surgical candidate due to chronic respiratory issues (hypercapnia, hypoxemia) and COPD.    Pt likely to be bedbound going forward.

## 2023-08-02 NOTE — CONSULT NOTE ADULT - SUBJECTIVE AND OBJECTIVE BOX
HPI:  Patient is a 88 year old female with pmh of HTN, DM2, COPD (O2 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 Patient is AAOx3 and was able to provide most of the information. Pt reports that she was sitting in an wheel chair and and fell asleep. When she woke up she was on the floor. She slipped out of the chair while she was sleeping. She then called for help and the staff was able to assist her. On examination she was found to have external rotation and abduction of the L lower extremity, prompting her to come to the ED.   In the ED, her vitals were notable for chronic hypoxia. Labs were notable for leukocytosis, chronic anemia, acute on chronic hyperkalemia, elevated BUN/Scr. Xray of hip/pelvis showed Acute comminuted mildly displaced periprosthetic left proximal femoral   fracture. (25 Jul 2023 19:53)    PERTINENT PM/SXH:   DM (diabetes mellitus)    Hypothyroid    HTN (hypertension)    COPD (chronic obstructive pulmonary disease)    Chronic bronchitis    Chronic kidney disease (CKD)    Shingles      S/P cholecystectomy    H/O cataract    Leg fracture    S/P hip replacement, left      FAMILY HISTORY:  FHx: rheumatoid arthritis (Mother)  Mother and Brother    FH: type 2 diabetes mellitus (Father)  Father    Family history of hypertension (Father)  Father      ------------------------------------------------------------------------------------------------------------  ITEMS NOT CHECKED ARE NOT PRESENT    SOCIAL HISTORY:   Living Situation: [ ]Home  [ ]Long term care  [ ]Rehab [ ]Other  Support:     Substance hx:  [ ]   Tobacco hx:  [ ]   Alcohol hx: [ ]   Family Hx substance abuse [ ]yes [ ]no    Denominational/Spirituality:  PCSSQ[Palliative Care Spiritual Screening Question]   Severity (0-10): 0  Score of 4 or > indicate consideration of Chaplaincy referral.  Chaplaincy Referral: [ ] yes [X ] refused [ ] following    Anticipatory Grief present?:  [ ] yes [X ] no  [ ] Deferred                      Other Referrals:    [ ]Hospice   [ ]Caregiver [place] Support  [ ]Child Life    [ ]Patient & Family Centered Care Referral  [ ]Holistic Therapy     ------------------------------------------------------------------------------------------------------------    PRESENT SYMPTOMS:    [ ] No     [ ] Unable to self-report      [ ] CPOT (ICU)     [ ] PAINADs     [ ] RDOS    [ ] Yes     Source if other than patient:  [ ]Family   [ ]Team     PAIN:   If blank, patient unable to specify   [ ]yes [ ]no  QOL impact-   Location -                    Aggravating factors -  Quality -  Radiation -  Timing-  Pain at most severe level (0-10 scale):  Pain at minimal acceptable level/Pain Goal (0-10 scale):     SYMPTOMS:   Dyspnea:                           [ ]Mild [ ]Moderate [ ]Severe  Anxiety:                             [ ]Mild [ ]Moderate [ ]Severe  Fatigue:                             [ ]Mild [ ]Moderate [ ]Severe  Nausea/Vomiting:              [ ]Mild [ ]Moderate [ ]Severe  Loss of appetite:                [ ]Mild [ ]Moderate [ ]Severe  Constipation:                     [ ]Mild [ ]Moderate [ ]Severe    Other Symptoms:  [X ]All other review of systems negative     Home Medications for symptoms if any:  I-Stop Reference No:    ------------------------------------------------------------------------------------------------------------    FUNCTIONAL STATUS:     Baseline ADL (prior to admission):  [ ] Independent  [ ] Moderate Assistance [ ] Dependent  Palliative Performance Score:     [ ]PPSV2 < or = to 30%     NUTRITIONAL STATUS:     Protein Calorie Malnutrition Present:   [ ]mild   [ ]moderate   [ ]severe   [ ]poor nutritional intake   [ ]artificial nutrition      Weight:   [ ]underweight (BMI 18.5 or less)   [ ]morbid obesity (BMI 30 or higher)   [ ]anasarca  [ ]significant weight loss     Height (cm): 162.6 (07-29-23 @ 09:01), 157.5 (04-11-23 @ 05:30), 167.6 (01-28-23 @ 19:50)  Weight (kg): 70.6 (07-29-23 @ 09:01), 68.9 (04-19-23 @ 06:45), 74.843 (01-28-23 @ 19:50)  BMI (kg/m2): 26.7 (07-29-23 @ 09:01), 27.8 (04-19-23 @ 06:45), 30.2 (04-11-23 @ 05:30)    ------------------------------------------------------------------------------------------------------------    PRIOR ADVANCE DIRECTIVES:    [ ] DNR/MOLST    [ ] Living Will    [ ] Health Care Proxy(s)    DECISION MAKER(s):  [ ] Patient    [ ] Surrogate(s)  [ ] HCP   [ ] Guardian             ------------------------------------------------------------------------------------------------------------  PHYSICAL EXAM:  Vital Signs Last 24 Hrs  T(C): 36.9 (02 Aug 2023 05:06), Max: 36.9 (02 Aug 2023 05:06)  T(F): 98.5 (02 Aug 2023 05:06), Max: 98.5 (02 Aug 2023 05:06)  HR: 67 (02 Aug 2023 07:35) (67 - 84)  BP: 148/53 (02 Aug 2023 05:06) (105/65 - 148/53)  BP(mean): --  RR: 19 (02 Aug 2023 05:06) (16 - 19)  SpO2: 93% (02 Aug 2023 07:35) (93% - 98%)    Parameters below as of 02 Aug 2023 05:06  Patient On (Oxygen Delivery Method): nasal cannula  O2 Flow (L/min): 6   I&O's Summary      GENERAL:  [ ]Cachexia  [ ] Frail  [ ]Awake  [ ]Oriented x   [ ]Lethargic  [ ]Unarousable  [ ]Verbal  [ ]Non-Verbal    BEHAVIORAL:   [ ] Anxiety  [ ] Delirium [ ] Agitation [ ] Other    HEENT:   [ ]Normal   [ ]Dry mouth   [ ]ET Tube/Trach  [ ]Oral lesions    PULMONARY:   [ ]Clear [ ]Tachypnea  [ ]Audible excessive secretions   [ ]Rhonchi        [ ]Right [ ]Left [ ]Bilateral  [ ]Crackles        [ ]Right [ ]Left [ ]Bilateral  [ ]Wheezing     [ ]Right [ ]Left [ ]Bilateral  [ ]Diminished breath sounds [ ]right [ ]left [ ]bilateral    CARDIOVASCULAR:    [ ]Regular [ ]Irregular [ ]Tachy  [ ]Harmeet [ ]Murmur [ ]Other    GASTROINTESTINAL:  [ ]Soft  [ ]Distended   [ ]+BS  [ ]Non tender [ ]Tender  [ ]Other [ ]PEG [ ]OGT/ NGT      GENITOURINARY:  [ ]Normal [ ] Incontinent   [ ]Oliguria/Anuria   [ ]Palacio    MUSCULOSKELETAL:   [ ]Normal   [ ]Weakness  [ ]Bed/Wheelchair bound [ ]Edema    NEUROLOGIC:   [ ]No focal deficits  [ ]Cognitive impairment  [ ]Dysphagia [ ]Dysarthria [ ]Paresis [ ]Other     SKIN:   [ ]Normal  [ ]Rash  [ ]Other  [ ]Pressure ulcer(s)       Present on admission [ ]y [ ]n    ------------------------------------------------------------------------------------------------------------    LABS:                        7.6    8.75  )-----------( 226      ( 02 Aug 2023 05:25 )             23.9   08-02    140  |  99  |  62<H>  ----------------------------<  136<H>  4.6   |  34<H>  |  1.89<H>    Ca    8.6      02 Aug 2023 05:25  Phos  3.0     08-02  Mg     2.30     08-02    TPro  6.8  /  Alb  3.2<L>  /  TBili  0.3  /  DBili  <0.2  /  AST  17  /  ALT  9   /  AlkPhos  71  08-01  PT/INR - ( 01 Aug 2023 05:55 )   PT: 11.0 sec;   INR: 0.97 ratio         PTT - ( 01 Aug 2023 05:55 )  PTT:28.1 sec    Urinalysis Basic - ( 02 Aug 2023 05:25 )    Color: x / Appearance: x / SG: x / pH: x  Gluc: 136 mg/dL / Ketone: x  / Bili: x / Urobili: x   Blood: x / Protein: x / Nitrite: x   Leuk Esterase: x / RBC: x / WBC x   Sq Epi: x / Non Sq Epi: x / Bacteria: x        ------------------------------------------------------------------------------------------------------------    CRITICAL CARE:  [ ]Shock Present  [ ]Septic [ ]Cardiogenic [ ]Neurologic [ ]Hypovolemic [ ] Undifferentiated/Mixed   [ ]Vasopressors [ ]Inotropes     [ ]Respiratory failure present: [ ]Acute  [ ]Chronic [ ]Hypoxic  [ ]Hypercarbic   [ ]Mechanical ventilation   [ ]Trach collar   [ ]Non-invasive ventilatory support   [ ]High flow  Mode: NIV (Noninvasive Ventilation), RR (machine): 16, TV (machine): 450, FiO2: 40, PEEP: 5, ITime: 0.9, P-High: 30, P-Low: 10, PIP: 11  [ ]Non-rebreather/Venti     [ ]Other organ failure     ------------------------------------------------------------------------------------------------------------    RADIOLOGY & ADDITIONAL STUDIES:      ------------------------------------------------------------------------------------------------------------    ALLERGIES:  Allergies    No Known Allergies    Intolerances      MEDICATIONS  (STANDING):  acetaminophen     Tablet .. 650 milliGRAM(s) Oral every 8 hours  acetaminophen   IVPB .. 1000 milliGRAM(s) IV Intermittent once  albuterol/ipratropium for Nebulization 3 milliLiter(s) Nebulizer every 6 hours  budesonide 160 MICROgram(s)/formoterol 4.5 MICROgram(s) Inhaler 2 Puff(s) Inhalation two times a day  busPIRone 10 milliGRAM(s) Oral two times a day  chlorhexidine 2% Cloths 1 Application(s) Topical once  dextrose 5%. 1000 milliLiter(s) (50 mL/Hr) IV Continuous <Continuous>  dextrose 5%. 1000 milliLiter(s) (100 mL/Hr) IV Continuous <Continuous>  dextrose 50% Injectable 25 Gram(s) IV Push once  dextrose 50% Injectable 12.5 Gram(s) IV Push once  dextrose 50% Injectable 25 Gram(s) IV Push once  furosemide   Injectable 40 milliGRAM(s) IV Push daily  glucagon  Injectable 1 milliGRAM(s) IntraMuscular once  insulin lispro (ADMELOG) corrective regimen sliding scale   SubCutaneous three times a day before meals  insulin lispro (ADMELOG) corrective regimen sliding scale   SubCutaneous at bedtime  levothyroxine 112 MICROGram(s) Oral daily  metoprolol succinate ER 25 milliGRAM(s) Oral daily  montelukast 10 milliGRAM(s) Oral at bedtime    MEDICATIONS  (PRN):  aluminum hydroxide/magnesium hydroxide/simethicone Suspension 30 milliLiter(s) Oral every 4 hours PRN Dyspepsia  dextrose Oral Gel 15 Gram(s) Oral once PRN Blood Glucose LESS THAN 70 milliGRAM(s)/deciliter  melatonin 3 milliGRAM(s) Oral at bedtime PRN Insomnia  ondansetron Injectable 4 milliGRAM(s) IV Push every 8 hours PRN Nausea and/or Vomiting           HPI:  Patient is a 88 year old female with pmh of HTN, DM2, COPD (O2 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 Patient is AAOx3 and was able to provide most of the information. Pt reports that she was sitting in an wheel chair and and fell asleep. When she woke up she was on the floor. She slipped out of the chair while she was sleeping. She then called for help and the staff was able to assist her. On examination she was found to have external rotation and abduction of the L lower extremity, prompting her to come to the ED.   In the ED, her vitals were notable for chronic hypoxia. Labs were notable for leukocytosis, chronic anemia, acute on chronic hyperkalemia, elevated BUN/Scr. Xray of hip/pelvis showed Acute comminuted mildly displaced periprosthetic left proximal femoral fracture. Palliative consulted for complex medical decision making in the setting of serious illness.       PERTINENT PM/SXH:   DM (diabetes mellitus)    Hypothyroid    HTN (hypertension)    COPD (chronic obstructive pulmonary disease)    Chronic bronchitis    Chronic kidney disease (CKD)    Shingles      S/P cholecystectomy    H/O cataract    Leg fracture    S/P hip replacement, left      FAMILY HISTORY:  FHx: rheumatoid arthritis (Mother)  Mother and Brother    FH: type 2 diabetes mellitus (Father)  Father    Family history of hypertension (Father)  Father      ------------------------------------------------------------------------------------------------------------  ITEMS NOT CHECKED ARE NOT PRESENT    SOCIAL HISTORY:   Living Situation: [ ]Home  [X ]Long term care  [ ]Rehab [ ]Other  Support:     Substance hx:  [ ]   Tobacco hx:  [ ]   Alcohol hx: [ ]   Family Hx substance abuse [ ]yes [ ]no    Latter-day/Spirituality:  PCSSQ[Palliative Care Spiritual Screening Question]   Severity (0-10): 0  Score of 4 or > indicate consideration of Chaplaincy referral.  Chaplaincy Referral: [ ] yes [X ] refused [ ] following    Anticipatory Grief present?:  [ ] yes [X ] no  [ ] Deferred                      Other Referrals:    [ ]Hospice   [ ]Caregiver [place] Support  [ ]Child Life    [ ]Patient & Family Centered Care Referral  [ ]Holistic Therapy     ------------------------------------------------------------------------------------------------------------    PRESENT SYMPTOMS:    [ ] No     [ ] Unable to self-report      [ ] CPOT (ICU)     [ ] PAINADs     [ ] RDOS    [ ] Yes     Source if other than patient:  [ ]Family   [ ]Team     PAIN:   If blank, patient unable to specify   [ ]yes [ ]no  QOL impact-   Location -                    Aggravating factors -  Quality -  Radiation -  Timing-  Pain at most severe level (0-10 scale):  Pain at minimal acceptable level/Pain Goal (0-10 scale):     SYMPTOMS:   Dyspnea:                           [ ]Mild [ ]Moderate [ ]Severe  Anxiety:                             [ ]Mild [ ]Moderate [ ]Severe  Fatigue:                             [ ]Mild [ ]Moderate [ ]Severe  Nausea/Vomiting:              [ ]Mild [ ]Moderate [ ]Severe  Loss of appetite:                [ ]Mild [ ]Moderate [ ]Severe  Constipation:                     [ ]Mild [ ]Moderate [ ]Severe    Other Symptoms:  [X ]All other review of systems negative     Home Medications for symptoms if any:  I-Stop Reference No:    ------------------------------------------------------------------------------------------------------------    FUNCTIONAL STATUS:     Baseline ADL (prior to admission):  [ ] Independent  [ ] Moderate Assistance [X ] Dependent  Palliative Performance Score: 30%     [X ]PPSV2 < or = to 30%     NUTRITIONAL STATUS:     Protein Calorie Malnutrition Present:   [ ]mild   [ ]moderate   [ ]severe   [ ]poor nutritional intake   [ ]artificial nutrition      Weight:   [ ]underweight (BMI 18.5 or less)   [ ]morbid obesity (BMI 30 or higher)   [ ]anasarca  [X ]significant weight loss     Height (cm): 162.6 (07-29-23 @ 09:01), 157.5 (04-11-23 @ 05:30), 167.6 (01-28-23 @ 19:50)  Weight (kg): 70.6 (07-29-23 @ 09:01), 68.9 (04-19-23 @ 06:45), 74.843 (01-28-23 @ 19:50)  BMI (kg/m2): 26.7 (07-29-23 @ 09:01), 27.8 (04-19-23 @ 06:45), 30.2 (04-11-23 @ 05:30)    ------------------------------------------------------------------------------------------------------------    PRIOR ADVANCE DIRECTIVES:    [X ] DNR/MOLST    [ ] Living Will    [ ] Health Care Proxy(s)    DECISION MAKER(s):  [ ] Patient    [ ] Surrogate(s)  [ ] HCP   [ ] Guardian             ------------------------------------------------------------------------------------------------------------  PHYSICAL EXAM:  Vital Signs Last 24 Hrs  T(C): 36.9 (02 Aug 2023 05:06), Max: 36.9 (02 Aug 2023 05:06)  T(F): 98.5 (02 Aug 2023 05:06), Max: 98.5 (02 Aug 2023 05:06)  HR: 67 (02 Aug 2023 07:35) (67 - 84)  BP: 148/53 (02 Aug 2023 05:06) (105/65 - 148/53)  BP(mean): --  RR: 19 (02 Aug 2023 05:06) (16 - 19)  SpO2: 93% (02 Aug 2023 07:35) (93% - 98%)    Parameters below as of 02 Aug 2023 05:06  Patient On (Oxygen Delivery Method): nasal cannula  O2 Flow (L/min): 6   I&O's Summary      GENERAL:  [ ]Cachexia  [ ] Frail  [ ]Awake  [ ]Oriented x   [ ]Lethargic  [ ]Unarousable  [ ]Verbal  [ ]Non-Verbal    BEHAVIORAL:   [ ] Anxiety  [ ] Delirium [ ] Agitation [ ] Other    HEENT:   [ ]Normal   [ ]Dry mouth   [ ]ET Tube/Trach  [ ]Oral lesions    PULMONARY:   [ ]Clear [ ]Tachypnea  [ ]Audible excessive secretions   [ ]Rhonchi        [ ]Right [ ]Left [ ]Bilateral  [ ]Crackles        [ ]Right [ ]Left [ ]Bilateral  [ ]Wheezing     [ ]Right [ ]Left [ ]Bilateral  [ ]Diminished breath sounds [ ]right [ ]left [ ]bilateral    CARDIOVASCULAR:    [ ]Regular [ ]Irregular [ ]Tachy  [ ]Harmeet [ ]Murmur [ ]Other    GASTROINTESTINAL:  [ ]Soft  [ ]Distended   [ ]+BS  [ ]Non tender [ ]Tender  [ ]Other [ ]PEG [ ]OGT/ NGT      GENITOURINARY:  [ ]Normal [ ] Incontinent   [ ]Oliguria/Anuria   [ ]Palacio    MUSCULOSKELETAL:   [ ]Normal   [ ]Weakness  [ ]Bed/Wheelchair bound [ ]Edema    NEUROLOGIC:   [ ]No focal deficits  [ ]Cognitive impairment  [ ]Dysphagia [ ]Dysarthria [ ]Paresis [ ]Other     SKIN:   [ ]Normal  [ ]Rash  [ ]Other  [ ]Pressure ulcer(s)       Present on admission [ ]y [ ]n    ------------------------------------------------------------------------------------------------------------    LABS:                        7.6    8.75  )-----------( 226      ( 02 Aug 2023 05:25 )             23.9   08-02    140  |  99  |  62<H>  ----------------------------<  136<H>  4.6   |  34<H>  |  1.89<H>    Ca    8.6      02 Aug 2023 05:25  Phos  3.0     08-02  Mg     2.30     08-02    TPro  6.8  /  Alb  3.2<L>  /  TBili  0.3  /  DBili  <0.2  /  AST  17  /  ALT  9   /  AlkPhos  71  08-01  PT/INR - ( 01 Aug 2023 05:55 )   PT: 11.0 sec;   INR: 0.97 ratio         PTT - ( 01 Aug 2023 05:55 )  PTT:28.1 sec    Urinalysis Basic - ( 02 Aug 2023 05:25 )    Color: x / Appearance: x / SG: x / pH: x  Gluc: 136 mg/dL / Ketone: x  / Bili: x / Urobili: x   Blood: x / Protein: x / Nitrite: x   Leuk Esterase: x / RBC: x / WBC x   Sq Epi: x / Non Sq Epi: x / Bacteria: x    ------------------------------------------------------------------------------------------------------------    CRITICAL CARE:  [ ]Shock Present  [ ]Septic [ ]Cardiogenic [ ]Neurologic [ ]Hypovolemic [ ] Undifferentiated/Mixed   [ ]Vasopressors [ ]Inotropes     [ ]Respiratory failure present: [ ]Acute  [ ]Chronic [ ]Hypoxic  [ ]Hypercarbic   [ ]Mechanical ventilation   [ ]Trach collar   [ ]Non-invasive ventilatory support   [ ]High flow  Mode: NIV (Noninvasive Ventilation), RR (machine): 16, TV (machine): 450, FiO2: 40, PEEP: 5, ITime: 0.9, P-High: 30, P-Low: 10, PIP: 11  [ ]Non-rebreather/Venti     [ ]Other organ failure     ------------------------------------------------------------------------------------------------------------    RADIOLOGY & ADDITIONAL STUDIES:    < from: Xray Hip w/ Pelvis 2 or 3 Views, Left (07.25.23 @ 12:40) >  IMPRESSION:  Acute comminuted mildly displaced periprosthetic left proximal femoral   fracture.    < end of copied text >    < from: CT Femur No Cont, Left (07.25.23 @ 20:50) >  IMPRESSION:    Acute comminutedleft femoral periprosthetic fracture as described.    < end of copied text >    < from: Xray Abdomen 1 View PORTABLE -Urgent (Xray Abdomen 1 View PORTABLE -Urgent .) (07.29.23 @ 09:43) >  FINDINGS/  IMPRESSION: Thebowel gas pattern is nonobstructive. There is contrast   within the colon.    < end of copied text >    < from: CT Head No Cont (07.31.23 @ 09:45) >  FINDINGS: There is no acute intracranial hemorrhage, mass effect, shift   of the midline structures, herniation, extra-axial fluid collection, or   hydrocephalus.    There is diffuse cerebral volume loss with prominence of the sulci,   fissures, and cisternal spaces which is normal for the patient's age.   Mild ventriculomegaly appears unchanged. There ismild deep and   periventricular white matter hypoattenuation statistically compatible   with microvascular changes given calcific atherosclerotic disease of the   intracranial arteries.    The paranasal sinuses and tympanomastoid cavities are clear. The   calvarium is intact. There is evidence of bilateral cataract removal.   Bilateral senile scleral plaques are noted.      < end of copied text >    < from: CT Chest No Cont (07.31.23 @ 09:45) >  IMPRESSION:    Interlobular septal thickening, suggestive of pulmonary edema.    Small bilateral pleural effusions.    New left upper lobe 4 mm nodule associated with the fissure. Recommend   follow-up noncontrast chest CT in one year.    Nodularity of the hepatic surface suggestive of cirrhosis.    < end of copied text >    < from: Transthoracic Echocardiogram (07.26.23 @ 18:57) >  CONCLUSIONS:  1. Mitral annular calcification, otherwise normal mitral  valve. Minimal mitral regurgitation.  2. Normal leftventricular internal dimensions and wall  thicknesses.  3. Endocardium not well visualized; grossly normal left  ventricular systolic function.  4. Mild diastolic dysfunction (Stage I).  5. Normal right ventricular size and function.    < end of copied text >    ------------------------------------------------------------------------------------------------------------    ALLERGIES:  Allergies    No Known Allergies    Intolerances      MEDICATIONS  (STANDING):  acetaminophen     Tablet .. 650 milliGRAM(s) Oral every 8 hours  acetaminophen   IVPB .. 1000 milliGRAM(s) IV Intermittent once  albuterol/ipratropium for Nebulization 3 milliLiter(s) Nebulizer every 6 hours  budesonide 160 MICROgram(s)/formoterol 4.5 MICROgram(s) Inhaler 2 Puff(s) Inhalation two times a day  busPIRone 10 milliGRAM(s) Oral two times a day  chlorhexidine 2% Cloths 1 Application(s) Topical once  dextrose 5%. 1000 milliLiter(s) (50 mL/Hr) IV Continuous <Continuous>  dextrose 5%. 1000 milliLiter(s) (100 mL/Hr) IV Continuous <Continuous>  dextrose 50% Injectable 25 Gram(s) IV Push once  dextrose 50% Injectable 12.5 Gram(s) IV Push once  dextrose 50% Injectable 25 Gram(s) IV Push once  furosemide   Injectable 40 milliGRAM(s) IV Push daily  glucagon  Injectable 1 milliGRAM(s) IntraMuscular once  insulin lispro (ADMELOG) corrective regimen sliding scale   SubCutaneous three times a day before meals  insulin lispro (ADMELOG) corrective regimen sliding scale   SubCutaneous at bedtime  levothyroxine 112 MICROGram(s) Oral daily  metoprolol succinate ER 25 milliGRAM(s) Oral daily  montelukast 10 milliGRAM(s) Oral at bedtime    MEDICATIONS  (PRN):  aluminum hydroxide/magnesium hydroxide/simethicone Suspension 30 milliLiter(s) Oral every 4 hours PRN Dyspepsia  dextrose Oral Gel 15 Gram(s) Oral once PRN Blood Glucose LESS THAN 70 milliGRAM(s)/deciliter  melatonin 3 milliGRAM(s) Oral at bedtime PRN Insomnia  ondansetron Injectable 4 milliGRAM(s) IV Push every 8 hours PRN Nausea and/or Vomiting           HPI:  Patient is a 88 year old female with pmh of HTN, DM2, COPD (O2 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 Patient is AAOx3 and was able to provide most of the information. Pt reports that she was sitting in an wheel chair and and fell asleep. When she woke up she was on the floor. She slipped out of the chair while she was sleeping. She then called for help and the staff was able to assist her. On examination she was found to have external rotation and abduction of the L lower extremity, prompting her to come to the ED.   In the ED, her vitals were notable for chronic hypoxia. Labs were notable for leukocytosis, chronic anemia, acute on chronic hyperkalemia, elevated BUN/Scr. Xray of hip/pelvis showed Acute comminuted mildly displaced periprosthetic left proximal femoral fracture. Palliative consulted for complex medical decision making in the setting of serious illness.     Pt seen and examined bedside. Patient was alert and oriented x3, engaged in conversation. Notably patient understood that she was not a candidate for surgery at this time due to her breathing issues. She reiterated that she was at [facility] was doing "okay" (no declines or improvement in her health) but had "an unfortunate accident" resulting in her fracturing her hip. She reiterated that the breathing mask makes her uncomfortable and does not want BiPAP at this time. At time of evaluation she received neb treatment and was breathing comfortably with NC. She feels well overall, comfortable sleeping upright and laying flat. Denies issues with sleeping, waking up from sleep, or feeling SOB. However she was upset that "because of the fracture [she is] now bedbound". Notably when asked what is next she remarked "death". Upon further conversation the patient expressed that since she was not a surgical candidate she will eventually die in bed. She denied SI, and noted that "death could be tomorrow or could be a year from now, who knows". When asked about intubation the patient adamantly said she did "not want to be dependent on a tube". She also offered up that her mother patel pneumonia and  while be intubated intermittently over 13 months. She also mentioned that her brother Zachariah and nephew Juan are involved in her care and she wanted to speak to them about next steps. Family meeting planned for today 2023 bedside with nephew in person and brother on the telephone at 1:30pm.        PERTINENT PM/SXH:   DM (diabetes mellitus)    Hypothyroid    HTN (hypertension)    COPD (chronic obstructive pulmonary disease)    Chronic bronchitis    Chronic kidney disease (CKD)    Shingles      S/P cholecystectomy    H/O cataract    Leg fracture    S/P hip replacement, left      FAMILY HISTORY:  FHx: rheumatoid arthritis (Mother)  Mother and Brother    FH: type 2 diabetes mellitus (Father)  Father    Family history of hypertension (Father)  Father      ------------------------------------------------------------------------------------------------------------  ITEMS NOT CHECKED ARE NOT PRESENT    SOCIAL HISTORY:   Living Situation: [ ]Home  [X ]Long term care  [ ]Rehab [ ]Other  Support:     Substance hx:  [ ]   Tobacco hx:  [ ]   Alcohol hx: [ ]   Family Hx substance abuse [ ]yes [ ]no    Holiness/Spirituality:  PCSSQ[Palliative Care Spiritual Screening Question]   Severity (0-10): 0  Score of 4 or > indicate consideration of Chaplaincy referral.  Chaplaincy Referral: [ ] yes [X ] refused [ ] following    Anticipatory Grief present?:  [ ] yes [X ] no  [ ] Deferred                      Other Referrals:    [ ]Hospice   [ ]Caregiver [place] Support  [ ]Child Life    [ ]Patient & Family Centered Care Referral  [ ]Holistic Therapy     ------------------------------------------------------------------------------------------------------------    PRESENT SYMPTOMS:    [ ] No     [ ] Unable to self-report      [ ] CPOT (ICU)     [ ] PAINADs     [ ] RDOS    [ ] Yes     Source if other than patient:  [ ]Family   [ ]Team     PAIN:   If blank, patient unable to specify   [ ]yes [ ]no  QOL impact-   Location -                    Aggravating factors -  Quality -  Radiation -  Timing-  Pain at most severe level (0-10 scale):  Pain at minimal acceptable level/Pain Goal (0-10 scale):     SYMPTOMS:   Dyspnea:                           [ ]Mild [ ]Moderate [ ]Severe  Anxiety:                             [ ]Mild [ ]Moderate [ ]Severe  Fatigue:                             [ ]Mild [ ]Moderate [ ]Severe  Nausea/Vomiting:              [ ]Mild [ ]Moderate [ ]Severe  Loss of appetite:                [ ]Mild [ ]Moderate [ ]Severe  Constipation:                     [ ]Mild [ ]Moderate [ ]Severe    Other Symptoms:  [X ]All other review of systems negative     Home Medications for symptoms if any:  I-Stop Reference No:    ------------------------------------------------------------------------------------------------------------    FUNCTIONAL STATUS:     Baseline ADL (prior to admission):  [ ] Independent  [ ] Moderate Assistance [X ] Dependent  Palliative Performance Score: 30%     [X ]PPSV2 < or = to 30%     NUTRITIONAL STATUS:     Protein Calorie Malnutrition Present:   [ ]mild   [ ]moderate   [ ]severe   [ ]poor nutritional intake   [ ]artificial nutrition      Weight:   [ ]underweight (BMI 18.5 or less)   [ ]morbid obesity (BMI 30 or higher)   [ ]anasarca  [X ]significant weight loss     Height (cm): 162.6 (23 @ 09:01), 157.5 (23 @ 05:30), 167.6 (23 @ 19:50)  Weight (kg): 70.6 (23 @ 09:01), 68.9 (23 @ 06:45), 74.843 (23 @ 19:50)  BMI (kg/m2): 26.7 (23 @ 09:01), 27.8 (23 @ 06:45), 30.2 (23 @ 05:30)    ------------------------------------------------------------------------------------------------------------    PRIOR ADVANCE DIRECTIVES:    [X ] DNR/MOLST    [ ] Living Will    [ ] Health Care Proxy(s)    DECISION MAKER(s):  [ ] Patient    [ ] Surrogate(s)  [ ] HCP   [ ] Guardian             ------------------------------------------------------------------------------------------------------------  PHYSICAL EXAM:  Vital Signs Last 24 Hrs  T(C): 36.9 (02 Aug 2023 05:06), Max: 36.9 (02 Aug 2023 05:06)  T(F): 98.5 (02 Aug 2023 05:06), Max: 98.5 (02 Aug 2023 05:06)  HR: 67 (02 Aug 2023 07:35) (67 - 84)  BP: 148/53 (02 Aug 2023 05:06) (105/65 - 148/53)  BP(mean): --  RR: 19 (02 Aug 2023 05:06) (16 - 19)  SpO2: 93% (02 Aug 2023 07:35) (93% - 98%)    Parameters below as of 02 Aug 2023 05:06  Patient On (Oxygen Delivery Method): nasal cannula  O2 Flow (L/min): 6   I&O's Summary      GENERAL:  [ ]Cachexia  [ ] Frail  [ ]Awake  [ ]Oriented x   [ ]Lethargic  [ ]Unarousable  [ ]Verbal  [ ]Non-Verbal    BEHAVIORAL:   [ ] Anxiety  [ ] Delirium [ ] Agitation [ ] Other    HEENT:   [ ]Normal   [ ]Dry mouth   [ ]ET Tube/Trach  [ ]Oral lesions    PULMONARY:   [ ]Clear [ ]Tachypnea  [ ]Audible excessive secretions   [ ]Rhonchi        [ ]Right [ ]Left [ ]Bilateral  [ ]Crackles        [ ]Right [ ]Left [ ]Bilateral  [ ]Wheezing     [ ]Right [ ]Left [ ]Bilateral  [ ]Diminished breath sounds [ ]right [ ]left [ ]bilateral    CARDIOVASCULAR:    [ ]Regular [ ]Irregular [ ]Tachy  [ ]Harmeet [ ]Murmur [ ]Other    GASTROINTESTINAL:  [ ]Soft  [ ]Distended   [ ]+BS  [ ]Non tender [ ]Tender  [ ]Other [ ]PEG [ ]OGT/ NGT      GENITOURINARY:  [ ]Normal [ ] Incontinent   [ ]Oliguria/Anuria   [ ]Palacio    MUSCULOSKELETAL:   [ ]Normal   [ ]Weakness  [ ]Bed/Wheelchair bound [ ]Edema    NEUROLOGIC:   [ ]No focal deficits  [ ]Cognitive impairment  [ ]Dysphagia [ ]Dysarthria [ ]Paresis [ ]Other     SKIN:   [ ]Normal  [ ]Rash  [ ]Other  [ ]Pressure ulcer(s)       Present on admission [ ]y [ ]n    ------------------------------------------------------------------------------------------------------------    LABS:                        7.6    8.75  )-----------( 226      ( 02 Aug 2023 05:25 )             23.9   08-    140  |  99  |  62<H>  ----------------------------<  136<H>  4.6   |  34<H>  |  1.89<H>    Ca    8.6      02 Aug 2023 05:25  Phos  3.0     08-  Mg     2.30     08-    TPro  6.8  /  Alb  3.2<L>  /  TBili  0.3  /  DBili  <0.2  /  AST  17  /  ALT  9   /  AlkPhos  71  08  PT/INR - ( 01 Aug 2023 05:55 )   PT: 11.0 sec;   INR: 0.97 ratio         PTT - ( 01 Aug 2023 05:55 )  PTT:28.1 sec    Urinalysis Basic - ( 02 Aug 2023 05:25 )    Color: x / Appearance: x / SG: x / pH: x  Gluc: 136 mg/dL / Ketone: x  / Bili: x / Urobili: x   Blood: x / Protein: x / Nitrite: x   Leuk Esterase: x / RBC: x / WBC x   Sq Epi: x / Non Sq Epi: x / Bacteria: x    ------------------------------------------------------------------------------------------------------------    CRITICAL CARE:  [ ]Shock Present  [ ]Septic [ ]Cardiogenic [ ]Neurologic [ ]Hypovolemic [ ] Undifferentiated/Mixed   [ ]Vasopressors [ ]Inotropes     [ ]Respiratory failure present: [ ]Acute  [ ]Chronic [ ]Hypoxic  [ ]Hypercarbic   [ ]Mechanical ventilation   [ ]Trach collar   [ ]Non-invasive ventilatory support   [ ]High flow  Mode: NIV (Noninvasive Ventilation), RR (machine): 16, TV (machine): 450, FiO2: 40, PEEP: 5, ITime: 0.9, P-High: 30, P-Low: 10, PIP: 11  [ ]Non-rebreather/Venti     [ ]Other organ failure     ------------------------------------------------------------------------------------------------------------    RADIOLOGY & ADDITIONAL STUDIES:    < from: Xray Hip w/ Pelvis 2 or 3 Views, Left (23 @ 12:40) >  IMPRESSION:  Acute comminuted mildly displaced periprosthetic left proximal femoral   fracture.    < end of copied text >    < from: CT Femur No Cont, Left (23 @ 20:50) >  IMPRESSION:    Acute comminutedleft femoral periprosthetic fracture as described.    < end of copied text >    < from: Xray Abdomen 1 View PORTABLE -Urgent (Xray Abdomen 1 View PORTABLE -Urgent .) (23 @ 09:43) >  FINDINGS/  IMPRESSION: Thebowel gas pattern is nonobstructive. There is contrast   within the colon.    < end of copied text >    < from: CT Head No Cont (23 @ 09:45) >  FINDINGS: There is no acute intracranial hemorrhage, mass effect, shift   of the midline structures, herniation, extra-axial fluid collection, or   hydrocephalus.    There is diffuse cerebral volume loss with prominence of the sulci,   fissures, and cisternal spaces which is normal for the patient's age.   Mild ventriculomegaly appears unchanged. There ismild deep and   periventricular white matter hypoattenuation statistically compatible   with microvascular changes given calcific atherosclerotic disease of the   intracranial arteries.    The paranasal sinuses and tympanomastoid cavities are clear. The   calvarium is intact. There is evidence of bilateral cataract removal.   Bilateral senile scleral plaques are noted.      < end of copied text >    < from: CT Chest No Cont (23 @ 09:45) >  IMPRESSION:    Interlobular septal thickening, suggestive of pulmonary edema.    Small bilateral pleural effusions.    New left upper lobe 4 mm nodule associated with the fissure. Recommend   follow-up noncontrast chest CT in one year.    Nodularity of the hepatic surface suggestive of cirrhosis.    < end of copied text >    < from: Transthoracic Echocardiogram (23 @ 18:57) >  CONCLUSIONS:  1. Mitral annular calcification, otherwise normal mitral  valve. Minimal mitral regurgitation.  2. Normal leftventricular internal dimensions and wall  thicknesses.  3. Endocardium not well visualized; grossly normal left  ventricular systolic function.  4. Mild diastolic dysfunction (Stage I).  5. Normal right ventricular size and function.    < end of copied text >    ------------------------------------------------------------------------------------------------------------    ALLERGIES:  Allergies    No Known Allergies    Intolerances      MEDICATIONS  (STANDING):  acetaminophen     Tablet .. 650 milliGRAM(s) Oral every 8 hours  acetaminophen   IVPB .. 1000 milliGRAM(s) IV Intermittent once  albuterol/ipratropium for Nebulization 3 milliLiter(s) Nebulizer every 6 hours  budesonide 160 MICROgram(s)/formoterol 4.5 MICROgram(s) Inhaler 2 Puff(s) Inhalation two times a day  busPIRone 10 milliGRAM(s) Oral two times a day  chlorhexidine 2% Cloths 1 Application(s) Topical once  dextrose 5%. 1000 milliLiter(s) (50 mL/Hr) IV Continuous <Continuous>  dextrose 5%. 1000 milliLiter(s) (100 mL/Hr) IV Continuous <Continuous>  dextrose 50% Injectable 25 Gram(s) IV Push once  dextrose 50% Injectable 12.5 Gram(s) IV Push once  dextrose 50% Injectable 25 Gram(s) IV Push once  furosemide   Injectable 40 milliGRAM(s) IV Push daily  glucagon  Injectable 1 milliGRAM(s) IntraMuscular once  insulin lispro (ADMELOG) corrective regimen sliding scale   SubCutaneous three times a day before meals  insulin lispro (ADMELOG) corrective regimen sliding scale   SubCutaneous at bedtime  levothyroxine 112 MICROGram(s) Oral daily  metoprolol succinate ER 25 milliGRAM(s) Oral daily  montelukast 10 milliGRAM(s) Oral at bedtime    MEDICATIONS  (PRN):  aluminum hydroxide/magnesium hydroxide/simethicone Suspension 30 milliLiter(s) Oral every 4 hours PRN Dyspepsia  dextrose Oral Gel 15 Gram(s) Oral once PRN Blood Glucose LESS THAN 70 milliGRAM(s)/deciliter  melatonin 3 milliGRAM(s) Oral at bedtime PRN Insomnia  ondansetron Injectable 4 milliGRAM(s) IV Push every 8 hours PRN Nausea and/or Vomiting           HPI:  Patient is a 88 year old female with pmh of HTN, DM2, COPD (O2 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 Patient is AAOx3 and was able to provide most of the information. Pt reports that she was sitting in an wheel chair and and fell asleep. When she woke up she was on the floor. She slipped out of the chair while she was sleeping. She then called for help and the staff was able to assist her. On examination she was found to have external rotation and abduction of the L lower extremity, prompting her to come to the ED.   In the ED, her vitals were notable for chronic hypoxia. Labs were notable for leukocytosis, chronic anemia, acute on chronic hyperkalemia, elevated BUN/Scr. Xray of hip/pelvis showed Acute comminuted mildly displaced periprosthetic left proximal femoral fracture. Palliative consulted for complex medical decision making in the setting of serious illness.     Pt seen and examined bedside. Patient was alert and oriented x3, engaged in conversation. Notably patient understood that she was not a candidate for surgery at this time due to her breathing issues. She reiterated that she was at [facility] was doing "okay" (no declines or improvement in her health) but had "an unfortunate accident" resulting in her fracturing her hip. She reiterated that the breathing mask makes her uncomfortable and does not want BiPAP at this time. At time of evaluation she received neb treatment and was breathing comfortably with NC. She feels well overall, comfortable sleeping upright and laying flat. Denies issues with sleeping, waking up from sleep, or feeling SOB. However she was upset that "because of the fracture [she is] now bedbound". Notably when asked what is next she remarked "death". Upon further conversation the patient expressed that since she was not a surgical candidate she will eventually die in bed. She denied SI, and noted that "death could be tomorrow or could be a year from now, who knows". When asked about intubation the patient adamantly said she did "not want to be dependent on a tube". She also offered up that her mother patel pneumonia and  while be intubated intermittently over 13 months. She also mentioned that her brother Zachariah and nephew Juan are involved in her care and she wanted to speak to them about next steps. Family meeting planned for today 2023 bedside with nephew in person and brother on the telephone at 1:30pm.        Additional conversation bedside with Nephew Juan, Brother Adal, and on phone brother Zachariah. Reviewed patient prognosis, wishes and next steps. Please see additional information in GOC section below.        PERTINENT PM/SXH:   DM (diabetes mellitus)    Hypothyroid    HTN (hypertension)    COPD (chronic obstructive pulmonary disease)    Chronic bronchitis    Chronic kidney disease (CKD)    Shingles      S/P cholecystectomy    H/O cataract    Leg fracture    S/P hip replacement, left      FAMILY HISTORY:  FHx: rheumatoid arthritis (Mother)  Mother and Brother    FH: type 2 diabetes mellitus (Father)  Father    Family history of hypertension (Father)  Father      ------------------------------------------------------------------------------------------------------------  ITEMS NOT CHECKED ARE NOT PRESENT    SOCIAL HISTORY:   Living Situation: [ ]Home  [X ]Long term care  [ ]Rehab [ ]Other  Support: Family lives [facility] and in [place], previously before April hospitalization lived on own with visiting nurse.     Substance hx:  [ ]   Tobacco hx:  [ ]   Alcohol hx: [ ]   Family Hx substance abuse [ ]yes [ ]no    Baptism/Spirituality:  PCSSQ[Palliative Care Spiritual Screening Question]   Severity (0-10): 0  Score of 4 or > indicate consideration of Chaplaincy referral.  Chaplaincy Referral: [ ] yes [X ] refused [ ] following    Anticipatory Grief present?:  [ ] yes [X ] no  [ ] Deferred                      Other Referrals:    [ ]Hospice   [ ]Caregiver [place] Support  [ ]Child Life    [ ]Patient & Family Centered Care Referral  [ ]Holistic Therapy     ------------------------------------------------------------------------------------------------------------    PRESENT SYMPTOMS:    [X] No     [ ] Unable to self-report      [ ] CPOT (ICU)     [ ] PAINADs     [ ] RDOS    [ ] Yes     Source if other than patient:  [ ]Family   [ ]Team     PAIN:   If blank, patient unable to specify   [ ]yes [X]no  QOL impact-   Location -                    Aggravating factors -  Quality -  Radiation -  Timing-  Pain at most severe level (0-10 scale):  Pain at minimal acceptable level/Pain Goal (0-10 scale):     SYMPTOMS:   Dyspnea:                           [ ]Mild [ ]Moderate [ ]Severe  Anxiety:                             [ ]Mild [ ]Moderate [ ]Severe  Fatigue:                             [ ]Mild [ ]Moderate [ ]Severe  Nausea/Vomiting:              [ ]Mild [ ]Moderate [ ]Severe  Loss of appetite:                [ ]Mild [ ]Moderate [ ]Severe  Constipation:                     [ ]Mild [ ]Moderate [ ]Severe    Other Symptoms:  [X ]All other review of systems negative     Home Medications for symptoms if any:  I-Stop Reference No:    ------------------------------------------------------------------------------------------------------------    FUNCTIONAL STATUS:     Baseline ADL (prior to admission):  [ ] Independent  [ ] Moderate Assistance [X ] Dependent  Palliative Performance Score: 30%     [X ]PPSV2 < or = to 30%     NUTRITIONAL STATUS:     Protein Calorie Malnutrition Present:   [ ]mild   [ ]moderate   [ ]severe   [ ]poor nutritional intake   [ ]artificial nutrition      Weight:   [ ]underweight (BMI 18.5 or less)   [ ]morbid obesity (BMI 30 or higher)   [ ]anasarca  [X ]significant weight loss     Height (cm): 162.6 (23 @ 09:01), 157.5 (23 @ 05:30), 167.6 (23 @ 19:50)  Weight (kg): 70.6 (23 @ 09:01), 68.9 (23 @ 06:45), 74.843 (23 @ 19:50)  BMI (kg/m2): 26.7 (23 @ 09:01), 27.8 (23 @ 06:45), 30.2 (23 05:30)    ------------------------------------------------------------------------------------------------------------    PRIOR ADVANCE DIRECTIVES:    [X ] DNR/MOLST    [ ] Living Will    [X] Health Care Proxy(s), completed on 2023--helena Martinez 1st, brother Zachariah 2nd    DECISION MAKER(s):  [X] Patient    [ ] Surrogate(s)  [X] HCP   [ ] Guardian             ------------------------------------------------------------------------------------------------------------  PHYSICAL EXAM:  Vital Signs Last 24 Hrs  T(C): 36.9 (02 Aug 2023 05:06), Max: 36.9 (02 Aug 2023 05:06)  T(F): 98.5 (02 Aug 2023 05:06), Max: 98.5 (02 Aug 2023 05:06)  HR: 67 (02 Aug 2023 07:35) (67 - 84)  BP: 148/53 (02 Aug 2023 05:06) (105/65 - 148/53)  BP(mean): --  RR: 19 (02 Aug 2023 05:06) (16 - 19)  SpO2: 93% (02 Aug 2023 07:35) (93% - 98%)    Parameters below as of 02 Aug 2023 05:06  Patient On (Oxygen Delivery Method): nasal cannula  O2 Flow (L/min): 6   I&O's Summary      GENERAL:  [ ]Cachexia  [X] Frail  [X]Awake  [X]Oriented    [ ]Lethargic  [ ]Unarousable  [ ]Verbal  [ ]Non-Verbal    BEHAVIORAL:   [ ] Anxiety  [ ] Delirium [ ] Agitation [ ] Other    HEENT:   [ ]Normal   [ ]Dry mouth   [ ]ET Tube/Trach  [ ]Oral lesions    PULMONARY:   [ ]Clear [ ]Tachypnea  [ ]Audible excessive secretions   [ ]Rhonchi        [ ]Right [ ]Left [ ]Bilateral  [X]Crackles        [ ]Right [ ]Left [X]Bilateral  [ ]Wheezing     [ ]Right [ ]Left [ ]Bilateral  [ ]Diminished breath sounds [ ]right [ ]left [ ]bilateral    CARDIOVASCULAR:    [X ]Regular [ ]Irregular [ ]Tachy  [ ]Harmeet [ ]Murmur [ ]Other    GASTROINTESTINAL:  [X ]Soft  [ ]Distended   [ X]+BS  [X ]Non tender [ ]Tender  [ ]Other [ ]PEG [ ]OGT/ NGT      GENITOURINARY:  [X]Normal [ ] Incontinent   [ ]Oliguria/Anuria   [ ]Palacio    MUSCULOSKELETAL:   [ ]Normal   [X ]Weakness  [X ]Bed/Wheelchair bound [ ]Edema    NEUROLOGIC:   [X ]No focal deficits  [ ]Cognitive impairment  [ ]Dysphagia [ ]Dysarthria [ ]Paresis [ ]Other     SKIN:   [X]Normal  [ ]Rash  [ ]Other  [ ]Pressure ulcer(s)       Present on admission [ ]y [ ]n    ------------------------------------------------------------------------------------------------------------    LABS:                        7.6    8.75  )-----------( 226      ( 02 Aug 2023 05:25 )             23.9   08-02    140  |  99  |  62<H>  ----------------------------<  136<H>  4.6   |  34<H>  |  1.89<H>    Ca    8.6      02 Aug 2023 05:25  Phos  3.0     08-  Mg     2.30     08-    TPro  6.8  /  Alb  3.2<L>  /  TBili  0.3  /  DBili  <0.2  /  AST  17  /  ALT  9   /  AlkPhos  71    PT/INR - ( 01 Aug 2023 05:55 )   PT: 11.0 sec;   INR: 0.97 ratio         PTT - ( 01 Aug 2023 05:55 )  PTT:28.1 sec    Urinalysis Basic - ( 02 Aug 2023 05:25 )    Color: x / Appearance: x / SG: x / pH: x  Gluc: 136 mg/dL / Ketone: x  / Bili: x / Urobili: x   Blood: x / Protein: x / Nitrite: x   Leuk Esterase: x / RBC: x / WBC x   Sq Epi: x / Non Sq Epi: x / Bacteria: x    ------------------------------------------------------------------------------------------------------------    CRITICAL CARE:  [ ]Shock Present  [ ]Septic [ ]Cardiogenic [ ]Neurologic [ ]Hypovolemic [ ] Undifferentiated/Mixed   [ ]Vasopressors [ ]Inotropes     [ ]Respiratory failure present: [ ]Acute  [X]Chronic [ ]Hypoxic  [X ]Hypercarbic   [ ]Mechanical ventilation   [ ]Trach collar   [ ]Non-invasive ventilatory support   [ ]High flow  Mode: NIV (Noninvasive Ventilation) AVAPS/BiPAP, RR (machine): 16, TV (machine): 450, FiO2: 40, PEEP: 5, ITime: 0.9, P-High: 30, P-Low: 10, PIP: 11  [ ]Non-rebreather/Venti   [ ]Other organ failure     ------------------------------------------------------------------------------------------------------------    RADIOLOGY & ADDITIONAL STUDIES:    < from: Xray Hip w/ Pelvis 2 or 3 Views, Left (23 @ 12:40) >  IMPRESSION:  Acute comminuted mildly displaced periprosthetic left proximal femoral   fracture.    < end of copied text >    < from: CT Femur No Cont, Left (23 @ 20:50) >  IMPRESSION:    Acute comminutedleft femoral periprosthetic fracture as described.    < end of copied text >    < from: Xray Abdomen 1 View PORTABLE -Urgent (Xray Abdomen 1 View PORTABLE -Urgent .) (23 @ 09:43) >  FINDINGS/  IMPRESSION: Thebowel gas pattern is nonobstructive. There is contrast   within the colon.    < end of copied text >    < from: CT Head No Cont (23 @ 09:45) >  FINDINGS: There is no acute intracranial hemorrhage, mass effect, shift   of the midline structures, herniation, extra-axial fluid collection, or   hydrocephalus.    There is diffuse cerebral volume loss with prominence of the sulci,   fissures, and cisternal spaces which is normal for the patient's age.   Mild ventriculomegaly appears unchanged. There ismild deep and   periventricular white matter hypoattenuation statistically compatible   with microvascular changes given calcific atherosclerotic disease of the   intracranial arteries.    The paranasal sinuses and tympanomastoid cavities are clear. The   calvarium is intact. There is evidence of bilateral cataract removal.   Bilateral senile scleral plaques are noted.      < end of copied text >    < from: CT Chest No Cont (23 @ 09:45) >  IMPRESSION:    Interlobular septal thickening, suggestive of pulmonary edema.    Small bilateral pleural effusions.    New left upper lobe 4 mm nodule associated with the fissure. Recommend   follow-up noncontrast chest CT in one year.    Nodularity of the hepatic surface suggestive of cirrhosis.    < end of copied text >    < from: Transthoracic Echocardiogram (23 @ 18:57) >  CONCLUSIONS:  1. Mitral annular calcification, otherwise normal mitral  valve. Minimal mitral regurgitation.  2. Normal leftventricular internal dimensions and wall  thicknesses.  3. Endocardium not well visualized; grossly normal left  ventricular systolic function.  4. Mild diastolic dysfunction (Stage I).  5. Normal right ventricular size and function.    < end of copied text >    ------------------------------------------------------------------------------------------------------------    ALLERGIES:  Allergies    No Known Allergies    Intolerances      MEDICATIONS  (STANDING):  acetaminophen     Tablet .. 650 milliGRAM(s) Oral every 8 hours  acetaminophen   IVPB .. 1000 milliGRAM(s) IV Intermittent once  albuterol/ipratropium for Nebulization 3 milliLiter(s) Nebulizer every 6 hours  budesonide 160 MICROgram(s)/formoterol 4.5 MICROgram(s) Inhaler 2 Puff(s) Inhalation two times a day  busPIRone 10 milliGRAM(s) Oral two times a day  chlorhexidine 2% Cloths 1 Application(s) Topical once  dextrose 5%. 1000 milliLiter(s) (50 mL/Hr) IV Continuous <Continuous>  dextrose 5%. 1000 milliLiter(s) (100 mL/Hr) IV Continuous <Continuous>  dextrose 50% Injectable 25 Gram(s) IV Push once  dextrose 50% Injectable 12.5 Gram(s) IV Push once  dextrose 50% Injectable 25 Gram(s) IV Push once  furosemide   Injectable 40 milliGRAM(s) IV Push daily  glucagon  Injectable 1 milliGRAM(s) IntraMuscular once  insulin lispro (ADMELOG) corrective regimen sliding scale   SubCutaneous three times a day before meals  insulin lispro (ADMELOG) corrective regimen sliding scale   SubCutaneous at bedtime  levothyroxine 112 MICROGram(s) Oral daily  metoprolol succinate ER 25 milliGRAM(s) Oral daily  montelukast 10 milliGRAM(s) Oral at bedtime    MEDICATIONS  (PRN):  aluminum hydroxide/magnesium hydroxide/simethicone Suspension 30 milliLiter(s) Oral every 4 hours PRN Dyspepsia  dextrose Oral Gel 15 Gram(s) Oral once PRN Blood Glucose LESS THAN 70 milliGRAM(s)/deciliter  melatonin 3 milliGRAM(s) Oral at bedtime PRN Insomnia  ondansetron Injectable 4 milliGRAM(s) IV Push every 8 hours PRN Nausea and/or Vomiting           HPI:  Patient is a 88 year old female with pmh of HTN, DM2, COPD (O2 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 Patient is AAOx3 and was able to provide most of the information. Pt reports that she was sitting in an wheel chair and and fell asleep. When she woke up she was on the floor. She slipped out of the chair while she was sleeping. She then called for help and the staff was able to assist her. On examination she was found to have external rotation and abduction of the L lower extremity, prompting her to come to the ED.   In the ED, her vitals were notable for chronic hypoxia. Labs were notable for leukocytosis, chronic anemia, acute on chronic hyperkalemia, elevated BUN/Scr. Xray of hip/pelvis showed Acute comminuted mildly displaced periprosthetic left proximal femoral fracture. Palliative consulted for complex medical decision making in the setting of serious illness.     Pt seen and examined bedside. Patient was alert and oriented x3, engaged in conversation. Notably patient understood that she was not a candidate for surgery at this time due to her breathing issues. She reiterated that she was at [facility] was doing "okay" (no declines or improvement in her health) but had "an unfortunate accident" resulting in her fracturing her hip. She reiterated that the breathing mask makes her uncomfortable and does not want BiPAP at this time. At time of evaluation she received neb treatment and was breathing comfortably with NC. She feels well overall, comfortable sleeping upright and laying flat. Denies issues with sleeping, waking up from sleep, or feeling SOB. However she was upset that "because of the fracture [she is] now bedbound". Notably when asked what is next she remarked "death". Upon further conversation the patient expressed that since she was not a surgical candidate she will eventually die in bed. She denied SI, and noted that "death could be tomorrow or could be a year from now, who knows". When asked about intubation the patient adamantly said she did "not want to be dependent on a tube". She also offered up that her mother patel pneumonia and  while be intubated intermittently over 13 months. She also mentioned that her brother Zachariah and nephew Juan are involved in her care and she wanted to speak to them about next steps. Family meeting planned for today 2023 bedside with nephew in person and brother on the telephone at 1:30pm.        Additional conversation bedside with Nephew Juan, Brother Adal, and on phone brother Zachariah. Reviewed patient prognosis, wishes and next steps. Please see additional information in GOC section below.        PERTINENT PM/SXH:   DM (diabetes mellitus)    Hypothyroid    HTN (hypertension)    COPD (chronic obstructive pulmonary disease)    Chronic bronchitis    Chronic kidney disease (CKD)    Shingles      S/P cholecystectomy    H/O cataract    Leg fracture    S/P hip replacement, left      FAMILY HISTORY:  FHx: rheumatoid arthritis (Mother)  Mother and Brother    FH: type 2 diabetes mellitus (Father)  Father    Family history of hypertension (Father)  Father      ------------------------------------------------------------------------------------------------------------  ITEMS NOT CHECKED ARE NOT PRESENT    SOCIAL HISTORY:   Living Situation: [ ]Home  [X ]Long term care  [ ]Rehab [ ]Other  Support: [facility] lives [email or web address] and in [place], previously before April hospitalization lived on own with visiting nurse.     Substance hx:  [ ]   Tobacco hx:  [ ]   Alcohol hx: [ ]   Family Hx substance abuse [ ]yes [ ]no    Synagogue/Spirituality:  PCSSQ[Palliative Care Spiritual Screening Question]   Severity (0-10): 0  Score of 4 or > indicate consideration of Chaplaincy referral.  Chaplaincy Referral: [ ] yes [X ] refused [ ] following    Anticipatory Grief present?:  [ ] yes [X ] no  [ ] Deferred                      Other Referrals:    [ ]Hospice   [ ]Caregiver [place] Support  [ ]Child Life    [ ]Patient & Family Centered Care Referral  [ ]Holistic Therapy     ------------------------------------------------------------------------------------------------------------    PRESENT SYMPTOMS:    [X] No     [ ] Unable to self-report      [ ] CPOT (ICU)     [ ] PAINADs     [ ] RDOS    [ ] Yes     Source if other than patient:  [ ]Family   [ ]Team     PAIN:   If blank, patient unable to specify   [ ]yes [X]no  QOL impact-   Location -                    Aggravating factors -  Quality -  Radiation -  Timing-  Pain at most severe level (0-10 scale):  Pain at minimal acceptable level/Pain Goal (0-10 scale):     SYMPTOMS:   Dyspnea:                           [ ]Mild [ ]Moderate [ ]Severe  Anxiety:                             [ ]Mild [ ]Moderate [ ]Severe  Fatigue:                             [ ]Mild [ ]Moderate [ ]Severe  Nausea/Vomiting:              [ ]Mild [ ]Moderate [ ]Severe  Loss of appetite:                [ ]Mild [ ]Moderate [ ]Severe  Constipation:                     [ ]Mild [ ]Moderate [ ]Severe    Other Symptoms:  [X ]All other review of systems negative     Home Medications for symptoms if any:  I-Stop Reference No:    ------------------------------------------------------------------------------------------------------------    FUNCTIONAL STATUS:     Baseline ADL (prior to admission):  [ ] Independent  [ ] Moderate Assistance [X ] Dependent  Palliative Performance Score: 30%     [X ]PPSV2 < or = to 30%     NUTRITIONAL STATUS:     Protein Calorie Malnutrition Present:   [ ]mild   [ ]moderate   [ ]severe   [ ]poor nutritional intake   [ ]artificial nutrition      Weight:   [ ]underweight (BMI 18.5 or less)   [ ]morbid obesity (BMI 30 or higher)   [ ]anasarca  [X ]significant weight loss     Height (cm): 162.6 (23 @ 09:01), 157.5 (23 @ 05:30), 167.6 (23 @ 19:50)  Weight (kg): 70.6 (23 @ 09:01), 68.9 (23 @ 06:45), 74.843 (23 @ 19:50)  BMI (kg/m2): 26.7 (23 @ 09:01), 27.8 (23 @ 06:45), 30.2 (23 05:30)    ------------------------------------------------------------------------------------------------------------    PRIOR ADVANCE DIRECTIVES:    [X ] DNR/MOLST    [ ] Living Will    [X] Health Care Proxy(s), completed on 2023--helena Martinez 1st, brother Zachariah 2nd    DECISION MAKER(s):  [X] Patient    [ ] Surrogate(s)  [X] HCP   [ ] Guardian             ------------------------------------------------------------------------------------------------------------  PHYSICAL EXAM:  Vital Signs Last 24 Hrs  T(C): 36.9 (02 Aug 2023 05:06), Max: 36.9 (02 Aug 2023 05:06)  T(F): 98.5 (02 Aug 2023 05:06), Max: 98.5 (02 Aug 2023 05:06)  HR: 67 (02 Aug 2023 07:35) (67 - 84)  BP: 148/53 (02 Aug 2023 05:06) (105/65 - 148/53)  BP(mean): --  RR: 19 (02 Aug 2023 05:06) (16 - 19)  SpO2: 93% (02 Aug 2023 07:35) (93% - 98%)    Parameters below as of 02 Aug 2023 05:06  Patient On (Oxygen Delivery Method): nasal cannula  O2 Flow (L/min): 6   I&O's Summary      GENERAL:  [ ]Cachexia  [X] Frail  [X]Awake  [X]Oriented    [ ]Lethargic  [ ]Unarousable  [X ]Verbal  [ ]Non-Verbal    BEHAVIORAL:   [ ] Anxiety  [ ] Delirium [ ] Agitation [ ] Other    HEENT:   [ ]Normal   [ ]Dry mouth   [ ]ET Tube/Trach  [ ]Oral lesions    PULMONARY:   [ ]Clear [ ]Tachypnea  [ ]Audible excessive secretions   [ ]Rhonchi        [ ]Right [ ]Left [ ]Bilateral  [X]Crackles        [ ]Right [ ]Left [X]Bilateral  [ ]Wheezing     [ ]Right [ ]Left [ ]Bilateral  [ ]Diminished breath sounds [ ]right [ ]left [ ]bilateral    CARDIOVASCULAR:    [X ]Regular [ ]Irregular [ ]Tachy  [ ]Harmeet [ ]Murmur [ ]Other    GASTROINTESTINAL:  [X ]Soft  [ ]Distended   [ X]+BS  [X ]Non tender [ ]Tender  [ ]Other [ ]PEG [ ]OGT/ NGT      GENITOURINARY:  [X]Normal [ ] Incontinent   [ ]Oliguria/Anuria   [ ]Palacio    MUSCULOSKELETAL:   [ ]Normal   [X ]Weakness  [X ]Bed/Wheelchair bound [ ]Edema    NEUROLOGIC:   [X ]No focal deficits  [ ]Cognitive impairment  [ ]Dysphagia [ ]Dysarthria [ ]Paresis [ ]Other     SKIN:   [X]Normal  [ ]Rash  [ ]Other  [ ]Pressure ulcer(s)       Present on admission [ ]y [ ]n    ------------------------------------------------------------------------------------------------------------    LABS:                        7.6    8.75  )-----------( 226      ( 02 Aug 2023 05:25 )             23.9   08-02    140  |  99  |  62<H>  ----------------------------<  136<H>  4.6   |  34<H>  |  1.89<H>    Ca    8.6      02 Aug 2023 05:25  Phos  3.0     08-  Mg     2.30     08-    TPro  6.8  /  Alb  3.2<L>  /  TBili  0.3  /  DBili  <0.2  /  AST  17  /  ALT  9   /  AlkPhos  71    PT/INR - ( 01 Aug 2023 05:55 )   PT: 11.0 sec;   INR: 0.97 ratio         PTT - ( 01 Aug 2023 05:55 )  PTT:28.1 sec    Urinalysis Basic - ( 02 Aug 2023 05:25 )    Color: x / Appearance: x / SG: x / pH: x  Gluc: 136 mg/dL / Ketone: x  / Bili: x / Urobili: x   Blood: x / Protein: x / Nitrite: x   Leuk Esterase: x / RBC: x / WBC x   Sq Epi: x / Non Sq Epi: x / Bacteria: x    ------------------------------------------------------------------------------------------------------------    CRITICAL CARE:  [ ]Shock Present  [ ]Septic [ ]Cardiogenic [ ]Neurologic [ ]Hypovolemic [ ] Undifferentiated/Mixed   [ ]Vasopressors [ ]Inotropes     [X ]Respiratory failure present: [ ]Acute  [X]Chronic [ ]Hypoxic  [X ]Hypercarbic   [ ]Mechanical ventilation   [ ]Trach collar   [ ]Non-invasive ventilatory support   [ ]High flow  Mode: NIV (Noninvasive Ventilation) AVAPS/BiPAP, RR (machine): 16, TV (machine): 450, FiO2: 40, PEEP: 5, ITime: 0.9, P-High: 30, P-Low: 10, PIP: 11  [ ]Non-rebreather/Venti   [ ]Other organ failure     ------------------------------------------------------------------------------------------------------------    RADIOLOGY & ADDITIONAL STUDIES:    < from: Xray Hip w/ Pelvis 2 or 3 Views, Left (23 @ 12:40) >  IMPRESSION:  Acute comminuted mildly displaced periprosthetic left proximal femoral   fracture.    < end of copied text >    < from: CT Femur No Cont, Left (23 @ 20:50) >  IMPRESSION:    Acute comminutedleft femoral periprosthetic fracture as described.    < end of copied text >    < from: Xray Abdomen 1 View PORTABLE -Urgent (Xray Abdomen 1 View PORTABLE -Urgent .) (23 @ 09:43) >  FINDINGS/  IMPRESSION: Thebowel gas pattern is nonobstructive. There is contrast   within the colon.    < end of copied text >    < from: CT Head No Cont (23 @ 09:45) >  FINDINGS: There is no acute intracranial hemorrhage, mass effect, shift   of the midline structures, herniation, extra-axial fluid collection, or   hydrocephalus.    There is diffuse cerebral volume loss with prominence of the sulci,   fissures, and cisternal spaces which is normal for the patient's age.   Mild ventriculomegaly appears unchanged. There ismild deep and   periventricular white matter hypoattenuation statistically compatible   with microvascular changes given calcific atherosclerotic disease of the   intracranial arteries.    The paranasal sinuses and tympanomastoid cavities are clear. The   calvarium is intact. There is evidence of bilateral cataract removal.   Bilateral senile scleral plaques are noted.      < end of copied text >    < from: CT Chest No Cont (23 @ 09:45) >  IMPRESSION:    Interlobular septal thickening, suggestive of pulmonary edema.    Small bilateral pleural effusions.    New left upper lobe 4 mm nodule associated with the fissure. Recommend   follow-up noncontrast chest CT in one year.    Nodularity of the hepatic surface suggestive of cirrhosis.    < end of copied text >    < from: Transthoracic Echocardiogram (23 @ 18:57) >  CONCLUSIONS:  1. Mitral annular calcification, otherwise normal mitral  valve. Minimal mitral regurgitation.  2. Normal leftventricular internal dimensions and wall  thicknesses.  3. Endocardium not well visualized; grossly normal left  ventricular systolic function.  4. Mild diastolic dysfunction (Stage I).  5. Normal right ventricular size and function.    < end of copied text >    ------------------------------------------------------------------------------------------------------------    ALLERGIES:  Allergies    No Known Allergies    Intolerances      MEDICATIONS  (STANDING):  acetaminophen     Tablet .. 650 milliGRAM(s) Oral every 8 hours  acetaminophen   IVPB .. 1000 milliGRAM(s) IV Intermittent once  albuterol/ipratropium for Nebulization 3 milliLiter(s) Nebulizer every 6 hours  budesonide 160 MICROgram(s)/formoterol 4.5 MICROgram(s) Inhaler 2 Puff(s) Inhalation two times a day  busPIRone 10 milliGRAM(s) Oral two times a day  chlorhexidine 2% Cloths 1 Application(s) Topical once  dextrose 5%. 1000 milliLiter(s) (50 mL/Hr) IV Continuous <Continuous>  dextrose 5%. 1000 milliLiter(s) (100 mL/Hr) IV Continuous <Continuous>  dextrose 50% Injectable 25 Gram(s) IV Push once  dextrose 50% Injectable 12.5 Gram(s) IV Push once  dextrose 50% Injectable 25 Gram(s) IV Push once  furosemide   Injectable 40 milliGRAM(s) IV Push daily  glucagon  Injectable 1 milliGRAM(s) IntraMuscular once  insulin lispro (ADMELOG) corrective regimen sliding scale   SubCutaneous three times a day before meals  insulin lispro (ADMELOG) corrective regimen sliding scale   SubCutaneous at bedtime  levothyroxine 112 MICROGram(s) Oral daily  metoprolol succinate ER 25 milliGRAM(s) Oral daily  montelukast 10 milliGRAM(s) Oral at bedtime    MEDICATIONS  (PRN):  aluminum hydroxide/magnesium hydroxide/simethicone Suspension 30 milliLiter(s) Oral every 4 hours PRN Dyspepsia  dextrose Oral Gel 15 Gram(s) Oral once PRN Blood Glucose LESS THAN 70 milliGRAM(s)/deciliter  melatonin 3 milliGRAM(s) Oral at bedtime PRN Insomnia  ondansetron Injectable 4 milliGRAM(s) IV Push every 8 hours PRN Nausea and/or Vomiting

## 2023-08-02 NOTE — CONSULT NOTE ADULT - NS ATTEST RISK PROBLEM GEN_ALL_CORE FT
Chronic hypoxemic and hypercapnic respiratory failure, COPD, Preoperative evaluation
Patient is seriously ill with acute on chronic hypercapnic respiratory failure, refusing bipap, fracture but high risk for orthopedic surgery   High risk of complications, further morbidity and mortality

## 2023-08-02 NOTE — CONSULT NOTE ADULT - CONSULT REASON
complex medical decision making in the setting of serious illness
left periprosthetic hip fracture
Perioperative risk optimization and HF
preop risk stratification

## 2023-08-02 NOTE — CONSULT NOTE ADULT - PROBLEM SELECTOR RECOMMENDATION 9
h/o COPD, previously hospitalized and recommended to use BiPAP. Patient intolerant of BiPAP. Intermittently utilized due to discomfort. Patient retaining CO2. PCO2 on ABG 72, pH .29. Likely some chronic compensation. However patient intermittently somnolent during evaluation requiring verbal redirection.    -Would recommend BiPAP education  -Rest of care per primary team. h/o COPD, previously hospitalized and recommended to use BiPAP. Patient intolerant of BiPAP. Intermittently utilized due to discomfort. Patient retaining CO2. PCO2 on ABG 72, pH .29. Likely some chronic compensation. However patient intermittently somnolent during evaluation requiring verbal redirection.  Hypercarbic can be as severe as CO2 in 80s to 90s   Patient finds bipap mask uncomfortable- family wondering if a more comfortable mask is available

## 2023-08-02 NOTE — PROGRESS NOTE ADULT - PROBLEM SELECTOR PLAN 6
Patient with hx of COPD, on 3-4L on oxygen at home   -Now in acute exacerbation   -Will continue with albuterol and bedtime avaps  -Pulm consult appreciated  -hypoxia due to fluid overload? ON lasix 40 daily and diuresing well  -encourage AVAPs use  -pall care evaluation today as patient is hospice appropriate since she is non compliant with avaps

## 2023-08-02 NOTE — CONSULT NOTE ADULT - PROBLEM SELECTOR RECOMMENDATION 4
Pt with severe hip fracture from fall out of wheelchair. Though notably wheelchair bound prior to arrival at hospital. Patient not a surgical candidate at this time due to her respiratory status and concern for inability to come off ventilator after surgery. Pt likely bedbound going forward.

## 2023-08-02 NOTE — CONSULT NOTE ADULT - ATTENDING COMMENTS
Agree with above. 88F w/ hx of htn, dm, copd (on O2), CHF, hypothyroid, CKD, s/p L hip hemiarthroplasty (9/2019), was doing well until earlier today when she fell off her wheelchair onto her left hip. Xrays reveal a displaced L periprosthetic femur fx. She is therefore indicated for revision L hip arthroplasty, MITALI, possible femur ORIF, possible PFR.     I discussed all of this in great detail with the patient including nonop/op options.     We discussed risks, benefits and alternatives. Rationale of care was reviewed and all questions were answered. Surgical risks include but are not limited to infection, bleeding, nerve damage, chronic pain, VTE, LLD, dislocation, fracture, limited ROM, weakness, hardware failure, loss of life/limb, and need for further surgical procedures.  The patient understood all of this and would like to proceed. In particular she understands that is at significantly increased risk of mortality due to her comorbidities, and may not be extubatable postop. She accepts all of these risks and would like to proceed. All of this was also discussed with her Nephew as well.    She is currently not optimized for surgery. Will plan for surgery after complete optimization/medical/cardiac/pulm clearance.
Patient is a 89 yo F w/ HTN, T2DM, COPD (home O2 3L), HFpEF, hypothyroidism, CKD3, who presents after fall found to have an acute comminuted mildly displaced periprosthetic L proximal femoral Pulmonary consulted for pre op evaluation and optimization.     #Chronic hypoxemic and hypercapnic respiratory failure - On 2 to 4 L nc at home as per patient. Has been on various inhaler regimens but is on triple therapy. Endorses unable to tolerate chronic NIPPV  #COPD  #Preoperative evaluation  - c/w LABA/LAMA/ICS. Can use Spiriva/Symbicort while admitted  - c/w duonebs PRN. Would give standing duonebs q6h the day prior to and day after surgery  - Incentive spirometer  - Would not extubate until fully awake from anesthesia standpoint. Can utilize NIPPV if needed  - Optimized from pulmonary standpoint for surgery  - Agree with cardiac evaluation    Jaime Tavarez MD  Pulmonary & Critical Care
There are no active cardiac conditions. There is no evidence of ACS, heart failure, or obstructive valvular heart disease. The patient may safely proceed with the planned procedure, including the use of sedation or GET as clinically indicated.  No further cardiac testing is required.    Echo personally reviewed. No aortic stenosis, normal biventricular function.
Patient is elderly frail F, with severe acute on chronic hypercarbic respiratory failure, has been refusing bipap. Patient was seen on prior admission in April, initially found unarousable, however improved and returned to baseline. Patient had been relatively well for the past few months until she sustained a fall.   Family meeting held today. Patient says mask is uncomfortable. Discussed consequences of not wearing mask, which includes death. Patient acknowledges this, she is also saying at this time she "wants to live" and will try to use the mask.   Family members urging her to be compliant. They wonder if another more comfortable mask is possible to use.   Discussed patient may continue to decline due to fracture, as she may be more bedridden.   Discussed hospice. Patient aware of hospice but refusing services at this time. If patient had agreed to hospice, would have to be done at a NH, as home hospice not possible with no one available to care for her.   Family says patient had been very sick before but she had bounced back. Family is thinking of moving patient closer to them.   Patient reaffirmed DNR/DNI.   Patient signed HCP form with nephew Juan as primary, and her brother Zachariah at secondary.

## 2023-08-02 NOTE — PROGRESS NOTE ADULT - ASSESSMENT
88F w/ HTN, T2DM, COPD (home O2 3L), HFpEF, dementia, hypothyroidism, CKD3, who presents after fall found to have an acute comminuted mildly displaced periprosthetic L proximal femoral Pulmonary consulted for pre op evaluation and optimization. On 7/29 had RRT for AMS found to be hypercapnic and placed on bipap with improvement in mental status.    #Chronic hypoxemic and hypercapnic respiratory failure   - at home patient on 3L NC  - reports prior unable to tolerate NIV ( has had prior hospitalization with hypercapneic resp failure and refused BiPAP then as well)  - RRT for AMS over the weekend and found to have hypercapneic resp failure improved on NIV use  - intermittent using AVAPS    Recommendations:  - patient does not appear to have a COPD exacerbation at this time: would continue symbicort and duonebs for now  - patient with acute on chronic hypercapneic respiratory failure declining to use NIV overnight consistently  - after extensive discussion with family, patient and rest of medical team, patient is not deemed an appropriate candidate for surgical intervention for fracture due to the high pulmonary risk   - agree with palliative consult    Pulmonary to sign off at this time. If any questions arise, please do not hesitate to call.  Discussed with Dr. Nicholas.

## 2023-08-02 NOTE — CONSULT NOTE ADULT - ASSESSMENT
Patient is a 88 year old female with pmh of HTN, DM2, COPD (O2 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 Patient is AAOx3 and was able to provide most of the information. Pt reports that she was sitting in an wheel chair and and fell asleep. When she woke up she was on the floor. She slipped out of the chair while she was sleeping. She then called for help and the staff was able to assist her. On examination she was found to have external rotation and abduction of the L lower extremity, prompting her to come to the ED.   In the ED, her vitals were notable for chronic hypoxia. Labs were notable for leukocytosis, chronic anemia, acute on chronic hyperkalemia, elevated BUN/Scr. Xray of hip/pelvis showed Acute comminuted mildly displaced periprosthetic left proximal femoral fracture. Palliative consulted for complex medical decision making in the setting of serious illness.

## 2023-08-02 NOTE — PROGRESS NOTE ADULT - SUBJECTIVE AND OBJECTIVE BOX
[facility] Division of Hospital Medicine  Dr. Joi Garcia  Pager [phone]      Patient is a 88y old  Female who presents with a chief complaint of fall (28 Jul 2023 08:00)    SUBJECTIVE / OVERNIGHT EVENTS: Patient seen and examined. Awake and alert. During evaluation reported some SOB was saturating 89% on 6L, saturation improved s/p duonebs. Understands that she is not a surgical candidate. She wants to leave hospital soon.     MEDICATIONS  (STANDING):  acetaminophen     Tablet .. 650 milliGRAM(s) Oral every 8 hours  budesonide 160 MICROgram(s)/formoterol 4.5 MICROgram(s) Inhaler 2 Puff(s) Inhalation two times a day  busPIRone 10 milliGRAM(s) Oral two times a day  chlorhexidine 2% Cloths 1 Application(s) Topical once  levothyroxine 112 MICROGram(s) Oral daily  metoprolol succinate ER 25 milliGRAM(s) Oral daily  montelukast 10 milliGRAM(s) Oral at bedtime  povidone iodine 5% Nasal Swab 1 Application(s) Both Nostrils once  tiotropium 2.5 MICROgram(s) Inhaler 2 Puff(s) Inhalation daily    MEDICATIONS  (PRN):  acetaminophen     Tablet .. 650 milliGRAM(s) Oral every 6 hours PRN Temp greater or equal to 38C (100.4F), Mild Pain (1 - 3)  albuterol/ipratropium for Nebulization 3 milliLiter(s) Nebulizer every 6 hours PRN Shortness of Breath and/or Wheezing  aluminum hydroxide/magnesium hydroxide/simethicone Suspension 30 milliLiter(s) Oral every 4 hours PRN Dyspepsia  melatonin 3 milliGRAM(s) Oral at bedtime PRN Insomnia  ondansetron Injectable 4 milliGRAM(s) IV Push every 8 hours PRN Nausea and/or Vomiting  oxyCODONE    IR 5 milliGRAM(s) Oral every 6 hours PRN moderate to severe pain    CAPILLARY BLOOD GLUCOSE  POCT Blood Glucose.: 246 mg/dL (31 Jul 2023 18:25)    PHYSICAL EXAM:  Vital Signs Last 24 Hrs  T(C): 37.1 (02 Aug 2023 12:16), Max: 37.1 (02 Aug 2023 12:16)  T(F): 98.7 (02 Aug 2023 12:16), Max: 98.7 (02 Aug 2023 12:16)  HR: 69 (02 Aug 2023 12:16) (67 - 84)  BP: 130/43 (02 Aug 2023 12:16) (105/65 - 148/53)  BP(mean): --  RR: 18 (02 Aug 2023 12:16) (16 - 19)  SpO2: 92% (02 Aug 2023 12:16) (92% - 98%)    Parameters below as of 02 Aug 2023 12:16  Patient On (Oxygen Delivery Method): nasal cannula  O2 Flow (L/min): 3    I&O's Summary    02 Aug 2023 07:01  -  02 Aug 2023 12:26  --------------------------------------------------------  IN: 0 mL / OUT: 950 mL / NET: -950 mL      CONSTITUTIONAL: frail appearing, opens eyes  EYES: EOMI  NECK: Supple  RESPIRATORY: decreased BS at bases  CARDIOVASCULAR: Regular rate and rhythm, + S1 and S2  ABDOMEN: Nontender to palpation, normoactive bowel sounds, no rebound/guarding  PSYCH: anxious       LABS:                                              8.4    8.00  )-----------( 213      ( 01 Aug 2023 05:55 )             27.9   08-01    139  |  97<L>  |  69<H>  ----------------------------<  148<H>  5.2   |  30  |  2.39<H>    Ca    8.7      01 Aug 2023 05:55  Phos  4.9     08-01  Mg     2.40     08-01    TPro  6.8  /  Alb  3.2<L>  /  TBili  0.3  /  DBili  <0.2  /  AST  17  /  ALT  9   /  AlkPhos  71  08-01    Blood Gas Profile - Venous (08.01.23 @ 05:55)    pH, Venous: 7.21: Due to specimen delivery delays, please interpret with caution   pCO2, Venous: 97 mmHg   pO2, Venous: 39 mmHg   HCO3, Venous: 39 mmol/L   Base Excess, Venous: 9.1 mmol/L   Oxygen Saturation, Venous: 61.0 %   Total CO2, Venous: 41.8 mmol/L      Urinalysis Basic - ( 28 Jul 2023 06:15 )    Color: x / Appearance: x / SG: x / pH: x  Gluc: 144 mg/dL / Ketone: x  / Bili: x / Urobili: x   Blood: x / Protein: x / Nitrite: x   Leuk Esterase: x / RBC: x / WBC x   Sq Epi: x / Non Sq Epi: x / Bacteria: x      < from: CT Chest No Cont (07.31.23 @ 09:45) >  IMPRESSION:    Interlobular septal thickening, suggestive of pulmonary edema.    Small bilateral pleural effusions.    New left upper lobe 4 mm nodule associated with the fissure. Recommend   follow-up noncontrast chest CT in one year.    Nodularity of the hepatic surface suggestive of cirrhosis.    --- End of Report ---    < end of copied text >

## 2023-08-02 NOTE — CONSULT NOTE ADULT - PROBLEM SELECTOR RECOMMENDATION 3
sCr: 1.89  BUN: 62    Likely mixed chronic vs pre-renal in setting of decreased PO and weight loss.   Rest of care per primary team.

## 2023-08-02 NOTE — CONSULT NOTE ADULT - CONVERSATION DETAILS
Pt seen and evaluated bedside both one-on-one (see above) and with family later on (Emma Martinez and Brother Adal bedside, brother Zachariah on phone. Patient expressed an understanding of current condition. Also expressed that she is not a surgical candidate due to respiratory issues. When asked what was next, patient remarked "Death" and became tearful. Patient understood that if she was not compliant with BiPAP she would likely get worse. She reaffirmed her interest in getting better as much as possible. Nephew also reinforced this to the patient. Patient nodded in understanding. She pointed out that BiPAP was very uncomfortable but was willing to try it again. Notably nephew pointed out that the patient was not using BiPAP consistently in [facility], and on days she does not use it she gets very drowsy.     Reviewed patient wishes regarding DNR/DNI. Patient re-iterated she did not want to be intubated and dependent on a tube to breath. However she would otherwise like everything else done and would like to continue to work up issues as they arise. Briefly introduced concept of Hospice to patient and family. Relayed that the spirit of hospice is to keep you comfortable, minimize time in the hospital and treat symptoms as they arise. However patient did not want to pursue hospice at this time and wanted to try to get better.     Reviewed HCA form. Patient requested that Emma Martinez be in charge of decisions if she was unable. Filled out HCA bedside and reviewed wishes with patient. Copy placed in chart, and original given to family.    Afterwards Nephew asked to speak outside to the palliative team. Nephew expressed concern about BiPAP adherence and asked if there were alternatives or better masks. Acknowledged how uncomfortable BiPAP is and explained that we would relay his concerns to the primary team. Pt seen and evaluated bedside both one-on-one (see above) and with family later on (Emma Martinez and Brother Adal bedside, brother Zachariah on phone. Patient expressed an understanding of current condition. Also expressed that she is not a surgical candidate due to respiratory issues. When asked what was next, patient remarked "Death" and became tearful. Patient understood that if she was not compliant with BiPAP her condition would likely get worse. Family urged  patient to be compliant with bipap. She reaffirmed her interest in getting better as much as possible. Nephew also reinforced this to the patient. Patient nodded in understanding. She pointed out that BiPAP was very uncomfortable but was willing to try it again. Notably nephew pointed out that the patient was not using BiPAP consistently in [facility], and on days she does not use it she gets very drowsy.    Reviewed patient wishes regarding DNR/DNI. Patient re-iterated she did not want to be intubated and dependent on a tube to breath. However she would otherwise like everything else done and would like to continue to work up issues as they arise. Explained that patient at times is refusing medical interventions so her wishes are unclear.     Briefly introduced concept of Hospice to patient and family. Relayed that the spirit of hospice is to keep you comfortable, minimize time in the hospital and treat symptoms as they arise. However patient did not want to pursue hospice at this time and wanted to try to get better.    Reviewed HCP form. Patient requested that Emma Martinez be in charge of decisions if she was unable. Filled out HCA bedside and reviewed wishes with patient. Copy placed in chart, and original given to family.    Afterwards Nephew asked to speak outside to the palliative team. Nephew expressed concern about BiPAP adherence and asked if there were alternatives or better masks. Acknowledged how uncomfortable BiPAP is and explained that we would relay his concerns to the primary team.

## 2023-08-02 NOTE — PROGRESS NOTE ADULT - NS ATTEST RISK PROBLEM GEN_ALL_CORE FT
Acute on chronic hypoxemic and hypercapnic respiratory failure  Chronic diastolic heart failure  COPD  L femoral fracture

## 2023-08-02 NOTE — CONSULT NOTE ADULT - PROBLEM SELECTOR RECOMMENDATION 6
Palliative consulted to aid in GOC conversations given patient decline and nonsurgical candidate. Extensive conversation one on one and with patient and family. Patient expressing wishes to continue with BiPAP and hopes to get better. Reviewed [facility], introduced hospice which patient declined, and filled out HCA document. See above GOC note for more information.    Pt is DNR/DNI but willing to pursue all other interventions.

## 2023-08-02 NOTE — PROGRESS NOTE ADULT - PROBLEM SELECTOR PLAN 2
Patient with L hip fracture after a fall   -Xray and CT L hip showed Acute comminuted mildly displaced periprosthetic left proximal femoral   fracture.  -Ortho recs appreciated  -not a surgical candidate.   -NWB on L leg, bedrest  -Pain control with tylenol

## 2023-08-03 NOTE — PROGRESS NOTE ADULT - SUBJECTIVE AND OBJECTIVE BOX
Indication of Geriatrics and Palliative Medicine Services:  [  ] Complex Medical Decision Making   [  ] Symptom/Pain management     DNR on chart:  Yes    INTERVAL EVENTS:     -------------------------------------------------------------------------------------------------------    PRESENT SYMPTOMS:     [X] No     [ ] Unable to self-report      [ ] CPOT (ICU)     [ ] PAINADs     [ ] RDOS    [ ] Yes     Source if other than patient:  [ ]Family   [ ]Team     PAIN:   If blank, patient unable to specify   [ ]yes [X]no  QOL impact-   Location -                    Aggravating factors -  Quality -  Radiation -  Timing-  Pain at most severe level (0-10 scale):  Pain at minimal acceptable level/Pain Goal (0-10 scale):     SYMPTOMS:   Dyspnea:                           [ ]Mild [ ]Moderate [ ]Severe  Anxiety:                             [ ]Mild [ ]Moderate [ ]Severe  Fatigue:                             [ ]Mild [ ]Moderate [ ]Severe  Nausea/Vomiting:              [ ]Mild [ ]Moderate [ ]Severe  Loss of appetite:                [ ]Mild [ ]Moderate [ ]Severe  Constipation:                     [ ]Mild [ ]Moderate [ ]Severe    Other Symptoms:  [X ]All other review of systems negative     Home Medications for symptoms if any:  I-Stop Reference No:(from initial)     -------------------------------------------------------------------------------------------------------    ITEMS UNCHECKED ARE NOT PRESENT    PHYSICAL:  Vital Signs Last 24 Hrs  T(C): 36.8 (03 Aug 2023 07:49), Max: 36.8 (03 Aug 2023 06:16)  T(F): 98.2 (03 Aug 2023 07:49), Max: 98.2 (03 Aug 2023 06:16)  HR: 66 (03 Aug 2023 12:55) (65 - 73)  BP: 124/39 (03 Aug 2023 07:49) (124/39 - 148/53)  BP(mean): --  RR: 18 (03 Aug 2023 07:49) (18 - 20)  SpO2: 92% (03 Aug 2023 12:55) (90% - 98%)    Parameters below as of 03 Aug 2023 09:02  Patient On (Oxygen Delivery Method): nasal cannula     I&O's Summary    02 Aug 2023 07:01  -  03 Aug 2023 07:00  --------------------------------------------------------  IN: 0 mL / OUT: 2050 mL / NET: -2050 mL    GENERAL:  [ ]Cachexia  [X] Frail  [X]Awake  [X]Oriented    [ ]Lethargic  [ ]Unarousable  [X ]Verbal  [ ]Non-Verbal    BEHAVIORAL:   [ ] Anxiety  [ ] Delirium [ ] Agitation [ ] Other    HEENT:   [ ]Normal   [ ]Dry mouth   [ ]ET Tube/Trach  [ ]Oral lesions    PULMONARY:   [ ]Clear [ ]Tachypnea  [ ]Audible excessive secretions   [ ]Rhonchi        [ ]Right [ ]Left [ ]Bilateral  [X]Crackles        [ ]Right [ ]Left [X]Bilateral  [ ]Wheezing     [ ]Right [ ]Left [ ]Bilateral  [ ]Diminished breath sounds [ ]right [ ]left [ ]bilateral    CARDIOVASCULAR:    [X ]Regular [ ]Irregular [ ]Tachy  [ ]Harmeet [ ]Murmur [ ]Other    GASTROINTESTINAL:  [X ]Soft  [ ]Distended   [ X]+BS  [X ]Non tender [ ]Tender  [ ]Other [ ]PEG [ ]OGT/ NGT      GENITOURINARY:  [X]Normal [ ] Incontinent   [ ]Oliguria/Anuria   [ ]Palacio    MUSCULOSKELETAL:   [ ]Normal   [X ]Weakness  [X ]Bed/Wheelchair bound [ ]Edema    NEUROLOGIC:   [X ]No focal deficits  [ ]Cognitive impairment  [ ]Dysphagia [ ]Dysarthria [ ]Paresis [ ]Other     SKIN:   [X]Normal  [ ]Rash  [ ]Other  [ ]Pressure ulcer(s)       Present on admission [ ]y [ ]n    -------------------------------------------------------------------------------------------------------    LABS:                        8.2    8.33  )-----------( 282      ( 03 Aug 2023 06:10 )             26.6   08-03    141  |  97<L>  |  61<H>  ----------------------------<  147<H>  5.2   |  36<H>  |  1.81<H>    Ca    8.8      03 Aug 2023 06:10  Phos  4.2     08-03  Mg     2.40     08-03        Urinalysis Basic - ( 03 Aug 2023 06:10 )    Color: x / Appearance: x / SG: x / pH: x  Gluc: 147 mg/dL / Ketone: x  / Bili: x / Urobili: x   Blood: x / Protein: x / Nitrite: x   Leuk Esterase: x / RBC: x / WBC x   Sq Epi: x / Non Sq Epi: x / Bacteria: x    Procalcitonin, Serum: 0.18 ng/mL (07-29-23 @ 08:45)    -------------------------------------------------------------------------------------------------------    CRITICAL CARE:  [ ]Shock Present  [ ]Septic [ ]Cardiogenic [ ]Neurologic [ ]Hypovolemic [ ]Undifferentiated    [ ]Vasopressors [ ]Inotropes    [ ]Respiratory failure present [ ]Acute  [ ]Chronic [ ]Hypoxic  [ ]Hypercarbic [ ]Mixed   [ ]Mechanical Ventilation  [ ]Trach collar   [ ]Non-invasive ventilatory support   [ ]High-Flow Mode: NIV (Noninvasive Ventilation), RR (machine): 16, TV (machine): 450, FiO2: 100, PEEP: 5, ITime: 0.9, PIP: 10  [ ]Oxygen mask/venti     [ ]Other organ failure     -------------------------------------------------------------------------------------------------------    RADIOLOGY & ADDITIONAL STUDIES:       < from: Xray Hip w/ Pelvis 2 or 3 Views, Left (07.25.23 @ 12:40) >  IMPRESSION:  Acute comminuted mildly displaced periprosthetic left proximal femoral   fracture.    < end of copied text >    < from: CT Femur No Cont, Left (07.25.23 @ 20:50) >  IMPRESSION:    Acute comminutedleft femoral periprosthetic fracture as described.    < end of copied text >    < from: Xray Abdomen 1 View PORTABLE -Urgent (Xray Abdomen 1 View PORTABLE -Urgent .) (07.29.23 @ 09:43) >  FINDINGS/  IMPRESSION: Thebowel gas pattern is nonobstructive. There is contrast   within the colon.    < end of copied text >    < from: CT Head No Cont (07.31.23 @ 09:45) >  FINDINGS: There is no acute intracranial hemorrhage, mass effect, shift   of the midline structures, herniation, extra-axial fluid collection, or   hydrocephalus.    There is diffuse cerebral volume loss with prominence of the sulci,   fissures, and cisternal spaces which is normal for the patient's age.   Mild ventriculomegaly appears unchanged. There ismild deep and   periventricular white matter hypoattenuation statistically compatible   with microvascular changes given calcific atherosclerotic disease of the   intracranial arteries.    The paranasal sinuses and tympanomastoid cavities are clear. The   calvarium is intact. There is evidence of bilateral cataract removal.   Bilateral senile scleral plaques are noted.      < end of copied text >    < from: CT Chest No Cont (07.31.23 @ 09:45) >  IMPRESSION:    Interlobular septal thickening, suggestive of pulmonary edema.    Small bilateral pleural effusions.    New left upper lobe 4 mm nodule associated with the fissure. Recommend   follow-up noncontrast chest CT in one year.    Nodularity of the hepatic surface suggestive of cirrhosis.    < end of copied text >    < from: Transthoracic Echocardiogram (07.26.23 @ 18:57) >  CONCLUSIONS:  1. Mitral annular calcification, otherwise normal mitral  valve. Minimal mitral regurgitation.  2. Normal leftventricular internal dimensions and wall  thicknesses.  3. Endocardium not well visualized; grossly normal left  ventricular systolic function.  4. Mild diastolic dysfunction (Stage I).  5. Normal right ventricular size and function.    < end of copied text >    -------------------------------------------------------------------------------------------------------  MEDICATIONS:     MEDICATIONS  (STANDING):  albuterol/ipratropium for Nebulization 3 milliLiter(s) Nebulizer every 6 hours  budesonide 160 MICROgram(s)/formoterol 4.5 MICROgram(s) Inhaler 2 Puff(s) Inhalation two times a day  busPIRone 10 milliGRAM(s) Oral two times a day  chlorhexidine 2% Cloths 1 Application(s) Topical once  dextrose 5%. 1000 milliLiter(s) (50 mL/Hr) IV Continuous <Continuous>  dextrose 5%. 1000 milliLiter(s) (100 mL/Hr) IV Continuous <Continuous>  dextrose 50% Injectable 25 Gram(s) IV Push once  dextrose 50% Injectable 25 Gram(s) IV Push once  dextrose 50% Injectable 12.5 Gram(s) IV Push once  furosemide   Injectable 40 milliGRAM(s) IV Push daily  glucagon  Injectable 1 milliGRAM(s) IntraMuscular once  insulin lispro (ADMELOG) corrective regimen sliding scale   SubCutaneous three times a day before meals  insulin lispro (ADMELOG) corrective regimen sliding scale   SubCutaneous at bedtime  levothyroxine 112 MICROGram(s) Oral daily  metoprolol succinate ER 25 milliGRAM(s) Oral daily  montelukast 10 milliGRAM(s) Oral at bedtime    MEDICATIONS  (PRN):  aluminum hydroxide/magnesium hydroxide/simethicone Suspension 30 milliLiter(s) Oral every 4 hours PRN Dyspepsia  dextrose Oral Gel 15 Gram(s) Oral once PRN Blood Glucose LESS THAN 70 milliGRAM(s)/deciliter  melatonin 3 milliGRAM(s) Oral at bedtime PRN Insomnia  ondansetron Injectable 4 milliGRAM(s) IV Push every 8 hours PRN Nausea and/or Vomiting         Indication of Geriatrics and Palliative Medicine Services:  [  ] Complex Medical Decision Making   [  ] Symptom/Pain management     DNR on chart:  Yes    INTERVAL EVENTS:     -------------------------------------------------------------------------------------------------------    PRESENT SYMPTOMS:     [ ] No     [X ] Unable to self-report      [ ] CPOT (ICU)     [ ] PAINADs     [ ] RDOS    [ ] Yes     Source if other than patient:  [ ]Family   [ ]Team     PAIN:   If blank, patient unable to specify   [ ]yes [X]no  QOL impact-   Location -                    Aggravating factors -  Quality -  Radiation -  Timing-  Pain at most severe level (0-10 scale):  Pain at minimal acceptable level/Pain Goal (0-10 scale):     SYMPTOMS:   Dyspnea:                           [ ]Mild [ ]Moderate [ ]Severe  Anxiety:                             [ ]Mild [ ]Moderate [ ]Severe  Fatigue:                             [ ]Mild [ ]Moderate [ ]Severe  Nausea/Vomiting:              [ ]Mild [ ]Moderate [ ]Severe  Loss of appetite:                [ ]Mild [ ]Moderate [ ]Severe  Constipation:                     [ ]Mild [ ]Moderate [ ]Severe    Other Symptoms:  [X ]All other review of systems negative     Home Medications for symptoms if any:  I-Stop Reference No:    -------------------------------------------------------------------------------------------------------    ITEMS UNCHECKED ARE NOT PRESENT    PHYSICAL:  Vital Signs Last 24 Hrs  T(C): 36.8 (03 Aug 2023 07:49), Max: 36.8 (03 Aug 2023 06:16)  T(F): 98.2 (03 Aug 2023 07:49), Max: 98.2 (03 Aug 2023 06:16)  HR: 66 (03 Aug 2023 12:55) (65 - 73)  BP: 124/39 (03 Aug 2023 07:49) (124/39 - 148/53)  BP(mean): --  RR: 18 (03 Aug 2023 07:49) (18 - 20)  SpO2: 92% (03 Aug 2023 12:55) (90% - 98%)    Parameters below as of 03 Aug 2023 09:02  Patient On (Oxygen Delivery Method): nasal cannula     I&O's Summary    02 Aug 2023 07:01  -  03 Aug 2023 07:00  --------------------------------------------------------  IN: 0 mL / OUT: 2050 mL / NET: -2050 mL    GENERAL:  [ ]Cachexia  [X] Frail  [ ]Awake  [ ]Oriented    [ ]Lethargic  [X ]Unarousable  [ ]Verbal  [ ]Non-Verbal    BEHAVIORAL:   [ ] Anxiety  [ ] Delirium [ ] Agitation [ ] Other    HEENT:   [ ]Normal   [X ]Dry mouth   [ ]ET Tube/Trach  [ ]Oral lesions    PULMONARY:   [ ]Clear [ ]Tachypnea  [ ]Audible excessive secretions   [ ]Rhonchi        [ ]Right [ ]Left [ ]Bilateral  [X]Crackles        [ ]Right [ ]Left [X]Bilateral  [ ]Wheezing     [ ]Right [ ]Left [ ]Bilateral  [X ]Diminished breath sounds [ ]right [ ]left [X ]bilateral    CARDIOVASCULAR:    [X ]Regular [ ]Irregular [ ]Tachy  [ ]Harmeet [ ]Murmur [ ]Other    GASTROINTESTINAL:  [X ]Soft  [ ]Distended   [ X]+BS  [X ]Non tender [ ]Tender  [ ]Other [ ]PEG [ ]OGT/ NGT      GENITOURINARY:  [X]Normal [ ] Incontinent   [ ]Oliguria/Anuria   [ ]Palacio    MUSCULOSKELETAL:   [ ]Normal   [X ]Weakness  [X ]Bed/Wheelchair bound [ ]Edema    NEUROLOGIC:   [X ]No focal deficits  [ ]Cognitive impairment  [ ]Dysphagia [ ]Dysarthria [ ]Paresis [ ]Other     SKIN:   [X]Normal  [ ]Rash  [ ]Other  [ ]Pressure ulcer(s)       Present on admission [ ]y [ ]n    -------------------------------------------------------------------------------------------------------    LABS:                        8.2    8.33  )-----------( 282      ( 03 Aug 2023 06:10 )             26.6   08-03    141  |  97<L>  |  61<H>  ----------------------------<  147<H>  5.2   |  36<H>  |  1.81<H>    Ca    8.8      03 Aug 2023 06:10  Phos  4.2     08-03  Mg     2.40     08-03    Urinalysis Basic - ( 03 Aug 2023 06:10 )    Color: x / Appearance: x / SG: x / pH: x  Gluc: 147 mg/dL / Ketone: x  / Bili: x / Urobili: x   Blood: x / Protein: x / Nitrite: x   Leuk Esterase: x / RBC: x / WBC x   Sq Epi: x / Non Sq Epi: x / Bacteria: x    Procalcitonin, Serum: 0.18 ng/mL (07-29-23 @ 08:45)    -------------------------------------------------------------------------------------------------------    CRITICAL CARE:  [ ]Shock Present  [ ]Septic [ ]Cardiogenic [ ]Neurologic [ ]Hypovolemic [ ]Undifferentiated    [ ]Vasopressors [ ]Inotropes    [X ]Respiratory failure present [X ]Acute  [X ]Chronic [ ]Hypoxic  [ ]Hypercarbic [ ]Mixed   [ ]Mechanical Ventilation  [ ]Trach collar   [X ]Non-invasive ventilatory support   [ ]High-Flow Mode: NIV (Noninvasive Ventilation), RR (machine): 16, TV (machine): 450, FiO2: 100, PEEP: 5, ITime: 0.9, PIP: 10  [ ]Oxygen mask/venti     [ ]Other organ failure     -------------------------------------------------------------------------------------------------------    RADIOLOGY & ADDITIONAL STUDIES:       < from: Xray Hip w/ Pelvis 2 or 3 Views, Left (07.25.23 @ 12:40) >  IMPRESSION:  Acute comminuted mildly displaced periprosthetic left proximal femoral   fracture.    < end of copied text >    < from: CT Femur No Cont, Left (07.25.23 @ 20:50) >  IMPRESSION:    Acute comminutedleft femoral periprosthetic fracture as described.    < end of copied text >    < from: Xray Abdomen 1 View PORTABLE -Urgent (Xray Abdomen 1 View PORTABLE -Urgent .) (07.29.23 @ 09:43) >  FINDINGS/  IMPRESSION: Thebowel gas pattern is nonobstructive. There is contrast   within the colon.    < end of copied text >    < from: CT Head No Cont (07.31.23 @ 09:45) >  FINDINGS: There is no acute intracranial hemorrhage, mass effect, shift   of the midline structures, herniation, extra-axial fluid collection, or   hydrocephalus.    There is diffuse cerebral volume loss with prominence of the sulci,   fissures, and cisternal spaces which is normal for the patient's age.   Mild ventriculomegaly appears unchanged. There ismild deep and   periventricular white matter hypoattenuation statistically compatible   with microvascular changes given calcific atherosclerotic disease of the   intracranial arteries.    The paranasal sinuses and tympanomastoid cavities are clear. The   calvarium is intact. There is evidence of bilateral cataract removal.   Bilateral senile scleral plaques are noted.      < end of copied text >    < from: CT Chest No Cont (07.31.23 @ 09:45) >  IMPRESSION:    Interlobular septal thickening, suggestive of pulmonary edema.    Small bilateral pleural effusions.    New left upper lobe 4 mm nodule associated with the fissure. Recommend   follow-up noncontrast chest CT in one year.    Nodularity of the hepatic surface suggestive of cirrhosis.    < end of copied text >    < from: Transthoracic Echocardiogram (07.26.23 @ 18:57) >  CONCLUSIONS:  1. Mitral annular calcification, otherwise normal mitral  valve. Minimal mitral regurgitation.  2. Normal leftventricular internal dimensions and wall  thicknesses.  3. Endocardium not well visualized; grossly normal left  ventricular systolic function.  4. Mild diastolic dysfunction (Stage I).  5. Normal right ventricular size and function.    < end of copied text >    -------------------------------------------------------------------------------------------------------  MEDICATIONS:     MEDICATIONS  (STANDING):  albuterol/ipratropium for Nebulization 3 milliLiter(s) Nebulizer every 6 hours  budesonide 160 MICROgram(s)/formoterol 4.5 MICROgram(s) Inhaler 2 Puff(s) Inhalation two times a day  busPIRone 10 milliGRAM(s) Oral two times a day  chlorhexidine 2% Cloths 1 Application(s) Topical once  dextrose 5%. 1000 milliLiter(s) (50 mL/Hr) IV Continuous <Continuous>  dextrose 5%. 1000 milliLiter(s) (100 mL/Hr) IV Continuous <Continuous>  dextrose 50% Injectable 25 Gram(s) IV Push once  dextrose 50% Injectable 25 Gram(s) IV Push once  dextrose 50% Injectable 12.5 Gram(s) IV Push once  furosemide   Injectable 40 milliGRAM(s) IV Push daily  glucagon  Injectable 1 milliGRAM(s) IntraMuscular once  insulin lispro (ADMELOG) corrective regimen sliding scale   SubCutaneous three times a day before meals  insulin lispro (ADMELOG) corrective regimen sliding scale   SubCutaneous at bedtime  levothyroxine 112 MICROGram(s) Oral daily  metoprolol succinate ER 25 milliGRAM(s) Oral daily  montelukast 10 milliGRAM(s) Oral at bedtime    MEDICATIONS  (PRN):  aluminum hydroxide/magnesium hydroxide/simethicone Suspension 30 milliLiter(s) Oral every 4 hours PRN Dyspepsia  dextrose Oral Gel 15 Gram(s) Oral once PRN Blood Glucose LESS THAN 70 milliGRAM(s)/deciliter  melatonin 3 milliGRAM(s) Oral at bedtime PRN Insomnia  ondansetron Injectable 4 milliGRAM(s) IV Push every 8 hours PRN Nausea and/or Vomiting         Indication of Geriatrics and Palliative Medicine Services:  [X  ] Complex Medical Decision Making   [  ] Symptom/Pain management     DNR on chart:  Yes    INTERVAL EVENTS: Overnight patient refused bipap again. RRT today as patient was altered. Patient seen this PM on bipap, unarousable.     -------------------------------------------------------------------------------------------------------    PRESENT SYMPTOMS:     [ ] No     [X ] Unable to self-report      [ ] CPOT (ICU)     [ ] PAINADs     [ ] RDOS    [ ] Yes     Source if other than patient:  [ ]Family   [ ]Team     PAIN:   If blank, patient unable to specify   [ ]yes [X]no  QOL impact-   Location -                    Aggravating factors -  Quality -  Radiation -  Timing-  Pain at most severe level (0-10 scale):  Pain at minimal acceptable level/Pain Goal (0-10 scale):     SYMPTOMS:   Dyspnea:                           [ ]Mild [ ]Moderate [ ]Severe  Anxiety:                             [ ]Mild [ ]Moderate [ ]Severe  Fatigue:                             [ ]Mild [ ]Moderate [ ]Severe  Nausea/Vomiting:              [ ]Mild [ ]Moderate [ ]Severe  Loss of appetite:                [ ]Mild [ ]Moderate [ ]Severe  Constipation:                     [ ]Mild [ ]Moderate [ ]Severe    Other Symptoms:  [X ]All other review of systems negative     Home Medications for symptoms if any:  I-Stop Reference No:    -------------------------------------------------------------------------------------------------------    ITEMS UNCHECKED ARE NOT PRESENT    PHYSICAL:  Vital Signs Last 24 Hrs  T(C): 36.8 (03 Aug 2023 07:49), Max: 36.8 (03 Aug 2023 06:16)  T(F): 98.2 (03 Aug 2023 07:49), Max: 98.2 (03 Aug 2023 06:16)  HR: 66 (03 Aug 2023 12:55) (65 - 73)  BP: 124/39 (03 Aug 2023 07:49) (124/39 - 148/53)  BP(mean): --  RR: 18 (03 Aug 2023 07:49) (18 - 20)  SpO2: 92% (03 Aug 2023 12:55) (90% - 98%)    Parameters below as of 03 Aug 2023 09:02  Patient On (Oxygen Delivery Method): nasal cannula     I&O's Summary    02 Aug 2023 07:01  -  03 Aug 2023 07:00  --------------------------------------------------------  IN: 0 mL / OUT: 2050 mL / NET: -2050 mL    GENERAL:  [ ]Cachexia  [X] Frail  [ ]Awake  [ ]Oriented    [ ]Lethargic  [X ]Unarousable  [ ]Verbal  [ ]Non-Verbal    BEHAVIORAL:   [ ] Anxiety  [ ] Delirium [ ] Agitation [ ] Other    HEENT:   [ ]Normal   [X ]Dry mouth   [ ]ET Tube/Trach  [ ]Oral lesions    PULMONARY:   [ ]Clear [ ]Tachypnea  [ ]Audible excessive secretions   [ ]Rhonchi        [ ]Right [ ]Left [ ]Bilateral  [X]Crackles        [ ]Right [ ]Left [X]Bilateral  [ ]Wheezing     [ ]Right [ ]Left [ ]Bilateral  [X ]Diminished breath sounds [ ]right [ ]left [X ]bilateral    CARDIOVASCULAR:    [X ]Regular [ ]Irregular [ ]Tachy  [ ]Harmeet [ ]Murmur [ ]Other    GASTROINTESTINAL:  [X ]Soft  [ ]Distended   [ X]+BS  [X ]Non tender [ ]Tender  [ ]Other [ ]PEG [ ]OGT/ NGT      GENITOURINARY:  [X]Normal [ ] Incontinent   [ ]Oliguria/Anuria   [ ]Palacio    MUSCULOSKELETAL:   [ ]Normal   [X ]Weakness  [X ]Bed/Wheelchair bound [ ]Edema    NEUROLOGIC:   [X ]No focal deficits  [ ]Cognitive impairment  [ ]Dysphagia [ ]Dysarthria [ ]Paresis [ ]Other     SKIN:   [X]Normal  [ ]Rash  [ ]Other  [ ]Pressure ulcer(s)       Present on admission [ ]y [ ]n    -------------------------------------------------------------------------------------------------------    LABS:                        8.2    8.33  )-----------( 282      ( 03 Aug 2023 06:10 )             26.6   08-03    141  |  97<L>  |  61<H>  ----------------------------<  147<H>  5.2   |  36<H>  |  1.81<H>    Ca    8.8      03 Aug 2023 06:10  Phos  4.2     08-03  Mg     2.40     08-03    Urinalysis Basic - ( 03 Aug 2023 06:10 )    Color: x / Appearance: x / SG: x / pH: x  Gluc: 147 mg/dL / Ketone: x  / Bili: x / Urobili: x   Blood: x / Protein: x / Nitrite: x   Leuk Esterase: x / RBC: x / WBC x   Sq Epi: x / Non Sq Epi: x / Bacteria: x    Procalcitonin, Serum: 0.18 ng/mL (07-29-23 @ 08:45)    -------------------------------------------------------------------------------------------------------    CRITICAL CARE:  [ ]Shock Present  [ ]Septic [ ]Cardiogenic [ ]Neurologic [ ]Hypovolemic [ ]Undifferentiated    [ ]Vasopressors [ ]Inotropes    [X ]Respiratory failure present [X ]Acute  [X ]Chronic [ ]Hypoxic  [ ]Hypercarbic [ ]Mixed   [ ]Mechanical Ventilation  [ ]Trach collar   [X ]Non-invasive ventilatory support   [ ]High-Flow Mode: NIV (Noninvasive Ventilation), RR (machine): 16, TV (machine): 450, FiO2: 100, PEEP: 5, ITime: 0.9, PIP: 10  [ ]Oxygen mask/venti     [ ]Other organ failure     -------------------------------------------------------------------------------------------------------    RADIOLOGY & ADDITIONAL STUDIES:       < from: Xray Hip w/ Pelvis 2 or 3 Views, Left (07.25.23 @ 12:40) >  IMPRESSION:  Acute comminuted mildly displaced periprosthetic left proximal femoral   fracture.    < end of copied text >    < from: CT Femur No Cont, Left (07.25.23 @ 20:50) >  IMPRESSION:    Acute comminutedleft femoral periprosthetic fracture as described.    < end of copied text >    < from: Xray Abdomen 1 View PORTABLE -Urgent (Xray Abdomen 1 View PORTABLE -Urgent .) (07.29.23 @ 09:43) >  FINDINGS/  IMPRESSION: Thebowel gas pattern is nonobstructive. There is contrast   within the colon.    < end of copied text >    < from: CT Head No Cont (07.31.23 @ 09:45) >  FINDINGS: There is no acute intracranial hemorrhage, mass effect, shift   of the midline structures, herniation, extra-axial fluid collection, or   hydrocephalus.    There is diffuse cerebral volume loss with prominence of the sulci,   fissures, and cisternal spaces which is normal for the patient's age.   Mild ventriculomegaly appears unchanged. There ismild deep and   periventricular white matter hypoattenuation statistically compatible   with microvascular changes given calcific atherosclerotic disease of the   intracranial arteries.    The paranasal sinuses and tympanomastoid cavities are clear. The   calvarium is intact. There is evidence of bilateral cataract removal.   Bilateral senile scleral plaques are noted.      < end of copied text >    < from: CT Chest No Cont (07.31.23 @ 09:45) >  IMPRESSION:    Interlobular septal thickening, suggestive of pulmonary edema.    Small bilateral pleural effusions.    New left upper lobe 4 mm nodule associated with the fissure. Recommend   follow-up noncontrast chest CT in one year.    Nodularity of the hepatic surface suggestive of cirrhosis.    < end of copied text >    < from: Transthoracic Echocardiogram (07.26.23 @ 18:57) >  CONCLUSIONS:  1. Mitral annular calcification, otherwise normal mitral  valve. Minimal mitral regurgitation.  2. Normal leftventricular internal dimensions and wall  thicknesses.  3. Endocardium not well visualized; grossly normal left  ventricular systolic function.  4. Mild diastolic dysfunction (Stage I).  5. Normal right ventricular size and function.    < end of copied text >    -------------------------------------------------------------------------------------------------------  MEDICATIONS:     MEDICATIONS  (STANDING):  albuterol/ipratropium for Nebulization 3 milliLiter(s) Nebulizer every 6 hours  budesonide 160 MICROgram(s)/formoterol 4.5 MICROgram(s) Inhaler 2 Puff(s) Inhalation two times a day  busPIRone 10 milliGRAM(s) Oral two times a day  chlorhexidine 2% Cloths 1 Application(s) Topical once  dextrose 5%. 1000 milliLiter(s) (50 mL/Hr) IV Continuous <Continuous>  dextrose 5%. 1000 milliLiter(s) (100 mL/Hr) IV Continuous <Continuous>  dextrose 50% Injectable 25 Gram(s) IV Push once  dextrose 50% Injectable 25 Gram(s) IV Push once  dextrose 50% Injectable 12.5 Gram(s) IV Push once  furosemide   Injectable 40 milliGRAM(s) IV Push daily  glucagon  Injectable 1 milliGRAM(s) IntraMuscular once  insulin lispro (ADMELOG) corrective regimen sliding scale   SubCutaneous three times a day before meals  insulin lispro (ADMELOG) corrective regimen sliding scale   SubCutaneous at bedtime  levothyroxine 112 MICROGram(s) Oral daily  metoprolol succinate ER 25 milliGRAM(s) Oral daily  montelukast 10 milliGRAM(s) Oral at bedtime    MEDICATIONS  (PRN):  aluminum hydroxide/magnesium hydroxide/simethicone Suspension 30 milliLiter(s) Oral every 4 hours PRN Dyspepsia  dextrose Oral Gel 15 Gram(s) Oral once PRN Blood Glucose LESS THAN 70 milliGRAM(s)/deciliter  melatonin 3 milliGRAM(s) Oral at bedtime PRN Insomnia  ondansetron Injectable 4 milliGRAM(s) IV Push every 8 hours PRN Nausea and/or Vomiting

## 2023-08-03 NOTE — PROGRESS NOTE ADULT - PROBLEM SELECTOR PLAN 6
Patient with hx of COPD, on 3-4L on oxygen at home   -Now in acute exacerbation   -Will continue with albuterol and bedtime avaps  -Pulm consult appreciated  -hypoxia due to fluid overload? ON lasix 40 daily and diuresing well  -encourage AVAPs use

## 2023-08-03 NOTE — PROGRESS NOTE ADULT - CONVERSATION DETAILS
Spoke to nephjoshua Baird over the phone after RRT. Notified him that patient refused avaps overnight and this morning when it was offered. Patient has made comments to staff previously that bipap is "inhumane". Although patient refused hospice yesterday and stated that she will use bipap she refused to wear bipap last night and this morning when it was offered. Patient has refused to wear bipap since last admission despite education. At this time family agrees that patient is not able to make a sound decision. Oli will come see patient in AM and attempt to have a conversation if patient awake before making final decision on hospice. Oli requested anti-anxiety medication. I explained risk vs benefit with anxiety medication given side effect of respiratory depression. If pursuing comfort driven care, ok to start low dose anti anxiety medications however currently patient is obtunded and unable to tolerate PO meds. Will continue with GOC conversation tomorrow AM.

## 2023-08-03 NOTE — PROGRESS NOTE ADULT - SUBJECTIVE AND OBJECTIVE BOX
[facility] Division of Hospital Medicine  Dr. Joi Garcia  Pager [phone]    Patient is a 88y old  Female who presents with a chief complaint of fall (28 Jul 2023 08:00)    SUBJECTIVE / OVERNIGHT EVENTS: Patient seen and examined. Lethargic appearing this morning but refusing avaps. Last night also refused avaps. Later in the day RRT called for unresponsiveness. Patient placed back on Avaps. GOC addressed with rodolfojoshua Baird. Please see conversation below in GOC section.     MEDICATIONS  (STANDING):  acetaminophen     Tablet .. 650 milliGRAM(s) Oral every 8 hours  budesonide 160 MICROgram(s)/formoterol 4.5 MICROgram(s) Inhaler 2 Puff(s) Inhalation two times a day  busPIRone 10 milliGRAM(s) Oral two times a day  chlorhexidine 2% Cloths 1 Application(s) Topical once  levothyroxine 112 MICROGram(s) Oral daily  metoprolol succinate ER 25 milliGRAM(s) Oral daily  montelukast 10 milliGRAM(s) Oral at bedtime  povidone iodine 5% Nasal Swab 1 Application(s) Both Nostrils once  tiotropium 2.5 MICROgram(s) Inhaler 2 Puff(s) Inhalation daily    MEDICATIONS  (PRN):  acetaminophen     Tablet .. 650 milliGRAM(s) Oral every 6 hours PRN Temp greater or equal to 38C (100.4F), Mild Pain (1 - 3)  albuterol/ipratropium for Nebulization 3 milliLiter(s) Nebulizer every 6 hours PRN Shortness of Breath and/or Wheezing  aluminum hydroxide/magnesium hydroxide/simethicone Suspension 30 milliLiter(s) Oral every 4 hours PRN Dyspepsia  melatonin 3 milliGRAM(s) Oral at bedtime PRN Insomnia  ondansetron Injectable 4 milliGRAM(s) IV Push every 8 hours PRN Nausea and/or Vomiting  oxyCODONE    IR 5 milliGRAM(s) Oral every 6 hours PRN moderate to severe pain    CAPILLARY BLOOD GLUCOSE  POCT Blood Glucose.: 246 mg/dL (31 Jul 2023 18:25)    PHYSICAL EXAM:  Vital Signs Last 24 Hrs  T(C): 36.8 (03 Aug 2023 07:49), Max: 36.8 (03 Aug 2023 06:16)  T(F): 98.2 (03 Aug 2023 07:49), Max: 98.2 (03 Aug 2023 06:16)  HR: 66 (03 Aug 2023 12:55) (65 - 73)  BP: 124/39 (03 Aug 2023 07:49) (124/39 - 148/53)  BP(mean): --  RR: 18 (03 Aug 2023 07:49) (18 - 20)  SpO2: 92% (03 Aug 2023 12:55) (90% - 98%)    Parameters below as of 03 Aug 2023 09:02  Patient On (Oxygen Delivery Method): nasal cannula    I&O's Summary    02 Aug 2023 07:01  -  03 Aug 2023 07:00  --------------------------------------------------------  IN: 0 mL / OUT: 2050 mL / NET: -2050 mL    CONSTITUTIONAL: frail appearing, opens eyes  EYES: EOMI  NECK: Supple  RESPIRATORY: decreased BS at bases  CARDIOVASCULAR: Regular rate and rhythm, + S1 and S2  ABDOMEN: Nontender to palpation, normoactive bowel sounds, no rebound/guarding  PSYCH: anxious       LABS:                                   8.2    8.33  )-----------( 282      ( 03 Aug 2023 06:10 )             26.6   08-03    141  |  97<L>  |  61<H>  ----------------------------<  147<H>  5.2   |  36<H>  |  1.81<H>    Ca    8.8      03 Aug 2023 06:10  Phos  4.2     08-03  Mg     2.40     08-03    Blood Gas Profile - Venous (08.03.23 @ 06:10)    pH, Venous: 7.26: Due to specimen delivery delays, please interpret with caution   pCO2, Venous: 93 mmHg   pO2, Venous: 54 mmHg   HCO3, Venous: 42 mmol/L   Base Excess, Venous: 10.6 mmol/L   Oxygen Saturation, Venous: 88.4 %   Total CO2, Venous: 44.6 mmol/L        Urinalysis Basic - ( 28 Jul 2023 06:15 )    Color: x / Appearance: x / SG: x / pH: x  Gluc: 144 mg/dL / Ketone: x  / Bili: x / Urobili: x   Blood: x / Protein: x / Nitrite: x   Leuk Esterase: x / RBC: x / WBC x   Sq Epi: x / Non Sq Epi: x / Bacteria: x      < from: CT Chest No Cont (07.31.23 @ 09:45) >  IMPRESSION:    Interlobular septal thickening, suggestive of pulmonary edema.    Small bilateral pleural effusions.    New left upper lobe 4 mm nodule associated with the fissure. Recommend   follow-up noncontrast chest CT in one year.    Nodularity of the hepatic surface suggestive of cirrhosis.    --- End of Report ---    < end of copied text >

## 2023-08-03 NOTE — PROGRESS NOTE ADULT - NUTRITIONAL ASSESSMENT
This patient has been assessed with a concern for Malnutrition and has been determined to have a diagnosis/diagnoses of Moderate protein-calorie malnutrition.    This patient is being managed with:   Diet Minced and Moist-  Consistent Carbohydrate {Evening Snack} (CSTCHOSN)  DASH/TLC {Sodium & Cholesterol Restricted} (DASH)  For patients receiving Renal Replacement - No Protein Restr No Conc K No Conc Phos Low Sodium (RENAL)  Entered: Jul 29 2023 10:12AM  
This patient has been assessed with a concern for Malnutrition and has been determined to have a diagnosis/diagnoses of Moderate protein-calorie malnutrition.    This patient is being managed with:   Diet Minced and Moist-  Consistent Carbohydrate {Evening Snack} (CSTCHOSN)  DASH/TLC {Sodium & Cholesterol Restricted} (DASH)  For patients receiving Renal Replacement - No Protein Restr No Conc K No Conc Phos Low Sodium (RENAL)  Entered: Jul 29 2023 10:12AM

## 2023-08-03 NOTE — PROGRESS NOTE ADULT - PROBLEM SELECTOR PLAN 5
Pt expressed an understanding of current situation. Understood why they are not a surgical candidate. Pt also vocalized understanding of needing to utilize BiPAP to get better. Patient did not what to be intubated under any circumstances and wishes to remain DNR/DNI but open to all other interventions. Patient also understood that discharge will likely be to a rehab center with goal of transitioning to living in a long-term care center. Patient expressed some interest in being closer to family.     Patient signed HCP form with nephew Juan as primary, and her brother Zachariah at secondary     Pt is DNR/DNI but seems willing to pursue all other interventions

## 2023-08-03 NOTE — PROGRESS NOTE ADULT - PROBLEM SELECTOR PLAN 3
sCr: 1.89  BUN: 62  Likely mixed chronic vs pre-renal in setting of decreased PO and weight loss  Rest of care per primary team.

## 2023-08-03 NOTE — RAPID RESPONSE TEAM SUMMARY - NSADDTLFINDINGSRRT_GEN_ALL_CORE
Patient reportedly had been refusing AVAPS overnight due to claustrophobia. Code status is DNR/DNI. VBG this morning w/ PCO2 93. Now patient is not responsive to sternal rub. Other vitals include HR in 60s, BP 160s/100s, O2 sat 94%. Patient placed on AVAPS. Recommended keeping patient on AVAPS and repeating VBG after 2 hours. Also recommending clarifying GOC as when patient becomes hypercarbic and regains mental status she may refuse AVAPS again causing her to become hypercarbic.

## 2023-08-03 NOTE — PROGRESS NOTE ADULT - TIME BILLING
Medical management as above, reviewing chart and coordinating care with primary team/staff, as well as reviewing vitals, radiology, medication list, recent labs, and prior records.    Does not include teaching time.
#Chronic hypoxemic and hypercapnic respiratory failure  #COPD  #Preoperative evaluation  #RRT for ?hypercapnia
Time spent includes direct patient care  (interview and examination of patient), discussion with other providers, support staff and/or patient's family members, review of medical records, ordering diagnostic tests and analyzing results, and documentation.

## 2023-08-03 NOTE — PROGRESS NOTE ADULT - PROBLEM SELECTOR PLAN 1
h/o COPD, previously hospitalized and recommended to use BiPAP. Patient intolerant of BiPAP. Intermittently utilized due to discomfort. Patient retaining CO2. PCO2 on ABG 72, pH .29. Likely some chronic compensation. However patient intermittently somnolent during evaluation requiring verbal redirection.  Hypercarbic can be as severe as CO2 in 80s to 90s   Patient finds bipap mask uncomfortable- family wondering if a more comfortable mask is available h/o COPD, previously hospitalized and recommended to use BiPAP. Patient intolerant of BiPAP. Intermittently utilized due to discomfort. Patient retaining CO2. PCO2 on ABG 72, pH .29. Likely some chronic compensation. However patient intermittently somnolent during evaluation requiring verbal redirection  Hypercarbic can be as severe as CO2 in 80s to 90s   Patient finds bipap mask uncomfortable  Patient continues to refuse bipap mask, now unarousable again, placed back on bipap

## 2023-08-03 NOTE — PROGRESS NOTE ADULT - PROBLEM SELECTOR PLAN 6
Palliative consulted to aid in GOC conversations given patient decline and nonsurgical candidate. Extensive conversation one on one and with patient and family. Patient expressing wishes to continue with BiPAP and hopes to get better. Reviewed MOLST, introduced hospice which patient declined, and filled out [facility] document. See above GOC note for more information.    Pt is DNR/DNI but willing to pursue all other interventions

## 2023-08-03 NOTE — PROGRESS NOTE ADULT - NS ATTEST RISK PROBLEM GEN_ALL_CORE FT
Patient is seriously ill with acute on chronic hypercapnic respiratory failure, refusing bipap, fracture but high risk for orthopedic surgery   High risk of complications, further morbidity and mortality

## 2023-08-03 NOTE — RAPID RESPONSE TEAM SUMMARY - NSSITUATIONBACKGROUNDRRT_GEN_ALL_CORE
89 y/o F with PMH of HTN, DM2, COPD (O2 dependent 2-3L,) Chronic diastolic HF, hypothyroidism, CKD3, and hx of hyperkalemia presents to the ED after a fall, found to have acute comminuted mildly displaced periprosthetic left proximal femoral fracture, course complicated by the hyperkalemia. Initially planned for OR 7/31 with ortho.    RRT called for AMS. Per primary RN at bedside, pt with hx of dementia, usually AAOx3 but more alert, now seems more lethargic. Withdrawing to pain, answers questions including current year correctly but eyes closed, sleepy on exam. Initial vital signs: T 98.3 F, HR 71, /45, RR 25, SpO2 92% on 4L O2 NC. Pt is apparently dyspneic at baseline, especially on exertion but seems to be more dyspneic than usual per primary RN/team at bedside. CXR showing pulmonary edema, diuresis has been held in setting of elevated SCr, was on Lasix 40 mg IV BID. Will give Lasix 40 mg IV given slightly lower O2 sats at baseline and increased work of breathing. VBG w/ lactate drawn to assess for hypercapnea which can explain AMS and tachypnea, may benefit from ABG if no improvement. Pt also has WBC 13.76 up from 9s in past several days, had leukocytosis of almost 16 on presentation deemed likely reactive from hip fx. Given pt now meets 2 SIRS criteria with redemonstrated leukocytosis, will draw empiric blood and urine cultures, procal, VBG w/ lactate. UA also to be sent. Will monitor off abx for now. BUN 55 which has been around baseline since admission. Pt previously refusing CT Head multiple times due to claustrophobia, now that pt is more lethargic, primary team will attempt to bring pt down for CT Head and C/A/P as part of infectious workup. Xray Abd with no acute findings, last BM yesterday, no diarrhea. Also discussed GoC with primary team, pt seems to be DNR/DNI on prior MOLST however order not placed given plans for OR. Given the new clinical situation, it would be appropriate to document DNR/DNI status and rescind prior to OR if necessary if pt's mental status/dyspnea does not improve given potential need for intubation in the future. Primary team to follow up with patient's family.  
88 year old female with pmh of HTN, DM2, COPD (O2 dependent 2-3L,) Chronic diastolic HF, hypothyroidism, CKD3, and hx of hyperkalemia presents to the ED after a fall, found to have acute comminuted mildly displaced periprosthetic left proximal femoral fracture, course complicated by the hyperkalemia. Now with hypercapnic respiratory failure.

## 2023-08-03 NOTE — PROGRESS NOTE ADULT - PROBLEM SELECTOR PLAN 1
-combined hypoxic and hypercapnic resp failure  -pulm edema leading to hypoxia? C/w lasix 40IV daily and assess output  -avaps for hypercapnia, patient has known h/o severe COPD  -pulm eval appreciated  -still only intermittently compliant with avaps   -palliative evaluation appreciated. Patient refused hospice yesterday and stated she would comply with avaps however refused overnight and this morning. Discussed with family that patient is hospice appropriate given patient is non compliant with avaps at night.

## 2023-08-04 NOTE — PROGRESS NOTE ADULT - PROBLEM SELECTOR PLAN 1
h/o COPD, previously hospitalized and recommended to use BiPAP. Patient intolerant of BiPAP. Intermittently utilized due to discomfort. Patient retaining CO2. PCO2 on ABG 72, pH .29. Likely some chronic compensation. However patient intermittently somnolent during evaluation requiring verbal redirection  Hypercarbic can be as severe as CO2 in 80s to 90s   Patient finds bipap mask uncomfortable  Patient continues to refuse bipap mask, now unarousable again, placed back on bipap h/o COPD, previously hospitalized and recommended to use BiPAP. Patient intolerant of BiPAP. Intermittently utilized due to discomfort. Patient retaining CO2. PCO2 on ABG 72, pH .29. Likely some chronic compensation. However patient intermittently somnolent during evaluation requiring verbal redirection  Hypercarbic can be as severe as CO2 in 80s to 90s   Patient finds bipap mask uncomfortable  Patient continues to refuse bipap mask, now unarousable again, placed back on bipap during RRT    Health Care Agent (Juan) agreed to remove BiPAP as a treatment option on 8/4/2023 h/o COPD, previously hospitalized and recommended to use BiPAP. Patient intolerant of BiPAP. Intermittently utilized due to discomfort. Patient retaining CO2. PCO2 on ABG 72, pH .29. Likely some chronic compensation. However patient intermittently somnolent during evaluation requiring verbal redirection  Hypercarbic can be as severe as CO2 in 80s to 90s   Patient finds bipap mask uncomfortable  Patient continues to refuse bipap mask  Health Care Agent (Juan) agreed to remove BiPAP

## 2023-08-04 NOTE — PROGRESS NOTE ADULT - PROBLEM SELECTOR PLAN 5
Pt expressed an understanding of current situation. Understood why they are not a surgical candidate. Pt also vocalized understanding of needing to utilize BiPAP to get better. Patient did not what to be intubated under any circumstances and wishes to remain DNR/DNI but open to all other interventions. Patient also understood that discharge will likely be to a rehab center with goal of transitioning to living in a long-term care center. Patient expressed some interest in being closer to family.     Patient signed HCP form with nephew Juan as primary, and her brother Zachariah at secondary     Pt is DNR/DNI but seems willing to pursue all other interventions Pt expressed an understanding of current situation. Understood why they are not a surgical candidate. Pt also vocalized understanding of needing to utilize BiPAP to get better. Patient did not what to be intubated under any circumstances and wishes to remain DNR/DNI but open to all other interventions. Patient also understood that discharge will likely be to a rehab center with goal of transitioning to living in a long-term care center. Patient expressed some interest in being closer to family.     Patient signed HCP form with nephew Juan as primary, and her brother Zachariah at secondary     Pt is DNR/DNI, removed BiPAP as an option for respiratory support, but seems willing to pursue all other interventions at this time. Family understands that without BiPAP patient will likely become more somnolent and could pass soon. 8/4- See [facility] note. I spent 30 minutes addressing advanced care planning with patient and/or decision maker(s). Patient has no capacity to make complex medical decisions. Nephew agreed to stop bipap. Later agreed to comfort care.   - Already DNR/DNI 8/4- See [facility] note. I spent 30 minutes addressing advanced care planning with patient and/or decision maker(s). Patient has no capacity to make complex medical decisions. Nephew agreed to stop bipap. Later agreed to comfort care with primary team.   - Already DNR/DNI

## 2023-08-04 NOTE — PROGRESS NOTE ADULT - PROBLEM SELECTOR PLAN 6
Patient with hx of COPD, on 3-4L on oxygen at home   -Now in acute exacerbation   -Will continue with albuterol and bedtime avaps  -Pulm consult appreciated  -hypoxia due to fluid overload? ON lasix 40 daily and diuresing well  - HCP agreed with comfort measures and pt made comfort

## 2023-08-04 NOTE — PROGRESS NOTE ADULT - PROBLEM SELECTOR PLAN 3
-suspect 2/2 from hypercapnia  -patient has h/o COPD and during prior hospitalization required bipap as well. She refused during last hospitalization as well. She was made DNR and was discharged to rehab.   -UA and cxr negative  -blood cx negative, urine cx + GNR - s/p 3 day course of zosyn  -CTH unremarkable  -labile mental status due to hypercapnia and non compliance with avaps, HCP agreed with comfort measures and pt made comfort

## 2023-08-04 NOTE — PROGRESS NOTE ADULT - SUBJECTIVE AND OBJECTIVE BOX
Patient is a 88y old  Female who presents with a chief complaint of fall (04 Aug 2023 11:31)      SUBJECTIVE / OVERNIGHT EVENTS:    This AM, patient without n/v/d/cp/sob.  Patient continues to refuse bipap and states she wants to go home.     MEDICATIONS  (STANDING):  albuterol/ipratropium for Nebulization 3 milliLiter(s) Nebulizer every 6 hours  budesonide 160 MICROgram(s)/formoterol 4.5 MICROgram(s) Inhaler 2 Puff(s) Inhalation two times a day  busPIRone 10 milliGRAM(s) Oral two times a day  chlorhexidine 2% Cloths 1 Application(s) Topical once  dextrose 5%. 1000 milliLiter(s) (50 mL/Hr) IV Continuous <Continuous>  dextrose 5%. 1000 milliLiter(s) (100 mL/Hr) IV Continuous <Continuous>  dextrose 50% Injectable 25 Gram(s) IV Push once  dextrose 50% Injectable 12.5 Gram(s) IV Push once  dextrose 50% Injectable 25 Gram(s) IV Push once  dextrose 50% Injectable 50 milliLiter(s) IV Push once  furosemide   Injectable 40 milliGRAM(s) IV Push daily  glucagon  Injectable 1 milliGRAM(s) IntraMuscular once  insulin lispro (ADMELOG) corrective regimen sliding scale   SubCutaneous three times a day before meals  insulin lispro (ADMELOG) corrective regimen sliding scale   SubCutaneous at bedtime  insulin regular  human recombinant 5 Unit(s) IV Push once  levothyroxine 112 MICROGram(s) Oral daily  metoprolol succinate ER 25 milliGRAM(s) Oral daily  montelukast 10 milliGRAM(s) Oral at bedtime    MEDICATIONS  (PRN):  aluminum hydroxide/magnesium hydroxide/simethicone Suspension 30 milliLiter(s) Oral every 4 hours PRN Dyspepsia  dextrose Oral Gel 15 Gram(s) Oral once PRN Blood Glucose LESS THAN 70 milliGRAM(s)/deciliter  melatonin 3 milliGRAM(s) Oral at bedtime PRN Insomnia  ondansetron Injectable 4 milliGRAM(s) IV Push every 8 hours PRN Nausea and/or Vomiting      PHYSICAL EXAM:  T(C): 36.8 (08-04-23 @ 05:59), Max: 36.9 (08-03-23 @ 15:49)  HR: 70 (08-04-23 @ 11:32) (64 - 79)  BP: 131/92 (08-04-23 @ 05:59) (95/61 - 131/92)  RR: 22 (08-04-23 @ 08:10) (19 - 22)  SpO2: 96% (08-04-23 @ 08:10) (92% - 100%)  I&O's Summary    GENERAL: NAD, elderly female resting in bed   HEAD:  Atraumatic, Normocephalic, MMM  CHEST/LUNG: No use of accessory muscles, decreased BS B/L  COR: RR, no mrcg  ABD: Soft, ND/NT, +BS  PSYCH: AAOx1, oriented to self   NEUROLOGY: no focal deficits noted, moving all extremities  SKIN: No rashes or lesions  EXT: no LE pain or swelling noted.     LABS:  CAPILLARY BLOOD GLUCOSE      POCT Blood Glucose.: 157 mg/dL (04 Aug 2023 08:45)  POCT Blood Glucose.: 177 mg/dL (03 Aug 2023 21:21)  POCT Blood Glucose.: 175 mg/dL (03 Aug 2023 17:58)  POCT Blood Glucose.: 153 mg/dL (03 Aug 2023 12:46)                          9.1    7.54  )-----------( 303      ( 04 Aug 2023 06:06 )             30.1     08-04    140  |  96<L>  |  79<H>  ----------------------------<  149<H>  5.7<H>   |  33<H>  |  1.98<H>    Ca    8.9      04 Aug 2023 08:46  Phos  5.0     08-04  Mg     2.50     08-04            Urinalysis Basic - ( 04 Aug 2023 08:46 )    Color: x / Appearance: x / SG: x / pH: x  Gluc: 149 mg/dL / Ketone: x  / Bili: x / Urobili: x   Blood: x / Protein: x / Nitrite: x   Leuk Esterase: x / RBC: x / WBC x   Sq Epi: x / Non Sq Epi: x / Bacteria: x          RADIOLOGY & ADDITIONAL TESTS:    Telemetry Personally Reviewed -     Imaging Personally Reviewed -     Imaging Reviewed -     Consultant(s) Notes Reviewed -       Care Discussed with Consultants/Other Providers -

## 2023-08-04 NOTE — CHART NOTE - NSCHARTNOTEFT_GEN_A_CORE
After extensive discussion with HCP, Juan, about patient and he would like her to be comfort measures only- including no BIPAP/CPAP or AVAPs, no vital sings, no IV fluids, no antibiotics. HCP agrees with Ativan and morphine prn and oxygen. MOLST form completed and placed in chart.     CM will send referral for inpt hospice.
Code Status discussed with pt's brother Zachariah Welsh who is pt's next of Kin (pt does not have living spouse or children.) over phone at [phone]. After discussion with Dr. Garcia, Zachariah has decided that he does NOT want aggressive life sustaining measures such as chest compressions or intubation for his sister. He agrees to DNR/DNI, MOLST signed and placed in chart. Pt's nephew Juan is at bedside, as per Juan discussion regarding code status was had amongst all family members including the patient who wishes for DNR/DNI. A trial of NIV is within the patient's wishes at this time.
Notified by RN that patient is more lethargic than usual this morning. As per RN, patient did not keep AVAPS on all night. Patient seen at bedside and assessed. Is A&Ox3. Appears slightly somnolent but easily arousable. Patient responsive and answering all questions. Patient states she feels fine with no shortness of breath, chest pain, dizziness, nausea, vomiting, fever or other complaints. VSS. On examination, patient is resting comfortably in NAD. Lungs CTA bilaterally. RRR. S1 and S2 present. Abdomen soft, non-distended and non-tender to palpation.    T(C): 36.8 (08-03-23 @ 06:16), Max: 37.1 (08-02-23 @ 12:16)  HR: 68 (08-03-23 @ 06:16) (67 - 73)  BP: 135/46 (08-03-23 @ 06:16) (128/42 - 148/53)  RR: 20 (08-03-23 @ 06:16) (18 - 20)  SpO2: 96% (08-03-23 @ 06:16) (90% - 98%)    RN instructed to place patient on AVAPS as she likely is lethargic 2/2 not using AVAPS all night.  Labs and VBG drawn at bedside by RN.  Will relay events to day team and f/u labs.
Per Ortho on call - ok to dc NPO - pt needs medical optimization prior to OR, pending pulm ,cards, and medical clearance.
Pt seen and examined. We had a long discussion regarding her condition and management. Unfortunately her pulmonary function has deteriorated and she is deemed high risk for surgery. Postop she may become vent dependent which would obviously also restrict her mobility, and also goes against her wishes of DNR/DNI. She is also extremely weak, per the nephew has spent most of her time in a wheelchair prior to the fall. For all these reasons- even if the surgical reconstruction goes well, she will likely have a poor outcome. I explained that the number one goal of care is pain control, and at this time she is laying bed, comfortable. Given such high risks we will not proceed with surgery. This plan was discussed with all the medical/pulm teams involved as well as the patient's nephew who all agreed. Will proceed with palliative care consult. I expressed my deepest empathy with the patient and the difficulty in coming to this decision. All questions answered.    FU Ortho PRN.     Time: 35 minutes.
Vital Signs Last 24 Hrs  T(C): 36.6 (29 Jul 2023 06:05), Max: 36.6 (28 Jul 2023 13:00)  T(F): 97.8 (29 Jul 2023 06:05), Max: 97.9 (28 Jul 2023 18:10)  HR: 87 (29 Jul 2023 06:05) (67 - 87)  BP: 155/71 (29 Jul 2023 06:05) (123/55 - 155/71)  BP(mean): --  RR: 18 (29 Jul 2023 06:05) (18 - 18)  SpO2: 96% (29 Jul 2023 06:05) (96% - 97%)    Parameters below as of 29 Jul 2023 06:05  Patient On (Oxygen Delivery Method): nasal cannula  O2 Flow (L/min): 4    Medical RRT response was called when pt became lethargic and not responding appropriately. Provider examined pt at bedside and pt was only responsive to her name but otherwise spoke nonsensical answers to questions. RN states that pt was at AO*3 baseline at 5am during morning medication. Physical exam was limited due to pt lack of response to commands. AM labs showed pt's WBC was elevated and was slightly tachypneic matching SIRs criteria. Full infection workup was ordered, BCX, lactate, UA and procal are pending results. CTH was also reordered to r/o CVA.  And CXR was taken at bedside and awaiting final reading. One dose of IVP lasix was given due to pt's HX of CHF. Will continue to monitor pt.     Braxton Hancock PA-C
Vital Signs Last 24 Hrs  T(C): 36.8 (03 Aug 2023 07:49), Max: 36.8 (03 Aug 2023 06:16)  T(F): 98.2 (03 Aug 2023 07:49), Max: 98.2 (03 Aug 2023 06:16)  HR: 66 (03 Aug 2023 12:55) (65 - 73)  BP: 124/39 (03 Aug 2023 07:49) (124/39 - 148/53)  BP(mean): --  RR: 18 (03 Aug 2023 07:49) (18 - 20)  SpO2: 92% (03 Aug 2023 12:55) (90% - 98%)      RRT called earlier for altered mental status. Upon arriving to patient's bedside. patient was not responsive to sternal rub. Vitals HR 60s, BP 160s/100s, O2 sat 94%. Of note, patient refused AVAPS overnight due to being uncomfortable and claustrophobic. VBG this morning with PCO2 93. Patient placed on AVAPS. Patient's code status is DNR/DNI. Will continue monitoring patient. Attending Dr. Garcia at bedside.
pt found to have K+ of 6.6 EKG unchanged, patient had K+ of 6.5. EKG unchanged. given calcium gluconate, insulin/dextrose, lasix IVP, lokelma, and albuterol.     -rpt BMP at midnight     Artemio Klein PA-C  Department of Internal Medicine  In-House beeper number [id number]
received call from Ortho team stating that patient is not optimized for procedure and ortho is looking to delay the procedure (planning for 7/31) in order to ensure optimization.     -c/w work up labs and NPO past midnight in case plans change in the AM.       PRESTON AustinC  Department of Internal Medicine  In-House beeper number [id number]
Noted repeat vbg    Blood Gas Profile - Venous (07.29.23 @ 13:21)    pH, Venous: 7.26    pCO2, Venous: 78 mmHg    pO2, Venous: 26 mmHg    HCO3, Venous: 35 mmol/L    Base Excess, Venous: 6.3 mmol/L    Oxygen Saturation, Venous: 45.1 %    Total CO2, Venous: 37.4 mmol/L    Please trial AVAPS with , PEEP 5, FiO2 0.4, backup rate 16 and then recheck vbg after 2 hours

## 2023-08-04 NOTE — PROGRESS NOTE ADULT - CONVERSATION DETAILS
Had extensive discussion with HCP, Juan about patient and her clinical condition. He states patient agrees initially but then refuses and states she does not want it. He does not want the patient to suffer and wants her to be comfortable including not having patient on BIPAP/CPAP or AVAPS. He wants comfort measures for patient which includes no blood draws/vital signs/no IV fluids/feeding tube or antibiotics. HCP agrees with Ativan prn anxiety and morphine prn pain and oxygen as needed.     All questions answered and emotional support provided. MOLST form completed and placed in chart.

## 2023-08-04 NOTE — PROGRESS NOTE ADULT - PROBLEM SELECTOR PLAN 6
Palliative consulted to aid in GOC conversations given patient decline and nonsurgical candidate. Extensive conversation one on one and with patient and family. Patient expressing wishes to continue with BiPAP and hopes to get better. Reviewed MOLST, introduced hospice which patient declined, and filled out HCA document. See above GOC note for more information.    Pt is DNR/DNI but willing to pursue all other interventions Palliative consulted to aid in GOC conversations given patient decline and nonsurgical candidate. Extensive conversation one on one and with patient and family. Patient expressing wishes to continue with BiPAP and hopes to get better. Reviewed MOLST, introduced hospice which patient declined, and filled out HCA document. See above GOC note for more information.    Pt is DNR/DNI, removed BiPAP as an option for respiratory support, but seems willing to pursue all other interventions at this time. Family understands that without BiPAP patient will likely become more somnolent and could pass soon. Following for CMDM    In the event of worsening symptoms, please contact the Palliative Medicine team via pager (if the patient is at [facility] #[phone] or if the patient is at [facility] #[phone]) The Geriatric and Palliative Medicine service has coverage 24 hours a day/ 7 days a week to provide medical recommendations regarding symptom management needs via telephone

## 2023-08-04 NOTE — PROGRESS NOTE ADULT - PROBLEM SELECTOR PLAN 1
-combined hypoxic and hypercapnic resp failure  -pulm edema leading to hypoxia? C/w lasix 40IV daily and assess output  -avaps for hypercapnia, patient has known h/o severe COPD  -pulm eval appreciated  -still only intermittently compliant with avaps   -palliative evaluation appreciated. Patient refused hospice yesterday and stated she would comply with avaps however refused overnight and this morning. Discussed with family that patient is hospice appropriate given patient is non compliant with avaps at night. HCP agreed with comfort measures.

## 2023-08-04 NOTE — PROGRESS NOTE ADULT - CONVERSATION/DISCUSSION
Diagnosis/Prognosis/Hospice Referral
Prognosis/MOLST Discussed
Diagnosis/Prognosis/MOLST Discussed
Diagnosis/Prognosis
Diagnosis/Prognosis

## 2023-08-04 NOTE — PROGRESS NOTE ADULT - PROBLEM SELECTOR PLAN 2
Left hip fracture after fall out of wheelchair. Pt not a surgical candidate due to chronic respiratory issues (hypercapnia, hypoxemia) and COPD.  Pt likely to be bedbound going forward. Left hip fracture after fall out of wheelchair. Pt not a surgical candidate due to chronic respiratory issues (hypercapnia, hypoxemia) and COPD.  Pt likely to be bedbound going forward

## 2023-08-04 NOTE — PROGRESS NOTE ADULT - ATTENDING COMMENTS
Patient is a 89 yo F w/ HTN, T2DM, COPD (home O2 3L), HFpEF, dementia, hypothyroidism, CKD3, who presents after fall found to have an acute comminuted mildly displaced periprosthetic L proximal femoral Pulmonary consulted for pre op evaluation and optimization. On 7/29 had RRT for AMS found to be hypercapnic and placed on bipap with improvement in mental status    #Chronic hypoxemic and hypercapnic respiratory failure - On 2 to 4 L nc at home as per patient. Has been on various inhaler regimens but is on triple therapy. Endorses unable to tolerate chronic NIPPV  #COPD  #Preoperative evaluation  #RRT for ?hypercapnia  - possibly became more hypercapnic due to hyperoxia (?vbg vs abg with po2 of 170). please aim for spo2 88-92%    - Can switch to AVAPS from BIPAP with a target TV of 450cc. Would then repeat VBG.  - If already going down for CT head, then please get CT chest Noncontrast  - Discontinue Spiriva. Please start Duonebs Q6 standing (Can hold for sleep).    Will continue to follow
Please see consult note for details:    There are no active cardiac conditions. There is no evidence of ACS, heart failure, or obstructive valvular heart disease. The patient may safely proceed with the planned procedure, including the use of sedation or GET as clinically indicated.  No further cardiac testing is required.    Echo personally reviewed. No aortic stenosis, normal biventricular function.
88 year old female with COPD and chronic hypoxemic respiratory failure on 3L NC, HFpEF, hypothyroidism, and CKD3 who presented following fall. Found to have an acute comminuted mildly displaced periprosthetic left proximal femoral fracture. Pulmonary consulted for pre-operative evaluation. Course c/b altered mental status in setting of hypercapnia. Patient has intermittently been on AVAPS during the day time and at night. SpO2 is acceptable although now requiring 6 L NC. CT chest shows interlobular septal thickening suggestive of pulmonary edema. Does not appear to have active COPD exacerbation.    Patient is high risk from a pulmonary standpoint for surgery. Due to inability to tolerate NIV her pCO2 continues to rise and we are unable to optimize her respiratory status. Discussed the issue of surgery extensively with patient as well as with the hospitalist and orthopedic surgeon involved in the case. Decision made today to pursue conservative management instead of surgery.    Recommend to continue AVAPS qHS and PRN during the day if able to tolerate it.  Continue supplemental oxygen to target SpO2 92 - 95%  Symbicort BID, Duonebs Q6H  Consider palliative input
Patient is a 87 yo F w/ HTN, T2DM, COPD (home O2 3L), HFpEF, dementia, hypothyroidism, CKD3, who presents after fall found to have an acute comminuted mildly displaced periprosthetic L proximal femoral Pulmonary consulted for pre op evaluation and optimization. On 7/29 had RRT for AMS found to be hypercapnic and placed on bipap with improvement in mental status    Today patient was found to have more alert mental status and conversive. Please have patient do trials off AVAPS but would have to go back on during sleep.    #Chronic hypoxemic and hypercapnic respiratory failure - On 2 to 4 L nc at home as per patient. Has been on various inhaler regimens but is on triple therapy. Endorses unable to tolerate chronic NIPPV  #COPD  #Preoperative evaluation  #RRT for ?hypercapnia  - possibly became more hypercapnic due to hyperoxia (?vbg vs abg with po2 of 170). please aim for spo2 88-92%    - Continue AVAPS but would increase target TV to 500cc.   - If already going down for CT head, then please get CT chest Noncontrast  - Continue to hold Spiriva.   - Cont Duonebs Q6 standing (Can hold for sleep).    Will continue to follow
88 year old female with COPD and chronic hypoxemic respiratory failure on 3L NC, HFpEF, hypothyroidism, and CKD3 who presented following fall. Found to have an acute comminuted mildly displaced periprosthetic left proximal femoral fracture. Pulmonary consulted for pre-operative evaluation.     Course c/b altered mental status in setting of hypercapnia. Patient has intermittently been on AVAPS during the day time and at night. SpO2 is acceptable although now requiring 6 L NC. CT chest shows interlobular septal thickening suggestive of pulmonary edema. Does not appear to have active COPD exacerbation.    Patient is high risk from a pulmonary standpoint for surgery.     Recommend to continue AVAPS qHS and PRN during the day  Continue supplemental oxygen to target SpO2 92 - 95%  Symbicort BID, Duonebs Q6H  Check ABG in AM  Agree with diuresis    Will discuss the case further with orthopedic service regarding tentative surgery this week
88 year old female with COPD and chronic hypoxemic respiratory failure on 3L NC, HFpEF, hypothyroidism, and CKD3 who presented following fall. Found to have an acute comminuted mildly displaced periprosthetic left proximal femoral fracture. Pulmonary consulted for pre-operative evaluation. Course c/b altered mental status in setting of hypercapnia. Patient has intermittently been on AVAPS during the day time and at night. SpO2 is acceptable although now requiring 6 L NC. CT chest shows interlobular septal thickening suggestive of pulmonary edema. Does not appear to have active COPD exacerbation. Ultimately deemed not a surgical candidate.    Recommend to continue AVAPS qHS and PRN during the day if able to tolerate it.  Continue supplemental oxygen to target SpO2 92 - 95%  Symbicort BID, Duonebs Q6H  Palliative input appreciated    Will sign off.  Please call with questions / reconsult as needed.
Patient seen and examined at bedside. Agree with above assessment and plan

## 2023-08-04 NOTE — PROGRESS NOTE ADULT - FAMILY/RELATIVE
Emma Martinez, PRATIK and Brother Adal
Brother - Zachariah
Oli malik
Brother Zachariah and Nephew Oli
helena Martinez

## 2023-08-04 NOTE — CHART NOTE - NSCHARTNOTESELECT_GEN_ALL_CORE
Pulmonary
Event Note
GOC/Event Note
RRT/Event Note

## 2023-08-04 NOTE — PROGRESS NOTE ADULT - CONVERSATION DETAILS
See above interval events about extensive conversation regarding patient prognosis. Ultimately removed BiPAP as a form of respiratory support. On re-evaluation Nephew and brother at bedside. Family attempting to  patient on need for BiPAP and how the RRT was likely to happen again if she did continue to use BiPAP. Patient was still grossly confused on evaluation. Nephew asked to speak outside of room.    Discussed patient has no capacity to make decisions as she changes her mind frequently and does not keep the bipap on despite saying "I want to live." As it was evident patient is refusing bipap, recommended to discontinue bipap. Patient may improve naturally on her own or may become hypercarbic again and  eventually. Nephew also acknowledged that "time might be running out" and that she "has lived a long life". Nephew agreed to stopping bipap.     Discussed with primary team. Family later agreed to comfort care measures with primary team.

## 2023-08-04 NOTE — PROGRESS NOTE ADULT - SUBJECTIVE AND OBJECTIVE BOX
Indication of Geriatrics and Palliative Medicine Services:  [X  ] Complex Medical Decision Making   [  ] Symptom/Pain management     DNR on chart:  Yes    INTERVAL EVENTS:     -------------------------------------------------------------------------------------------------------    PRESENT SYMPTOMS:     [ ] No     [X ] Unable to self-report      [ ] CPOT (ICU)     [ ] PAINADs     [ ] RDOS    [ ] Yes     Source if other than patient:  [ ]Family   [ ]Team     PAIN:   If blank, patient unable to specify   [ ]yes [X]no  QOL impact-   Location -                    Aggravating factors -  Quality -  Radiation -  Timing-  Pain at most severe level (0-10 scale):  Pain at minimal acceptable level/Pain Goal (0-10 scale):     SYMPTOMS:   Dyspnea:                           [ ]Mild [ ]Moderate [ ]Severe  Anxiety:                             [ ]Mild [ ]Moderate [ ]Severe  Fatigue:                             [ ]Mild [ ]Moderate [ ]Severe  Nausea/Vomiting:              [ ]Mild [ ]Moderate [ ]Severe  Loss of appetite:                [ ]Mild [ ]Moderate [ ]Severe  Constipation:                     [ ]Mild [ ]Moderate [ ]Severe    Other Symptoms:  [X ]All other review of systems negative     Home Medications for symptoms if any:  I-Stop Reference No:    -------------------------------------------------------------------------------------------------------    ITEMS UNCHECKED ARE NOT PRESENT    PHYSICAL:  Vital Signs Last 24 Hrs  T(C): 36.8 (03 Aug 2023 07:49), Max: 36.8 (03 Aug 2023 06:16)  T(F): 98.2 (03 Aug 2023 07:49), Max: 98.2 (03 Aug 2023 06:16)  HR: 66 (03 Aug 2023 12:55) (65 - 73)  BP: 124/39 (03 Aug 2023 07:49) (124/39 - 148/53)  BP(mean): --  RR: 18 (03 Aug 2023 07:49) (18 - 20)  SpO2: 92% (03 Aug 2023 12:55) (90% - 98%)    Parameters below as of 03 Aug 2023 09:02  Patient On (Oxygen Delivery Method): nasal cannula     I&O's Summary    02 Aug 2023 07:01  -  03 Aug 2023 07:00  --------------------------------------------------------  IN: 0 mL / OUT: 2050 mL / NET: -2050 mL    GENERAL:  [ ]Cachexia  [X] Frail  [ ]Awake  [ ]Oriented    [ ]Lethargic  [X ]Unarousable  [ ]Verbal  [ ]Non-Verbal    BEHAVIORAL:   [ ] Anxiety  [ ] Delirium [ ] Agitation [ ] Other    HEENT:   [ ]Normal   [X ]Dry mouth   [ ]ET Tube/Trach  [ ]Oral lesions    PULMONARY:   [ ]Clear [ ]Tachypnea  [ ]Audible excessive secretions   [ ]Rhonchi        [ ]Right [ ]Left [ ]Bilateral  [X]Crackles        [ ]Right [ ]Left [X]Bilateral  [ ]Wheezing     [ ]Right [ ]Left [ ]Bilateral  [X ]Diminished breath sounds [ ]right [ ]left [X ]bilateral    CARDIOVASCULAR:    [X ]Regular [ ]Irregular [ ]Tachy  [ ]Harmeet [ ]Murmur [ ]Other    GASTROINTESTINAL:  [X ]Soft  [ ]Distended   [ X]+BS  [X ]Non tender [ ]Tender  [ ]Other [ ]PEG [ ]OGT/ NGT      GENITOURINARY:  [X]Normal [ ] Incontinent   [ ]Oliguria/Anuria   [ ]Palacio    MUSCULOSKELETAL:   [ ]Normal   [X ]Weakness  [X ]Bed/Wheelchair bound [ ]Edema    NEUROLOGIC:   [X ]No focal deficits  [ ]Cognitive impairment  [ ]Dysphagia [ ]Dysarthria [ ]Paresis [ ]Other     SKIN:   [X]Normal  [ ]Rash  [ ]Other  [ ]Pressure ulcer(s)       Present on admission [ ]y [ ]n    -------------------------------------------------------------------------------------------------------    LABS:                        8.2    8.33  )-----------( 282      ( 03 Aug 2023 06:10 )             26.6   08-03    141  |  97<L>  |  61<H>  ----------------------------<  147<H>  5.2   |  36<H>  |  1.81<H>    Ca    8.8      03 Aug 2023 06:10  Phos  4.2     08-03  Mg     2.40     08-03    Urinalysis Basic - ( 03 Aug 2023 06:10 )    Color: x / Appearance: x / SG: x / pH: x  Gluc: 147 mg/dL / Ketone: x  / Bili: x / Urobili: x   Blood: x / Protein: x / Nitrite: x   Leuk Esterase: x / RBC: x / WBC x   Sq Epi: x / Non Sq Epi: x / Bacteria: x    Procalcitonin, Serum: 0.18 ng/mL (07-29-23 @ 08:45)    -------------------------------------------------------------------------------------------------------    CRITICAL CARE:  [ ]Shock Present  [ ]Septic [ ]Cardiogenic [ ]Neurologic [ ]Hypovolemic [ ]Undifferentiated    [ ]Vasopressors [ ]Inotropes    [X ]Respiratory failure present [X ]Acute  [X ]Chronic [ ]Hypoxic  [ ]Hypercarbic [ ]Mixed   [ ]Mechanical Ventilation  [ ]Trach collar   [X ]Non-invasive ventilatory support   [ ]High-Flow Mode: NIV (Noninvasive Ventilation), RR (machine): 16, TV (machine): 450, FiO2: 100, PEEP: 5, ITime: 0.9, PIP: 10  [ ]Oxygen mask/venti     [ ]Other organ failure     -------------------------------------------------------------------------------------------------------    RADIOLOGY & ADDITIONAL STUDIES:       < from: Xray Hip w/ Pelvis 2 or 3 Views, Left (07.25.23 @ 12:40) >  IMPRESSION:  Acute comminuted mildly displaced periprosthetic left proximal femoral   fracture.    < end of copied text >    < from: CT Femur No Cont, Left (07.25.23 @ 20:50) >  IMPRESSION:    Acute comminutedleft femoral periprosthetic fracture as described.    < end of copied text >    < from: Xray Abdomen 1 View PORTABLE -Urgent (Xray Abdomen 1 View PORTABLE -Urgent .) (07.29.23 @ 09:43) >  FINDINGS/  IMPRESSION: Thebowel gas pattern is nonobstructive. There is contrast   within the colon.    < end of copied text >    < from: CT Head No Cont (07.31.23 @ 09:45) >  FINDINGS: There is no acute intracranial hemorrhage, mass effect, shift   of the midline structures, herniation, extra-axial fluid collection, or   hydrocephalus.    There is diffuse cerebral volume loss with prominence of the sulci,   fissures, and cisternal spaces which is normal for the patient's age.   Mild ventriculomegaly appears unchanged. There ismild deep and   periventricular white matter hypoattenuation statistically compatible   with microvascular changes given calcific atherosclerotic disease of the   intracranial arteries.    The paranasal sinuses and tympanomastoid cavities are clear. The   calvarium is intact. There is evidence of bilateral cataract removal.   Bilateral senile scleral plaques are noted.      < end of copied text >    < from: CT Chest No Cont (07.31.23 @ 09:45) >  IMPRESSION:    Interlobular septal thickening, suggestive of pulmonary edema.    Small bilateral pleural effusions.    New left upper lobe 4 mm nodule associated with the fissure. Recommend   follow-up noncontrast chest CT in one year.    Nodularity of the hepatic surface suggestive of cirrhosis.    < end of copied text >    < from: Transthoracic Echocardiogram (07.26.23 @ 18:57) >  CONCLUSIONS:  1. Mitral annular calcification, otherwise normal mitral  valve. Minimal mitral regurgitation.  2. Normal leftventricular internal dimensions and wall  thicknesses.  3. Endocardium not well visualized; grossly normal left  ventricular systolic function.  4. Mild diastolic dysfunction (Stage I).  5. Normal right ventricular size and function.    < end of copied text >    -------------------------------------------------------------------------------------------------------  MEDICATIONS:     MEDICATIONS  (STANDING):  albuterol/ipratropium for Nebulization 3 milliLiter(s) Nebulizer every 6 hours  budesonide 160 MICROgram(s)/formoterol 4.5 MICROgram(s) Inhaler 2 Puff(s) Inhalation two times a day  busPIRone 10 milliGRAM(s) Oral two times a day  chlorhexidine 2% Cloths 1 Application(s) Topical once  dextrose 5%. 1000 milliLiter(s) (50 mL/Hr) IV Continuous <Continuous>  dextrose 5%. 1000 milliLiter(s) (100 mL/Hr) IV Continuous <Continuous>  dextrose 50% Injectable 25 Gram(s) IV Push once  dextrose 50% Injectable 25 Gram(s) IV Push once  dextrose 50% Injectable 12.5 Gram(s) IV Push once  furosemide   Injectable 40 milliGRAM(s) IV Push daily  glucagon  Injectable 1 milliGRAM(s) IntraMuscular once  insulin lispro (ADMELOG) corrective regimen sliding scale   SubCutaneous three times a day before meals  insulin lispro (ADMELOG) corrective regimen sliding scale   SubCutaneous at bedtime  levothyroxine 112 MICROGram(s) Oral daily  metoprolol succinate ER 25 milliGRAM(s) Oral daily  montelukast 10 milliGRAM(s) Oral at bedtime    MEDICATIONS  (PRN):  aluminum hydroxide/magnesium hydroxide/simethicone Suspension 30 milliLiter(s) Oral every 4 hours PRN Dyspepsia  dextrose Oral Gel 15 Gram(s) Oral once PRN Blood Glucose LESS THAN 70 milliGRAM(s)/deciliter  melatonin 3 milliGRAM(s) Oral at bedtime PRN Insomnia  ondansetron Injectable 4 milliGRAM(s) IV Push every 8 hours PRN Nausea and/or Vomiting         Indication of Geriatrics and Palliative Medicine Services:  [X  ] Complex Medical Decision Making   [  ] Symptom/Pain management     DNR on chart:  Yes    INTERVAL EVENTS: Pt was a RRT on 8/3 at 14:00 hours due to somnolence and unarousable. Pt placed back on BiPAP. Patient was not responsive. Family notified. Pt monitored on BiPAP with 1:1. Pt not alert enough for PO meds overnight. At approximately 6:00 this AM patient became alert and removed BiPAP. Placed on NC.     Upon evaluation this AM pt was lethargic. Responded to verbal and physical stimuli, opening eyes. Pt was grossly confused (AO1-2), insisting that we were in [place]. She confused in the interviewer for her long time friend insisting that we have known each other for years. Could only identify this location as a hospital. Did not know date/year. She denied feeling short of breath, coughing, pain. Notably on exam patient has pronounced secretions making her speech difficult to understand. Patient was unable to cough or clear secretions despite coaching.    On re-evaluation Nephew and brother at bedside. Family attempting to  patient on need for BiPAP and how yesterdays RRT was likely to happen again if she did not take      -------------------------------------------------------------------------------------------------------    PRESENT SYMPTOMS:     [ ] No     [X ] Unable to self-report      [ ] CPOT (ICU)     [ ] PAINADs     [ ] RDOS    [ ] Yes     Source if other than patient:  [ ]Family   [ ]Team     PAIN:   If blank, patient unable to specify   [ ]yes [X]no  QOL impact-   Location -                    Aggravating factors -  Quality -  Radiation -  Timing-  Pain at most severe level (0-10 scale):  Pain at minimal acceptable level/Pain Goal (0-10 scale):     SYMPTOMS:   Dyspnea:                           [ ]Mild [ ]Moderate [ ]Severe  Anxiety:                             [ ]Mild [ ]Moderate [ ]Severe  Fatigue:                             [ ]Mild [ ]Moderate [ ]Severe  Nausea/Vomiting:              [ ]Mild [ ]Moderate [ ]Severe  Loss of appetite:                [ ]Mild [ ]Moderate [ ]Severe  Constipation:                     [ ]Mild [ ]Moderate [ ]Severe    Other Symptoms:  [X ]All other review of systems negative     Home Medications for symptoms if any:  I-Stop Reference No:    -------------------------------------------------------------------------------------------------------    ITEMS UNCHECKED ARE NOT PRESENT    PHYSICAL:  Vital Signs Last 24 Hrs  T(C): 36.8 (03 Aug 2023 07:49), Max: 36.8 (03 Aug 2023 06:16)  T(F): 98.2 (03 Aug 2023 07:49), Max: 98.2 (03 Aug 2023 06:16)  HR: 66 (03 Aug 2023 12:55) (65 - 73)  BP: 124/39 (03 Aug 2023 07:49) (124/39 - 148/53)  BP(mean): --  RR: 18 (03 Aug 2023 07:49) (18 - 20)  SpO2: 92% (03 Aug 2023 12:55) (90% - 98%)    Parameters below as of 03 Aug 2023 09:02  Patient On (Oxygen Delivery Method): nasal cannula     I&O's Summary    02 Aug 2023 07:01  -  03 Aug 2023 07:00  --------------------------------------------------------  IN: 0 mL / OUT: 2050 mL / NET: -2050 mL    GENERAL:  [ ]Cachexia  [X] Frail  [ ]Awake  [ ]Oriented    [ ]Lethargic  [X ]Unarousable  [ ]Verbal  [ ]Non-Verbal    BEHAVIORAL:   [ ] Anxiety  [ ] Delirium [ ] Agitation [ ] Other    HEENT:   [ ]Normal   [X ]Dry mouth   [ ]ET Tube/Trach  [ ]Oral lesions    PULMONARY:   [ ]Clear [ ]Tachypnea  [ ]Audible excessive secretions   [ ]Rhonchi        [ ]Right [ ]Left [ ]Bilateral  [X]Crackles        [ ]Right [ ]Left [X]Bilateral  [ ]Wheezing     [ ]Right [ ]Left [ ]Bilateral  [X ]Diminished breath sounds [ ]right [ ]left [X ]bilateral    CARDIOVASCULAR:    [X ]Regular [ ]Irregular [ ]Tachy  [ ]Harmeet [ ]Murmur [ ]Other    GASTROINTESTINAL:  [X ]Soft  [ ]Distended   [ X]+BS  [X ]Non tender [ ]Tender  [ ]Other [ ]PEG [ ]OGT/ NGT      GENITOURINARY:  [X]Normal [ ] Incontinent   [ ]Oliguria/Anuria   [ ]Palacio    MUSCULOSKELETAL:   [ ]Normal   [X ]Weakness  [X ]Bed/Wheelchair bound [ ]Edema    NEUROLOGIC:   [X ]No focal deficits  [ ]Cognitive impairment  [ ]Dysphagia [ ]Dysarthria [ ]Paresis [ ]Other     SKIN:   [X]Normal  [ ]Rash  [ ]Other  [ ]Pressure ulcer(s)       Present on admission [ ]y [ ]n    -------------------------------------------------------------------------------------------------------    LABS:                        8.2    8.33  )-----------( 282      ( 03 Aug 2023 06:10 )             26.6   08-03    141  |  97<L>  |  61<H>  ----------------------------<  147<H>  5.2   |  36<H>  |  1.81<H>    Ca    8.8      03 Aug 2023 06:10  Phos  4.2     08-03  Mg     2.40     08-03    Urinalysis Basic - ( 03 Aug 2023 06:10 )    Color: x / Appearance: x / SG: x / pH: x  Gluc: 147 mg/dL / Ketone: x  / Bili: x / Urobili: x   Blood: x / Protein: x / Nitrite: x   Leuk Esterase: x / RBC: x / WBC x   Sq Epi: x / Non Sq Epi: x / Bacteria: x    Procalcitonin, Serum: 0.18 ng/mL (07-29-23 @ 08:45)    -------------------------------------------------------------------------------------------------------    CRITICAL CARE:  [ ]Shock Present  [ ]Septic [ ]Cardiogenic [ ]Neurologic [ ]Hypovolemic [ ]Undifferentiated    [ ]Vasopressors [ ]Inotropes    [X ]Respiratory failure present [X ]Acute  [X ]Chronic [ ]Hypoxic  [ ]Hypercarbic [ ]Mixed   [ ]Mechanical Ventilation  [ ]Trach collar   [X ]Non-invasive ventilatory support   [ ]High-Flow Mode: NIV (Noninvasive Ventilation), RR (machine): 16, TV (machine): 450, FiO2: 100, PEEP: 5, ITime: 0.9, PIP: 10  [ ]Oxygen mask/venti     [ ]Other organ failure     -------------------------------------------------------------------------------------------------------    RADIOLOGY & ADDITIONAL STUDIES:       < from: Xray Hip w/ Pelvis 2 or 3 Views, Left (07.25.23 @ 12:40) >  IMPRESSION:  Acute comminuted mildly displaced periprosthetic left proximal femoral   fracture.    < end of copied text >    < from: CT Femur No Cont, Left (07.25.23 @ 20:50) >  IMPRESSION:    Acute comminutedleft femoral periprosthetic fracture as described.    < end of copied text >    < from: Xray Abdomen 1 View PORTABLE -Urgent (Xray Abdomen 1 View PORTABLE -Urgent .) (07.29.23 @ 09:43) >  FINDINGS/  IMPRESSION: Thebowel gas pattern is nonobstructive. There is contrast   within the colon.    < end of copied text >    < from: CT Head No Cont (07.31.23 @ 09:45) >  FINDINGS: There is no acute intracranial hemorrhage, mass effect, shift   of the midline structures, herniation, extra-axial fluid collection, or   hydrocephalus.    There is diffuse cerebral volume loss with prominence of the sulci,   fissures, and cisternal spaces which is normal for the patient's age.   Mild ventriculomegaly appears unchanged. There ismild deep and   periventricular white matter hypoattenuation statistically compatible   with microvascular changes given calcific atherosclerotic disease of the   intracranial arteries.    The paranasal sinuses and tympanomastoid cavities are clear. The   calvarium is intact. There is evidence of bilateral cataract removal.   Bilateral senile scleral plaques are noted.      < end of copied text >    < from: CT Chest No Cont (07.31.23 @ 09:45) >  IMPRESSION:    Interlobular septal thickening, suggestive of pulmonary edema.    Small bilateral pleural effusions.    New left upper lobe 4 mm nodule associated with the fissure. Recommend   follow-up noncontrast chest CT in one year.    Nodularity of the hepatic surface suggestive of cirrhosis.    < end of copied text >    < from: Transthoracic Echocardiogram (07.26.23 @ 18:57) >  CONCLUSIONS:  1. Mitral annular calcification, otherwise normal mitral  valve. Minimal mitral regurgitation.  2. Normal leftventricular internal dimensions and wall  thicknesses.  3. Endocardium not well visualized; grossly normal left  ventricular systolic function.  4. Mild diastolic dysfunction (Stage I).  5. Normal right ventricular size and function.    < end of copied text >    -------------------------------------------------------------------------------------------------------  MEDICATIONS:     MEDICATIONS  (STANDING):  albuterol/ipratropium for Nebulization 3 milliLiter(s) Nebulizer every 6 hours  budesonide 160 MICROgram(s)/formoterol 4.5 MICROgram(s) Inhaler 2 Puff(s) Inhalation two times a day  busPIRone 10 milliGRAM(s) Oral two times a day  chlorhexidine 2% Cloths 1 Application(s) Topical once  dextrose 5%. 1000 milliLiter(s) (50 mL/Hr) IV Continuous <Continuous>  dextrose 5%. 1000 milliLiter(s) (100 mL/Hr) IV Continuous <Continuous>  dextrose 50% Injectable 25 Gram(s) IV Push once  dextrose 50% Injectable 25 Gram(s) IV Push once  dextrose 50% Injectable 12.5 Gram(s) IV Push once  furosemide   Injectable 40 milliGRAM(s) IV Push daily  glucagon  Injectable 1 milliGRAM(s) IntraMuscular once  insulin lispro (ADMELOG) corrective regimen sliding scale   SubCutaneous three times a day before meals  insulin lispro (ADMELOG) corrective regimen sliding scale   SubCutaneous at bedtime  levothyroxine 112 MICROGram(s) Oral daily  metoprolol succinate ER 25 milliGRAM(s) Oral daily  montelukast 10 milliGRAM(s) Oral at bedtime    MEDICATIONS  (PRN):  aluminum hydroxide/magnesium hydroxide/simethicone Suspension 30 milliLiter(s) Oral every 4 hours PRN Dyspepsia  dextrose Oral Gel 15 Gram(s) Oral once PRN Blood Glucose LESS THAN 70 milliGRAM(s)/deciliter  melatonin 3 milliGRAM(s) Oral at bedtime PRN Insomnia  ondansetron Injectable 4 milliGRAM(s) IV Push every 8 hours PRN Nausea and/or Vomiting         Indication of Geriatrics and Palliative Medicine Services:  [X  ] Complex Medical Decision Making   [  ] Symptom/Pain management     DNR on chart:  Yes    INTERVAL EVENTS: Pt was a RRT on 8/3 at 14:00 hours due to somnolence and unarousable. Pt placed back on BiPAP. Patient was not responsive. Family notified. Pt monitored on BiPAP with 1:1. Pt not alert enough for PO meds overnight. At approximately 6:00 this AM patient became alert and removed BiPAP. Placed on NC.     Upon evaluation this AM pt was lethargic. Responded to verbal and physical stimuli, opening eyes. Pt was grossly confused (AO1-2), insisting that we were in [place]. She confused in the interviewer for her long time friend insisting that we have known each other for years. Could only identify this location as a hospital. Did not know date/year. She denied feeling short of breath, coughing, pain. Notably on exam patient has pronounced secretions making her speech difficult to understand. Patient was unable to cough or clear secretions despite coaching.    On re-evaluation Nephew and brother at bedside. Family attempting to  patient on need for BiPAP and how the RRT was likely to happen again if she did continue to use BiPAP. Patient was still grossly confused on evaluation. Nephew asked to speak outside of room.    During the conversation Nephew acknowledged that BiPAP is necessary but it doesn't seem like the patient is taking it. Nephew also acknowledged how the patient seems more confused and doesn't seem to understand how important BiPAP is. Nephew also acknowledge that "time might be running out" and that she "has lived a long life". Reiterated that although patient says things like "I want to live" it is juxtaposed by her actions and statements of how horrible BiPAP is. Nephew ultimately decided that it was time to discontinue BiPAP and see how she does. He acknowledged that she might pass away soon while being off the BiPAP.     -------------------------------------------------------------------------------------------------------    PRESENT SYMPTOMS:     [ ] No     [X ] Unable to self-report      [ ] CPOT (ICU)     [ ] PAINADs     [ ] RDOS    [ ] Yes     Source if other than patient:  [ ]Family   [ ]Team     PAIN:   If blank, patient unable to specify   [ ]yes [X]no  QOL impact-   Location -                    Aggravating factors -  Quality -  Radiation -  Timing-  Pain at most severe level (0-10 scale):  Pain at minimal acceptable level/Pain Goal (0-10 scale):     SYMPTOMS:   Dyspnea:                           [ ]Mild [ ]Moderate [ ]Severe  Anxiety:                             [ ]Mild [ ]Moderate [ ]Severe  Fatigue:                             [ ]Mild [ ]Moderate [ ]Severe  Nausea/Vomiting:              [ ]Mild [ ]Moderate [ ]Severe  Loss of appetite:                [ ]Mild [ ]Moderate [ ]Severe  Constipation:                     [ ]Mild [ ]Moderate [ ]Severe    Other Symptoms:  [X ]All other review of systems negative     Home Medications for symptoms if any:  I-Stop Reference No:    -------------------------------------------------------------------------------------------------------    ITEMS UNCHECKED ARE NOT PRESENT    PHYSICAL:  Vital Signs Last 24 Hrs  T(C): 36.8 (03 Aug 2023 07:49), Max: 36.8 (03 Aug 2023 06:16)  T(F): 98.2 (03 Aug 2023 07:49), Max: 98.2 (03 Aug 2023 06:16)  HR: 66 (03 Aug 2023 12:55) (65 - 73)  BP: 124/39 (03 Aug 2023 07:49) (124/39 - 148/53)  BP(mean): --  RR: 18 (03 Aug 2023 07:49) (18 - 20)  SpO2: 92% (03 Aug 2023 12:55) (90% - 98%)    Parameters below as of 03 Aug 2023 09:02  Patient On (Oxygen Delivery Method): nasal cannula     I&O's Summary    02 Aug 2023 07:01  -  03 Aug 2023 07:00  --------------------------------------------------------  IN: 0 mL / OUT: 2050 mL / NET: -2050 mL    GENERAL:  [ ]Cachexia  [X] Frail  [ ]Awake  [ ]Oriented    [ ]Lethargic  [X ]Unarousable  [ ]Verbal  [ ]Non-Verbal    BEHAVIORAL:   [ ] Anxiety  [ ] Delirium [ ] Agitation [ ] Other    HEENT:   [ ]Normal   [ ]Dry mouth   [ ]ET Tube/Trach  [ ]Oral lesions    PULMONARY:   [ ]Clear [ ]Tachypnea  [X]Audible excessive secretions   [ ]Rhonchi        [ ]Right [ ]Left [ ]Bilateral  [X]Crackles        [ ]Right [ ]Left [X]Bilateral  [ ]Wheezing     [ ]Right [ ]Left [ ]Bilateral  [X ]Diminished breath sounds [ ]right [ ]left [X ]bilateral    CARDIOVASCULAR:    [X ]Regular [ ]Irregular [ ]Tachy  [ ]Harmeet [ ]Murmur [ ]Other    GASTROINTESTINAL:  [X ]Soft  [ ]Distended   [ X]+BS  [X ]Non tender [ ]Tender  [ ]Other [ ]PEG [ ]OGT/ NGT      GENITOURINARY:  [X]Normal [ ] Incontinent   [ ]Oliguria/Anuria   [ ]Palacio    MUSCULOSKELETAL:   [ ]Normal   [X ]Weakness  [X ]Bed/Wheelchair bound [ ]Edema    NEUROLOGIC:   [X ]No focal deficits  [ ]Cognitive impairment  [ ]Dysphagia [ ]Dysarthria [ ]Paresis [ ]Other     SKIN:   [X]Normal  [ ]Rash  [ ]Other  [ ]Pressure ulcer(s)       Present on admission [ ]y [ ]n    -------------------------------------------------------------------------------------------------------    LABS:                        8.2    8.33  )-----------( 282      ( 03 Aug 2023 06:10 )             26.6   08-03    141  |  97<L>  |  61<H>  ----------------------------<  147<H>  5.2   |  36<H>  |  1.81<H>    Ca    8.8      03 Aug 2023 06:10  Phos  4.2     08-03  Mg     2.40     08-03    Urinalysis Basic - ( 03 Aug 2023 06:10 )    Color: x / Appearance: x / SG: x / pH: x  Gluc: 147 mg/dL / Ketone: x  / Bili: x / Urobili: x   Blood: x / Protein: x / Nitrite: x   Leuk Esterase: x / RBC: x / WBC x   Sq Epi: x / Non Sq Epi: x / Bacteria: x    Procalcitonin, Serum: 0.18 ng/mL (07-29-23 @ 08:45)    -------------------------------------------------------------------------------------------------------    CRITICAL CARE:  [ ]Shock Present  [ ]Septic [ ]Cardiogenic [ ]Neurologic [ ]Hypovolemic [ ]Undifferentiated    [ ]Vasopressors [ ]Inotropes    [X ]Respiratory failure present [X ]Acute  [X ]Chronic [ ]Hypoxic  [ ]Hypercarbic [ ]Mixed   [ ]Mechanical Ventilation  [ ]Trach collar   [X ]Non-invasive ventilatory support   [ ]High-Flow Mode: NIV (Noninvasive Ventilation), RR (machine): 16, TV (machine): 450, FiO2: 100, PEEP: 5, ITime: 0.9, PIP: 10  [ ]Oxygen mask/venti     [ ]Other organ failure     -------------------------------------------------------------------------------------------------------    RADIOLOGY & ADDITIONAL STUDIES:       < from: Xray Hip w/ Pelvis 2 or 3 Views, Left (07.25.23 @ 12:40) >  IMPRESSION:  Acute comminuted mildly displaced periprosthetic left proximal femoral   fracture.    < end of copied text >    < from: CT Femur No Cont, Left (07.25.23 @ 20:50) >  IMPRESSION:    Acute comminutedleft femoral periprosthetic fracture as described.    < end of copied text >    < from: Xray Abdomen 1 View PORTABLE -Urgent (Xray Abdomen 1 View PORTABLE -Urgent .) (07.29.23 @ 09:43) >  FINDINGS/  IMPRESSION: Thebowel gas pattern is nonobstructive. There is contrast   within the colon.    < end of copied text >    < from: CT Head No Cont (07.31.23 @ 09:45) >  FINDINGS: There is no acute intracranial hemorrhage, mass effect, shift   of the midline structures, herniation, extra-axial fluid collection, or   hydrocephalus.    There is diffuse cerebral volume loss with prominence of the sulci,   fissures, and cisternal spaces which is normal for the patient's age.   Mild ventriculomegaly appears unchanged. There ismild deep and   periventricular white matter hypoattenuation statistically compatible   with microvascular changes given calcific atherosclerotic disease of the   intracranial arteries.    The paranasal sinuses and tympanomastoid cavities are clear. The   calvarium is intact. There is evidence of bilateral cataract removal.   Bilateral senile scleral plaques are noted.      < end of copied text >    < from: CT Chest No Cont (07.31.23 @ 09:45) >  IMPRESSION:    Interlobular septal thickening, suggestive of pulmonary edema.    Small bilateral pleural effusions.    New left upper lobe 4 mm nodule associated with the fissure. Recommend   follow-up noncontrast chest CT in one year.    Nodularity of the hepatic surface suggestive of cirrhosis.    < end of copied text >    < from: Transthoracic Echocardiogram (07.26.23 @ 18:57) >  CONCLUSIONS:  1. Mitral annular calcification, otherwise normal mitral  valve. Minimal mitral regurgitation.  2. Normal leftventricular internal dimensions and wall  thicknesses.  3. Endocardium not well visualized; grossly normal left  ventricular systolic function.  4. Mild diastolic dysfunction (Stage I).  5. Normal right ventricular size and function.    < end of copied text >    -------------------------------------------------------------------------------------------------------  MEDICATIONS:     MEDICATIONS  (STANDING):  albuterol/ipratropium for Nebulization 3 milliLiter(s) Nebulizer every 6 hours  budesonide 160 MICROgram(s)/formoterol 4.5 MICROgram(s) Inhaler 2 Puff(s) Inhalation two times a day  busPIRone 10 milliGRAM(s) Oral two times a day  chlorhexidine 2% Cloths 1 Application(s) Topical once  dextrose 5%. 1000 milliLiter(s) (50 mL/Hr) IV Continuous <Continuous>  dextrose 5%. 1000 milliLiter(s) (100 mL/Hr) IV Continuous <Continuous>  dextrose 50% Injectable 25 Gram(s) IV Push once  dextrose 50% Injectable 25 Gram(s) IV Push once  dextrose 50% Injectable 12.5 Gram(s) IV Push once  furosemide   Injectable 40 milliGRAM(s) IV Push daily  glucagon  Injectable 1 milliGRAM(s) IntraMuscular once  insulin lispro (ADMELOG) corrective regimen sliding scale   SubCutaneous three times a day before meals  insulin lispro (ADMELOG) corrective regimen sliding scale   SubCutaneous at bedtime  levothyroxine 112 MICROGram(s) Oral daily  metoprolol succinate ER 25 milliGRAM(s) Oral daily  montelukast 10 milliGRAM(s) Oral at bedtime    MEDICATIONS  (PRN):  aluminum hydroxide/magnesium hydroxide/simethicone Suspension 30 milliLiter(s) Oral every 4 hours PRN Dyspepsia  dextrose Oral Gel 15 Gram(s) Oral once PRN Blood Glucose LESS THAN 70 milliGRAM(s)/deciliter  melatonin 3 milliGRAM(s) Oral at bedtime PRN Insomnia  ondansetron Injectable 4 milliGRAM(s) IV Push every 8 hours PRN Nausea and/or Vomiting         Indication of Geriatrics and Palliative Medicine Services:  [X  ] Complex Medical Decision Making   [  ] Symptom/Pain management     DNR on chart:  Yes    INTERVAL EVENTS: Pt was a RRT on 8/3 at 14:00 hours due to somnolence and unarousable. Pt placed back on BiPAP. Patient was not responsive. Family notified. Pt monitored on BiPAP with 1:1. Pt not alert enough for PO meds overnight. At approximately 6:00 this AM patient became alert and removed BiPAP. Placed on NC.     Upon evaluation this AM pt was lethargic. Responded to verbal and physical stimuli, opening eyes. Pt was grossly confused (AO1-2), insisting that we were in [place]. She confused in the interviewer for her long time friend insisting that we have known each other for years. Could only identify this location as a hospital. Did not know date/year. She denied feeling short of breath, coughing, pain. Notably on exam patient has pronounced secretions making her speech difficult to understand. Patient was unable to cough or clear secretions despite coaching.    See below for GOC with family at bedside.     -------------------------------------------------------------------------------------------------------    PRESENT SYMPTOMS:     [ ] No     [X ] Unable to self-report      [ ] CPOT (ICU)     [ ] PAINADs     [X ] RDOS 0    [ ] Yes     Source if other than patient:  [ ]Family   [ ]Team     PAIN:   If blank, patient unable to specify   [ ]yes [X]no  QOL impact-   Location -                    Aggravating factors -  Quality -  Radiation -  Timing-  Pain at most severe level (0-10 scale):  Pain at minimal acceptable level/Pain Goal (0-10 scale):     SYMPTOMS:   Dyspnea:                           [ ]Mild [ ]Moderate [ ]Severe  Anxiety:                             [ ]Mild [ ]Moderate [ ]Severe  Fatigue:                             [ ]Mild [ ]Moderate [ ]Severe  Nausea/Vomiting:              [ ]Mild [ ]Moderate [ ]Severe  Loss of appetite:                [ ]Mild [ ]Moderate [ ]Severe  Constipation:                     [ ]Mild [ ]Moderate [ ]Severe    Other Symptoms:  [X ]All other review of systems negative     Home Medications for symptoms if any:  I-Stop Reference No:    -------------------------------------------------------------------------------------------------------    ITEMS UNCHECKED ARE NOT PRESENT    PHYSICAL:  Vital Signs Last 24 Hrs  T(C): 36.8 (04 Aug 2023 05:59), Max: 36.9 (03 Aug 2023 15:49)  T(F): 98.2 (04 Aug 2023 05:59), Max: 98.4 (03 Aug 2023 15:49)  HR: 70 (04 Aug 2023 11:32) (64 - 79)  BP: 131/92 (04 Aug 2023 05:59) (95/61 - 131/92)  BP(mean): --  RR: 22 (04 Aug 2023 08:10) (19 - 22)  SpO2: 96% (04 Aug 2023 08:10) (96% - 100%)    Parameters below as of 04 Aug 2023 08:10  Patient On (Oxygen Delivery Method): nasal cannula  O2 Flow (L/min): 6      GENERAL:  [ ]Cachexia  [X] Frail  [X ]Awake  [ ]Oriented    [ ]Lethargic  [ ]Unarousable  [X ]Verbal  [ ]Non-Verbal    BEHAVIORAL:   [ ] Anxiety  [ ] Delirium [ ] Agitation [X ] Other - confused     HEENT:   [ ]Normal   [ ]Dry mouth   [ ]ET Tube/Trach  [ ]Oral lesions    PULMONARY:   [ ]Clear [ ]Tachypnea  [X]Audible excessive secretions   [ ]Rhonchi        [ ]Right [ ]Left [ ]Bilateral  []Crackles        [ ]Right [ ]Left [X]Bilateral  [ ]Wheezing     [ ]Right [ ]Left [ ]Bilateral  [X ]Diminished breath sounds [ ]right [ ]left [X ]bilateral    CARDIOVASCULAR:    [X ]Regular [ ]Irregular [ ]Tachy  [ ]Harmeet [ ]Murmur [ ]Other    GASTROINTESTINAL:  [X ]Soft  [ ]Distended   [ X]+BS  [X ]Non tender [ ]Tender  [ ]Other [ ]PEG [ ]OGT/ NGT      GENITOURINARY:  [X]Normal [ ] Incontinent   [ ]Oliguria/Anuria   [ ]Palacio    MUSCULOSKELETAL:   [ ]Normal   [X ]Weakness  [X ]Bed/Wheelchair bound [ ]Edema    NEUROLOGIC:   [X ]No focal deficits  [ ]Cognitive impairment  [ ]Dysphagia [ ]Dysarthria [ ]Paresis [ ]Other     SKIN:   [X]Normal  [ ]Rash  [ ]Other  [ ]Pressure ulcer(s)       Present on admission [ ]y [ ]n    -------------------------------------------------------------------------------------------------------    LABS:                                   9.1    7.54  )-----------( 303      ( 04 Aug 2023 06:06 )             30.1     08-04    140  |  96<L>  |  79<H>  ----------------------------<  149<H>  5.7<H>   |  33<H>  |  1.98<H>    Ca    8.9      04 Aug 2023 08:46  Phos  5.0     08-04  Mg     2.50     08-04    -------------------------------------------------------------------------------------------------------    CRITICAL CARE:  [ ]Shock Present  [ ]Septic [ ]Cardiogenic [ ]Neurologic [ ]Hypovolemic [ ]Undifferentiated    [ ]Vasopressors [ ]Inotropes    [X ]Respiratory failure present [X ]Acute  [X ]Chronic [ ]Hypoxic  [ ]Hypercarbic [ ]Mixed   [ ]Mechanical Ventilation  [ ]Trach collar   [X ]Non-invasive ventilatory support   [ ]High-Flow Mode: NIV (Noninvasive Ventilation), RR (machine): 16, TV (machine): 450, FiO2: 100, PEEP: 5, ITime: 0.9, PIP: 10  [ ]Oxygen mask/venti     [ ]Other organ failure     -------------------------------------------------------------------------------------------------------    RADIOLOGY & ADDITIONAL STUDIES:       < from: Xray Hip w/ Pelvis 2 or 3 Views, Left (07.25.23 @ 12:40) >  IMPRESSION:  Acute comminuted mildly displaced periprosthetic left proximal femoral   fracture.    < end of copied text >    < from: CT Femur No Cont, Left (07.25.23 @ 20:50) >  IMPRESSION:    Acute comminutedleft femoral periprosthetic fracture as described.    < end of copied text >    < from: Xray Abdomen 1 View PORTABLE -Urgent (Xray Abdomen 1 View PORTABLE -Urgent .) (07.29.23 @ 09:43) >  FINDINGS/  IMPRESSION: Thebowel gas pattern is nonobstructive. There is contrast   within the colon.    < end of copied text >    < from: CT Head No Cont (07.31.23 @ 09:45) >  FINDINGS: There is no acute intracranial hemorrhage, mass effect, shift   of the midline structures, herniation, extra-axial fluid collection, or   hydrocephalus.    There is diffuse cerebral volume loss with prominence of the sulci,   fissures, and cisternal spaces which is normal for the patient's age.   Mild ventriculomegaly appears unchanged. There ismild deep and   periventricular white matter hypoattenuation statistically compatible   with microvascular changes given calcific atherosclerotic disease of the   intracranial arteries.    The paranasal sinuses and tympanomastoid cavities are clear. The   calvarium is intact. There is evidence of bilateral cataract removal.   Bilateral senile scleral plaques are noted.      < end of copied text >    < from: CT Chest No Cont (07.31.23 @ 09:45) >  IMPRESSION:    Interlobular septal thickening, suggestive of pulmonary edema.    Small bilateral pleural effusions.    New left upper lobe 4 mm nodule associated with the fissure. Recommend   follow-up noncontrast chest CT in one year.    Nodularity of the hepatic surface suggestive of cirrhosis.    < end of copied text >    < from: Transthoracic Echocardiogram (07.26.23 @ 18:57) >  CONCLUSIONS:  1. Mitral annular calcification, otherwise normal mitral  valve. Minimal mitral regurgitation.  2. Normal leftventricular internal dimensions and wall  thicknesses.  3. Endocardium not well visualized; grossly normal left  ventricular systolic function.  4. Mild diastolic dysfunction (Stage I).  5. Normal right ventricular size and function.    < end of copied text >    -------------------------------------------------------------------------------------------------------  MEDICATIONS:     MEDICATIONS  (STANDING):  albuterol/ipratropium for Nebulization 3 milliLiter(s) Nebulizer every 6 hours  budesonide 160 MICROgram(s)/formoterol 4.5 MICROgram(s) Inhaler 2 Puff(s) Inhalation two times a day  busPIRone 10 milliGRAM(s) Oral two times a day  chlorhexidine 2% Cloths 1 Application(s) Topical once  dextrose 5%. 1000 milliLiter(s) (50 mL/Hr) IV Continuous <Continuous>  dextrose 5%. 1000 milliLiter(s) (100 mL/Hr) IV Continuous <Continuous>  dextrose 50% Injectable 25 Gram(s) IV Push once  dextrose 50% Injectable 25 Gram(s) IV Push once  dextrose 50% Injectable 12.5 Gram(s) IV Push once  furosemide   Injectable 40 milliGRAM(s) IV Push daily  glucagon  Injectable 1 milliGRAM(s) IntraMuscular once  insulin lispro (ADMELOG) corrective regimen sliding scale   SubCutaneous three times a day before meals  insulin lispro (ADMELOG) corrective regimen sliding scale   SubCutaneous at bedtime  levothyroxine 112 MICROGram(s) Oral daily  metoprolol succinate ER 25 milliGRAM(s) Oral daily  montelukast 10 milliGRAM(s) Oral at bedtime    MEDICATIONS  (PRN):  aluminum hydroxide/magnesium hydroxide/simethicone Suspension 30 milliLiter(s) Oral every 4 hours PRN Dyspepsia  dextrose Oral Gel 15 Gram(s) Oral once PRN Blood Glucose LESS THAN 70 milliGRAM(s)/deciliter  melatonin 3 milliGRAM(s) Oral at bedtime PRN Insomnia  ondansetron Injectable 4 milliGRAM(s) IV Push every 8 hours PRN Nausea and/or Vomiting

## 2023-08-04 NOTE — PROVIDER CONTACT NOTE (CRITICAL VALUE NOTIFICATION) - SITUATION
Patient 88 yr old female admitted for weakness. abg pco2 72
Patient 88 yr old female admitted for weakness.

## 2023-08-05 NOTE — PROGRESS NOTE ADULT - SUBJECTIVE AND OBJECTIVE BOX
Patient is a 88y old  Female who presents with a chief complaint of fall (04 Aug 2023 12:08)      SUBJECTIVE / OVERNIGHT EVENTS:    No events overnight. Patient noted to be resting.     MEDICATIONS  (STANDING):  albuterol/ipratropium for Nebulization 3 milliLiter(s) Nebulizer every 6 hours  budesonide 160 MICROgram(s)/formoterol 4.5 MICROgram(s) Inhaler 2 Puff(s) Inhalation two times a day  furosemide   Injectable 40 milliGRAM(s) IV Push daily    MEDICATIONS  (PRN):  glycopyrrolate Injectable 0.4 milliGRAM(s) IV Push four times a day PRN Secretions  LORazepam   Injectable 0.5 milliGRAM(s) IV Push every 4 hours PRN Anxiety      PHYSICAL EXAM:  T(C): 36.8 (08-05-23 @ 05:58), Max: 36.9 (08-04-23 @ 19:47)  HR: 72 (08-05-23 @ 05:58) (70 - 80)  BP: 136/71 (08-05-23 @ 05:58) (124/83 - 136/71)  RR: 19 (08-05-23 @ 05:58) (19 - 20)  SpO2: 97% (08-05-23 @ 05:58) (96% - 99%)  I&O's Summary    GENERAL: NAD, elderly female resting in bed   HEAD:  Atraumatic, Normocephalic, MMM  CHEST/LUNG: No use of accessory muscles, decreased breath sounds B/L   COR: RR, no mrcg  ABD: Soft, ND/NT, +BS  PSYCH: alert and calm  NEUROLOGY:  moving all extremities  SKIN: No rashes or lesions  EXT: no LE edema noted B/L     LABS:  CAPILLARY BLOOD GLUCOSE      POCT Blood Glucose.: 159 mg/dL (04 Aug 2023 12:22)                          9.1    7.54  )-----------( 303      ( 04 Aug 2023 06:06 )             30.1     08-04    140  |  96<L>  |  79<H>  ----------------------------<  149<H>  5.7<H>   |  33<H>  |  1.98<H>    Ca    8.9      04 Aug 2023 08:46  Phos  5.0     08-04  Mg     2.50     08-04            Urinalysis Basic - ( 04 Aug 2023 08:46 )    Color: x / Appearance: x / SG: x / pH: x  Gluc: 149 mg/dL / Ketone: x  / Bili: x / Urobili: x   Blood: x / Protein: x / Nitrite: x   Leuk Esterase: x / RBC: x / WBC x   Sq Epi: x / Non Sq Epi: x / Bacteria: x          RADIOLOGY & ADDITIONAL TESTS:    Imaging Personally Reviewed -     Imaging Reviewed -     Consultant(s) Notes Reviewed -       Care Discussed with Consultants/Other Providers -

## 2023-08-06 NOTE — PROGRESS NOTE ADULT - PROBLEM SELECTOR PLAN 1
-combined hypoxic and hypercapnic resp failure  -pulm edema leading to hypoxia? C/w lasix 40IV daily and assess output  -avaps for hypercapnia, patient has known h/o severe COPD  -pulm eval appreciated  -still only intermittently compliant with avaps   -palliative evaluation appreciated. Patient refused hospice yesterday and stated she would comply with avaps however refused overnight and this morning. Discussed with family that patient is hospice appropriate given patient is non compliant with avaps at night. HCP agreed with comfort measures. combined hypoxic and hypercapnic resp failure  -pulm edema leading to hypoxia? C/w lasix 40IV daily and assess output  -avaps for hypercapnia, patient has known h/o severe COPD  -pulm eval appreciated  -on admission only intermittently compliant with avaps   -palliative evaluation appreciated. Patient refused hospice previously and stated she would comply with avaps however refused later.  Discussed with family that patient is hospice appropriate given patient is non compliant with avaps at night. HCP agreed with comfort measures.

## 2023-08-06 NOTE — PROGRESS NOTE ADULT - SUBJECTIVE AND OBJECTIVE BOX
Patient is a 88y old  Female who presents with a chief complaint of fall (05 Aug 2023 09:51)      SUBJECTIVE / OVERNIGHT EVENTS:    No events overnight. This AM, patient without n/v/d/cp/sob.  Patient states she is hungry.     MEDICATIONS  (STANDING):  albuterol/ipratropium for Nebulization 3 milliLiter(s) Nebulizer every 6 hours  budesonide 160 MICROgram(s)/formoterol 4.5 MICROgram(s) Inhaler 2 Puff(s) Inhalation two times a day  furosemide   Injectable 40 milliGRAM(s) IV Push daily    MEDICATIONS  (PRN):  glycopyrrolate Injectable 0.4 milliGRAM(s) IV Push four times a day PRN Secretions  LORazepam   Injectable 0.5 milliGRAM(s) IV Push every 4 hours PRN Anxiety      PHYSICAL EXAM:  T(C): 36.5 (08-05-23 @ 23:57), Max: 36.8 (08-05-23 @ 11:45)  HR: 74 (08-06-23 @ 05:50) (74 - 79)  BP: 139/60 (08-06-23 @ 05:50) (128/82 - 145/53)  RR: 19 (08-05-23 @ 23:57) (19 - 20)  SpO2: 97% (08-05-23 @ 23:57) (97% - 98%)  I&O's Summary      GENERAL: NAD, elderly female resting in bed   HEAD:  Atraumatic, Normocephalic, MMM  CHEST/LUNG: No use of accessory muscles, decreased breath sounds B/L   COR: RR, no mrcg  ABD: Soft, ND/NT, +BS  PSYCH: alert and calm  NEUROLOGY:  moving all extremities  SKIN: No rashes or lesions  EXT: no LE edema noted B/L    LABS:  CAPILLARY BLOOD GLUCOSE                            RADIOLOGY & ADDITIONAL TESTS:      Imaging Personally Reviewed -     Imaging Reviewed -     Consultant(s) Notes Reviewed -       Care Discussed with Consultants/Other Providers -  Patient is a 88y old  Female who presents with a chief complaint of fall (05 Aug 2023 09:51)      SUBJECTIVE / OVERNIGHT EVENTS:    No events overnight. This AM, patient noted to be more somnolent but does respond to her name. Does not open her eyes.     MEDICATIONS  (STANDING):  albuterol/ipratropium for Nebulization 3 milliLiter(s) Nebulizer every 6 hours  budesonide 160 MICROgram(s)/formoterol 4.5 MICROgram(s) Inhaler 2 Puff(s) Inhalation two times a day  furosemide   Injectable 40 milliGRAM(s) IV Push daily    MEDICATIONS  (PRN):  glycopyrrolate Injectable 0.4 milliGRAM(s) IV Push four times a day PRN Secretions  LORazepam   Injectable 0.5 milliGRAM(s) IV Push every 4 hours PRN Anxiety      PHYSICAL EXAM:  T(C): 36.5 (08-05-23 @ 23:57), Max: 36.8 (08-05-23 @ 11:45)  HR: 74 (08-06-23 @ 05:50) (74 - 79)  BP: 139/60 (08-06-23 @ 05:50) (128/82 - 145/53)  RR: 19 (08-05-23 @ 23:57) (19 - 20)  SpO2: 97% (08-05-23 @ 23:57) (97% - 98%)  I&O's Summary      GENERAL: NAD, elderly female resting in bed   HEAD:  Atraumatic, Normocephalic, MMM  CHEST/LUNG: No use of accessory muscles, decreased breath sounds B/L   COR: RR, no mrcg  ABD: Soft, ND/NT, +BS  PSYCH: somnolent but does respond to her name with sternal rub  NEUROLOGY:  moving all extremities  SKIN: No rashes or lesions  EXT: no LE edema noted B/L     LABS:  CAPILLARY BLOOD GLUCOSE                            RADIOLOGY & ADDITIONAL TESTS:      Imaging Personally Reviewed -     Imaging Reviewed -     Consultant(s) Notes Reviewed -       Care Discussed with Consultants/Other Providers -

## 2023-08-06 NOTE — PROVIDER CONTACT NOTE (OTHER) - REASON
patient desaturating
Oral Buspar held, PT reduced alertness and on continuous AVAPS
BP: 132/45- pt. asymptomatic
Patient had 3 beats of Vtach
Holding PO meds
Unable to arouse pt with sternal rub. Pt has pulse present.

## 2023-08-06 NOTE — PROVIDER CONTACT NOTE (OTHER) - RECOMMENDATIONS
ACP made aware. RN asked ACP if FS q6 can be placed and if PO meds can be changed to IV.
ACP made aware
Put the patient back on AVAPs
Notify provider
ACP made aware, will continue to monitor.
provider notified

## 2023-08-06 NOTE — PROVIDER CONTACT NOTE (OTHER) - DATE AND TIME:
01-Aug-2023 16:00
06-Aug-2023 06:19
27-Jul-2023 04:30
03-Aug-2023 21:11
31-Jul-2023 11:00
03-Aug-2023 18:00

## 2023-08-06 NOTE — PROGRESS NOTE ADULT - PROBLEM SELECTOR PROBLEM 5
Acute on chronic diastolic congestive heart failure
Leukocytosis
Leukocytosis
Acute on chronic diastolic congestive heart failure
Leukocytosis
ACP (advance care planning)
Acute on chronic diastolic congestive heart failure
Leukocytosis
ACP (advance care planning)
Leukocytosis

## 2023-08-06 NOTE — PROVIDER CONTACT NOTE (OTHER) - ASSESSMENT
On assessment, pt. has reduced alertness, unable to take oral medication at this time
patient asymptomatic, currently resting in bed with chest rise and fall
Unable to arouse pt with sternal rub. Pt has pulse present.
On assessment, pt. has reduced alertness since RRT at 1247, RN feels pt is unable to take oral medication at this time
On assessment, pt. is asymptomatic. Denies SOB and/or chest pain at this time.
patient was complaining of SOB and anxiety. patient was hyperventilating at this time

## 2023-08-06 NOTE — PROGRESS NOTE ADULT - PROBLEM SELECTOR PROBLEM 7
Acute on chronic diastolic congestive heart failure
Chronic kidney disease, unspecified CKD stage
Acute on chronic diastolic congestive heart failure
Acute on chronic diastolic congestive heart failure
Chronic kidney disease, unspecified CKD stage
Acute on chronic diastolic congestive heart failure
Chronic kidney disease, unspecified CKD stage
Acute on chronic diastolic congestive heart failure

## 2023-08-06 NOTE — PROGRESS NOTE ADULT - PROBLEM SELECTOR PROBLEM 4
Hyperkalemia
COPD, severe
Hyperkalemia
Debility
Hyperkalemia
Debility
Hyperkalemia

## 2023-08-06 NOTE — PROGRESS NOTE ADULT - PROBLEM SELECTOR PLAN 4
Patient with hx of COPD, on 3-4L on oxygen at home   -Not in acute exacerbation   -Will continue with albuterol, budesonide, and montelukast  -Pulm consulted  for preop optimization, optimized
Patient with acute on chronic hyperkalemia   -EKG showed T wave inversion on lateral leads, no peaked T wave   - Improved, Trend K
Pt with severe hip fracture from fall out of wheelchair. Though notably wheelchair bound prior to arrival at hospital. Patient not a surgical candidate at this time due to her respiratory status and concern for inability to come off ventilator after surgery. Pt likely bedbound going forward.
Patient with acute on chronic hyperkalemia   -EKG showed T wave inversion on lateral leads, no peaked T wave   - Improved, Trend K
Pt with severe hip fracture from fall out of wheelchair. Though notably wheelchair bound prior to arrival at hospital. Patient not a surgical candidate at this time due to her respiratory status and concern for inability to come off ventilator after surgery. Pt likely bedbound going forward.
Patient with acute on chronic hyperkalemia   -EKG showed T wave inversion on lateral leads, no peaked T wave   - Improved, Trend K
Patient with acute on chronic hyperkalemia   -EKG showed T wave inversion on lateral leads, no peaked T wave   - Improved, Trend K
Patient with hx of COPD, on 3-4L on oxygen at home   -Not in acute exacerbation   -Will continue with albuterol, budesonide, and montelukast  -Pulm consult  for preop optimization.
Patient with acute on chronic hyperkalemia   -EKG showed T wave inversion on lateral leads, no peaked T wave   - Improved, Trend K
Patient with hx of COPD, on 3-4L on oxygen at home   -Not in acute exacerbation   -Will continue with albuterol, budesonide, and montelukast  -Pulm consult  for preop optimization.
Patient with acute on chronic hyperkalemia   -EKG showed T wave inversion on lateral leads, no peaked T wave   - Improved, Trend K

## 2023-08-06 NOTE — PROGRESS NOTE ADULT - REASON FOR ADMISSION
fall

## 2023-08-06 NOTE — PROVIDER CONTACT NOTE (OTHER) - BACKGROUND
Admitting dx: femur fracture
87yo female admitted for Left hip fracture
Admitting dx: femur fracture
Admitting dx: femur fracture
Pt admitted with hip fracture. Pt comfort care only, DNI/DNR.
patient admitted with SOB and ordered AVAPs at night

## 2023-08-06 NOTE — PROGRESS NOTE ADULT - PROBLEM SELECTOR PLAN 2
Patient with L hip fracture after a fall   -Xray and CT L hip showed Acute comminuted mildly displaced periprosthetic left proximal femoral   fracture.  -Ortho recs appreciated  -not a surgical candidate.   -NWB on L leg, bedrest  -Pain control with tylenol Patient with L hip fracture after a fall   Xray and CT L hip showed Acute comminuted mildly displaced periprosthetic left proximal femoral   fracture.    -Ortho consulted and not a surgical candidate  -NWB on L leg, bedrest  -Pain control with Tylenol

## 2023-08-06 NOTE — PROGRESS NOTE ADULT - PROBLEM SELECTOR PROBLEM 6
COPD, severe
Palliative care encounter
Preop examination
COPD, severe
Palliative care encounter
COPD, severe

## 2023-08-06 NOTE — PROVIDER CONTACT NOTE (OTHER) - ACTION/TREATMENT ORDERED:
ACP made aware, will continue to monitor.
provider made aware, no response from provider, continuing to monitor patient
put patient back on Avaps. Pt now sating mid 90's
ACP agreeable. awaiting orders. continuous monitoring in place.
Provider made aware. Provider asked if pt had pulse, RN confirmed that pt does have a pulse. No new orders given.
ACP made aware, hold current dose

## 2023-08-06 NOTE — PROGRESS NOTE ADULT - PROBLEM SELECTOR PROBLEM 8
Chronic kidney disease, unspecified CKD stage
Preventive measure
Preventive measure
Chronic kidney disease, unspecified CKD stage
Preventive measure
Chronic kidney disease, unspecified CKD stage

## 2023-08-06 NOTE — PROGRESS NOTE ADULT - PROVIDER SPECIALTY LIST ADULT
Palliative Care
Orthopedics
Pulmonology
Cardiology
Orthopedics
Orthopedics
Pulmonology
Orthopedics
Pulmonology
Hospitalist
Palliative Care
Hospitalist

## 2023-08-06 NOTE — PROVIDER CONTACT NOTE (OTHER) - SITUATION
Unable to arouse pt with sternal rub. Pt has pulse present.
PT reduced alertness and on continuous AVAPS
patient on 6L NC and desaturated to 80%. patient put back on AVAPS
Oral Buspar held, PT reduced alertness and on continuous AVAPS
BP: 132/45- pt. asymptomatic
Patient had 3 beats of Vtach

## 2023-08-06 NOTE — PROGRESS NOTE ADULT - PROBLEM SELECTOR PLAN 8
-diurese and assess improvement   -hyperkalemia s/p insulin and dextrose yesterday -diureses and assess improvement   -hyperkalemia s/p insulin and dextrose 8/5

## 2023-08-06 NOTE — PROGRESS NOTE ADULT - PROBLEM SELECTOR PROBLEM 3
Acute encephalopathy
Leukocytosis
Acute encephalopathy
Leukocytosis
Acute encephalopathy
Chronic kidney disease, unspecified CKD stage
Leukocytosis
Chronic kidney disease, unspecified CKD stage
Closed left hip fracture
Acute encephalopathy
Closed left hip fracture

## 2023-08-06 NOTE — PROGRESS NOTE ADULT - PROBLEM SELECTOR PLAN 6
Patient with hx of COPD, on 3-4L on oxygen at home   -Now in acute exacerbation   -Will continue with albuterol and bedtime avaps  -Pulm consult appreciated  -hypoxia due to fluid overload? ON lasix 40 daily and diuresing well  - HCP agreed with comfort measures and pt made comfort Patient with hx of COPD, on 3-4L on oxygen at home   -Now in acute exacerbation   -Will continue with albuterol and will hold bedtime avaps  -Pulm consult appreciated  -hypoxia due to fluid overload? ON lasix 40 daily and diuresing well  - HCP agreed with comfort measures and pt made comfort

## 2023-08-06 NOTE — PROGRESS NOTE ADULT - PROBLEM SELECTOR PLAN 7
-At presentation, pt had bibasilar crackles and b/l LE edema   - lasix 40IV -At presentation, pt had bibasilar crackles and b/l LE edema   - lasix 40mg IV

## 2023-08-06 NOTE — PROGRESS NOTE ADULT - PROBLEM SELECTOR PLAN 3
-suspect 2/2 from hypercapnia  -patient has h/o COPD and during prior hospitalization required bipap as well. She refused during last hospitalization as well. She was made DNR and was discharged to rehab.   -UA and cxr negative  -blood cx negative, urine cx + GNR - s/p 3 day course of zosyn  -CTH unremarkable  -labile mental status due to hypercapnia and non compliance with avaps, HCP agreed with comfort measures and pt made comfort -suspect 2/2 from hypercapnia  patient has h/o COPD and during prior hospitalization required bipap as well. She refused during last hospitalization as well. She was made DNR and was discharged to rehab.   -UA and cxr negative  -blood cx negative, urine cx + GNR - s/p 3 day course of zosyn  -CTH unremarkable  -labile mental status due to hypercapnia and non compliance with avaps, HCP agreed with comfort measures and pt made comfort

## 2023-08-06 NOTE — PROGRESS NOTE ADULT - PROBLEM SELECTOR PROBLEM 2
Closed left hip fracture
Acute hypercapnic respiratory failure
Hyperkalemia
Closed left hip fracture
Acute hypercapnic respiratory failure
Hyperkalemia
Closed left hip fracture
Hyperkalemia
Closed left hip fracture

## 2023-08-06 NOTE — PROGRESS NOTE ADULT - PROBLEM SELECTOR PROBLEM 1
Acute hypercapnic respiratory failure
Closed left hip fracture
Acute hypercapnic respiratory failure
Closed left hip fracture
Acute hypercapnic respiratory failure
Acute hypercapnic respiratory failure
Acute encephalopathy
Acute hypercapnic respiratory failure
Acute hypercapnic respiratory failure
Closed left hip fracture
Acute hypercapnic respiratory failure
Acute encephalopathy

## 2023-08-07 NOTE — DISCHARGE NOTE FOR THE EXPIRED PATIENT - HOSPITAL COURSE
88-year-old female with past medical history of hypertension, diabetes, COPD on chronic O2, CHF, hypothyroidism, CKD presents to the ER after a fall out of her wheelchair at [place], found to have acute comminuted mildly displaced periprosthetic left proximal femoral fracture, course complicated by the hyperkalemia.     Closed left hip fracture.   - Patient with L hip fracture after a fall   - Xray L hip- showed Acute comminuted mildly displaced periprosthetic left proximal femoral fracture  - Ortho - plan for OR possibly 7/31 L revision hip arthroplasty vs ORIF vs PFR   - Appreciate cards / pulm clearances  - NWB LLE, bedrest  - F/u with CT hip   - Pain control with tylenol, Oxy IR prn    Leukocytosis.   - Most likely reactive due to hip fracture   - No focal sign of infection   - Monitor CBC daily for now   - Monitor off abx.    COPD, severe.   Patient with hx of COPD, on 3-4L on oxygen at home   - Not in acute exacerbation   - Will continue with albuterol, budesonide, and montelukast  - Pulm consult in the AM for preop optimization.    Chronic diastolic congestive heart failure.   - Patient with chronic hx of HF   - On exam, pt has bibasilar crackles and b/l LE edema   - started lasix 40 mg IV BID   - Cardiology consulted for preop optimization.    Chronic kidney disease, unspecified CKD stage.   - Scr is at baseline   - Trend Scr daily.    8/7/2023 - Notified by RN thaat patient without spontaneous respirations. Patient DNR/DNI/COmfort care. Patient unresponsive to verbal/noxious stimuli.  Pupils fixed and dilated. No spontaneous breathing. No palpable carotid/radial pulse. No heart sounds. No breath sounds.  Time of Death: 05:10  HCP Juan Welsh notified.

## 2023-08-09 ENCOUNTER — NON-APPOINTMENT (OUTPATIENT)
Age: 88
End: 2023-08-09

## 2023-08-24 NOTE — PATIENT PROFILE ADULT - HISTORY OF COVID-19 VACCINATION
PAIN MEDICINE ATTENDING     I saw the patient at the bedside along with the pain medicine team.  I was actively involved in evaluation, physical examination and development of the plan of care.  Please refer to Judit Mckenna DNP note above for the full details.     Briefly, Gallo is a pleasant 55 years old male with pancreatic adenocarcinoma. The pain service was consulted for lower abdominal pain to consider interventions.   The patient is currently satisfied with the medication management by our palliative care colleagues. He feels that the pain was related to the higher rate of tube feeds. The patient feel that the pain improves with bowel movements and describes michele sensation below the umbilicus behind the pubic symphysis area. The patient has a well healed g tube in the epigastric area.     - Given the lower pelvic area of pain, a celiac neurolysis is not recommended at this time.   - The idea of hypogastric block is interesting however the patient does not present with pathology that typically responds to a hypogastric plexus neurolysis. Given that he is satisfied with current pain control, we do not recommend hypogastric block at this time.   - The patient blood glucose control is variable and implantable pain control is not recommended at this time. The patient understands but is interested in the modality given the sedation he is experiencing from systemic medications. We recommend against any interventional approaches at this time.     LES Guerrero    Pain Medicine  Department of Anesthesiology  [facility]        Vaccine status unknown

## 2023-09-06 NOTE — PRE-OP CHECKLIST - ALLERGIES REVIEWED
done Tranexamic Acid Counseling:  Patient advised of the small risk of bleeding problems with tranexamic acid. They were also instructed to call if they developed any nausea, vomiting or diarrhea. All of the patient's questions and concerns were addressed.

## 2023-09-11 NOTE — PHYSICAL THERAPY INITIAL EVALUATION ADULT - PASSIVE RANGE OF MOTION EXAMINATION, REHAB EVAL
81
bilateral upper extremity Passive ROM was WFL (within functional limits)/bilateral lower extremity Passive ROM was WNL

## 2023-11-30 NOTE — ED ADULT TRIAGE NOTE - PAIN RATING/NUMBER SCALE (0-10): REST
Anticoagulation: Return Visit      Pt here for routine f/u. Pt is on warfarin for Dural venous sinus thrombosis , with INR goal of 2 - 3.    Current warfarin regimen: 15mg Mon and Fri and 10mg ROW    Assessment  (-) missed or extra doses  (-) change in medications  (+) change in vitamin K intake- had some increased greens such as broccoli and blueberries, was unaware of the impact on his INR  (-) EtOH use  (-) signs/sx of VTE or stroke  (-) new bleeding/bruising  (-) recent falls  (-) upcoming surgeries/procedures    Plan   Pt counseled to seek emergent care in case of excessive bleeding/bruising, or if sx of VTE/stroke occur. Patient's INR = 2.0 today, which is within the desired therapeutic range of 2 - 3.     Educated patient on the Importance of watching vitmain K intake and will look over sheet given at initial visit. Plan is to continue with the current warfarin regimen. RTC in 4 weeks (12/28/23).    Given Rx this visit: Not Needed    Kemar Ortiz  Pharmacy Student     
0

## 2023-12-07 NOTE — ED ADULT NURSE NOTE - CAS TRG GEN SKIN CONDITION
Patient placed on in person 1:1 at this time.       Stephanie Kim RN  12/07/23 4667 very dry scaly recent shingles per patient/Warm

## 2024-01-01 NOTE — ED PROVIDER NOTE - CADM POA CENTRAL LINE
No
VENT ORDERS:   Non Invasive Vent (CPAP/BIPAP)  Settings: Routine   Non-Invasive Ventilation: CPAP   Indication for NPPV: Increased Work of Breathing  Targeted SpO2 Range (%): 88-97   FiO2:  21   Expiratory Pressure  CPAP:  6   Notify Provider for SpO2 BELOW: 88 (10-21-24 @ 09:08)

## 2024-01-11 NOTE — PROGRESS NOTE ADULT - PROBLEM SELECTOR PROBLEM 6
Is This A New Presentation, Or A Follow-Up?: Skin Lesion What Type Of Note Output Would You Prefer (Optional)?: Standard Output How Severe Is Your Skin Lesion?: mild Has Your Skin Lesion Been Treated?: not been treated Which Family Member (Optional)?: Mother Essential hypertension

## 2024-02-05 NOTE — PATIENT PROFILE ADULT - FALL HARM RISK
Detail Level: Detailed
Quality 431: Preventive Care And Screening: Unhealthy Alcohol Use - Screening: Patient identified as an unhealthy alcohol user when screened for unhealthy alcohol use using a systematic screening method and received brief counseling
age(85 years old or older)

## 2024-02-23 NOTE — PATIENT PROFILE ADULT - FUNCTIONAL ASSESSMENT - DAILY ACTIVITY 3.
tylenol 650 mg by mouth every 6 -8 hours as needed for pain 4 = No assist / stand by assistance 1 = Total assistance

## 2024-03-07 NOTE — DIETITIAN INITIAL EVALUATION ADULT. - PROBLEM SELECTOR PLAN 7
No care due was identified.  [facility] Embedded Care Due Messages. Reference number: [id number].   3/07/2024 7:34:18 AM CST   Patient does not know her medications, restarted medications from d/c 6/24 by MELISSA. Has difficulty at home with self-administration. Will need facility on d/c -  [place]

## 2024-03-14 NOTE — DIETITIAN INITIAL EVALUATION ADULT - NSFNSPHYEXAMSKINFT_GEN_A_CORE
Contacted pt to see how he is doing since his first appt with Dr Vázquez last week.  Pt denied any questions or concerns except that the had not heard for [facility] for therapy yet.  He was informed that the referral was sent.  He was given the phone number to [facility] and he will call to make an appt.  He will call back if has any issues between appts with Dr Vázquez.   As per RN flow sheet, no pressure injury noted at this time.

## 2024-05-29 NOTE — PROGRESS NOTE ADULT - NS ATTEST RISK PROBLEM GEN_ALL_CORE FT
Patient is seriously ill with acute on chronic hypercapnic respiratory failure, refusing bipap, fracture but high risk for orthopedic surgery   High risk of complications, further morbidity and mortality no

## 2024-06-04 NOTE — PROGRESS NOTE ADULT - PROBLEM/PLAN-1
Damir looks excellent. Please follow up with the therapy places that I provided. Continue speech therapy as well once a week. Please let me know if you notice a change in his behaviors over the summer and as school starts this coming fall. Thank you for protecting him with vaccines.     Well Child Visit at 4 Years   AMBULATORY CARE:   A well child visit  is when your child sees a healthcare provider to prevent health problems. Well child visits are used to track your child's growth and development. It is also a time for you to ask questions and to get information on how to keep your child safe. Write down your questions so you remember to ask them. Your child should have regular well child visits from birth to 17 years.  Development milestones your child may reach by 4 years:  Each child develops at his or her own pace. Your child might have already reached the following milestones, or he or she may reach them later:  Speak clearly and be understood easily    Know his or her first and last name and gender, and talk about his or her interests    Identify some colors and numbers, and draw a person who has at least 3 body parts    Tell a story or tell someone about an event, and use the past tense    Hop on one foot, and catch a bounced ball    Enjoy playing with other children, and play board games    Dress and undress himself or herself, and want privacy for getting dressed    Control his or her bladder and bowels, with occasional accidents    Keep your child safe in the car:   Always place your child in a booster car seat.  Choose a seat that meets the Federal Motor Vehicle Safety Standard 213. Make sure the seat has a harness and clip. Also make sure that the harness and clips fit snugly against your child. There should be no more than a finger width of space between the strap and your child's chest. Ask your healthcare provider for more information on car safety seats.         Always put your child's car seat in  the back seat.  Never put your child's car seat in the front. This will help prevent him or her from being injured in an accident.    Make your home safe for your child:   Place guards over windows on the second floor or higher.  This will prevent your child from falling out of the window. Keep furniture away from windows. Use cordless window shades, or get cords that do not have loops. You can also cut the loops. A child's head can fall through a looped cord, and the cord can become wrapped around his or her neck.    Secure heavy or large items.  This includes bookshelves, TVs, dressers, cabinets, and lamps. Make sure these items are held in place or nailed into the wall.    Keep all medicines, car supplies, lawn supplies, and cleaning supplies out of your child's reach.  Keep these items in a locked cabinet or closet. Call Poison Control (1-934.731.4416) if your child eats anything that could be harmful.         Store and lock all guns and weapons.  Make sure all guns are unloaded before you store them. Make sure your child cannot reach or find where weapons or bullets are kept. Never  leave a loaded gun unattended.    Keep your child safe in the sun and near water:   Always keep your child within reach near water.  This includes any time you are near ponds, lakes, pools, the ocean, or the bathtub.    Ask about swimming lessons for your child.  At 4 years, your child may be ready for swimming lessons. He or she will need to be enrolled in lessons taught by a licensed instructor.    Put sunscreen on your child.  Ask your healthcare provider which sunscreen is safe for your child. Do not apply sunscreen to your child's eyes, mouth, or hands.    Other ways to keep your child safe:   Follow directions on the medicine label when you give your child medicine.  Ask your child's healthcare provider for directions if you do not know how to give the medicine. If your child misses a dose, do not double the next dose. Ask how  to make up the missed dose.Do not give aspirin to children younger than 18 years.  Your child could develop Reye syndrome if he or she has the flu or a fever and takes aspirin. Reye syndrome can cause life-threatening brain and liver damage. Check your child's medicine labels for aspirin or salicylates.    Talk to your child about personal safety without making him or her anxious.  Teach him or her that no one has the right to touch his or her private parts. Also explain that others should not ask your child to touch their private parts. Let your child know that he or she should tell you even if he or she is told not to.    Do not let your child play outdoors without supervision from an adult.  Your child is not old enough to cross the street on his or her own. Do not let him or her play near the street. He or she could run or ride his or her bicycle into the street.    What you need to know about nutrition for your child:   Give your child a variety of healthy foods.  Healthy foods include fruits, vegetables, lean meats, and whole grains. Cut all foods into small pieces. Ask your healthcare provider how much of each type of food your child needs. The following are examples of healthy foods:    Whole grains such as bread, hot or cold cereal, and cooked pasta or rice    Protein from lean meats, chicken, fish, beans, or eggs    Dairy such as whole milk, cheese, or yogurt    Vegetables such as carrots, broccoli, or spinach    Fruits such as strawberries, oranges, apples, or tomatoes       Make sure your child gets enough calcium.  Calcium is needed to build strong bones and teeth. Children need about 2 to 3 servings of dairy each day to get enough calcium. Good sources of calcium are low-fat dairy foods (milk, cheese, and yogurt). A serving of dairy is 8 ounces of milk or yogurt, or 1½ ounces of cheese. Other foods that contain calcium include tofu, kale, spinach, broccoli, almonds, and calcium-fortified orange juice.  Ask your child's healthcare provider for more information about the serving sizes of these foods.         Limit foods high in fat and sugar.  These foods do not have the nutrients your child needs to be healthy. Food high in fat and sugar include snack foods (potato chips, candy, and other sweets), juice, fruit drinks, and soda. If your child eats these foods often, he or she may eat fewer healthy foods during meals. He or she may gain too much weight.    Do not give your child foods that could cause him or her to choke.  Examples include nuts, popcorn, and hard, raw vegetables. Cut round or hard foods into thin slices. Grapes and hotdogs are examples of round foods. Carrots are an example of hard foods.    Give your child 3 meals and 2 to 3 snacks per day.  Cut all food into small pieces. Examples of healthy snacks include applesauce, bananas, crackers, and cheese.    Have your child eat with other family members.  This gives your child the opportunity to watch and learn how others eat.         Let your child decide how much to eat.  Give your child small portions. Let your child have another serving if he or she asks for one. Your child will be very hungry on some days and want to eat more. For example, your child may want to eat more on days when he or she is more active. Your child may also eat more if he or she is going through a growth spurt. There may be days when he or she eats less than usual.       Keep your child's teeth healthy:   Your child needs to brush his or her teeth with fluoride toothpaste 2 times each day.  He or she also needs to floss 1 time each day. Have your child brush his or her teeth for at least 2 minutes. At 4 years, your child should be able to brush his or her teeth without help. Apply a small amount of toothpaste the size of a pea on the toothbrush. Make sure your child spits all of the toothpaste out. Your child does not need to rinse his or her mouth with water. The small amount  "of toothpaste that stays in his or her mouth can help prevent cavities.    Take your child to the dentist regularly.  A dentist can make sure your child's teeth and gums are developing properly. Your child may be given a fluoride treatment to prevent cavities. Ask your child's dentist how often he or she needs to visit.    Create routines for your child:   Have your child take at least 1 nap each day.  Plan the nap early enough in the day so your child is still tired at bedtime.    Create a bedtime routine.  This may include 1 hour of calm and quiet activities before bed. You can read to your child or listen to music. Have your child brush his or her teeth during his or her bedtime routine.    Plan for family time.  Start family traditions such as going for a walk, listening to music, or playing games. Do not watch TV during family time. Have your child play with other family members during family time.    Other ways to support your child:   Do not punish your child with hitting, spanking, or yelling.  Never shake your child. Tell your child \"no.\" Give your child short and simple rules. Do not allow your child to hit, kick, or bite another person. Put your child in time-out in a safe place. You can distract your child with a new activity when he or she behaves badly. Make sure everyone who cares for your child disciplines him or her the same way.    Read to your child.  This will comfort your child and help his or her brain develop. Point to pictures as you read. This will help your child make connections between pictures and words. Have other family members or caregivers read to your child. At 4 years, your child may be able to read parts of some books to you. He or she may also enjoy reading quietly on his or her own.         Help your child get ready to go to school.  Your child's healthcare provider may help you create meal, play, and bedtime schedules. Your child will need to be able to follow a schedule before " DISPLAY PLAN FREE TEXT he or she can start school. You may also need to make sure your child can go to the bathroom on his or her own and wash his or her own hands.    Talk with your child.  Have him or her tell you about his or her day. Ask him or her what he or she did during the day, or if he or she played with a friend. Ask what he or she enjoyed most about the day. Have him or her tell you something he or she learned.    Help your child learn outside of school.  Take him or her to places that will help him or her learn and discover. For example, a children'[employer] will allow him or her to touch and play with objects as he or she learns. Your child may be ready to have his or her own library card. Let him or her choose his or her own books to check out from the library. Teach him or her to take care of the books and to return them when he or she is done.    Talk to your child's healthcare provider about bedwetting.  Bedwetting may happen up to the age of 4 years in girls and 5 years in boys. Talk to your child's healthcare provider if you have any concerns about this.    Engage with your child if he or she watches TV.  Do not let your child watch TV alone, if possible. You or another adult should watch with your child. Talk with your child about what he or she is watching. When TV time is done, try to apply what you and your child saw. For example, if your child saw someone talking about colors, have your child find objects that are those colors. TV time should never replace active playtime. Turn the TV off when your child plays. Do not let your child watch TV during meals or within 1 hour of bedtime.    Limit your child's screen time.  Screen time is the amount of television, computer, smart phone, and video game time your child has each day. It is important to limit screen time. This helps your child get enough sleep, physical activity, and social interaction each day. Your child's pediatrician can help you create a screen time  plan. The daily limit is usually 1 hour for children 2 to 5 years. The daily limit is usually 2 hours for children 6 years or older. You can also set limits on the kinds of devices your child can use, and where he or she can use them. Keep the plan where your child and anyone who takes care of him or her can see it. Create a plan for each child in your family. You can also go to https://www.healthychildren.org/English/media/Pages/default.aspx#planview for more help creating a plan.    Get a bicycle helmet for your child.  Make sure your child always wears a helmet, even when he or she goes on short bicycle rides. He or she should also wear a helmet if he or she rides in a passenger seat on an adult bicycle. Make sure the helmet fits correctly. Do not buy a larger helmet for your child to grow into. Get one that fits him or her now. Ask your child's healthcare provider for more information on bicycle helmets.       What you need to know about your child's next well child visit:  Your child's healthcare provider will tell you when to bring him or her in again. The next well child visit is usually at 5 to 6 years. Contact your child's healthcare provider if you have questions or concerns about your child's health or care before the next visit. All children aged 3 to 5 years should have at least one vision screening. Your child may need vaccines at the next well child visit. Your provider will tell you which vaccines your child needs and when your child should get them.       © Copyright [employer] 2023 Information is for End User's use only and may not be sold, redistributed or otherwise used for commercial purposes.  The above information is an  only. It is not intended as medical advice for individual conditions or treatments. Talk to your doctor, nurse or pharmacist before following any medical regimen to see if it is safe and effective for you.

## 2024-06-05 NOTE — ED PROVIDER NOTE - NS ED ROS FT
Would you be able to send benadryl script to pharmacy for patient. Medication pended.   CONSTITUTIONAL: +weakness, + chills  EYES/ENT: No visual changes;  No vertigo or throat pain   NECK: No pain or stiffness  RESPIRATORY: +cough, no wheezing, +shortness of breath  CARDIOVASCULAR: No chest pain or palpitations  GASTROINTESTINAL: No abdominal pain; nausea, vomiting;  diarrhea or constipation.   GENITOURINARY: No dysuria, frequency or hematuria  NEUROLOGICAL: No numbness or weakness  SKIN: No itching, burning, rashes, or lesions

## 2024-06-28 NOTE — PHARMACOTHERAPY INTERVENTION NOTE - OUTCOME
Delivery note    Breanna is a 26 year old  at 38w6d weeks who presented to labor and delivery for IOL for gest HTN. AROM - mec fluid, 4 cm, Pitocin.  She progressed well through labor.  For pain relief she had an epidural.  She progressed to complete.  She pushed well for approximately 70 minutes and had a spontaneous vaginal delivery over an intact perineum. The infant was suctioned thoroughly after delivery and was passed to the mother.  The cord was clamped and cut.  There was spontaneous delivery of an intact placenta with three-vessel cord.  There was a 1st degree tear noted.  This was repaired with 3-0 Vicryl in the usual fashion.  The delivery resulted in an infant male who was found to weigh 8 pounds and 9 ounces and was assigned Apgars of 8 and 9.  The patient and her child were left stable in the labor and delivery room. Estimated blood loss was 300 mL.  
accepted

## 2024-10-05 NOTE — RAPID RESPONSE TEAM SUMMARY - NSOTHERINTERVENTIONSRRT_GEN_ALL_CORE
[ ] f/u ABG  [ ] c/w goals of care [ ] f/u ABG, AMS likely 2/2 hypercarbia iso BiPAP non-adherence  [ ] c/w goals of care Consult- Tube Feeding Assessment, MST Score 2 or >  Information obtained from: Review of pt's current medical record, RN, Pt's transfer paperwork from admission to OSH

## 2024-11-04 NOTE — ED ADULT NURSE NOTE - TEMPLATE
"Subjective:       Patient ID: Tobi Colbert is a 35 y.o. female.    Chief Complaint: Oral Swelling (bottom lip swollen)    Pt here with lower lip swelling on left lower lip.  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  Pt states that she took 2 doses yesterday and woke up with a swollen lip today, malaise, and sore throat.  No SOB or chest pain.    Some cough that started on Sunday not productive not constant        Past Medical History:   Diagnosis Date    Anxiety     Chronic constipation     Constipation      Past Surgical History:   Procedure Laterality Date    COLONOSCOPY N/A 4/4/2017    Procedure: COLONOSCOPY;  Surgeon: Arianna Quezada MD;  Location: [facility];  Service: Endoscopy;  Laterality: N/A;     Social History     Social History Narrative    No narrative on file     Family History   Problem Relation Age of Onset    Diabetes Mother     Hypertension Mother     Anxiety disorder Mother     Colon cancer Neg Hx     Esophageal cancer Neg Hx     Celiac disease Neg Hx     Cirrhosis Neg Hx     Colon polyps Neg Hx     Crohn's disease Neg Hx     Cystic fibrosis Neg Hx     Hemochromatosis Neg Hx     Inflammatory bowel disease Neg Hx     Irritable bowel syndrome Neg Hx     Liver cancer Neg Hx     Liver disease Neg Hx     Rectal cancer Neg Hx     Stomach cancer Neg Hx     Ulcerative colitis Neg Hx     Levi's disease Neg Hx      Vitals:    08/01/17 0847 08/01/17 0910   BP: 120/84    Pulse: 78    Temp: 98.3 °F (36.8 °C)    SpO2:  99%   Weight: 83 kg (182 lb 15.7 oz)    Height: 5' 4" (1.626 m)    PainSc:   8      Outpatient Encounter Prescriptions as of 8/1/2017   Medication Sig Dispense Refill    linaclotide (LINZESS) 290 mcg Cap Take 290 mcg by mouth once daily.      LO "
Please increase your amlodipine to 10 mg daily.  Let me know what your blood pressure is running over the next 2 weeks.  
"LOESTRIN FE 1 mg-10 mcg (24)/10 mcg (2) Tab       predniSONE (DELTASONE) 20 MG tablet Take 2 po with breakfast x 3d, thenTake 1 po qd x 3d, thenTake 1/2 po qd x  4d 11 tablet 0    ranitidine (ZANTAC) 300 MG tablet Take 1 tablet (300 mg total) by mouth every evening. 30 tablet 0    [DISCONTINUED] lubiprostone (AMITIZA) 8 MCG Cap Take 1 capsule (8 mcg total) by mouth 2 (two) times daily. 60 capsule 6     Facility-Administered Encounter Medications as of 8/1/2017   Medication Dose Route Frequency Provider Last Rate Last Dose    [COMPLETED] methylPREDNISolone sodium succinate injection 125 mg  125 mg Intramuscular 1 time in Clinic/HOD NOHEMI Baker-ALYSSA   125 mg at 08/01/17 0915     Wt Readings from Last 3 Encounters:   08/01/17 83 kg (182 lb 15.7 oz)   05/19/17 82.7 kg (182 lb 5.1 oz)   04/04/17 71.7 kg (158 lb)     Last 3 sets of Vitals    Vitals - 1 value per visit 4/4/2017 5/19/2017 8/1/2017   SYSTOLIC 110 113 120   DIASTOLIC 61 78 84   PULSE 78 75 78   TEMPERATURE 96.8 - 98.3   RESPIRATIONS 18 - -   SPO2 100 - 99   Weight (lb) - 182.32 182.98   Weight (kg) - 82.7 83   HEIGHT - 5' 3" 5' 4"   BODY MASS INDEX - 32.3 31.41   VISIT REPORT - - -   Pain Score  - 6 8   Some recent data might be hidden     No results found for: CBC  Review of Systems   Constitutional: Negative for chills, diaphoresis, fatigue and fever.   HENT: Positive for facial swelling and sore throat. Negative for drooling and trouble swallowing.    Eyes: Negative for photophobia.   Respiratory: Positive for cough. Negative for chest tightness and shortness of breath.    Cardiovascular: Negative for chest pain and palpitations.   Gastrointestinal: Negative for abdominal pain, diarrhea, nausea and vomiting.   Musculoskeletal: Negative for myalgias.   Skin: Negative for rash.       Objective:      Physical Exam   Constitutional: She is oriented to person, place, and time. She appears well-developed and well-nourished. No distress.   HENT: "
  Head: Normocephalic and atraumatic.   Right Ear: External ear normal.   Left Ear: External ear normal.   Nose: Nose normal.   Mouth/Throat: Uvula is midline and mucous membranes are normal. No oropharyngeal exudate. No tonsillar exudate.       Eyes: Conjunctivae are normal. Right eye exhibits no discharge. Left eye exhibits no discharge.   Cardiovascular: Normal rate, regular rhythm, normal heart sounds and intact distal pulses.    Pulmonary/Chest: Effort normal and breath sounds normal. No stridor. No respiratory distress. She has no wheezes.   Lymphadenopathy:     She has no cervical adenopathy.   Neurological: She is alert and oriented to person, place, and time.   Skin: Skin is warm and dry. Capillary refill takes less than 2 seconds. No rash noted. She is not diaphoretic. No erythema. No pallor.   Psychiatric: She has a normal mood and affect. Her behavior is normal. Judgment and thought content normal.   Nursing note and vitals reviewed.      Assessment:       1. Angioedema, initial encounter        Plan:       Advised to not take abx unless evaluated and indicated  Advised to not take Doxycycline anymore  ER precautions given    At end of appt pt attempting to get me to refill her T3 for ' menstrual pain' from her fibroids  Informed pt that she would need to contact her GYN due to the Opoid drug crisis she needed to go  To one and only one provider for that medication and really an alternative should be considered    Lucindaargenis was seen today for oral swelling.    Diagnoses and all orders for this visit:    Angioedema, initial encounter  -     methylPREDNISolone sodium succinate injection 125 mg; Inject 125 mg into the muscle one time.  -     ranitidine (ZANTAC) 300 MG tablet; Take 1 tablet (300 mg total) by mouth every evening.  -     predniSONE (DELTASONE) 20 MG tablet; Take 2 po with breakfast x 3d, thenTake 1 po qd x 3d, thenTake 1/2 po qd x  4d      Patient Instructions   Do not take any more Doxycycline 
General
or variations of it    Take Prednisone with food as directed for next 10 days    Get over the counter Claritin and take one every morning for next 10 days this blocks type 1 histamine    Take Zantac once a day to block type 2 histamine for next 10 days    TO ER FOR ANY WORSENING    Follow up with your OB/GYN for your menstrual pain and GYN issues      Angioedema  Angioedema ([id number]-gtu-[id number]-eh-CLIFF-muh) is a sudden appearance of swollen patches (edema) on the skin or mucous membranes. It most often involves the face, lips, mouth, tongue, back of throat, or vocal cords. It may also occur in other places, such as the arms or legs. A rash may also appear during the first 4 days of this illness.  Symptoms  There are different types of angioedema. Your symptoms will depend on what type of angioedema you have. Swelling and redness may be the main symptoms. Like allergic reactions, angioedema may include:  · Rash, hives, redness, welts, blisters  · Itching, burning, stinging, pain  · Dry, flaky, cracking, or scaly skin  · Swelling of the face, lips, or other parts of the body  More severe symptoms may include:  · Trouble swallowing, or feeling like your throat is closing  · Trouble breathing or wheezing  · Hoarse voice or trouble speaking  · Nausea, vomiting, diarrhea, or stomach cramps  · Feeling faint or lightheaded, rapid heart rate, or low blood pressure  Causes  Angioedema can be triggered by exposure to certain substances. Medical conditions involving the immune systems and certain infections may cause it. In rare cases, angioedema can be hereditary. Sometimes the cause may be very clear. However, it is often hard to find a cause. The most common causes include:  · Foods, such as shrimp, shellfish, peanuts, milk products, gluten, and eggs; also colorings, flavorings, and additives  · Insect bites or stings, from bees, mosquitos, fleas, or ticks  · Medicines, such as ACE inhibitors, penicillin, sulfa drugs, amoxicillin, 
aspirin, and ibuprofen  · Latex, which may be in gloves, clothes, toys, balloons, and some kinds of tape. People who are allergic to latex may have problems with foods such as bananas, avocados, kiwi, papaya, or chestnuts.  · Stress  · Heat, cold, or sunlight  The most common cause of angioedema is a reaction to a class of medicines called ACE inhibitors. These are used to treat high blood pressure. ACE inhibitors include captopril, enalapril, and lisinopril. Tell your doctor if you have angioedema symptoms and are taking any of these medicines.  Angioedema may recur. It is important to watch for the earliest signs of this condition (see the list below). Contact your healthcare provider right away if swelling involves the face, mouth, or throat.  Home care  Rest quietly today. Avoid vigorous physical activity.  Medicines: The healthcare provider may prescribe medicines for itching, swelling, or pain. Follow the healthcare providers instructions when taking these medicines.  · Oral diphenhydramine is an antihistamine available without a prescription. Unless a prescription antihistamine was given, diphenhydramine may be used to reduce widespread itching. It may make you sleepy, so be careful using it when going to school, working, or driving. (Note: Do not use diphenhydramine if you have glaucoma or if you are a man who has trouble urinating due to an enlarged prostate.) Loratadine is an antihistamine that may cause less drowsiness.  · Do not use diphenhydramine cream on your skin. Some people can have an allergic reaction to this.  · Calamine lotion or oatmeal baths sometimes help with itching.  · You may use acetaminophen or ibuprofen for pain, unless another pain medicine was prescribed.  · If you were told that your angioedema was caused by a medicine you are taking, you must stop taking it. Ask your healthcare provider for a different one. In the future, advise medical staff that you are allergic to this 
medicine.  · If medicine was prescribed to treat angioedema (such as steroids or antihistamines), be sure you understand what the medicine is and how to take it. Then, take it as directed.  Follow-up care  Follow up with your healthcare provider, or as advised.  Call 911  Contact emergency services right away if any of these occur:  · Trouble breathing or swallowing, or wheezing  · Hoarse voice or trouble speaking  · Chest pain  · Confusion  · Extreme drowsiness or trouble awakening  · Fainting or loss of consciousness  · Rapid heart rate  · Vomiting blood, or large amounts of blood in stool  · Seizure  When to seek medical attention  Call your healthcare provider right away if any of the following occur:  · Increase in swelling of the lips, mouth, or tongue  · Severe abdominal pain  Date Last Reviewed: 7/20/2015  © 9137-0452 [employer]. [address] [place] [address], [place] [address]. All rights reserved. This information is not intended as a substitute for professional medical care. Always follow your healthcare professional's instructions.

## 2024-11-15 NOTE — PATIENT PROFILE ADULT - BRAND OF COVID-19 VACCINATION
LOV Moderna dose 1 and 2

## 2024-11-21 NOTE — PATIENT PROFILE ADULT - FUNCTIONAL ASSESSMENT - BASIC MOBILITY 6.
4 = No assist / stand by assistance OBHx: 2023 FT VAVD for NRFHT, 7lbs 11oz  GYNHx: Remote x abnormal pap and subsequent colposcopy, all recent pap smears wnl. No ovarian cysts, fibroids or STDs 1 = Total assistance

## 2024-12-04 NOTE — ED ADULT NURSE REASSESSMENT NOTE - NS ED NURSE REASSESS COMMENT FT1
pending labs collected and sent, [facility]
[FreeTextEntry1] : Dear Dr. Mari,   I had the pleasure of seeing Francisco for follow up today. Below is my note regarding the office visit today.    Thank you so very much for allowing me to participate in Francisco's care. Please don't hesitate to call me should any questions or issues arise .  Sincerely, John Ma MD, FACS, PU Chief, Pediatric Urology Professor of Urology and Pediatrics [employer] President, [employer] - [place] Section Past-President, Societies for Pediatric Urology
Received pt at change of shift, PT is resting in stretcher, easily arousable to verbal stimuli, A+Ox4. no apparent distress noted. pt arrived for high K. no medical complaints at this time. 20G to LAC, no redness or swelling noted. will continue to monitor.

## 2024-12-10 NOTE — ED ADULT NURSE NOTE - NSHOSCREENINGQ1_ED_ALL_ED
No Detail Level: Detailed Quality 130: Documentation Of Current Medications In The Medical Record: Current Medications Documented

## 2025-02-26 NOTE — ED PROVIDER NOTE - CONSTITUTIONAL, MLM
Willam Gleason MD, D. LYNETTE office at [phone] if you have the following:  Drainage, increase in redness and/or swelling from the injection site. If there is a dressing/Band-Aid over the injection site, some spotting of the dressings over the injection site is normal, but  drainage that pools, soaks, or drips from the dressing is not normal.  Temperature above 101 degrees, chills, sweats, or body aches.  Numbness or weakness of your legs or arms, different than before the injection.  Severe headache.     normal... Well appearing, well nourished, awake, alert, oriented to person, place, time/situation and in no apparent distress.

## 2025-03-09 NOTE — PROGRESS NOTE ADULT - PROBLEM SELECTOR PLAN 1
CHRIS ANAYA [facility]  [address] [place] [address] [phone]      Medical Progress Note      NAME: Makenzie Zepeda   :  1934  MRM:  [id number]    Date/Time: 3/9/2025  4:30 PM           Assessment / Plan:   89 years old female who presented with symptoms of fatigue and cough, admitted with bilateral community-acquired pneumonia    Community-acquired pneumonia  Acute hypoxic respiratory failure  Fatigue  CT chest obtained on admission with bilateral upper lobe airspace disease.   Patient has been treated with doxycycline and Rocephin.  Legionella noted to be negative with resolved leukocytosis.    However, today, patient started requiring supplemental oxygen.  Repeat chest x-ray today is revealing interstitial opacities which may represent either pneumonia or pulmonary edema; noted that patient had elevated BNP on admission, which has further increased.  I suspect some pulmonary edema and as such I will give patient Lasix 20 mg IV x 1.    Hyponatremia  Suspect likely related to prerenal versus SIADH.  Improved with IV fluid hydration.  No further IV fluids.   Home hydrochlorothiazide and spironolactone currently on hold.  Continue to closely monitor.    Hypokalemia  Status post oral repletion today 3/9.    OPAL, POA  Resolved with IV fluid hydration.  No further IV fluids at this time.    Essential hypertension  Blood pressure currently at goal.  Continue with amlodipine, hydralazine and losartan.  Hydrochlorothiazide and spironolactone on hold as above.  Continue to monitor and titrate accordingly.    Slurred speech  Initially reported concerns for slurred speech which resulted in CT scan of head being obtained.  No acute pathology noted.  Patient speaking fluently currently.    Memory loss  Supportive care.    Chronic macrocytic anemia  Noted B12, folate, and TSH which likely do not explain macrocytosis.                 Care Plan discussed with: Patient    Prophylaxis:   for: \"GLPOC\"       Patient with hyperkalemia in the setting of CKD, NSAIDs, and ACE-i.  Shilpa CHAU reviewed, patient had previous episode of hyperkalemia of 7.4 on 8/6/2019. [employer] reviewed, on 8/9/2019 showed serum potassium of 7.3. Patient arrived to  ER on 8/10/2019, serum potassium was 7.6 with TWI inversions on EKG. Patient received Kayexalate, Calcium gluconate, Insulin and D50 in the ER. Current serum K: 4.7 improved.

## 2025-03-11 NOTE — DISCHARGE NOTE NURSING/CASE MANAGEMENT/SOCIAL WORK - NSDCPEPTCAREGIVEDUMATLIST _GEN_ALL_CORE
It is mandatory that you follow up with your primary care provider and obstetrics to ensure resolution/improvement of your symptoms.  Please discuss obtaining an MRI during this visit.    Please see your discharge paperwork for information to contact Dr. Morales with obstetrics.  Alternatively, please schedule an appointment with a specialists of this field with whom you have an existing relationship.    MANDATORY return to the emergency department within 12-24 hours unless you are better.  If you feel worse or have any other concerns, return sooner.    If you experience any of the red flag signs/symptoms that we discussed, please return to the emergency department or call 911 immediately.    Please take medications as we discussed.   Diabetes

## 2025-03-18 NOTE — ED ADULT TRIAGE NOTE - HEIGHT IN INCHES
Physical Therapy  Facility/Department: [address] MED SURG    Name: Mag Sales  : 1978  MRN: [id number]  Date of Service: 3/18/2025    PT ryanal was attempted this afternoon and after speaking with pt, as she was sitting up and bent forward in bed due to lower abdominal/bladder pain, she reported she is Independent with functional mobility and has been walking herself to and from  on her own.  Amb distance is only limited at this time because of the pain.  Pt feels she has no need for skilled PT at this time and will be discharged from our service.    Ramez Zee Jr., P.T.  License Number: [id number]     4

## 2025-04-03 NOTE — H&P ADULT - PROBLEM SELECTOR PROBLEM 3
Medication Refill Request  Refill request for:   rizatriptan (Maxalt) 10 MG tablet       Preferred pharmacy verified, selected and refill sent.    Pt has been out and would like this as soon as possible.  She has been getting a lot of headaches.      T2DM (type 2 diabetes mellitus) Stage 3b chronic kidney disease

## 2025-04-10 NOTE — PROGRESS NOTE ADULT - SUBJECTIVE AND OBJECTIVE BOX
"Daily Note     Today's date: 4/10/2025  Patient name: Denita Brown  : 1962  MRN: [id number]  Referring provider: Ezio Borges,*  Dx:   Encounter Diagnosis     ICD-10-CM    1. Primary osteoarthritis of one hip, right  M16.11           Start Time: 1613  Stop Time: 1651  Total time in clinic (min): 38 minutes    Subjective: Pt noted that she is sore but noted no immediate changes in her R hip.  Next ortho follow up is scheduled for .       Objective: See treatment diary below      Assessment:  contineud with treatment session with focus on primary strengthening of R hip. Tolerated treatment well. Patient exhibited good technique with therapeutic exercises and would benefit from continued PT  Challenge noted with lateral step downs this visit. Overall was able to complete  with proper form and technique.     Plan: Continue per plan of care.      Precautions: L1-S1 fusion, C2-C7 fusion >10 years, HTN, DM, Tachycardia , osteopenia.  Right Posterior KENNEDY 25.  Per surgeon note 25 able to flex beyond 90 degrees    Access Code: [email or web address] - 3/17/25.     POC expires Unit limit Auth  expiration date PT/OT + Visit Limit?   4/3/25 NA 25 1                 Visit/Unit Tracking  AUTH Status:  Date 4/3 4/7 2/26 3/4 3/6 3/10 3/13 3/17  3/20 3/24 3/27 3/31   Authorized Used 13 14 3 4 5 6 7 8  9 10 11 12    Remaining                  QR     Manuals 3/27 3/31 4/3 4/7 4/10  3/13 3/17 3/20 3/24   PROM right hip JF  JF JF.  Flex beyond 90 degrees   SC flexion beyond 90 degrees gentle.   JF SC JF LQ   Gentle hip flexor stretch at edge of mat JF        JF 3x30\" LQ  3x30\"                Re-eval JF            Neuro Re-Ed             Pt education        DOMS and HEP                                                                                    Ther Ex             Nustep L5x10' 10' L5 10' L6  10 min L6  10 min L6   L5x10' L5 10 min  L5x10' L3 x10   Standing hip ABD and Ext   YTB 2x 10   YTB 2x 10   YTB 2x 10 " "YTB 2 x10  RTB 2x 10  3x10  YTB 2x 10  No band.  Pain today 3x10 3x10 No band   Bridges  GTB 3\" 3x10 GTB 3\" 3x10 GTB 3\" 3x10 Gtb 3x 10   5\"x30 5\" 3x 10  5\" 3x 10  5\" 3x 10    Clamshell hooklying  GTB 3\" x30 GTB 3\" x30 GTB 3\" 3x10  GTB 2x 10 s/l   RTB x30 RTB 3x 10      SLR  2x10 2x10 2x15  2x 15   SLR 3x10 SLR VC\"S 3x 10  Hold today only HELD   Seated LAQ Instructed            QS Instructed        15    Ankle pumps Instructed            SL clamshells pillow between knees         3\"x30 3\"x30   Sidelying right hip ABD  AAROM 2x10 AROM 2x10 AROM 3x10 AROM  3x 10         Ther Activity             Squats Instructed 3x10 3x10 3x 10  3x 10   Standing 3x10 3x 10  3x10 3x10   Step ups F/L  6\" 3x10 fwd 6\" 3x10 fwd 4\" 2x 10   6\" 2x 10   6\" 3x10 6\" 2x 10  F and   4\" 2x 10 L  6\" 3x10 fwd 6\" 3x 10  F and   4\" 2x 10 L    Right lateral step downs   4\" x10 4\" 2x10 4\" 2x 10                      Gait Training                                       Modalities                                                   " Orthopedic Surgery Progress Note    Patient seen and examined this morning. No acute events overnight. Now extubated on Bipap. Failed trial to wean yesterday    O:  Vital Signs Last 24 Hrs  T(C): 37.2 (30 Sep 2019 04:00), Max: 37.6 (29 Sep 2019 20:00)  T(F): 99 (30 Sep 2019 04:00), Max: 99.6 (29 Sep 2019 20:00)  HR: 71 (30 Sep 2019 06:00) (55 - 86)  BP: 147/62 (30 Sep 2019 06:00) (119/47 - 163/71)  BP(mean): 81 (30 Sep 2019 06:00) (61 - 91)  RR: 17 (30 Sep 2019 06:00) (16 - 25)  SpO2: 94% (30 Sep 2019 06:00) (86% - 98%)    Gen: NAD  LLE  Dressing C/D/I  EHL/FHL/TA/GS intact  SILT DP/SP/WOO/Sa  WWP distally    Labs:                        8.3    10.17 )-----------( 356      ( 30 Sep 2019 05:57 )             26.8                         8.3    13.02 )-----------( 386      ( 29 Sep 2019 01:25 )             26.1       09-30    146<H>  |  103  |  34<H>  ----------------------------<  144<H>  3.7   |  30  |  1.06

## 2025-07-15 NOTE — PROGRESS NOTE ADULT - PROBLEM/PLAN-6
DISPLAY PLAN FREE TEXT
abdominal pain